# Patient Record
Sex: FEMALE | Race: WHITE | NOT HISPANIC OR LATINO | Employment: OTHER | URBAN - METROPOLITAN AREA
[De-identification: names, ages, dates, MRNs, and addresses within clinical notes are randomized per-mention and may not be internally consistent; named-entity substitution may affect disease eponyms.]

---

## 2017-03-21 ENCOUNTER — ALLSCRIPTS OFFICE VISIT (OUTPATIENT)
Dept: OTHER | Facility: OTHER | Age: 82
End: 2017-03-21

## 2017-03-23 ENCOUNTER — HOSPITAL ENCOUNTER (OUTPATIENT)
Dept: RADIOLOGY | Facility: HOSPITAL | Age: 82
Discharge: HOME | End: 2017-03-23
Attending: FAMILY MEDICINE
Payer: MEDICARE

## 2017-03-23 ENCOUNTER — TRANSCRIBE ORDERS (OUTPATIENT)
Dept: RADIOLOGY | Facility: HOSPITAL | Age: 82
End: 2017-03-23

## 2017-03-23 ENCOUNTER — ALLSCRIPTS OFFICE VISIT (OUTPATIENT)
Dept: OTHER | Facility: OTHER | Age: 82
End: 2017-03-23

## 2017-03-23 DIAGNOSIS — M54.9 DORSALGIA: ICD-10-CM

## 2017-03-23 PROCEDURE — 72110 X-RAY EXAM L-2 SPINE 4/>VWS: CPT

## 2017-04-06 ENCOUNTER — GENERIC CONVERSION - ENCOUNTER (OUTPATIENT)
Dept: OTHER | Facility: OTHER | Age: 82
End: 2017-04-06

## 2017-04-06 ENCOUNTER — ALLSCRIPTS OFFICE VISIT (OUTPATIENT)
Dept: OTHER | Facility: OTHER | Age: 82
End: 2017-04-06

## 2017-06-22 ENCOUNTER — ALLSCRIPTS OFFICE VISIT (OUTPATIENT)
Dept: OTHER | Facility: OTHER | Age: 82
End: 2017-06-22

## 2017-06-27 ENCOUNTER — APPOINTMENT (OUTPATIENT)
Dept: PHYSICAL THERAPY | Age: 82
End: 2017-06-27
Payer: MEDICARE

## 2017-06-27 PROCEDURE — G8990 OTHER PT/OT CURRENT STATUS: HCPCS

## 2017-06-27 PROCEDURE — 97163 PT EVAL HIGH COMPLEX 45 MIN: CPT

## 2017-06-27 PROCEDURE — G8991 OTHER PT/OT GOAL STATUS: HCPCS

## 2017-06-30 ENCOUNTER — APPOINTMENT (OUTPATIENT)
Dept: PHYSICAL THERAPY | Age: 82
End: 2017-06-30
Payer: MEDICARE

## 2017-07-07 ENCOUNTER — APPOINTMENT (OUTPATIENT)
Dept: PHYSICAL THERAPY | Age: 82
End: 2017-07-07
Payer: MEDICARE

## 2017-07-10 ENCOUNTER — APPOINTMENT (OUTPATIENT)
Dept: PHYSICAL THERAPY | Age: 82
End: 2017-07-10
Payer: MEDICARE

## 2017-07-10 PROCEDURE — 97112 NEUROMUSCULAR REEDUCATION: CPT

## 2017-07-10 PROCEDURE — 97014 ELECTRIC STIMULATION THERAPY: CPT

## 2017-07-13 ENCOUNTER — APPOINTMENT (OUTPATIENT)
Dept: PHYSICAL THERAPY | Age: 82
End: 2017-07-13
Payer: MEDICARE

## 2017-07-13 PROCEDURE — G8991 OTHER PT/OT GOAL STATUS: HCPCS | Performed by: PHYSICAL THERAPIST

## 2017-07-13 PROCEDURE — G8990 OTHER PT/OT CURRENT STATUS: HCPCS | Performed by: PHYSICAL THERAPIST

## 2017-07-13 PROCEDURE — 97110 THERAPEUTIC EXERCISES: CPT

## 2017-07-13 PROCEDURE — 97014 ELECTRIC STIMULATION THERAPY: CPT

## 2017-07-17 ENCOUNTER — APPOINTMENT (OUTPATIENT)
Dept: PHYSICAL THERAPY | Age: 82
End: 2017-07-17
Payer: MEDICARE

## 2017-07-19 ENCOUNTER — APPOINTMENT (OUTPATIENT)
Dept: PHYSICAL THERAPY | Age: 82
End: 2017-07-19
Payer: MEDICARE

## 2017-07-20 ENCOUNTER — APPOINTMENT (OUTPATIENT)
Dept: PHYSICAL THERAPY | Age: 82
End: 2017-07-20
Payer: MEDICARE

## 2017-07-22 ENCOUNTER — HOSPITAL ENCOUNTER (EMERGENCY)
Facility: HOSPITAL | Age: 82
Discharge: HOME | End: 2017-07-22
Attending: EMERGENCY MEDICINE
Payer: MEDICARE

## 2017-07-22 ENCOUNTER — APPOINTMENT (EMERGENCY)
Dept: RADIOLOGY | Facility: HOSPITAL | Age: 82
End: 2017-07-22
Payer: MEDICARE

## 2017-07-22 VITALS
OXYGEN SATURATION: 93 % | TEMPERATURE: 98 F | WEIGHT: 193 LBS | RESPIRATION RATE: 18 BRPM | HEART RATE: 108 BPM | SYSTOLIC BLOOD PRESSURE: 137 MMHG | DIASTOLIC BLOOD PRESSURE: 69 MMHG
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
## 2017-07-22 DIAGNOSIS — R07.89 CHEST WALL PAIN: ICD-10-CM

## 2017-07-22 DIAGNOSIS — F11.20 UNCOMPLICATED OPIOID DEPENDENCE (HCC): ICD-10-CM

## 2017-07-22 DIAGNOSIS — M54.9 BACK PAIN: ICD-10-CM

## 2017-07-22 LAB
ALBUMIN SERPL BCP-MCNC: 3.6 G/DL (ref 3.5–5)
ALP SERPL-CCNC: 203 U/L (ref 46–116)
ALT SERPL W P-5'-P-CCNC: 50 U/L (ref 12–78)
ANION GAP SERPL CALCULATED.3IONS-SCNC: 10 MMOL/L (ref 4–13)
AST SERPL W P-5'-P-CCNC: 37 U/L (ref 5–45)
ATRIAL RATE: 110 BPM
BACTERIA UR QL AUTO: ABNORMAL /HPF
BASOPHILS # BLD AUTO: 0.02 THOUSANDS/ΜL (ref 0–0.1)
BASOPHILS NFR BLD AUTO: 0 % (ref 0–1)
BILIRUB SERPL-MCNC: 0.8 MG/DL (ref 0.2–1)
BILIRUB UR QL STRIP: ABNORMAL
BUN SERPL-MCNC: 20 MG/DL (ref 5–25)
CALCIUM SERPL-MCNC: 9.5 MG/DL (ref 8.3–10.1)
CHLORIDE SERPL-SCNC: 100 MMOL/L (ref 100–108)
CLARITY UR: CLEAR
CO2 SERPL-SCNC: 26 MMOL/L (ref 21–32)
COLOR UR: YELLOW
CREAT SERPL-MCNC: 1.06 MG/DL (ref 0.6–1.3)
EOSINOPHIL # BLD AUTO: 0.11 THOUSAND/ΜL (ref 0–0.61)
EOSINOPHIL NFR BLD AUTO: 1 % (ref 0–6)
ERYTHROCYTE [DISTWIDTH] IN BLOOD BY AUTOMATED COUNT: 13 % (ref 11.6–15.1)
GFR SERPL CREATININE-BSD FRML MDRD: 49.9 ML/MIN/1.73SQ M
GLUCOSE SERPL-MCNC: 216 MG/DL (ref 65–140)
GLUCOSE UR STRIP-MCNC: NEGATIVE MG/DL
HCT VFR BLD AUTO: 44.4 % (ref 34.8–46.1)
HGB BLD-MCNC: 14.8 G/DL (ref 11.5–15.4)
HGB UR QL STRIP.AUTO: ABNORMAL
HYALINE CASTS #/AREA URNS LPF: ABNORMAL /LPF
KETONES UR STRIP-MCNC: NEGATIVE MG/DL
LEUKOCYTE ESTERASE UR QL STRIP: NEGATIVE
LIPASE SERPL-CCNC: 104 U/L (ref 73–393)
LYMPHOCYTES # BLD AUTO: 3.38 THOUSANDS/ΜL (ref 0.6–4.47)
LYMPHOCYTES NFR BLD AUTO: 24 % (ref 14–44)
MCH RBC QN AUTO: 29.5 PG (ref 26.8–34.3)
MCHC RBC AUTO-ENTMCNC: 33.3 G/DL (ref 31.4–37.4)
MCV RBC AUTO: 89 FL (ref 82–98)
MONOCYTES # BLD AUTO: 1.07 THOUSAND/ΜL (ref 0.17–1.22)
MONOCYTES NFR BLD AUTO: 7 % (ref 4–12)
NEUTROPHILS # BLD AUTO: 9.78 THOUSANDS/ΜL (ref 1.85–7.62)
NEUTS SEG NFR BLD AUTO: 68 % (ref 43–75)
NITRITE UR QL STRIP: NEGATIVE
NON-SQ EPI CELLS URNS QL MICRO: ABNORMAL /HPF
NRBC BLD AUTO-RTO: 0 /100 WBCS
P AXIS: 66 DEGREES
PH UR STRIP.AUTO: 5.5 [PH] (ref 4.5–8)
PLATELET # BLD AUTO: 312 THOUSANDS/UL (ref 149–390)
PMV BLD AUTO: 11.4 FL (ref 8.9–12.7)
POTASSIUM SERPL-SCNC: 4.2 MMOL/L (ref 3.5–5.3)
PR INTERVAL: 160 MS
PROT SERPL-MCNC: 8.1 G/DL (ref 6.4–8.2)
PROT UR STRIP-MCNC: ABNORMAL MG/DL
QRS AXIS: 47 DEGREES
QRSD INTERVAL: 84 MS
QT INTERVAL: 344 MS
QTC INTERVAL: 465 MS
RBC # BLD AUTO: 5.01 MILLION/UL (ref 3.81–5.12)
RBC #/AREA URNS AUTO: ABNORMAL /HPF
SODIUM SERPL-SCNC: 136 MMOL/L (ref 136–145)
SP GR UR STRIP.AUTO: >=1.03 (ref 1–1.03)
SPECIMEN SOURCE: NORMAL
T WAVE AXIS: 82 DEGREES
TROPONIN I BLD-MCNC: 0.01 NG/ML (ref 0–0.08)
UROBILINOGEN UR QL STRIP.AUTO: 0.2 E.U./DL
VENTRICULAR RATE: 110 BPM
WBC # BLD AUTO: 14.4 THOUSAND/UL (ref 4.31–10.16)
WBC #/AREA URNS AUTO: ABNORMAL /HPF

## 2017-07-22 PROCEDURE — 80053 COMPREHEN METABOLIC PANEL: CPT | Performed by: EMERGENCY MEDICINE

## 2017-07-22 PROCEDURE — 70450 CT HEAD/BRAIN W/O DYE: CPT

## 2017-07-22 PROCEDURE — 96374 THER/PROPH/DIAG INJ IV PUSH: CPT

## 2017-07-22 PROCEDURE — 85025 COMPLETE CBC W/AUTO DIFF WBC: CPT | Performed by: EMERGENCY MEDICINE

## 2017-07-22 PROCEDURE — 72125 CT NECK SPINE W/O DYE: CPT

## 2017-07-22 PROCEDURE — 93005 ELECTROCARDIOGRAM TRACING: CPT | Performed by: EMERGENCY MEDICINE

## 2017-07-22 PROCEDURE — 36415 COLL VENOUS BLD VENIPUNCTURE: CPT | Performed by: EMERGENCY MEDICINE

## 2017-07-22 PROCEDURE — 83690 ASSAY OF LIPASE: CPT | Performed by: EMERGENCY MEDICINE

## 2017-07-22 PROCEDURE — 96375 TX/PRO/DX INJ NEW DRUG ADDON: CPT

## 2017-07-22 PROCEDURE — 71250 CT THORAX DX C-: CPT

## 2017-07-22 PROCEDURE — 81002 URINALYSIS NONAUTO W/O SCOPE: CPT | Performed by: EMERGENCY MEDICINE

## 2017-07-22 PROCEDURE — 74176 CT ABD & PELVIS W/O CONTRAST: CPT

## 2017-07-22 PROCEDURE — 99284 EMERGENCY DEPT VISIT MOD MDM: CPT

## 2017-07-22 PROCEDURE — 84484 ASSAY OF TROPONIN QUANT: CPT

## 2017-07-22 PROCEDURE — 81001 URINALYSIS AUTO W/SCOPE: CPT

## 2017-07-22 RX ORDER — OXYCODONE HYDROCHLORIDE 5 MG/1
5 TABLET ORAL ONCE
Status: COMPLETED | OUTPATIENT
Start: 2017-07-22 | End: 2017-07-22

## 2017-07-22 RX ORDER — MORPHINE SULFATE 4 MG/ML
4 INJECTION, SOLUTION INTRAMUSCULAR; INTRAVENOUS ONCE
Status: COMPLETED | OUTPATIENT
Start: 2017-07-22 | End: 2017-07-22

## 2017-07-22 RX ORDER — FENTANYL CITRATE 50 UG/ML
50 INJECTION, SOLUTION INTRAMUSCULAR; INTRAVENOUS ONCE
Status: COMPLETED | OUTPATIENT
Start: 2017-07-22 | End: 2017-07-22

## 2017-07-22 RX ORDER — ACETAMINOPHEN 325 MG/1
975 TABLET ORAL ONCE
Status: COMPLETED | OUTPATIENT
Start: 2017-07-22 | End: 2017-07-22

## 2017-07-22 RX ADMIN — MORPHINE SULFATE 4 MG: 4 INJECTION, SOLUTION INTRAMUSCULAR; INTRAVENOUS at 03:44

## 2017-07-22 RX ADMIN — OXYCODONE HYDROCHLORIDE 5 MG: 5 TABLET ORAL at 05:04

## 2017-07-22 RX ADMIN — ACETAMINOPHEN 975 MG: 325 TABLET, FILM COATED ORAL at 05:02

## 2017-07-22 RX ADMIN — FENTANYL CITRATE 50 MCG: 50 INJECTION INTRAMUSCULAR; INTRAVENOUS at 03:02

## 2017-07-22 RX ADMIN — MORPHINE SULFATE 4 MG: 4 INJECTION, SOLUTION INTRAMUSCULAR; INTRAVENOUS at 03:16

## 2017-07-24 ENCOUNTER — APPOINTMENT (OUTPATIENT)
Dept: PHYSICAL THERAPY | Age: 82
End: 2017-07-24
Payer: MEDICARE

## 2017-07-26 ENCOUNTER — APPOINTMENT (OUTPATIENT)
Dept: PHYSICAL THERAPY | Age: 82
End: 2017-07-26
Payer: MEDICARE

## 2017-10-26 ENCOUNTER — GENERIC CONVERSION - ENCOUNTER (OUTPATIENT)
Dept: OTHER | Facility: OTHER | Age: 82
End: 2017-10-26

## 2017-11-01 ENCOUNTER — ALLSCRIPTS OFFICE VISIT (OUTPATIENT)
Dept: OTHER | Facility: OTHER | Age: 82
End: 2017-11-01

## 2017-11-02 NOTE — PROGRESS NOTES
Assessment  1  Chronic migraine without aura (346 70) (G43 709)    Plan  Chronic back pain greater than 3 months duration, Chronic migraine without aura,  Health Maintenance    · Follow-up visit in 6 months Evaluation and Treatment  Follow-up with Mackenzie  Status:  Complete  Done: [id number]   Ordered; For: Chronic back pain greater than 3 months duration, Chronic migraine without aura, Health Maintenance; Ordered By: Rehan Eubanks Performed:  Due: [id number]; Last Updated By: Frank Craven; 11/1/2017 12:01:08 PM    Discussion/Summary  Discussion Summary:   She is to continue to botox  Will change her botox to q 4 months and continue to see if we can wean her off of the botox slowly  Chief Complaint  Chief Complaint Free Text Note Form: Pt is here for a f/u      History of Present Illness  HPI: We had the pleasure of evaluating Dalton Dorado in neurological follow up today  As you know she is a [de-identified]year old right handed female  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  She is here today for headaches  Has history of breast cancer in 1992 with left breast lumpectomy and 1995 had mastectomy  Migraine headaches: meds used: Metoprolol, Methocarbamol, Benadryl, escitalopram, lorazepam, Depakote, olanzapine, Tizanidine, Lisinoprilmeds used: OxyContin, Reglan, Headache trigger: Fatigue, Stress/Tensionnone  last seen headaches have improved with Botox injections q 3 months  Since starting botox, the patient reports greater than 7 days of migraine relief from baseline, correlated with headache diary, decreased abortive medication use and decreased ER visits    often do the headaches occur - one with botox nowtime of the day do the headaches start - varieslong do the headaches last - few hours to daysare they located â occipital and temporalis the intensity of pain â 3 to 10/10your usual headache - Throbbing, Poundingsymptoms: nauseaPhotophobia, phonophobiaprefer to be alone and in a dark room, unable to work  reviewed personally       Review of Systems  Neurological ROS:   Constitutional: no fever, no chills, no recent weight gain, no recent weight loss, no complaints of feeling tired, no changes in appetite  HEENT:  no sinus problems, not feeling congested, no blurred vision, no dryness of the eyes, no eye pain, no hearing loss, no tinnitus, no mouth sores, no sore throat, no hoarseness, no dysphagia, no masses, no bleeding  Cardiovascular:  no chest pain or pressure, no palpitations present, the heart rate was not rapid or irregular, no swelling in the arms or legs, no poor circulation  Respiratory:  no unusual or persistant cough, no shortness of breath with or without exertion  Gastrointestinal:  no nausea, no vomiting, no diarrhea, no abdominal pain, no changes in bowel habits, no melena, no loss of bowel control  Genitourinary:  no incontinence, no feelings of urinary urgency, no increase in frequency, no urinary hesitancy, no dysuria, no hematuria  Musculoskeletal:  no arthralgias, no myalgias, no immobility or loss of function, no head/neck/back pain, no pain while walking  Integumentary  no masses, no rash, no skin lesions, no livedo reticularis  Psychiatric:  no anxiety, no depression, no mood swings, no psychiatric hospitalizations, no sleep problems  Endocrine  no unusual weight loss or gain, no excessive urination, no excessive thirst, no hair loss or gain, no hot or cold intolerance, no menstrual period change or irregularity, no loss of sexual ability or drive, no erection difficulty, no nipple discharge  Hematologic/Lymphatic:  no unusual bleeding, no tendency for easy bruising, no clotting skin or lumps  Neurological General:  no headache, no nausea or vomiting, no lightheadedness, no convulsions, no blackouts, no syncope, no trauma, no photopsia, no increased sleepiness, no trouble falling asleep, no snoring, no awakening at night     Neurological Mental Status:  no confusion, no mood swings, no alteration or loss of consciousness, no difficulty expressing/understanding speech, no memory problems  Neurological Cranial Nerves:  no blurry or double vision, no loss of vision, no face drooping, no facial numbness or weakness, no taste or smell loss/changes, no hearing loss or ringing, no vertigo or dizziness, no dysphagia, no slurred speech  Neurological Motor findings include:  no tremor, no twitching, no cramping(pre/post exercise), no atrophy  Neurological Coordination:  no unsteadiness, no vertigo or dizziness, no clumsiness, no problems reaching for objects  Neurological Sensory:  no numbness, no pain, no tingling, does not fall when eyes closed or taking a shower  Neurological Gait:  no difficulty walking, not falling to one side, no sensation of being pushed, has not had falls  ROS Reviewed:   ROS reviewed  Active Problems  1  Chronic back pain greater than 3 months duration (724 5,338 29) (M54 9,G89 29)   2  Chronic migraine without aura (346 70) (G43 709)   3  Migraine without aura with status migrainosus (346 12) (G43 001)   4  Pleural effusion (511 9) (J90)    Past Medical History  1  History of Anxiety (300 00) (F41 9)   2  History of Asthma (493 90) (J45 909)   3  History of Breast cancer, left (174 9) (C50 912)   4  History of Depression (311) (F32 9)   5  History of Diabetes (250 00) (E11 9)   6  History of gastroesophageal reflux (GERD) (V12 79) (Z87 19)   7  History of Hyperlipidemia (272 4) (E78 5)   8  History of Hypertension (401 9) (I10)    Surgical History  1  History of Back Surgery   2  History of Cholecystectomy   3  History of Hysterectomy    Family History  Mother    1  Family history of Colon cancer   2  Family history of   Father    3  Family history of    4  FH: emphysema (V17 6) (Z82 5)   5  Family history of Stroke  Sister    6  Family history of Breast cancer   7  Family history of    6   Family history of Leukemia  Brother    9  Family history of ALS (amyotrophic lateral sclerosis)   10  Family history of   Maternal Grandmother    6  Family history of   Paternal Grandmother    15  Family history of   Maternal Grandfather    15  Family history of   Paternal Grandfather    15  Family history of     Social History   · Caffeine use (V49 89) (F15 90)   · Drinks coffee   · Denied: History of Drug use   ·    · Never a smoker   · Social alcohol use (Z78 9)    Current Meds   1  Domperidone POWD; 20mg four times a day; Therapy: (Recorded:96Kpc9314) to Recorded   2  EpiPen 2-Hector 0 3 MG/0 3ML Injection Solution Auto-injector; INJECT 0 3ML   INTRAMUSCULARLY AS DIRECTED; Therapy: (Recorded:96Fqt4633) to Recorded   3  Fluocinonide 0 05 % External Cream;   Therapy: 12CDC2530 to Recorded   4  HumaLOG KwikPen 100 UNIT/ML Subcutaneous Solution Pen-injector; as directed; Therapy: 60QPR6992 to (Evaluate:2015) Recorded   5  Hydrocod Polst-CPM Polst ER 10-8 MG/5ML Oral Suspension Extended Release; Therapy: 10BSK2823 to Recorded   6  Lantus SOLN;   Therapy: (Recorded:83Qjk4512) to Recorded   7  Lidocaine 5 % External Ointment; APPLY TO AFFECTED area prior to Botox; Therapy: 94TEG2237 to ([facility])  Requested for: 03VKV6403 Ordered   8  Lidocaine-Prilocaine 2 5-2 5 % External Cream; APPLY 1 HOUR BEFORE PROCEDURE   AS DIRECTED; Therapy: 42MPO7525 to (Last [facility])  Requested for: 63TRI5847 Ordered   9  LORazepam 1 MG Oral Tablet; Therapy: 17OIX0664 to Recorded   10  Metoprolol Tartrate 50 MG Oral Tablet; TAKE 2 TABLETS DAILY; Therapy: 66Sdi7746 to Recorded   11  NovoLOG FlexPen 100 UNIT/ML Subcutaneous Solution Pen-injector; Therapy: 62XLG1913 to Recorded   12  OLANZapine 5 MG Oral Tablet; 1 po at bedtime PRN headache; Therapy: 81DHH3917 to (Evaluate:2017)  Requested for: 2017; Last    Rx:2017 Ordered   13   Oxybutynin Chloride ER 15 MG Oral Tablet Extended Release 24 Hour; Take 1 tablet    daily; Therapy: 81FIX1558 to (Evaluate:06Oct2014) Recorded   14  Prevacid 30 MG Oral Capsule Delayed Release; Therapy: (Recorded:49Xsn7327) to Recorded   15  Simvastatin 20 MG Oral Tablet; TAKE 1 TABLET DAILY AT BEDTIME; Therapy: 23RLM6992 to (Evaluate:05Fys3812) Recorded    Allergies  1  Betadine MISC   2  iodine    Vitals  Signs   Recorded: 65EGP3205 11:32AM   Heart Rate: 90  Systolic: 041  Diastolic: 67  Weight: 456 lb   BMI Calculated: 33 13  BSA Calculated: 1 9    Physical Exam    Constitutional   General appearance: No acute distress, well appearing and well nourished  Eyes   Ophthalmoscopic examination: Vision is grossly normal  Gross visual field testing by confrontation shows no abnormalities  EOMI in both eyes  Conjunctivae clear  Eyelids normal palpebral fissures equal  Orbits exhibit normal position  No discharge from the eyes  PERRL  Musculoskeletal   Gait and station: Abnormal   Gait evaluation demonstrated a wide-based and ataxic gait  Muscle strength: Normal strength throughout  -- left hand in a brace , unable to tchekc left upper ext  Muscle tone: No atrophy, abnormal movements, flaccidity, cogwheeling or spasticity      Neurologic   Orientation to person, place, and time: Normal     2nd cranial nerve: Normal     3rd, 4th, and 6th cranial nerves: Normal     5th cranial nerve: Normal     7th cranial nerve: Normal     8th cranial nerve: Normal     9th cranial nerve: Normal     11th cranial nerve: Normal     12th cranial nerve: Normal     Sensation: Normal     Reflexes: Normal     Coordination: Normal     Mood and affect: Normal        Future Appointments    Date/Time Provider Specialty Site   03/01/2018 02:00 PM Tonia Nicolas [facility] Neurology [facility] 21     Signatures   Electronically signed by : Liz Colorado MD; Nov 1 2017  1:41PM EST                       (Author)

## 2018-01-12 VITALS
WEIGHT: 198.03 LBS | DIASTOLIC BLOOD PRESSURE: 67 MMHG | BODY MASS INDEX: 34.53 KG/M2 | HEART RATE: 63 BPM | TEMPERATURE: 98.6 F | SYSTOLIC BLOOD PRESSURE: 147 MMHG

## 2018-01-13 VITALS
HEART RATE: 90 BPM | WEIGHT: 190 LBS | DIASTOLIC BLOOD PRESSURE: 67 MMHG | BODY MASS INDEX: 33.13 KG/M2 | SYSTOLIC BLOOD PRESSURE: 139 MMHG

## 2018-01-13 VITALS
SYSTOLIC BLOOD PRESSURE: 132 MMHG | OXYGEN SATURATION: 93 % | DIASTOLIC BLOOD PRESSURE: 68 MMHG | RESPIRATION RATE: 18 BRPM | TEMPERATURE: 97.7 F | HEART RATE: 70 BPM

## 2018-01-13 VITALS — TEMPERATURE: 98.3 F | SYSTOLIC BLOOD PRESSURE: 122 MMHG | DIASTOLIC BLOOD PRESSURE: 57 MMHG

## 2018-01-14 VITALS
HEART RATE: 103 BPM | HEIGHT: 64 IN | TEMPERATURE: 98.1 F | RESPIRATION RATE: 16 BRPM | WEIGHT: 192 LBS | DIASTOLIC BLOOD PRESSURE: 76 MMHG | SYSTOLIC BLOOD PRESSURE: 132 MMHG | OXYGEN SATURATION: 95 % | BODY MASS INDEX: 32.78 KG/M2

## 2018-01-18 NOTE — MISCELLANEOUS
Message   Recorded as Task   Date: 03/30/2017 01:22 PM, Created By: Brandt Jacobs   Task Name: Follow Up   Assigned To: Jeffrey Schneider   Regarding Patient: Veola Hashimoto, Status: Active   Comment:    Brandt Jacobs - 30 Mar 2017 1:22 PM     TASK CREATED  Caller: Self; [phone] (Home); [phone] (Work)  Pt would like to know if it is to combine Nortriptyline, Duloxetine, Oxycodone, and Tizanidine  Please advise     Pt advised not to take tizanidine at night, to take duloxetine in AM, and nortriptyline in PM       Signatures   Electronically signed by : NGOZI Farnsworth ; Apr 6 2017 12:20AM EST                       (Author)

## 2018-01-22 VITALS — SYSTOLIC BLOOD PRESSURE: 118 MMHG | DIASTOLIC BLOOD PRESSURE: 72 MMHG | TEMPERATURE: 97.8 F

## 2018-03-28 ENCOUNTER — PROCEDURE VISIT (OUTPATIENT)
Dept: NEUROLOGY | Facility: CLINIC | Age: 83
End: 2018-03-28
Payer: MEDICARE

## 2018-03-28 VITALS — DIASTOLIC BLOOD PRESSURE: 72 MMHG | SYSTOLIC BLOOD PRESSURE: 148 MMHG | TEMPERATURE: 97.7 F

## 2018-03-28 DIAGNOSIS — G43.709 CHRONIC MIGRAINE WITHOUT AURA WITHOUT STATUS MIGRAINOSUS, NOT INTRACTABLE: Primary | ICD-10-CM

## 2018-03-28 PROCEDURE — 64615 CHEMODENERV MUSC MIGRAINE: CPT | Performed by: PSYCHIATRY & NEUROLOGY

## 2018-03-28 NOTE — PROGRESS NOTES
Chemodenervation  Date/Time: 3/28/2018 3:06 PM  Performed by: Boom Orona  Authorized by: Jessie Serna details:     Prepped With: Alcohol    Anesthesia (see MAR for exact dosages): Anesthesia method:  None  Procedure details:     Position:  Upright  Botox:     Botox Type:  Type A    Brand:  Botox    mL's of Botulinum Toxin:  155    Final Concentration per CC:  200 units    Needle Gauge:  30 G 2 5 inch  Procedures:     Botox Procedures: chronic headache      Indications: migraines    Injection Location:     Head / Face:  L superior cervical paraspinal, R superior cervical paraspinal, L , R , L frontalis, R frontalis, L medial occipitalis, R medial occipitalis, procerus, R temporalis, L temporalis, R superior trapezius and L superior trapezius    L  injection amount:  5 unit(s)    R  injection amount:  5 unit(s)    L lateral frontalis:  0 unit(s)    R lateral frontalis:  0 unit(s)    L medial frontalis:  0 unit(s)    R medial frontalis:  0 unit(s)    L temporalis injection amount:  20 unit(s)    R temporalis injection amount:  20 unit(s)    Procerus injection amount:  5 unit(s)    L medial occipitalis injection amount:  20 unit(s)    R medial occipitalis injection amount:  20 unit(s)    L superior cervical paraspinal injection amount:  10 unit(s)    R superior cervical paraspinal injection amount:  10 unit(s)    L superior trapezius injection amount:  15 unit(s)    R superior trapezius injection amount:  15 unit(s)  Total Units:     Total units used:  155    Total units discarded:  45  Post-procedure details:     Chemodenervation:  Chronic migraine    Patient tolerance of procedure: Tolerated well, no immediate complications  Comments:      10  Units given in the apex of hers scalp

## 2018-05-15 NOTE — PROGRESS NOTES
Patient ID: Mary Suarez is a [de-identified] y o  female  Assessment/Plan:    Chronic migraine without aura without status migrainosus, not intractable  Preventative:  Continue your Botox every 3 months  Continue Metoprolol twice a day    Abortive: At onset of migraine use the Ketorolac and heating pad  Problem List Items Addressed This Visit        Cardiovascular and Mediastinum    Migraine without status migrainosus - Primary    Relevant Medications    ketorolac (TORADOL) 10 mg tablet    Chronic migraine without aura without status migrainosus, not intractable     Preventative:  Continue your Botox every 3 months  Continue Metoprolol twice a day    Abortive: At onset of migraine use the Ketorolac and heating pad  Relevant Medications    ketorolac (TORADOL) 10 mg tablet             Subjective:    HPI  We had the pleasure of evaluating Mary Suarez in neurological follow up today  As you know she is a [de-identified]year old right handed female  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  She is here today for headaches  Has history of breast cancer in 1992 with left breast lumpectomy and 1995 had mastectomy  Chronic Migraine headaches:   PREVENTIVE meds used: Metoprolol, Methocarbamol, Benadryl, escitalopram, lorazepam, Depakote, olanzapine, Tizanidine, Lisinopril, Botox  ABORTIVE meds used: OxyContin, Reglan,   Headache trigger: Fatigue, Stress/Tension  Aura: none    Since last seen headaches have improved with Botox injections q 3 months  Since starting botox, the patient reports greater than 7 days of migraine relief from baseline, correlated with headache diary, decreased abortive medication use and decreased ER visits      How often do the headaches occur - 1-2 a month  What time of the day do the headaches start - varies  How long do the headaches last - few hours   Where are they located - occipital and temporal  What is the intensity of pain - 3 to 6/10  Describe your usual headache - Throbbing, Pounding  Associated symptoms:   - nausea  - Photophobia, phonophobia  - prefer to be alone and in a dark room, unable to work      The following portions of the patient's history were reviewed and updated as appropriate: allergies, current medications, past family history, past medical history, past social history, past surgical history and problem list          Objective:    Blood pressure (!) 176/85, pulse 81, height 5' 2" (1 575 m), weight 83 7 kg (184 lb 9 6 oz)  Physical Exam   Constitutional: She appears well-developed and well-nourished  HENT:   Head: Normocephalic and atraumatic  Eyes: EOM are normal  Pupils are equal, round, and reactive to light  Neck: Normal range of motion  Cardiovascular: Normal rate  Pulmonary/Chest: Effort normal    Musculoskeletal: Normal range of motion  Neurological: She has normal strength and normal reflexes  Gait and coordination normal    Skin: Skin is warm and dry  Psychiatric: She has a normal mood and affect  Her speech is normal    Nursing note and vitals reviewed  Neurological Exam    Mental Status  The patient is alert and oriented to person, place, time, and situation  Her recent and remote memory are normal  She has no visuospatial neglect  Her speech is normal  Her language is fluent with no aphasia  She has normal attention span and concentration  She has a normal fund of knowledge  Cranial Nerves    CN II: The patient's visual acuity and visual fields are normal   CN III, IV, VI: The patient's pupils are equally round and reactive to light and ocular movements are normal   CN V: The patient has normal facial sensation  CN VII:  The patient has symmetric facial movement  CN VIII:  The patient's hearing is normal   CN IX, X: The patient has symmetric palate movement and normal gag reflex    CN XI: The patient's shoulder shrug strength is normal   CN XII: The patient's tongue is midline without atrophy or fasciculations  Motor  The patient has normal muscle bulk throughout  Her overall muscle tone is normal throughout  Her strength is 5/5 throughout all four extremities  Sensory  The patient's sensation is normal in all four extremities  She has normal cortical sensation    Reflexes  Deep tendon reflexes are 2+ and symmetric in all four extremities with downgoing toes bilaterally  Gait and Coordination  The patient has normal gait and station and normal casual, toe, heel, and tandem gait  She has normal coordination bilaterally  ROS:    Review of Systems   Constitutional: Negative  HENT: Positive for sneezing  Itching throat    Eyes: Negative  Respiratory: Positive for cough  Cardiovascular: Negative  Gastrointestinal: Negative  Endocrine: Negative  Genitourinary: Negative  Musculoskeletal: Positive for gait problem (ambulates with cane ) and neck pain  Skin: Negative  Allergic/Immunologic: Positive for environmental allergies  Neurological: Positive for light-headedness  Hematological: Negative  Psychiatric/Behavioral: Positive for sleep disturbance

## 2018-05-17 ENCOUNTER — OFFICE VISIT (OUTPATIENT)
Dept: NEUROLOGY | Facility: CLINIC | Age: 83
End: 2018-05-17
Payer: MEDICARE

## 2018-05-17 VITALS
HEART RATE: 81 BPM | WEIGHT: 184.6 LBS | BODY MASS INDEX: 33.97 KG/M2 | DIASTOLIC BLOOD PRESSURE: 85 MMHG | HEIGHT: 62 IN | SYSTOLIC BLOOD PRESSURE: 176 MMHG

## 2018-05-17 DIAGNOSIS — G43.009 MIGRAINE WITHOUT AURA AND WITHOUT STATUS MIGRAINOSUS, NOT INTRACTABLE: Primary | ICD-10-CM

## 2018-05-17 DIAGNOSIS — G43.709 CHRONIC MIGRAINE WITHOUT AURA WITHOUT STATUS MIGRAINOSUS, NOT INTRACTABLE: ICD-10-CM

## 2018-05-17 PROCEDURE — 99214 OFFICE O/P EST MOD 30 MIN: CPT | Performed by: PHYSICIAN ASSISTANT

## 2018-05-17 RX ORDER — INSULIN ASPART 100 [IU]/ML
INJECTION, SOLUTION INTRAVENOUS; SUBCUTANEOUS
Refills: 6 | COMMUNITY
Start: 2018-04-16 | End: 2019-06-18 | Stop reason: SDUPTHER

## 2018-05-17 RX ORDER — KETOROLAC TROMETHAMINE 10 MG/1
10 TABLET, FILM COATED ORAL AS NEEDED
COMMUNITY
End: 2018-10-18 | Stop reason: SDUPTHER

## 2018-05-17 RX ORDER — INSULIN GLARGINE 300 U/ML
50 INJECTION, SOLUTION SUBCUTANEOUS
COMMUNITY
Start: 2018-05-02 | End: 2019-06-18 | Stop reason: SDUPTHER

## 2018-05-17 RX ORDER — FLUTICASONE PROPIONATE 220 UG/1
AEROSOL, METERED RESPIRATORY (INHALATION)
Refills: 6 | COMMUNITY
Start: 2018-05-14

## 2018-05-17 RX ORDER — EPINEPHRINE 0.3 MG/.3ML
0.3 INJECTION SUBCUTANEOUS
COMMUNITY

## 2018-05-17 NOTE — PATIENT INSTRUCTIONS
Preventative:  Continue your Botox every 3 months  Continue Metoprolol twice a day    Abortive: At onset of migraine use the Ketorolac and heating pad  May take these over-the-counter supplements to decrease intensity and frequency of migraines  - Magnesium Oxide 400-500 mg a day  If any diarrhea or upset stomach, decrease dose  as tolerated  -  B2 200 mg a day  May take once a day in am  This supplement will change the color of the urine to fluorescent yellow no matter how hydrated, which is normal      Discussed side effects of all medications prescribed today to the patient in detail  Patient education was completed today and we also discussed precautions for rebound headaches  When patient has a moderate to severe headache, they should seek rest, initiate relaxation and apply cold compresses to the head  1  Maintain regular sleep schedule  Adults need at least 7-8 hours of uninterrupted a night  2  Limit over the counter medications such as Tylenol, Ibuprofen, Aleve, Excedrin  (No more than 3 times a week)  3  Maintain headache diary  We discussed an HEATHER for a smart phone is "Migraine eDiary"  4  Limit caffeine to 1-2 cups a day or less  5  Avoid dietary trigger  (list given to the patient and reviewed with them)  6  Patient is to have regular frequent meals to prevent headache onset  7   Please drink at least 64 ounces of water a day to help remain hydrated

## 2018-05-17 NOTE — ASSESSMENT & PLAN NOTE
Preventative:  Continue your Botox every 3 months  Continue Metoprolol twice a day    Abortive: At onset of migraine use the Ketorolac and heating pad

## 2018-06-15 ENCOUNTER — LAB REQUISITION (OUTPATIENT)
Dept: LAB | Facility: HOSPITAL | Age: 83
End: 2018-06-15
Payer: MEDICARE

## 2018-06-15 DIAGNOSIS — K22.70 BARRETT'S ESOPHAGUS WITHOUT DYSPLASIA: ICD-10-CM

## 2018-06-15 PROCEDURE — 88305 TISSUE EXAM BY PATHOLOGIST: CPT | Performed by: PATHOLOGY

## 2018-06-25 NOTE — PROGRESS NOTES
Chemodenervation  Date/Time: 6/27/2018 3:13 PM  Performed by: El Knowles by: Azul Ojeda details:     Prepped With: Alcohol    Anesthesia (see MAR for exact dosages): Anesthesia method:  None  Procedure details:     Position:  Upright  Botox:     Botox Type:  Type A    Brand:  Botox    Final Concentration per CC:  200 units    Needle Gauge:  30 G 2 5 inch  Procedures:     Botox Procedures: chronic headache      Indications: migraines    Injection Location:     Head / Face:  L superior cervical paraspinal, R superior cervical paraspinal, L , R , L frontalis, R frontalis, L medial occipitalis, R medial occipitalis, procerus, R temporalis, L temporalis, R superior trapezius and L superior trapezius    L temporalis injection amount:  20 unit(s)    R temporalis injection amount:  20 unit(s)    Procerus injection amount:  5 unit(s)    L medial occipitalis injection amount:  15 unit(s)    R medial occipitalis injection amount:  15 unit(s)    L superior cervical paraspinal injection amount:  10 unit(s)    R superior cervical paraspinal injection amount:  10 unit(s)    L superior trapezius injection amount:  15 unit(s)    R superior trapezius injection amount:  15 unit(s)  Post-procedure details:     Chemodenervation:  Chronic migraine    Facial Nerve Location[de-identified]  Bilateral facial nerve    Patient tolerance of procedure:   Tolerated well, no immediate complications    30 Units given in the occipital region bilaterally

## 2018-06-27 ENCOUNTER — PROCEDURE VISIT (OUTPATIENT)
Dept: NEUROLOGY | Facility: CLINIC | Age: 83
End: 2018-06-27
Payer: MEDICARE

## 2018-06-27 VITALS — HEART RATE: 73 BPM | SYSTOLIC BLOOD PRESSURE: 178 MMHG | TEMPERATURE: 98.1 F | DIASTOLIC BLOOD PRESSURE: 85 MMHG

## 2018-06-27 DIAGNOSIS — G43.709 CHRONIC MIGRAINE WITHOUT AURA WITHOUT STATUS MIGRAINOSUS, NOT INTRACTABLE: Primary | ICD-10-CM

## 2018-06-27 PROCEDURE — 64615 CHEMODENERV MUSC MIGRAINE: CPT | Performed by: PSYCHIATRY & NEUROLOGY

## 2018-09-24 ENCOUNTER — HOSPITAL ENCOUNTER (OUTPATIENT)
Facility: HOSPITAL | Age: 83
Setting detail: OBSERVATION
Discharge: HOME | End: 2018-09-25
Attending: EMERGENCY MEDICINE | Admitting: HOSPITALIST
Payer: MEDICARE

## 2018-09-24 ENCOUNTER — APPOINTMENT (EMERGENCY)
Dept: RADIOLOGY | Facility: HOSPITAL | Age: 83
End: 2018-09-24
Payer: MEDICARE

## 2018-09-24 DIAGNOSIS — R07.9 CHEST PAIN: Primary | ICD-10-CM

## 2018-09-24 PROBLEM — I50.9 CHRONIC HEART FAILURE (HCC): Status: ACTIVE | Noted: 2018-09-24

## 2018-09-24 LAB
ALBUMIN SERPL BCP-MCNC: 3.3 G/DL (ref 3.5–5)
ALP SERPL-CCNC: 154 U/L (ref 46–116)
ALT SERPL W P-5'-P-CCNC: 40 U/L (ref 12–78)
ANION GAP SERPL CALCULATED.3IONS-SCNC: 6 MMOL/L (ref 4–13)
AST SERPL W P-5'-P-CCNC: 37 U/L (ref 5–45)
ATRIAL RATE: 74 BPM
ATRIAL RATE: 75 BPM
ATRIAL RATE: 79 BPM
BASOPHILS # BLD AUTO: 0.02 THOUSANDS/ΜL (ref 0–0.1)
BASOPHILS NFR BLD AUTO: 0 % (ref 0–1)
BILIRUB SERPL-MCNC: 0.94 MG/DL (ref 0.2–1)
BUN SERPL-MCNC: 20 MG/DL (ref 5–25)
CALCIUM SERPL-MCNC: 9 MG/DL (ref 8.3–10.1)
CHLORIDE SERPL-SCNC: 104 MMOL/L (ref 100–108)
CO2 SERPL-SCNC: 25 MMOL/L (ref 21–32)
CREAT SERPL-MCNC: 1.01 MG/DL (ref 0.6–1.3)
DEPRECATED D DIMER PPP: 483 NG/ML (FEU) (ref 0–424)
EOSINOPHIL # BLD AUTO: 0.15 THOUSAND/ΜL (ref 0–0.61)
EOSINOPHIL NFR BLD AUTO: 1 % (ref 0–6)
ERYTHROCYTE [DISTWIDTH] IN BLOOD BY AUTOMATED COUNT: 12.7 % (ref 11.6–15.1)
GFR SERPL CREATININE-BSD FRML MDRD: 52 ML/MIN/1.73SQ M
GLUCOSE SERPL-MCNC: 176 MG/DL (ref 65–140)
GLUCOSE SERPL-MCNC: 207 MG/DL (ref 65–140)
HCT VFR BLD AUTO: 45.4 % (ref 34.8–46.1)
HGB BLD-MCNC: 14.6 G/DL (ref 11.5–15.4)
IMM GRANULOCYTES # BLD AUTO: 0.04 THOUSAND/UL (ref 0–0.2)
IMM GRANULOCYTES NFR BLD AUTO: 0 % (ref 0–2)
LYMPHOCYTES # BLD AUTO: 2.86 THOUSANDS/ΜL (ref 0.6–4.47)
LYMPHOCYTES NFR BLD AUTO: 24 % (ref 14–44)
MCH RBC QN AUTO: 29.1 PG (ref 26.8–34.3)
MCHC RBC AUTO-ENTMCNC: 32.2 G/DL (ref 31.4–37.4)
MCV RBC AUTO: 90 FL (ref 82–98)
MONOCYTES # BLD AUTO: 0.8 THOUSAND/ΜL (ref 0.17–1.22)
MONOCYTES NFR BLD AUTO: 7 % (ref 4–12)
NEUTROPHILS # BLD AUTO: 7.97 THOUSANDS/ΜL (ref 1.85–7.62)
NEUTS SEG NFR BLD AUTO: 68 % (ref 43–75)
NRBC BLD AUTO-RTO: 0 /100 WBCS
NT-PROBNP SERPL-MCNC: 176 PG/ML
P AXIS: 31 DEGREES
P AXIS: 43 DEGREES
P AXIS: 56 DEGREES
PLATELET # BLD AUTO: 214 THOUSANDS/UL (ref 149–390)
PLATELET # BLD AUTO: 264 THOUSANDS/UL (ref 149–390)
PMV BLD AUTO: 11.7 FL (ref 8.9–12.7)
PMV BLD AUTO: 11.8 FL (ref 8.9–12.7)
POTASSIUM SERPL-SCNC: 4.2 MMOL/L (ref 3.5–5.3)
PR INTERVAL: 150 MS
PR INTERVAL: 156 MS
PR INTERVAL: 158 MS
PROT SERPL-MCNC: 7.2 G/DL (ref 6.4–8.2)
QRS AXIS: 17 DEGREES
QRS AXIS: 19 DEGREES
QRS AXIS: 29 DEGREES
QRSD INTERVAL: 72 MS
QRSD INTERVAL: 74 MS
QRSD INTERVAL: 76 MS
QT INTERVAL: 352 MS
QT INTERVAL: 370 MS
QT INTERVAL: 384 MS
QTC INTERVAL: 403 MS
QTC INTERVAL: 413 MS
QTC INTERVAL: 426 MS
RBC # BLD AUTO: 5.02 MILLION/UL (ref 3.81–5.12)
SODIUM SERPL-SCNC: 135 MMOL/L (ref 136–145)
T WAVE AXIS: 14 DEGREES
T WAVE AXIS: 47 DEGREES
T WAVE AXIS: 58 DEGREES
TROPONIN I SERPL-MCNC: <0.02 NG/ML
VENTRICULAR RATE: 74 BPM
VENTRICULAR RATE: 75 BPM
VENTRICULAR RATE: 79 BPM
WBC # BLD AUTO: 11.84 THOUSAND/UL (ref 4.31–10.16)

## 2018-09-24 PROCEDURE — 84484 ASSAY OF TROPONIN QUANT: CPT | Performed by: PHYSICIAN ASSISTANT

## 2018-09-24 PROCEDURE — 85049 AUTOMATED PLATELET COUNT: CPT | Performed by: PHYSICIAN ASSISTANT

## 2018-09-24 PROCEDURE — 93010 ELECTROCARDIOGRAM REPORT: CPT | Performed by: INTERNAL MEDICINE

## 2018-09-24 PROCEDURE — 96374 THER/PROPH/DIAG INJ IV PUSH: CPT

## 2018-09-24 PROCEDURE — 36415 COLL VENOUS BLD VENIPUNCTURE: CPT

## 2018-09-24 PROCEDURE — 99220 PR INITIAL OBSERVATION CARE/DAY 70 MINUTES: CPT | Performed by: INTERNAL MEDICINE

## 2018-09-24 PROCEDURE — 74150 CT ABDOMEN W/O CONTRAST: CPT

## 2018-09-24 PROCEDURE — 82948 REAGENT STRIP/BLOOD GLUCOSE: CPT

## 2018-09-24 PROCEDURE — 85025 COMPLETE CBC W/AUTO DIFF WBC: CPT | Performed by: EMERGENCY MEDICINE

## 2018-09-24 PROCEDURE — 84484 ASSAY OF TROPONIN QUANT: CPT | Performed by: EMERGENCY MEDICINE

## 2018-09-24 PROCEDURE — 85379 FIBRIN DEGRADATION QUANT: CPT | Performed by: EMERGENCY MEDICINE

## 2018-09-24 PROCEDURE — 83880 ASSAY OF NATRIURETIC PEPTIDE: CPT | Performed by: EMERGENCY MEDICINE

## 2018-09-24 PROCEDURE — 80053 COMPREHEN METABOLIC PANEL: CPT | Performed by: EMERGENCY MEDICINE

## 2018-09-24 PROCEDURE — 96376 TX/PRO/DX INJ SAME DRUG ADON: CPT

## 2018-09-24 PROCEDURE — 93005 ELECTROCARDIOGRAM TRACING: CPT

## 2018-09-24 PROCEDURE — 99285 EMERGENCY DEPT VISIT HI MDM: CPT

## 2018-09-24 PROCEDURE — 71250 CT THORAX DX C-: CPT

## 2018-09-24 RX ORDER — MUSCLE RUB CREAM 100; 150 MG/G; MG/G
1 CREAM TOPICAL 4 TIMES DAILY PRN
Status: DISCONTINUED | OUTPATIENT
Start: 2018-09-24 | End: 2018-09-25 | Stop reason: HOSPADM

## 2018-09-24 RX ORDER — PRAVASTATIN SODIUM 40 MG
40 TABLET ORAL
Status: DISCONTINUED | OUTPATIENT
Start: 2018-09-25 | End: 2018-09-25 | Stop reason: HOSPADM

## 2018-09-24 RX ORDER — MORPHINE SULFATE 15 MG/1
15 TABLET ORAL EVERY 8 HOURS PRN
Status: DISCONTINUED | OUTPATIENT
Start: 2018-09-24 | End: 2018-09-25 | Stop reason: HOSPADM

## 2018-09-24 RX ORDER — GABAPENTIN 100 MG/1
100 CAPSULE ORAL
Status: DISCONTINUED | OUTPATIENT
Start: 2018-09-24 | End: 2018-09-25 | Stop reason: HOSPADM

## 2018-09-24 RX ORDER — ACETAMINOPHEN 325 MG/1
975 TABLET ORAL ONCE
Status: COMPLETED | OUTPATIENT
Start: 2018-09-24 | End: 2018-09-24

## 2018-09-24 RX ORDER — CALCIUM CARBONATE 200(500)MG
1000 TABLET,CHEWABLE ORAL DAILY PRN
Status: DISCONTINUED | OUTPATIENT
Start: 2018-09-24 | End: 2018-09-25 | Stop reason: HOSPADM

## 2018-09-24 RX ORDER — ALBUTEROL SULFATE 90 UG/1
1 AEROSOL, METERED RESPIRATORY (INHALATION) EVERY 4 HOURS PRN
Status: DISCONTINUED | OUTPATIENT
Start: 2018-09-24 | End: 2018-09-25 | Stop reason: HOSPADM

## 2018-09-24 RX ORDER — SENNOSIDES 8.6 MG
1 TABLET ORAL
Status: DISCONTINUED | OUTPATIENT
Start: 2018-09-24 | End: 2018-09-25 | Stop reason: HOSPADM

## 2018-09-24 RX ORDER — FLUTICASONE PROPIONATE 220 UG/1
2 AEROSOL, METERED RESPIRATORY (INHALATION)
Status: DISCONTINUED | OUTPATIENT
Start: 2018-09-24 | End: 2018-09-25 | Stop reason: HOSPADM

## 2018-09-24 RX ORDER — INSULIN GLARGINE 100 [IU]/ML
50 INJECTION, SOLUTION SUBCUTANEOUS
Status: DISCONTINUED | OUTPATIENT
Start: 2018-09-24 | End: 2018-09-25 | Stop reason: HOSPADM

## 2018-09-24 RX ORDER — ACETAMINOPHEN 325 MG/1
975 TABLET ORAL EVERY 8 HOURS SCHEDULED
Status: DISCONTINUED | OUTPATIENT
Start: 2018-09-24 | End: 2018-09-25 | Stop reason: HOSPADM

## 2018-09-24 RX ORDER — ONDANSETRON 2 MG/ML
4 INJECTION INTRAMUSCULAR; INTRAVENOUS EVERY 6 HOURS PRN
Status: DISCONTINUED | OUTPATIENT
Start: 2018-09-24 | End: 2018-09-25 | Stop reason: HOSPADM

## 2018-09-24 RX ORDER — MORPHINE SULFATE 15 MG/1
15 TABLET, FILM COATED, EXTENDED RELEASE ORAL EVERY 12 HOURS PRN
COMMUNITY

## 2018-09-24 RX ORDER — PANTOPRAZOLE SODIUM 40 MG/1
40 TABLET, DELAYED RELEASE ORAL
Status: DISCONTINUED | OUTPATIENT
Start: 2018-09-25 | End: 2018-09-25 | Stop reason: HOSPADM

## 2018-09-24 RX ORDER — METOPROLOL TARTRATE 50 MG/1
50 TABLET, FILM COATED ORAL EVERY 12 HOURS SCHEDULED
COMMUNITY

## 2018-09-24 RX ADMIN — INSULIN LISPRO 2 UNITS: 100 INJECTION, SOLUTION INTRAVENOUS; SUBCUTANEOUS at 22:59

## 2018-09-24 RX ADMIN — GABAPENTIN 100 MG: 100 CAPSULE ORAL at 22:29

## 2018-09-24 RX ADMIN — HYDROMORPHONE HYDROCHLORIDE 0.5 MG: 1 INJECTION, SOLUTION INTRAMUSCULAR; INTRAVENOUS; SUBCUTANEOUS at 17:14

## 2018-09-24 RX ADMIN — HYDROMORPHONE HYDROCHLORIDE 0.5 MG: 1 INJECTION, SOLUTION INTRAMUSCULAR; INTRAVENOUS; SUBCUTANEOUS at 22:30

## 2018-09-24 RX ADMIN — METOPROLOL TARTRATE 25 MG: 25 TABLET ORAL at 22:30

## 2018-09-24 RX ADMIN — INSULIN GLARGINE 50 UNITS: 100 INJECTION, SOLUTION SUBCUTANEOUS at 22:30

## 2018-09-24 RX ADMIN — HYDROMORPHONE HYDROCHLORIDE 0.5 MG: 1 INJECTION, SOLUTION INTRAMUSCULAR; INTRAVENOUS; SUBCUTANEOUS at 18:26

## 2018-09-24 RX ADMIN — ACETAMINOPHEN 975 MG: 325 TABLET, FILM COATED ORAL at 20:59

## 2018-09-24 NOTE — ASSESSMENT & PLAN NOTE
· Suspect MSK, r/o ACS  · Serial troponins  If negative, anticipate discharge home tomorrow  · Pain control- scheduled Tylenol, heat, topical agents   Start gabapentin

## 2018-09-24 NOTE — ASSESSMENT & PLAN NOTE
· Reports history of heart failure and follows with Dr Shady Milan  · Not on diuretics- appears euvolemic  · Continue BB

## 2018-09-24 NOTE — ASSESSMENT & PLAN NOTE
· Chronic morphine use due to chronic low back pain  Continue at home dose   Breakthrough meds if needed

## 2018-09-24 NOTE — ED ATTENDING ATTESTATION
I, Desirae Hollingsworth DO, saw and evaluated the patient  I have discussed the patient with the resident/non-physician practitioner and agree with the resident's/non-physician practitioner's findings, Plan of Care, and MDM as documented in the resident's/non-physician practitioner's note, except where noted  All available labs and Radiology studies were reviewed  At this point I agree with the current assessment done in the Emergency Department  I have conducted an independent evaluation of this patient a history and physical is as follows:      Critical Care Time  CritCare Time    Procedures     82 yr old fem with acute chest pain with radiation to left back and arm 30 min PTA  No sob, nausea, sweats  Had similar a few months ago that she did not seek eval   Pain still present  Some incr with arm motion  Exm; anxious,  Pressure good  Heart: rrr  With EGK wo acute changes  Radial pulses equal bilaterally  Abd: soft and nontender  Left shoulder tender with arm use but not with palpation  Pln: dissection eval and adm for ACS screen  Heart score over 3

## 2018-09-24 NOTE — ASSESSMENT & PLAN NOTE
Lab Results   Component Value Date    HGBA1C 7 2 (H) 01/20/2015       No results for input(s): POCGLU in the last 72 hours      Blood Sugar Average: Last 72 hrs:  · Check A1C  · Continue home insulin regimen

## 2018-09-24 NOTE — H&P
H&P- Hilda William 1935, 82 y o  female MRN: [id number]    Unit/Bed#: ED 20 Encounter: [id number]    Primary Care Provider: MERARY Soni MD   Date and time admitted to hospital: 9/24/2018  4:02 PM    * Chest pain   Assessment & Plan    · Suspect MSK, r/o ACS  · Serial troponins  If negative, anticipate discharge home tomorrow  · Pain control- scheduled Tylenol, heat, topical agents  Start gabapentin        Chronic heart failure ([facility] [address] )   Assessment & Plan    · Reports history of heart failure and follows with Dr Nathalia Cantu  · Not on diuretics- appears euvolemic  · Continue BB        Uncomplicated opioid dependence ([facility] [address] )   Assessment & Plan    · Chronic morphine use due to chronic low back pain  Continue at home dose  Breakthrough meds if needed        Diabetes mellitus type 2 in [facility])   Assessment & Plan    Lab Results   Component Value Date    HGBA1C 7 2 (H) 01/20/2015       No results for input(s): POCGLU in the last 72 hours  Blood Sugar Average: Last 72 hrs:  · Check A1C  · Continue home insulin regimen          VTE Prophylaxis: Enoxaparin (Lovenox)  / sequential compression device   Code Status: Full code  POLST: There is no POLST form on file for this patient (pre-hospital)  Discussion with family:  at bedside    Anticipated Length of Stay:  Patient will be admitted on an Observation basis with an anticipated length of stay of  < 2 midnights  Justification for Hospital Stay: Rule out cardiac event    Total Time for Visit, including Counseling / Coordination of Care: 45 minutes  Greater than 50% of this total time spent on direct patient counseling and coordination of care      Chief Complaint:   Chest pain    History of Present Illness:    Hilda William is a 82 y o  female with a history of heart failure, DM, HTN, HLD, breast cancer s/p left mastectomy with saline implant, and chronic opioid dependence secondary to chronic low back pain who presents with acute onset chest pain starting today  Pain started around 12:30 when patient was sitting in recliner  Pain is in anterior left chest and radiates to left shoulder blade, left neck, and left arm  Pain feels like a pressure and is worsened with movement of shoulder and sitting up/laying down  She was given aspirin and nitro in the ambulance without relief  Here she was given dilaudid 0 5 mg x 2 without relief  Her pain is ongoing and currently 9 5/10  She had no associated SOB  She notes she was told she had stenosis in her neck and was referred to neck surgeon in the past  Currently, she is sitting up eating in no acute distress  No smoking history  No family history of CAD  She reports she has a history of heart failure and follows with Dr Chavira Neither  Not on diuretic  Review of Systems:    Review of Systems   Constitutional: Negative  HENT: Negative  Eyes: Negative  Respiratory: Negative for shortness of breath  Cardiovascular: Positive for chest pain  Gastrointestinal: Negative  Endocrine: Negative  Genitourinary: Negative  Musculoskeletal: Positive for back pain and neck pain  Left arm pain   Skin: Negative  Allergic/Immunologic: Negative  Neurological: Negative  Hematological: Negative  Psychiatric/Behavioral: Negative  Past Medical and Surgical History:     Past Medical History:   Diagnosis Date    Diabetes mellitus ([address] )     GERD (gastroesophageal reflux disease)     Hyperlipidemia     Hypertension     Migraine        Past Surgical History:   Procedure Laterality Date    BREAST SURGERY      CARDIAC SURGERY      MASTECTOMY         Meds/Allergies:    Prior to Admission medications    Medication Sig Start Date End Date Taking?  Authorizing Provider   FLOVENT  MCG/ACT inhaler INHALE 2 PUFF BY INHALATION ROUTE 2 TIMES EVERY DAY 5/14/18  Yes Historical Provider, MD   lansoprazole (PREVACID) 15 mg capsule Take 30 mg by mouth daily     Yes Historical Provider, MD   metoprolol succinate (TOPROL-XL) 50 mg 24 hr tablet Take 50 mg by mouth daily  Yes Historical Provider, MD   oxybutynin (DITROPAN XL) 15 MG 24 hr tablet Take 15 mg by mouth daily  Yes Historical Provider, MD   simvastatin (ZOCOR) 20 mg tablet Take 20 mg by mouth daily at bedtime  Yes Historical Provider, MD   EPINEPHrine (EPIPEN 2-SILVIO) 0 3 mg/0 3 mL SOAJ Inject 0 3 mL as directed    Historical Provider, MD   ketorolac (TORADOL) 10 mg tablet Take 10 mg by mouth as needed for moderate pain    Historical Provider, MD   morphine (MSIR) 15 mg tablet Take 15 mg by mouth every 8 (eight) hours as needed for severe pain  Historical Provider, MD   NOVOLOG FLEXPEN 100 UNIT/ML SOPN USE SUBCUTANEOUSLY PER SLIDING SCALE PROVIDED BY DOCTOR  4/16/18   Historical Provider, MD Magda Negron SOLOSTAR injection pen 300 units/mL  5/2/18   Historical Provider, MD   VENTOLIN  (90 Base) MCG/ACT inhaler  2/26/18   Historical Provider, MD     I have reviewed home medications with patient personally  Allergies: Allergies   Allergen Reactions    Betadine [Povidone Iodine] Anaphylaxis    Iodine Anaphylaxis and Other (See Comments)    Aspirin GI Bleeding    Caffeine Palpitations       Social History:     Marital Status: /Civil Union   Occupation:    Patient Pre-hospital Living Situation: Lives at home  Patient Pre-hospital Level of Mobility:    Patient Pre-hospital Diet Restrictions:    Substance Use History:   History   Alcohol Use No     History   Smoking Status    Never Smoker   Smokeless Tobacco    Never Used     History   Drug Use No       Family History:    No family history of CAD    Physical Exam:     Vitals:   Blood Pressure: (!) 171/75 (09/24/18 1915)  Pulse: 80 (09/24/18 1915)  Temperature: 98 4 °F (36 9 °C) (09/24/18 1606)  Temp Source: Oral (09/24/18 1606)  Respirations: (!) 27 (09/24/18 1915)  SpO2: 93 % (09/24/18 1915)    Physical Exam   Constitutional: She is oriented to person, place, and time  No distress  NAD  Sitting up eating dinner   HENT:   Head: Normocephalic and atraumatic  Eyes: No scleral icterus  Neck: Neck supple  Reports neck pain with neck rotation   Cardiovascular: Normal rate, regular rhythm and normal heart sounds  Pulmonary/Chest: Effort normal and breath sounds normal  No respiratory distress  She has no wheezes  She has no rales  Abdominal: Soft  Bowel sounds are normal  She exhibits no distension  There is no tenderness  Musculoskeletal:   +Tenderness to palpation left thoracic paraspinal muscles  Pain reproducible with rotation of left shoulder   Neurological: She is alert and oriented to person, place, and time  Skin: Skin is warm  She is not diaphoretic  Psychiatric: She has a normal mood and affect  Her behavior is normal        Additional Data:     Lab Results: I have personally reviewed pertinent reports  Results from last 7 days  Lab Units 09/24/18  1610   WBC Thousand/uL 11 84*   HEMOGLOBIN g/dL 14 6   HEMATOCRIT % 45 4   PLATELETS Thousands/uL 264   NEUTROS PCT % 68   LYMPHS PCT % 24   MONOS PCT % 7   EOS PCT % 1       Results from last 7 days  Lab Units 09/24/18  1610   SODIUM mmol/L 135*   POTASSIUM mmol/L 4 2   CHLORIDE mmol/L 104   CO2 mmol/L 25   BUN mg/dL 20   CREATININE mg/dL 1 01   CALCIUM mg/dL 9 0   ALK PHOS U/L 154*   ALT U/L 40   AST U/L 37                   Imaging: I have personally reviewed pertinent reports  CT chest and abdomen wo contrast   Final Result by Sameera Mera MD (09/24 1808)      Chronic stable T12 compression deformity  No acute findings within the chest or abdomen  No thoracic or abdominal aortic aneurysm  Workstation performed: [id number]             EKG, Pathology, and Other Studies Reviewed on Admission:   · EKG: Normal sinus rhythm    Allscripts / Epic Records Reviewed: Yes     ** Please Note: This note has been constructed using a voice recognition system   **

## 2018-09-24 NOTE — ED PROVIDER NOTES
History  Chief Complaint   Patient presents with    Chest Pain     patient reports sudden onset of left sided chest pressure radiating to back and left arm, worse with movement     83 yo F presents to ED for chest pain  Pt reports that the pain started suddenly about an hour and a half ago while she was sitting in her recliner  Pain is pressure like and radiates to her back and down her L arm  Pt says that pain has been constant since onset  She received 4 ASA and a nitro en route from EMS with no improvement  Pain not worsened with exertion or relieved by rest  Pain worse with taking deep breath  She denies any shortness of breath, nausea/vomiting, diaphoresis, heart palpitations, lightheadedness/dizziness  Pt says she has had similar chest pain once before but it lasted only briefly  No known history of CAD but says she does have history of CHF and is followed by Dr Tracie Hutchins with cardiology  She denies any known recent weight gain or increase in edema  CAD risk factors include HTN, HLD, DM  Prior to Admission Medications   Prescriptions Last Dose Informant Patient Reported? Taking? EPINEPHrine (EPIPEN 2-SILVIO) 0 3 mg/0 3 mL SOAJ  Self Yes No   Sig: Inject 0 3 mL as directed   FLOVENT  MCG/ACT inhaler  Self Yes No   Sig: INHALE 2 PUFF BY INHALATION ROUTE 2 TIMES EVERY DAY   NOVOLOG FLEXPEN 100 UNIT/ML SOPN  Self Yes No   Sig: USE SUBCUTANEOUSLY PER SLIDING SCALE PROVIDED BY DOCTOR  GABE VALLE injection pen 300 units/mL  Self Yes No   VENTOLIN  (90 Base) MCG/ACT inhaler  Self Yes No   ketorolac (TORADOL) 10 mg tablet  Self Yes No   Sig: Take 10 mg by mouth as needed for moderate pain   lansoprazole (PREVACID) 15 mg capsule  Self Yes No   Sig: Take 30 mg by mouth daily     metoprolol succinate (TOPROL-XL) 50 mg 24 hr tablet  Self Yes No   Sig: Take 50 mg by mouth daily     morphine (MSIR) 15 mg tablet  Self Yes No   Sig: Take 15 mg by mouth every 8 (eight) hours as needed for severe pain    oxybutynin (DITROPAN XL) 15 MG 24 hr tablet  Self Yes No   Sig: Take 15 mg by mouth daily  simvastatin (ZOCOR) 20 mg tablet  Self Yes No   Sig: Take 20 mg by mouth daily at bedtime  Facility-Administered Medications: None       Past Medical History:   Diagnosis Date    Diabetes mellitus ([address] )     GERD (gastroesophageal reflux disease)     Hyperlipidemia     Hypertension     Migraine        Past Surgical History:   Procedure Laterality Date    BREAST SURGERY      CARDIAC SURGERY      MASTECTOMY         Family History   Problem Relation Age of Onset    Colon cancer Mother     Stroke Father     Emphysema Father     Leukemia Sister     Breast cancer Sister     ALS Brother      I have reviewed and agree with the history as documented  Social History   Substance Use Topics    Smoking status: Never Smoker    Smokeless tobacco: Never Used    Alcohol use No        Review of Systems   Constitutional: Negative for activity change, chills and fever  HENT: Negative for congestion, rhinorrhea, sore throat and trouble swallowing  Eyes: Negative for pain, discharge and visual disturbance  Respiratory: Negative for cough, chest tightness and shortness of breath  Cardiovascular: Positive for chest pain  Negative for palpitations and leg swelling  Gastrointestinal: Negative for abdominal pain, nausea and vomiting  Genitourinary: Negative for dysuria, frequency and urgency  Musculoskeletal: Negative for gait problem, neck pain and neck stiffness  Skin: Negative for color change, rash and wound  Neurological: Negative for dizziness, syncope, light-headedness and headaches         Physical Exam  ED Triage Vitals [09/24/18 1606]   Temperature Pulse Respirations Blood Pressure SpO2   98 4 °F (36 9 °C) 82 20 129/68 96 %      Temp Source Heart Rate Source Patient Position - Orthostatic VS BP Location FiO2 (%)   Oral Monitor Sitting Right arm --      Pain Score       9 Orthostatic Vital Signs  Vitals:    09/24/18 1710 09/24/18 1740 09/24/18 1745 09/24/18 1813   BP: 146/67 166/76 166/76 166/74   Pulse: 71 70 70 75   Patient Position - Orthostatic VS: Sitting Sitting Sitting Sitting       Physical Exam   Constitutional: She is oriented to person, place, and time  She appears well-developed and well-nourished  No distress  HENT:   Head: Normocephalic and atraumatic  Mouth/Throat: Oropharynx is clear and moist    Eyes: Conjunctivae and EOM are normal  Right eye exhibits no discharge  Left eye exhibits no discharge  Neck: Normal range of motion  Neck supple  Cardiovascular: Normal rate, regular rhythm and intact distal pulses  Pulmonary/Chest: Effort normal and breath sounds normal  No stridor  No respiratory distress  She exhibits no tenderness  Abdominal: Soft  There is no tenderness  There is no rebound and no guarding  Musculoskeletal: Normal range of motion  She exhibits edema (trace bilateral lower extremity edema)  She exhibits no tenderness  Neurological: She is alert and oriented to person, place, and time  She exhibits normal muscle tone  Skin: Skin is warm and dry  Capillary refill takes less than 2 seconds  Nursing note and vitals reviewed  ED Medications  Medications   acetaminophen (TYLENOL) tablet 975 mg (0 mg Oral Hold 9/24/18 1831)    EMS REPLENISHMENT MED ( Does not apply Given to EMS 9/24/18 1614)   HYDROmorphone (DILAUDID) injection 0 5 mg (0 5 mg Intravenous Given 9/24/18 1714)   HYDROmorphone (DILAUDID) injection 0 5 mg (0 5 mg Intravenous Given 9/24/18 1826)       Diagnostic Studies  Results Reviewed     Procedure Component Value Units Date/Time    Troponin I [[id number]] Collected:  09/24/18 1826    Lab Status:   In process Specimen:  Blood from Arm, Right Updated:  09/24/18 1833    Comprehensive metabolic panel [[id number]]  (Abnormal) Collected:  09/24/18 1610    Lab Status:  Final result Specimen:  Blood from Arm, Right Updated: 09/24/18 1703     Sodium 135 (L) mmol/L      Potassium 4 2 mmol/L      Chloride 104 mmol/L      CO2 25 mmol/L      ANION GAP 6 mmol/L      BUN 20 mg/dL      Creatinine 1 01 mg/dL      Glucose 176 (H) mg/dL      Calcium 9 0 mg/dL      AST 37 U/L      ALT 40 U/L      Alkaline Phosphatase 154 (H) U/L      Total Protein 7 2 g/dL      Albumin 3 3 (L) g/dL      Total Bilirubin 0 94 mg/dL      eGFR 52 ml/min/1 73sq m     Narrative:         National Kidney Disease Education Program recommendations are as follows:  GFR calculation is accurate only with a steady state creatinine  Chronic Kidney disease less than 60 ml/min/1 73 sq  meters  Kidney failure less than 15 ml/min/1 73 sq  meters      BNP [[id number]]  (Normal) Collected:  09/24/18 1610    Lab Status:  Final result Specimen:  Blood from Arm, Right Updated:  09/24/18 1703     NT-proBNP 176 pg/mL     D-dimer, quantitative [[id number]]  (Abnormal) Collected:  09/24/18 1610    Lab Status:  Final result Specimen:  Blood from Arm, Right Updated:  09/24/18 1700     D-Dimer, Quant 483 (H) ng/ml (FEU)     Troponin I [[id number]]  (Normal) Collected:  09/24/18 1610    Lab Status:  Final result Specimen:  Blood from Arm, Right Updated:  09/24/18 1700     Troponin I <0 02 ng/mL     CBC and differential [[id number]]  (Abnormal) Collected:  09/24/18 1610    Lab Status:  Final result Specimen:  Blood from Arm, Right Updated:  09/24/18 1626     WBC 11 84 (H) Thousand/uL      RBC 5 02 Million/uL      Hemoglobin 14 6 g/dL      Hematocrit 45 4 %      MCV 90 fL      MCH 29 1 pg      MCHC 32 2 g/dL      RDW 12 7 %      MPV 11 8 fL      Platelets 355 Thousands/uL      nRBC 0 /100 WBCs      Neutrophils Relative 68 %      Immat GRANS % 0 %      Lymphocytes Relative 24 %      Monocytes Relative 7 %      Eosinophils Relative 1 %      Basophils Relative 0 %      Neutrophils Absolute 7 97 (H) Thousands/µL      Immature Grans Absolute 0 04 Thousand/uL      Lymphocytes Absolute 2 86 Thousands/µL Monocytes Absolute 0 80 Thousand/µL      Eosinophils Absolute 0 15 Thousand/µL      Basophils Absolute 0 02 Thousands/µL                  CT chest and abdomen wo contrast   Final Result by Sherman Flowers MD (09/24 1808)      Chronic stable T12 compression deformity  No acute findings within the chest or abdomen  No thoracic or abdominal aortic aneurysm  Workstation performed: [id number]               Procedures  ECG 12 Lead Documentation  Date/Time: 9/24/2018 4:29 PM  Performed by: Deon Love by: Autumn Seals     Indications / Diagnosis:  Chest pain  ECG reviewed by me, the ED Provider: yes    Patient location:  ED  Previous ECG:     Previous ECG:  Compared to current  Interpretation:     Interpretation: normal    Rate:     ECG rate:  75    ECG rate assessment: normal    Rhythm:     Rhythm: sinus rhythm    Ectopy:     Ectopy: none    QRS:     QRS axis:  Normal  ST segments:     ST segments:  Normal  T waves:     T waves: normal            Phone Consults  ED Phone Contact    ED Course           Identification of Seniors at Risk      Most Recent Value   (ISAR) Identification of Seniors at Risk   Before the illness or injury that brought you to the Emergency, did you need someone to help you on a regular basis? 0 Filed at: 09/24/2018 1607   In the last 24 hours, have you needed more help than usual?  0 Filed at: 09/24/2018 1607   Have you been hospitalized for one or more nights during the past 6 months? 0 Filed at: 09/24/2018 1607   In general, do you see well?  0 Filed at: 09/24/2018 1607   In general, do you have serious problems with your memory? 0 Filed at: 09/24/2018 1607   Do you take more than three different medications every day?   1 Filed at: 09/24/2018 1607   ISAR Score  1 Filed at: 09/24/2018 1607                          MDM  Number of Diagnoses or Management Options  Chest pain:   Diagnosis management comments: Received ASA, nitro en route with no improvement of symptoms  EKG unremarkable, first troponin negative  Unable to do contrast study due to iodine anaphylaxis allergy but CT chest/abdomen without acute process  Pt reports pain improved after dilaudid, but after about an hour, chest pain worsened again  EKG repeated and remains unchanged  Repeat dilaudid and tylenol given  Unable to give toradol due to GFR<60  Heart score 4 (age and risk factors)  Will admit to [facility] [place] Time    Disposition  Final diagnoses:   Chest pain     Time reflects when diagnosis was documented in both MDM as applicable and the Disposition within this note     Time User Action Codes Description Comment    9/24/2018  6:41 PM Delfina Godinez Add [R07 9] Chest pain       ED Disposition     ED Disposition Condition Comment    Admit  Case was discussed with [facility] [place] and the patient's admission status was agreed to be Admission Status: observation status to the service of Dr Teresa Gaxiola   Follow-up Information    None         Patient's Medications   Discharge Prescriptions    No medications on file     No discharge procedures on file  ED Provider  Attending physically available and evaluated Leonora Trujillo I managed the patient along with the ED Attending      Electronically Signed by         Martha Oh MD  09/24/18 4380

## 2018-09-25 VITALS
HEIGHT: 62 IN | WEIGHT: 188.6 LBS | HEART RATE: 78 BPM | SYSTOLIC BLOOD PRESSURE: 124 MMHG | BODY MASS INDEX: 34.71 KG/M2 | DIASTOLIC BLOOD PRESSURE: 60 MMHG | RESPIRATION RATE: 18 BRPM | TEMPERATURE: 98.5 F | OXYGEN SATURATION: 94 %

## 2018-09-25 PROBLEM — R07.9 CHEST PAIN: Status: RESOLVED | Noted: 2018-09-24 | Resolved: 2018-09-25

## 2018-09-25 LAB
ANION GAP SERPL CALCULATED.3IONS-SCNC: 7 MMOL/L (ref 4–13)
BUN SERPL-MCNC: 19 MG/DL (ref 5–25)
CALCIUM SERPL-MCNC: 8.9 MG/DL (ref 8.3–10.1)
CHLORIDE SERPL-SCNC: 106 MMOL/L (ref 100–108)
CO2 SERPL-SCNC: 25 MMOL/L (ref 21–32)
CREAT SERPL-MCNC: 0.94 MG/DL (ref 0.6–1.3)
ERYTHROCYTE [DISTWIDTH] IN BLOOD BY AUTOMATED COUNT: 12.7 % (ref 11.6–15.1)
EST. AVERAGE GLUCOSE BLD GHB EST-MCNC: 157 MG/DL
GFR SERPL CREATININE-BSD FRML MDRD: 57 ML/MIN/1.73SQ M
GLUCOSE SERPL-MCNC: 106 MG/DL (ref 65–140)
GLUCOSE SERPL-MCNC: 113 MG/DL (ref 65–140)
GLUCOSE SERPL-MCNC: 169 MG/DL (ref 65–140)
HBA1C MFR BLD: 7.1 % (ref 4.2–6.3)
HCT VFR BLD AUTO: 41.4 % (ref 34.8–46.1)
HGB BLD-MCNC: 13.3 G/DL (ref 11.5–15.4)
MCH RBC QN AUTO: 29 PG (ref 26.8–34.3)
MCHC RBC AUTO-ENTMCNC: 32.1 G/DL (ref 31.4–37.4)
MCV RBC AUTO: 90 FL (ref 82–98)
PLATELET # BLD AUTO: 224 THOUSANDS/UL (ref 149–390)
PMV BLD AUTO: 12.1 FL (ref 8.9–12.7)
POTASSIUM SERPL-SCNC: 3.7 MMOL/L (ref 3.5–5.3)
RBC # BLD AUTO: 4.58 MILLION/UL (ref 3.81–5.12)
SODIUM SERPL-SCNC: 138 MMOL/L (ref 136–145)
TROPONIN I SERPL-MCNC: <0.02 NG/ML
WBC # BLD AUTO: 10.09 THOUSAND/UL (ref 4.31–10.16)

## 2018-09-25 PROCEDURE — 82948 REAGENT STRIP/BLOOD GLUCOSE: CPT

## 2018-09-25 PROCEDURE — 83036 HEMOGLOBIN GLYCOSYLATED A1C: CPT | Performed by: PHYSICIAN ASSISTANT

## 2018-09-25 PROCEDURE — 84484 ASSAY OF TROPONIN QUANT: CPT | Performed by: PHYSICIAN ASSISTANT

## 2018-09-25 PROCEDURE — 85027 COMPLETE CBC AUTOMATED: CPT | Performed by: PHYSICIAN ASSISTANT

## 2018-09-25 PROCEDURE — 99217 PR OBSERVATION CARE DISCHARGE MANAGEMENT: CPT | Performed by: GENERAL PRACTICE

## 2018-09-25 PROCEDURE — 80048 BASIC METABOLIC PNL TOTAL CA: CPT | Performed by: PHYSICIAN ASSISTANT

## 2018-09-25 RX ORDER — LIDOCAINE 5% 5 G/100G
1 CREAM TOPICAL AS NEEDED
Qty: 1 TUBE | Refills: 0 | Status: SHIPPED | OUTPATIENT
Start: 2018-09-25

## 2018-09-25 RX ORDER — GABAPENTIN 100 MG/1
100 CAPSULE ORAL
Qty: 30 CAPSULE | Refills: 0 | Status: SHIPPED | OUTPATIENT
Start: 2018-09-25 | End: 2018-10-18

## 2018-09-25 RX ORDER — ACETAMINOPHEN 500 MG
1000 TABLET ORAL EVERY 8 HOURS
Start: 2018-09-25 | End: 2018-10-18

## 2018-09-25 RX ADMIN — METOPROLOL TARTRATE 25 MG: 25 TABLET ORAL at 08:05

## 2018-09-25 RX ADMIN — ACETAMINOPHEN 975 MG: 325 TABLET, FILM COATED ORAL at 05:50

## 2018-09-25 RX ADMIN — INSULIN LISPRO 1 UNITS: 100 INJECTION, SOLUTION INTRAVENOUS; SUBCUTANEOUS at 10:43

## 2018-09-25 RX ADMIN — OXYBUTYNIN CHLORIDE 15 MG: 5 TABLET, EXTENDED RELEASE ORAL at 08:05

## 2018-09-25 RX ADMIN — MORPHINE SULFATE 15 MG: 15 TABLET ORAL at 08:07

## 2018-09-25 RX ADMIN — HYDROMORPHONE HYDROCHLORIDE 0.5 MG: 1 INJECTION, SOLUTION INTRAMUSCULAR; INTRAVENOUS; SUBCUTANEOUS at 10:40

## 2018-09-25 RX ADMIN — PANTOPRAZOLE SODIUM 40 MG: 40 TABLET, DELAYED RELEASE ORAL at 05:50

## 2018-09-25 NOTE — ASSESSMENT & PLAN NOTE
· Reports history of heart failure and follows with Dr Alize Herrera  · Not on diuretics- appears euvolemic  · Continue BB

## 2018-09-25 NOTE — CASE MANAGEMENT
Initial Clinical Review    Admission: Date/Time/Statement: 09/24/2018 @ 1842  Orders Placed This Encounter   Procedures    Place in Observation (expected length of stay for this patient is less than two midnights)     Standing Status:   Standing     Number of Occurrences:   1     Order Specific Question:   Admitting Physician     Answer:   Medina Rodríguez     Order Specific Question:   Level of Care     Answer:   Med Surg [16]         ED: Date/Time/Mode of Arrival:   ED Arrival Information     Expected Arrival Acuity Means of Arrival Escorted By Service Admission Type    - 9/24/2018 16:02 Emergent Ambulance [address] Emergency    Arrival Complaint    Chest Pain          Chief Complaint:   Chief Complaint   Patient presents with    Chest Pain     patient reports sudden onset of left sided chest pressure radiating to back and left arm, worse with movement       History of Illness:   Reta Suresh is a 82 y o  female with a history of heart failure, DM, HTN, HLD, breast cancer s/p left mastectomy with saline implant, and chronic opioid dependence secondary to chronic low back pain who presents with acute onset chest pain starting today  Pain started around 12:30 when patient was sitting in recliner  Pain is in anterior left chest and radiates to left shoulder blade, left neck, and left arm  Pain feels like a pressure and is worsened with movement of shoulder and sitting up/laying down  She was given aspirin and nitro in the ambulance without relief  Here she was given dilaudid 0 5 mg x 2 without relief  Her pain is ongoing and currently 9 5/10       ED Vital Signs:   ED Triage Vitals [09/24/18 1606]   Temperature Pulse Respirations Blood Pressure SpO2   98 4 °F (36 9 °C) 82 20 129/68 96 %      Temp Source Heart Rate Source Patient Position - Orthostatic VS BP Location FiO2 (%)   Oral Monitor Sitting Right arm --      Pain Score       9        Wt Readings from Last 1 Encounters: 18 85 5 kg (188 lb 9 6 oz)     Abnormal Labs/Diagnostic Test Results:   WBC Thousand/uL 11 84*   HEMOGLOBIN g/dL 14 6   HEMATOCRIT % 45 4   PLATELETS Thousands/uL 264     SODIUM mmol/L 135*   POTASSIUM mmol/L 4 2   CHLORIDE mmol/L 104   CO2 mmol/L 25   BUN mg/dL 20   CREATININE mg/dL 1 01   CALCIUM mg/dL 9 0   ALK PHOS U/L 154*   ALT U/L 40   AST U/L 37     CT chest/abd:    Chronic stable T12 compression deformity  No acute findings within the chest or abdomen  No thoracic or abdominal aortic aneurysm          EC, NSR    NT-proBNP 176 pg/mL     D-Dimer, Quant 483 (H) ng/ml (FEU)      Troponin I <0 02 ng/mL               ED Treatment:   Medication Administration from 2018 1601 to 20187       Date/Time Order Dose Route Action Action by Comments     2018 1614  EMS REPLENISHMENT MED 0  Does not apply Given to EMS Rolin Show, RN      2018 1714 HYDROmorphone (DILAUDID) injection 0 5 mg 0 5 mg Intravenous Given Rolin Show, RN      2018 1826 HYDROmorphone (DILAUDID) injection 0 5 mg 0 5 mg Intravenous Given Rolin Show, RN      2018 205 acetaminophen (TYLENOL) tablet 975 mg 975 mg Oral Given Rolin Show, RN      2018 183 acetaminophen (TYLENOL) tablet 975 mg 0 mg Oral Hold Rolin Show, RN           Past Medical/Surgical History:    Active Ambulatory Problems     Diagnosis Date Noted    Migraine without status migrainosus 2016    Essential hypertension 2016    GERD without esophagitis 2016    Diabetes mellitus type 2 in obese ([facility] )     [facility]    Chronic migraine without aura without status migrainosus, not intractable 2018     Resolved Ambulatory Problems     Diagnosis Date Noted    No Resolved Ambulatory Problems     Past Medical History:   Diagnosis Date    [facility]     GERD (gastroesophageal reflux disease)     Hyperlipidemia     Hypertension     Migraine        Admitting Diagnosis: Chest pain [R07 9]    Age/Sex: 82 y o  female    Assessment/Plan:   * Chest pain   Assessment & Plan     · Suspect MSK, r/o ACS  · Serial troponins  If negative, anticipate discharge home tomorrow  · Pain control- scheduled Tylenol, heat, topical agents  Start gabapentin          Chronic heart failure (HCC)   Assessment & Plan     · Reports history of heart failure and follows with Dr Fenton Phoenix  · Not on diuretics- appears euvolemic  · Continue BB          Uncomplicated opioid dependence ([address] )   Assessment & Plan     · Chronic morphine use due to chronic low back pain  Continue at home dose  Breakthrough meds if needed          Diabetes mellitus type 2 in obese [facility])   Assessment & Plan     Lab Results   Component Value Date     HGBA1C 7 2 (H) 01/20/2015         No results for input(s): POCGLU in the last 72 hours      Blood Sugar Average: Last 72 hrs:  · Check A1C  · Continue home insulin regimen             VTE Prophylaxis: Enoxaparin (Lovenox)  / sequential compression device   Anticipated Length of Stay:  Patient will be admitted on an Observation basis with an anticipated length of stay of  < 2 midnights     Justification for Hospital Stay: Rule out cardiac event    Admission Orders:  Scheduled Meds:   Current Facility-Administered Medications:  acetaminophen 975 mg Oral [facility] Nicole Chou PA-C   albuterol 1 puff Inhalation Q4H PRN Nicole Chou PA-C   calcium carbonate 1,000 mg Oral Daily PRN Nicole Chou PA-C   enoxaparin 40 mg Subcutaneous Daily Nicole Chou PA-C   fluticasone 2 puff Inhalation BID Nicole Chou PA-C   gabapentin 100 mg Oral HS Nicole Chou PA-C   HYDROmorphone 0 5 mg Intravenous Q6H PRN Nicole Chou PA-C   insulin glargine 50 Units Subcutaneous HS Nicole Chou PA-C   insulin lispro 1-6 Units Subcutaneous 4x Daily (AC & HS) Nicole Chou PA-C   menthol-methyl salicylate 1 application Apply externally 4x Daily PRN Meli Patino PA-C   metoprolol tartrate 25 mg Oral Q12H [address] Meli Patino PA-C   morphine 15 mg Oral Q8H PRN Meli Patino PA-C   ondansetron 4 mg Intravenous Q6H PRN Meli Patino PA-C   oxybutynin 15 mg Oral Daily Meli Patino PA-C   pantoprazole 40 mg Oral Early Morning Meli Patino PA-C   pravastatin 40 mg Oral Daily With [employer], MANUEL   senna 1 tablet Oral HS PRN Meli Patino PA-C       TELM

## 2018-09-25 NOTE — CASE MANAGEMENT
Continued Stay Review    Date: 09/25/2018  Age/Sex: 82 y o  female     Assessment/Plan: Jose Barrera    Discharge Plan:   09/25/18 1104  Discharge patient Once     Discharge Disposition: Home/Self Care    Expected Discharge Date: 09/25/18            Vital Signs: /61 (BP Location: Right arm)   Pulse 74   Temp 98 4 °F (36 9 °C) (Oral)   Resp 18   Ht 5' 2" (1 575 m)   Wt 85 5 kg (188 lb 9 6 oz)   SpO2 92%   BMI 34 50 kg/m²     Medications:   Scheduled Meds:   Current Facility-Administered Medications:  acetaminophen 975 mg Oral Q8H [address] Yolande Clarendon, PA-C   albuterol 1 puff Inhalation Q4H PRN Yolande Banner, PA-C   calcium carbonate 1,000 mg Oral Daily PRN [place] Clarendon, PA-C   enoxaparin 40 mg Subcutaneous Daily [place] Banner, PA-C   fluticasone 2 puff Inhalation BID [place] , PA-C   gabapentin 100 mg Oral HS Yolande Clarendon, PA-C   HYDROmorphone 0 5 mg Intravenous Q6H PRN Yolande Banjohann, PA-C   insulin glargine 50 Units Subcutaneous HS [place] Clarendon, PA-C   insulin lispro 1-6 Units Subcutaneous 4x Daily (AC & HS) Yolandekavita Cannon, PA-C   menthol-methyl salicylate 1 application Apply externally 4x Daily PRN Yolande Banjohann, PA-C   metoprolol tartrate 25 mg Oral Q12H [address] Yolande , PA-C   morphine 15 mg Oral Q8H PRN [place] Banner, PA-C   ondansetron 4 mg Intravenous Q6H PRN [place] Banner, PA-C   oxybutynin 15 mg Oral Daily [place] Clarendon, PA-C   pantoprazole 40 mg Oral Early Morning Yolande Banner, PA-C   pravastatin 40 mg Oral Daily With Shai Anguiano, PA-C   senna 1 tablet Oral HS PRN Yolandekavita Cannon, PA-C       Abnormal Labs/Diagnostic Results:
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
## 2018-09-25 NOTE — ASSESSMENT & PLAN NOTE
· Suspect MSK, pain in shoulder  · Serial troponins neg  · Pain control- scheduled Tylenol, heat, topical lidocaine  Start gabapentin  · Outpt Lexiscan nuclear stress    GREG < 3

## 2018-09-25 NOTE — DISCHARGE SUMMARY
Discharge- Diana Law 1935, 82 y o  female MRN: [id number]    Unit/Bed#: DISCHARGE POOL Encounter: [id number]    Primary Care Provider: MERARY Jackson MD   Date and time admitted to hospital: 9/24/2018  4:02 PM        * Chest painresolved as of 9/25/2018   Assessment & Plan    · Suspect MSK, pain in shoulder  · Serial troponins neg  · Pain control- scheduled Tylenol, heat, topical lidocaine  Start gabapentin  · Outpt Lexiscan nuclear stress  GREG < 3        Chronic heart failure (HCC)   Assessment & Plan    · Reports history of heart failure and follows with Dr Alyce Strong  · Not on diuretics- appears euvolemic  · Continue BB        Uncomplicated opioid dependence ([address] )   Assessment & Plan    · Chronic morphine use due to chronic low back pain  Continue at home dose     · Add Neurontin, tylenol RTC, and lidocaine gel        Diabetes mellitus type 2 in [facility])   Assessment & Plan    Lab Results   Component Value Date    HGBA1C 7 1 (H) 09/25/2018       Recent Labs      09/24/18   2212  09/25/18   0615  09/25/18   1040   POCGLU  207*  106  169*       Blood Sugar Average: Last 72 hrs:  · (P) 015 [id number] A1C  · Continue home insulin regimen          Discharging Physician / Practitioner: Elston Sever, DO  PCP: Jazmin Suarez MD  Admission Date:   Admission Orders     Ordered        09/24/18 1842  Place in Observation (expected length of stay for this patient is less than two midnights)  Once             Discharge Date: 09/25/18    Resolved Problems  Date Reviewed: 9/25/2018          Resolved    * (Principal)Chest pain 9/25/2018     Resolved by  Elston Sever, DO          Consultations During Hospital Stay:  · none    Procedures Performed:     · none    Significant Findings / Test Results:     · trops neg    Incidental Findings:   · none     Test Results Pending at Discharge (will require follow up):   · none     Outpatient Tests Requested:  · Lexiscan stress test    Complications: none    Reason for Admission: CP r/o ACS    Hospital Course:     Magui Mcfarlane is a 82 y o  female patient who originally presented to the hospital on 9/24/2018 due to CP to r/o ACS  Pain more localized to shoulder  Trops neg x 3  Will need to f/u with PCP and possibly get referral to pain mgmt    Please see above list of diagnoses and related plan for additional information  Condition at Discharge: stable     Discharge Day Visit / Exam:     Subjective:  Left shoulder pain  Vitals: Blood Pressure: 124/60 (09/25/18 1114)  Pulse: 78 (09/25/18 1114)  Temperature: 98 5 °F (36 9 °C) (09/25/18 1114)  Temp Source: Oral (09/25/18 0732)  Respirations: 18 (09/25/18 1114)  Height: 5' 2" (157 5 cm) (09/24/18 2158)  Weight - Scale: 85 5 kg (188 lb 9 6 oz) (09/24/18 2158)  SpO2: 94 % (09/25/18 1114)  Exam:   Physical Exam   Constitutional: She is oriented to person, place, and time  No distress  HENT:   Head: Normocephalic and atraumatic  Eyes: Conjunctivae and EOM are normal    Neck: Normal range of motion  Neck supple  Cardiovascular: Normal rate and regular rhythm  Pulmonary/Chest: Effort normal and breath sounds normal  She has no wheezes  She has no rales  Abdominal: Soft  Bowel sounds are normal  She exhibits no distension  There is no tenderness  Musculoskeletal: Normal range of motion  She exhibits tenderness (left shoulder)  She exhibits no edema  Neurological: She is alert and oriented to person, place, and time  Skin: Skin is warm and dry  She is not diaphoretic  Discussion with Family: no    Discharge instructions/Information to patient and family:   See after visit summary for information provided to patient and family  Provisions for Follow-Up Care:  See after visit summary for information related to follow-up care and any pertinent home health orders        Disposition:     Home    For Discharges to [facility]:   · Not Applicable to this Patient - Not Applicable to this Patient    Planned Readmission: no     Discharge Statement:  I spent 25 minutes discharging the patient  This time was spent on the day of discharge  I had direct contact with the patient on the day of discharge  Greater than 50% of the total time was spent examining patient, answering all patient questions, arranging and discussing plan of care with patient as well as directly providing post-discharge instructions  Additional time then spent on discharge activities  Discharge Medications:  See after visit summary for reconciled discharge medications provided to patient and family        ** Please Note: This note has been constructed using a voice recognition system **

## 2018-09-25 NOTE — DISCHARGE INSTRUCTIONS
Please talk to your PCP about pain management  To rule out heart disease, please get a stress test    Chest Pain, Ambulatory Care   GENERAL INFORMATION:   Chest pain  can be caused by a range of conditions, from not serious to life-threatening  It may be caused by a heart attack or a blood clot in your lungs  Sometimes chest pain or pressure is caused by poor blood flow to your heart (angina)  Infection, inflammation, or a fracture in the bones or cartilage in your chest can cause pain or discomfort  Chest pain can also be a symptom of a digestive problem, such as acid reflux or a stomach ulcer  Common symptoms include the following:   · Fever or sweating     · Nausea or vomiting     · Shortness of breath     · Discomfort or pressure that spreads from your chest to your back, jaw, or arm     · A racing or slow heartbeat     · Feeling weak, tired, or faint  Seek immediate care for the following symptoms:   · Any of the following signs of a heart attack:      ¨ Squeezing, pressure, or pain in your chest that lasts longer than 5 minutes or returns    ¨ Discomfort or pain in your back, neck, jaw, stomach, or arm     ¨ Trouble breathing     ¨ Nausea or vomiting    ¨ Lightheadedness or a sudden cold sweat, especially with trouble breathing         · Chest discomfort that gets worse, even with medicine    · Coughing or vomiting blood    · Black or bloody bowel movements     · Vomiting that does not stop, or pain when you swallow  Treatment for chest pain  may include medicine to treat your symptoms while he determines the cause of your chest pain  You may also need any of the following:  · Antiplatelets , such as aspirin, help prevent blood clots  Take your antiplatelet medicine exactly as directed  These medicines make it more likely for you to bleed or bruise  If you are told to take aspirin, do not take acetaminophen or ibuprofen instead  · Prescription pain medicine  may be given   Ask how to take this medicine safely  Do not smoke: If you smoke, it is never too late to quit  Smoking increases your risk for a heart attack and other heart and lung conditions  Ask your healthcare provider for information about how to stop smoking if you need help  Follow up with your healthcare provider as directed: You may need more tests  You may be referred to a specialist, such as a cardiologist or gastroenterologist  Write down your questions so you remember to ask them during your visits  CARE AGREEMENT:   You have the right to help plan your care  Learn about your health condition and how it may be treated  Discuss treatment options with your caregivers to decide what care you want to receive  You always have the right to refuse treatment  The above information is an  only  It is not intended as medical advice for individual conditions or treatments  Talk to your doctor, nurse or pharmacist before following any medical regimen to see if it is safe and effective for you  © 2014 [address] is for End User's use only and may not be sold, redistributed or otherwise used for commercial purposes  All illustrations and images included in CareNotes® are the copyrighted property of A D A M [employer] Inc  or Michael Hankins

## 2018-09-25 NOTE — ASSESSMENT & PLAN NOTE
· Chronic morphine use due to chronic low back pain  Continue at home dose     · Add Neurontin, tylenol RTC, and lidocaine gel

## 2018-09-25 NOTE — ASSESSMENT & PLAN NOTE
Lab Results   Component Value Date    HGBA1C 7 1 (H) 09/25/2018       Recent Labs      09/24/18   2212  09/25/18   0615  09/25/18   1040   POCGLU  207*  106  169*       Blood Sugar Average: Last 72 hrs:  · (P) 810 [id number] A1C  · Continue home insulin regimen

## 2018-09-26 NOTE — SOCIAL WORK
Patient identified as HRR per criteria- [address], DM, CHF  Call made to DC appointment hotline with information as required for CM support follow up  Cm left vm on OP CM hotline

## 2018-10-01 ENCOUNTER — HOSPITAL ENCOUNTER (OUTPATIENT)
Dept: NON INVASIVE DIAGNOSTICS | Facility: CLINIC | Age: 83
Discharge: HOME | End: 2018-10-01
Payer: MEDICARE

## 2018-10-01 DIAGNOSIS — R07.9 CHEST PAIN: ICD-10-CM

## 2018-10-01 PROCEDURE — 93017 CV STRESS TEST TRACING ONLY: CPT

## 2018-10-01 PROCEDURE — 78452 HT MUSCLE IMAGE SPECT MULT: CPT | Performed by: INTERNAL MEDICINE

## 2018-10-01 PROCEDURE — 93018 CV STRESS TEST I&R ONLY: CPT | Performed by: INTERNAL MEDICINE

## 2018-10-01 PROCEDURE — 93016 CV STRESS TEST SUPVJ ONLY: CPT | Performed by: INTERNAL MEDICINE

## 2018-10-01 PROCEDURE — A9502 TC99M TETROFOSMIN: HCPCS

## 2018-10-01 PROCEDURE — 78452 HT MUSCLE IMAGE SPECT MULT: CPT

## 2018-10-01 RX ADMIN — REGADENOSON 0.4 MG: 0.08 INJECTION, SOLUTION INTRAVENOUS at 14:03

## 2018-10-02 LAB
CHEST PAIN STATEMENT: NORMAL
MAX DIASTOLIC BP: 76 MMHG
MAX HEART RATE: 92 BPM
MAX PREDICTED HEART RATE: 139 BPM
MAX. SYSTOLIC BP: 150 MMHG
PROTOCOL NAME: NORMAL
REASON FOR TERMINATION: NORMAL
TARGET HR FORMULA: NORMAL
TEST INDICATION: NORMAL
TIME IN EXERCISE PHASE: NORMAL

## 2018-10-18 ENCOUNTER — PROCEDURE VISIT (OUTPATIENT)
Dept: NEUROLOGY | Facility: CLINIC | Age: 83
End: 2018-10-18
Payer: MEDICARE

## 2018-10-18 VITALS — HEART RATE: 80 BPM | DIASTOLIC BLOOD PRESSURE: 79 MMHG | SYSTOLIC BLOOD PRESSURE: 141 MMHG | TEMPERATURE: 98 F

## 2018-10-18 DIAGNOSIS — G43.709 CHRONIC MIGRAINE WITHOUT AURA WITHOUT STATUS MIGRAINOSUS, NOT INTRACTABLE: Primary | ICD-10-CM

## 2018-10-18 PROCEDURE — 64615 CHEMODENERV MUSC MIGRAINE: CPT

## 2018-10-18 RX ORDER — LIDOCAINE AND PRILOCAINE 25; 25 MG/G; MG/G
CREAM TOPICAL
Refills: 2 | COMMUNITY
Start: 2018-10-14

## 2018-10-18 RX ORDER — LANCETS 33 GAUGE
EACH MISCELLANEOUS
Refills: 5 | COMMUNITY
Start: 2018-10-14

## 2018-10-18 RX ORDER — KETOROLAC TROMETHAMINE 10 MG/1
10 TABLET, FILM COATED ORAL AS NEEDED
Qty: 20 TABLET | Refills: 0 | Status: SHIPPED | OUTPATIENT
Start: 2018-10-18 | End: 2019-01-28 | Stop reason: SDUPTHER

## 2018-10-18 RX ORDER — LIDOCAINE 5% 5 G/100G
CREAM TOPICAL
Refills: 0 | COMMUNITY
Start: 2018-09-28 | End: 2018-10-18

## 2018-10-18 NOTE — PROGRESS NOTES
Chemodenervation  Date/Time: 10/18/2018 2:37 PM  Performed by: Dominik Correa  Authorized by: Glynn Gosselin details:     Preparation: Patient was prepped and draped in usual sterile fashion      Prepped With: Alcohol    Anesthesia (see MAR for exact dosages): Anesthesia method:  None  Procedure details:     Position:  Upright  Botox:     Botox Type:  Type A    Brand:  Botox    mL's of Botulinum Toxin:  200    Final Concentration per CC:  200 units    Needle Gauge:  30 G 2 5 inch  Procedures:     Botox Procedures: chronic headache      Indications: migraines    Injection Location:     Head / Face:  L superior cervical paraspinal, R superior cervical paraspinal, L frontalis, R frontalis, procerus, R medial occipitalis, L medial occipitalis, L temporalis, R temporalis, L superior trapezius and R superior trapezius    L medial frontalis:  10 unit(s)    R medial frontalis:  10 unit(s)    L temporalis injection amount:  20 unit(s)    R temporalis injection amount:  20 unit(s)    Procerus injection amount:  5 unit(s)    L medial occipitalis injection amount:  15 unit(s)    R medial occipitalis injection amount:  15 unit(s)    L superior cervical paraspinal injection amount:  10 unit(s)    R superior cervical paraspinal injection amount:  10 unit(s)    L superior trapezius injection amount:  15 unit(s)    R superior trapezius injection amount:  15 unit(s)  Total Units:     Total units used:  200    Total units discarded:  0  Post-procedure details:     Chemodenervation:  Chronic migraine    Facial Nerve Location[de-identified]  Bilateral facial nerve    Patient tolerance of procedure:   Tolerated well, no immediate complications  Comments:      20 units occipitalis  35 units frontalis  All medically necessary

## 2018-12-19 ENCOUNTER — LAB REQUISITION (OUTPATIENT)
Dept: LAB | Facility: HOSPITAL | Age: 83
End: 2018-12-19
Payer: MEDICARE

## 2018-12-19 DIAGNOSIS — K57.30 DIVERTICULOSIS OF LARGE INTESTINE WITHOUT PERFORATION OR ABSCESS WITHOUT BLEEDING: ICD-10-CM

## 2018-12-19 DIAGNOSIS — D12.0 BENIGN NEOPLASM OF CECUM: ICD-10-CM

## 2018-12-19 DIAGNOSIS — Z86.010 HISTORY OF COLONIC POLYPS: ICD-10-CM

## 2018-12-19 DIAGNOSIS — D12.3 BENIGN NEOPLASM OF TRANSVERSE COLON: ICD-10-CM

## 2018-12-19 PROCEDURE — 88305 TISSUE EXAM BY PATHOLOGIST: CPT | Performed by: PATHOLOGY

## 2019-01-02 ENCOUNTER — TELEPHONE (OUTPATIENT)
Dept: NEUROLOGY | Facility: CLINIC | Age: 84
End: 2019-01-02

## 2019-01-23 ENCOUNTER — TELEPHONE (OUTPATIENT)
Dept: NEUROLOGY | Facility: CLINIC | Age: 84
End: 2019-01-23

## 2019-01-24 NOTE — TELEPHONE ENCOUNTER
Patient called back and scheduled BINJ for Monday 1/28/19 with KV in [facility] office  She confirmed date/time/location

## 2019-01-25 RX ORDER — TOBRAMYCIN AND DEXAMETHASONE 3; 1 MG/ML; MG/ML
SUSPENSION/ DROPS OPHTHALMIC
Refills: 2 | COMMUNITY
Start: 2019-01-09 | End: 2019-04-04 | Stop reason: ALTCHOICE

## 2019-01-28 ENCOUNTER — PROCEDURE VISIT (OUTPATIENT)
Dept: NEUROLOGY | Facility: CLINIC | Age: 84
End: 2019-01-28
Payer: MEDICARE

## 2019-01-28 VITALS — SYSTOLIC BLOOD PRESSURE: 124 MMHG | DIASTOLIC BLOOD PRESSURE: 72 MMHG | HEART RATE: 98 BPM

## 2019-01-28 DIAGNOSIS — G43.709 CHRONIC MIGRAINE WITHOUT AURA WITHOUT STATUS MIGRAINOSUS, NOT INTRACTABLE: Primary | ICD-10-CM

## 2019-01-28 PROCEDURE — 64615 CHEMODENERV MUSC MIGRAINE: CPT | Performed by: PHYSICIAN ASSISTANT

## 2019-01-28 RX ORDER — KETOROLAC TROMETHAMINE 10 MG/1
10 TABLET, FILM COATED ORAL AS NEEDED
Qty: 20 TABLET | Refills: 0 | Status: SHIPPED | OUTPATIENT
Start: 2019-01-28 | End: 2020-06-05 | Stop reason: SDUPTHER

## 2019-01-28 NOTE — PROGRESS NOTES
Chemodenervation  Date/Time: 1/28/2019 2:58 PM  Performed by: Triny Collier  Authorized by: Bonifacio Leon details:     Preparation: Patient was prepped and draped in usual sterile fashion      Prepped With: Alcohol    Anesthesia (see MAR for exact dosages): Anesthesia method:  None  Procedure details:     Position:  Upright  Botox:     Botox Type:  Type A    Brand:  Botox    mL's of Botulinum Toxin:  200    Final Concentration per CC:  200 units    Needle Gauge:  30 G 2 5 inch  Procedures:     Botox Procedures: chronic headache      Indications: migraines    Injection Location:     Head / Face:  L superior cervical paraspinal, R superior cervical paraspinal, L frontalis, R frontalis, procerus, R medial occipitalis, L medial occipitalis, L temporalis, R temporalis, L superior trapezius and R superior trapezius    L medial frontalis:  10 unit(s)    R medial frontalis:  10 unit(s)    L temporalis injection amount:  20 unit(s)    R temporalis injection amount:  20 unit(s)    Procerus injection amount:  5 unit(s)    L medial occipitalis injection amount:  15 unit(s)    R medial occipitalis injection amount:  15 unit(s)    L superior cervical paraspinal injection amount:  10 unit(s)    R superior cervical paraspinal injection amount:  10 unit(s)    L superior trapezius injection amount:  15 unit(s)    R superior trapezius injection amount:  15 unit(s)  Total Units:     Total units used:  200    Total units discarded:  0  Post-procedure details:     Chemodenervation:  Chronic migraine    Facial Nerve Location[de-identified]  Bilateral facial nerve    Patient tolerance of procedure:   Tolerated well, no immediate complications  Comments:      20 units occipitalis  35 units frontalis  All medically necessary

## 2019-04-04 ENCOUNTER — OFFICE VISIT (OUTPATIENT)
Dept: CARDIOLOGY CLINIC | Facility: CLINIC | Age: 84
End: 2019-04-04
Payer: MEDICARE

## 2019-04-04 VITALS
DIASTOLIC BLOOD PRESSURE: 80 MMHG | WEIGHT: 188.4 LBS | SYSTOLIC BLOOD PRESSURE: 135 MMHG | HEIGHT: 62 IN | BODY MASS INDEX: 34.67 KG/M2 | HEART RATE: 75 BPM | RESPIRATION RATE: 18 BRPM

## 2019-04-04 DIAGNOSIS — I50.32 CHRONIC DIASTOLIC HEART FAILURE (HCC): ICD-10-CM

## 2019-04-04 DIAGNOSIS — I10 ESSENTIAL HYPERTENSION: Primary | Chronic | ICD-10-CM

## 2019-04-04 DIAGNOSIS — E78.2 MIXED HYPERLIPIDEMIA: ICD-10-CM

## 2019-04-04 PROCEDURE — 99213 OFFICE O/P EST LOW 20 MIN: CPT | Performed by: INTERNAL MEDICINE

## 2019-04-04 RX ORDER — OCTISALATE, AVOBENZONE, HOMOSALATE, AND OCTOCRYLENE 29.4; 29.4; 49; 25.48 MG/ML; MG/ML; MG/ML; MG/ML
LOTION TOPICAL
COMMUNITY

## 2019-05-02 ENCOUNTER — TELEPHONE (OUTPATIENT)
Dept: NEUROLOGY | Facility: CLINIC | Age: 84
End: 2019-05-02

## 2019-05-13 ENCOUNTER — PROCEDURE VISIT (OUTPATIENT)
Dept: NEUROLOGY | Facility: CLINIC | Age: 84
End: 2019-05-13
Payer: MEDICARE

## 2019-05-13 VITALS — DIASTOLIC BLOOD PRESSURE: 74 MMHG | SYSTOLIC BLOOD PRESSURE: 136 MMHG | HEART RATE: 68 BPM | TEMPERATURE: 97.8 F

## 2019-05-13 DIAGNOSIS — G43.709 CHRONIC MIGRAINE WITHOUT AURA WITHOUT STATUS MIGRAINOSUS, NOT INTRACTABLE: Primary | ICD-10-CM

## 2019-05-13 PROCEDURE — 64615 CHEMODENERV MUSC MIGRAINE: CPT | Performed by: PHYSICIAN ASSISTANT

## 2019-07-19 ENCOUNTER — DOCUMENTATION (OUTPATIENT)
Dept: NEUROLOGY | Facility: CLINIC | Age: 84
End: 2019-07-19

## 2019-07-19 NOTE — PROGRESS NOTES
Type Date User Summary Attachment   General 07/19/2019 11:32 AM Bri Gates Care Coordination  -   Note    Botox- no authorization needed   Please use our stock      Thank you,

## 2019-07-22 LAB — HBA1C MFR BLD HPLC: 7.7 %

## 2019-08-13 ENCOUNTER — PROCEDURE VISIT (OUTPATIENT)
Dept: NEUROLOGY | Facility: CLINIC | Age: 84
End: 2019-08-13
Payer: MEDICARE

## 2019-08-13 VITALS — TEMPERATURE: 98.1 F | SYSTOLIC BLOOD PRESSURE: 142 MMHG | DIASTOLIC BLOOD PRESSURE: 62 MMHG

## 2019-08-13 DIAGNOSIS — G43.709 CHRONIC MIGRAINE WITHOUT AURA WITHOUT STATUS MIGRAINOSUS, NOT INTRACTABLE: Primary | ICD-10-CM

## 2019-08-13 PROCEDURE — 64615 CHEMODENERV MUSC MIGRAINE: CPT | Performed by: PHYSICIAN ASSISTANT

## 2019-08-13 NOTE — PROGRESS NOTES
Chemodenervation  Date/Time: 8/13/2019 1:48 PM  Performed by: Loli Finley PA-C  Authorized by: Loli Finley PA-C     Pre-procedure details:     Prepped With: Alcohol    Anesthesia (see MAR for exact dosages): Anesthesia method:  None  Procedure details:     Position:  Upright  Botox:     Botox Type:  Type A    Brand:  Botox    mL's of Botulinum Toxin:  200    Final Concentration per CC:  200 units    Needle Gauge:  30 G 2 5 inch  Procedures:     Botox Procedures: chronic headache      Indications: migraines    Injection Location:     Head / Face:  L superior cervical paraspinal, R superior cervical paraspinal, L frontalis, R frontalis, L medial occipitalis, R medial occipitalis, procerus, R temporalis, L temporalis, R superior trapezius and L superior trapezius    L lateral frontalis:  5 unit(s)    R lateral frontalis:  5 unit(s)    L medial frontalis:  5 unit(s)    R medial frontalis:  5 unit(s)    L temporalis injection amount:  20 unit(s)    R temporalis injection amount:  20 unit(s)    Procerus injection amount:  5 unit(s)    L medial occipitalis injection amount:  15 unit(s)    R medial occipitalis injection amount:  15 unit(s)    L superior cervical paraspinal injection amount:  10 unit(s)    R superior cervical paraspinal injection amount:  10 unit(s)    L superior trapezius injection amount:  15 unit(s)    R superior trapezius injection amount:  15 unit(s)  Total Units:     Total units used:  200    Total units discarded:  0  Post-procedure details:     Chemodenervation:  Chronic migraine    Facial Nerve Location[de-identified]  Bilateral facial nerve    Patient tolerance of procedure:   Tolerated well, no immediate complications  Comments:      20 units occipitalis  35 units frontalis  All medically necessary

## 2019-09-19 ENCOUNTER — OFFICE VISIT (OUTPATIENT)
Dept: CARDIOLOGY CLINIC | Facility: CLINIC | Age: 84
End: 2019-09-19
Payer: MEDICARE

## 2019-09-19 VITALS
HEART RATE: 70 BPM | DIASTOLIC BLOOD PRESSURE: 80 MMHG | WEIGHT: 186.4 LBS | SYSTOLIC BLOOD PRESSURE: 130 MMHG | RESPIRATION RATE: 18 BRPM | HEIGHT: 62 IN | BODY MASS INDEX: 34.3 KG/M2

## 2019-09-19 DIAGNOSIS — I50.32 CHRONIC DIASTOLIC HEART FAILURE (HCC): Primary | ICD-10-CM

## 2019-09-19 DIAGNOSIS — I10 ESSENTIAL HYPERTENSION: Chronic | ICD-10-CM

## 2019-09-19 DIAGNOSIS — E78.2 MIXED HYPERLIPIDEMIA: ICD-10-CM

## 2019-09-19 PROCEDURE — 99213 OFFICE O/P EST LOW 20 MIN: CPT | Performed by: INTERNAL MEDICINE

## 2019-09-19 NOTE — LETTER
September 19, 2019     Sachin Moncada MD  [facility] [address]  [address] [address]  [address] [address]    Patient: Aleyda Padilla   YOB: 1935   Date of Visit: 9/19/2019       Dear Dr Phyllis Boone: Thank you for referring Aleyda Padilla to me for evaluation  Below are my notes for this consultation  If you have questions, please do not hesitate to call me  I look forward to following your patient along with you  Sincerely,        Kendrick Felix MD        CC: No Recipients  Kendrick Felix MD  9/19/2019  3:35 PM  Sign at close encounter  Assessment/Plan:    Mixed hyperlipidemia  Hyperlipidemia, stable  The patient will continue simvastatin at 20 mg daily  Chronic heart failure (HCC)  Wt Readings from Last 3 Encounters:   09/19/19 84 6 kg (186 lb 6 4 oz)   04/04/19 85 5 kg (188 lb 6 4 oz)   09/24/18 85 5 kg (188 lb 9 6 oz)         The patient had a bout of diastolic congestive heart failure in the context of acute pericarditis and paroxysmal atrial fibrillation  This has resolved and the patient has been asymptomatic with no symptoms of dyspnea or any evidence of leg edema  We will continue present regimen of medications  Essential hypertension  Hypertension, stable and adequately controlled  Diagnoses and all orders for this visit:    Chronic diastolic heart failure ([address] )    Essential hypertension    Mixed hyperlipidemia          Subjective:  Feels rather well  Patient ID: Aleyda Padilla is a 82 y o  female  Patient presented to this office for the purpose of cardiac follow-up  She has a history of hypertension and prior experience with acute pericarditis complicated by congestive heart failure and paroxysmal atrial fibrillation  The patient has been doing well from the cardiac standpoint denying any symptoms of chest pain, shortness of breath, palpitation, dizziness or lightheadedness  She has no leg edema        The following portions of the patient's history were reviewed and updated as appropriate: allergies, current medications, past family history, past medical history, past social history, past surgical history and problem list     Review of Systems   Respiratory: Negative for apnea, cough, chest tightness, shortness of breath and wheezing  Cardiovascular: Negative for chest pain, palpitations and leg swelling  Gastrointestinal: Negative for abdominal pain  Neurological: Negative for dizziness and light-headedness  Objective:  Stable cardiac-wise  /80 (BP Location: Right arm, Patient Position: Sitting)   Pulse 70   Resp 18   Ht 5' 2" (1 575 m)   Wt 84 6 kg (186 lb 6 4 oz)   BMI 34 09 kg/m²           Physical Exam   Constitutional: She appears well-developed and well-nourished  No distress  HENT:   Head: Normocephalic and atraumatic  Neck: Normal range of motion  Neck supple  No JVD present  No thyromegaly present  Cardiovascular: Normal rate, regular rhythm, S1 normal and S2 normal  Exam reveals no gallop and no friction rub  No murmur heard  Pulmonary/Chest: Effort normal  No respiratory distress  She has no wheezes  She has no rales  She exhibits no tenderness  Abdominal: Soft  Skin: She is not diaphoretic  Vitals reviewed

## 2019-09-19 NOTE — PROGRESS NOTES
Assessment/Plan:    Mixed hyperlipidemia  Hyperlipidemia, stable  The patient will continue simvastatin at 20 mg daily  Chronic heart failure (HCC)  Wt Readings from Last 3 Encounters:   09/19/19 84 6 kg (186 lb 6 4 oz)   04/04/19 85 5 kg (188 lb 6 4 oz)   09/24/18 85 5 kg (188 lb 9 6 oz)         The patient had a bout of diastolic congestive heart failure in the context of acute pericarditis and paroxysmal atrial fibrillation  This has resolved and the patient has been asymptomatic with no symptoms of dyspnea or any evidence of leg edema  We will continue present regimen of medications  Essential hypertension  Hypertension, stable and adequately controlled  Diagnoses and all orders for this visit:    Chronic diastolic heart failure ([address] )    Essential hypertension    Mixed hyperlipidemia          Subjective:  Feels rather well  Patient ID: Spenser Jones is a 82 y o  female  Patient presented to this office for the purpose of cardiac follow-up  She has a history of hypertension and prior experience with acute pericarditis complicated by congestive heart failure and paroxysmal atrial fibrillation  The patient has been doing well from the cardiac standpoint denying any symptoms of chest pain, shortness of breath, palpitation, dizziness or lightheadedness  She has no leg edema  The following portions of the patient's history were reviewed and updated as appropriate: allergies, current medications, past family history, past medical history, past social history, past surgical history and problem list     Review of Systems   Respiratory: Negative for apnea, cough, chest tightness, shortness of breath and wheezing  Cardiovascular: Negative for chest pain, palpitations and leg swelling  Gastrointestinal: Negative for abdominal pain  Neurological: Negative for dizziness and light-headedness  Objective:  Stable cardiac-wise        /80 (BP Location: Right arm, Patient Position: Sitting)   Pulse 70   Resp 18   Ht 5' 2" (1 575 m)   Wt 84 6 kg (186 lb 6 4 oz)   BMI 34 09 kg/m²          Physical Exam   Constitutional: She appears well-developed and well-nourished  No distress  HENT:   Head: Normocephalic and atraumatic  Neck: Normal range of motion  Neck supple  No JVD present  No thyromegaly present  Cardiovascular: Normal rate, regular rhythm, S1 normal and S2 normal  Exam reveals no gallop and no friction rub  No murmur heard  Pulmonary/Chest: Effort normal  No respiratory distress  She has no wheezes  She has no rales  She exhibits no tenderness  Abdominal: Soft  Skin: She is not diaphoretic  Vitals reviewed

## 2019-09-19 NOTE — ASSESSMENT & PLAN NOTE
Wt Readings from Last 3 Encounters:   09/19/19 84 6 kg (186 lb 6 4 oz)   04/04/19 85 5 kg (188 lb 6 4 oz)   09/24/18 85 5 kg (188 lb 9 6 oz)         The patient had a bout of diastolic congestive heart failure in the context of acute pericarditis and paroxysmal atrial fibrillation  This has resolved and the patient has been asymptomatic with no symptoms of dyspnea or any evidence of leg edema  We will continue present regimen of medications

## 2019-11-05 ENCOUNTER — DOCUMENTATION (OUTPATIENT)
Dept: NEUROLOGY | Facility: CLINIC | Age: 84
End: 2019-11-05

## 2019-11-05 NOTE — PROGRESS NOTES
Patient is scheduled on 11/26/19 with Loren Stevens in [facility]

## 2019-11-05 NOTE — PROGRESS NOTES
Referral Notes   Number of Notes: 1   Type Date User Summary Attachment   General 11/05/2019 10:35 AM Jasmin Verdin care coordination  -   Note    Botox- no authorization needed   Please use our stock      Thank you,     Barbara Willingham

## 2020-02-04 ENCOUNTER — PROCEDURE VISIT (OUTPATIENT)
Dept: NEUROLOGY | Facility: CLINIC | Age: 85
End: 2020-02-04
Payer: MEDICARE

## 2020-02-04 VITALS — TEMPERATURE: 97.7 F | DIASTOLIC BLOOD PRESSURE: 70 MMHG | HEART RATE: 73 BPM | SYSTOLIC BLOOD PRESSURE: 132 MMHG

## 2020-02-04 DIAGNOSIS — G43.709 CHRONIC MIGRAINE WITHOUT AURA WITHOUT STATUS MIGRAINOSUS, NOT INTRACTABLE: Primary | ICD-10-CM

## 2020-02-04 PROCEDURE — 64615 CHEMODENERV MUSC MIGRAINE: CPT | Performed by: PHYSICIAN ASSISTANT

## 2020-02-04 NOTE — PROGRESS NOTES
Chemodenervation  Date/Time: 2/4/2020 1:10 PM  Performed by: Patito Tipton PA-C  Authorized by: Patito Tipton PA-C     Pre-procedure details:     Prepped With: Alcohol    Anesthesia (see MAR for exact dosages): Anesthesia method:  None  Procedure details:     Position:  Upright  Botox:     Botox Type:  Type A    Brand:  Botox    mL's of Botulinum Toxin:  200    Final Concentration per CC:  200 units    Needle Gauge:  30 G 2 5 inch  Procedures:     Botox Procedures: chronic headache      Indications: migraines    Injection Location:     Head / Face:  L superior cervical paraspinal, R superior cervical paraspinal, L frontalis, R frontalis, L medial occipitalis, R medial occipitalis, procerus, R temporalis, L temporalis, R superior trapezius and L superior trapezius    L lateral frontalis:  5 unit(s)    R lateral frontalis:  5 unit(s)    L medial frontalis:  5 unit(s)    R medial frontalis:  5 unit(s)    L temporalis injection amount:  20 unit(s)    R temporalis injection amount:  20 unit(s)    Procerus injection amount:  5 unit(s)    L medial occipitalis injection amount:  15 unit(s)    R medial occipitalis injection amount:  15 unit(s)    L superior cervical paraspinal injection amount:  10 unit(s)    R superior cervical paraspinal injection amount:  10 unit(s)    L superior trapezius injection amount:  15 unit(s)    R superior trapezius injection amount:  15 unit(s)  Total Units:     Total units used:  200    Total units discarded:  0  Post-procedure details:     Chemodenervation:  Chronic migraine    Facial Nerve Location[de-identified]  Bilateral facial nerve    Patient tolerance of procedure:   Tolerated well, no immediate complications  Comments:      20 units occipitalis  35 units frontalis  All medically necessary

## 2020-04-01 ENCOUNTER — DOCUMENTATION (OUTPATIENT)
Dept: NEUROLOGY | Facility: CLINIC | Age: 85
End: 2020-04-01

## 2020-06-03 ENCOUNTER — TELEPHONE (OUTPATIENT)
Dept: NEUROLOGY | Facility: CLINIC | Age: 85
End: 2020-06-03

## 2020-06-03 ENCOUNTER — TELEMEDICINE (OUTPATIENT)
Dept: NEUROLOGY | Facility: CLINIC | Age: 85
End: 2020-06-03
Payer: MEDICARE

## 2020-06-03 VITALS — HEIGHT: 62 IN | BODY MASS INDEX: 33.86 KG/M2 | WEIGHT: 184 LBS

## 2020-06-03 DIAGNOSIS — F11.20 UNCOMPLICATED OPIOID DEPENDENCE (HCC): Primary | ICD-10-CM

## 2020-06-03 DIAGNOSIS — G43.009 MIGRAINE WITHOUT AURA AND WITHOUT STATUS MIGRAINOSUS, NOT INTRACTABLE: ICD-10-CM

## 2020-06-03 PROBLEM — G43.709 CHRONIC MIGRAINE WITHOUT AURA WITHOUT STATUS MIGRAINOSUS, NOT INTRACTABLE: Status: RESOLVED | Noted: 2018-03-28 | Resolved: 2020-06-03

## 2020-06-03 PROCEDURE — 99214 OFFICE O/P EST MOD 30 MIN: CPT | Performed by: PSYCHIATRY & NEUROLOGY

## 2020-06-05 DIAGNOSIS — G43.709 CHRONIC MIGRAINE WITHOUT AURA WITHOUT STATUS MIGRAINOSUS, NOT INTRACTABLE: ICD-10-CM

## 2020-06-07 RX ORDER — KETOROLAC TROMETHAMINE 10 MG/1
10 TABLET, FILM COATED ORAL AS NEEDED
Qty: 10 TABLET | Refills: 0 | Status: SHIPPED | OUTPATIENT
Start: 2020-06-07 | End: 2020-11-18 | Stop reason: HOSPADM

## 2020-08-17 PROBLEM — N63.0 BREAST MASS: Status: ACTIVE | Noted: 2020-08-17

## 2020-08-19 NOTE — PROGRESS NOTES
Call placed to patient regarding recommendation for;    _____ RIGHT ___X___LEFT      __X___Ultrasound guided  ______Stereotactic breast biopsy  Pt states that procedure was explained to her, additional questions answered at this time    __X___Verbalized understanding        Blood thinners:  _____yes __X___no    Date stopped: ___N/A____    Biopsy teaching sheet given:  _____yes __X__no (All teaching points discussed during call, pt with no questions at this time, pt adv to arrive at [phone] for ultrasound to be followed by biopsy)    Pt given name/# for any further questions/needs    Pt agreeable to a post procedure call

## 2020-08-31 ENCOUNTER — HOSPITAL ENCOUNTER (OUTPATIENT)
Dept: ULTRASOUND IMAGING | Facility: CLINIC | Age: 85
Discharge: HOME | End: 2020-08-31
Payer: MEDICARE

## 2020-08-31 ENCOUNTER — TREATMENT (OUTPATIENT)
Dept: OTHER | Facility: HOSPITAL | Age: 85
End: 2020-08-31

## 2020-08-31 VITALS — SYSTOLIC BLOOD PRESSURE: 163 MMHG | HEART RATE: 68 BPM | TEMPERATURE: 97.1 F | DIASTOLIC BLOOD PRESSURE: 88 MMHG

## 2020-08-31 DIAGNOSIS — R92.8 ABNORMAL ULTRASOUND OF BREAST: ICD-10-CM

## 2020-08-31 DIAGNOSIS — N63.0 BREAST MASS: Primary | ICD-10-CM

## 2020-08-31 DIAGNOSIS — N63.0 BREAST MASS: ICD-10-CM

## 2020-08-31 PROCEDURE — 88341 IMHCHEM/IMCYTCHM EA ADD ANTB: CPT | Performed by: PATHOLOGY

## 2020-08-31 PROCEDURE — 19083 BX BREAST 1ST LESION US IMAG: CPT

## 2020-08-31 PROCEDURE — 88361 TUMOR IMMUNOHISTOCHEM/COMPUT: CPT | Performed by: PATHOLOGY

## 2020-08-31 PROCEDURE — 76642 ULTRASOUND BREAST LIMITED: CPT

## 2020-08-31 PROCEDURE — 88305 TISSUE EXAM BY PATHOLOGIST: CPT | Performed by: PATHOLOGY

## 2020-08-31 PROCEDURE — 38505 NEEDLE BIOPSY LYMPH NODES: CPT

## 2020-08-31 PROCEDURE — 88342 IMHCHEM/IMCYTCHM 1ST ANTB: CPT | Performed by: PATHOLOGY

## 2020-08-31 PROCEDURE — 76942 ECHO GUIDE FOR BIOPSY: CPT

## 2020-08-31 RX ORDER — LIDOCAINE HYDROCHLORIDE 10 MG/ML
5 INJECTION, SOLUTION EPIDURAL; INFILTRATION; INTRACAUDAL; PERINEURAL ONCE
Status: COMPLETED | OUTPATIENT
Start: 2020-08-31 | End: 2020-08-31

## 2020-08-31 RX ADMIN — LIDOCAINE HYDROCHLORIDE 5 ML: 10 INJECTION, SOLUTION EPIDURAL; INFILTRATION; INTRACAUDAL; PERINEURAL at 13:40

## 2020-08-31 RX ADMIN — LIDOCAINE HYDROCHLORIDE 5 ML: 10 INJECTION, SOLUTION EPIDURAL; INFILTRATION; INTRACAUDAL; PERINEURAL at 13:30

## 2020-08-31 NOTE — PROGRESS NOTES
Patient arrived via:    __x___ambulatory with walker    _____wheelchair    _____stretcher      Domestic violence screen    __x____negative______positive    Breast Implants:    ____x___yes ________no

## 2020-08-31 NOTE — PROGRESS NOTES
Procedure type:    __x___ultrasound guided _____stereotactic    Breast:    ___x__Left _____Right    Location: 1-2 oclock    Needle: 14g Gianna    # of passes: 4    Clip: Ultraclip Ribbon    Performed by: Dr Terence Mccurdy held for 5 minutes by: Femi Mcclendon    Steri Strips:    __x___yes _____no    Band aid:    __x___yes_____no    Tape and guaze:    _____yes __x___no    Tolerated procedure:    ___x__yes _____no        Procedure type:    __x___ultrasound guided _____stereotactic    Breast:    __x___Left _____Right    Location: Left axilla    Needle: 16g Gianna    # of passes: 3    Clip: Hydromark Open Coil    Performed by: Dr Terence Mccurdy held for 5 minutes by: Femi Mcclendon    Steri Strips:    ___x__yes _____no    Band aid:    ___x__yes_____no    Tape and guaze:    _____yes __x___no    Tolerated procedure:    ___x__yes _____no

## 2020-08-31 NOTE — DISCHARGE INSTR - OTHER ORDERS
POST LARGE CORE BREAST BIOPSY PATIENT INFORMATION      1  Place an ice pack inside your bra over the top of the dressing every hour for 20 minutes (20 minutes on, 60 minutes off)  Do this until bedtime  2  Do not shower or bathe until the following morning  3  You may bathe your breast carefully with the steri-strips in place  Be careful    Not to loosen them  The steri-strips will fall off in 3-5 days  4  You may have mild discomfort, and you may have some bruising where the   Needle entered the skin  This should clear within 5-7 days  5  If you need medicine for discomfort, take acetaminophen products such as   Tylenol  You may also take Advil or Motrin products  6  Do not participate in strenuous activities such as-tennis, aerobics, skiing,  Weight lifting, etc  for 24 hours  Refrain from swimming/soaking for 72 hours  7  Wearing a bra for sleeping may be more comfortable for the first 24-48 hours  8  Watch for continued bleeding, pain or fever over 101; please call with any questions or concerns  For procedures done at the [facility] call:  Jayjay Hand RN at [facility] RN at [phone]                    *After 4 PM call the Interventional Radiology Department                    [phone] and ask to speak with the nurse on call  For procedures done at the [address] [place] [address] call:         Jannette Ghotra RN at   *After 4 PM call the Interventional Radiology Department   [phone] and ask to speak with the nurse on call  For procedures done at [address] [place] [address] call: The Radiology Nurse at [phone]  *After 4 PM call your physician, or go to the Emergency Department  9          The final results of your biopsy are usually available within one week

## 2020-09-01 NOTE — PROGRESS NOTES
Post procedure call completed on 9/1/20 @ 0912    Bleeding: _____yes __x___no    Pain: _____yes ___x___no    Redness/Swelling: ______yes ___x___no    Band aid removed: _____yes __x___no    Steri-Strips intact: ___x___yes _____no  Pt states left breast is a little sore

## 2020-09-03 ENCOUNTER — TELEPHONE (OUTPATIENT)
Dept: HEMATOLOGY ONCOLOGY | Facility: CLINIC | Age: 85
End: 2020-09-03

## 2020-09-03 ENCOUNTER — TREATMENT (OUTPATIENT)
Dept: OTHER | Facility: HOSPITAL | Age: 85
End: 2020-09-03

## 2020-09-03 NOTE — TELEPHONE ENCOUNTER
New Patient Encounter    New Patient Intake Form   Patient Details:  Nadege Jordan  1935  [id number]    Background Information:  [address] starts by opening a telephone encounter and gathering the following information   Who is calling to schedule? If not self, relationship to patient? self   Referring Provider Isaias Sims   What is the diagnosis? Recurrent LT breast CA   Is this diagnosis confirmed? Yes   When was the diagnosis? 9/2020   Is there a confirmed diagnosis from a biopsy/tissue reviewed by pathology? yes   Were outside slides requested? No   Is patient aware of diagnosis? Yes   Is there a personal history and what kind? Breast 25 yrs ago   Is there a family history and what kind? breast   Reason for visit? New Diagnosis   Have you had any imaging or labs done? If so: when, where? yes  [facility] RMRI ( no imaging effected breast since mastectomy)   Are records in [facility]? yes   If patient has a prior history of breast cancer were old records obtained? No 25 yrs ago, unavailable   Was the patient told to bring a disk? No, requested RMRI be sent by  to [facility]   Does the patient smoke or Vape? If yes, how many packs or cartridges per day? Scheduling Information:   Preferred [place]: [facility]     Are there any dates/time the patient cannot be seen? Miscellaneous: pt had lumpectomy and RT 25 years ago, had recurrence 2yrs later with mastectomy and no further treatment or imaging on LT side  After completing the above information, please route to Financial Counselor and the appropriate Nurse Navigator for review

## 2020-09-03 NOTE — PROGRESS NOTES
Patient is aware of these results  Patient referred to breast specialist, Dr Екатерина Boyd:   A  Left breast ultrasound-guided core needle biopsy at 1-2 o'clock:  - Invasive mammary carcinoma of no special type (ductal)  See Note       B  Left axillary mass ultrasound-guided core needle biopsy:  - Invasive mammary carcinoma of no special type (ductal)  See Note       Note from pathology:  In both biopsies, there is background fibrosclerosis consistent with prior surgical site and probable recurrent invasive carcinoma  No residual lymph node is identified in specimen B, favoring axillary extension of invasive carcinoma over lymph node metastasis  Sampling of a completely replaced lymph node with extranodal extension of metastatic carcinoma cannot be completely excluded  Clinical radiologic correlation is required  There is significant crush artifact and detached tumor noted hindering evaluation        Please refer to the full pathology report      The imaging and malignant pathology is concordant  On ultrasound, the mass at 1-2 o'clock measured 28 x 25 x 12 mm and involved the skin  The mass in the axilla measured 14 x 12 x 11 mm  It is uncertain on imaging and pathology if the mass in the axilla is a fully replaced lymph node or if this is just a 2nd site of malignancy      Given the patient's history of a left mastectomy, no left mammogram was performed and only the areas of clinical concern were evaluated with targeted ultrasound  We have not performed dedicated imaging of the patient's right breast at this institution  The patient may benefit from additional imaging    If she is seen here for additional breast imaging, we need her comparison studies        The patient will be notified of her pathology results by her surgeon         RECOMMENDATION:       - Surgical Consultation for both breasts

## 2020-09-04 NOTE — TELEPHONE ENCOUNTER
Pt has active medicare part A & B  [employer] part A effective 05/01/02  [employer] part B effective 8/01/06  [employer] pt also has an active supplemental plan C thru [employer] effective 08/01/10  this plan will pay the 20% that medicare doesn'tpay

## 2020-09-14 ENCOUNTER — PATIENT OUTREACH (OUTPATIENT)
Dept: SURGICAL ONCOLOGY | Facility: CLINIC | Age: 85
End: 2020-09-14

## 2020-09-14 DIAGNOSIS — C50.812 MALIGNANT NEOPLASM OF OVERLAPPING SITES OF LEFT FEMALE BREAST, UNSPECIFIED ESTROGEN RECEPTOR STATUS (HCC): Primary | ICD-10-CM

## 2020-09-14 NOTE — PROGRESS NOTES
Reviewed patient's chart with Dr Bang Romero  He is requesting that these studies be performed prior to patient's consultation with him to expedite care

## 2020-09-14 NOTE — PROGRESS NOTES
Breast Oncology Nurse Navigator    Called patient for initial navigation outreach  Explained reason for call  Discussed the role of breast cancer nurse navigator as well as additional cancer support services available to utilize as needed  She appears to have a good understanding of pathology findings  Discussed additional testing Dr Taj Bautista ordered to be done prior to appointment needed for additional treatment recommendations  I offered to schedule CT C/A/P, Bone Scan and Right breast mammogram  Patient stated that she prefers to schedule herself as she needs to refer to her calendar to coordinate around other appointments  Patient advised to schedule as soon as possible and at minimum 1 week prior to appointment  She is scheduled for labs ordered by PCP in which CBCD and CMP are ordered as well  Patient will have these drawn at [facility] and request that a copy be sent to Dr Booker Bernal  Provided patient with phone number for central scheduling to schedule all ordered testing  Patient offered that when she was first diagnosed with breast cancer in 1992 she had a lumpectomy and 36 radiation treatments at [facility] in [place] which patient says is not longer there  Then in February 1995 She has mastectomy with reconstruction also at [facility]  Patient with supportive spouse, family and friends  Stated it was surprising to have cancer again but she is hopeful that it can be treated  Offered patient emotional support and encouragement  Provided with my contact information and availability and encouraged to call as needed with any questions or support needs throughout treatment course  I will continue to follow up with patient as needed         Received call from patient informing that when she called to schedule ordered testing one is ordered to be done with contrast and due to Iodine allergy she will not have test done with any contrast  Informed that I will let surgical oncology know and request ordered be changed and I will call her back once that is completed so she can schedule all testing  Patient agreeable  Message sent to surgical oncology informing as above and requesting change to order

## 2020-09-15 ENCOUNTER — HOSPITAL ENCOUNTER (OUTPATIENT)
Dept: RADIOLOGY | Age: 85
Discharge: HOME | End: 2020-09-15
Payer: MEDICARE

## 2020-09-15 DIAGNOSIS — C50.812 MALIGNANT NEOPLASM OF OVERLAPPING SITES OF LEFT FEMALE BREAST, UNSPECIFIED ESTROGEN RECEPTOR STATUS (HCC): Primary | ICD-10-CM

## 2020-09-15 DIAGNOSIS — C50.812 MALIGNANT NEOPLASM OF OVERLAPPING SITES OF LEFT FEMALE BREAST, UNSPECIFIED ESTROGEN RECEPTOR STATUS (HCC): ICD-10-CM

## 2020-09-15 LAB — HBA1C MFR BLD HPLC: 7.8 %

## 2020-09-15 PROCEDURE — 74176 CT ABD & PELVIS W/O CONTRAST: CPT

## 2020-09-15 PROCEDURE — 71250 CT THORAX DX C-: CPT

## 2020-09-15 PROCEDURE — G1004 CDSM NDSC: HCPCS

## 2020-09-15 NOTE — PROGRESS NOTES
Called patient, no answer  Left voicemail message and informed that CT C/A/P has been reordered without contrast and to scheduled as soon as possible  Requested return call to confirm message received  Awaiting return call  Received return call from patient  Patient confirmed she received my message  She asked more details about ordered testing which I answered to her satisfaction  She stated she is calling now to schedule all tests  Encouraged to call as needed with any difficulty in scheduling  Patient agreeable  I will continue to follow up with patient as needed  Call from patient stating that she just had her CT C/A/P done and scheduled for NM bone scan tomorrow  She tried to make an appointment with [facility] but they could not schedule her until mid October  Patient will call her usual imaging center where she gets her mammograms done  Patient called and informed that she is scheduled for right Breast mammogram on 09/21/2020  Faxed order to [phone] as requested and requested that patient obtain a disc after mammogram and bring with her to appointment with Dr Dre Lowe  Patient verbalized understanding and agreeable

## 2020-09-16 ENCOUNTER — HOSPITAL ENCOUNTER (OUTPATIENT)
Dept: NUCLEAR MEDICINE | Facility: HOSPITAL | Age: 85
Discharge: HOME | End: 2020-09-16
Attending: SURGERY
Payer: MEDICARE

## 2020-09-16 DIAGNOSIS — C50.812 MALIGNANT NEOPLASM OF OVERLAPPING SITES OF LEFT FEMALE BREAST, UNSPECIFIED ESTROGEN RECEPTOR STATUS (HCC): ICD-10-CM

## 2020-09-16 PROCEDURE — G1004 CDSM NDSC: HCPCS

## 2020-09-16 PROCEDURE — 78306 BONE IMAGING WHOLE BODY: CPT

## 2020-09-16 PROCEDURE — A9503 TC99M MEDRONATE: HCPCS

## 2020-09-22 ENCOUNTER — PATIENT OUTREACH (OUTPATIENT)
Dept: SURGICAL ONCOLOGY | Facility: CLINIC | Age: 85
End: 2020-09-22

## 2020-09-24 NOTE — PROGRESS NOTES
Received call from patient to inform me that she had ordered mammogram completed but they could not provide her with the disc because there equipment was not working and that they will be mailing the disc to Dr Moi Caraballo  She also requested fax number to have copy of labs ordered by PCP that included same labs requested by Dr Moi Caraballo faxed for his review and our records as well  Provided with surgical oncology fax number [phone]  Patient with no additional questions at this time

## 2020-10-05 ENCOUNTER — TELEPHONE (OUTPATIENT)
Dept: SURGICAL ONCOLOGY | Facility: CLINIC | Age: 85
End: 2020-10-05

## 2020-10-05 PROBLEM — Z17.0 MALIGNANT NEOPLASM OF UPPER-OUTER QUADRANT OF LEFT BREAST IN FEMALE, ESTROGEN RECEPTOR POSITIVE (HCC): Status: ACTIVE | Noted: 2020-08-17

## 2020-10-05 PROBLEM — C50.412 MALIGNANT NEOPLASM OF UPPER-OUTER QUADRANT OF LEFT BREAST IN FEMALE, ESTROGEN RECEPTOR POSITIVE (HCC): Status: ACTIVE | Noted: 2020-08-17

## 2020-10-07 ENCOUNTER — CONSULT (OUTPATIENT)
Dept: SURGICAL ONCOLOGY | Facility: CLINIC | Age: 85
End: 2020-10-07
Payer: MEDICARE

## 2020-10-07 VITALS
HEART RATE: 83 BPM | TEMPERATURE: 97.2 F | WEIGHT: 191 LBS | BODY MASS INDEX: 34.93 KG/M2 | DIASTOLIC BLOOD PRESSURE: 74 MMHG | SYSTOLIC BLOOD PRESSURE: 130 MMHG

## 2020-10-07 DIAGNOSIS — Z17.0 MALIGNANT NEOPLASM OF UPPER-OUTER QUADRANT OF LEFT BREAST IN FEMALE, ESTROGEN RECEPTOR POSITIVE (HCC): Primary | ICD-10-CM

## 2020-10-07 DIAGNOSIS — N63.0 BREAST MASS: ICD-10-CM

## 2020-10-07 DIAGNOSIS — Z80.3 FAMILY HISTORY OF BREAST CANCER: ICD-10-CM

## 2020-10-07 DIAGNOSIS — C50.412 MALIGNANT NEOPLASM OF UPPER-OUTER QUADRANT OF LEFT BREAST IN FEMALE, ESTROGEN RECEPTOR POSITIVE (HCC): Primary | ICD-10-CM

## 2020-10-07 PROCEDURE — 99205 OFFICE O/P NEW HI 60 MIN: CPT | Performed by: SURGERY

## 2020-10-08 ENCOUNTER — HOSPITAL ENCOUNTER (OUTPATIENT)
Dept: RADIOLOGY | Facility: HOSPITAL | Age: 85
Discharge: HOME | End: 2020-10-08
Attending: SURGERY
Payer: MEDICARE

## 2020-10-08 VITALS — WEIGHT: 188 LBS | HEIGHT: 63 IN | BODY MASS INDEX: 33.31 KG/M2

## 2020-10-08 DIAGNOSIS — C50.412 MALIGNANT NEOPLASM OF UPPER-OUTER QUADRANT OF LEFT BREAST IN FEMALE, ESTROGEN RECEPTOR POSITIVE (HCC): ICD-10-CM

## 2020-10-08 DIAGNOSIS — Z17.0 MALIGNANT NEOPLASM OF UPPER-OUTER QUADRANT OF LEFT BREAST IN FEMALE, ESTROGEN RECEPTOR POSITIVE (HCC): ICD-10-CM

## 2020-10-08 PROCEDURE — C8908 MRI W/O FOL W/CONT, BREAST,: HCPCS

## 2020-10-08 PROCEDURE — A9585 GADOBUTROL INJECTION: HCPCS | Performed by: SURGERY

## 2020-10-08 PROCEDURE — C8937 CAD BREAST MRI: HCPCS

## 2020-10-08 PROCEDURE — G1004 CDSM NDSC: HCPCS

## 2020-10-08 RX ADMIN — GADOBUTROL 9 ML: 604.72 INJECTION INTRAVENOUS at 12:43

## 2020-10-09 ENCOUNTER — TELEPHONE (OUTPATIENT)
Dept: INFUSION CENTER | Facility: HOSPITAL | Age: 85
End: 2020-10-09

## 2020-10-20 ENCOUNTER — TELEPHONE (OUTPATIENT)
Dept: SURGICAL ONCOLOGY | Facility: CLINIC | Age: 85
End: 2020-10-20

## 2020-10-21 ENCOUNTER — LAB (OUTPATIENT)
Dept: LAB | Facility: CLINIC | Age: 85
End: 2020-10-21
Payer: MEDICARE

## 2020-10-21 ENCOUNTER — HOSPITAL ENCOUNTER (OUTPATIENT)
Dept: RADIOLOGY | Facility: HOSPITAL | Age: 85
Discharge: HOME | End: 2020-10-21
Attending: SURGERY
Payer: MEDICARE

## 2020-10-21 ENCOUNTER — APPOINTMENT (OUTPATIENT)
Dept: LAB | Facility: CLINIC | Age: 85
End: 2020-10-21
Payer: MEDICARE

## 2020-10-21 ENCOUNTER — OFFICE VISIT (OUTPATIENT)
Dept: SURGICAL ONCOLOGY | Facility: CLINIC | Age: 85
End: 2020-10-21
Payer: MEDICARE

## 2020-10-21 VITALS
DIASTOLIC BLOOD PRESSURE: 98 MMHG | HEART RATE: 78 BPM | RESPIRATION RATE: 18 BRPM | BODY MASS INDEX: 33.13 KG/M2 | WEIGHT: 187 LBS | TEMPERATURE: 97.4 F | SYSTOLIC BLOOD PRESSURE: 138 MMHG | HEIGHT: 63 IN

## 2020-10-21 DIAGNOSIS — C50.412 MALIGNANT NEOPLASM OF UPPER-OUTER QUADRANT OF LEFT BREAST IN FEMALE, ESTROGEN RECEPTOR POSITIVE (HCC): ICD-10-CM

## 2020-10-21 DIAGNOSIS — Z17.0 MALIGNANT NEOPLASM OF UPPER-OUTER QUADRANT OF LEFT BREAST IN FEMALE, ESTROGEN RECEPTOR POSITIVE (HCC): ICD-10-CM

## 2020-10-21 DIAGNOSIS — C50.812 MALIGNANT NEOPLASM OF OVERLAPPING SITES OF LEFT FEMALE BREAST, UNSPECIFIED ESTROGEN RECEPTOR STATUS (HCC): ICD-10-CM

## 2020-10-21 DIAGNOSIS — Z01.818 PREOP EXAMINATION: Primary | ICD-10-CM

## 2020-10-21 LAB
ALBUMIN SERPL BCP-MCNC: 3.8 G/DL (ref 3.5–5)
ALP SERPL-CCNC: 141 U/L (ref 46–116)
ALT SERPL W P-5'-P-CCNC: 31 U/L (ref 12–78)
ANION GAP SERPL CALCULATED.3IONS-SCNC: 9 MMOL/L (ref 4–13)
AST SERPL W P-5'-P-CCNC: 18 U/L (ref 5–45)
BASOPHILS # BLD AUTO: 0.03 THOUSANDS/ΜL (ref 0–0.1)
BASOPHILS NFR BLD AUTO: 0 % (ref 0–1)
BILIRUB SERPL-MCNC: 1.11 MG/DL (ref 0.2–1)
BUN SERPL-MCNC: 23 MG/DL (ref 5–25)
CALCIUM SERPL-MCNC: 10 MG/DL (ref 8.3–10.1)
CHLORIDE SERPL-SCNC: 101 MMOL/L (ref 100–108)
CO2 SERPL-SCNC: 25 MMOL/L (ref 21–32)
CREAT SERPL-MCNC: 1.26 MG/DL (ref 0.6–1.3)
EOSINOPHIL # BLD AUTO: 0.05 THOUSAND/ΜL (ref 0–0.61)
EOSINOPHIL NFR BLD AUTO: 0 % (ref 0–6)
ERYTHROCYTE [DISTWIDTH] IN BLOOD BY AUTOMATED COUNT: 12.4 % (ref 11.6–15.1)
GFR SERPL CREATININE-BSD FRML MDRD: 39 ML/MIN/1.73SQ M
GLUCOSE SERPL-MCNC: 225 MG/DL (ref 65–140)
HCT VFR BLD AUTO: 48.4 % (ref 34.8–46.1)
HGB BLD-MCNC: 15.6 G/DL (ref 11.5–15.4)
IMM GRANULOCYTES # BLD AUTO: 0.05 THOUSAND/UL (ref 0–0.2)
IMM GRANULOCYTES NFR BLD AUTO: 0 % (ref 0–2)
LYMPHOCYTES # BLD AUTO: 2.32 THOUSANDS/ΜL (ref 0.6–4.47)
LYMPHOCYTES NFR BLD AUTO: 19 % (ref 14–44)
MCH RBC QN AUTO: 29.4 PG (ref 26.8–34.3)
MCHC RBC AUTO-ENTMCNC: 32.2 G/DL (ref 31.4–37.4)
MCV RBC AUTO: 91 FL (ref 82–98)
MONOCYTES # BLD AUTO: 0.73 THOUSAND/ΜL (ref 0.17–1.22)
MONOCYTES NFR BLD AUTO: 6 % (ref 4–12)
NEUTROPHILS # BLD AUTO: 9.36 THOUSANDS/ΜL (ref 1.85–7.62)
NEUTS SEG NFR BLD AUTO: 75 % (ref 43–75)
NRBC BLD AUTO-RTO: 0 /100 WBCS
PLATELET # BLD AUTO: 295 THOUSANDS/UL (ref 149–390)
PMV BLD AUTO: 11.3 FL (ref 8.9–12.7)
POTASSIUM SERPL-SCNC: 4.4 MMOL/L (ref 3.5–5.3)
PROT SERPL-MCNC: 8.3 G/DL (ref 6.4–8.2)
RBC # BLD AUTO: 5.31 MILLION/UL (ref 3.81–5.12)
SODIUM SERPL-SCNC: 135 MMOL/L (ref 136–145)
WBC # BLD AUTO: 12.54 THOUSAND/UL (ref 4.31–10.16)

## 2020-10-21 PROCEDURE — 93005 ELECTROCARDIOGRAM TRACING: CPT

## 2020-10-21 PROCEDURE — 36415 COLL VENOUS BLD VENIPUNCTURE: CPT

## 2020-10-21 PROCEDURE — 71046 X-RAY EXAM CHEST 2 VIEWS: CPT

## 2020-10-21 PROCEDURE — 99215 OFFICE O/P EST HI 40 MIN: CPT | Performed by: SURGERY

## 2020-10-21 PROCEDURE — 80053 COMPREHEN METABOLIC PANEL: CPT

## 2020-10-21 PROCEDURE — 85025 COMPLETE CBC W/AUTO DIFF WBC: CPT

## 2020-10-22 LAB
ATRIAL RATE: 71 BPM
P AXIS: 31 DEGREES
PR INTERVAL: 154 MS
QRS AXIS: 29 DEGREES
QRSD INTERVAL: 76 MS
QT INTERVAL: 394 MS
QTC INTERVAL: 428 MS
T WAVE AXIS: 43 DEGREES
VENTRICULAR RATE: 71 BPM

## 2020-10-22 PROCEDURE — 93010 ELECTROCARDIOGRAM REPORT: CPT | Performed by: INTERNAL MEDICINE

## 2020-10-28 ENCOUNTER — PATIENT OUTREACH (OUTPATIENT)
Dept: CASE MANAGEMENT | Facility: HOSPITAL | Age: 85
End: 2020-10-28

## 2020-11-02 ENCOUNTER — HOSPITAL ENCOUNTER (OUTPATIENT)
Dept: ULTRASOUND IMAGING | Facility: CLINIC | Age: 85
Discharge: HOME | End: 2020-11-02
Admitting: RADIOLOGY
Payer: MEDICARE

## 2020-11-02 VITALS — HEART RATE: 80 BPM | DIASTOLIC BLOOD PRESSURE: 88 MMHG | TEMPERATURE: 97.5 F | SYSTOLIC BLOOD PRESSURE: 160 MMHG

## 2020-11-02 DIAGNOSIS — Z85.3 HISTORY OF LEFT BREAST CANCER: ICD-10-CM

## 2020-11-02 PROCEDURE — 10035 PLMT SFT TISS LOCLZJ DEV 1ST: CPT

## 2020-11-02 RX ORDER — LIDOCAINE HYDROCHLORIDE 10 MG/ML
5 INJECTION, SOLUTION EPIDURAL; INFILTRATION; INTRACAUDAL; PERINEURAL ONCE
Status: COMPLETED | OUTPATIENT
Start: 2020-11-02 | End: 2020-11-02

## 2020-11-02 RX ADMIN — LIDOCAINE HYDROCHLORIDE 5 ML: 10 INJECTION, SOLUTION EPIDURAL; INFILTRATION; INTRACAUDAL; PERINEURAL at 14:59

## 2020-11-06 ENCOUNTER — PATIENT OUTREACH (OUTPATIENT)
Dept: CASE MANAGEMENT | Facility: HOSPITAL | Age: 85
End: 2020-11-06

## 2020-11-07 DIAGNOSIS — C50.412 MALIGNANT NEOPLASM OF UPPER-OUTER QUADRANT OF LEFT BREAST IN FEMALE, ESTROGEN RECEPTOR POSITIVE (HCC): ICD-10-CM

## 2020-11-07 DIAGNOSIS — Z17.0 MALIGNANT NEOPLASM OF UPPER-OUTER QUADRANT OF LEFT BREAST IN FEMALE, ESTROGEN RECEPTOR POSITIVE (HCC): ICD-10-CM

## 2020-11-07 PROCEDURE — U0003 INFECTIOUS AGENT DETECTION BY NUCLEIC ACID (DNA OR RNA); SEVERE ACUTE RESPIRATORY SYNDROME CORONAVIRUS 2 (SARS-COV-2) (CORONAVIRUS DISEASE [COVID-19]), AMPLIFIED PROBE TECHNIQUE, MAKING USE OF HIGH THROUGHPUT TECHNOLOGIES AS DESCRIBED BY CMS-2020-01-R: HCPCS | Performed by: SURGERY

## 2020-11-08 LAB — SARS-COV-2 RNA SPEC QL NAA+PROBE: NOT DETECTED

## 2020-11-16 ENCOUNTER — ANESTHESIA EVENT (OUTPATIENT)
Dept: PERIOP | Facility: HOSPITAL | Age: 85
End: 2020-11-16
Payer: MEDICARE

## 2020-11-17 ENCOUNTER — ANESTHESIA (OUTPATIENT)
Dept: PERIOP | Facility: HOSPITAL | Age: 85
End: 2020-11-17
Payer: MEDICARE

## 2020-11-17 ENCOUNTER — HOSPITAL ENCOUNTER (OUTPATIENT)
Dept: ULTRASOUND IMAGING | Facility: HOSPITAL | Age: 85
Discharge: HOME | End: 2020-11-17
Attending: SURGERY
Payer: MEDICARE

## 2020-11-17 ENCOUNTER — HOSPITAL ENCOUNTER (OUTPATIENT)
Facility: HOSPITAL | Age: 85
Setting detail: OUTPATIENT SURGERY
LOS: 1 days | Discharge: HOME HEALTH | End: 2020-11-18
Attending: SURGERY | Admitting: SURGERY
Payer: MEDICARE

## 2020-11-17 ENCOUNTER — APPOINTMENT (OUTPATIENT)
Dept: MAMMOGRAPHY | Facility: HOSPITAL | Age: 85
End: 2020-11-17
Payer: MEDICARE

## 2020-11-17 VITALS — HEART RATE: 96 BPM

## 2020-11-17 DIAGNOSIS — Z17.0 MALIGNANT NEOPLASM OF UPPER-OUTER QUADRANT OF LEFT BREAST IN FEMALE, ESTROGEN RECEPTOR POSITIVE (HCC): ICD-10-CM

## 2020-11-17 DIAGNOSIS — C50.412 MALIGNANT NEOPLASM OF UPPER-OUTER QUADRANT OF LEFT FEMALE BREAST (HCC): ICD-10-CM

## 2020-11-17 DIAGNOSIS — C50.412 MALIGNANT NEOPLASM OF UPPER-OUTER QUADRANT OF LEFT BREAST IN FEMALE, ESTROGEN RECEPTOR POSITIVE (HCC): ICD-10-CM

## 2020-11-17 PROBLEM — Z86.79 ATRIAL FIBRILLATION, CURRENTLY IN SINUS RHYTHM: Chronic | Status: ACTIVE | Noted: 2020-11-17

## 2020-11-17 PROBLEM — G89.29 CHRONIC PAIN: Chronic | Status: ACTIVE | Noted: 2020-11-17

## 2020-11-17 LAB
GLUCOSE SERPL-MCNC: 181 MG/DL (ref 65–140)
GLUCOSE SERPL-MCNC: 188 MG/DL (ref 65–140)

## 2020-11-17 PROCEDURE — 88342 IMHCHEM/IMCYTCHM 1ST ANTB: CPT | Performed by: PATHOLOGY

## 2020-11-17 PROCEDURE — 82948 REAGENT STRIP/BLOOD GLUCOSE: CPT

## 2020-11-17 PROCEDURE — 88309 TISSUE EXAM BY PATHOLOGIST: CPT | Performed by: PATHOLOGY

## 2020-11-17 PROCEDURE — 76642 ULTRASOUND BREAST LIMITED: CPT

## 2020-11-17 PROCEDURE — G9197 ORDER FOR CEPH: HCPCS | Performed by: SURGERY

## 2020-11-17 PROCEDURE — 19371 PERI-IMPLT CAPSLC BRST COMPL: CPT | Performed by: SURGERY

## 2020-11-17 PROCEDURE — 88341 IMHCHEM/IMCYTCHM EA ADD ANTB: CPT | Performed by: PATHOLOGY

## 2020-11-17 PROCEDURE — 94664 DEMO&/EVAL PT USE INHALER: CPT

## 2020-11-17 PROCEDURE — 19302 P-MASTECTOMY W/LN REMOVAL: CPT | Performed by: SURGERY

## 2020-11-17 RX ORDER — MORPHINE SULFATE 15 MG/1
15 TABLET ORAL EVERY 8 HOURS PRN
Status: DISCONTINUED | OUTPATIENT
Start: 2020-11-17 | End: 2020-11-18 | Stop reason: HOSPADM

## 2020-11-17 RX ORDER — PROMETHAZINE HYDROCHLORIDE 25 MG/1
25 TABLET ORAL EVERY 6 HOURS PRN
Status: DISCONTINUED | OUTPATIENT
Start: 2020-11-17 | End: 2020-11-17

## 2020-11-17 RX ORDER — ONDANSETRON 2 MG/ML
4 INJECTION INTRAMUSCULAR; INTRAVENOUS EVERY 6 HOURS PRN
Status: DISCONTINUED | OUTPATIENT
Start: 2020-11-17 | End: 2020-11-18 | Stop reason: HOSPADM

## 2020-11-17 RX ORDER — MORPHINE SULFATE 15 MG/1
15 TABLET, FILM COATED, EXTENDED RELEASE ORAL EVERY 12 HOURS PRN
Status: DISCONTINUED | OUTPATIENT
Start: 2020-11-17 | End: 2020-11-18 | Stop reason: HOSPADM

## 2020-11-17 RX ORDER — DIPHENHYDRAMINE HCL 25 MG
50 TABLET ORAL EVERY 6 HOURS PRN
Status: DISCONTINUED | OUTPATIENT
Start: 2020-11-17 | End: 2020-11-18 | Stop reason: HOSPADM

## 2020-11-17 RX ORDER — PROMETHAZINE HYDROCHLORIDE 25 MG/ML
12.5 INJECTION, SOLUTION INTRAMUSCULAR; INTRAVENOUS ONCE AS NEEDED
Status: DISCONTINUED | OUTPATIENT
Start: 2020-11-17 | End: 2020-11-17 | Stop reason: HOSPADM

## 2020-11-17 RX ORDER — MAGNESIUM HYDROXIDE 1200 MG/15ML
LIQUID ORAL AS NEEDED
Status: DISCONTINUED | OUTPATIENT
Start: 2020-11-17 | End: 2020-11-17 | Stop reason: HOSPADM

## 2020-11-17 RX ORDER — HYDROMORPHONE HCL/PF 1 MG/ML
0.5 SYRINGE (ML) INJECTION
Status: DISCONTINUED | OUTPATIENT
Start: 2020-11-17 | End: 2020-11-17 | Stop reason: HOSPADM

## 2020-11-17 RX ORDER — MORPHINE SULFATE 15 MG/1
15 TABLET, FILM COATED, EXTENDED RELEASE ORAL ONCE
Status: COMPLETED | OUTPATIENT
Start: 2020-11-17 | End: 2020-11-17

## 2020-11-17 RX ORDER — LIDOCAINE HYDROCHLORIDE 10 MG/ML
0.5 INJECTION, SOLUTION EPIDURAL; INFILTRATION; INTRACAUDAL; PERINEURAL ONCE AS NEEDED
Status: DISCONTINUED | OUTPATIENT
Start: 2020-11-17 | End: 2020-11-17 | Stop reason: HOSPADM

## 2020-11-17 RX ORDER — DOCUSATE SODIUM 100 MG/1
100 CAPSULE, LIQUID FILLED ORAL 2 TIMES DAILY
Status: DISCONTINUED | OUTPATIENT
Start: 2020-11-17 | End: 2020-11-17

## 2020-11-17 RX ORDER — DOCUSATE SODIUM 100 MG/1
100 CAPSULE, LIQUID FILLED ORAL 2 TIMES DAILY
Status: DISCONTINUED | OUTPATIENT
Start: 2020-11-17 | End: 2020-11-18 | Stop reason: HOSPADM

## 2020-11-17 RX ORDER — OXYCODONE HYDROCHLORIDE AND ACETAMINOPHEN 5; 325 MG/1; MG/1
2 TABLET ORAL EVERY 4 HOURS PRN
Status: DISCONTINUED | OUTPATIENT
Start: 2020-11-17 | End: 2020-11-18 | Stop reason: HOSPADM

## 2020-11-17 RX ORDER — CEFAZOLIN SODIUM 2 G/50ML
2000 SOLUTION INTRAVENOUS ONCE
Status: COMPLETED | OUTPATIENT
Start: 2020-11-17 | End: 2020-11-17

## 2020-11-17 RX ORDER — OXYCODONE HYDROCHLORIDE AND ACETAMINOPHEN 5; 325 MG/1; MG/1
2 TABLET ORAL EVERY 4 HOURS PRN
Status: DISCONTINUED | OUTPATIENT
Start: 2020-11-17 | End: 2020-11-17

## 2020-11-17 RX ORDER — ONDANSETRON 2 MG/ML
4 INJECTION INTRAMUSCULAR; INTRAVENOUS ONCE AS NEEDED
Status: DISCONTINUED | OUTPATIENT
Start: 2020-11-17 | End: 2020-11-17 | Stop reason: HOSPADM

## 2020-11-17 RX ORDER — SODIUM CHLORIDE 9 MG/ML
INJECTION, SOLUTION INTRAVENOUS CONTINUOUS PRN
Status: DISCONTINUED | OUTPATIENT
Start: 2020-11-17 | End: 2020-11-17

## 2020-11-17 RX ORDER — FLUTICASONE PROPIONATE 220 UG/1
1 AEROSOL, METERED RESPIRATORY (INHALATION)
Status: DISCONTINUED | OUTPATIENT
Start: 2020-11-17 | End: 2020-11-18 | Stop reason: HOSPADM

## 2020-11-17 RX ORDER — ALBUTEROL SULFATE 90 UG/1
1 AEROSOL, METERED RESPIRATORY (INHALATION) EVERY 4 HOURS PRN
Status: DISCONTINUED | OUTPATIENT
Start: 2020-11-17 | End: 2020-11-18 | Stop reason: HOSPADM

## 2020-11-17 RX ORDER — HYDROMORPHONE HCL/PF 1 MG/ML
0.2 SYRINGE (ML) INJECTION
Status: DISCONTINUED | OUTPATIENT
Start: 2020-11-17 | End: 2020-11-17 | Stop reason: HOSPADM

## 2020-11-17 RX ORDER — LIDOCAINE HYDROCHLORIDE AND EPINEPHRINE 10; 10 MG/ML; UG/ML
INJECTION, SOLUTION INFILTRATION; PERINEURAL AS NEEDED
Status: DISCONTINUED | OUTPATIENT
Start: 2020-11-17 | End: 2020-11-17 | Stop reason: HOSPADM

## 2020-11-17 RX ORDER — CEFAZOLIN SODIUM 2 G/50ML
2000 SOLUTION INTRAVENOUS ONCE
Status: DISCONTINUED | OUTPATIENT
Start: 2020-11-17 | End: 2020-11-17

## 2020-11-17 RX ORDER — PROPOFOL 10 MG/ML
INJECTION, EMULSION INTRAVENOUS AS NEEDED
Status: DISCONTINUED | OUTPATIENT
Start: 2020-11-17 | End: 2020-11-17

## 2020-11-17 RX ORDER — DEXAMETHASONE SODIUM PHOSPHATE 10 MG/ML
INJECTION, SOLUTION INTRAMUSCULAR; INTRAVENOUS AS NEEDED
Status: DISCONTINUED | OUTPATIENT
Start: 2020-11-17 | End: 2020-11-17

## 2020-11-17 RX ORDER — FENTANYL CITRATE/PF 50 MCG/ML
25 SYRINGE (ML) INJECTION
Status: DISCONTINUED | OUTPATIENT
Start: 2020-11-17 | End: 2020-11-17 | Stop reason: HOSPADM

## 2020-11-17 RX ORDER — METOPROLOL TARTRATE 50 MG/1
50 TABLET, FILM COATED ORAL EVERY 12 HOURS SCHEDULED
Status: DISCONTINUED | OUTPATIENT
Start: 2020-11-17 | End: 2020-11-18 | Stop reason: HOSPADM

## 2020-11-17 RX ORDER — ROCURONIUM BROMIDE 10 MG/ML
INJECTION, SOLUTION INTRAVENOUS AS NEEDED
Status: DISCONTINUED | OUTPATIENT
Start: 2020-11-17 | End: 2020-11-17

## 2020-11-17 RX ORDER — PANTOPRAZOLE SODIUM 20 MG/1
20 TABLET, DELAYED RELEASE ORAL
Status: DISCONTINUED | OUTPATIENT
Start: 2020-11-18 | End: 2020-11-18 | Stop reason: HOSPADM

## 2020-11-17 RX ORDER — MORPHINE SULFATE 4 MG/ML
3 INJECTION, SOLUTION INTRAMUSCULAR; INTRAVENOUS
Status: DISCONTINUED | OUTPATIENT
Start: 2020-11-17 | End: 2020-11-18 | Stop reason: HOSPADM

## 2020-11-17 RX ORDER — LABETALOL 20 MG/4 ML (5 MG/ML) INTRAVENOUS SYRINGE
10
Status: DISCONTINUED | OUTPATIENT
Start: 2020-11-17 | End: 2020-11-17 | Stop reason: HOSPADM

## 2020-11-17 RX ORDER — LIDOCAINE HYDROCHLORIDE 10 MG/ML
INJECTION, SOLUTION EPIDURAL; INFILTRATION; INTRACAUDAL; PERINEURAL AS NEEDED
Status: DISCONTINUED | OUTPATIENT
Start: 2020-11-17 | End: 2020-11-17

## 2020-11-17 RX ORDER — HYDRALAZINE HYDROCHLORIDE 20 MG/ML
5 INJECTION INTRAMUSCULAR; INTRAVENOUS
Status: DISCONTINUED | OUTPATIENT
Start: 2020-11-17 | End: 2020-11-17 | Stop reason: HOSPADM

## 2020-11-17 RX ORDER — OXYBUTYNIN CHLORIDE 5 MG/1
15 TABLET, EXTENDED RELEASE ORAL DAILY
Status: DISCONTINUED | OUTPATIENT
Start: 2020-11-18 | End: 2020-11-18 | Stop reason: HOSPADM

## 2020-11-17 RX ORDER — ONDANSETRON 2 MG/ML
INJECTION INTRAMUSCULAR; INTRAVENOUS AS NEEDED
Status: DISCONTINUED | OUTPATIENT
Start: 2020-11-17 | End: 2020-11-17

## 2020-11-17 RX ORDER — EPHEDRINE SULFATE 50 MG/ML
INJECTION INTRAVENOUS AS NEEDED
Status: DISCONTINUED | OUTPATIENT
Start: 2020-11-17 | End: 2020-11-17

## 2020-11-17 RX ORDER — KETAMINE HCL IN NACL, ISO-OSM 100MG/10ML
SYRINGE (ML) INJECTION AS NEEDED
Status: DISCONTINUED | OUTPATIENT
Start: 2020-11-17 | End: 2020-11-17

## 2020-11-17 RX ORDER — ALBUTEROL SULFATE 2.5 MG/3ML
2.5 SOLUTION RESPIRATORY (INHALATION) ONCE AS NEEDED
Status: DISCONTINUED | OUTPATIENT
Start: 2020-11-17 | End: 2020-11-17 | Stop reason: HOSPADM

## 2020-11-17 RX ORDER — SACCHAROMYCES BOULARDII 250 MG
250 CAPSULE ORAL 2 TIMES DAILY
Status: DISCONTINUED | OUTPATIENT
Start: 2020-11-17 | End: 2020-11-18 | Stop reason: HOSPADM

## 2020-11-17 RX ORDER — HYDROMORPHONE HCL/PF 1 MG/ML
SYRINGE (ML) INJECTION AS NEEDED
Status: DISCONTINUED | OUTPATIENT
Start: 2020-11-17 | End: 2020-11-17

## 2020-11-17 RX ORDER — METOCLOPRAMIDE HYDROCHLORIDE 5 MG/ML
10 INJECTION INTRAMUSCULAR; INTRAVENOUS ONCE AS NEEDED
Status: DISCONTINUED | OUTPATIENT
Start: 2020-11-17 | End: 2020-11-17 | Stop reason: HOSPADM

## 2020-11-17 RX ORDER — FENTANYL CITRATE 50 UG/ML
INJECTION, SOLUTION INTRAMUSCULAR; INTRAVENOUS AS NEEDED
Status: DISCONTINUED | OUTPATIENT
Start: 2020-11-17 | End: 2020-11-17

## 2020-11-17 RX ORDER — SODIUM CHLORIDE, SODIUM LACTATE, POTASSIUM CHLORIDE, CALCIUM CHLORIDE 600; 310; 30; 20 MG/100ML; MG/100ML; MG/100ML; MG/100ML
125 INJECTION, SOLUTION INTRAVENOUS CONTINUOUS
Status: DISCONTINUED | OUTPATIENT
Start: 2020-11-17 | End: 2020-11-18 | Stop reason: HOSPADM

## 2020-11-17 RX ORDER — CEFAZOLIN SODIUM 2 G/50ML
2000 SOLUTION INTRAVENOUS EVERY 8 HOURS
Status: DISCONTINUED | OUTPATIENT
Start: 2020-11-17 | End: 2020-11-18 | Stop reason: HOSPADM

## 2020-11-17 RX ORDER — SUCCINYLCHOLINE/SOD CL,ISO/PF 100 MG/5ML
SYRINGE (ML) INTRAVENOUS AS NEEDED
Status: DISCONTINUED | OUTPATIENT
Start: 2020-11-17 | End: 2020-11-17

## 2020-11-17 RX ORDER — SODIUM CHLORIDE, SODIUM LACTATE, POTASSIUM CHLORIDE, CALCIUM CHLORIDE 600; 310; 30; 20 MG/100ML; MG/100ML; MG/100ML; MG/100ML
INJECTION, SOLUTION INTRAVENOUS CONTINUOUS PRN
Status: DISCONTINUED | OUTPATIENT
Start: 2020-11-17 | End: 2020-11-17

## 2020-11-17 RX ADMIN — DOCUSATE SODIUM 100 MG: 100 CAPSULE, LIQUID FILLED ORAL at 21:28

## 2020-11-17 RX ADMIN — FENTANYL CITRATE 50 MCG: 50 INJECTION INTRAMUSCULAR; INTRAVENOUS at 14:53

## 2020-11-17 RX ADMIN — METOPROLOL TARTRATE 50 MG: 50 TABLET, FILM COATED ORAL at 21:28

## 2020-11-17 RX ADMIN — MORPHINE SULFATE 15 MG: 15 TABLET, EXTENDED RELEASE ORAL at 12:37

## 2020-11-17 RX ADMIN — Medication 50 MG: at 14:30

## 2020-11-17 RX ADMIN — HYDROMORPHONE HYDROCHLORIDE 0.5 MG: 1 INJECTION, SOLUTION INTRAMUSCULAR; INTRAVENOUS; SUBCUTANEOUS at 14:59

## 2020-11-17 RX ADMIN — SODIUM CHLORIDE, SODIUM LACTATE, POTASSIUM CHLORIDE, AND CALCIUM CHLORIDE: .6; .31; .03; .02 INJECTION, SOLUTION INTRAVENOUS at 14:50

## 2020-11-17 RX ADMIN — CEFAZOLIN SODIUM 2000 MG: 2 SOLUTION INTRAVENOUS at 14:22

## 2020-11-17 RX ADMIN — HYDROMORPHONE HYDROCHLORIDE 0.5 MG: 1 INJECTION, SOLUTION INTRAMUSCULAR; INTRAVENOUS; SUBCUTANEOUS at 16:03

## 2020-11-17 RX ADMIN — FENTANYL CITRATE 50 MCG: 50 INJECTION INTRAMUSCULAR; INTRAVENOUS at 14:30

## 2020-11-17 RX ADMIN — MORPHINE SULFATE 15 MG: 15 TABLET, EXTENDED RELEASE ORAL at 21:29

## 2020-11-17 RX ADMIN — Medication 0.2 MCG/KG/HR: at 14:45

## 2020-11-17 RX ADMIN — Medication 100 MG: at 14:31

## 2020-11-17 RX ADMIN — SUGAMMADEX 339 MG: 100 INJECTION, SOLUTION INTRAVENOUS at 15:12

## 2020-11-17 RX ADMIN — HYDROMORPHONE HYDROCHLORIDE 0.2 MG: 1 INJECTION, SOLUTION INTRAMUSCULAR; INTRAVENOUS; SUBCUTANEOUS at 18:06

## 2020-11-17 RX ADMIN — SODIUM CHLORIDE: 0.9 INJECTION, SOLUTION INTRAVENOUS at 14:36

## 2020-11-17 RX ADMIN — SODIUM CHLORIDE, SODIUM LACTATE, POTASSIUM CHLORIDE, AND CALCIUM CHLORIDE 125 ML/HR: .6; .31; .03; .02 INJECTION, SOLUTION INTRAVENOUS at 21:32

## 2020-11-17 RX ADMIN — FENTANYL CITRATE 25 MCG: 50 INJECTION INTRAMUSCULAR; INTRAVENOUS at 17:48

## 2020-11-17 RX ADMIN — PHENYLEPHRINE HYDROCHLORIDE 50 MCG/MIN: 10 INJECTION INTRAVENOUS at 14:45

## 2020-11-17 RX ADMIN — PHENYLEPHRINE HYDROCHLORIDE 200 MCG: 10 INJECTION INTRAVENOUS at 14:35

## 2020-11-17 RX ADMIN — SODIUM CHLORIDE, SODIUM LACTATE, POTASSIUM CHLORIDE, AND CALCIUM CHLORIDE: .6; .31; .03; .02 INJECTION, SOLUTION INTRAVENOUS at 14:23

## 2020-11-17 RX ADMIN — LIDOCAINE HYDROCHLORIDE 50 MG: 10 INJECTION, SOLUTION EPIDURAL; INFILTRATION; INTRACAUDAL at 14:30

## 2020-11-17 RX ADMIN — HYDROMORPHONE HYDROCHLORIDE 0.2 MG: 1 INJECTION, SOLUTION INTRAMUSCULAR; INTRAVENOUS; SUBCUTANEOUS at 18:13

## 2020-11-17 RX ADMIN — DEXAMETHASONE SODIUM PHOSPHATE 4 MG: 10 INJECTION, SOLUTION INTRAMUSCULAR; INTRAVENOUS at 14:34

## 2020-11-17 RX ADMIN — CEFAZOLIN SODIUM 2000 MG: 2 SOLUTION INTRAVENOUS at 21:33

## 2020-11-17 RX ADMIN — PROPOFOL 150 MG: 10 INJECTION, EMULSION INTRAVENOUS at 14:30

## 2020-11-17 RX ADMIN — HYDROMORPHONE HYDROCHLORIDE 0.2 MG: 1 INJECTION, SOLUTION INTRAMUSCULAR; INTRAVENOUS; SUBCUTANEOUS at 17:58

## 2020-11-17 RX ADMIN — ONDANSETRON 4 MG: 2 INJECTION INTRAMUSCULAR; INTRAVENOUS at 14:31

## 2020-11-17 RX ADMIN — FENTANYL CITRATE 25 MCG: 50 INJECTION INTRAMUSCULAR; INTRAVENOUS at 17:53

## 2020-11-17 RX ADMIN — SODIUM CHLORIDE, SODIUM LACTATE, POTASSIUM CHLORIDE, AND CALCIUM CHLORIDE: .6; .31; .03; .02 INJECTION, SOLUTION INTRAVENOUS at 12:35

## 2020-11-17 RX ADMIN — ROCURONIUM BROMIDE 50 MG: 10 SOLUTION INTRAVENOUS at 14:49

## 2020-11-17 RX ADMIN — Medication 250 MG: at 21:28

## 2020-11-17 RX ADMIN — FENTANYL CITRATE 25 MCG: 50 INJECTION INTRAMUSCULAR; INTRAVENOUS at 17:42

## 2020-11-17 RX ADMIN — PHENYLEPHRINE HYDROCHLORIDE 30 MCG/MIN: 10 INJECTION INTRAVENOUS at 15:02

## 2020-11-17 RX ADMIN — EPHEDRINE SULFATE 10 MG: 50 INJECTION, SOLUTION INTRAVENOUS at 14:46

## 2020-11-18 VITALS
HEIGHT: 63 IN | DIASTOLIC BLOOD PRESSURE: 68 MMHG | BODY MASS INDEX: 33.13 KG/M2 | OXYGEN SATURATION: 92 % | SYSTOLIC BLOOD PRESSURE: 154 MMHG | HEART RATE: 72 BPM | RESPIRATION RATE: 18 BRPM | WEIGHT: 187 LBS | TEMPERATURE: 97.6 F

## 2020-11-18 LAB
GLUCOSE SERPL-MCNC: 182 MG/DL (ref 65–140)
GLUCOSE SERPL-MCNC: 229 MG/DL (ref 65–140)

## 2020-11-18 PROCEDURE — 99024 POSTOP FOLLOW-UP VISIT: CPT | Performed by: SURGERY

## 2020-11-18 PROCEDURE — 82948 REAGENT STRIP/BLOOD GLUCOSE: CPT

## 2020-11-18 RX ADMIN — Medication 250 MG: at 09:54

## 2020-11-18 RX ADMIN — SODIUM CHLORIDE, SODIUM LACTATE, POTASSIUM CHLORIDE, AND CALCIUM CHLORIDE 125 ML/HR: .6; .31; .03; .02 INJECTION, SOLUTION INTRAVENOUS at 05:44

## 2020-11-18 RX ADMIN — PANTOPRAZOLE SODIUM 20 MG: 20 TABLET, DELAYED RELEASE ORAL at 05:47

## 2020-11-18 RX ADMIN — ENOXAPARIN SODIUM 40 MG: 40 INJECTION SUBCUTANEOUS at 09:54

## 2020-11-18 RX ADMIN — INSULIN LISPRO 2 UNITS: 100 INJECTION, SOLUTION INTRAVENOUS; SUBCUTANEOUS at 11:52

## 2020-11-18 RX ADMIN — CEFAZOLIN SODIUM 2000 MG: 2 SOLUTION INTRAVENOUS at 05:47

## 2020-11-18 RX ADMIN — INSULIN LISPRO 1 UNITS: 100 INJECTION, SOLUTION INTRAVENOUS; SUBCUTANEOUS at 09:55

## 2020-11-18 RX ADMIN — MORPHINE SULFATE 15 MG: 15 TABLET, EXTENDED RELEASE ORAL at 07:29

## 2020-11-18 RX ADMIN — DOCUSATE SODIUM 100 MG: 100 CAPSULE, LIQUID FILLED ORAL at 09:53

## 2020-11-18 RX ADMIN — OXYBUTYNIN 15 MG: 5 TABLET, FILM COATED, EXTENDED RELEASE ORAL at 09:53

## 2020-11-18 RX ADMIN — METOPROLOL TARTRATE 50 MG: 50 TABLET, FILM COATED ORAL at 09:54

## 2020-11-30 ENCOUNTER — TELEPHONE (OUTPATIENT)
Dept: SURGICAL ONCOLOGY | Facility: CLINIC | Age: 85
End: 2020-11-30

## 2020-12-01 ENCOUNTER — TELEPHONE (OUTPATIENT)
Dept: NEUROLOGY | Facility: CLINIC | Age: 85
End: 2020-12-01

## 2020-12-01 PROBLEM — C50.912 CARCINOMA OF LEFT BREAST METASTATIC TO AXILLARY LYMPH NODE (HCC): Status: ACTIVE | Noted: 2020-12-01

## 2020-12-01 PROBLEM — C77.3 CARCINOMA OF LEFT BREAST METASTATIC TO AXILLARY LYMPH NODE (HCC): Status: ACTIVE | Noted: 2020-12-01

## 2020-12-03 ENCOUNTER — OFFICE VISIT (OUTPATIENT)
Dept: SURGICAL ONCOLOGY | Facility: CLINIC | Age: 85
End: 2020-12-03
Payer: MEDICARE

## 2020-12-03 ENCOUNTER — TELEPHONE (OUTPATIENT)
Dept: HEMATOLOGY ONCOLOGY | Facility: CLINIC | Age: 85
End: 2020-12-03

## 2020-12-03 VITALS
RESPIRATION RATE: 18 BRPM | HEART RATE: 72 BPM | TEMPERATURE: 97 F | WEIGHT: 184 LBS | BODY MASS INDEX: 32.6 KG/M2 | DIASTOLIC BLOOD PRESSURE: 80 MMHG | HEIGHT: 63 IN | SYSTOLIC BLOOD PRESSURE: 122 MMHG

## 2020-12-03 DIAGNOSIS — C50.412 MALIGNANT NEOPLASM OF UPPER-OUTER QUADRANT OF LEFT BREAST IN FEMALE, ESTROGEN RECEPTOR POSITIVE (HCC): ICD-10-CM

## 2020-12-03 DIAGNOSIS — Z17.0 MALIGNANT NEOPLASM OF UPPER-OUTER QUADRANT OF LEFT BREAST IN FEMALE, ESTROGEN RECEPTOR POSITIVE (HCC): ICD-10-CM

## 2020-12-03 DIAGNOSIS — C50.912 CARCINOMA OF LEFT BREAST METASTATIC TO AXILLARY LYMPH NODE (HCC): ICD-10-CM

## 2020-12-03 DIAGNOSIS — Z71.89 OTHER SPECIFIED COUNSELING: Primary | ICD-10-CM

## 2020-12-03 DIAGNOSIS — C77.3 CARCINOMA OF LEFT BREAST METASTATIC TO AXILLARY LYMPH NODE (HCC): ICD-10-CM

## 2020-12-03 DIAGNOSIS — Z98.890 STATUS POST LEFT BREAST LUMPECTOMY: ICD-10-CM

## 2020-12-03 PROCEDURE — 99213 OFFICE O/P EST LOW 20 MIN: CPT | Performed by: SURGERY

## 2020-12-14 ENCOUNTER — TELEPHONE (OUTPATIENT)
Dept: RADIATION ONCOLOGY | Facility: HOSPITAL | Age: 85
End: 2020-12-14

## 2020-12-21 ENCOUNTER — CONSULT (OUTPATIENT)
Dept: HEMATOLOGY ONCOLOGY | Facility: CLINIC | Age: 85
End: 2020-12-21
Payer: MEDICARE

## 2020-12-21 VITALS
TEMPERATURE: 97.5 F | BODY MASS INDEX: 33.13 KG/M2 | SYSTOLIC BLOOD PRESSURE: 158 MMHG | HEART RATE: 80 BPM | HEIGHT: 63 IN | OXYGEN SATURATION: 96 % | RESPIRATION RATE: 18 BRPM | DIASTOLIC BLOOD PRESSURE: 90 MMHG | WEIGHT: 187 LBS

## 2020-12-21 DIAGNOSIS — C77.3 CARCINOMA OF LEFT BREAST METASTATIC TO AXILLARY LYMPH NODE (HCC): Primary | ICD-10-CM

## 2020-12-21 DIAGNOSIS — C50.912 CARCINOMA OF LEFT BREAST METASTATIC TO AXILLARY LYMPH NODE (HCC): Primary | ICD-10-CM

## 2020-12-21 PROCEDURE — 99205 OFFICE O/P NEW HI 60 MIN: CPT | Performed by: INTERNAL MEDICINE

## 2020-12-21 RX ORDER — ANASTROZOLE 1 MG/1
1 TABLET ORAL DAILY
Qty: 90 TABLET | Refills: 1 | Status: SHIPPED | OUTPATIENT
Start: 2020-12-21 | End: 2021-06-21 | Stop reason: SDUPTHER

## 2020-12-21 RX ORDER — OXYCODONE HYDROCHLORIDE AND ACETAMINOPHEN 5; 325 MG/1; MG/1
TABLET ORAL
COMMUNITY
Start: 2020-12-18 | End: 2022-04-20 | Stop reason: ALTCHOICE

## 2020-12-28 ENCOUNTER — TELEPHONE (OUTPATIENT)
Dept: RADIATION ONCOLOGY | Facility: CLINIC | Age: 85
End: 2020-12-28

## 2021-01-14 ENCOUNTER — CLINICAL SUPPORT (OUTPATIENT)
Dept: RADIATION ONCOLOGY | Facility: HOSPITAL | Age: 86
End: 2021-01-14
Attending: RADIOLOGY
Payer: MEDICARE

## 2021-01-14 VITALS
HEIGHT: 63 IN | WEIGHT: 188 LBS | HEART RATE: 83 BPM | SYSTOLIC BLOOD PRESSURE: 130 MMHG | OXYGEN SATURATION: 96 % | BODY MASS INDEX: 33.31 KG/M2 | RESPIRATION RATE: 20 BRPM | DIASTOLIC BLOOD PRESSURE: 72 MMHG | TEMPERATURE: 97.8 F

## 2021-01-14 DIAGNOSIS — C50.912 CARCINOMA OF LEFT BREAST METASTATIC TO AXILLARY LYMPH NODE (HCC): Primary | ICD-10-CM

## 2021-01-14 DIAGNOSIS — C77.3 CARCINOMA OF LEFT BREAST METASTATIC TO AXILLARY LYMPH NODE (HCC): Primary | ICD-10-CM

## 2021-01-14 DIAGNOSIS — C77.3 CARCINOMA OF LEFT BREAST METASTATIC TO AXILLARY LYMPH NODE (HCC): ICD-10-CM

## 2021-01-14 DIAGNOSIS — C50.912 CARCINOMA OF LEFT BREAST METASTATIC TO AXILLARY LYMPH NODE (HCC): ICD-10-CM

## 2021-01-14 DIAGNOSIS — C50.412 MALIGNANT NEOPLASM OF UPPER-OUTER QUADRANT OF LEFT BREAST IN FEMALE, ESTROGEN RECEPTOR POSITIVE (HCC): Primary | ICD-10-CM

## 2021-01-14 DIAGNOSIS — Z17.0 MALIGNANT NEOPLASM OF UPPER-OUTER QUADRANT OF LEFT BREAST IN FEMALE, ESTROGEN RECEPTOR POSITIVE (HCC): Primary | ICD-10-CM

## 2021-01-14 PROCEDURE — 99211 OFF/OP EST MAY X REQ PHY/QHP: CPT | Performed by: RADIOLOGY

## 2021-01-14 NOTE — PROGRESS NOTES
Briseyda Carter 1935 is a 82 y o  female     Patient presents today in consult to discuss radiation therapy for recurrent left breast cancer  This is an 82-year-old woman who in 12 was diagnosed with breast cancer on the left side  She was treated with lumpectomy and radiation therapy to the whole breast  36-38 treatments  Unable to obtain records  She did not have any axillary surgery, hormonal therapy or chemotherapy  Approximately 2 years later she developed a recurrence and had a mastectomy  This was done in [place]  Records are trying to be obtained  Patient was doing well until just before the peak of COVID when she appreciated a mass in the left breast and ultimately saw Dr Ariel Hill  On 8/31/2020 she underwent left breast and lymph node biopsy  which demonstrated invasive ductal carcinoma measuring approximately 2 8 cm in size grade 2 % LA 90% HER2 Ember 1+  The lymph node was either a replace lymph node or additional cancer in the axilla approximately 1 4 cm in size  9/15/2020 CT of the chest, abdomen and pelvis revealed small pulmonary nodules  5/6 of them were stable the other 1 is approximately 4 mm and would need follow-up  9/16/2020 bone scan demonstrated no metastatic disease  10/7/2020 Consult with Dr Lobito Guillen:  Family history is remarkable for breast cancer in her sister as well as her mother  She declined genetic testing  Recommended an MRI of her breast to better define the area as well as exclude any additional masses that may not be appreciated on exam   Recommended placing a HUGO  reflector in the lymph node  10/21/2020 Pre-op visit with Dr Lobito Guillen: Recommend removing the breast cancer with a large lumpectomy and then perform an axillary dissection         11/17/2020 Underwent left breast lumpectomy; (Left) HUGO  DIRECTED AXILLARY DISSECTION; (Left) BREAST CAPSULECTOMY;  (Left) BREAST IMPLANT REMOVAL and  (Left) FLAP LOCAL TRUNK 12/3/2020 Post-op visit with Dr Davis Miller:  Pathology demonstrated 3/5 positive lymph nodes with multiple invasion as well as erosion through the skin making her a stage IIIB if this is the new cancer  Clinically there were suspicious small areas for dermal metastasis in the region which were completely excised  She was reconstructed with a large rotational flap by Dr Elli Cooper   She has a positive margin at the anterior subcutaneous soft tissue margin 9-12 o'clock as well as close the deep margin  Referred to Radiation Oncology as well as Medical Oncology  12/21/2020-Dr Madhuri Crowder  No chemotherapy; recommended anastrozole    Upcoming:    3/4/21-f/u surg onc Davis Miller  6/21/21-f/u med onc Madhuri Crowder          Oncology History   Malignant neoplasm of upper-outer quadrant of left breast in female, estrogen receptor positive ([facility] [address] )   1992 Initial Diagnosis    Left breast cancer  Lumpectomy  RT      1994 Surgery    Left breast cancer recurrence  Mastectomy with immediate reconstruction     8/31/2020 Biopsy    Left breast ultrasound-guided biopsy  A  1 - 2 o'clock  Invasive mammary carcinoma of no special type   Grade 2    UT 90  HER2 1+  Lymphovascular invasion not identified    B  Left axillary lymph node  Invasive mammary carcinoma of no special type  Grade 2    Concordant  No residual lymph node is identified in specimen B, favoring axillary extension of invasive carcinoma over lymph node metastasis  Breast mass measured 2 8cm on US and involved skin  Axillary mass measured 1 4cm  Right clear       11/17/2020 Surgery    Left breast ultrasound-guided lumpectomy with HUGO  directed axillary dissection  Invasive carcinoma of no special type (ductal)  Grade 2  3 2 cm  Invasive carcinoma directly invades into the dermis or epidermis with skin ulceration  Carcinoma invades skeletal muscle  Lymphovascular invasion present  Anterior margin positive for invasive carcinoma  3/5 Lymph nodes with macrometastases (1 6 cm)  Anatomic Stage IIIB  Prognostic Stage IIIA    Immediate reconstruction with Dr Stella Zapata (implant removal and local flap)     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 [address] )   12/1/2020 Initial Diagnosis    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 [facility])         Clinical Trial: no        Health Maintenance   Topic Date Due    Medicare Annual Wellness Visit (AWV)  1935    Diabetic Foot Exam  05/28/1947    DM Eye Exam  05/28/1947    URINE MICROALBUMIN  05/28/1947    Depression Screening PHQ  05/28/1949    COVID-19 Vaccine (1 of 2) 05/28/1953    BMI: Followup Plan  05/28/1955    Fall Risk  05/28/2002    Pneumococcal Vaccine: 65+ Years (2 of 2 - PPSV23) 02/26/2017    Influenza Vaccine (1) 09/01/2020    HEMOGLOBIN A1C  03/15/2021    BMI: Adult  01/14/2022    DTaP,Tdap,and Td Vaccines (2 - Td) 10/11/2026    HIB Vaccine  Aged Out    Hepatitis B Vaccine  Aged Out    IPV Vaccine  Aged Out    Hepatitis A Vaccine  Aged Out    Meningococcal ACWY Vaccine  Aged Out    HPV Vaccine  Aged Out       Past Medical History:   Diagnosis Date    Anxiety     Asthma     Atrial fibrillation ([facility] [address] )     Breast cancer ([facility] [address] )     Chest pain, unspecified     CHF (congestive heart failure) ([facility] [address] )     Diabetes mellitus ([facility] [address] )     GERD (gastroesophageal reflux disease)     History of radiation exposure 1992    Hyperlipidemia     Hypertension     Irregular heart beat     Afib    Migraine     Obesity     Pericardial effusion     Pericarditis        Past Surgical History:   Procedure Laterality Date    ABDOMINAL ADHESION SURGERY      APPENDECTOMY      AUGMENTATION MAMMAPLASTY Left 1994    BACK SURGERY      BREAST LUMPECTOMY Left 1992    malignant    BREAST LUMPECTOMY Left 11/17/2020    Procedure: BREAST ULTRASOUND DIRECTED LUMPECTOMY (ULTRASOUND AT 1200);   Surgeon: Barbara Cabrera MD;  [facility];  Service: Surgical Oncology    BREAST SURGERY      CARDIAC SURGERY Pericardial window    CHOLECYSTECTOMY      EYE SURGERY      FLAP LOCAL TRUNK Left 11/17/2020    Procedure: FLAP LOCAL TRUNK;  Surgeon: Wanda Medel MD;  [facility];  Service: Plastics    HYSTERECTOMY      LYMPH NODE DISSECTION Left 11/17/2020    Procedure: HUGO  DIRECTED AXILLARY DISSECTION;  Surgeon: Juan Mcdonnell MD;  [facility];  Service: Surgical Oncology    MASTECTOMY Left 1994    malignant    WI CELESTE-IMPLANT CAPSULECTOMY BREAST COMPLETE Left 11/17/2020    Procedure: BREAST CAPSULECTOMY;  Surgeon: Wanda Medel MD;  [facility];  Service: Plastics    WI REMOVAL INTACT BREAST IMPLANT Left 11/17/2020    Procedure: BREAST IMPLANT REMOVAL;  Surgeon: Wanda Medel MD;  [facility];  Service: Plastics    US BREAST NEEDLE LOC LEFT Left 11/2/2020    US GUIDED BREAST BIOPSY LEFT COMPLETE Left 8/31/2020    US GUIDED BREAST LYMPH NODE BIOPSY LEFT Left 8/31/2020    WRIST SURGERY         Family History   Problem Relation Age of Onset    Colon cancer Mother 54    Breast cancer Mother         Early 52's    Stroke Father     Emphysema Father     Leukemia Sister     Breast cancer Sister 72    ALS Brother        Social History     Tobacco Use    Smoking status: Never Smoker    Smokeless tobacco: Never Used   Substance Use Topics    Alcohol use: Yes     Frequency: Monthly or less     Drinks per session: 1 or 2     Comment: Twice a month    Drug use: No          Current Outpatient Medications:     anastrozole (ARIMIDEX) 1 mg tablet, Take 1 tablet (1 mg total) by mouth daily, Disp: 90 tablet, Rfl: 1    EPINEPHrine (EPIPEN 2-SILVIO) 0 3 mg/0 3 mL SOAJ, Inject 0 3 mL as directed, Disp: , Rfl:     FLOVENT  MCG/ACT inhaler, INHALE 2 PUFF BY INHALATION ROUTE 2 TIMES EVERY DAY, Disp: , Rfl: 6    insulin aspart (NOVOLOG FLEXPEN) 100 Units/mL injection pen, Novolog Flexpen U-100 Insulin aspart 100 unit/mL (3 mL) subcutaneous, Disp: , Rfl:     Insulin Glargine (TOUJEO SOLOSTAR) 300 units/mL CONCETRATED U-300 injection pen, Toujeo SoloStar U-300 Insulin 300 unit/mL (1 5 mL) subcutaneous pen  INJECT 67 UNITS BY SUBCUTANEOUS ROUTE AS PER INSULIN PROTOCOL, Disp: , Rfl:     lansoprazole (PREVACID) 30 mg capsule, lansoprazole 30 mg capsule,delayed release, Disp: , Rfl:     Lidocaine 5 % CREA, Apply 1 application topically as needed (pain), Disp: 1 Tube, Rfl: 0    lidocaine-prilocaine (EMLA) cream, APPLY FOR 12 HOURS, AND THEN OFF 12 HOURS AS NEEDED, Disp: , Rfl: 2    metoprolol tartrate (LOPRESSOR) 50 mg tablet, Take 50 mg by mouth every 12 (twelve) hours , Disp: , Rfl:     morphine (MS CONTIN) 15 mg 12 hr tablet, Take 15 mg by mouth every 12 (twelve) hours as needed for severe pain, Disp: , Rfl:     morphine (MSIR) 15 mg tablet, Take 15 mg by mouth every 8 (eight) hours as needed for severe pain , Disp: , Rfl:     NON FORMULARY, Domperidone 20 mg TID, Disp: , Rfl:     NOVOFINE 32G X 6 MM MISC, 2 (two) times a day As directed, Disp: , Rfl: 5    ONE TOUCH ULTRA TEST test strip, TEST TWICE DAILY OR AS DIRECTED DX: E11 9, Disp: , Rfl: 5    ONETOUCH DELICA LANCETS 48B MISC, TEST TWICE DAILY OR AS DIRECTED, Disp: , Rfl: 5    oxybutynin (DITROPAN XL) 15 MG 24 hr tablet, Take 15 mg by mouth daily  , Disp: , Rfl:     oxyCODONE-acetaminophen (PERCOCET) 5-325 mg per tablet, , Disp: , Rfl:     Probiotic Product (ALIGN) 4 MG CAPS, Take by mouth, Disp: , Rfl:     simvastatin (ZOCOR) 20 mg tablet, Take 20 mg by mouth daily at bedtime  , Disp: , Rfl:     triamcinolone (KENALOG) 0 1 % cream, , Disp: , Rfl:     VENTOLIN  (90 Base) MCG/ACT inhaler, , Disp: , Rfl:     Allergies   Allergen Reactions    Betadine [Povidone Iodine] Anaphylaxis    Iodinated Diagnostic Agents Anaphylaxis    Iodine Anaphylaxis and Other (See Comments)    Aspirin GI Bleeding    Caffeine Palpitations        Review of Systems:  Review of Systems   Constitutional: Positive for fatigue   Negative for activity change, appetite change, chills, fever and unexpected weight change  HENT: Negative  Eyes:        Wears glasses   Respiratory: Positive for cough (asthma)  Negative for shortness of breath  Cardiovascular: Negative  Negative for chest pain, palpitations and leg swelling  Gastrointestinal: Negative for abdominal pain, blood in stool, constipation, diarrhea, nausea and vomiting  Left abdominal pain since surgery 8 weeks ago   Endocrine: Negative for heat intolerance  Genitourinary: Negative  Musculoskeletal: Positive for back pain  Ambulates with walker   Skin:        Skin healed from surgery   Allergic/Immunologic: Positive for food allergies  Neurological: Negative for dizziness, weakness, light-headedness and headaches  Hematological: Negative  Does not bruise/bleed easily  Psychiatric/Behavioral: Positive for dysphoric mood  C/o depression       Vitals:    21 1039   BP: 130/72   BP Location: Right arm   Patient Position: Sitting   Pulse: 83   Resp: 20   Temp: 97 8 °F (36 6 °C)   TempSrc: Temporal   SpO2: 96%   Weight: 85 3 kg (188 lb)   Height: 5' 3" (1 6 m)            OB/GYN History:  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, Pacemaker, pain stimulator) rods and screws in back    Hip Replacement no

## 2021-01-26 ENCOUNTER — TELEPHONE (OUTPATIENT)
Dept: HEMATOLOGY ONCOLOGY | Facility: CLINIC | Age: 86
End: 2021-01-26

## 2021-01-26 ENCOUNTER — NURSE TRIAGE (OUTPATIENT)
Dept: HEMATOLOGY ONCOLOGY | Facility: CLINIC | Age: 86
End: 2021-01-26

## 2021-01-26 NOTE — TELEPHONE ENCOUNTER
Returned call to patient  Patient started anastrozole one month ago  Patient  c/o rash  Onset:3 weeks ago  Location: on left side of chest, axilla and anterior shoulder where surgical incision is located  Size:diffuse  Color:reddish purple  Slight pruritis, patient is afebrile  Denies sore throat , fever,  joint pain or SOB  Patient denies new agents like food or topicals etc   Patient has not seen PCP in awhile but will be making appt to establish care again  Will pass on rash assessment  to Dr Fannie Brennan RN  Patient aware to seek emergency care with any acute signs of allergic reaction  Patient aware of plan

## 2021-01-26 NOTE — TELEPHONE ENCOUNTER
Patient states that she is currently taking the medication anastrozole (ARIMIDEX) 1 mg tablet and is currently has a rash which she believes is a reaction to the medication  Best call back [phone]

## 2021-01-26 NOTE — TELEPHONE ENCOUNTER
Dr Cory Robertson does not feel that this is related to the Anastrozole  She can try bendryl (PO or cream) or hydrocortisone cream OTC  She should ask her PCP to evaluate as well when she sees them

## 2021-01-26 NOTE — TELEPHONE ENCOUNTER
Patient aware that rash is likely not from anastrozole per Dr Anastacio Mccollum  Patient will continue taking med and call PCP for evaluation of rash  Patient verbalizes understanding

## 2021-02-02 NOTE — PROGRESS NOTES
Consultation - Radiation Oncology      STUART:[id number] : 1935  Encounter: [id number]  Patient Information: Sandra Severino      CHIEF COMPLAINT  Chief Complaint   Patient presents with    Consult     radiation oncology     Cancer Staging  Malignant neoplasm of upper-outer quadrant of left breast in female, estrogen receptor positive ([facility] [address] )  Staging form: Breast, AJCC 8th Edition  - Pathologic: Stage IIIA (pT4b, pN1a, cM0, G2, ER+, AL+, HER2-) - Unsigned  Method of lymph node assessment: Axillary lymph node dissection  Histologic grading system: 3 grade system           History of Present Illness   Sandra Severino is a 82y o  year old female who presents today in consult to discuss radiation therapy for recurrent left breast cancer       Briefly, the patient was diagnosed in  left sided breast cancer status post lumpectomy and radiation  Unable to obtain records  She did not have any axillary surgery, hormonal therapy or chemotherapy  Approximately 2 years later she developed a recurrence and had a mastectomy with reconstruction  Treatment was done in [place] and records have been requested, but are not available at time of consultation  She did well until  when she self-palpated a left breast mass  2020 she underwent left breast biopsy with Dr Liban Herrera  Pathology demonstrated grade 2 invasive ductal carcinoma  Tumor cells were ER/AL (+) and [id number] (-)      2020 The patient underwent left breast resection of mass, implant removal, axillary lymph node dissection and flap reconstruction under the care of Dr Jamari Worrell and Dr Daniel Tom     Pathology demonstrated 3 2cm in size grade 2 invasive ductal carcinoma  3/5 lymph nodes demonstrated metastatic disease  Largest deposit measured 1 6cm with MARKO  Tumor involved skin with ulceration and skeletal muscle  Anterior subcutaneous margin 9-12:00 was positive and deep margin negative by 0 5mm    LVI and dermal LVI were noted  9/15/2020 CT of the chest, abdomen and pelvis revealed small pulmonary nodules, most stable  No definite metastatic disease  9/16/2020 bone scan demonstrated no metastatic disease       12/21/2020-Dr Azalia Boyd recommended anastrozole     Currently, the patient has some pain in the left abdominal wall and lower chest wall where rotational flap was harvested  She is taking 4 Percocet per day with improvement  She rates pain 7-8/10 in severity at baseline decreased to 6/10 with Percocet  She notes tenderness and break through sharp pains  She denies upper extremity edema or shoulder restriction  She denies new palpable nodules or suspicious skin changes of the right breast or left chest wall  She denies spreading or progressive pain, edema, erythema of the chest wall  Historical Information   Oncology History   Malignant neoplasm of upper-outer quadrant of left breast in female, estrogen receptor positive ([facility] [address] )   1992 Initial Diagnosis    Left breast cancer  Lumpectomy  RT      1994 Surgery    Left breast cancer recurrence  Mastectomy with immediate reconstruction     8/31/2020 Biopsy    Left breast ultrasound-guided biopsy  A  1 - 2 o'clock  Invasive mammary carcinoma of no special type   Grade 2    IL 90  HER2 1+  Lymphovascular invasion not identified    B  Left axillary lymph node  Invasive mammary carcinoma of no special type  Grade 2    Concordant  No residual lymph node is identified in specimen B, favoring axillary extension of invasive carcinoma over lymph node metastasis  Breast mass measured 2 8cm on US and involved skin  Axillary mass measured 1 4cm  Right clear       11/17/2020 Surgery    Left breast ultrasound-guided lumpectomy with HUGO  directed axillary dissection  Invasive carcinoma of no special type (ductal)  Grade 2  3 2 cm  Invasive carcinoma directly invades into the dermis or epidermis with skin ulceration  Carcinoma invades skeletal muscle  Lymphovascular invasion present  Anterior margin positive for invasive carcinoma  3/5 Lymph nodes with macrometastases (1 6 cm)  Anatomic Stage IIIB  Prognostic Stage IIIA    Immediate reconstruction with Dr Adele Escalona (implant removal and local flap)     2020 -  Hormone Therapy    Anastrozole 1 mg daily  Dr Bruner Life of left breast 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 ([facility] [address] )   2020 Initial Diagnosis    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 [facility])       OB/GYN History:  The patient underwent 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  Had many surgeries  Menopause Status Post  No LMP recorded  Patient is postmenopausal   Menopause at 19 years  Partial hysterectomy  Uterus removed at 27  Menopause Reason surgical  Hormone replacement therapy: yes  Years used approx 3-4   0   Para 0   Pregnancy test needed:  not applicable    Past Medical History:   Diagnosis Date    Anxiety     Asthma     Atrial fibrillation (HCC)     Breast cancer (HCC)     Chest pain, unspecified     CHF (congestive heart failure) (HCC)     Diabetes mellitus ([facility] [address] )     GERD (gastroesophageal reflux disease)     History of radiation exposure     Hyperlipidemia     Hypertension     Irregular heart beat     Afib    Migraine     Obesity     Pericardial effusion     Pericarditis      Past Surgical History:   Procedure Laterality Date    ABDOMINAL ADHESION SURGERY      APPENDECTOMY      AUGMENTATION MAMMAPLASTY Left     BACK SURGERY      BREAST LUMPECTOMY Left     malignant    BREAST LUMPECTOMY Left 2020    Procedure: BREAST ULTRASOUND DIRECTED LUMPECTOMY (ULTRASOUND AT 1200);   Surgeon: Niyah Flores MD;  [facility];  Service: Surgical Oncology    BREAST SURGERY      CARDIAC SURGERY      Pericardial window    CHOLECYSTECTOMY      EYE SURGERY      FLAP LOCAL TRUNK Left 2020    Procedure: FLAP LOCAL TRUNK;  Surgeon: Zeeshan Matson MD;  [facility];  Service: Plastics    HYSTERECTOMY      LYMPH NODE DISSECTION Left 11/17/2020    Procedure: HUGO  DIRECTED AXILLARY DISSECTION;  Surgeon: Juan Mcdonnell MD;  [facility];  Service: Surgical Oncology    MASTECTOMY Left 1994    malignant    PA CELESTE-IMPLANT CAPSULECTOMY BREAST COMPLETE Left 11/17/2020    Procedure: BREAST CAPSULECTOMY;  Surgeon: Wanda Medel MD;  [facility];  Service: Plastics    PA REMOVAL INTACT BREAST IMPLANT Left 11/17/2020    Procedure: BREAST IMPLANT REMOVAL;  Surgeon: Wanda Medel MD;  [facility];  Service: Plastics    US BREAST NEEDLE LOC LEFT Left 11/2/2020    US GUIDED BREAST BIOPSY LEFT COMPLETE Left 8/31/2020    US GUIDED BREAST LYMPH NODE BIOPSY LEFT Left 8/31/2020    WRIST SURGERY         Family History   Problem Relation Age of Onset    Colon cancer Mother 54    Breast cancer Mother         Early 52's    Stroke Father     Emphysema Father     Leukemia Sister     Breast cancer Sister 72    ALS Brother        Social History   Social History     Substance and Sexual Activity   Alcohol Use Yes    Frequency: Monthly or less    Drinks per session: 1 or 2    Comment: Twice a month     Social History     Substance and Sexual Activity   Drug Use No     Social History     Tobacco Use   Smoking Status Never Smoker   Smokeless Tobacco Never Used       Meds/Allergies     Current Outpatient Medications:     anastrozole (ARIMIDEX) 1 mg tablet, Take 1 tablet (1 mg total) by mouth daily, Disp: 90 tablet, Rfl: 1    EPINEPHrine (EPIPEN 2-SILVIO) 0 3 mg/0 3 mL SOAJ, Inject 0 3 mL as directed, Disp: , Rfl:     FLOVENT  MCG/ACT inhaler, INHALE 2 PUFF BY INHALATION ROUTE 2 TIMES EVERY DAY, Disp: , Rfl: 6    insulin aspart (NOVOLOG FLEXPEN) 100 Units/mL injection pen, Novolog Flexpen U-100 Insulin aspart 100 unit/mL (3 mL) subcutaneous, Disp: , Rfl:     Insulin Glargine (TOUJEO SOLOSTAR) 300 units/mL CONCETRATED U-300 injection pen, Toujeo SoloStar U-300 Insulin 300 unit/mL (1 5 mL) subcutaneous pen  INJECT 67 UNITS BY SUBCUTANEOUS ROUTE AS PER INSULIN PROTOCOL, Disp: , Rfl:     lansoprazole (PREVACID) 30 mg capsule, lansoprazole 30 mg capsule,delayed release, Disp: , Rfl:     Lidocaine 5 % CREA, Apply 1 application topically as needed (pain), Disp: 1 Tube, Rfl: 0    lidocaine-prilocaine (EMLA) cream, APPLY FOR 12 HOURS, AND THEN OFF 12 HOURS AS NEEDED, Disp: , Rfl: 2    metoprolol tartrate (LOPRESSOR) 50 mg tablet, Take 50 mg by mouth every 12 (twelve) hours , Disp: , Rfl:     morphine (MS CONTIN) 15 mg 12 hr tablet, Take 15 mg by mouth every 12 (twelve) hours as needed for severe pain, Disp: , Rfl:     morphine (MSIR) 15 mg tablet, Take 15 mg by mouth every 8 (eight) hours as needed for severe pain , Disp: , Rfl:     NON FORMULARY, Domperidone 20 mg TID, Disp: , Rfl:     NOVOFINE 32G X 6 MM MISC, 2 (two) times a day As directed, Disp: , Rfl: 5    ONE TOUCH ULTRA TEST test strip, TEST TWICE DAILY OR AS DIRECTED DX: E11 9, Disp: , Rfl: 5    ONETOUCH DELICA LANCETS 60P MISC, TEST TWICE DAILY OR AS DIRECTED, Disp: , Rfl: 5    oxybutynin (DITROPAN XL) 15 MG 24 hr tablet, Take 15 mg by mouth daily  , Disp: , Rfl:     oxyCODONE-acetaminophen (PERCOCET) 5-325 mg per tablet, , Disp: , Rfl:     Probiotic Product (ALIGN) 4 MG CAPS, Take by mouth, Disp: , Rfl:     simvastatin (ZOCOR) 20 mg tablet, Take 20 mg by mouth daily at bedtime  , Disp: , Rfl:     triamcinolone (KENALOG) 0 1 % cream, , Disp: , Rfl:     VENTOLIN  (90 Base) MCG/ACT inhaler, , Disp: , Rfl:   Allergies   Allergen Reactions    Betadine [Povidone Iodine] Anaphylaxis    Iodinated Diagnostic Agents Anaphylaxis    Iodine Anaphylaxis and Other (See Comments)    Aspirin GI Bleeding    Caffeine Palpitations       Review of Systems   Constitutional: Positive for fatigue  Negative for activity change, appetite change, chills, fever and unexpected weight change  HENT: Negative     Eyes: Wears glasses   Respiratory: Positive for cough (asthma)  Negative for shortness of breath  Cardiovascular: Negative  Negative for chest pain, palpitations and leg swelling  Gastrointestinal: Negative for abdominal pain, blood in stool, constipation, diarrhea, nausea and vomiting  Left abdominal pain since surgery 8 weeks ago   Endocrine: Negative for heat intolerance  Genitourinary: Negative  Musculoskeletal: Positive for back pain  Ambulates with walker   Skin:   Skin healed from surgery   Allergic/Immunologic: Positive for food allergies  Neurological: Negative for dizziness, weakness, light-headedness and headaches  Hematological: Negative  Does not bruise/bleed easily  Psychiatric/Behavioral: Positive for dysphoric mood  C/o depression       OBJECTIVE:   /72 (BP Location: Right arm, Patient Position: Sitting)   Pulse 83   Temp 97 8 °F (36 6 °C) (Temporal)   Resp 20   Ht 5' 3" (1 6 m)   Wt 85 3 kg (188 lb)   SpO2 96%   BMI 33 30 kg/m² Performance Status: Karnofsky: 80 - Normal activity with effort; some signs or symptoms of disease    Physical Exam  Vitals signs and nursing note reviewed  Constitutional:       General: She is not in acute distress  Appearance: She is well-developed  Eyes:      General: No scleral icterus  Cardiovascular:      Rate and Rhythm: Normal rate and regular rhythm  Heart sounds: No murmur  Pulmonary:      Effort: Pulmonary effort is normal  No respiratory distress  Breath sounds: No stridor  No wheezing, rhonchi or rales  Chest:          Comments: Breast exam demonstrates left mastectomy and flap reconstruction  The area appears closed and healing well  No suspicious skin changes of right breast or left chest wall  No palpable nodules bilaterally  Abdominal:      General: There is no distension  Palpations: Abdomen is soft  Tenderness: There is no abdominal tenderness     Musculoskeletal:      Right lower leg: No edema  Left lower leg: No edema  Comments: No upper extremity edema bilaterally  FROM of upper extremities bilaterally  Lymphadenopathy:      Cervical: No cervical adenopathy  Upper Body:      Right upper body: No supraclavicular or axillary adenopathy  Left upper body: No supraclavicular or axillary adenopathy  Neurological:      Mental Status: She is alert and oriented to person, place, and time  RESULTS    Pathology:  Collected:  11/17/2020 15:19 Status:  Final result   Visible to patient:  No (inaccessible in [address]) Dx: Malignant neoplasm of upper-outer lora    Component    Case Report   Surgical Pathology Report                         Case: [id number]                                    Authorizing Provider: Emma Moore MD              Collected:           11/17/2020 1519               Ordering Location:     [facility]        Received:            11/17/2020 [address] [place] [address] Operating Room                                                       Pathologist:           Lorraine Louis MD                                                               Specimen:    Soft Tissue, Other, section of left chest wall mass and axillary dissection                Final Diagnosis   A   Soft Tissue, Other, section of left chest wall mass and axillary dissection :  - Invasive mammary carcinoma, NOS/ ductal carcinoma 3 2 x 3 1 x 1 0 cm , ulcerating into the skin ( slide A29), located in the 9-12 o'clock region  -The tumor invades skeletal muscle in few section  -Intraductal papilloma  -Skin with seborrhoic keratosis and small hemangioma (2 mm)  -Multiple foci of LVI seen  -Subcutaneous dense fibrous capsule  -Three of total five lymph nodes are positive for metastatic carcinoma with extranodal extension, largest deposit measures 1 6 cm     Margins  -The tumor involves (blue -inked soft tissue  margin)  anterior subcutaneous  soft tissue margin in the 9-12 o'clock quadrant (slide A30, 32)  -Tumor is located 0 5 mm of deep margin in 12-3 o'clock-yellow inked margin  The remaining margins are negative for tumor by wider range     Note  Despite extensive search of ymph nodes and use of fat dissolving solution only five lymph nodes are identifed  Case discussed with Dr Scott Kim on 11/30/20 at 10:30 AM                      ASSESSMENT  1  Malignant neoplasm of upper-outer quadrant of left breast in female, estrogen receptor positive ([facility] [address] )     2  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)  Ambulatory referral to Radiation Oncology     Cancer Staging  Malignant neoplasm of upper-outer quadrant of left breast in female, estrogen receptor positive ([facility] [address] )  Staging form: Breast, AJCC 8th Edition  - Pathologic: Stage IIIA (pT4b, pN1a, cM0, G2, ER+, ND+, HER2-) - Unsigned  Method of lymph node assessment: Axillary lymph node dissection  Histologic grading system: 3 grade system        PLAN/DISCUSSION    Rob Amador is a 82y o  year old female with stage IIIA left breast cancer status post resection and axillary lymph node dissection  She had left breast cancer in 1998 status post lumpectomy and adjuvant radiation followed by mastectomy, lymph node dissection and reconstruction in 2000  No further radiation at that time per patient  Pathology from most recent resection is significant for +3/5 lymph nodes with GRICELDA and positive anterior subcutaneous margin in the 9-12:00 position and close but negative deep margin  Tumor cells are ER/ND (+) and [id number] (-)  Aromatase inhibitor therapy planned  Given her presentation, the patient is at increased risk for local recurrence  I offered the patient adjuvant radiation to the chest wall and draining lymph nodes  We would plan a dose of 45-50Gy with possible boost to (+) margin areas to 60-66Gy depending on normal tissue tolerance and ability to locate this region    I feel that this would offer patient benefit in terms of local control  We are attempting to obtain [place] records to confirm no second round of radiation previously given  Full boost doses may also be limited depending on skin and tissue tolerance  The rationale and potential benefits, as well as the risks and acute and late side effects and potential toxicities of radiation were discussed with the patient at length  Side effects discussed included, but were not limited to: Fatigue, skin erythema, hyperpigmentation, desquamation, fibrosis, skin necrosis, pulmonary fibrosis, lymphedema  Alternatively, given her age and ER/NE (+) disease, hormone therapy alone was discussed as an option  The patient was given the opportunity to ask questions and all questions were answered to her satisfaction  She wished to proceed with the recommended treatment with radiation  We will refer patient for lymphadema clinic evaluation prophylactically  CT simulation will be scheduled  Plan to begin radiation soon thereafter  Ashley Carbone MD  2/2/2021,2:39 PM      Portions of the record may have been created with voice recognition software  Occasional wrong word or "sound a like" substitutions may have occurred due to the inherent limitations of voice recognition software  Read the chart carefully and recognize, using context, where substitutions have occurred

## 2021-02-03 ENCOUNTER — IMMUNIZATIONS (OUTPATIENT)
Dept: FAMILY MEDICINE CLINIC | Facility: HOSPITAL | Age: 86
End: 2021-02-03

## 2021-02-03 DIAGNOSIS — Z23 ENCOUNTER FOR IMMUNIZATION: Primary | ICD-10-CM

## 2021-02-03 PROCEDURE — 91300 SARS-COV-2 / COVID-19 MRNA VACCINE (PFIZER-BIONTECH) 30 MCG: CPT

## 2021-02-03 PROCEDURE — 0001A SARS-COV-2 / COVID-19 MRNA VACCINE (PFIZER-BIONTECH) 30 MCG: CPT

## 2021-02-09 DIAGNOSIS — C77.3 CARCINOMA OF LEFT BREAST METASTATIC TO AXILLARY LYMPH NODE (HCC): Primary | ICD-10-CM

## 2021-02-09 DIAGNOSIS — C50.912 CARCINOMA OF LEFT BREAST METASTATIC TO AXILLARY LYMPH NODE (HCC): Primary | ICD-10-CM

## 2021-02-11 ENCOUNTER — APPOINTMENT (OUTPATIENT)
Dept: PHYSICAL THERAPY | Age: 86
End: 2021-02-11
Payer: MEDICARE

## 2021-02-17 ENCOUNTER — EVALUATION (OUTPATIENT)
Dept: PHYSICAL THERAPY | Age: 86
End: 2021-02-17
Payer: MEDICARE

## 2021-02-17 DIAGNOSIS — C77.3 CARCINOMA OF LEFT BREAST METASTATIC TO AXILLARY LYMPH NODE (HCC): ICD-10-CM

## 2021-02-17 DIAGNOSIS — I89.0 LYMPHEDEMA: Primary | ICD-10-CM

## 2021-02-17 DIAGNOSIS — C50.912 CARCINOMA OF LEFT BREAST METASTATIC TO AXILLARY LYMPH NODE (HCC): ICD-10-CM

## 2021-02-17 PROCEDURE — 97162 PT EVAL MOD COMPLEX 30 MIN: CPT

## 2021-02-17 PROCEDURE — 97110 THERAPEUTIC EXERCISES: CPT

## 2021-02-18 NOTE — PROGRESS NOTES
PT Evaluation     Today's date: 2021  Patient name: Brandon Beltran  : 1935  MRN: [id number]  Referring provider: Manjula Anthony MD  Dx:   Encounter Diagnosis     ICD-10-CM    1  Lymphedema  I89 0    2  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 ([facility] [address] )  C50 912 Ambulatory referral to Physical Therapy    C77 3              21 Right UE Left  le reeval     mcp 19cm 18 5cm      Styloid proc 17 18      4cm prox sty 18 19 8      8 21 23      12 24 25      16 26 26 5      20 27 5 28      24 28 31      28 30 33      32 33 34      36 34 35      40 36cm 37cm                Left ant chest wall ecchymosis hard seroma tender to palpation                                                 Assessment  Assessment details: Brandon Beltran is an 82year old female referred to outpt PT with left ant chest wall swellling/lymphedema , seroma and tenderness to palpation and decreased left shoulder rom and strength due to pain s/p 2020 left breast cancer lumpectomy surgery with axial node dissection and saline implant removal  PT is warranted to address the above stated deficits in efforts to maximize function, reduce pain and swelling in the left ant chest wall and shoulder  The pt is strongly encouraged to perform aarom and arom left shoulder exer in efforts to be able to receive radiation treatment in the near future   PT performed KT compression wrapping of the left ant chest wall today in efforts to reduce left encapsulated seroma with ecchymosis present  PT shall also trial and recommend and left ant chest wall and left ue vasopnuematic compression pump with left ue elevation 40  MMHG along with exercise and tissue fibrosis reduction and mld massage and soft tissue mobilization as tolerated     Impairments: abnormal or restricted ROM, activity intolerance, impaired physical strength, lacks appropriate home exercise program, pain with function and poor posture   Other impairment: seroma left ant chest wall with encapsulation, non pitting , painful to palpation with significant ecchymosis, + tissue fibrosis, scar tissue adherence, lymphedema   Functional limitations: decreased dressing, reaching overhead and lifting ability due to rom deficits and pain,   Symptom irritability: moderateUnderstanding of Dx/Px/POC: good   Prognosis: good    Goals  stg independence in home exer program in 2 weeks   Increase left shoulder rom by 40-50 degrees with flex and abd in 2 weeks   Improve sitting posture with use of lumbar towel roll in two week   ltg achieve 170 left shoulder flexion and abd in 4 weeks ext rotation wfl's   Reduction in left ant chest wall seroma and lymphedema with compression wrapping (KT wrap used today )and possible need for surgical drainage in future   Left shoulder mmt to 4/5 mmt in 4-6 weeks     Plan  Patient would benefit from: PT eval, skilled physical therapy and lymphedema eval  Referral necessary: Yes  Other planned modality interventions: vasopneumatic compression pump with chest component left ue and chest wall   Planned therapy interventions: manual therapy, massage, neuromuscular re-education, muscle pump exercises, patient education, postural training, compression, strengthening, stretching, therapeutic activities, therapeutic exercise and home exercise program  Frequency: 2x week  Duration in weeks: 8  Plan of Care beginning date: 2/17/2021  Plan of Care expiration date: 4/16/2021  Treatment plan discussed with: patient        Subjective Evaluation    History of Present Illness  Onset date: 2 months ago approx Jan of 2021 left ant chest wall swelling  Date of surgery: 11/17/2020 left breast cancer lumpectomy and saline implant removal and lymph node dissection    Mechanism of injury: surgery  Mechanism of injury: Marcelina Hackett is an 82year old female referred to outpt PT with left ant chest wall pain, swelling and discoloration and left shoulder pain with rom deficits since undergoing left breast cancer lumpectomy surgery, lymph node dissection and saline implant removal on 2020  She reported increased left ant chest wall swelling approx 2 months ago with significant ecchymosis present  The pt was scheduled to receive radiation treatment but was unable to do so as she could not raise her left arm above her head  No PT was ordered post surgery and the pt did not perform any arm exercises per her report  PMH is significant for multiple breast cancer occurrences with active cancer still present per pt report  ( see PMH) She is now referred to PT to increase left shoulder rom and reduce edema             Recurrent probem    Quality of life: fair    Pain  Current pain ratin  At best pain ratin  At worst pain rating: 10  Location: left sh pain and ant chest wall pain  Quality: sharp, tight, pressure, pulling and radiating  Relieving factors: change in position and rest  Aggravating factors: overhead activity and lifting  Progression: worsening    Social Support  Steps to enter house: yes (3-4 steps + 1 step no railing)  Stairs in house: no   L[facility] in: [facility]  Lives with: spouse    Employment status: not working  Hand dominance: right  Exercise history: sedentary in nature, pt goes to [employer] twice weekly    Treatments  Current treatment: physical therapy  Patient Goals  Patient goals for therapy: decreased edema, decreased pain, increased motion, increased strength, independence with ADLs/IADLs and return to sport/leisure activities  Patient goal: "To have less pain and to move my left arm better "        Objective     Active Range of Motion   Left Shoulder   Flexion: 105 degrees with pain  Abduction: 100 degrees with pain  External rotation 90°: 50 degrees with pain    Right Shoulder   Flexion: 165 degrees   Abduction: 165 degrees   External rotation 90°: 85 degrees    Strength/Myotome Testing     Left Shoulder     Planes of Motion   Flexion: 3+   Abduction: 3+ External rotation at 90°: 3+     Right Shoulder     Planes of Motion   Flexion: 5   Abduction: 5   External rotation at 90°: 5     Additional Strength Details  Pt with new c/o of right wrist pain wearing velcro compression band at this time  Pt does not recall any incident of injuring her right wrist      Ambulation     Ambulation: Level Surfaces   Ambulation with assistive device: independent    Additional Level Surfaces Ambulation Details  100 ft pt utilized rollator with ambulation,       Flowsheet Rows      Most Recent Value   PT/OT G-Codes   Current Score  59   Projected Score  67             Precautions: allergies : betadine, iodine, iodinated diagnostic agents, caffeine,  aspirin,  PMH: GERD, DM type 2, migraine without aura, essential HTN, A fib, chronic heart failure, uncomplicated opiod dependence, chronic pain, asthma, anxiety, cholecystectomy, appendectomy, s/p abdominal adhesion surgery, left breast lumpectomy surgery for breast cancer May of 1992 s/p radiation, breast cancer Dec of 1994 s/p mastectomy,  left breast augmentation mammaplasty 1994 and saline implant, left ,  11/17/2020 s/p lumpectomy surgery for left breast cancer and removal of left breast implant, jill-implant capsulectomy and lymph node dissection, left flap of local trunk also performed , pt to be scheduled for radiation in the future,  Pericardial window cardiac surgery history, pt with limited left shoulder rom since 11/17/2020 surgery and onset of left ant chest wall swelling and discoloration noted 2 months ago, left wrist fx 2017, new c/o right wrist pain pt wearing left velcro strap on wrist, low back surgery 1982 and 2005 rods screws inserted and s/p laminectomy per pt report, pt is right and dominant         Manuals 2/17/21             I eval            compression wrapping with kt wrap left ant chest             Mld massage and soft tissue mobilization left ant chest wall prn                          There exer Wall pulleys 5 min            Left ue sh flex and abd 5 reps             Sh backward rolls 10 reps            scap squeezes 5 reps 5 sec hold                                                    Ther Ex                                                                                                                     Ther Activity                                                                              Modalities

## 2021-02-24 ENCOUNTER — IMMUNIZATIONS (OUTPATIENT)
Dept: FAMILY MEDICINE CLINIC | Facility: HOSPITAL | Age: 86
End: 2021-02-24

## 2021-02-24 ENCOUNTER — APPOINTMENT (OUTPATIENT)
Dept: PHYSICAL THERAPY | Age: 86
End: 2021-02-24
Payer: MEDICARE

## 2021-02-24 DIAGNOSIS — Z23 ENCOUNTER FOR IMMUNIZATION: Primary | ICD-10-CM

## 2021-02-24 PROCEDURE — 0002A SARS-COV-2 / COVID-19 MRNA VACCINE (PFIZER-BIONTECH) 30 MCG: CPT

## 2021-02-24 PROCEDURE — 91300 SARS-COV-2 / COVID-19 MRNA VACCINE (PFIZER-BIONTECH) 30 MCG: CPT

## 2021-02-26 ENCOUNTER — OFFICE VISIT (OUTPATIENT)
Dept: PHYSICAL THERAPY | Age: 86
End: 2021-02-26
Payer: MEDICARE

## 2021-02-26 DIAGNOSIS — I89.0 LYMPHEDEMA: Primary | ICD-10-CM

## 2021-02-26 PROCEDURE — 97110 THERAPEUTIC EXERCISES: CPT

## 2021-02-26 PROCEDURE — 97016 VASOPNEUMATIC DEVICE THERAPY: CPT

## 2021-02-27 NOTE — PROGRESS NOTES
Daily Note     Today's date: 2021  Patient name: Jatin Gottlieb  : 1935  MRN: [id number]  Referring provider: Mack Gómez MD  Dx:   Encounter Diagnosis     ICD-10-CM    1  Lymphedema  I89 0                   Subjective: Will U PT supervising      Objective: See treatment diary below      Assessment: Tolerated treatment well  Patient would benefit from continued PT  Trial compression pump with chest component and left ue without c/o pain       Plan: Continue per plan of care        Precautions: allergies      Manuals 21            I eval            compression wrapping with kt wrap left ant chest  Pt to wrap daily and bring in kt wrap if not wrapped           Mld massage and soft tissue mobilization left ant chest wall prn                          There exer              Wall pulleys 5 min 5 min           Left ue sh flex and abd 5 reps  On table 3 x 10 on sliding board           Sh backward rolls 10 reps 10           scap squeezes 5 reps 5 sec hold  10 reps                                                   Ther Ex                                                                                                                     Ther Activity                                                                              Modalities             Lue and chest piece compression pump with left ue elevation 40 mmHG  30 min

## 2021-03-01 ENCOUNTER — APPOINTMENT (OUTPATIENT)
Dept: PHYSICAL THERAPY | Age: 86
End: 2021-03-01
Payer: MEDICARE

## 2021-03-03 ENCOUNTER — TELEPHONE (OUTPATIENT)
Dept: SURGICAL ONCOLOGY | Facility: CLINIC | Age: 86
End: 2021-03-03

## 2021-03-03 ENCOUNTER — APPOINTMENT (OUTPATIENT)
Dept: RADIOLOGY | Facility: AMBULARY SURGERY CENTER | Age: 86
End: 2021-03-03
Attending: SURGERY
Payer: MEDICARE

## 2021-03-03 ENCOUNTER — OFFICE VISIT (OUTPATIENT)
Dept: OBGYN CLINIC | Facility: CLINIC | Age: 86
End: 2021-03-03
Payer: MEDICARE

## 2021-03-03 VITALS
SYSTOLIC BLOOD PRESSURE: 149 MMHG | DIASTOLIC BLOOD PRESSURE: 80 MMHG | WEIGHT: 187 LBS | BODY MASS INDEX: 33.13 KG/M2 | HEART RATE: 77 BPM | HEIGHT: 63 IN

## 2021-03-03 DIAGNOSIS — M18.11 ARTHRITIS OF CARPOMETACARPAL (CMC) JOINT OF RIGHT THUMB: ICD-10-CM

## 2021-03-03 DIAGNOSIS — M25.531 PAIN IN RIGHT WRIST: ICD-10-CM

## 2021-03-03 DIAGNOSIS — M65.4 DE QUERVAIN'S TENOSYNOVITIS, RIGHT: Primary | ICD-10-CM

## 2021-03-03 PROBLEM — Z79.811 USE OF ANASTROZOLE (ARIMIDEX): Status: ACTIVE | Noted: 2021-03-03

## 2021-03-03 PROCEDURE — 20550 NJX 1 TENDON SHEATH/LIGAMENT: CPT | Performed by: SURGERY

## 2021-03-03 PROCEDURE — 73110 X-RAY EXAM OF WRIST: CPT

## 2021-03-03 PROCEDURE — 99204 OFFICE O/P NEW MOD 45 MIN: CPT | Performed by: SURGERY

## 2021-03-03 RX ORDER — LIDOCAINE HYDROCHLORIDE 10 MG/ML
1 INJECTION, SOLUTION INFILTRATION; PERINEURAL
Status: COMPLETED | OUTPATIENT
Start: 2021-03-03 | End: 2021-03-03

## 2021-03-03 RX ORDER — DEXAMETHASONE SODIUM PHOSPHATE 100 MG/10ML
40 INJECTION INTRAMUSCULAR; INTRAVENOUS
Status: COMPLETED | OUTPATIENT
Start: 2021-03-03 | End: 2021-03-03

## 2021-03-03 RX ADMIN — DEXAMETHASONE SODIUM PHOSPHATE 40 MG: 100 INJECTION INTRAMUSCULAR; INTRAVENOUS at 15:18

## 2021-03-03 RX ADMIN — LIDOCAINE HYDROCHLORIDE 1 ML: 10 INJECTION, SOLUTION INFILTRATION; PERINEURAL at 15:18

## 2021-03-03 NOTE — PROGRESS NOTES
Jonathan NGO  Attending, Orthopaedic Surgery  Hand, Wrist, and Elbow Surgery  [facility]      ORTHOPAEDIC HAND, WRIST, AND ELBOW OFFICE  VISIT       ASSESSMENT/PLAN:      82 y o  female with right De Quervain's tenosynovitis and right thumb CMC arthritis (asymptimamic)  The etiology of Alpa Holden was discussed today along with treatment options  Wrist x-ray's were obtained and reviewed  Benny Fisher was provided with a right thumb spica brace, to be worn at night  OT was ordered for a [employer]  The decision was made to proceed with an initial right De Quervain's tenosynovitis CSI  The CSI was performed in the office without complication  Post injection Procort was discussed  Follow up in 6 weeks time for re-evaluation  The patient verbalized understanding of exam findings and treatment plan  We engaged in the shared decision-making process and treatment options were discussed at length with the patient  Surgical and conservative management discussed today along with risks and benefits  Diagnoses and all orders for this visit:    De Quervain's tenosynovitis, right  -     Ambulatory referral to PT/OT hand therapy; Future  -     Hand/upper extremity injection: R extensor compartment 1    Pain in right wrist  -     XR wrist 3+ vw right; Future    Arthritis of carpometacarpal Bates) joint of right thumb      Follow Up:  Return in about 6 weeks (around 4/14/2021)  To Do Next Visit:  Re-evaluation of current issue      General Discussions:  Elysia Cadet Tenosynovitis: The anatomy and physiology of de Quervain's tenosynovitis was discussed with the patient today in the office  Edema and increased contact pressure within the first dorsal extensor compartment at the radial styloid can cause pain, crepitation, and limitation of function    Treatment options include resting thumb spica splints to decrease edema, oral anti-inflammatory medications, home or formal therapy exercises, up to 2 steroid injections within the first dorsal extensor compartment, or surgical release  While majority of patients do respond to conservative treatment, up to 20% may require surgical release      ____________________________________________________________________________________________________________________________________________      CHIEF COMPLAINT:  Chief Complaint   Patient presents with    Right Wrist - Pain       SUBJECTIVE:  Tevin Grey is a 82y o  year old RHD female who presents to the office today for right wrist pain/ Josphine states that she has been experiencing pain to the dorsal aspect of her right wrist for aprox  2 5 months  She denies any injury or trama  Jackquline Breeze states that her pain will worse with wrist flexion, such as wiping after using the restroom  She is currently on Oxycodone and notes that it has help slightly with her wrist pain  She has 3 different wrist braces, which also provide her with some improvement in her pain        Pain/symptom timing:  Worse during the day when active  Pain/symptom context:  Worse with activites and work  Pain/symptom modifying factors:  Rest makes better, activities make worse  Pain/symptom associated signs/symptoms: none    Prior treatment   · NSAIDsNo   · Injections No   · Bracing/Orthotics Yes    Physical Therapy No     I have personally reviewed all the relevant PMH, PSH, SH, FH, Medications and allergies      PAST MEDICAL HISTORY:  Past Medical History:   Diagnosis Date    Anxiety     Asthma     Atrial fibrillation ([facility] [address] )     [facility]     Chest pain, unspecified     CHF (congestive heart failure) (HCC)     Diabetes mellitus [facility]     GERD (gastroesophageal reflux disease)     History of radiation exposure 1992    Hyperlipidemia     Hypertension     Irregular heart beat     Afib    Migraine     Obesity     Pericardial effusion     Pericarditis        PAST SURGICAL HISTORY:  Past Surgical History: Procedure Laterality Date    ABDOMINAL ADHESION SURGERY      APPENDECTOMY      AUGMENTATION MAMMAPLASTY Left 1994    BACK SURGERY      BREAST LUMPECTOMY Left 1992    malignant    BREAST LUMPECTOMY Left 11/17/2020    Procedure: BREAST ULTRASOUND DIRECTED LUMPECTOMY (ULTRASOUND AT 1200);   Surgeon: Krzysztof Scott MD;  [facility];  Service: Surgical Oncology    BREAST SURGERY      CARDIAC SURGERY      Pericardial window    CHOLECYSTECTOMY      EYE SURGERY      FLAP LOCAL TRUNK Left 11/17/2020    Procedure: FLAP LOCAL TRUNK;  Surgeon: Pegge Cogan, MD;  [facility];  Service: Plastics    HYSTERECTOMY      LYMPH NODE DISSECTION Left 11/17/2020    Procedure: HUGO  DIRECTED AXILLARY DISSECTION;  Surgeon: Krzysztof Scott MD;  [facility];  Service: Surgical Oncology    MASTECTOMY Left 1994    malignant    MD CELESTE-IMPLANT CAPSULECTOMY BREAST COMPLETE Left 11/17/2020    Procedure: BREAST CAPSULECTOMY;  Surgeon: Pegge Cogan, MD;  [facility];  Service: Plastics    MD REMOVAL INTACT BREAST IMPLANT Left 11/17/2020    Procedure: BREAST IMPLANT REMOVAL;  Surgeon: Pegge Cogan, MD;  [facility];  Service: Plastics    US BREAST NEEDLE LOC LEFT Left 11/2/2020    US GUIDED BREAST BIOPSY LEFT COMPLETE Left 8/31/2020    US GUIDED BREAST LYMPH NODE BIOPSY LEFT Left 8/31/2020    WRIST SURGERY         FAMILY HISTORY:  Family History   Problem Relation Age of Onset    Colon cancer Mother 54    Breast cancer Mother         Early 52's    Stroke Father     Emphysema Father     Leukemia Sister     Breast cancer Sister 72    ALS Brother        SOCIAL HISTORY:  Social History     Tobacco Use    Smoking status: Never Smoker    Smokeless tobacco: Never Used   Substance Use Topics    Alcohol use: Yes     Frequency: Monthly or less     Drinks per session: 1 or 2     Comment: Twice a month    Drug use: No       MEDICATIONS:    Current Outpatient Medications:     anastrozole (ARIMIDEX) 1 mg tablet, Take 1 tablet (1 mg total) by mouth daily, Disp: 90 tablet, Rfl: 1    EPINEPHrine (EPIPEN 2-SILVIO) 0 3 mg/0 3 mL SOAJ, Inject 0 3 mL as directed, Disp: , Rfl:     FLOVENT  MCG/ACT inhaler, INHALE 2 PUFF BY INHALATION ROUTE 2 TIMES EVERY DAY, Disp: , Rfl: 6    insulin aspart (NOVOLOG FLEXPEN) 100 Units/mL injection pen, Novolog Flexpen U-100 Insulin aspart 100 unit/mL (3 mL) subcutaneous, Disp: , Rfl:     Insulin Glargine (TOUJEO SOLOSTAR) 300 units/mL CONCETRATED U-300 injection pen, Toujeo SoloStar U-300 Insulin 300 unit/mL (1 5 mL) subcutaneous pen  INJECT 67 UNITS BY SUBCUTANEOUS ROUTE AS PER INSULIN PROTOCOL, Disp: , Rfl:     lansoprazole (PREVACID) 30 mg capsule, lansoprazole 30 mg capsule,delayed release, Disp: , Rfl:     Lidocaine 5 % CREA, Apply 1 application topically as needed (pain), Disp: 1 Tube, Rfl: 0    lidocaine-prilocaine (EMLA) cream, APPLY FOR 12 HOURS, AND THEN OFF 12 HOURS AS NEEDED, Disp: , Rfl: 2    metoprolol tartrate (LOPRESSOR) 50 mg tablet, Take 50 mg by mouth every 12 (twelve) hours , Disp: , Rfl:     morphine (MS CONTIN) 15 mg 12 hr tablet, Take 15 mg by mouth every 12 (twelve) hours as needed for severe pain, Disp: , Rfl:     morphine (MSIR) 15 mg tablet, Take 15 mg by mouth every 8 (eight) hours as needed for severe pain , Disp: , Rfl:     NON FORMULARY, Domperidone 20 mg TID, Disp: , Rfl:     NOVOFINE 32G X 6 MM MISC, 2 (two) times a day As directed, Disp: , Rfl: 5    ONE TOUCH ULTRA TEST test strip, TEST TWICE DAILY OR AS DIRECTED DX: E11 9, Disp: , Rfl: 5    ONETOUCH DELICA LANCETS 91V MISC, TEST TWICE DAILY OR AS DIRECTED, Disp: , Rfl: 5    oxybutynin (DITROPAN XL) 15 MG 24 hr tablet, Take 15 mg by mouth daily  , Disp: , Rfl:     oxyCODONE-acetaminophen (PERCOCET) 5-325 mg per tablet, , Disp: , Rfl:     Probiotic Product (ALIGN) 4 MG CAPS, Take by mouth, Disp: , Rfl:     simvastatin (ZOCOR) 20 mg tablet, Take 20 mg by mouth daily at bedtime  , Disp: , Rfl:     triamcinolone (KENALOG) 0 1 % cream, , Disp: , Rfl:     VENTOLIN  (90 Base) MCG/ACT inhaler, , Disp: , Rfl:     ALLERGIES:  Allergies   Allergen Reactions    Betadine [Povidone Iodine] Anaphylaxis    Iodinated Diagnostic Agents Anaphylaxis    Iodine (Food Allergy) Anaphylaxis and Other (See Comments)    Aspirin GI Bleeding    Caffeine (Food Allergy) Palpitations           REVIEW OF SYSTEMS:  Review of Systems   Constitutional: Negative for chills, fever and unexpected weight change  HENT: Negative for hearing loss, nosebleeds and sore throat  Eyes: Negative for pain, redness and visual disturbance  Respiratory: Negative for cough, shortness of breath and wheezing  Cardiovascular: Negative for chest pain, palpitations and leg swelling  Gastrointestinal: Negative for abdominal pain, nausea and vomiting  Endocrine: Negative for polydipsia and polyuria  Genitourinary: Negative for difficulty urinating and hematuria  Musculoskeletal: Positive for arthralgias  Negative for joint swelling and myalgias  Skin: Negative for rash and wound  Neurological: Negative for dizziness, numbness and headaches  Psychiatric/Behavioral: Negative for decreased concentration, dysphoric mood and suicidal ideas  The patient is not nervous/anxious          VITALS:  Vitals:    03/03/21 1438   BP: 149/80   Pulse: 77       LABS:  HgA1c:   Lab Results   Component Value Date    HGBA1C 7 8 09/15/2020     BMP:   Lab Results   Component Value Date    GLUCOSE 103 01/30/2015    CALCIUM 10 0 10/21/2020     01/30/2015    K 4 4 10/21/2020    CO2 25 10/21/2020     10/21/2020    BUN 23 10/21/2020    CREATININE 1 26 10/21/2020       _____________________________________________________  PHYSICAL EXAMINATION:  General: well developed and well nourished, alert, oriented times 3 and appears comfortable  Psychiatric: Normal  HEENT: Normocephalic, Atraumatic Trachea Midline, No torticollis  Pulmonary: No audible wheezing or respiratory distress   Cardiovascular: No pitting edema, 2+ radial pulse   Skin: No masses, erythema, lacerations, fluctation, ulcerations  Neurovascular: Sensation Intact to the Median, Ulnar, Radial Nerve, Motor Intact to the Median, Ulnar, Radial Nerve and Pulses Intact  Musculoskeletal: Normal, except as noted in detailed exam and in HPI  MUSCULOSKELETAL EXAMINATION:    Maritza Arguello Tenosynovitis Exam:  right side  Positive tender to palpation over 1st dorsal extensor compartment   Negative palpable nodule  Negative crepitus over 1st dorsal extensor compartment   Positive Finkelstein's test    Positive pain with resisted abduction of the thumb    Right CMC Exam:  No adduction contracture  No hyperextension deformity of MCP joint  Negative localized tenderness over radial and dorsal aspect of thumb (CMC joint)  Grind test is Negative for pain   Metacarpal load shift test slightly positive   No triggering or tenderness over the A1 pulley  + pain with Finkelsteins maneuver       ___________________________________________________  STUDIES REVIEWED:  I have personally reviewed AP lateral and oblique radiographs of   Right  wristdemonstrates no acute fracture dislocation,  Patient does have moderate CMC arthrosis        PROCEDURES PERFORMED:  Hand/upper extremity injection: R extensor compartment 1  Universal Protocol:  Consent: Verbal consent obtained  Written consent not obtained  Risks and benefits: risks, benefits and alternatives were discussed  Consent given by: patient  Time out: Immediately prior to procedure a "time out" was called to verify the correct patient, procedure, equipment, support staff and site/side marked as required    Patient identity confirmed: verbally with patient    Supporting Documentation  Indications: pain   Procedure Details  Condition:de Quervain's tenosynovitis Site: R extensor compartment 1   Preparation: Patient was prepped and draped in the usual sterile fashion  Needle size: 25 G  Ultrasound guidance: no  Medications administered: 1 mL lidocaine 1 %; 40 mg dexamethasone 100 mg/10 mL    Patient tolerance: patient tolerated the procedure well with no immediate complications  Dressing:  Sterile dressing applied            _____________________________________________________      Scribe Attestation    I,:  Maribel Moore am acting as a scribe while in the presence of the attending physician :       I,:  Sánchez Woodward MD personally performed the services described in this documentation    as scribed in my presence :

## 2021-03-04 ENCOUNTER — OFFICE VISIT (OUTPATIENT)
Dept: SURGICAL ONCOLOGY | Facility: CLINIC | Age: 86
End: 2021-03-04
Payer: MEDICARE

## 2021-03-04 VITALS
HEART RATE: 74 BPM | BODY MASS INDEX: 32.96 KG/M2 | DIASTOLIC BLOOD PRESSURE: 78 MMHG | HEIGHT: 63 IN | WEIGHT: 186 LBS | SYSTOLIC BLOOD PRESSURE: 132 MMHG | RESPIRATION RATE: 18 BRPM | TEMPERATURE: 98 F

## 2021-03-04 DIAGNOSIS — C77.3 CARCINOMA OF LEFT BREAST METASTATIC TO AXILLARY LYMPH NODE (HCC): ICD-10-CM

## 2021-03-04 DIAGNOSIS — Z17.0 MALIGNANT NEOPLASM OF UPPER-OUTER QUADRANT OF LEFT BREAST IN FEMALE, ESTROGEN RECEPTOR POSITIVE (HCC): Primary | ICD-10-CM

## 2021-03-04 DIAGNOSIS — Z79.811 USE OF ANASTROZOLE (ARIMIDEX): ICD-10-CM

## 2021-03-04 DIAGNOSIS — C50.912 CARCINOMA OF LEFT BREAST METASTATIC TO AXILLARY LYMPH NODE (HCC): ICD-10-CM

## 2021-03-04 DIAGNOSIS — C50.412 MALIGNANT NEOPLASM OF UPPER-OUTER QUADRANT OF LEFT BREAST IN FEMALE, ESTROGEN RECEPTOR POSITIVE (HCC): Primary | ICD-10-CM

## 2021-03-04 PROCEDURE — 99214 OFFICE O/P EST MOD 30 MIN: CPT | Performed by: SURGERY

## 2021-03-04 NOTE — PROGRESS NOTES
Surgical Oncology Follow Up       [address]  [facility]  [address] [facility] [address]  [facility] [address]    Jacob Hoang  1935  [id number]  [address] [facility]  [facility] [place]  [address] [address]    Chief Complaint   Patient presents with    Follow-up     Pt is here for a three month follow up          Assessment & Plan:    Lindy Patrick presents for a three-month follow-up visit  She had developed a seroma Malon Backer  And her postoperative site where she had previously had a lumpectomy followed by mastectomy and reconstruction with an implant implant had been removed for recurrence and resected with a rotational flap by Ramiro Barry   The patient had seen Dr Andreea Hoskins who had recommended aspirating this the patient declined at that time  She presents now for 3 month follow-up visit and is agreeable having it aspirated  400 cc of old serous sanguinous fluid was removed  The patient tolerated the procedure well  Patient is on anastrozole with Dr Prince Bautista  She is going to have radiation therapy with Dr Rosalie Rmaesh  Cancer History:     Oncology History   Malignant neoplasm of upper-outer quadrant of left breast in female, estrogen receptor positive ([facility] [address] )   1992 Initial Diagnosis    Left breast cancer  Lumpectomy  RT      1994 Surgery    Left breast cancer recurrence  Mastectomy with immediate reconstruction     8/31/2020 Biopsy    Left breast ultrasound-guided biopsy  A  1 - 2 o'clock  Invasive mammary carcinoma of no special type   Grade 2    IL 90  HER2 1+  Lymphovascular invasion not identified    B  Left axillary lymph node  Invasive mammary carcinoma of no special type  Grade 2    Concordant  No residual lymph node is identified in specimen B, favoring axillary extension of invasive carcinoma over lymph node metastasis  Breast mass measured 2 8cm on US and involved skin   Axillary mass measured 1 4cm  Right clear  11/17/2020 Surgery    Left breast ultrasound-guided lumpectomy with HUGO  directed axillary dissection  Invasive carcinoma of no special type (ductal)  Grade 2  3 2 cm  Invasive carcinoma directly invades into the dermis or epidermis with skin ulceration  Carcinoma invades skeletal muscle  Lymphovascular invasion present  Anterior margin positive for invasive carcinoma  3/5 Lymph nodes with macrometastases (1 6 cm)  Anatomic Stage IIIB  Prognostic Stage IIIA    Immediate reconstruction with Dr Yan Horner (implant removal and local flap)     12/21/2020 -  Hormone Therapy    Anastrozole 1 mg daily  Dr Berlin Shearer of left breast 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 ([facility] [address] )   12/1/2020 Initial Diagnosis    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 ([facility] [address] )           Interval History:     See above, the patient has developed a large hematoma  Review of Systems:   Review of Systems   All other systems reviewed and are negative        Past Medical History     Patient Active Problem List   Diagnosis    Essential hypertension    GERD without esophagitis    Diabetes mellitus type 2 in obese ([facility] [address] )    Uncomplicated opioid dependence ([facility] [address] )    Chronic heart failure (HCC)    Mixed hyperlipidemia    Migraine without aura and without status migrainosus, not intractable    Malignant neoplasm of upper-outer quadrant of left breast in female, estrogen receptor positive ([address] )    Atrial fibrillation, currently in sinus rhythm    Chronic pain    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)    Use of anastrozole (Arimidex)     Past Medical History:   Diagnosis Date    Anxiety     Asthma     Atrial fibrillation (HCC)     Breast cancer (HCC)     Chest pain, unspecified     CHF (congestive heart failure) ([facility] [address] )     Diabetes mellitus ([address]     GERD (gastroesophageal reflux disease)     History of radiation exposure 1992    Hyperlipidemia     Hypertension  Irregular heart beat     Afib    Migraine     Obesity     Pericardial effusion     Pericarditis      Past Surgical History:   Procedure Laterality Date    ABDOMINAL ADHESION SURGERY      APPENDECTOMY      AUGMENTATION MAMMAPLASTY Left 1994    BACK SURGERY      BREAST LUMPECTOMY Left 1992    malignant    BREAST LUMPECTOMY Left 11/17/2020    Procedure: BREAST ULTRASOUND DIRECTED LUMPECTOMY (ULTRASOUND AT 1200);   Surgeon: Roberto Rouse MD;  [facility];  Service: Surgical Oncology    BREAST SURGERY      CARDIAC SURGERY      Pericardial window    CHOLECYSTECTOMY      EYE SURGERY      FLAP LOCAL TRUNK Left 11/17/2020    Procedure: FLAP LOCAL TRUNK;  Surgeon: Foster Calvo MD;  [facility];  Service: Plastics    HYSTERECTOMY      LYMPH NODE DISSECTION Left 11/17/2020    Procedure: HUGO  DIRECTED AXILLARY DISSECTION;  Surgeon: Roberto Rouse MD;  [facility];  Service: Surgical Oncology    MASTECTOMY Left 1994    malignant    ME CELESTE-IMPLANT CAPSULECTOMY BREAST COMPLETE Left 11/17/2020    Procedure: BREAST CAPSULECTOMY;  Surgeon: Foster Calvo MD;  [facility];  Service: Plastics    ME REMOVAL INTACT BREAST IMPLANT Left 11/17/2020    Procedure: BREAST IMPLANT REMOVAL;  Surgeon: Foster Calvo MD;  [facility];  Service: Plastics    US BREAST NEEDLE LOC LEFT Left 11/2/2020    US GUIDED BREAST BIOPSY LEFT COMPLETE Left 8/31/2020    US GUIDED BREAST LYMPH NODE BIOPSY LEFT Left 8/31/2020    WRIST SURGERY       Family History   Problem Relation Age of Onset    Colon cancer Mother 54    Breast cancer Mother         Early 52's    Stroke Father     Emphysema Father     Leukemia Sister     Breast cancer Sister 72    ALS Brother      Social History     Socioeconomic History    Marital status: /Civil Union     Spouse name: Not on file    Number of children: Not on file    Years of education: Not on file    Highest education level: Not on file Occupational History    Not on file   Social Needs    Financial resource strain: Not on file    Food insecurity     Worry: Not on file     Inability: Not on file    Transportation needs     Medical: Not on file     Non-medical: Not on file   Tobacco Use    Smoking status: Never Smoker    Smokeless tobacco: Never Used   Substance and Sexual Activity    Alcohol use: Yes     Frequency: Monthly or less     Drinks per session: 1 or 2     Comment: Twice a month    Drug use: No    Sexual activity: Not on file   Lifestyle    Physical activity     Days per week: Not on file     Minutes per session: Not on file    Stress: Not on file   Relationships    Social connections     Talks on phone: Not on file     Gets together: Not on file     Attends Protestant service: Not on file     Active member of club or organization: Not on file     Attends meetings of clubs or organizations: Not on file     Relationship status: Not on file    Intimate partner violence     Fear of current or ex partner: Not on file     Emotionally abused: Not on file     Physically abused: Not on file     Forced sexual activity: Not on file   Other Topics Concern    Not on file   Social History Narrative    Not on file       Current Outpatient Medications:     anastrozole (ARIMIDEX) 1 mg tablet, Take 1 tablet (1 mg total) by mouth daily, Disp: 90 tablet, Rfl: 1    EPINEPHrine (EPIPEN 2-SILVIO) 0 3 mg/0 3 mL SOAJ, Inject 0 3 mL as directed, Disp: , Rfl:     FLOVENT  MCG/ACT inhaler, INHALE 2 PUFF BY INHALATION ROUTE 2 TIMES EVERY DAY, Disp: , Rfl: 6    insulin aspart (NOVOLOG FLEXPEN) 100 Units/mL injection pen, Novolog Flexpen U-100 Insulin aspart 100 unit/mL (3 mL) subcutaneous, Disp: , Rfl:     Insulin Glargine (TOUJEO SOLOSTAR) 300 units/mL CONCETRATED U-300 injection pen, Toujeo SoloStar U-300 Insulin 300 unit/mL (1 5 mL) subcutaneous pen  INJECT 67 UNITS BY SUBCUTANEOUS ROUTE AS PER INSULIN PROTOCOL, Disp: , Rfl:    lansoprazole (PREVACID) 30 mg capsule, lansoprazole 30 mg capsule,delayed release, Disp: , Rfl:     Lidocaine 5 % CREA, Apply 1 application topically as needed (pain), Disp: 1 Tube, Rfl: 0    lidocaine-prilocaine (EMLA) cream, APPLY FOR 12 HOURS, AND THEN OFF 12 HOURS AS NEEDED, Disp: , Rfl: 2    metoprolol tartrate (LOPRESSOR) 50 mg tablet, Take 50 mg by mouth every 12 (twelve) hours , Disp: , Rfl:     morphine (MS CONTIN) 15 mg 12 hr tablet, Take 15 mg by mouth every 12 (twelve) hours as needed for severe pain, Disp: , Rfl:     morphine (MSIR) 15 mg tablet, Take 15 mg by mouth every 8 (eight) hours as needed for severe pain , Disp: , Rfl:     NON FORMULARY, Domperidone 20 mg TID, Disp: , Rfl:     NOVOFINE 32G X 6 MM MISC, 2 (two) times a day As directed, Disp: , Rfl: 5    ONE TOUCH ULTRA TEST test strip, TEST TWICE DAILY OR AS DIRECTED DX: E11 9, Disp: , Rfl: 5    ONETOUCH DELICA LANCETS 15R MISC, TEST TWICE DAILY OR AS DIRECTED, Disp: , Rfl: 5    oxybutynin (DITROPAN XL) 15 MG 24 hr tablet, Take 15 mg by mouth daily  , Disp: , Rfl:     oxyCODONE-acetaminophen (PERCOCET) 5-325 mg per tablet, , Disp: , Rfl:     Probiotic Product (ALIGN) 4 MG CAPS, Take by mouth, Disp: , Rfl:     simvastatin (ZOCOR) 20 mg tablet, Take 20 mg by mouth daily at bedtime  , Disp: , Rfl:     triamcinolone (KENALOG) 0 1 % cream, , Disp: , Rfl:     VENTOLIN  (90 Base) MCG/ACT inhaler, , Disp: , Rfl:   No current facility-administered medications for this visit  Allergies   Allergen Reactions    Betadine [Povidone Iodine] Anaphylaxis    Iodinated Diagnostic Agents Anaphylaxis    Iodine (Food Allergy) Anaphylaxis and Other (See Comments)    Aspirin GI Bleeding    Caffeine (Food Allergy) Palpitations       Physical Exam:     Vitals:    03/04/21 1120   BP: 132/78   Pulse: 74   Resp: 18   Temp: 98 °F (36 7 °C)     Physical Exam  Vitals signs reviewed  Constitutional:       Appearance: She is well-developed     HENT: Head: Normocephalic and atraumatic  Eyes:      Pupils: Pupils are equal, round, and reactive to light  Neck:      Musculoskeletal: Normal range of motion and neck supple  Thyroid: No thyromegaly  Vascular: No JVD  Trachea: No tracheal deviation  Cardiovascular:      Rate and Rhythm: Normal rate and regular rhythm  Heart sounds: Normal heart sounds  No murmur  No friction rub  No gallop  Pulmonary:      Effort: Pulmonary effort is normal  No respiratory distress  Breath sounds: Normal breath sounds  No wheezing or rales  Chest:      Comments: The right breast was examined in the sitting and supine position  There are no worrisome skin changes, tenderness, inverted nipple, nipple discharge, swelling, bleeding or evidence of a mass in any quadrant  Errol survey demonstrated no evidence of any clinically suspicious axillary, pectoral or paraclavicular lymph nodes  examination of the left breast demonstrates a large hematoma replacing the volume that her previous implant had  Is quite tense,  There is no evidence of infection  Abdominal:      General: There is no distension  Palpations: Abdomen is soft  There is no hepatomegaly or mass  Tenderness: There is no abdominal tenderness  There is no guarding or rebound  Musculoskeletal: Normal range of motion  General: No tenderness  Lymphadenopathy:      Cervical: No cervical adenopathy  Skin:     General: Skin is warm and dry  Findings: No erythema or rash  Neurological:      Mental Status: She is alert and oriented to person, place, and time  Cranial Nerves: No cranial nerve deficit  Psychiatric:         Behavior: Behavior normal            Results & Discussion:     After informed consent the area was prepped and 400 cc of serous sanguinous low viscosity fluid was withdrawn  The patient tolerated the procedure well    There is still probably 20 cc residual fluid that I could not aspirate  We will see her back if her seroma recurs otherwise we will see her back in approximately 6 months  She is agreeable see our advanced practitioner at that time  She will follow-up with Dr Stefanie Clark for radiation therapy  Advance Care Planning/Advance Directives:  I discussed the disease status, treatment plans and follow-up with the patient

## 2021-03-05 ENCOUNTER — OFFICE VISIT (OUTPATIENT)
Dept: PHYSICAL THERAPY | Age: 86
End: 2021-03-05
Payer: MEDICARE

## 2021-03-05 DIAGNOSIS — C77.3 CARCINOMA OF LEFT BREAST METASTATIC TO AXILLARY LYMPH NODE (HCC): ICD-10-CM

## 2021-03-05 DIAGNOSIS — C50.912 CARCINOMA OF LEFT BREAST METASTATIC TO AXILLARY LYMPH NODE (HCC): ICD-10-CM

## 2021-03-05 DIAGNOSIS — I89.0 LYMPHEDEMA: Primary | ICD-10-CM

## 2021-03-05 PROCEDURE — 97110 THERAPEUTIC EXERCISES: CPT

## 2021-03-06 NOTE — PROGRESS NOTES
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 [facility])  C50 912     C77 3                   Subjective: "Dr Jose Alberto Suarez drained 400 cc's out of the left side of my chest "  Spouse has KT compression wrap on currently  Pt not being consistent with home exer program      Objective: See treatment diary below      Assessment: Tolerated treatment well  Patient would benefit from continued PT      Plan: Continue per plan of care        Precautions: allergies      Manuals 2/17/21 2/26 3/5           I eval            compression wrapping with kt wrap left ant chest  Pt to wrap daily and bring in kt wrap if not wrapped           Mld massage and soft tissue mobilization left ant chest wall prn                          There exer              Wall pulleys 5 min 5 min 5 min          Left ue sh flex and abd 5 reps  On table 3 x 10 on sliding board 3 x 10          Sh backward rolls 10 reps 10 20          scap squeezes 5 reps 5 sec hold  10 reps  20          Wall slides sh flex and abd   5 reps each          Sitting  press with cane    10 reps           Bicep curls  With cane   2 x 10          Arm circles ccw, cw in 90 degrees flexion and abd 90 degrees   2 x 10, pulses forward , backward, upwards downwards at 90 degrees abd 2 x 10                                                                                                                  Ther Activity                                                                              Modalities             Lue and chest piece compression pump with left ue elevation 40 mmHG  30 min

## 2021-03-09 ENCOUNTER — OFFICE VISIT (OUTPATIENT)
Dept: PHYSICAL THERAPY | Age: 86
End: 2021-03-09
Payer: MEDICARE

## 2021-03-09 DIAGNOSIS — I89.0 LYMPHEDEMA: Primary | ICD-10-CM

## 2021-03-09 DIAGNOSIS — C77.3 CARCINOMA OF LEFT BREAST METASTATIC TO AXILLARY LYMPH NODE (HCC): ICD-10-CM

## 2021-03-09 DIAGNOSIS — C50.912 CARCINOMA OF LEFT BREAST METASTATIC TO AXILLARY LYMPH NODE (HCC): ICD-10-CM

## 2021-03-09 PROCEDURE — 97110 THERAPEUTIC EXERCISES: CPT

## 2021-03-09 PROCEDURE — 97140 MANUAL THERAPY 1/> REGIONS: CPT

## 2021-03-09 NOTE — PROGRESS NOTES
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)  C50 912     C77 3                   Subjective: "My right thumb and wrist still hurt and my left shoulder hurts  I am in so much pain today  The shot in my wrist didn't work " Per pt's spouse he reports she is not performing any of her home exer programming  The pt requires strong encouragement to participate  Objective: See treatment diary below      Assessment: Tolerated treatment well  Patient would benefit from continued PT      Plan: Continue per plan of care        Precautions: allergies      Manuals 2/17/21 2/26 3/5 3/9          I eval            compression wrapping with kt wrap left ant chest  Pt to wrap daily and bring in kt wrap if not wrapped  Man with chip bag left ant chest wall         Mld massage and soft tissue mobilization left ant chest wall prn    Prom left shoulder by PT                      There exer              Wall pulleys 5 min 5 min 5 min 5 min         Left ue sh flex and abd 5 reps  On table 3 x 10 on sliding board 3 x 10          Sh backward rolls 10 reps 10 20 20         scap squeezes 5 reps 5 sec hold  10 reps  20 20         Wall slides sh flex and abd   5 reps each          Sitting  press with cane    10 reps           Bicep curls  With cane   2 x 10          Arm circles ccw, cw in 90 degrees flexion and abd 90 degrees   2 x 10, pulses forward , backward, upwards downwards at 90 degrees abd 2 x 10                                                                                                                  Ther Activity                                                                              Modalities             Lue and chest piece compression pump with left ue elevation 40 mmHG  30 min           MH left last chest wall pre stretch   No charge

## 2021-03-12 ENCOUNTER — OFFICE VISIT (OUTPATIENT)
Dept: PHYSICAL THERAPY | Age: 86
End: 2021-03-12
Payer: MEDICARE

## 2021-03-12 DIAGNOSIS — I89.0 LYMPHEDEMA: Primary | ICD-10-CM

## 2021-03-12 DIAGNOSIS — C50.912 CARCINOMA OF LEFT BREAST METASTATIC TO AXILLARY LYMPH NODE (HCC): ICD-10-CM

## 2021-03-12 DIAGNOSIS — C77.3 CARCINOMA OF LEFT BREAST METASTATIC TO AXILLARY LYMPH NODE (HCC): ICD-10-CM

## 2021-03-12 PROCEDURE — 97140 MANUAL THERAPY 1/> REGIONS: CPT

## 2021-03-12 PROCEDURE — 97110 THERAPEUTIC EXERCISES: CPT

## 2021-03-13 NOTE — PROGRESS NOTES
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)  C50 912     C77 3                   Subjective: "I am moving my left arm more but the swelling is returning in the left side of the chest "      Objective: See treatment diary below      Assessment: Tolerated treatment well  Patient would benefit from continued PT      Plan: Continue per plan of care        Precautions: allergies      Manuals 2/17/21 2/26 3/5 3/9 3/12         I eval            compression wrapping with kt wrap left ant chest  Pt to wrap daily and bring in kt wrap if not wrapped  Man with chip bag left ant chest wall performed by spouse with chip bag        Mld massage and soft tissue mobilization left ant chest wall prn    Prom left shoulder by PT Prom left sh by PT                     There exer              Wall pulleys 5 min 5 min 5 min 5 min  5min        Left ue sh flex and abd 5 reps  On table 3 x 10 on sliding board 3 x 10  3 x 10        Sh backward rolls 10 reps 10 20 20 20        scap squeezes 5 reps 5 sec hold  10 reps  20 20 20        Wall slides sh flex and abd   5 reps each  5        Sitting  press with cane    10 reps           Bicep curls  With cane   2 x 10          Arm circles ccw, cw in 90 degrees flexion and abd 90 degrees   2 x 10, pulses forward , backward, upwards downwards at 90 degrees abd 2 x 10  2 x 10        Sitting ext rotation on wedge      10 reps 10 sec hold                                                                                                   Ther Activity                                                                              Modalities             Lue and chest piece compression pump with left ue elevation 40 mmHG  30 min           MH left last chest wall pre stretch   No charge    No charge pre stretch

## 2021-03-16 ENCOUNTER — APPOINTMENT (OUTPATIENT)
Dept: PHYSICAL THERAPY | Age: 86
End: 2021-03-16
Payer: MEDICARE

## 2021-03-18 ENCOUNTER — EVALUATION (OUTPATIENT)
Dept: OCCUPATIONAL THERAPY | Age: 86
End: 2021-03-18
Payer: MEDICARE

## 2021-03-18 DIAGNOSIS — M65.4 DE QUERVAIN'S TENOSYNOVITIS, RIGHT: ICD-10-CM

## 2021-03-18 DIAGNOSIS — M25.531 RIGHT WRIST PAIN: Primary | ICD-10-CM

## 2021-03-18 PROCEDURE — 97165 OT EVAL LOW COMPLEX 30 MIN: CPT

## 2021-03-18 PROCEDURE — 97110 THERAPEUTIC EXERCISES: CPT

## 2021-03-18 NOTE — PROGRESS NOTES
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  Recommend follow-up as needed  Impairments: abnormal or restricted ROM, activity intolerance, impaired physical strength, lacks appropriate home exercise program and pain with function    Goals  1  Wil Barnhart will be compliant and independent with her HEP in 2 weeks  Plan  Patient would benefit from: OT eval        Subjective Evaluation    History of Present Illness  Onset date: 3 months  Mechanism of injury: Wil Barnhart reports pain started when she picked up a dictionary  She has a OTC thumb spica and has had a CSI which she reports has caused her more pain  Pain  Current pain rating: 10  At best pain rating: 10  At worst pain rating: 10  Quality: burning, dull ache and throbbing    Hand dominance: right          Objective     Neurological Testing     Sensation     Wrist/Hand     Right   Intact: light touch    Active Range of Motion     Right Wrist   Wrist flexion: WFL  Wrist extension: WFl    Right Thumb   Flexion     MP: 32    DIP: 42  Palmar Abduction    CMC: 45  Radial Abduction    CMC: 43  Opposition: Full opposition with intact thumb slide    Additional Active Range of Motion Details  Reports "sensation" with wrist AROM  WFL composite fist    Tests     Right Wrist/Hand   Positive Finkelstein's       Additional Tests Details  (+) WHAT             Precautions: Universal      Manuals                                                                 Neuro Re-Ed Ther Ex                                                                                                        HEP: Dart throwers and WE Eccentrics, Digit Eccentrics, Cross Friction Massage             Ther Activity                                       Gait Training                                       Modalities

## 2021-03-19 ENCOUNTER — OFFICE VISIT (OUTPATIENT)
Dept: PHYSICAL THERAPY | Age: 86
End: 2021-03-19
Payer: MEDICARE

## 2021-03-19 DIAGNOSIS — C77.3 CARCINOMA OF LEFT BREAST METASTATIC TO AXILLARY LYMPH NODE (HCC): ICD-10-CM

## 2021-03-19 DIAGNOSIS — I89.0 LYMPHEDEMA: Primary | ICD-10-CM

## 2021-03-19 DIAGNOSIS — C50.912 CARCINOMA OF LEFT BREAST METASTATIC TO AXILLARY LYMPH NODE (HCC): ICD-10-CM

## 2021-03-19 PROCEDURE — 97140 MANUAL THERAPY 1/> REGIONS: CPT

## 2021-03-19 PROCEDURE — 97110 THERAPEUTIC EXERCISES: CPT

## 2021-03-20 NOTE — PROGRESS NOTES
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 ([facility] [address] )  C50 912     C77 3                   Subjective: "I think I have to get it drained again " Discussion post Dr Ronaldo Castleman visit to drain a second time and immediately compression wrap with TG band  Next week      Objective: See treatment diary below  Manuals 2/17/21 2/26 3/5 3/9 3/12  3/19             I eval                     compression wrapping with kt wrap left ant chest  Pt to wrap daily and bring in kt wrap if not wrapped   Man with chip bag left ant chest wall performed by spouse with chip bag             Mld massage and soft tissue mobilization left ant chest wall prn       Prom left shoulder by PT Prom left sh by PT  prom left sh man                                   There exer                        Wall pulleys 5 min 5 min 5 min 5 min  5min  5 min           Left ue sh flex and abd 5 reps  On table 3 x 10 on sliding board 3 x 10   3 x 10  3 x 10           Sh backward rolls 10 reps 10 20 20 20  20           scap squeezes 5 reps 5 sec hold  10 reps  20 20 20  20           Wall slides sh flex and abd     5 reps each   5  5           Sitting  press with cane      10 reps                  Bicep curls  With cane     2 x 10                 Arm circles ccw, cw in 90 degrees flexion and abd 90 degrees     2 x 10, pulses forward , backward, upwards downwards at 90 degrees abd 2 x 10   2 x 10             Sitting ext rotation on wedge          10 reps 10 sec hold  10 reps 10 sec hold                                                                                                                                                                                   Ther Activity                                                                                                                                               Modalities                       Lue and chest piece compression pump with left ue elevation 40 mmHG   30 min                   MH left last chest wall pre stretch     No charge    No charge pre stretch  no charge                  Assessment: Tolerated treatment well  Patient would benefit from continued PT      Plan: Continue per plan of care        Precautions: Universal

## 2021-03-22 ENCOUNTER — OFFICE VISIT (OUTPATIENT)
Dept: SURGICAL ONCOLOGY | Facility: CLINIC | Age: 86
End: 2021-03-22
Payer: MEDICARE

## 2021-03-22 VITALS
HEIGHT: 63 IN | DIASTOLIC BLOOD PRESSURE: 78 MMHG | SYSTOLIC BLOOD PRESSURE: 124 MMHG | BODY MASS INDEX: 33.13 KG/M2 | HEART RATE: 80 BPM | TEMPERATURE: 98 F | RESPIRATION RATE: 18 BRPM | WEIGHT: 187 LBS

## 2021-03-22 DIAGNOSIS — Z17.0 MALIGNANT NEOPLASM OF UPPER-OUTER QUADRANT OF LEFT BREAST IN FEMALE, ESTROGEN RECEPTOR POSITIVE (HCC): ICD-10-CM

## 2021-03-22 DIAGNOSIS — N64.89 SEROMA OF BREAST: Primary | ICD-10-CM

## 2021-03-22 DIAGNOSIS — Z79.811 USE OF ANASTROZOLE (ARIMIDEX): ICD-10-CM

## 2021-03-22 DIAGNOSIS — C50.912 CARCINOMA OF LEFT BREAST METASTATIC TO AXILLARY LYMPH NODE (HCC): ICD-10-CM

## 2021-03-22 DIAGNOSIS — C77.3 CARCINOMA OF LEFT BREAST METASTATIC TO AXILLARY LYMPH NODE (HCC): ICD-10-CM

## 2021-03-22 DIAGNOSIS — C50.412 MALIGNANT NEOPLASM OF UPPER-OUTER QUADRANT OF LEFT BREAST IN FEMALE, ESTROGEN RECEPTOR POSITIVE (HCC): ICD-10-CM

## 2021-03-22 PROCEDURE — 99213 OFFICE O/P EST LOW 20 MIN: CPT | Performed by: SURGERY

## 2021-03-22 RX ORDER — SODIUM CHLORIDE 9 MG/ML
75 INJECTION, SOLUTION INTRAVENOUS CONTINUOUS
Status: CANCELLED | OUTPATIENT
Start: 2021-03-22

## 2021-03-22 NOTE — PROGRESS NOTES
Surgical Oncology Follow Up       [address]  [facility] [place]  [address] [facility]  [place] [address]    Talat Cannon  1935  [id number]  [address]  [facility] [place]  [address] [address]    Chief Complaint   Patient presents with    Seroma          Assessment & Plan:    Patient presents with a recurrent seroma  Previously drained 400 cc today we drained approximately 80 and were unable to withdraw further fluid  Though she did continue to ooze out of her 18 gauge needle hole  I have recommended she have an IR placed drain which may help resolve this issue  Cancer History:     Oncology History   Malignant neoplasm of upper-outer quadrant of left breast in female, estrogen receptor positive ([facility] [address] )   1992 Initial Diagnosis    Left breast cancer  Lumpectomy  RT      1994 Surgery    Left breast cancer recurrence  Mastectomy with immediate reconstruction     8/31/2020 Biopsy    Left breast ultrasound-guided biopsy  A  1 - 2 o'clock  Invasive mammary carcinoma of no special type   Grade 2    ME 90  HER2 1+  Lymphovascular invasion not identified    B  Left axillary lymph node  Invasive mammary carcinoma of no special type  Grade 2    Concordant  No residual lymph node is identified in specimen B, favoring axillary extension of invasive carcinoma over lymph node metastasis  Breast mass measured 2 8cm on US and involved skin  Axillary mass measured 1 4cm  Right clear       11/17/2020 Surgery    Left breast ultrasound-guided lumpectomy with HUGO  directed axillary dissection  Invasive carcinoma of no special type (ductal)  Grade 2  3 2 cm  Invasive carcinoma directly invades into the dermis or epidermis with skin ulceration  Carcinoma invades skeletal muscle  Lymphovascular invasion present  Anterior margin positive for invasive carcinoma  3/5 Lymph nodes with macrometastases (1 6 cm)  Anatomic Stage IIIB  Prognostic Stage IIIA    Immediate reconstruction with Dr Javier Benton (implant removal and local flap)     12/21/2020 -  Hormone Therapy    Anastrozole 1 mg daily  Dr Daniela Anthony of left breast 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 (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 ([facility] )           Interval History:    the patient is waiting to have her seroma resolved so she can undergo radiation therapy  Review of Systems:   Review of Systems   All other systems reviewed and are negative        Past Medical History     Patient Active Problem List   Diagnosis    Essential hypertension    GERD without esophagitis    Diabetes mellitus type 2 in obese ([facility] )    Uncomplicated opioid dependence ([facility] )    Chronic heart failure (HCC)    Mixed hyperlipidemia    Migraine without aura and without status migrainosus, not intractable    Malignant neoplasm of upper-outer quadrant of left breast in female, estrogen receptor positive (HCC)    Atrial fibrillation, currently in sinus rhythm    Chronic pain    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)    Use of anastrozole (Arimidex)     Past Medical History:   Diagnosis Date    Anxiety     Asthma     Atrial fibrillation (HCC)     Breast cancer (HCC)     Chest pain, unspecified     CHF (congestive heart failure) (HCC)     Diabetes mellitus ([facility] )     GERD (gastroesophageal reflux disease)     History of radiation exposure 1992    Hyperlipidemia     Hypertension     Irregular heart beat     Afib    Migraine     Obesity     Pericardial effusion     Pericarditis      Past Surgical History:   Procedure Laterality Date    ABDOMINAL ADHESION SURGERY      APPENDECTOMY      AUGMENTATION MAMMAPLASTY Left 1994    BACK SURGERY      BREAST LUMPECTOMY Left 1992    malignant    BREAST LUMPECTOMY Left 11/17/2020    Procedure: BREAST ULTRASOUND DIRECTED LUMPECTOMY (ULTRASOUND AT 1200);   Surgeon: Jose Young MD;  [facility];  Service: Surgical Oncology    BREAST SURGERY      CARDIAC SURGERY      Pericardial window    CHOLECYSTECTOMY      EYE SURGERY      FLAP LOCAL TRUNK Left 11/17/2020    Procedure: FLAP LOCAL TRUNK;  Surgeon: Cisco Daley MD;  [facility];  Service: Plastics    HYSTERECTOMY      LYMPH NODE DISSECTION Left 11/17/2020    Procedure: HUGO  DIRECTED AXILLARY DISSECTION;  Surgeon: Jose Young MD;  [facility];  Service: Surgical Oncology    MASTECTOMY Left 1994    malignant    NV CELESTE-IMPLANT CAPSULECTOMY BREAST COMPLETE Left 11/17/2020    Procedure: BREAST CAPSULECTOMY;  Surgeon: Cisco Daley MD;  [facility];  Service: Plastics    NV REMOVAL INTACT BREAST IMPLANT Left 11/17/2020    Procedure: BREAST IMPLANT REMOVAL;  Surgeon: Cisco Daley MD;  [facility];  Service: Plastics    US BREAST NEEDLE LOC LEFT Left 11/2/2020    US GUIDED BREAST BIOPSY LEFT COMPLETE Left 8/31/2020    US GUIDED BREAST LYMPH NODE BIOPSY LEFT Left 8/31/2020    WRIST SURGERY       Family History   Problem Relation Age of Onset   Anastacio Garcia Colon cancer Mother 54    Breast cancer Mother         Early 52's    Stroke Father     Emphysema Father     Leukemia Sister     Breast cancer Sister 72    ALS Brother      Social History     Socioeconomic History    Marital status: /Civil Union     Spouse name: Not on file    Number of children: Not on file    Years of education: Not on file    Highest education level: Not on file   Occupational History    Not on file   Social Needs    Financial resource strain: Not on file    Food insecurity     Worry: Not on file     Inability: Not on file    Transportation needs     Medical: Not on file     Non-medical: Not on file   Tobacco Use    Smoking status: Never Smoker    Smokeless tobacco: Never Used   Substance and Sexual Activity    Alcohol use: Yes     Frequency: Monthly or less Drinks per session: 1 or 2     Comment: Twice a month    Drug use: No    Sexual activity: Not on file   Lifestyle    Physical activity     Days per week: Not on file     Minutes per session: Not on file    Stress: Not on file   Relationships    Social connections     Talks on phone: Not on file     Gets together: Not on file     Attends Spiritism service: Not on file     Active member of club or organization: Not on file     Attends meetings of clubs or organizations: Not on file     Relationship status: Not on file    Intimate partner violence     Fear of current or ex partner: Not on file     Emotionally abused: Not on file     Physically abused: Not on file     Forced sexual activity: Not on file   Other Topics Concern    Not on file   Social History Narrative    Not on file       Current Outpatient Medications:     anastrozole (ARIMIDEX) 1 mg tablet, Take 1 tablet (1 mg total) by mouth daily, Disp: 90 tablet, Rfl: 1    EPINEPHrine (EPIPEN 2-SILVIO) 0 3 mg/0 3 mL SOAJ, Inject 0 3 mL as directed, Disp: , Rfl:     FLOVENT  MCG/ACT inhaler, INHALE 2 PUFF BY INHALATION ROUTE 2 TIMES EVERY DAY, Disp: , Rfl: 6    insulin aspart (NOVOLOG FLEXPEN) 100 Units/mL injection pen, Novolog Flexpen U-100 Insulin aspart 100 unit/mL (3 mL) subcutaneous, Disp: , Rfl:     Insulin Glargine (TOUJEO SOLOSTAR) 300 units/mL CONCETRATED U-300 injection pen, Toujeo SoloStar U-300 Insulin 300 unit/mL (1 5 mL) subcutaneous pen  INJECT 67 UNITS BY SUBCUTANEOUS ROUTE AS PER INSULIN PROTOCOL, Disp: , Rfl:     lansoprazole (PREVACID) 30 mg capsule, lansoprazole 30 mg capsule,delayed release, Disp: , Rfl:     Lidocaine 5 % CREA, Apply 1 application topically as needed (pain), Disp: 1 Tube, Rfl: 0    lidocaine-prilocaine (EMLA) cream, APPLY FOR 12 HOURS, AND THEN OFF 12 HOURS AS NEEDED, Disp: , Rfl: 2    metoprolol tartrate (LOPRESSOR) 50 mg tablet, Take 50 mg by mouth every 12 (twelve) hours , Disp: , Rfl:     morphine (MS CONTIN) 15 mg 12 hr tablet, Take 15 mg by mouth every 12 (twelve) hours as needed for severe pain, Disp: , Rfl:     morphine (MSIR) 15 mg tablet, Take 15 mg by mouth every 8 (eight) hours as needed for severe pain , Disp: , Rfl:     NON FORMULARY, Domperidone 20 mg TID, Disp: , Rfl:     NOVOFINE 32G X 6 MM MISC, 2 (two) times a day As directed, Disp: , Rfl: 5    ONE TOUCH ULTRA TEST test strip, TEST TWICE DAILY OR AS DIRECTED DX: E11 9, Disp: , Rfl: 5    ONETOUCH DELICA LANCETS 43Z MISC, TEST TWICE DAILY OR AS DIRECTED, Disp: , Rfl: 5    oxybutynin (DITROPAN XL) 15 MG 24 hr tablet, Take 15 mg by mouth daily  , Disp: , Rfl:     oxyCODONE-acetaminophen (PERCOCET) 5-325 mg per tablet, , Disp: , Rfl:     Probiotic Product (ALIGN) 4 MG CAPS, Take by mouth, Disp: , Rfl:     simvastatin (ZOCOR) 20 mg tablet, Take 20 mg by mouth daily at bedtime  , Disp: , Rfl:     triamcinolone (KENALOG) 0 1 % cream, , Disp: , Rfl:     VENTOLIN  (90 Base) MCG/ACT inhaler, , Disp: , Rfl:   Allergies   Allergen Reactions    Betadine [Povidone Iodine] Anaphylaxis    Iodinated Diagnostic Agents Anaphylaxis    Iodine Anaphylaxis and Other (See Comments)    Aspirin GI Bleeding    Caffeine Palpitations       Physical Exam:     Vitals:    03/22/21 1027   BP: 124/78   Pulse: 80   Resp: 18   Temp: 98 °F (36 7 °C)     Physical Exam  Chest:      Comments:   Examination of the left breast demonstrates a large recurrent seroma however there was multiple loculations  Aspiration was able to remove approximately 80 cc of serous sanguinous fluid  Left breast was prepped with alcohol an 18 gauge needle inserted in 80 cc of serous sanguinous fluid were withdrawn though it kept occluding  Ultrasound evaluation demonstrated multiple loculated  Areas though they seem to communicate      Results & Discussion:     I suspect that this may be substantially better treated with a IR placed drain could have constant suction on it over several weeks a possibly with a sclerotic agent  If this is not resolved but then surgical evacuation and drain placement may be warranted  All this was reviewed with the patient  We will see her back in approximately 3 weeks  Advance Care Planning/Advance Directives:  I discussed the disease status, treatment plans and follow-up with the patient

## 2021-03-23 ENCOUNTER — TELEPHONE (OUTPATIENT)
Dept: RADIOLOGY | Facility: HOSPITAL | Age: 86
End: 2021-03-23

## 2021-03-23 ENCOUNTER — APPOINTMENT (OUTPATIENT)
Dept: PHYSICAL THERAPY | Age: 86
End: 2021-03-23
Payer: MEDICARE

## 2021-03-23 NOTE — PRE-PROCEDURE INSTRUCTIONS
Phone Consult completed:Pre procedure instructions reviewed with verbal understanding  Allergies,meds,NPO, and ride  Approximate arrival time given,SDS phone call evening before procedure  COVID vaccine complete

## 2021-03-26 ENCOUNTER — APPOINTMENT (OUTPATIENT)
Dept: PHYSICAL THERAPY | Age: 86
End: 2021-03-26
Payer: MEDICARE

## 2021-03-31 ENCOUNTER — TELEPHONE (OUTPATIENT)
Dept: SURGERY | Facility: HOSPITAL | Age: 86
End: 2021-03-31

## 2021-04-01 ENCOUNTER — PREP FOR PROCEDURE (OUTPATIENT)
Dept: INTERVENTIONAL RADIOLOGY/VASCULAR | Facility: CLINIC | Age: 86
End: 2021-04-01

## 2021-04-01 ENCOUNTER — HOSPITAL ENCOUNTER (OUTPATIENT)
Dept: RADIOLOGY | Facility: HOSPITAL | Age: 86
Discharge: HOME | End: 2021-04-01
Attending: RADIOLOGY | Admitting: STUDENT IN AN ORGANIZED HEALTH CARE EDUCATION/TRAINING PROGRAM
Payer: MEDICARE

## 2021-04-01 VITALS
DIASTOLIC BLOOD PRESSURE: 74 MMHG | RESPIRATION RATE: 16 BRPM | WEIGHT: 187 LBS | TEMPERATURE: 97.7 F | SYSTOLIC BLOOD PRESSURE: 163 MMHG | BODY MASS INDEX: 33.13 KG/M2 | HEART RATE: 76 BPM | HEIGHT: 63 IN | OXYGEN SATURATION: 92 %

## 2021-04-01 DIAGNOSIS — N64.89 SEROMA OF BREAST: Primary | ICD-10-CM

## 2021-04-01 DIAGNOSIS — N64.89 SEROMA OF BREAST: ICD-10-CM

## 2021-04-01 LAB
ANION GAP SERPL CALCULATED.3IONS-SCNC: 4 MMOL/L (ref 4–13)
BUN SERPL-MCNC: 22 MG/DL (ref 5–25)
CALCIUM SERPL-MCNC: 10 MG/DL (ref 8.3–10.1)
CHLORIDE SERPL-SCNC: 108 MMOL/L (ref 100–108)
CO2 SERPL-SCNC: 25 MMOL/L (ref 21–32)
CREAT SERPL-MCNC: 1.38 MG/DL (ref 0.6–1.3)
ERYTHROCYTE [DISTWIDTH] IN BLOOD BY AUTOMATED COUNT: 12.4 % (ref 11.6–15.1)
GFR SERPL CREATININE-BSD FRML MDRD: 35 ML/MIN/1.73SQ M
GLUCOSE P FAST SERPL-MCNC: 156 MG/DL (ref 65–99)
GLUCOSE SERPL-MCNC: 156 MG/DL (ref 65–140)
HCT VFR BLD AUTO: 44.2 % (ref 34.8–46.1)
HGB BLD-MCNC: 14 G/DL (ref 11.5–15.4)
MCH RBC QN AUTO: 28.5 PG (ref 26.8–34.3)
MCHC RBC AUTO-ENTMCNC: 31.7 G/DL (ref 31.4–37.4)
MCV RBC AUTO: 90 FL (ref 82–98)
PLATELET # BLD AUTO: 293 THOUSANDS/UL (ref 149–390)
PMV BLD AUTO: 11.6 FL (ref 8.9–12.7)
POTASSIUM SERPL-SCNC: 5.5 MMOL/L (ref 3.5–5.3)
RBC # BLD AUTO: 4.92 MILLION/UL (ref 3.81–5.12)
SODIUM SERPL-SCNC: 137 MMOL/L (ref 136–145)
WBC # BLD AUTO: 10.53 THOUSAND/UL (ref 4.31–10.16)

## 2021-04-01 PROCEDURE — 99152 MOD SED SAME PHYS/QHP 5/>YRS: CPT | Performed by: STUDENT IN AN ORGANIZED HEALTH CARE EDUCATION/TRAINING PROGRAM

## 2021-04-01 PROCEDURE — C1729 CATH, DRAINAGE: HCPCS

## 2021-04-01 PROCEDURE — 85027 COMPLETE CBC AUTOMATED: CPT | Performed by: RADIOLOGY

## 2021-04-01 PROCEDURE — 99024 POSTOP FOLLOW-UP VISIT: CPT | Performed by: STUDENT IN AN ORGANIZED HEALTH CARE EDUCATION/TRAINING PROGRAM

## 2021-04-01 PROCEDURE — 10030 IMG GID FLU COLL DRG SFT TIS: CPT | Performed by: STUDENT IN AN ORGANIZED HEALTH CARE EDUCATION/TRAINING PROGRAM

## 2021-04-01 PROCEDURE — 10030 IMG GID FLU COLL DRG SFT TIS: CPT

## 2021-04-01 PROCEDURE — 80048 BASIC METABOLIC PNL TOTAL CA: CPT | Performed by: RADIOLOGY

## 2021-04-01 PROCEDURE — 99152 MOD SED SAME PHYS/QHP 5/>YRS: CPT

## 2021-04-01 RX ORDER — MIDAZOLAM HYDROCHLORIDE 2 MG/2ML
INJECTION, SOLUTION INTRAMUSCULAR; INTRAVENOUS CODE/TRAUMA/SEDATION MEDICATION
Status: COMPLETED | OUTPATIENT
Start: 2021-04-01 | End: 2021-04-01

## 2021-04-01 RX ORDER — FENTANYL CITRATE 50 UG/ML
INJECTION, SOLUTION INTRAMUSCULAR; INTRAVENOUS CODE/TRAUMA/SEDATION MEDICATION
Status: COMPLETED | OUTPATIENT
Start: 2021-04-01 | End: 2021-04-01

## 2021-04-01 RX ORDER — SODIUM CHLORIDE 9 MG/ML
75 INJECTION, SOLUTION INTRAVENOUS CONTINUOUS
Status: DISCONTINUED | OUTPATIENT
Start: 2021-04-01 | End: 2021-04-01 | Stop reason: HOSPADM

## 2021-04-01 RX ADMIN — MIDAZOLAM 0.5 MG: 1 INJECTION INTRAMUSCULAR; INTRAVENOUS at 11:12

## 2021-04-01 RX ADMIN — FENTANYL CITRATE 50 MCG: 50 INJECTION INTRAMUSCULAR; INTRAVENOUS at 11:12

## 2021-04-01 RX ADMIN — SODIUM CHLORIDE 75 ML/HR: 0.9 INJECTION, SOLUTION INTRAVENOUS at 10:26

## 2021-04-01 RX ADMIN — FENTANYL CITRATE 50 MCG: 50 INJECTION INTRAMUSCULAR; INTRAVENOUS at 11:15

## 2021-04-01 NOTE — SEDATION DOCUMENTATION
Left breast drain tube placed by dr Evangelista Record without complication  Pt is for 1 hour bedrest starting 2490  Report given to SS

## 2021-04-01 NOTE — BRIEF OP NOTE (RAD/CATH)
INTERVENTIONAL RADIOLOGY PROCEDURE NOTE    Date: 4/1/2021    Procedure: IR DRAINAGE TUBE PLACEMENT    Preoperative diagnosis:   1  Seroma of breast         Postoperative diagnosis: Same  Surgeon: Roby Geller MD     Assistant: None  No qualified resident was available  Blood loss: 5 ml    Specimens: None     Findings: 8 Fr L seroma drain placement, 300 ml serosanguinous fluid removed  Complications: None immediate      Anesthesia: conscious sedation

## 2021-04-01 NOTE — PROGRESS NOTES
Interventional Radiology Preprocedure Note    History/Indication for procedure:   Freddy Dubose is a 82 y o  female with a PMH of recurrent L breast seroma who presents for drainage catheter placement  Relevant past medical history:    Past Medical History:   Diagnosis Date    Anxiety     Asthma     Atrial fibrillation ([facility] )     Breast cancer ([facility] )     Chest pain, unspecified     CHF (congestive heart failure) (HCC)     Diabetes mellitus (HCC)     GERD (gastroesophageal reflux disease)     History of radiation exposure 1992    Hyperlipidemia     Hypertension     Irregular heart beat     Afib    Migraine     Obesity     Pericardial effusion     Pericarditis      Patient Active Problem List   Diagnosis    Essential hypertension    GERD without esophagitis    Diabetes mellitus type 2 in obese 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)    Use of anastrozole (Arimidex)       /75   Pulse 77   Temp 97 7 °F (36 5 °C) (Oral)   Resp 18   Ht 5' 3" (1 6 m)   Wt 84 8 kg (187 lb)   SpO2 96%   BMI 33 13 kg/m²     Medications:    Inpatient Medications:     Scheduled Medications:  Current Facility-Administered Medications   Medication Dose Route Frequency Provider Last Rate    sodium chloride  75 mL/hr Intravenous Continuous Lou Gonzalez MD 75 mL/hr (04/01/21 1026)       Infusions:  sodium chloride, 75 mL/hr, Last Rate: 75 mL/hr (04/01/21 1026)        PRN:      Outpatient Medications:  No current facility-administered medications on file prior to encounter        Current Outpatient Medications on File Prior to Encounter   Medication Sig Dispense Refill    anastrozole (ARIMIDEX) 1 mg tablet Take 1 tablet (1 mg total) by mouth daily 90 tablet 1    insulin aspart (NOVOLOG FLEXPEN) 100 Units/mL injection pen Novolog Flexpen U-100 Insulin aspart 100 unit/mL (3 mL) subcutaneous      Insulin Glargine (TOUJEO SOLOSTAR) 300 units/mL CONCETRATED U-300 injection pen Toujeo SoloStar U-300 Insulin 300 unit/mL (1 5 mL) subcutaneous pen   INJECT 67 UNITS BY SUBCUTANEOUS ROUTE AS PER INSULIN PROTOCOL      lansoprazole (PREVACID) 30 mg capsule lansoprazole 30 mg capsule,delayed release      metoprolol tartrate (LOPRESSOR) 50 mg tablet Take 50 mg by mouth every 12 (twelve) hours       NON FORMULARY Domperidone 20 mg TID      NOVOFINE 32G X 6 MM MISC 2 (two) times a day As directed  5    oxybutynin (DITROPAN XL) 15 MG 24 hr tablet Take 15 mg by mouth daily   Probiotic Product (ALIGN) 4 MG CAPS Take by mouth      simvastatin (ZOCOR) 20 mg tablet Take 20 mg by mouth daily at bedtime   EPINEPHrine (EPIPEN 2-SILVIO) 0 3 mg/0 3 mL SOAJ Inject 0 3 mL as directed      FLOVENT  MCG/ACT inhaler INHALE 2 PUFF BY INHALATION ROUTE 2 TIMES EVERY DAY  6    Lidocaine 5 % CREA Apply 1 application topically as needed (pain) 1 Tube 0    lidocaine-prilocaine (EMLA) cream APPLY FOR 12 HOURS, AND THEN OFF 12 HOURS AS NEEDED  2    morphine (MS CONTIN) 15 mg 12 hr tablet Take 15 mg by mouth every 12 (twelve) hours as needed for severe pain      morphine (MSIR) 15 mg tablet Take 15 mg by mouth every 8 (eight) hours as needed for severe pain        ONE TOUCH ULTRA TEST test strip TEST TWICE DAILY OR AS DIRECTED DX: E11 9  5    ONETOUCH DELICA LANCETS 77Z MISC TEST TWICE DAILY OR AS DIRECTED  5    oxyCODONE-acetaminophen (PERCOCET) 5-325 mg per tablet       triamcinolone (KENALOG) 0 1 % cream       VENTOLIN  (90 Base) MCG/ACT inhaler          Allergies   Allergen Reactions    Betadine [Povidone Iodine] Anaphylaxis    Iodinated Diagnostic Agents Anaphylaxis    Iodine - Food Allergy Anaphylaxis and Other (See Comments)    Aspirin GI Bleeding    Caffeine - Food Allergy Palpitations       Anticoagulants: none    ASA classification: ASA 3 - Patient with moderate systemic disease with functional limitations    Airway Assessment: II (hard and soft palate, upper portion of tonsils anduvula visible)    Relevant family history: None    Relevant review of systems: None    Prior sedation/anesthesia: yes    Can the patient lie flat? Yes     NPO Status: yes    Labs:   CBC with diff:   Lab Results   Component Value Date    WBC 10 53 (H) 04/01/2021    HGB 14 0 04/01/2021    HCT 44 2 04/01/2021    MCV 90 04/01/2021     04/01/2021    MCH 28 5 04/01/2021    MCHC 31 7 04/01/2021    RDW 12 4 04/01/2021    MPV 11 6 04/01/2021    NRBC 0 10/21/2020     BMP/CMP:  Lab Results   Component Value Date     01/30/2015    K 4 4 10/21/2020    K 3 7 01/30/2015     10/21/2020    CL 99 (L) 01/30/2015    CO2 25 10/21/2020    CO2 30 01/30/2015    ANIONGAP 7 01/30/2015    BUN 23 10/21/2020    BUN 20 01/30/2015    CREATININE 1 26 10/21/2020    CREATININE 1 03 01/30/2015    GLUCOSE 103 01/30/2015    CALCIUM 10 0 10/21/2020    CALCIUM 9 3 01/30/2015    AST 18 10/21/2020    AST 58 (H) 01/28/2015    ALT 31 10/21/2020    ALT 44 01/28/2015    ALKPHOS 141 (H) 10/21/2020    ALKPHOS 144 (H) 01/28/2015    PROT 5 7 (L) 01/28/2015    PROT 6 3 (L) 01/28/2015    BILITOT 0 49 01/28/2015    EGFR 39 10/21/2020   ,     Coags:   Lab Results   Component Value Date    PTT 49 (H) 01/16/2015    INR 2 19 (H) 01/17/2015   ,          Relevant imaging studies:   Reviewed  Directed physical examination:  I agree with the physical exam performed on 3/22/21 and there are no additional changes  Assessment/Plan: For L seroma drainage catheter placement  Sedation/Anesthesia plan: Moderate sedation will be used as needed for procedure      Consent with alternatives to the procedure, risks and benefits have been explained and discussed with the patient/patient's family: yes

## 2021-04-02 ENCOUNTER — TELEPHONE (OUTPATIENT)
Dept: RADIOLOGY | Facility: HOSPITAL | Age: 86
End: 2021-04-02

## 2021-04-02 NOTE — PROGRESS NOTES
Discussed proper tube care with  Blanca Buerger  Currently patient is draining tube and resetting SEFERINO bulb  Also recording output  Instructed to call [phone] or [phone] with any questions or issues with left breast seroma drain tube  Verbalizes understanding of tube care    Next F/U appointment is scheduled for Monday 4/12/21 at [facility]

## 2021-04-02 NOTE — DISCHARGE INSTRUCTIONS
TUBE CARE INSTRUCTIONS    Care after your procedure:    Resume your normal diet  Small sips of flat soda will help with nausea  1  The properly functioning catheter should be forward flushed once (1x) daily with 10ml of normal saline using clean technique  You will be given a prescription for flushes  To flush the tube, clean both connections with alcohol swab  Twist off the drainage bag/ bulb  tubing and twist the saline syringe into the drainage tube and flush  Remove the syringe and twist the drainage bag / bulb tubing tubing back on     2  The drainage bag/bulb may be emptied as necessary  Keep a record of the amount of fluid you drain from your tube  This should be done with clean technique as well  3  A fresh dressing should be applied daily over the tube insertion site  4  As the tube is secured to the skin with only a suture,try not to pull on your tube  Tub baths are not permitted  Showers are permitted if the patient's skin entry site is prevented from getting wet  Similarly, washcloth "baths" are acceptable  Contact Interventional Radiology at [phone] Jadiel PATIENTS: Contact Interventional Radiology at [phone]) Hillary Wren PATIENTS: Contact Interventional Radiology at [phone]) if:    1  Leakage or large amounts of liquid around the catheter  2  Fever of 101 degrees lasting several hours without other obvious cause (such as sore throat, flu, etc)  3  Persistent nausea or vomiting  4  Diminished drainage, which may be associated with pressure or pain  Or when the     drainage from your tube is less than 10mls for 48 hours  5  Catheter pulled back or falls out  The following pharmacies carry the flush syringes         [facility]                     [facility]  [address]                         [address] [facility] [place]  Phone [phone]            Phone [phone]   [place] [address]                                [phone]  [address] [facility] [address] [place]                      [address] [facility]  Phone [phone]            Phone [phone]                      Pedro Record                                                                                                          [phone]  [facility]  [facility]    [address]  Phone [phone]

## 2021-04-19 ENCOUNTER — OFFICE VISIT (OUTPATIENT)
Dept: SURGICAL ONCOLOGY | Facility: CLINIC | Age: 86
End: 2021-04-19
Payer: MEDICARE

## 2021-04-19 VITALS
BODY MASS INDEX: 32.78 KG/M2 | DIASTOLIC BLOOD PRESSURE: 80 MMHG | HEART RATE: 76 BPM | RESPIRATION RATE: 16 BRPM | TEMPERATURE: 98 F | HEIGHT: 63 IN | WEIGHT: 185 LBS | SYSTOLIC BLOOD PRESSURE: 142 MMHG

## 2021-04-19 DIAGNOSIS — Z17.0 MALIGNANT NEOPLASM OF UPPER-OUTER QUADRANT OF LEFT BREAST IN FEMALE, ESTROGEN RECEPTOR POSITIVE (HCC): ICD-10-CM

## 2021-04-19 DIAGNOSIS — Z79.811 USE OF ANASTROZOLE (ARIMIDEX): ICD-10-CM

## 2021-04-19 DIAGNOSIS — C50.412 MALIGNANT NEOPLASM OF UPPER-OUTER QUADRANT OF LEFT BREAST IN FEMALE, ESTROGEN RECEPTOR POSITIVE (HCC): ICD-10-CM

## 2021-04-19 DIAGNOSIS — N64.89 SEROMA OF BREAST: Primary | ICD-10-CM

## 2021-04-19 PROCEDURE — 99213 OFFICE O/P EST LOW 20 MIN: CPT | Performed by: SURGERY

## 2021-04-19 NOTE — PROGRESS NOTES
Surgical Oncology Follow Up       [address]  [facility]  [address]  [facility] [address]    Refugia Doing  1935  [id number]  [address] [facility]  [facility] [place]  [place]az[place] [id number]    Chief Complaint   Patient presents with    Follow-up          Assessment & Plan:     Patient  Presents for follow-up visit  She still has her IR drain in is putting out approximately 40-50 cc a day  She has no evidence of infection  She has a follow-up appointment with Radiation Oncology  I suspect Radiology may consider placing a  Sclerosing agent through the drain if it is not diminish relatively soon  The patient is aware of this  Cancer History:     Oncology History   Malignant neoplasm of upper-outer quadrant of left breast in female, estrogen receptor positive ([facility] )   1992 Initial Diagnosis    Left breast cancer  Lumpectomy  RT      1994 Surgery    Left breast cancer recurrence  Mastectomy with immediate reconstruction     8/31/2020 Biopsy    Left breast ultrasound-guided biopsy  A  1 - 2 o'clock  Invasive mammary carcinoma of no special type   Grade 2    CT 90  HER2 1+  Lymphovascular invasion not identified    B  Left axillary lymph node  Invasive mammary carcinoma of no special type  Grade 2    Concordant  No residual lymph node is identified in specimen B, favoring axillary extension of invasive carcinoma over lymph node metastasis  Breast mass measured 2 8cm on US and involved skin  Axillary mass measured 1 4cm  Right clear       11/17/2020 Surgery    Left breast ultrasound-guided lumpectomy with HUGO  directed axillary dissection  Invasive carcinoma of no special type (ductal)  Grade 2  3 2 cm  Invasive carcinoma directly invades into the dermis or epidermis with skin ulceration  Carcinoma invades skeletal muscle  Lymphovascular invasion present  Anterior margin positive for invasive carcinoma  3/5 Lymph nodes with macrometastases (1 6 cm)  Anatomic Stage IIIB  Prognostic Stage IIIA    Immediate reconstruction with Dr Carlos Hood (implant removal and local flap)     12/21/2020 -  Hormone Therapy    Anastrozole 1 mg daily  Dr Valle Eye of left breast 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 ([facility] )   12/1/2020 Initial Diagnosis    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 ([place] [address] )           Interval History:     See above  Overall the patient is doing well  Review of Systems:   Review of Systems   Constitutional: Negative for chills and fever  HENT: Negative for ear pain and sore throat  Eyes: Negative for pain and visual disturbance  Respiratory: Negative for cough and shortness of breath  Cardiovascular: Negative for chest pain and palpitations  Gastrointestinal: Negative for abdominal pain and vomiting  Genitourinary: Negative for dysuria and hematuria  Musculoskeletal: Negative for arthralgias and back pain  Skin: Negative for color change and rash  Neurological: Negative for seizures and syncope  All other systems reviewed and are negative        Past Medical History     Patient Active Problem List   Diagnosis    Essential hypertension    GERD without esophagitis    Diabetes mellitus type 2 in obese ([facility] )    Uncomplicated opioid dependence ([place] [address] )    Chronic heart failure (HCC)    Mixed hyperlipidemia    Migraine without aura and without status migrainosus, not intractable    Malignant neoplasm of upper-outer quadrant of left breast in female, estrogen receptor positive ([facility] )    Atrial fibrillation, currently in sinus rhythm    Chronic pain    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)    Use of anastrozole (Arimidex)     Past Medical History:   Diagnosis Date    Anxiety     Asthma     Atrial fibrillation (HCC)     Breast cancer (HCC)     Chest pain, unspecified     CHF (congestive heart failure) ([facility] )     Diabetes mellitus ([facility] )     GERD (gastroesophageal reflux disease)     History of radiation exposure 1992    Hyperlipidemia     Hypertension     Irregular heart beat     Afib    Migraine     Obesity     Pericardial effusion     Pericarditis      Past Surgical History:   Procedure Laterality Date    ABDOMINAL ADHESION SURGERY      APPENDECTOMY      AUGMENTATION MAMMAPLASTY Left 1994    BACK SURGERY      BREAST LUMPECTOMY Left 1992    malignant    BREAST LUMPECTOMY Left 11/17/2020    Procedure: BREAST ULTRASOUND DIRECTED LUMPECTOMY (ULTRASOUND AT 1200);   Surgeon: Johnna Stewart MD;  [facility];  Service: Surgical Oncology    BREAST SURGERY      CARDIAC SURGERY      Pericardial window    CHOLECYSTECTOMY      EYE SURGERY      FLAP LOCAL TRUNK Left 11/17/2020    Procedure: FLAP LOCAL TRUNK;  Surgeon: Shikha Hanson MD;  [facility];  Service: Plastics    HYSTERECTOMY      IR DRAINAGE TUBE PLACEMENT  4/1/2021    LYMPH NODE DISSECTION Left 11/17/2020    Procedure: HUGO  DIRECTED AXILLARY DISSECTION;  Surgeon: Johnna Stewart MD;  [facility];  Service: Surgical Oncology    MASTECTOMY Left 1994    malignant    HI CELESTE-IMPLANT CAPSULECTOMY BREAST COMPLETE Left 11/17/2020    Procedure: BREAST CAPSULECTOMY;  Surgeon: Shikha Hanson MD;  [facility];  Service: Plastics    HI REMOVAL INTACT BREAST IMPLANT Left 11/17/2020    Procedure: BREAST IMPLANT REMOVAL;  Surgeon: Shikha Hanson MD;  [facility];  Service: Plastics    US BREAST NEEDLE LOC LEFT Left 11/2/2020    US GUIDED BREAST BIOPSY LEFT COMPLETE Left 8/31/2020    US GUIDED BREAST LYMPH NODE BIOPSY LEFT Left 8/31/2020    WRIST SURGERY       Family History   Problem Relation Age of Onset    Colon cancer Mother 54    Breast cancer Mother         Early 52's    Stroke Father     Emphysema Father     Leukemia Sister     Breast cancer Sister 72    ALS Brother      Social History     Socioeconomic History    Marital status: /Civil Union     Spouse name: Not on file    Number of children: Not on file    Years of education: Not on file    Highest education level: Not on file   Occupational History    Not on file   Social Needs    Financial resource strain: Not on file    Food insecurity     Worry: Not on file     Inability: Not on file    Transportation needs     Medical: Not on file     Non-medical: Not on file   Tobacco Use    Smoking status: Never Smoker    Smokeless tobacco: Never Used   Substance and Sexual Activity    Alcohol use: Yes     Frequency: Monthly or less     Drinks per session: 1 or 2     Comment: Twice a month    Drug use: No    Sexual activity: Not on file   Lifestyle    Physical activity     Days per week: Not on file     Minutes per session: Not on file    Stress: Not on file   Relationships    Social connections     Talks on phone: Not on file     Gets together: Not on file     Attends Gnosticist service: Not on file     Active member of club or organization: Not on file     Attends meetings of clubs or organizations: Not on file     Relationship status: Not on file    Intimate partner violence     Fear of current or ex partner: Not on file     Emotionally abused: Not on file     Physically abused: Not on file     Forced sexual activity: Not on file   Other Topics Concern    Not on file   Social History Narrative    Not on file       Current Outpatient Medications:     anastrozole (ARIMIDEX) 1 mg tablet, Take 1 tablet (1 mg total) by mouth daily, Disp: 90 tablet, Rfl: 1    EPINEPHrine (EPIPEN 2-SILVIO) 0 3 mg/0 3 mL SOAJ, Inject 0 3 mL as directed, Disp: , Rfl:     FLOVENT  MCG/ACT inhaler, INHALE 2 PUFF BY INHALATION ROUTE 2 TIMES EVERY DAY, Disp: , Rfl: 6    insulin aspart (NOVOLOG FLEXPEN) 100 Units/mL injection pen, Novolog Flexpen U-100 Insulin aspart 100 unit/mL (3 mL) subcutaneous, Disp: , Rfl:     Insulin Glargine (TOUJEO SOLOSTAR) 300 units/mL CONCETRATED U-300 injection pen, Toujeo SoloStar U-300 Insulin 300 unit/mL (1 5 mL) subcutaneous pen  INJECT 67 UNITS BY SUBCUTANEOUS ROUTE AS PER INSULIN PROTOCOL, Disp: , Rfl:     lansoprazole (PREVACID) 30 mg capsule, lansoprazole 30 mg capsule,delayed release, Disp: , Rfl:     Lidocaine 5 % CREA, Apply 1 application topically as needed (pain), Disp: 1 Tube, Rfl: 0    lidocaine-prilocaine (EMLA) cream, APPLY FOR 12 HOURS, AND THEN OFF 12 HOURS AS NEEDED, Disp: , Rfl: 2    metoprolol tartrate (LOPRESSOR) 50 mg tablet, Take 50 mg by mouth every 12 (twelve) hours , Disp: , Rfl:     morphine (MS CONTIN) 15 mg 12 hr tablet, Take 15 mg by mouth every 12 (twelve) hours as needed for severe pain, Disp: , Rfl:     morphine (MSIR) 15 mg tablet, Take 15 mg by mouth every 8 (eight) hours as needed for severe pain , Disp: , Rfl:     NON FORMULARY, Domperidone 20 mg TID, Disp: , Rfl:     NOVOFINE 32G X 6 MM MISC, 2 (two) times a day As directed, Disp: , Rfl: 5    ONE TOUCH ULTRA TEST test strip, TEST TWICE DAILY OR AS DIRECTED DX: E11 9, Disp: , Rfl: 5    ONETOUCH DELICA LANCETS 45Y MISC, TEST TWICE DAILY OR AS DIRECTED, Disp: , Rfl: 5    oxybutynin (DITROPAN XL) 15 MG 24 hr tablet, Take 15 mg by mouth daily  , Disp: , Rfl:     oxyCODONE-acetaminophen (PERCOCET) 5-325 mg per tablet, , Disp: , Rfl:     Probiotic Product (ALIGN) 4 MG CAPS, Take by mouth, Disp: , Rfl:     simvastatin (ZOCOR) 20 mg tablet, Take 20 mg by mouth daily at bedtime  , Disp: , Rfl:     triamcinolone (KENALOG) 0 1 % cream, , Disp: , Rfl:     VENTOLIN  (90 Base) MCG/ACT inhaler, , Disp: , Rfl:   Allergies   Allergen Reactions    Betadine [Povidone Iodine] Anaphylaxis    Iodinated Diagnostic Agents Anaphylaxis    Iodine - Food Allergy Anaphylaxis and Other (See Comments)    Aspirin GI Bleeding    Caffeine - Food Allergy Palpitations       Physical Exam:     Vitals:    04/19/21 1153   BP: 142/80   Pulse: 76   Resp: 16   Temp: 98 °F (36 7 °C)     Physical Exam  Chest:      Comments: The left mastectomy site has a pigtail catheter attached to a SEFERINO suction bulb  There is no evidence of infection  There is no evidence of fluid in her cavity  It  Is completely flat  Results & Discussion:    I have recommended she continue to follow-up with interventional Radiology as well as Radiation Oncology  I will see her back in 3 months  The patient will contact me should she have any problems or questions  Advance Care Planning/Advance Directives:  I discussed the disease status, treatment plans and follow-up with the patient

## 2021-04-23 ENCOUNTER — HOSPITAL ENCOUNTER (OUTPATIENT)
Dept: RADIOLOGY | Facility: HOSPITAL | Age: 86
Discharge: HOME | End: 2021-04-23
Attending: STUDENT IN AN ORGANIZED HEALTH CARE EDUCATION/TRAINING PROGRAM | Admitting: STUDENT IN AN ORGANIZED HEALTH CARE EDUCATION/TRAINING PROGRAM
Payer: MEDICARE

## 2021-04-23 DIAGNOSIS — N64.89 SEROMA OF BREAST: ICD-10-CM

## 2021-04-23 PROCEDURE — 76080 X-RAY EXAM OF FISTULA: CPT | Performed by: STUDENT IN AN ORGANIZED HEALTH CARE EDUCATION/TRAINING PROGRAM

## 2021-04-23 PROCEDURE — 76080 X-RAY EXAM OF FISTULA: CPT

## 2021-04-23 PROCEDURE — 49424 ASSESS CYST CONTRAST INJECT: CPT

## 2021-04-23 PROCEDURE — 49424 ASSESS CYST CONTRAST INJECT: CPT | Performed by: STUDENT IN AN ORGANIZED HEALTH CARE EDUCATION/TRAINING PROGRAM

## 2021-04-23 NOTE — SEDATION DOCUMENTATION
Left breast drainage tube check performed and removed at this visit  Patient educated about potential of recollection of fluid and verbalized understanding

## 2021-04-23 NOTE — H&P (VIEW-ONLY)
Interventional Radiology Preprocedure Note    History/Indication for procedure:   Brooks Collet is a 82 y o  female with a PMH of L breast seroma who presents for abscessogram     Output has been < 10 ml/3 days  No f/c/leakage  There were no vitals taken for this visit  Relevant past medical history:    Past Medical History:   Diagnosis Date    Anxiety     Asthma     Atrial fibrillation ([facility] )     Breast cancer ([place] [address] )     Chest pain, unspecified     CHF (congestive heart failure) (HCC)     Diabetes mellitus ([place] [address] )     GERD (gastroesophageal reflux disease)     History of radiation exposure 1992    Hyperlipidemia     Hypertension     Irregular heart beat     Afib    Migraine     Obesity     Pericardial effusion     Pericarditis      Patient Active Problem List   Diagnosis    Essential hypertension    GERD without esophagitis    Diabetes mellitus type 2 in obese ([place] [address] )    Uncomplicated opioid dependence ([place] [address] )    Chronic heart failure (HCC)    Mixed hyperlipidemia    Migraine without aura and without status migrainosus, not intractable    Malignant neoplasm of upper-outer quadrant of left breast in female, estrogen receptor positive ([place] [address] )    Atrial fibrillation, currently in sinus rhythm    Chronic pain    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)    Use of anastrozole (Arimidex)       Medications:    Inpatient Medications:     Scheduled Medications:      Infusions:  No current facility-administered medications for this encounter         PRN:      Outpatient Medications:  Current Outpatient Medications on File Prior to Encounter   Medication Sig Dispense Refill    anastrozole (ARIMIDEX) 1 mg tablet Take 1 tablet (1 mg total) by mouth daily 90 tablet 1    EPINEPHrine (EPIPEN 2-SILVIO) 0 3 mg/0 3 mL SOAJ Inject 0 3 mL as directed      FLOVENT  MCG/ACT inhaler INHALE 2 PUFF BY INHALATION ROUTE 2 TIMES EVERY DAY  6    insulin aspart (NOVOLOG FLEXPEN) 100 Units/mL injection pen Novolog Flexpen U-100 Insulin aspart 100 unit/mL (3 mL) subcutaneous      Insulin Glargine (TOUJEO SOLOSTAR) 300 units/mL CONCETRATED U-300 injection pen Toujeo SoloStar U-300 Insulin 300 unit/mL (1 5 mL) subcutaneous pen   INJECT 67 UNITS BY SUBCUTANEOUS ROUTE AS PER INSULIN PROTOCOL      lansoprazole (PREVACID) 30 mg capsule lansoprazole 30 mg capsule,delayed release      Lidocaine 5 % CREA Apply 1 application topically as needed (pain) 1 Tube 0    lidocaine-prilocaine (EMLA) cream APPLY FOR 12 HOURS, AND THEN OFF 12 HOURS AS NEEDED  2    metoprolol tartrate (LOPRESSOR) 50 mg tablet Take 50 mg by mouth every 12 (twelve) hours       morphine (MS CONTIN) 15 mg 12 hr tablet Take 15 mg by mouth every 12 (twelve) hours as needed for severe pain      morphine (MSIR) 15 mg tablet Take 15 mg by mouth every 8 (eight) hours as needed for severe pain   NON FORMULARY Domperidone 20 mg TID      NOVOFINE 32G X 6 MM MISC 2 (two) times a day As directed  5    ONE TOUCH ULTRA TEST test strip TEST TWICE DAILY OR AS DIRECTED DX: E11 9  5    ONETOUCH DELICA LANCETS 83N MISC TEST TWICE DAILY OR AS DIRECTED  5    oxybutynin (DITROPAN XL) 15 MG 24 hr tablet Take 15 mg by mouth daily   oxyCODONE-acetaminophen (PERCOCET) 5-325 mg per tablet       Probiotic Product (ALIGN) 4 MG CAPS Take by mouth      simvastatin (ZOCOR) 20 mg tablet Take 20 mg by mouth daily at bedtime   triamcinolone (KENALOG) 0 1 % cream       VENTOLIN  (90 Base) MCG/ACT inhaler        No current facility-administered medications on file prior to encounter          Allergies   Allergen Reactions    Betadine [Povidone Iodine] Anaphylaxis    Iodinated Diagnostic Agents Anaphylaxis    Iodine - Food Allergy Anaphylaxis and Other (See Comments)    Aspirin GI Bleeding    Caffeine - Food Allergy Palpitations       Anticoagulants: none    Labs:   CBC with diff:   Lab Results   Component Value Date    WBC 10 53 (H) 04/01/2021    HGB 14 0 04/01/2021    HCT 44 2 04/01/2021    MCV 90 04/01/2021     04/01/2021    MCH 28 5 04/01/2021    MCHC 31 7 04/01/2021    RDW 12 4 04/01/2021    MPV 11 6 04/01/2021    NRBC 0 10/21/2020     BMP/CMP:  Lab Results   Component Value Date     01/30/2015    K 5 5 (H) 04/01/2021    K 3 7 01/30/2015     04/01/2021    CL 99 (L) 01/30/2015    CO2 25 04/01/2021    CO2 30 01/30/2015    ANIONGAP 7 01/30/2015    BUN 22 04/01/2021    BUN 20 01/30/2015    CREATININE 1 38 (H) 04/01/2021    CREATININE 1 03 01/30/2015    GLUCOSE 103 01/30/2015    CALCIUM 10 0 04/01/2021    CALCIUM 9 3 01/30/2015    AST 18 10/21/2020    AST 58 (H) 01/28/2015    ALT 31 10/21/2020    ALT 44 01/28/2015    ALKPHOS 141 (H) 10/21/2020    ALKPHOS 144 (H) 01/28/2015    PROT 5 7 (L) 01/28/2015    PROT 6 3 (L) 01/28/2015    BILITOT 0 49 01/28/2015    EGFR 35 04/01/2021   ,     Coags:   Lab Results   Component Value Date    PTT 49 (H) 01/16/2015    INR 2 19 (H) 01/17/2015   ,          Relevant imaging studies:   Reviewed  Directed physical examination:  I agree with the physical exam performed on 4/19/21 and there are no additional changes  Assessment/Plan: For tube check and/or change  Sedation/Anesthesia plan:  Local sedation will be used as needed for procedure  Consent with alternatives to the procedure, risks and benefits have been explained and discussed with the patient/patient's family: Continuation of care

## 2021-04-23 NOTE — DISCHARGE INSTRUCTIONS
Drainage Tube Removal    Your drainage tube was removed today  What you need know at home:   Keep a clean dry dressing at the tube site until the small opening closes  It will take twenty four to forty eight hours  Keep the site dry until it heals  A small amount of drainage on your dressing is normal  Resume your normal diet  Small sips of flat soda will help with any nausea  Contact Interventional Radiology for any of the following: You have pain, fever greater than 101, shaking chills  If you have increased redness or swelling at the site  I the drainage from your site does not stop  If the site drains pus or has a bad odor       Contact Interventional Radiology at [phone]   Jadiel PATIENTS: Contact Interventional Radiology at [phone]) Cheryl Pelayo PATIENTS: Contact Interventional Radiology at [phone]) if:

## 2021-04-23 NOTE — PROGRESS NOTES
Interventional Radiology Preprocedure Note    History/Indication for procedure:   Derian Bingham is a 82 y o  female with a PMH of L breast seroma who presents for abscessogram     Output has been < 10 ml/3 days  No f/c/leakage  There were no vitals taken for this visit  Relevant past medical history:    Past Medical History:   Diagnosis Date    Anxiety     Asthma     Atrial fibrillation ([facility] )     Breast cancer ([place] [address] )     Chest pain, unspecified     CHF (congestive heart failure) (HCC)     Diabetes mellitus ([place] [address] )     GERD (gastroesophageal reflux disease)     History of radiation exposure 1992    Hyperlipidemia     Hypertension     Irregular heart beat     Afib    Migraine     Obesity     Pericardial effusion     Pericarditis      Patient Active Problem List   Diagnosis    Essential hypertension    GERD without esophagitis    Diabetes mellitus type 2 in obese ([place] [address] )    Uncomplicated opioid dependence ([place] [address] )    Chronic heart failure (HCC)    Mixed hyperlipidemia    Migraine without aura and without status migrainosus, not intractable    Malignant neoplasm of upper-outer quadrant of left breast in female, estrogen receptor positive ([place] [address] )    Atrial fibrillation, currently in sinus rhythm    Chronic pain    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)    Use of anastrozole (Arimidex)       Medications:    Inpatient Medications:     Scheduled Medications:      Infusions:  No current facility-administered medications for this encounter         PRN:      Outpatient Medications:  Current Outpatient Medications on File Prior to Encounter   Medication Sig Dispense Refill    anastrozole (ARIMIDEX) 1 mg tablet Take 1 tablet (1 mg total) by mouth daily 90 tablet 1    EPINEPHrine (EPIPEN 2-SILVIO) 0 3 mg/0 3 mL SOAJ Inject 0 3 mL as directed      FLOVENT  MCG/ACT inhaler INHALE 2 PUFF BY INHALATION ROUTE 2 TIMES EVERY DAY  6    insulin aspart (NOVOLOG FLEXPEN) 100 Units/mL injection pen Novolog Flexpen U-100 Insulin aspart 100 unit/mL (3 mL) subcutaneous      Insulin Glargine (TOUJEO SOLOSTAR) 300 units/mL CONCETRATED U-300 injection pen Toujeo SoloStar U-300 Insulin 300 unit/mL (1 5 mL) subcutaneous pen   INJECT 67 UNITS BY SUBCUTANEOUS ROUTE AS PER INSULIN PROTOCOL      lansoprazole (PREVACID) 30 mg capsule lansoprazole 30 mg capsule,delayed release      Lidocaine 5 % CREA Apply 1 application topically as needed (pain) 1 Tube 0    lidocaine-prilocaine (EMLA) cream APPLY FOR 12 HOURS, AND THEN OFF 12 HOURS AS NEEDED  2    metoprolol tartrate (LOPRESSOR) 50 mg tablet Take 50 mg by mouth every 12 (twelve) hours       morphine (MS CONTIN) 15 mg 12 hr tablet Take 15 mg by mouth every 12 (twelve) hours as needed for severe pain      morphine (MSIR) 15 mg tablet Take 15 mg by mouth every 8 (eight) hours as needed for severe pain   NON FORMULARY Domperidone 20 mg TID      NOVOFINE 32G X 6 MM MISC 2 (two) times a day As directed  5    ONE TOUCH ULTRA TEST test strip TEST TWICE DAILY OR AS DIRECTED DX: E11 9  5    ONETOUCH DELICA LANCETS 22M MISC TEST TWICE DAILY OR AS DIRECTED  5    oxybutynin (DITROPAN XL) 15 MG 24 hr tablet Take 15 mg by mouth daily   oxyCODONE-acetaminophen (PERCOCET) 5-325 mg per tablet       Probiotic Product (ALIGN) 4 MG CAPS Take by mouth      simvastatin (ZOCOR) 20 mg tablet Take 20 mg by mouth daily at bedtime   triamcinolone (KENALOG) 0 1 % cream       VENTOLIN  (90 Base) MCG/ACT inhaler        No current facility-administered medications on file prior to encounter          Allergies   Allergen Reactions    Betadine [Povidone Iodine] Anaphylaxis    Iodinated Diagnostic Agents Anaphylaxis    Iodine - Food Allergy Anaphylaxis and Other (See Comments)    Aspirin GI Bleeding    Caffeine - Food Allergy Palpitations       Anticoagulants: none    Labs:   CBC with diff:   Lab Results   Component Value Date    WBC 10 53 (H) 04/01/2021    HGB 14 0 04/01/2021    HCT 44 2 04/01/2021    MCV 90 04/01/2021     04/01/2021    MCH 28 5 04/01/2021    MCHC 31 7 04/01/2021    RDW 12 4 04/01/2021    MPV 11 6 04/01/2021    NRBC 0 10/21/2020     BMP/CMP:  Lab Results   Component Value Date     01/30/2015    K 5 5 (H) 04/01/2021    K 3 7 01/30/2015     04/01/2021    CL 99 (L) 01/30/2015    CO2 25 04/01/2021    CO2 30 01/30/2015    ANIONGAP 7 01/30/2015    BUN 22 04/01/2021    BUN 20 01/30/2015    CREATININE 1 38 (H) 04/01/2021    CREATININE 1 03 01/30/2015    GLUCOSE 103 01/30/2015    CALCIUM 10 0 04/01/2021    CALCIUM 9 3 01/30/2015    AST 18 10/21/2020    AST 58 (H) 01/28/2015    ALT 31 10/21/2020    ALT 44 01/28/2015    ALKPHOS 141 (H) 10/21/2020    ALKPHOS 144 (H) 01/28/2015    PROT 5 7 (L) 01/28/2015    PROT 6 3 (L) 01/28/2015    BILITOT 0 49 01/28/2015    EGFR 35 04/01/2021   ,     Coags:   Lab Results   Component Value Date    PTT 49 (H) 01/16/2015    INR 2 19 (H) 01/17/2015   ,          Relevant imaging studies:   Reviewed  Directed physical examination:  I agree with the physical exam performed on 4/19/21 and there are no additional changes  Assessment/Plan: For tube check and/or change  Sedation/Anesthesia plan:  Local sedation will be used as needed for procedure  Consent with alternatives to the procedure, risks and benefits have been explained and discussed with the patient/patient's family: Continuation of care

## 2021-04-23 NOTE — BRIEF OP NOTE (RAD/CATH)
INTERVENTIONAL RADIOLOGY PROCEDURE NOTE    Date: 4/23/2021    Procedure: IR DRAINAGE TUBE CHECK WITH SCLEROSIS    Preoperative diagnosis:   1  Seroma of breast         Postoperative diagnosis: Same  Surgeon: Wallace Rodarte MD     Assistant: None  No qualified resident was available  Blood loss: 0 ml    Specimens: None  Findings: tube check showed minimal residual cavity  Catheter removed  Complications: None immediate      Anesthesia: local

## 2021-05-05 ENCOUNTER — TELEPHONE (OUTPATIENT)
Dept: HEMATOLOGY ONCOLOGY | Facility: CLINIC | Age: 86
End: 2021-05-05

## 2021-05-05 DIAGNOSIS — C77.3 CARCINOMA OF LEFT BREAST METASTATIC TO AXILLARY LYMPH NODE (HCC): ICD-10-CM

## 2021-05-05 DIAGNOSIS — N64.89 RECURRENT SEROMA OF BREAST: Primary | ICD-10-CM

## 2021-05-05 DIAGNOSIS — C50.912 CARCINOMA OF LEFT BREAST METASTATIC TO AXILLARY LYMPH NODE (HCC): ICD-10-CM

## 2021-05-05 NOTE — TELEPHONE ENCOUNTER
Patient calling asking to speak with REJI Hand  Patient states her chest is filling with fluid again and would like a call back to discuss  I will also teams message REJI Hand

## 2021-05-05 NOTE — TELEPHONE ENCOUNTER
Returned patient's phone call to further discuss  Patient reports that her drain was removed by IR on 4/23/21 and that 3-4 days afterwards, she noticed a build up of fluid in her chest again  Reports that it appears like a "breast formed" again and the area is uncomfortable  Discussed this with Dr Jorge Luis New who recommended that the patient call IR to be seen by them again for possible catheter placement and sclerotherapy  Patient verbalized understanding

## 2021-05-06 ENCOUNTER — TELEPHONE (OUTPATIENT)
Dept: RADIOLOGY | Facility: HOSPITAL | Age: 86
End: 2021-05-06

## 2021-05-06 ENCOUNTER — PREP FOR PROCEDURE (OUTPATIENT)
Dept: INTERVENTIONAL RADIOLOGY/VASCULAR | Facility: CLINIC | Age: 86
End: 2021-05-06

## 2021-05-06 DIAGNOSIS — N64.89 SEROMA OF BREAST: Primary | ICD-10-CM

## 2021-05-06 NOTE — PRE-PROCEDURE INSTRUCTIONS
Phone Consult completed:Pre procedure instructions reviewed regarding seroma drain placemntwith verbal understanding  Allergies,meds,,and ride   Arrival time given, procedure with local

## 2021-05-07 ENCOUNTER — TELEPHONE (OUTPATIENT)
Dept: HEMATOLOGY ONCOLOGY | Facility: CLINIC | Age: 86
End: 2021-05-07

## 2021-05-07 NOTE — TELEPHONE ENCOUNTER
Patient calling in requesting to speak to [address] with Dr Zandra Cooks  States she spoke to her the other day regarding fluid in her lungs and has some questions

## 2021-05-14 ENCOUNTER — HOSPITAL ENCOUNTER (OUTPATIENT)
Dept: RADIOLOGY | Facility: HOSPITAL | Age: 86
Discharge: HOME | End: 2021-05-14
Attending: STUDENT IN AN ORGANIZED HEALTH CARE EDUCATION/TRAINING PROGRAM | Admitting: RADIOLOGY
Payer: MEDICARE

## 2021-05-14 DIAGNOSIS — N64.89 SEROMA OF BREAST: ICD-10-CM

## 2021-05-14 PROCEDURE — C1769 GUIDE WIRE: HCPCS

## 2021-05-14 PROCEDURE — 10030 IMG GID FLU COLL DRG SFT TIS: CPT

## 2021-05-14 PROCEDURE — C1729 CATH, DRAINAGE: HCPCS

## 2021-05-14 PROCEDURE — 10030 IMG GID FLU COLL DRG SFT TIS: CPT | Performed by: RADIOLOGY

## 2021-05-14 RX ORDER — LIDOCAINE WITH 8.4% SOD BICARB 0.9%(10ML)
SYRINGE (ML) INJECTION CODE/TRAUMA/SEDATION MEDICATION
Status: COMPLETED | OUTPATIENT
Start: 2021-05-14 | End: 2021-05-14

## 2021-05-14 RX ORDER — SODIUM CHLORIDE 9 MG/ML
10 INJECTION INTRAVENOUS DAILY
Qty: 300 ML | Refills: 0 | Status: SHIPPED | OUTPATIENT
Start: 2021-05-14

## 2021-05-14 RX ADMIN — Medication 10 ML: at 10:27

## 2021-05-14 NOTE — SEDATION DOCUMENTATION
10 Fr  Drain placed with bulb suction to left breast seroma without complications  Tolerated well by patient  200ml cloudy, tea colored fluid aspirated immediately

## 2021-05-14 NOTE — BRIEF OP NOTE (RAD/CATH)
INTERVENTIONAL RADIOLOGY PROCEDURE NOTE    Date: 5/14/2021    Procedure: IR DRAINAGE TUBE PLACEMENT WITH SCLEROSIS    Preoperative diagnosis:   1  Seroma of breast         Postoperative diagnosis: Same  Surgeon: Afsaneh Ledezma MD     Assistant: None  No qualified resident was available  Blood loss: Minimal    Specimens: None     Findings: Left breast seroma, 10 F drain placed  Complications: None immediate      Anesthesia: local

## 2021-05-14 NOTE — DISCHARGE INSTRUCTIONS
:      Rafi-Myers Drain Care   WHAT YOU NEED TO KNOW:   A Rafi-Myers (SEFERINO) drain is used to remove fluids that build up in an area of your body after surgery  The SEFERINO drain is a bulb shaped device connected to a tube  One end of the tube is placed inside you during surgery  The other end comes out through a small cut in your skin  The bulb is connected to this end  You may have a stitch to hold the tube in place  DISCHARGE INSTRUCTIONS:   Seek care immediately if:   · Your SEFERINO drain breaks or comes out  · You have cloudy yellow or brown drainage from your SEFERINO drain site, or the drainage smells bad  Contact your healthcare provider if:   · You drain less than 30 milliliters (2 tablespoons) in 24 hours  This may mean your drain can be removed  · You suddenly stop draining fluid or think your SEFERINO drain is blocked  · You have a fever higher than 101 5°F (38 6°C)  · You have increased pain, redness, or swelling around the drain site  · You have questions about your SEFERINO drain care  How a Rafi-Myers drain works: The SEFERINO drain removes fluids by creating suction in the tube  The bulb is squeezed flat and connected to the tube that sticks out of your body  The bulb expands as it fills with fluid  How to change the bandage around your Rafi-Myers drain:  If you have a bandage, change it once a day  You may need to change your bandage more than once a day if it gets completely wet  · Wash your hands with soap and water  · Loosen the tape and gently remove the old bandage  Throw the old bandage into a plastic trash bag  · Use soap and water or saline (salt water) solution to clean your SEFERINO drain site  Dip a cotton swab or gauze pad in the solution and gently clean your skin  · Pat the area dry  · Place a new bandage on your SEFERINO drain site and secure it to your skin with medical tape  · Wash your hands      How to empty the Rafi-Myers drain:  Empty the bulb when it is half full or every 8 to 12 hours  · Wash your hands with soap and water  · Remove the plug from the bulb  · Pour the fluid into a measuring cup  · Clean the plug with an alcohol swab or a cotton ball dipped in rubbing alcohol  · Squeeze the bulb flat and put the plug back in  The bulb should stay flat until it starts to fill with fluid again  · Measure the amount of fluid you pour out  Write down how much fluid you empty from the SEFERINO drain and the date and time you collected it  · Flush the fluid down the toilet  Wash your hands  Clear clogged tubing: Use the following steps to clear your Rafi-Myers tubing:  · Hold the tubing between your thumb and first finger at the place closest to your skin  This hand will prevent the tube from being pulled out of your skin  · Use your other thumb and first finger to slide the clog down the tubing toward the bulb  You may have to repeat the sliding until the tubing is unclogged  Rafi-Myers drain removal:  The amount of fluid that you drain will decrease as your wound heals  The SEFERINO drain usually is removed when less than 30 milliliters (2 tablespoons) is collected in 24 hours  Ask your healthcare provider when and how your SEFERINO drain will be removed  Follow up with your healthcare provider as directed:  Write down your questions so you remember to ask them during your visits  © Copyright [address] Information is for End User's use only and may not be sold, redistributed or otherwise used for commercial purposes  All illustrations and images included in CareNotes® are the copyrighted property of A D A M [employer]  or [address] [place], [place] [address]mirian   The above information is an  only  It is not intended as medical advice for individual conditions or treatments  Talk to your doctor, nurse or pharmacist before following any medical regimen to see if it is safe and effective for you

## 2021-05-14 NOTE — INTERVAL H&P NOTE
Update: (This section must be completed if the H&P was completed greater than 24 hrs to procedure or admission)    H&P reviewed  After examining the patient, I find no changed to the H&P since it had been written  Patient re-evaluated   Accept as history and physical     Candance Shallow, MD/May 14, 2021/10:54 AM

## 2021-05-25 ENCOUNTER — TELEPHONE (OUTPATIENT)
Dept: HEMATOLOGY ONCOLOGY | Facility: CLINIC | Age: 86
End: 2021-05-25

## 2021-05-25 NOTE — TELEPHONE ENCOUNTER
Dr Bri Toussaint does not think that this is related to the Anastrozole  He would like for her to contact her PCP in regards to her symptoms

## 2021-05-25 NOTE — TELEPHONE ENCOUNTER
Patient calling to report that she developed a cough about 5 months ago  She started on Arimidex 6 months ago  Patient states the cough is constant  She has it all day and all night  She tried using Tussionex Rx which did not offer her relief  This Rx was given to her prior to starting Arimidex - from another provider  Patient thinks her cough is coming from Arimidex and is looking for further recommendation from Dr Chandan Larose  Should she hold medication to see if symptoms resolve?     Will send to Rn to review with MD  Call back number for patient [phone]

## 2021-06-03 ENCOUNTER — HOSPITAL ENCOUNTER (OUTPATIENT)
Dept: RADIOLOGY | Facility: HOSPITAL | Age: 86
Discharge: HOME | End: 2021-06-03
Attending: RADIOLOGY | Admitting: RADIOLOGY
Payer: MEDICARE

## 2021-06-03 DIAGNOSIS — N64.89 SEROMA OF BREAST: ICD-10-CM

## 2021-06-03 PROCEDURE — 75984 XRAY CONTROL CATHETER CHANGE: CPT

## 2021-06-03 PROCEDURE — 75984 XRAY CONTROL CATHETER CHANGE: CPT | Performed by: RADIOLOGY

## 2021-06-03 PROCEDURE — 49423 EXCHANGE DRAINAGE CATHETER: CPT | Performed by: RADIOLOGY

## 2021-06-03 PROCEDURE — 49185 SCLEROTX FLUID COLLECTION: CPT

## 2021-06-03 PROCEDURE — 49185 SCLEROTX FLUID COLLECTION: CPT | Performed by: RADIOLOGY

## 2021-06-03 PROCEDURE — 49423 EXCHANGE DRAINAGE CATHETER: CPT

## 2021-06-03 PROCEDURE — A9585 GADOBUTROL INJECTION: HCPCS | Performed by: RADIOLOGY

## 2021-06-03 RX ORDER — ALCOHOL 0.98 ML/ML
INJECTION INTRASPINAL CODE/TRAUMA/SEDATION MEDICATION
Status: COMPLETED | OUTPATIENT
Start: 2021-06-03 | End: 2021-06-03

## 2021-06-03 RX ADMIN — ALCOHOL 10 ML: 0.98 INJECTION INTRASPINAL at 13:41

## 2021-06-03 RX ADMIN — GADOBUTROL 10 ML: 604.72 INJECTION INTRAVENOUS at 13:43

## 2021-06-03 NOTE — H&P
Interventional Radiology  History and Physical 6/3/2021     Des Glover   1935   [id number]    Assessment/Plan:  73-year-old female with history of breast cancer status post mastectomy developed left breast seroma  Drainage catheter has been placed with sclerotherapy performed in the past   Patient returns for a drain check  Problem List Items Addressed This Visit     None      Visit Diagnoses     Seroma of breast        Relevant Orders    IR drainage tube check with sclerosis             Subjective:     Patient ID: Des Glover is a 82 y o  female  History of Present Illness  Patient with history of breast cancer status post mastectomy developed left breast seroma  Drainage catheter has been placed with sclerotherapy performed in the past   Patient returns for a drain check  Patient continues to have approximately 50 cc of output daily through the drain  Review of Systems   Constitutional: Negative for fever  Respiratory: Negative for shortness of breath  Past Medical History:   Diagnosis Date    Anxiety     Asthma     Atrial fibrillation (HCC)     Breast cancer (HCC)     Chest pain, unspecified     CHF (congestive heart failure) (HCC)     Diabetes mellitus ([address] )     GERD (gastroesophageal reflux disease)     History of radiation exposure 1992    Hyperlipidemia     Hypertension     Irregular heart beat     Afib    Migraine     Obesity     Pericardial effusion     Pericarditis         Past Surgical History:   Procedure Laterality Date    ABDOMINAL ADHESION SURGERY      APPENDECTOMY      AUGMENTATION MAMMAPLASTY Left 1994    BACK SURGERY      BREAST LUMPECTOMY Left 1992    malignant    BREAST LUMPECTOMY Left 11/17/2020    Procedure: BREAST ULTRASOUND DIRECTED LUMPECTOMY (ULTRASOUND AT 1200);   Surgeon: Sam Garsia MD;  [facility];  Service: Surgical Oncology    BREAST SURGERY      CARDIAC SURGERY      Pericardial window    CHOLECYSTECTOMY      EYE SURGERY      FLAP LOCAL TRUNK Left 11/17/2020    Procedure: FLAP LOCAL TRUNK;  Surgeon: Fer Poon MD;  [facility];  Service: Plastics    HYSTERECTOMY      IR DRAINAGE TUBE CHECK WITH SCLEROSIS  4/23/2021    IR DRAINAGE TUBE PLACEMENT  4/1/2021    IR DRAINAGE TUBE PLACEMENT WITH SCLEROSIS  5/14/2021    LYMPH NODE DISSECTION Left 11/17/2020    Procedure: HUGO  DIRECTED AXILLARY DISSECTION;  Surgeon: Wil Reynaga MD;  [facility];  Service: Surgical Oncology    MASTECTOMY Left 1994    malignant    MS CELESTE-IMPLANT CAPSULECTOMY BREAST COMPLETE Left 11/17/2020    Procedure: BREAST CAPSULECTOMY;  Surgeon: Fer Poon MD;  [facility];  Service: Plastics    MS REMOVAL INTACT BREAST IMPLANT Left 11/17/2020    Procedure: BREAST IMPLANT REMOVAL;  Surgeon: Fer Poon MD;  [facility];  Service: Plastics    US BREAST NEEDLE LOC LEFT Left 11/2/2020    US GUIDED BREAST BIOPSY LEFT COMPLETE Left 8/31/2020    US GUIDED BREAST LYMPH NODE BIOPSY LEFT Left 8/31/2020    WRIST SURGERY          Social History     Tobacco Use   Smoking Status Never Smoker   Smokeless Tobacco Never Used        Social History     Substance and Sexual Activity   Alcohol Use Yes    Frequency: Monthly or less    Drinks per session: 1 or 2    Comment: Twice a month        Social History     Substance and Sexual Activity   Drug Use No        Allergies   Allergen Reactions    Betadine [Povidone Iodine] Anaphylaxis    Iodinated Diagnostic Agents Anaphylaxis    Iodine - Food Allergy Anaphylaxis and Other (See Comments)    Aspirin GI Bleeding    Caffeine - Food Allergy Palpitations       Current Outpatient Medications   Medication Sig Dispense Refill    anastrozole (ARIMIDEX) 1 mg tablet Take 1 tablet (1 mg total) by mouth daily 90 tablet 1    EPINEPHrine (EPIPEN 2-SILVIO) 0 3 mg/0 3 mL SOAJ Inject 0 3 mL as directed      FLOVENT  MCG/ACT inhaler INHALE 2 PUFF BY INHALATION ROUTE 2 TIMES EVERY DAY 6    insulin aspart (NOVOLOG FLEXPEN) 100 Units/mL injection pen Novolog Flexpen U-100 Insulin aspart 100 unit/mL (3 mL) subcutaneous      Insulin Glargine (TOUJEO SOLOSTAR) 300 units/mL CONCETRATED U-300 injection pen Toujeo SoloStar U-300 Insulin 300 unit/mL (1 5 mL) subcutaneous pen   INJECT 67 UNITS BY SUBCUTANEOUS ROUTE AS PER INSULIN PROTOCOL      lansoprazole (PREVACID) 30 mg capsule lansoprazole 30 mg capsule,delayed release      Lidocaine 5 % CREA Apply 1 application topically as needed (pain) 1 Tube 0    lidocaine-prilocaine (EMLA) cream APPLY FOR 12 HOURS, AND THEN OFF 12 HOURS AS NEEDED  2    metoprolol tartrate (LOPRESSOR) 50 mg tablet Take 50 mg by mouth every 12 (twelve) hours       morphine (MS CONTIN) 15 mg 12 hr tablet Take 15 mg by mouth every 12 (twelve) hours as needed for severe pain      morphine (MSIR) 15 mg tablet Take 15 mg by mouth every 8 (eight) hours as needed for severe pain   NON FORMULARY Domperidone 20 mg TID      NOVOFINE 32G X 6 MM MISC 2 (two) times a day As directed  5    ONE TOUCH ULTRA TEST test strip TEST TWICE DAILY OR AS DIRECTED DX: E11 9  5    ONETOUCH DELICA LANCETS 61T MISC TEST TWICE DAILY OR AS DIRECTED  5    oxybutynin (DITROPAN XL) 15 MG 24 hr tablet Take 15 mg by mouth daily   oxyCODONE-acetaminophen (PERCOCET) 5-325 mg per tablet       Probiotic Product (ALIGN) 4 MG CAPS Take by mouth      simvastatin (ZOCOR) 20 mg tablet Take 20 mg by mouth daily at bedtime   sodium chloride, PF, 0 9 % 10 mL by Intracatheter route daily Intracatheter flushing daily 300 mL 0    triamcinolone (KENALOG) 0 1 % cream       VENTOLIN  (90 Base) MCG/ACT inhaler        No current facility-administered medications for this encounter  Objective: There were no vitals filed for this visit  Physical Exam  Constitutional:       Appearance: Normal appearance     Pulmonary:      Effort: Pulmonary effort is normal    Musculoskeletal: Comments: Left chest wall drain in place   Skin:     General: Skin is dry  Lab Results   Component Value Date    BNP 86 01/16/2015      Lab Results   Component Value Date    WBC 10 53 (H) 04/01/2021    HGB 14 0 04/01/2021    HCT 44 2 04/01/2021    MCV 90 04/01/2021     04/01/2021     Lab Results   Component Value Date    INR 2 19 (H) 01/17/2015    INR 2 46 (H) 01/16/2015    INR 1 19 (H) 12/04/2014    PROTIME 23 8 (H) 01/17/2015    PROTIME 26 0 (H) 01/16/2015    PROTIME 14 9 (H) 12/04/2014     Lab Results   Component Value Date    PTT 49 (H) 01/16/2015         I have personally reviewed pertinent imaging and laboratory results  Code Status: Prior  Advance Directive and Living Will:      Power of :    POLST:      This text is generated with voice recognition software  There may be translation, syntax,  or grammatical errors  If you have any questions, please contact the dictating provider

## 2021-06-03 NOTE — BRIEF OP NOTE (RAD/CATH)
INTERVENTIONAL RADIOLOGY PROCEDURE NOTE    Date: 6/3/2021    Procedure: IR DRAINAGE TUBE CHECK WITH SCLEROSIS    Preoperative diagnosis:   1  Seroma of breast         Postoperative diagnosis: Same  Surgeon: Abner Crow MD     Assistant: None  No qualified resident was available  Blood loss: None    Specimens: None     Findings:   1  There remained left breast seroma cavity  2  Drain was repositioned more centrally within the cavity  3  Sclerotherapy performed using 10 mL alcohol  Complications: None immediate      Anesthesia: local

## 2021-06-14 ENCOUNTER — OFFICE VISIT (OUTPATIENT)
Dept: OBGYN CLINIC | Facility: CLINIC | Age: 86
End: 2021-06-14
Payer: MEDICARE

## 2021-06-14 VITALS
HEIGHT: 63 IN | BODY MASS INDEX: 32.6 KG/M2 | SYSTOLIC BLOOD PRESSURE: 169 MMHG | WEIGHT: 184 LBS | DIASTOLIC BLOOD PRESSURE: 82 MMHG | HEART RATE: 80 BPM

## 2021-06-14 DIAGNOSIS — M18.11 ARTHRITIS OF CARPOMETACARPAL (CMC) JOINT OF RIGHT THUMB: ICD-10-CM

## 2021-06-14 DIAGNOSIS — M65.4 DE QUERVAIN'S TENOSYNOVITIS, RIGHT: Primary | ICD-10-CM

## 2021-06-14 PROCEDURE — 20550 NJX 1 TENDON SHEATH/LIGAMENT: CPT | Performed by: SURGERY

## 2021-06-14 PROCEDURE — 99214 OFFICE O/P EST MOD 30 MIN: CPT | Performed by: SURGERY

## 2021-06-14 RX ORDER — BUPIVACAINE HYDROCHLORIDE 2.5 MG/ML
0.5 INJECTION, SOLUTION INFILTRATION; PERINEURAL
Status: COMPLETED | OUTPATIENT
Start: 2021-06-14 | End: 2021-06-14

## 2021-06-14 RX ORDER — TRIAMCINOLONE ACETONIDE 40 MG/ML
20 INJECTION, SUSPENSION INTRA-ARTICULAR; INTRAMUSCULAR
Status: COMPLETED | OUTPATIENT
Start: 2021-06-14 | End: 2021-06-14

## 2021-06-14 RX ADMIN — BUPIVACAINE HYDROCHLORIDE 0.5 ML: 2.5 INJECTION, SOLUTION INFILTRATION; PERINEURAL at 15:55

## 2021-06-14 RX ADMIN — TRIAMCINOLONE ACETONIDE 20 MG: 40 INJECTION, SUSPENSION INTRA-ARTICULAR; INTRAMUSCULAR at 15:55

## 2021-06-14 NOTE — PROGRESS NOTES
ASSESSMENT/PLAN:      82 y o  female with right De Quervain's tenosynovitis and right thumb CMC arthritis (asymptimatic)  Garrison Clinton is not interested in therapy at this time  As the first CSI was not significantly beneficial for her, right De Quervain's release was discussed  Garrison Clinton does not wish to proceed with surgical intervention at this time  She wishes to proceed with a 2nd De Quervain's CSI using Kenalog instead of Dexamethasone  Right Karen Coles CSI was performed in the office without complication  Follow up in the office as needed if symptoms worsen or fail to improve  The patient verbalized understanding of exam findings and treatment plan  We engaged in the shared decision-making process and treatment options were discussed at length with the patient  Surgical and conservative management discussed today along with risks and benefits  Diagnoses and all orders for this visit:    De Quervain's tenosynovitis, right  -     Hand/upper extremity injection: R extensor compartment 1    Arthritis of carpometacarpal (CMC) joint of right thumb      Follow Up:  Return if symptoms worsen or fail to improve  To Do Next Visit:  Re-evaluation of current issue    ____________________________________________________________________________________________________________________________________________      CHIEF COMPLAINT:  Chief Complaint   Patient presents with    Right Wrist - Follow-up, Pain       SUBJECTIVE:  Adiel Richards is a 82y o  year old RHD female who presents to the office today for a follow up regarding right wrist pain  Garrison Clinton was last seen in the office on 3/3/2021, at which time she was provided with a thumb spica brace, for nighttime use  OT was ordered for [employer] exercises and she underwent a right De Quervain's CSI  Garrison Clinton states that the CSI was not beneficial for her   She notes continued pain from the IP joint of her thumb to the radial aspect of her wrist  She was provided with a HEP by therapy and states that she was only able to perform 1 one of exercises and is not interested in therapy  Julieta Neville states that she is able to wipe after using the bathroom without pain  Her pain will increased with wrist flexion or when abducting her fingers  Julieta Neville is not wearing the thumb spica brace at night as it is cumbersome and she is not able to do anything with it on  Julieta Neville is wearing a compression brace during the day and while sleeping  She is currently on Morphine for back issues  Julieta Neville was recently diagnosed with cancer  I have personally reviewed all the relevant PMH, PSH, SH, FH, Medications and allergies  PAST MEDICAL HISTORY:  Past Medical History:   Diagnosis Date    Anxiety     Asthma     Atrial fibrillation (HCC)     Breast cancer (HCC)     Chest pain, unspecified     CHF (congestive heart failure) (HCC)     Diabetes mellitus ([address] )     GERD (gastroesophageal reflux disease)     History of radiation exposure 1992    Hyperlipidemia     Hypertension     Irregular heart beat     Afib    Migraine     Obesity     Pericardial effusion     Pericarditis        PAST SURGICAL HISTORY:  Past Surgical History:   Procedure Laterality Date    ABDOMINAL ADHESION SURGERY      APPENDECTOMY      AUGMENTATION MAMMAPLASTY Left 1994    BACK SURGERY      BREAST LUMPECTOMY Left 1992    malignant    BREAST LUMPECTOMY Left 11/17/2020    Procedure: BREAST ULTRASOUND DIRECTED LUMPECTOMY (ULTRASOUND AT 1200);   Surgeon: Nazia Martin MD;  [facility];  Service: Surgical Oncology    BREAST SURGERY      CARDIAC SURGERY      Pericardial window    CHOLECYSTECTOMY      EYE SURGERY      FLAP LOCAL TRUNK Left 11/17/2020    Procedure: FLAP LOCAL TRUNK;  Surgeon: Aleksandr Collins MD;  [facility];  Service: Plastics    HYSTERECTOMY      IR DRAINAGE TUBE CHECK WITH SCLEROSIS  4/23/2021    IR DRAINAGE TUBE CHECK WITH SCLEROSIS  6/3/2021    IR DRAINAGE TUBE PLACEMENT  4/1/2021    IR DRAINAGE TUBE PLACEMENT WITH SCLEROSIS  5/14/2021    LYMPH NODE DISSECTION Left 11/17/2020    Procedure: HUGO  DIRECTED AXILLARY DISSECTION;  Surgeon: Faith Paz MD;  [facility];  Service: Surgical Oncology    MASTECTOMY Left 1994    malignant    MN CELESTE-IMPLANT CAPSULECTOMY BREAST COMPLETE Left 11/17/2020    Procedure: BREAST CAPSULECTOMY;  Surgeon: Sherry Carreon MD;  [facility];  Service: Plastics    MN REMOVAL INTACT BREAST IMPLANT Left 11/17/2020    Procedure: BREAST IMPLANT REMOVAL;  Surgeon: Sherry Carreon MD;  [facility];  Service: Plastics    US BREAST NEEDLE LOC LEFT Left 11/2/2020    US GUIDED BREAST BIOPSY LEFT COMPLETE Left 8/31/2020    US GUIDED BREAST LYMPH NODE BIOPSY LEFT Left 8/31/2020    WRIST SURGERY         FAMILY HISTORY:  Family History   Problem Relation Age of Onset    Colon cancer Mother 54    Breast cancer Mother         Early 54's    Stroke Father     Emphysema Father     Leukemia Sister     Breast cancer Sister 72    ALS Brother        SOCIAL HISTORY:  Social History     Tobacco Use    Smoking status: Never Smoker    Smokeless tobacco: Never Used   Vaping Use    Vaping Use: Never used   Substance Use Topics    Alcohol use: Yes     Comment: Twice a month    Drug use: No       MEDICATIONS:    Current Outpatient Medications:     anastrozole (ARIMIDEX) 1 mg tablet, Take 1 tablet (1 mg total) by mouth daily, Disp: 90 tablet, Rfl: 1    EPINEPHrine (EPIPEN 2-SILVIO) 0 3 mg/0 3 mL SOAJ, Inject 0 3 mL as directed, Disp: , Rfl:     FLOVENT  MCG/ACT inhaler, INHALE 2 PUFF BY INHALATION ROUTE 2 TIMES EVERY DAY, Disp: , Rfl: 6    hydrocodone-chlorpheniramine polistirex (TUSSIONEX) 10-8 mg/5 mL ER suspension, Take 5 mL by mouth every 12 (twelve) hours, Disp: , Rfl:     hydrocortisone 2 5 % cream, APPLY TO THE AFFECTED AREA TWICE DAILY   ([address], [address]), Disp: , Rfl:     insulin aspart (Teodoro Gallegos) 100 Units/mL injection pen, Novolog Flexpen U-100 Insulin aspart 100 unit/mL (3 mL) subcutaneous, Disp: , Rfl:     Insulin Glargine (TOUJEO SOLOSTAR) 300 units/mL CONCETRATED U-300 injection pen, Toujeo SoloStar U-300 Insulin 300 unit/mL (1 5 mL) subcutaneous pen  INJECT 67 UNITS BY SUBCUTANEOUS ROUTE AS PER INSULIN PROTOCOL, Disp: , Rfl:     lansoprazole (PREVACID) 30 mg capsule, lansoprazole 30 mg capsule,delayed release, Disp: , Rfl:     Lidocaine 5 % CREA, Apply 1 application topically as needed (pain), Disp: 1 Tube, Rfl: 0    lidocaine-prilocaine (EMLA) cream, APPLY FOR 12 HOURS, AND THEN OFF 12 HOURS AS NEEDED, Disp: , Rfl: 2    metoprolol tartrate (LOPRESSOR) 50 mg tablet, Take 50 mg by mouth every 12 (twelve) hours , Disp: , Rfl:     morphine (MS CONTIN) 15 mg 12 hr tablet, Take 15 mg by mouth every 12 (twelve) hours as needed for severe pain, Disp: , Rfl:     morphine (MSIR) 15 mg tablet, Take 15 mg by mouth every 8 (eight) hours as needed for severe pain , Disp: , Rfl:     NON FORMULARY, Domperidone 20 mg TID, Disp: , Rfl:     NOVOFINE 32G X 6 MM MISC, 2 (two) times a day As directed, Disp: , Rfl: 5    nystatin (MYCOSTATIN) cream, APPLY TO AFFECTED AREAS TWICE A DAY  (MIX WITH HYDROCORTISONE), Disp: , Rfl:     ONE TOUCH ULTRA TEST test strip, TEST TWICE DAILY OR AS DIRECTED DX: E11 9, Disp: , Rfl: 5    ONETOUCH DELICA LANCETS 91B MISC, TEST TWICE DAILY OR AS DIRECTED, Disp: , Rfl: 5    oxybutynin (DITROPAN XL) 15 MG 24 hr tablet, Take 15 mg by mouth daily  , Disp: , Rfl:     oxyCODONE-acetaminophen (PERCOCET) 5-325 mg per tablet, , Disp: , Rfl:     Probiotic Product (ALIGN) 4 MG CAPS, Take by mouth, Disp: , Rfl:     simvastatin (ZOCOR) 20 mg tablet, Take 20 mg by mouth daily at bedtime  , Disp: , Rfl:     sodium chloride, PF, 0 9 %, 10 mL by Intracatheter route daily Intracatheter flushing daily, Disp: 300 mL, Rfl: 0    triamcinolone (KENALOG) 0 1 % cream, , Disp: , Rfl:     VENTOLIN HFA 108 (90 Base) MCG/ACT inhaler, , Disp: , Rfl:     ALLERGIES:  Allergies   Allergen Reactions    Betadine [Povidone Iodine] Anaphylaxis    Iodinated Diagnostic Agents Anaphylaxis    Iodine - Food Allergy Anaphylaxis and Other (See Comments)    Aspirin GI Bleeding    Caffeine - Food Allergy Palpitations       REVIEW OF SYSTEMS:  Review of Systems   Constitutional: Negative for chills, fever and unexpected weight change  HENT: Negative for hearing loss, nosebleeds and sore throat  Eyes: Negative for pain, redness and visual disturbance  Respiratory: Negative for cough, shortness of breath and wheezing  Cardiovascular: Negative for chest pain, palpitations and leg swelling  Gastrointestinal: Negative for abdominal pain, nausea and vomiting  Endocrine: Negative for polydipsia and polyuria  Genitourinary: Negative for difficulty urinating and hematuria  Musculoskeletal: Positive for arthralgias and myalgias  Negative for joint swelling  Skin: Negative for rash and wound  Neurological: Negative for dizziness, numbness and headaches  Psychiatric/Behavioral: Negative for decreased concentration, dysphoric mood and suicidal ideas  The patient is not nervous/anxious          VITALS:  Vitals:    06/14/21 1538   BP: 169/82   Pulse: 80       LABS:  HgA1c:   Lab Results   Component Value Date    HGBA1C 7 8 09/15/2020     BMP:   Lab Results   Component Value Date    GLUCOSE 103 01/30/2015    CALCIUM 10 0 04/01/2021     01/30/2015    K 5 5 (H) 04/01/2021    CO2 25 04/01/2021     04/01/2021    BUN 22 04/01/2021    CREATININE 1 38 (H) 04/01/2021       _____________________________________________________  PHYSICAL EXAMINATION:  General: well developed and well nourished, alert, oriented times 3 and appears comfortable  Psychiatric: Normal  HEENT: Normocephalic, Atraumatic Trachea Midline, No torticollis  Pulmonary: No audible wheezing or respiratory distress   Cardiovascular: No pitting edema, 2+ radial pulse   Abdominal/GI: abdomen non tender, non distended   Skin: No masses, erythema, lacerations, fluctation, ulcerations  Neurovascular: Sensation Intact to the Median, Ulnar, Radial Nerve, Motor Intact to the Median, Ulnar, Radial Nerve and Pulses Intact  Musculoskeletal: Normal, except as noted in detailed exam and in HPI  MUSCULOSKELETAL EXAMINATION:    Karen Coles Tenosynovitis Exam:  right side    Positive tender to palpation over 1st dorsal extensor compartment   Negative palpable nodule  Negative crepitus over 1st dorsal extensor compartment   Positive Finkelstein's test    Positive pain with resisted abduction of the thumb  CMC joint mildly tender to palpation     ___________________________________________________  STUDIES REVIEWED:  No new imaging to review           PROCEDURES PERFORMED:  Hand/upper extremity injection: R extensor compartment 1  Universal Protocol:  Consent: Verbal consent obtained  Written consent not obtained  Risks and benefits: risks, benefits and alternatives were discussed  Consent given by: patient  Time out: Immediately prior to procedure a "time out" was called to verify the correct patient, procedure, equipment, support staff and site/side marked as required    Patient identity confirmed: verbally with patient    Supporting Documentation  Indications: pain   Procedure Details  Condition:de Quervain's tenosynovitis Site: R extensor compartment 1   Preparation: Patient was prepped and draped in the usual sterile fashion  Needle size: 25 G  Ultrasound guidance: no  Medications administered: 0 5 mL bupivacaine 0 25 %; 20 mg triamcinolone acetonide 40 mg/mL    Patient tolerance: patient tolerated the procedure well with no immediate complications  Dressing:  Sterile dressing applied            _____________________________________________________      Scribe Attestation    I,:  Dominique Moore am acting as a scribe while in the presence of the attending physician : I,:  Royer Neely MD personally performed the services described in this documentation    as scribed in my presence :

## 2021-06-17 ENCOUNTER — HOSPITAL ENCOUNTER (OUTPATIENT)
Dept: RADIOLOGY | Facility: HOSPITAL | Age: 86
Discharge: HOME | End: 2021-06-17
Attending: RADIOLOGY | Admitting: RADIOLOGY
Payer: MEDICARE

## 2021-06-17 DIAGNOSIS — N64.89 SEROMA OF BREAST: Primary | ICD-10-CM

## 2021-06-17 PROCEDURE — A9585 GADOBUTROL INJECTION: HCPCS | Performed by: RADIOLOGY

## 2021-06-17 PROCEDURE — 49185 SCLEROTX FLUID COLLECTION: CPT | Performed by: RADIOLOGY

## 2021-06-17 PROCEDURE — 49185 SCLEROTX FLUID COLLECTION: CPT

## 2021-06-17 RX ADMIN — GADOBUTROL 10 ML: 604.72 INJECTION INTRAVENOUS at 13:07

## 2021-06-17 NOTE — SEDATION DOCUMENTATION
Drainage tube check performed with sclerosis of 5ml dehydrated alcohol  Tube capped and patient educated to place to drainage bag in 4 hours

## 2021-06-17 NOTE — DISCHARGE INSTRUCTIONS
Drainage tube sclerosed with 5ml of dehydrated alcohol  Tube is capped  Please place to drainage bulb in 4 hours

## 2021-06-21 ENCOUNTER — OFFICE VISIT (OUTPATIENT)
Dept: HEMATOLOGY ONCOLOGY | Facility: CLINIC | Age: 86
End: 2021-06-21
Payer: MEDICARE

## 2021-06-21 VITALS
HEART RATE: 75 BPM | DIASTOLIC BLOOD PRESSURE: 68 MMHG | WEIGHT: 185.4 LBS | RESPIRATION RATE: 18 BRPM | OXYGEN SATURATION: 96 % | TEMPERATURE: 98.2 F | SYSTOLIC BLOOD PRESSURE: 150 MMHG | BODY MASS INDEX: 32.85 KG/M2 | HEIGHT: 63 IN

## 2021-06-21 DIAGNOSIS — Z17.0 MALIGNANT NEOPLASM OF UPPER-OUTER QUADRANT OF LEFT BREAST IN FEMALE, ESTROGEN RECEPTOR POSITIVE (HCC): ICD-10-CM

## 2021-06-21 DIAGNOSIS — C77.3 CARCINOMA OF LEFT BREAST METASTATIC TO AXILLARY LYMPH NODE (HCC): Primary | ICD-10-CM

## 2021-06-21 DIAGNOSIS — C50.912 CARCINOMA OF LEFT BREAST METASTATIC TO AXILLARY LYMPH NODE (HCC): Primary | ICD-10-CM

## 2021-06-21 DIAGNOSIS — C50.412 MALIGNANT NEOPLASM OF UPPER-OUTER QUADRANT OF LEFT BREAST IN FEMALE, ESTROGEN RECEPTOR POSITIVE (HCC): ICD-10-CM

## 2021-06-21 PROCEDURE — 99214 OFFICE O/P EST MOD 30 MIN: CPT | Performed by: INTERNAL MEDICINE

## 2021-06-21 RX ORDER — ANASTROZOLE 1 MG/1
1 TABLET ORAL DAILY
Qty: 90 TABLET | Refills: 3 | Status: SHIPPED | OUTPATIENT
Start: 2021-06-21 | End: 2022-03-30

## 2021-06-21 NOTE — PROGRESS NOTES
Hematology / Oncology Outpatient Follow Up Note    Adiel Richards 82 y o  female :1935 AMY:[id number]         Date:  2021    Assessment / Plan:  A 62-year-old postmenopausal woman with history of left breast cancer in , status post lumpectomy followed by radiation therapy  In , she had local recurrence in her left breast for which she underwent mastectomy  She has no history of adjuvant hormonal therapy or chemotherapy in the past   She had left chest wall and left axillary lymph node recurrent disease, grade 2, % positive, W[place] 90% positive, HER2 negative disease, diagnosed in 2020  She underwent surgical resection as well as left axillary lymph node dissection resulting in grossly RODNEY but positive anterior surgical margin  She is currently on adjuvant hormonal therapy with anastrozole with excellent tolerance  Based on physical examination, she has no evidence recurrent disease  I recommended her to continue with anastrozole 1 mg once a day  I will see her again in 6 month for routine follow-up  She is in agreement with my recommendations            Subjective:      HPI:  A 21-year-old postmenopausal woman with history of left breast cancer in   She underwent lumpectomy followed by radiation therapy  She did not have adjuvant chemotherapy or hormonal treatment  In , she had local recurrence in her left breast for which she underwent mastectomy  Again, she did not have adjuvant therapy at all  Since 2020, she felt a lump in her left reconstructed breast   She was found to have large left chest wall mass which was biopsied as well as left axillary lymph node biopsy both of which were positive for invasive ductal carcinoma, grade 2  This was % positive, WY 90% positive, HER2 negative disease    Subsequently, she underwent surgical resection as well as axillary lymph node dissection and removal of left breast implant in  which showed 32 x 31 x 10 mm of invasive ductal carcinoma, grade 2  Lymphovascular invasion was present  3/5 axillary lymph nodes were positive for metastatic disease with largest tumor measuring 16 mm  She had dermis invasion of tumor  She presents today to discuss adjuvant treatment options  Her anterior surgical margin was positive  She has some pain in her left chest wall  Otherwise, she feels well  She has no respiratory symptoms  Her weight is stable  Her performance status is normal              Interval History:  A 82year-old postmenopausal woman with history of left breast cancer in 1992, status post lumpectomy followed by radiation therapy  In 1994, she had local recurrence in her left breast for which she underwent mastectomy  She has no history of adjuvant hormonal therapy or chemotherapy in the past   She had left chest wall and left axillary lymph node recurrent disease, grade 2, % positive, TN 90% positive, HER2 negative disease, diagnosed in November 2020  She underwent surgical resection as well as left axillary lymph node dissection resulting in grossly RODNEY but positive anterior surgical margin  Since December 2020, she has been on adjuvant hormonal therapy with anastrozole  She is tolerating treatment well  She has no hot flashes or musculoskeletal symptoms  She has frequent fluid accumulation in her left chest wall  She still have drain in place  From the drain, she noted have 45-50 cc of fluid daily  She has no complaint of pain  She has no respiratory symptoms  Her performance status is normal            Objective:      Primary Diagnosis:     1  Left breast cancer, diagnosed in 1992       2  Local recurrence of breast cancer in her left breast, diagnosed in 1994      3  Left chest wall and left axillary lymph node recurrent disease, grade 2, % positive, TN 90% positive, HER2 negative disease    Diagnosed in November 2020       Cancer Staging:  Cancer Staging  Malignant neoplasm of upper-outer quadrant of left breast in female, estrogen receptor positive ([facility] [address] )  Staging form: Breast, AJCC 8th Edition  - Pathologic: Stage IIIA (pT4b, pN1a, cM0, G2, ER+, NH+, HER2-) - Unsigned  Method of lymph node assessment: Axillary lymph node dissection  Histologic grading system: 3 grade system           Previous Hematologic/ Oncologic Treatment:      Adjuvant radiation therapy in 1992       Current Hematologic/ Oncologic Treatment:             Disease Status:       Adjuvant hormonal therapy with anastrozole since December 2020       Test Results:     Pathology:     32 x 31 x 10 mm of invasive ductal carcinoma, grade 2  Lymphovascular invasion was present  3/5 axillary lymph nodes were positive for metastatic disease measuring 16 mm  Dermal invasion was positive  Anterior surgical margin was positive        Radiology:     Chest x-ray was negative for pulmonary disease      Laboratory:     See below      Physical Exam:        General Appearance:    Alert, oriented          Eyes:    PERRL   Ears:    Normal external ear canals, both ears   Nose:   Nares normal, septum midline   Throat:   Mucosa moist  Pharynx without injection  Neck:   Supple         Lungs:     Clear to auscultation bilaterally   Chest Wall:    No tenderness or deformity    Heart:    Regular rate and rhythm         Abdomen:     Soft, non-tender, bowel sounds +, no organomegaly               Extremities:   Extremities no cyanosis or edema         Skin:   no rash or icterus  Lymph nodes:   Cervical, supraclavicular, and axillary nodes normal   Neurologic:   CNII-XII intact, normal strength, sensation and reflexes     Throughout             Breast exam:   Left status post mastectomy without reconstruction  No palpable abnormality in her left chest wall  Right breast exam is negative  ROS: Review of Systems   All other systems reviewed and are negative            Imaging: IR drainage tube check with sclerosis    Result Date: 6/17/2021  Narrative: Abscess tube check Clinical History: Follow-up of left breast seroma Contrast: 10 mL of gadobutrol injection (MULTI-DOSE) Fluoro time: 0 2 mins Number of Images: 4 Radiation dose: 4 mGy Technique: The patient was brought to the interventional radiology suite and placed supine on the table  After a  view was obtained, contrast was injected into the abscess drainage catheter and multiple images were obtained  Findings: The cavity is relatively small in size, and the existing catheter aspirates well  5 mL ethanol was injected  This will be allowed to dwell for 4 hours and then opened to drainage  The patient will follow-up with a tube check in one week  Impression: Impression: Small remaining cavity with continued output  Sclerosis was performed  Workstation performed: [id number]     IR drainage tube check with sclerosis    Result Date: 6/3/2021  Narrative: PROCEDURE: Left breast seroma drain check with exchange and sclerotherapy Procedural Personnel Attending physician(s): Dr Dora Cain Pre-procedure diagnosis: Left breast seroma Post-procedure diagnosis: Same Indication: Patient with left breast seroma returns for drain check and sclerotherapy Complications: No immediate complications  Impression: 1  There remained a left breast seroma cavity surrounding the existing drain  2   Existing drain was exchanged for a new 10-Italian catheter which was placed more centrally within the cavity  3   Repeat sclerotherapy performed using 10 mL alcohol  Plan: - Return in 2 weeks for repeat drain check ______________________________________________________________________ PROCEDURE SUMMARY: - Left breast seroma drain check - Left breast seroma drain exchange and repositioning - Seroma sclerotherapy PROCEDURE DETAILS: Pre-procedure Consent: Existing informed consent was utilized and time-out was performed prior to the procedure   Preparation: The site was prepared and draped using maximal sterile barrier technique including cutaneous antisepsis  Initial imaging The patient was positioned supine  Initial imaging was performed using fluoroscopy with gadolinium injection through the existing drain  Findings: There remains seroma cavity in the left breast  Drainage catheter exchange and repositioning The existing seroma drain was removed over an Amplatz wire  A new 10-Yoruba drainage catheter was advanced over the wire into the seroma cavity  Sclerotherapy 10 mL of absolute ethanol was injected through the new catheter and capped  Patient will uncap at home one hour later  Contrast Contrast agent: Gadolinium Contrast volume (mL): 10 Radiation Dose Fluoroscopy time (minutes): 0 4  Reference air kerma (mGy): 5 Additional Details Specimens removed: Existing seroma drainage catheter Estimated blood loss (mL): None Standardized report: [email or web address] Attestation Signer name: [facility] I attest that I was present for the entire procedure  I reviewed the stored images and agree with the report as written   Workstation performed: [id number]         Labs:   Lab Results   Component Value Date    WBC 10 53 (H) 04/01/2021    HGB 14 0 04/01/2021    HCT 44 2 04/01/2021    MCV 90 04/01/2021     04/01/2021     Lab Results   Component Value Date     01/30/2015    K 5 5 (H) 04/01/2021     04/01/2021    CO2 25 04/01/2021    ANIONGAP 7 01/30/2015    BUN 22 04/01/2021    CREATININE 1 38 (H) 04/01/2021    GLUCOSE 103 01/30/2015    GLUF 156 (H) 04/01/2021    CALCIUM 10 0 04/01/2021    AST 18 10/21/2020    ALT 31 10/21/2020    ALKPHOS 141 (H) 10/21/2020    PROT 5 7 (L) 01/28/2015    PROT 6 3 (L) 01/28/2015    BILITOT 0 49 01/28/2015    EGFR 35 04/01/2021         Lab Results   Component Value Date     01/17/2015       Lab Results   Component Value Date    UPEP The urine total 01/28/2015         Current Medications: Reviewed  Allergies: Reviewed  PMH/FH/SH: Reviewed      Vital Sign:    Body surface area is 1 87 meters squared      Wt Readings from Last 3 Encounters:   06/21/21 84 1 kg (185 lb 6 4 oz)   06/14/21 83 5 kg (184 lb)   04/19/21 83 9 kg (185 lb)        Temp Readings from Last 3 Encounters:   06/21/21 98 2 °F (36 8 °C) (Tympanic)   04/19/21 98 °F (36 7 °C)   04/01/21 97 7 °F (36 5 °C) (Oral)        BP Readings from Last 3 Encounters:   06/21/21 150/68   06/14/21 169/82   04/19/21 142/80         Pulse Readings from Last 3 Encounters:   06/21/21 75   06/14/21 80   04/19/21 76     @LASTSAO2(3)@

## 2021-06-28 ENCOUNTER — HOSPITAL ENCOUNTER (OUTPATIENT)
Dept: RADIOLOGY | Facility: HOSPITAL | Age: 86
Discharge: HOME | End: 2021-06-28
Attending: RADIOLOGY
Payer: MEDICARE

## 2021-06-28 DIAGNOSIS — N64.89 SEROMA OF BREAST: ICD-10-CM

## 2021-06-28 PROCEDURE — A9585 GADOBUTROL INJECTION: HCPCS | Performed by: RADIOLOGY

## 2021-06-28 PROCEDURE — 49185 SCLEROTX FLUID COLLECTION: CPT | Performed by: RADIOLOGY

## 2021-06-28 PROCEDURE — 49185 SCLEROTX FLUID COLLECTION: CPT

## 2021-06-28 RX ORDER — ALCOHOL 0.98 ML/ML
INJECTION INTRASPINAL CODE/TRAUMA/SEDATION MEDICATION
Status: COMPLETED | OUTPATIENT
Start: 2021-06-28 | End: 2021-06-28

## 2021-06-28 RX ADMIN — ALCOHOL 5 ML: 0.98 INJECTION INTRASPINAL at 11:51

## 2021-06-28 RX ADMIN — GADOBUTROL 5 ML: 604.72 INJECTION INTRAVENOUS at 12:04

## 2021-06-28 NOTE — SEDATION DOCUMENTATION
Left breast seroma drainage tube checked under fluoro by Dr Ashley Vergara  Dehydrated alcohol injected to tube  per Dr Ashley Vergara  Tube to be capped and to dwell for one hour  Tube to then be returned to bulb suction  To return in one week for tube check  AVS given to patient  Discharged ambulatory without questions or complaints

## 2021-06-28 NOTE — DISCHARGE INSTRUCTIONS
TUBE CARE INSTRUCTIONS    Care after your procedure:    Resume your normal diet  Small sips of flat soda will help with nausea  1  The properly functioning catheter should be forward flushed once (1x) daily with 10ml of normal saline using clean technique  You will be given a prescription for flushes  To flush the tube, clean both connections with alcohol swab  Twist off the drainage bag/ bulb  tubing and twist the saline syringe into the drainage tube and flush  Remove the syringe and twist the drainage bag / bulb tubing tubing back on     2  The drainage bag/bulb may be emptied as necessary  Keep a record of the amount of fluid you drain from your tube  This should be done with clean technique as well  3  A fresh dressing should be applied daily over the tube insertion site  4  As the tube is secured to the skin with only a suture,try not to pull on your tube  Tub baths are not permitted  Showers are permitted if the patient's skin entry site is prevented from getting wet  Similarly, washcloth "baths" are acceptable  Contact Interventional Radiology at [phone] Jadiel PATIENTS: Contact Interventional Radiology at [phone]) Tian Sanz PATIENTS: Contact Interventional Radiology at [phone]) if:    1  Leakage or large amounts of liquid around the catheter  2  Fever of 101 degrees lasting several hours without other obvious cause (such as sore throat, flu, etc)  3  Persistent nausea or vomiting  4  Diminished drainage, which may be associated with pressure or pain  Or when the     drainage from your tube is less than 10mls for 48 hours  5  Catheter pulled back or falls out  The following pharmacies carry the flush syringes         [facility]                     [facility]  [address] [facility]                         [address] [facility]  Phone [phone]            Phone [phone]   [place] [address]                                [phone]  [address] [facility] [place]                      [address] [facility]  Phone [phone]            Phone [phone]                      Yvan Donahue                                                                                                          [phone]  [facility]  [facility] [address]    [address]  Phone [phone]

## 2021-06-28 NOTE — BRIEF OP NOTE (RAD/CATH)
INTERVENTIONAL RADIOLOGY PROCEDURE NOTE    Date: 6/28/2021    Procedure: IR DRAINAGE TUBE CHECK WITH SCLEROSIS    Preoperative diagnosis:   1  Seroma of breast         Postoperative diagnosis: Same  Surgeon: Candance Shallow, MD     Assistant: None  No qualified resident was available  Blood loss: None    Specimens: None     Findings: Persistent small seroma  5 cc ethanol sclerosis performed  The patient was instructed to place catheter bag to suction in 1 hour  Complications: None immediate      Anesthesia: none

## 2021-07-07 ENCOUNTER — HOSPITAL ENCOUNTER (OUTPATIENT)
Dept: RADIOLOGY | Facility: HOSPITAL | Age: 86
Discharge: HOME | End: 2021-07-07
Attending: RADIOLOGY | Admitting: RADIOLOGY
Payer: MEDICARE

## 2021-07-07 DIAGNOSIS — N64.89 SEROMA OF BREAST: ICD-10-CM

## 2021-07-07 PROCEDURE — 49185 SCLEROTX FLUID COLLECTION: CPT | Performed by: RADIOLOGY

## 2021-07-07 PROCEDURE — A9585 GADOBUTROL INJECTION: HCPCS | Performed by: RADIOLOGY

## 2021-07-07 PROCEDURE — 49185 SCLEROTX FLUID COLLECTION: CPT

## 2021-07-07 RX ORDER — ALCOHOL 0.98 ML/ML
INJECTION INTRASPINAL CODE/TRAUMA/SEDATION MEDICATION
Status: COMPLETED | OUTPATIENT
Start: 2021-07-07 | End: 2021-07-07

## 2021-07-07 RX ADMIN — ALCOHOL 5 ML: 0.98 INJECTION INTRASPINAL at 13:15

## 2021-07-07 RX ADMIN — GADOBUTROL 5 ML: 604.72 INJECTION INTRAVENOUS at 13:23

## 2021-07-07 NOTE — H&P
Interventional Radiology  History and Physical 7/7/2021     Reta Suresh   1935   [id number]    Assessment/Plan:  82-year-old female with history of breast cancer status post mastectomy developed left breast seroma  Drainage catheter has been placed with sclerotherapy performed in the past   Patient returns for a drain check  Problem List Items Addressed This Visit     None      Visit Diagnoses     Seroma of breast        Relevant Orders    IR drainage tube check with sclerosis             Subjective:     Patient ID: Reta Suresh is a 82 y o  female  History of Present Illness  Patient with history of breast cancer status post mastectomy developed left breast seroma  Drainage catheter has been placed with sclerotherapy performed in the past   Patient returns for a drain check  Patient continues to have approximately 30 cc of output daily through the drain  Review of Systems   Constitutional: Negative for fever  Respiratory: Negative for shortness of breath  Past Medical History:   Diagnosis Date    Anxiety     Asthma     Atrial fibrillation (HCC)     Breast cancer (HCC)     Chest pain, unspecified     CHF (congestive heart failure) (HCC)     Diabetes mellitus ([address] )     GERD (gastroesophageal reflux disease)     History of radiation exposure 1992    Hyperlipidemia     Hypertension     Irregular heart beat     Afib    Migraine     Obesity     Pericardial effusion     Pericarditis         Past Surgical History:   Procedure Laterality Date    ABDOMINAL ADHESION SURGERY      APPENDECTOMY      AUGMENTATION MAMMAPLASTY Left 1994    BACK SURGERY      BREAST LUMPECTOMY Left 1992    malignant    BREAST LUMPECTOMY Left 11/17/2020    Procedure: BREAST ULTRASOUND DIRECTED LUMPECTOMY (ULTRASOUND AT 1200);   Surgeon: Carl Iverson MD;  [facility];  Service: Surgical Oncology    BREAST SURGERY      CARDIAC SURGERY      Pericardial window    CHOLECYSTECTOMY      EYE SURGERY      FLAP LOCAL TRUNK Left 11/17/2020    Procedure: FLAP LOCAL TRUNK;  Surgeon: Tommy Rodriguez MD;  [facility];  Service: Plastics    HYSTERECTOMY      IR DRAINAGE TUBE CHECK WITH SCLEROSIS  4/23/2021    IR DRAINAGE TUBE CHECK WITH SCLEROSIS  6/3/2021    IR DRAINAGE TUBE CHECK WITH SCLEROSIS  6/17/2021    IR DRAINAGE TUBE CHECK WITH SCLEROSIS  6/28/2021    IR DRAINAGE TUBE PLACEMENT  4/1/2021    IR DRAINAGE TUBE PLACEMENT WITH SCLEROSIS  5/14/2021    LYMPH NODE DISSECTION Left 11/17/2020    Procedure: HUGO  DIRECTED AXILLARY DISSECTION;  Surgeon: Livier López MD;  [facility];  Service: Surgical Oncology    MASTECTOMY Left 1994    malignant    LA CELESTE-IMPLANT CAPSULECTOMY BREAST COMPLETE Left 11/17/2020    Procedure: BREAST CAPSULECTOMY;  Surgeon: Tommy Rodriguez MD;  [facility];  Service: Plastics    LA REMOVAL INTACT BREAST IMPLANT Left 11/17/2020    Procedure: BREAST IMPLANT REMOVAL;  Surgeon: Tommy Rodriguez MD;  [facility];  Service: Plastics    US BREAST NEEDLE LOC LEFT Left 11/2/2020    US GUIDED BREAST BIOPSY LEFT COMPLETE Left 8/31/2020    US GUIDED BREAST LYMPH NODE BIOPSY LEFT Left 8/31/2020    WRIST SURGERY          Social History     Tobacco Use   Smoking Status Never Smoker   Smokeless Tobacco Never Used        Social History     Substance and Sexual Activity   Alcohol Use Yes    Comment: Twice a month        Social History     Substance and Sexual Activity   Drug Use No        Allergies   Allergen Reactions    Betadine [Povidone Iodine] Anaphylaxis    Iodinated Diagnostic Agents Anaphylaxis    Iodine - Food Allergy Anaphylaxis and Other (See Comments)    Aspirin GI Bleeding    Caffeine - Food Allergy Palpitations       Current Outpatient Medications   Medication Sig Dispense Refill    anastrozole (ARIMIDEX) 1 mg tablet Take 1 tablet (1 mg total) by mouth daily 90 tablet 3    EPINEPHrine (EPIPEN 2-SILVIO) 0 3 mg/0 3 mL SOAJ Inject 0 3 mL as directed      FLOVENT  MCG/ACT inhaler INHALE 2 PUFF BY INHALATION ROUTE 2 TIMES EVERY DAY  6    hydrocodone-chlorpheniramine polistirex (TUSSIONEX) 10-8 mg/5 mL ER suspension Take 5 mL by mouth every 12 (twelve) hours      hydrocortisone 2 5 % cream APPLY TO THE AFFECTED AREA TWICE DAILY  (MIX WITH NYSTATIN)      insulin aspart (NOVOLOG FLEXPEN) 100 Units/mL injection pen Novolog Flexpen U-100 Insulin aspart 100 unit/mL (3 mL) subcutaneous      Insulin Glargine (TOUJEO SOLOSTAR) 300 units/mL CONCETRATED U-300 injection pen Toujeo SoloStar U-300 Insulin 300 unit/mL (1 5 mL) subcutaneous pen   INJECT 67 UNITS BY SUBCUTANEOUS ROUTE AS PER INSULIN PROTOCOL      lansoprazole (PREVACID) 30 mg capsule lansoprazole 30 mg capsule,delayed release      Lidocaine 5 % CREA Apply 1 application topically as needed (pain) 1 Tube 0    lidocaine-prilocaine (EMLA) cream APPLY FOR 12 HOURS, AND THEN OFF 12 HOURS AS NEEDED  2    metoprolol tartrate (LOPRESSOR) 50 mg tablet Take 50 mg by mouth every 12 (twelve) hours       morphine (MS CONTIN) 15 mg 12 hr tablet Take 15 mg by mouth every 12 (twelve) hours as needed for severe pain      morphine (MSIR) 15 mg tablet Take 15 mg by mouth every 8 (eight) hours as needed for severe pain   NON FORMULARY Domperidone 20 mg TID      NOVOFINE 32G X 6 MM MISC 2 (two) times a day As directed  5    nystatin (MYCOSTATIN) cream APPLY TO AFFECTED AREAS TWICE A DAY  (MIX WITH HYDROCORTISONE)      ONE TOUCH ULTRA TEST test strip TEST TWICE DAILY OR AS DIRECTED DX: E11 9  5    ONETOUCH DELICA LANCETS 56E MISC TEST TWICE DAILY OR AS DIRECTED  5    oxybutynin (DITROPAN XL) 15 MG 24 hr tablet Take 15 mg by mouth daily   oxyCODONE-acetaminophen (PERCOCET) 5-325 mg per tablet  (Patient not taking: Reported on 6/21/2021)      Probiotic Product (ALIGN) 4 MG CAPS Take by mouth      simvastatin (ZOCOR) 20 mg tablet Take 20 mg by mouth daily at bedtime        sodium chloride, PF, 0 9 % 10 mL by Intracatheter route daily Intracatheter flushing daily 300 mL 0    triamcinolone (KENALOG) 0 1 % cream       VENTOLIN  (90 Base) MCG/ACT inhaler        No current facility-administered medications for this encounter  Objective: There were no vitals filed for this visit  Physical Exam  Constitutional:       Appearance: Normal appearance  Pulmonary:      Effort: Pulmonary effort is normal    Musculoskeletal:      Comments: Left breast seroma drain present   Skin:     General: Skin is dry  Lab Results   Component Value Date    BNP 86 01/16/2015      Lab Results   Component Value Date    WBC 10 53 (H) 04/01/2021    HGB 14 0 04/01/2021    HCT 44 2 04/01/2021    MCV 90 04/01/2021     04/01/2021     Lab Results   Component Value Date    INR 2 19 (H) 01/17/2015    INR 2 46 (H) 01/16/2015    INR 1 19 (H) 12/04/2014    PROTIME 23 8 (H) 01/17/2015    PROTIME 26 0 (H) 01/16/2015    PROTIME 14 9 (H) 12/04/2014     Lab Results   Component Value Date    PTT 49 (H) 01/16/2015         I have personally reviewed pertinent imaging and laboratory results  Code Status: Prior  Advance Directive and Living Will:      Power of :    POLST:      This text is generated with voice recognition software  There may be translation, syntax,  or grammatical errors  If you have any questions, please contact the dictating provider

## 2021-07-07 NOTE — BRIEF OP NOTE (RAD/CATH)
INTERVENTIONAL RADIOLOGY PROCEDURE NOTE    Date: 7/7/2021    Procedure: IR DRAINAGE TUBE CHECK WITH SCLEROSIS    Preoperative diagnosis:   1  Seroma of breast         Postoperative diagnosis: Same  Surgeon: Maryellen Thapa MD     Assistant: None  No qualified resident was available  Blood loss: None    Specimens: None     Findings: There remains small seroma cavity  Drainage catheter kept in place  Sclerotherapy performed using 5 mL alcohol  Complications: None immediate      Anesthesia: none

## 2021-07-07 NOTE — DISCHARGE INSTRUCTIONS
TUBE CARE INSTRUCTIONS    Care after your procedure:    Resume your normal diet  Small sips of flat soda will help with nausea  1  The properly functioning catheter should be forward flushed once (1x) daily with 10ml of normal saline using clean technique  You will be given a prescription for flushes  To flush the tube, clean both connections with alcohol swab  Twist off the drainage bag/ bulb  tubing and twist the saline syringe into the drainage tube and flush  Remove the syringe and twist the drainage bag / bulb tubing tubing back on     2  The drainage bag/bulb may be emptied as necessary  Keep a record of the amount of fluid you drain from your tube  This should be done with clean technique as well  3  A fresh dressing should be applied daily over the tube insertion site  4  As the tube is secured to the skin with only a suture,try not to pull on your tube  Tub baths are not permitted  Showers are permitted if the patient's skin entry site is prevented from getting wet  Similarly, washcloth "baths" are acceptable  5  Connect to bulb at 215 pm   Contact Interventional Radiology at [phone] [facility] PATIENTS: Contact Interventional Radiology at [phone]) ([address] [facility] [address]: Contact Interventional Radiology at [phone]) if:    1  Leakage or large amounts of liquid around the catheter  2  Fever of 101 degrees lasting several hours without other obvious cause (such as sore throat, flu, etc)  3  Persistent nausea or vomiting  4  Diminished drainage, which may be associated with pressure or pain  Or when the     drainage from your tube is less than 10mls for 48 hours  5  Catheter pulled back or falls out  The following pharmacies carry the flush syringes         [address] [facility]  [address] [facility]  Phone [phone]            Phone [phone]   [place] [address]                                [phone]  [address] [facility] [place]                      [address] [facility]  Phone [phone]            Phone [phone]                      Terra Keto                                                                                                          [phone]  [facility]  [facility]    [address]  Phone [phone]

## 2021-07-19 ENCOUNTER — OFFICE VISIT (OUTPATIENT)
Dept: SURGICAL ONCOLOGY | Facility: CLINIC | Age: 86
End: 2021-07-19
Payer: MEDICARE

## 2021-07-19 VITALS
TEMPERATURE: 98 F | BODY MASS INDEX: 33.31 KG/M2 | OXYGEN SATURATION: 96 % | SYSTOLIC BLOOD PRESSURE: 138 MMHG | HEART RATE: 90 BPM | WEIGHT: 188 LBS | DIASTOLIC BLOOD PRESSURE: 64 MMHG | HEIGHT: 63 IN | RESPIRATION RATE: 14 BRPM

## 2021-07-19 DIAGNOSIS — Z79.811 USE OF ANASTROZOLE (ARIMIDEX): ICD-10-CM

## 2021-07-19 DIAGNOSIS — C50.912 CARCINOMA OF LEFT BREAST METASTATIC TO AXILLARY LYMPH NODE (HCC): ICD-10-CM

## 2021-07-19 DIAGNOSIS — C77.3 CARCINOMA OF LEFT BREAST METASTATIC TO AXILLARY LYMPH NODE (HCC): ICD-10-CM

## 2021-07-19 DIAGNOSIS — C50.412 MALIGNANT NEOPLASM OF UPPER-OUTER QUADRANT OF LEFT BREAST IN FEMALE, ESTROGEN RECEPTOR POSITIVE (HCC): Primary | ICD-10-CM

## 2021-07-19 DIAGNOSIS — Z17.0 MALIGNANT NEOPLASM OF UPPER-OUTER QUADRANT OF LEFT BREAST IN FEMALE, ESTROGEN RECEPTOR POSITIVE (HCC): Primary | ICD-10-CM

## 2021-07-19 PROCEDURE — 99214 OFFICE O/P EST MOD 30 MIN: CPT | Performed by: SURGERY

## 2021-07-19 RX ORDER — AMOXICILLIN 500 MG/1
500 CAPSULE ORAL EVERY 8 HOURS SCHEDULED
COMMUNITY
End: 2022-04-20 | Stop reason: ALTCHOICE

## 2021-07-19 NOTE — PROGRESS NOTES
Surgical Oncology Follow Up       [address]  [facility] [place]  [address] [facility] [address]  [place] [address]    Huson Seats  1935  [id number]  [address] [facility]  [facility] [place]  [address] [address]    Chief Complaint   Patient presents with    Follow-up     3 month           Assessment & Plan:    patient presents for a three-month follow-up visit  She still has her IR drain that was putting out small amounts of serous fluid  Exam demonstrates that this is most certainly stuck  Hopefully she will have the drain removed quickly and can start radiation therapy  Otherwise she has no complaints referable to her breast   She has good range of motion  We will see her back in 6 months  Cancer History:     Oncology History   Malignant neoplasm of upper-outer quadrant of left breast in female, estrogen receptor positive ([facility] [address] )   1992 Initial Diagnosis    Left breast cancer  Lumpectomy  RT      1994 Surgery    Left breast cancer recurrence  Mastectomy with immediate reconstruction     8/31/2020 Biopsy    Left breast ultrasound-guided biopsy  A  1 - 2 o'clock  Invasive mammary carcinoma of no special type   Grade 2    NH 90  HER2 1+  Lymphovascular invasion not identified    B  Left axillary lymph node  Invasive mammary carcinoma of no special type  Grade 2    Concordant  No residual lymph node is identified in specimen B, favoring axillary extension of invasive carcinoma over lymph node metastasis  Breast mass measured 2 8cm on US and involved skin  Axillary mass measured 1 4cm  Right clear       11/17/2020 Surgery    Left breast ultrasound-guided lumpectomy with HUGO  directed axillary dissection  Invasive carcinoma of no special type (ductal)  Grade 2  3 2 cm  Invasive carcinoma directly invades into the dermis or epidermis with skin ulceration  Carcinoma invades skeletal muscle  Lymphovascular invasion present  Anterior margin positive for invasive carcinoma  3/5 Lymph nodes with macrometastases (1 6 cm)  Anatomic Stage IIIB  Prognostic Stage IIIA    Immediate reconstruction with Dr Higinio Jules (implant removal and local flap)     12/21/2020 -  Hormone Therapy    Anastrozole 1 mg daily  Dr Nithya Monreal of left breast 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 ([facility] )   12/1/2020 Initial Diagnosis    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 ([facility] )           Interval History:     See above, the patient will contact Dr Tonya Woods once her drain is removed to start radiation therapy  Review of Systems:   Review of Systems   All other systems reviewed and are negative        Past Medical History     Patient Active Problem List   Diagnosis    Essential hypertension    GERD without esophagitis    Diabetes mellitus type 2 in obese ([facility] )    Uncomplicated opioid dependence ([facility] )    Chronic heart failure (HCC)    Mixed hyperlipidemia    Migraine without aura and without status migrainosus, not intractable    Malignant neoplasm of upper-outer quadrant of left breast in female, estrogen receptor positive ([facility] )    Atrial fibrillation, currently in sinus rhythm    Chronic pain    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)    Use of anastrozole (Arimidex)     Past Medical History:   Diagnosis Date    Anxiety     Asthma     Atrial fibrillation (HCC)     Breast cancer (HCC)     Chest pain, unspecified     CHF (congestive heart failure) ([facility] )     Diabetes mellitus ([facility] )     GERD (gastroesophageal reflux disease)     History of radiation exposure 1992    Hyperlipidemia     Hypertension     Irregular heart beat     Afib    Migraine     Obesity     Pericardial effusion     Pericarditis      Past Surgical History:   Procedure Laterality Date    ABDOMINAL ADHESION SURGERY      APPENDECTOMY      AUGMENTATION MAMMAPLASTY Left 1994    BACK SURGERY      BREAST LUMPECTOMY Left 1992    malignant    BREAST LUMPECTOMY Left 11/17/2020    Procedure: BREAST ULTRASOUND DIRECTED LUMPECTOMY (ULTRASOUND AT 1200);   Surgeon: Rubens Oviedo MD;  [facility];  Service: Surgical Oncology    BREAST SURGERY      CARDIAC SURGERY      Pericardial window    CHOLECYSTECTOMY      EYE SURGERY      FLAP LOCAL TRUNK Left 11/17/2020    Procedure: FLAP LOCAL TRUNK;  Surgeon: Dario Luevano MD;  [facility];  Service: Plastics    HYSTERECTOMY      IR DRAINAGE TUBE CHECK WITH SCLEROSIS  4/23/2021    IR DRAINAGE TUBE CHECK WITH SCLEROSIS  6/3/2021    IR DRAINAGE TUBE CHECK WITH SCLEROSIS  6/17/2021    IR DRAINAGE TUBE CHECK WITH SCLEROSIS  6/28/2021    IR DRAINAGE TUBE CHECK WITH SCLEROSIS  7/7/2021    IR DRAINAGE TUBE PLACEMENT  4/1/2021    IR DRAINAGE TUBE PLACEMENT WITH SCLEROSIS  5/14/2021    LYMPH NODE DISSECTION Left 11/17/2020    Procedure: HUGO  DIRECTED AXILLARY DISSECTION;  Surgeon: Rubens Oviedo MD;  [facility];  Service: Surgical Oncology    MASTECTOMY Left 1994    malignant    TX CELESTE-IMPLANT CAPSULECTOMY BREAST COMPLETE Left 11/17/2020    Procedure: BREAST CAPSULECTOMY;  Surgeon: Dario Luevano MD;  [facility];  Service: Plastics    TX REMOVAL INTACT BREAST IMPLANT Left 11/17/2020    Procedure: BREAST IMPLANT REMOVAL;  Surgeon: Dario Luevano MD;  [facility];  Service: Plastics    US BREAST NEEDLE LOC LEFT Left 11/2/2020    US GUIDED BREAST BIOPSY LEFT COMPLETE Left 8/31/2020    US GUIDED BREAST LYMPH NODE BIOPSY LEFT Left 8/31/2020    WRIST SURGERY       Family History   Problem Relation Age of Onset    Colon cancer Mother 54    Breast cancer Mother         Early 52's    Stroke Father     Emphysema Father     Leukemia Sister     Breast cancer Sister 72    ALS Brother      Social History     Socioeconomic History    Marital status: /Civil Union     Spouse name: Not on file    Number of children: Not on file    Years of education: Not on file    Highest education level: Not on file   Occupational History    Not on file   Tobacco Use    Smoking status: Never Smoker    Smokeless tobacco: Never Used   Vaping Use    Vaping Use: Never used   Substance and Sexual Activity    Alcohol use: Yes     Comment: Twice a month    Drug use: No    Sexual activity: Not Currently   Other Topics Concern    Not on file   Social History Narrative    Not on file     Social Determinants of Health     Financial Resource Strain:     Difficulty of Paying Living Expenses:    Food Insecurity:     Worried About Running Out of Food in the Last Year:     [address] in the Last Year:    Transportation Needs:     Lack of Transportation (Medical):      Lack of Transportation (Non-Medical):    Physical Activity:     Days of Exercise per Week:     Minutes of Exercise per Session:    Stress:     Feeling of Stress :    Social Connections:     Frequency of Communication with Friends and Family:     Frequency of Social Gatherings with Friends and Family:     Attends Oriental orthodox Services:     Active Member of Clubs or Organizations:     Attends Club or Organization Meetings:     Marital Status:    Intimate Partner Violence:     Fear of Current or Ex-Partner:     Emotionally Abused:     Physically Abused:     Sexually Abused:        Current Outpatient Medications:     amoxicillin (AMOXIL) 500 mg capsule, Take 500 mg by mouth every 8 (eight) hours, Disp: , Rfl:     anastrozole (ARIMIDEX) 1 mg tablet, Take 1 tablet (1 mg total) by mouth daily, Disp: 90 tablet, Rfl: 3    EPINEPHrine (EPIPEN 2-SILVIO) 0 3 mg/0 3 mL SOAJ, Inject 0 3 mL as directed, Disp: , Rfl:     FLOVENT  MCG/ACT inhaler, INHALE 2 PUFF BY INHALATION ROUTE 2 TIMES EVERY DAY, Disp: , Rfl: 6    hydrocodone-chlorpheniramine polistirex (TUSSIONEX) 10-8 mg/5 mL ER suspension, Take 5 mL by mouth every 12 (twelve) hours, Disp: , Rfl:     hydrocortisone 2 5 % cream, APPLY TO THE AFFECTED AREA TWICE DAILY  ([address], [address]), Disp: , Rfl:     insulin aspart (NOVOLOG FLEXPEN) 100 Units/mL injection pen, Novolog Flexpen U-100 Insulin aspart 100 unit/mL (3 mL) subcutaneous, Disp: , Rfl:     Insulin Glargine (TOUJEO SOLOSTAR) 300 units/mL CONCETRATED U-300 injection pen, Toujeo SoloStar U-300 Insulin 300 unit/mL (1 5 mL) subcutaneous pen  INJECT 67 UNITS BY SUBCUTANEOUS ROUTE AS PER INSULIN PROTOCOL, Disp: , Rfl:     lansoprazole (PREVACID) 30 mg capsule, lansoprazole 30 mg capsule,delayed release, Disp: , Rfl:     Lidocaine 5 % CREA, Apply 1 application topically as needed (pain), Disp: 1 Tube, Rfl: 0    lidocaine-prilocaine (EMLA) cream, APPLY FOR 12 HOURS, AND THEN OFF 12 HOURS AS NEEDED, Disp: , Rfl: 2    metoprolol tartrate (LOPRESSOR) 50 mg tablet, Take 50 mg by mouth every 12 (twelve) hours , Disp: , Rfl:     morphine (MS CONTIN) 15 mg 12 hr tablet, Take 15 mg by mouth every 12 (twelve) hours as needed for severe pain, Disp: , Rfl:     morphine (MSIR) 15 mg tablet, Take 15 mg by mouth every 8 (eight) hours as needed for severe pain , Disp: , Rfl:     NON FORMULARY, Domperidone 20 mg TID, Disp: , Rfl:     NOVOFINE 32G X 6 MM MISC, 2 (two) times a day As directed, Disp: , Rfl: 5    nystatin (MYCOSTATIN) cream, APPLY TO AFFECTED AREAS TWICE A DAY  (MIX WITH HYDROCORTISONE), Disp: , Rfl:     ONE TOUCH ULTRA TEST test strip, TEST TWICE DAILY OR AS DIRECTED DX: E11 9, Disp: , Rfl: 5    ONETOUCH DELICA LANCETS 33Q MISC, TEST TWICE DAILY OR AS DIRECTED, Disp: , Rfl: 5    oxybutynin (DITROPAN XL) 15 MG 24 hr tablet, Take 15 mg by mouth daily  , Disp: , Rfl:     Probiotic Product (ALIGN) 4 MG CAPS, Take by mouth, Disp: , Rfl:     simvastatin (ZOCOR) 20 mg tablet, Take 20 mg by mouth daily at bedtime  , Disp: , Rfl:     sodium chloride, PF, 0 9 %, 10 mL by Intracatheter route daily Intracatheter flushing daily, Disp: 300 mL, Rfl: 0    triamcinolone (KENALOG) 0 1 % cream, , Disp: , Rfl:     VENTOLIN  (90 Base) MCG/ACT inhaler, , Disp: , Rfl:     oxyCODONE-acetaminophen (PERCOCET) 5-325 mg per tablet, , Disp: , Rfl:   Allergies   Allergen Reactions    Betadine [Povidone Iodine] Anaphylaxis    Iodinated Diagnostic Agents Anaphylaxis    Iodine - Food Allergy Anaphylaxis and Other (See Comments)    Aspirin GI Bleeding    Caffeine - Food Allergy Palpitations       Physical Exam:     Vitals:    07/19/21 1043   BP: 138/64   Pulse: 90   Resp: 14   Temp: 98 °F (36 7 °C)   SpO2: 96%     Physical Exam  Vitals reviewed  Constitutional:       Appearance: She is well-developed  HENT:      Head: Normocephalic and atraumatic  Eyes:      Pupils: Pupils are equal, round, and reactive to light  Neck:      Thyroid: No thyromegaly  Vascular: No JVD  Trachea: No tracheal deviation  Cardiovascular:      Rate and Rhythm: Normal rate and regular rhythm  Heart sounds: Normal heart sounds  No murmur heard  No friction rub  No gallop  Pulmonary:      Effort: Pulmonary effort is normal  No respiratory distress  Breath sounds: Normal breath sounds  No wheezing or rales  Chest:      Comments: The right breast was examined in the sitting and supine position  There are no worrisome skin changes, tenderness, inverted nipple, nipple discharge, swelling, bleeding or evidence of a mass in any quadrant  Errol survey demonstrated no evidence of any clinically suspicious axillary, pectoral or paraclavicular lymph nodes  Examination of the left mastectomy site demonstrates no evidence of infection  Her drain is putting out serous fluid  She states that is a very small amount on a daily basis  There is no evidence of any skin metastasis or axillary adenopathy  Abdominal:      General: There is no distension  Palpations: Abdomen is soft  There is no hepatomegaly or mass  Tenderness: There is no abdominal tenderness   There is no guarding or rebound  Musculoskeletal:         General: No tenderness  Normal range of motion  Cervical back: Normal range of motion and neck supple  Lymphadenopathy:      Cervical: No cervical adenopathy  Skin:     General: Skin is warm and dry  Findings: No erythema or rash  Neurological:      Mental Status: She is alert and oriented to person, place, and time  Cranial Nerves: No cranial nerve deficit  Psychiatric:         Behavior: Behavior normal            Results & Discussion:     Overall the patient is doing quite well  Hopefully her drain will be removed in the near future and she can initiate radiation therapy  We will see her back in 6 months  She has no evidence of local regional recurrence  Advance Care Planning/Advance Directives:  I discussed the disease status, treatment plans and follow-up with the patient

## 2021-07-27 ENCOUNTER — HOSPITAL ENCOUNTER (OUTPATIENT)
Dept: RADIOLOGY | Facility: HOSPITAL | Age: 86
Discharge: HOME | End: 2021-07-27
Attending: RADIOLOGY | Admitting: RADIOLOGY
Payer: MEDICARE

## 2021-07-27 VITALS
RESPIRATION RATE: 18 BRPM | SYSTOLIC BLOOD PRESSURE: 157 MMHG | DIASTOLIC BLOOD PRESSURE: 75 MMHG | OXYGEN SATURATION: 95 % | HEART RATE: 75 BPM

## 2021-07-27 DIAGNOSIS — N64.89 SEROMA OF BREAST: ICD-10-CM

## 2021-07-27 PROCEDURE — 76080 X-RAY EXAM OF FISTULA: CPT | Performed by: RADIOLOGY

## 2021-07-27 PROCEDURE — 49424 ASSESS CYST CONTRAST INJECT: CPT

## 2021-07-27 PROCEDURE — 49424 ASSESS CYST CONTRAST INJECT: CPT | Performed by: RADIOLOGY

## 2021-07-27 PROCEDURE — 76080 X-RAY EXAM OF FISTULA: CPT

## 2021-07-27 RX ADMIN — IOHEXOL 5 ML: 350 INJECTION, SOLUTION INTRAVENOUS at 12:26

## 2021-07-27 NOTE — DISCHARGE INSTRUCTIONS
Drainage Tube Removal    Your drainage tube was removed today  What you need know at home:   Keep a clean dry dressing at the tube site until the small opening closes  It will take twenty four to forty eight hours  Keep the site dry until it heals  A small amount of drainage on your dressing is normal  Resume your normal diet  Small sips of flat soda will help with any nausea  Contact Interventional Radiology for any of the following: You have pain, fever greater than 101, shaking chills  If you have increased redness or swelling at the site  I the drainage from your site does not stop  If the site drains pus or has a bad odor       Contact Interventional Radiology at [phone]   [facility] PATIENTS: Contact Interventional Radiology at [phone]) My [facility] PATIENTS: Contact Interventional Radiology at [phone]) if:

## 2021-07-27 NOTE — SEDATION DOCUMENTATION
Left breast drainage tube checked and removed in IR per order Dr Ashley Pisano  AVS given to patient  Discharged ambulatory without questions or complaints

## 2021-08-31 ENCOUNTER — RADIATION ONCOLOGY CONSULT (OUTPATIENT)
Dept: RADIATION ONCOLOGY | Facility: HOSPITAL | Age: 86
End: 2021-08-31
Attending: RADIOLOGY
Payer: MEDICARE

## 2021-08-31 VITALS — RESPIRATION RATE: 18 BRPM | OXYGEN SATURATION: 97 % | HEART RATE: 70 BPM | TEMPERATURE: 97.7 F

## 2021-08-31 DIAGNOSIS — C50.912 CARCINOMA OF LEFT BREAST METASTATIC TO AXILLARY LYMPH NODE (HCC): Primary | ICD-10-CM

## 2021-08-31 DIAGNOSIS — C77.3 CARCINOMA OF LEFT BREAST METASTATIC TO AXILLARY LYMPH NODE (HCC): Primary | ICD-10-CM

## 2021-08-31 PROCEDURE — 99211 OFF/OP EST MAY X REQ PHY/QHP: CPT | Performed by: RADIOLOGY

## 2021-08-31 NOTE — PROGRESS NOTES
Reta Suresh 1935 is a 82 y o  female       Follow up visit     Patient returns to radiation oncology for limited consult and CT simulation for recurrent left breast cancer  Last consult with Dr Mingo Lui on 1/14/21    82year old female with stage IIIA left breast cancer status post resection and axillary lymph node dissection  She had left breast cancer in 1998 status post lumpectomy and adjuvant radiation followed by mastectomy, lymph node dissection and reconstruction in 2000  No further radiation at that time per patient  Pathology from most recent resection is significant for +3/5 lymph nodes with GRICELDA and positive anterior subcutaneous margin in the 9-12:00 position and close but negative deep margin  Tumor cells are ER/MA (+) and [id number] (-)  Aromatase inhibitor therapy planned  Dr Mingo Lui offered the patient adjuvant radiation to the chest wall and draining lymph nodes  Plan a dose of 45-50Gy with possible boost to (+) margin areas to 60-66Gy depending on normal tissue tolerance and ability to locate this region  2/8/21 Pt attempted CT simulation but unable to raise left arm, she was referred to Physical therapy and asked to return when ROM improved  2/17/21 PT evaluation for LCW edema, lymphedema, seroma, decreased left shoulder ROM  Plan for PT 2x/week 2/17 - 4/17/21    3/4/21 Dr Dennis Lopez - aspirated 400 cc serosang fluid    3/22/21 Dr Dennis Lopez - recurrent seroma 80 cc aspirated  Refer to IR for drain placement  4/1/21 IR - Drain placed for left breast recurrent seroma  300 mL drained  4/23/21 IR - drain removal    5/14/21 IR - drain placement     6/3/21 IR - drain repositioned    6/21/21 Dr Alex White follow-up  Currently on adjuvant hormonal therapy with anastrozole with excellent tolerance  7/7/21 IR - drain tube check -  There remains small seroma cavity  Drainage catheter kept in place        7/19/21 Dr Josue Caballero has her IR drain that was putting out small amounts of serous fluid  Hopefully she will have the drain removed quickly and can start radiation therapy  Otherwise she has no complaints referable to her breast   She has good range of motion  Will see her back in 6 months     7/27/21 IR - drain removed         12/20/21 Dr Guadalupe Trevino follow-up  1/19/22 Dr Florencia Canales follow-up          Oncology History   Malignant neoplasm of upper-outer quadrant of left breast in female, estrogen receptor positive ([address] )   1992 Initial Diagnosis    Left breast cancer  Lumpectomy  RT      1994 Surgery    Left breast cancer recurrence  Mastectomy with immediate reconstruction     8/31/2020 Biopsy    Left breast ultrasound-guided biopsy  A  1 - 2 o'clock  Invasive mammary carcinoma of no special type   Grade 2    MD 90  HER2 1+  Lymphovascular invasion not identified    B  Left axillary lymph node  Invasive mammary carcinoma of no special type  Grade 2    Concordant  No residual lymph node is identified in specimen B, favoring axillary extension of invasive carcinoma over lymph node metastasis  Breast mass measured 2 8cm on US and involved skin  Axillary mass measured 1 4cm  Right clear       11/17/2020 Surgery    Left breast ultrasound-guided lumpectomy with HUGO  directed axillary dissection  Invasive carcinoma of no special type (ductal)  Grade 2  3 2 cm  Invasive carcinoma directly invades into the dermis or epidermis with skin ulceration  Carcinoma invades skeletal muscle  Lymphovascular invasion present  Anterior margin positive for invasive carcinoma  3/5 Lymph nodes with macrometastases (1 6 cm)  Anatomic Stage IIIB  Prognostic Stage IIIA    Immediate reconstruction with Dr Evin Holley (implant removal and local flap)     12/21/2020 -  Hormone Therapy    Anastrozole 1 mg daily  Dr Merry Gomez of left breast 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 ([facility] )   12/1/2020 Initial Diagnosis    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 ([address] )         Clinical Trial: no      Health Maintenance   Topic Date Due    Medicare Annual Wellness Visit (AWV)  Never done    Diabetic Foot Exam  Never done    DM Eye Exam  Never done    URINE MICROALBUMIN  Never done    BMI: Followup Plan  Never done    Pneumococcal Vaccine: 65+ Years (2 of 4 - PPSV23) 04/22/2016    HEMOGLOBIN A1C  03/15/2021    COVID-19 Vaccine (3 - Pfizer risk 3-dose series) 03/24/2021    Influenza Vaccine (1) 09/01/2021    Depression Screening PHQ  01/14/2022    Fall Risk  02/17/2022    BMI: Adult  07/19/2022    Breast Cancer Screening: Mammogram  09/21/2022    DTaP,Tdap,and Td Vaccines (2 - Td or Tdap) 10/11/2026    HIB Vaccine  Aged Out    Hepatitis B Vaccine  Aged Out    IPV Vaccine  Aged Out    Hepatitis A Vaccine  Aged Out    Meningococcal ACWY Vaccine  Aged Out    HPV Vaccine  Aged Out       Patient Active Problem List   Diagnosis    Essential hypertension    GERD without esophagitis    Diabetes mellitus type 2 in obese ([facility] [facility]    [facility]com[facility] opioid dependence [facility]    Chronic heart failure ([facility]    Mixed hyperlipidemia    Migraine without aura and without status migrainosus, not intractable    Malignant neoplasm of upper-outer quadrant of left breast in female, estrogen receptor positive ([facility] )    Atrial fibrillation, currently in sinus rhythm    Chronic pain    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)    Use of anastrozole (Arimidex)     Past Medical History:   Diagnosis Date    Anxiety     Asthma     Atrial fibrillation ([facility]     [facility] cancer [facility]     Chest pain, unspecified     CHF (congestive heart failure) ([facility]     Diabetes mellitus ([facility]     GERD (gastroesophageal reflux disease)     History of radiation exposure 1992    Hyperlipidemia     Hypertension     Irregular heart beat     Afib    Migraine     Obesity     Pericardial effusion     Pericarditis      Past Surgical History:   Procedure Laterality Date    ABDOMINAL ADHESION SURGERY      APPENDECTOMY      AUGMENTATION MAMMAPLASTY Left 1994    BACK SURGERY      BREAST LUMPECTOMY Left 1992    malignant    BREAST LUMPECTOMY Left 11/17/2020    Procedure: BREAST ULTRASOUND DIRECTED LUMPECTOMY (ULTRASOUND AT 1200);   Surgeon: Rubens Oviedo MD;  [facility];  Service: Surgical Oncology    BREAST SURGERY      CARDIAC SURGERY      Pericardial window    CHOLECYSTECTOMY      EYE SURGERY      FLAP LOCAL TRUNK Left 11/17/2020    Procedure: FLAP LOCAL TRUNK;  Surgeon: Dario Luevano MD;  [facility];  Service: Plastics    HYSTERECTOMY      IR DRAINAGE TUBE CHECK WITH SCLEROSIS  4/23/2021    IR DRAINAGE TUBE CHECK WITH SCLEROSIS  6/3/2021    IR DRAINAGE TUBE CHECK WITH SCLEROSIS  6/17/2021    IR DRAINAGE TUBE CHECK WITH SCLEROSIS  6/28/2021    IR DRAINAGE TUBE CHECK WITH SCLEROSIS  7/7/2021    IR DRAINAGE TUBE CHECK WITH SCLEROSIS  7/27/2021    IR DRAINAGE TUBE PLACEMENT  4/1/2021    IR DRAINAGE TUBE PLACEMENT WITH SCLEROSIS  5/14/2021    LYMPH NODE DISSECTION Left 11/17/2020    Procedure: HUGO  DIRECTED AXILLARY DISSECTION;  Surgeon: Rubens Oviedo MD;  [facility];  Service: Surgical Oncology    MASTECTOMY Left 1994    malignant    SD CELESTE-IMPLANT CAPSULECTOMY BREAST COMPLETE Left 11/17/2020    Procedure: BREAST CAPSULECTOMY;  Surgeon: Dario Luevano MD;  [facility];  Service: Plastics    SD REMOVAL INTACT BREAST IMPLANT Left 11/17/2020    Procedure: BREAST IMPLANT REMOVAL;  Surgeon: Dario Luevano MD;  [facility];  Service: Plastics    US BREAST NEEDLE LOC LEFT Left 11/2/2020    US GUIDED BREAST BIOPSY LEFT COMPLETE Left 8/31/2020    US GUIDED BREAST LYMPH NODE BIOPSY LEFT Left 8/31/2020    WRIST SURGERY       Family History   Problem Relation Age of Onset    Colon cancer Mother 54    Breast cancer Mother         Early 52's    Stroke Father     Emphysema Father     Leukemia Sister     Breast cancer Sister 72    ALS Brother Social History     Socioeconomic History    Marital status: /Civil Union     Spouse name: Not on file    Number of children: Not on file    Years of education: Not on file    Highest education level: Not on file   Occupational History    Not on file   Tobacco Use    Smoking status: Never Smoker    Smokeless tobacco: Never Used   Vaping Use    Vaping Use: Never used   Substance and Sexual Activity    Alcohol use: Yes     Comment: Twice a month    Drug use: No    Sexual activity: Not Currently   Other Topics Concern    Not on file   Social History Narrative    Not on file     Social Determinants of Health     Financial Resource Strain:     Difficulty of Paying Living Expenses:    Food Insecurity:     Worried About Running Out of Food in the Last Year:     [address] in the Last Year:    Transportation Needs:     Lack of Transportation (Medical):      Lack of Transportation (Non-Medical):    Physical Activity:     Days of Exercise per Week:     Minutes of Exercise per Session:    Stress:     Feeling of Stress :    Social Connections:     Frequency of Communication with Friends and Family:     Frequency of Social Gatherings with Friends and Family:     Attends Congregational Services:     Active Member of Clubs or Organizations:     Attends Club or Organization Meetings:     Marital Status:    Intimate Partner Violence:     Fear of Current or Ex-Partner:     Emotionally Abused:     Physically Abused:     Sexually Abused:        Current Outpatient Medications:     anastrozole (ARIMIDEX) 1 mg tablet, Take 1 tablet (1 mg total) by mouth daily, Disp: 90 tablet, Rfl: 3    EPINEPHrine (EPIPEN 2-SILVIO) 0 3 mg/0 3 mL SOAJ, Inject 0 3 mL as directed, Disp: , Rfl:     FLOVENT  MCG/ACT inhaler, INHALE 2 PUFF BY INHALATION ROUTE 2 TIMES EVERY DAY, Disp: , Rfl: 6    hydrocodone-chlorpheniramine polistirex (TUSSIONEX) 10-8 mg/5 mL ER suspension, Take 5 mL by mouth every 12 (twelve) hours, Disp: , Rfl:     hydrocortisone 2 5 % cream, APPLY TO THE AFFECTED AREA TWICE DAILY  ([address], [address]), Disp: , Rfl:     insulin aspart (NOVOLOG FLEXPEN) 100 Units/mL injection pen, Novolog Flexpen U-100 Insulin aspart 100 unit/mL (3 mL) subcutaneous, Disp: , Rfl:     Insulin Glargine (TOUJEO SOLOSTAR) 300 units/mL CONCETRATED U-300 injection pen, Toujeo SoloStar U-300 Insulin 300 unit/mL (1 5 mL) subcutaneous pen  INJECT 67 UNITS BY SUBCUTANEOUS ROUTE AS PER INSULIN PROTOCOL, Disp: , Rfl:     lansoprazole (PREVACID) 30 mg capsule, lansoprazole 30 mg capsule,delayed release, Disp: , Rfl:     Lidocaine 5 % CREA, Apply 1 application topically as needed (pain), Disp: 1 Tube, Rfl: 0    metoprolol tartrate (LOPRESSOR) 50 mg tablet, Take 50 mg by mouth every 12 (twelve) hours , Disp: , Rfl:     morphine (MS CONTIN) 15 mg 12 hr tablet, Take 15 mg by mouth every 12 (twelve) hours as needed for severe pain, Disp: , Rfl:     morphine (MSIR) 15 mg tablet, Take 15 mg by mouth every 8 (eight) hours as needed for severe pain , Disp: , Rfl:     NON FORMULARY, Domperidone 20 mg TID, Disp: , Rfl:     NOVOFINE 32G X 6 MM MISC, 2 (two) times a day As directed, Disp: , Rfl: 5    nystatin (MYCOSTATIN) cream, APPLY TO AFFECTED AREAS TWICE A DAY  (MIX WITH HYDROCORTISONE), Disp: , Rfl:     ONE TOUCH ULTRA TEST test strip, TEST TWICE DAILY OR AS DIRECTED DX: E11 9, Disp: , Rfl: 5    ONETOUCH DELICA LANCETS 87I MISC, TEST TWICE DAILY OR AS DIRECTED, Disp: , Rfl: 5    oxybutynin (DITROPAN XL) 15 MG 24 hr tablet, Take 15 mg by mouth daily  , Disp: , Rfl:     Probiotic Product (ALIGN) 4 MG CAPS, Take by mouth, Disp: , Rfl:     triamcinolone (KENALOG) 0 1 % cream, , Disp: , Rfl:     VENTOLIN  (90 Base) MCG/ACT inhaler, , Disp: , Rfl:     amoxicillin (AMOXIL) 500 mg capsule, Take 500 mg by mouth every 8 (eight) hours (Patient not taking: Reported on 8/31/2021), Disp: , Rfl:     lidocaine-prilocaine (EMLA) cream, APPLY FOR 12 HOURS, AND THEN OFF 12 HOURS AS NEEDED, Disp: , Rfl: 2    oxyCODONE-acetaminophen (PERCOCET) 5-325 mg per tablet, , Disp: , Rfl:     simvastatin (ZOCOR) 20 mg tablet, Take 20 mg by mouth daily at bedtime  , Disp: , Rfl:     sodium chloride, PF, 0 9 %, 10 mL by Intracatheter route daily Intracatheter flushing daily (Patient not taking: Reported on 8/31/2021), Disp: 300 mL, Rfl: 0  Allergies   Allergen Reactions    Betadine [Povidone Iodine] Anaphylaxis    Iodinated Diagnostic Agents Anaphylaxis    Iodine - Food Allergy Anaphylaxis and Other (See Comments)    Aspirin GI Bleeding    Caffeine - Food Allergy Palpitations       Review of Systems:  Review of Systems   Constitutional: Negative  HENT: Negative  Eyes: Negative  Respiratory: Positive for shortness of breath (with exertion)  Cardiovascular: Negative  Endocrine: Negative  Genitourinary: Negative  Musculoskeletal: Positive for back pain  Skin:        Healed scar LCW, edema improved, ROM improved in left arm   Allergic/Immunologic: Negative  Neurological: Negative  Hematological: Negative  Psychiatric/Behavioral: Negative  Vitals:    08/31/21 0940   Pulse: 70   Resp: 18   Temp: 97 7 °F (36 5 °C)   TempSrc: Temporal   SpO2: 97%                 Imaging:No results found      Teaching: [facility] radiation packet

## 2021-09-29 ENCOUNTER — TELEPHONE (OUTPATIENT)
Dept: RADIATION ONCOLOGY | Facility: HOSPITAL | Age: 86
End: 2021-09-29

## 2021-09-29 NOTE — TELEPHONE ENCOUNTER
Patient was told her case would be discussed at [facility] and she has not heard anything  She would like to know what was discussed and what we are recommending

## 2021-09-29 NOTE — TELEPHONE ENCOUNTER
I have been on leave for the last 6 weeks, I think Dr Delisa Shanks may have called me but I don't remember if it was this patient or what the details were  I would wait until he returns and ask him

## 2021-11-10 ENCOUNTER — TELEPHONE (OUTPATIENT)
Dept: OBGYN CLINIC | Facility: HOSPITAL | Age: 86
End: 2021-11-10

## 2021-11-11 ENCOUNTER — IMMUNIZATIONS (OUTPATIENT)
Dept: FAMILY MEDICINE CLINIC | Facility: HOSPITAL | Age: 86
End: 2021-11-11

## 2021-11-11 DIAGNOSIS — Z23 ENCOUNTER FOR IMMUNIZATION: Primary | ICD-10-CM

## 2021-11-11 PROCEDURE — 0001A COVID-19 PFIZER VACC 0.3 ML: CPT

## 2021-11-11 PROCEDURE — 91300 COVID-19 PFIZER VACC 0.3 ML: CPT

## 2022-01-06 ENCOUNTER — TELEPHONE (OUTPATIENT)
Dept: HEMATOLOGY ONCOLOGY | Facility: MEDICAL CENTER | Age: 87
End: 2022-01-06

## 2022-01-19 ENCOUNTER — TELEPHONE (OUTPATIENT)
Dept: HEMATOLOGY ONCOLOGY | Facility: CLINIC | Age: 87
End: 2022-01-19

## 2022-01-19 NOTE — TELEPHONE ENCOUNTER
Reschedule Appointment     Who is calling in  Patient   Doctor Appointment Scheduled with Garrison Ni date and time 1-19-22 @ 2:30pm    New date and time 2-23-22 @ 2:30pm   Location [place]    Patient verbalized understanding

## 2022-01-21 ENCOUNTER — TELEPHONE (OUTPATIENT)
Dept: HEMATOLOGY ONCOLOGY | Facility: CLINIC | Age: 87
End: 2022-01-21

## 2022-01-21 NOTE — TELEPHONE ENCOUNTER
Reschedule Appointment     Who is calling in Patient    Doctor Appointment Scheduled with Cha Rogers date and time 01/21/22 @ 2:40pm   New date and time 01/28/22 @ 1:20pm   Location [place]    Patient verbalized understanding

## 2022-01-28 ENCOUNTER — OFFICE VISIT (OUTPATIENT)
Dept: HEMATOLOGY ONCOLOGY | Facility: CLINIC | Age: 87
End: 2022-01-28
Payer: MEDICARE

## 2022-01-28 VITALS
TEMPERATURE: 97.9 F | HEIGHT: 63 IN | DIASTOLIC BLOOD PRESSURE: 80 MMHG | OXYGEN SATURATION: 96 % | RESPIRATION RATE: 18 BRPM | WEIGHT: 184 LBS | BODY MASS INDEX: 32.6 KG/M2 | HEART RATE: 74 BPM | SYSTOLIC BLOOD PRESSURE: 142 MMHG

## 2022-01-28 DIAGNOSIS — Z17.0 MALIGNANT NEOPLASM OF UPPER-OUTER QUADRANT OF LEFT BREAST IN FEMALE, ESTROGEN RECEPTOR POSITIVE (HCC): ICD-10-CM

## 2022-01-28 DIAGNOSIS — C50.412 MALIGNANT NEOPLASM OF UPPER-OUTER QUADRANT OF LEFT BREAST IN FEMALE, ESTROGEN RECEPTOR POSITIVE (HCC): ICD-10-CM

## 2022-01-28 DIAGNOSIS — C50.912 CARCINOMA OF LEFT BREAST METASTATIC TO AXILLARY LYMPH NODE (HCC): Primary | ICD-10-CM

## 2022-01-28 DIAGNOSIS — C77.3 CARCINOMA OF LEFT BREAST METASTATIC TO AXILLARY LYMPH NODE (HCC): Primary | ICD-10-CM

## 2022-01-28 PROCEDURE — 99214 OFFICE O/P EST MOD 30 MIN: CPT | Performed by: INTERNAL MEDICINE

## 2022-01-28 NOTE — PROGRESS NOTES
Hematology / Oncology Outpatient Follow Up Note    Des Glover 82 y o  female :1935 ZLD:[id number]         Date:  2022    Assessment / Plan:  A 24-year-old postmenopausal woman with history of left breast cancer in , status post lumpectomy followed by radiation therapy   In , she had local recurrence in her left breast for which she underwent mastectomy   She has no history of adjuvant hormonal therapy or chemotherapy in the past   She had left chest wall and left axillary lymph node recurrent disease, grade 2, % positive, RI 90% positive, HER2 negative disease, diagnosed in 2020  She underwent surgical resection as well as left axillary lymph node dissection resulting in grossly RODNEY but positive anterior surgical margin  She is currently on adjuvant hormonal therapy with anastrozole with excellent tolerance  Clinically, she has no evidence recurrent disease  I recommended her to continue anastrozole 1 mg once a day  She is in agreement with my recommendation  I will see her again in a year for routine follow-up           Subjective:      HPI:  A 24-year-old postmenopausal woman with history of left breast cancer in   She underwent lumpectomy followed by radiation therapy   She did not have adjuvant chemotherapy or hormonal treatment  Lucie Goode, she had local recurrence in her left breast for which she underwent mastectomy  Earnest Bhagat, she did not have adjuvant therapy at all   Since 2020, she felt a lump in her left reconstructed breast   She was found to have large left chest wall mass which was biopsied as well as left axillary lymph node biopsy both of which were positive for invasive ductal carcinoma, grade 2   This was % positive, RI 90% positive, HER2 negative disease   Subsequently, she underwent surgical resection as well as axillary lymph node dissection and removal of left breast implant in  which showed 32 x 31 x 10 mm of invasive ductal carcinoma, grade 2  Lymphovascular invasion was present   3/5 axillary lymph nodes were positive for metastatic disease with largest tumor measuring 16 mm   She had dermis invasion of tumor   She presents today to discuss adjuvant treatment options   Her anterior surgical margin was positive   She has some pain in her left chest wall   Otherwise, she feels well   She has no respiratory symptoms   Her weight is stable   Her performance status is normal              Interval History:  A 82year-old postmenopausal woman with history of left breast cancer in 1992, status post lumpectomy followed by radiation therapy  [facility] , she had local recurrence in her left breast for which she underwent mastectomy   She has no history of adjuvant hormonal therapy or chemotherapy in the past   She had left chest wall and left axillary lymph node recurrent disease, grade 2, % positive, OK 90% positive, HER2 negative disease, diagnosed in November 2020  She underwent surgical resection as well as left axillary lymph node dissection resulting in grossly RODNEY but positive anterior surgical margin  Since December 2020, she has been on adjuvant hormonal therapy with anastrozole  She presents today for routine follow-up  She is tolerating anastrozole very well  She has no complaint hot flashes or musculoskeletal symptoms  She denied any pain  She has no cough, sputum production or shortness of breath    Her performance status is normal           Objective:      Primary Diagnosis:     1  Left breast cancer, diagnosed in 1992       2  Local recurrence of breast cancer in her left breast, diagnosed in 1994      3  Left chest wall and left axillary lymph node recurrent disease, grade 2, % positive, OK 90% positive, HER2 negative disease   Diagnosed in November 2020       Cancer Staging:  Cancer Staging  Malignant neoplasm of upper-outer quadrant of left breast in female, estrogen receptor positive [facility])  Staging form: Breast, AJCC 8th Edition  - Pathologic: Stage IIIA (pT4b, pN1a, cM0, G2, ER+, TN+, HER2-) - Unsigned  Method of lymph node assessment: Axillary lymph node dissection  Histologic grading system: 3 grade system           Previous Hematologic/ Oncologic Treatment:      Adjuvant radiation therapy in 1992       Current Hematologic/ Oncologic Treatment:         Adjuvant hormonal therapy with anastrozole since December 2020       Disease Status:      RODNEY status post mastectomy and axillary lymph node dissection      Test Results:     Pathology:     32 x 31 x 10 mm of invasive ductal carcinoma, grade 2   Lymphovascular invasion was present   3/5 axillary lymph nodes were positive for metastatic disease measuring 16 mm   Dermal invasion was positive   Anterior surgical margin was positive        Radiology:     Chest x-ray was negative for pulmonary disease      Laboratory:     See below      Physical Exam:        General Appearance:    Alert, oriented          Eyes:    PERRL   Ears:    Normal external ear canals, both ears   Nose:   Nares normal, septum midline   Throat:   Mucosa moist  Pharynx without injection  Neck:   Supple         Lungs:     Clear to auscultation bilaterally   Chest Wall:    No tenderness or deformity    Heart:    Regular rate and rhythm         Abdomen:     Soft, non-tender, bowel sounds +, no organomegaly               Extremities:   Extremities no cyanosis or edema         Skin:   no rash or icterus  Lymph nodes:   Cervical, supraclavicular, and axillary nodes normal   Neurologic:   CNII-XII intact, normal strength, sensation and reflexes     Throughout             Breast exam:   Left status post mastectomy without reconstruction   No palpable abnormality in her left chest wall   Right breast exam is negative               ROS: Review of Systems   All other systems reviewed and are negative  Imaging: No results found        Labs:   Lab Results   Component Value Date WBC 10 53 (H) 04/01/2021    HGB 14 0 04/01/2021    HCT 44 2 04/01/2021    MCV 90 04/01/2021     04/01/2021     Lab Results   Component Value Date     01/30/2015    K 5 5 (H) 04/01/2021     04/01/2021    CO2 25 04/01/2021    ANIONGAP 7 01/30/2015    BUN 22 04/01/2021    CREATININE 1 38 (H) 04/01/2021    GLUCOSE 103 01/30/2015    GLUF 156 (H) 04/01/2021    CALCIUM 10 0 04/01/2021    AST 18 10/21/2020    ALT 31 10/21/2020    ALKPHOS 141 (H) 10/21/2020    PROT 5 7 (L) 01/28/2015    PROT 6 3 (L) 01/28/2015    BILITOT 0 49 01/28/2015    EGFR 35 04/01/2021         Lab Results   Component Value Date     01/17/2015       Lab Results   Component Value Date    UPEP The urine total 01/28/2015       Current Medications: Reviewed  Allergies: Reviewed  PMH/FH/SH:  Reviewed      Vital Sign:    Body surface area is 1 87 meters squared      Wt Readings from Last 3 Encounters:   01/28/22 83 5 kg (184 lb)   07/19/21 85 3 kg (188 lb)   06/21/21 84 1 kg (185 lb 6 4 oz)        Temp Readings from Last 3 Encounters:   01/28/22 97 9 °F (36 6 °C) (Tympanic Core)   08/31/21 97 7 °F (36 5 °C) (Temporal)   07/19/21 98 °F (36 7 °C) (Tympanic)        BP Readings from Last 3 Encounters:   01/28/22 142/80   07/27/21 157/75   07/19/21 138/64         Pulse Readings from Last 3 Encounters:   01/28/22 74   08/31/21 70   07/27/21 75     @LASTSAO2(3)@

## 2022-02-08 ENCOUNTER — OFFICE VISIT (OUTPATIENT)
Dept: UROLOGY | Facility: AMBULATORY SURGERY CENTER | Age: 87
End: 2022-02-08
Payer: MEDICARE

## 2022-02-08 VITALS
WEIGHT: 188 LBS | SYSTOLIC BLOOD PRESSURE: 132 MMHG | HEIGHT: 63 IN | HEART RATE: 72 BPM | BODY MASS INDEX: 33.31 KG/M2 | DIASTOLIC BLOOD PRESSURE: 82 MMHG

## 2022-02-08 DIAGNOSIS — N39.0 RECURRENT UTI: Primary | ICD-10-CM

## 2022-02-08 DIAGNOSIS — N32.81 OAB (OVERACTIVE BLADDER): ICD-10-CM

## 2022-02-08 PROCEDURE — 99204 OFFICE O/P NEW MOD 45 MIN: CPT | Performed by: NURSE PRACTITIONER

## 2022-02-08 NOTE — PATIENT INSTRUCTIONS
Kegel Exercises for Women   AMBULATORY CARE:   Kegel exercises  help strengthen your pelvic muscles  Pelvic muscles hold your pelvic organs, such as your bladder and uterus, in place  Kegel exercises help prevent or control problems with urine incontinence (leakage)  Incontinence may be caused by pregnancy, childbirth, or menopause  Contact your healthcare provider if:   · You cannot feel your muscles tighten or relax  · You continue to leak urine  · You have questions or concerns about your condition or care  Use the correct muscles:  Pelvic muscles are the muscles you use to control urine flow  To target these muscles, stop and start the flow of urine several times  This will help you become familiar with how it feels to tighten and relax these muscles  How to do Kegel exercises:   · Empty your bladder  You may lie down, stand up, or sit down to do these exercises  When you first try to do these exercises, it may be easier if you lie down  Tighten or squeeze your pelvic muscles slowly  It may feel like you are trying to hold back urine or gas  Hold this position for 3 seconds  Relax for 3 seconds  Repeat this cycle 10 times  · Do 10 sets of Kegel exercises, at least 3 times a day  Do not hold your breath when you do Kegel exercises  Keep your stomach, back, and leg muscles relaxed  · As your muscles get stronger, you will be able to hold the squeeze longer  Your healthcare provider may ask that you increase your pelvic muscle squeeze to 10 seconds  After you squeeze for 10 seconds, relax for 10 seconds  What else you should know:   · Once you know how to do Kegel exercises, use different positions  You can do these exercises while you lie on the floor, sit at your desk or watch TV, and while you stand  · You may notice improved bladder control within about 6 weeks  · Tighten your pelvic muscles before you sneeze, cough, or lift to prevent urine leakage      Follow up with your doctor as directed:  Write down your questions so you remember to ask them during your visits  © Copyright [employer] 2021 Information is for End User's use only and may not be sold, redistributed or otherwise used for commercial purposes  All illustrations and images included in CareNotes® are the copyrighted property of [employer]  or Ruthie Brumfield  The above information is an  only  It is not intended as medical advice for individual conditions or treatments  Talk to your doctor, nurse or pharmacist before following any medical regimen to see if it is safe and effective for you

## 2022-02-08 NOTE — PROGRESS NOTES
2/8/2022    Sanam Johnson  1935  [id number]        Assessment  -Overactive bladder  -Recurrent urinary tract infections  -History of nephrolithiasis     Discussion/Plan  Tam Blackman is a 82 y o  female who presents in consultation    1  Overactive bladder, recurrent urinary tract infections- patient unable to provide urine specimen in the office today  Provided orders for UA and culture  I recommend obtaining a renal ultrasound due to her increased lower urinary tract symptoms and remotes history of nephrolithiasis  We will call with her results  We also discussed dietary and behavioral modifications and adding Myrbetriq to current anticholinergic  Patient defers at this time, and wishes to continue oxybutynin only  We discussed that if results are negative and symptoms persist, would place referral to pelvic floor physical therapy  Call with results of testing, and determine follow up thereafter  Patient instructed to call sooner with any issues      -All questions answered, patients agree with plan     History of Present Illness  82 y o  female who presents in consultation today for evaluation of overactive bladder and urinary tract infections  Patient referred by her PCP  She reports previously following with [facility] urology 4 years ago  Patient has longstanding history of urinary frequency, urgency, and mixed incontinence  She is currently on oxybutynin 15mg daily  Patient states prior urologist had prescribed Myrbetriq, but medication was ineffective  She denies any gross hematuria or dysuria  No recent urine testing available for review  She also notes a remote history of nephrolithiasis and underwent ureteroscopy  Patient wears numerous sanitary pads daily for protection  Additional history includes insulin dependent diabetes and breast cancer  Review of Systems  Review of Systems   Constitutional: Negative  HENT: Negative  Respiratory: Negative      Cardiovascular: Negative  Gastrointestinal: Negative  Genitourinary: Positive for frequency and urgency  Negative for decreased urine volume, difficulty urinating, dysuria, flank pain and hematuria  Musculoskeletal: Negative  Skin: Negative  Neurological: Negative  Psychiatric/Behavioral: Negative  Past Medical History  Past Medical History:   Diagnosis Date    Anxiety     Asthma     Atrial fibrillation (HCC)     Breast cancer (HCC)     Chest pain, unspecified     CHF (congestive heart failure) (HCC)     Diabetes mellitus (HCC)     GERD (gastroesophageal reflux disease)     History of radiation exposure 1992    Hyperlipidemia     Hypertension     Irregular heart beat     Afib    Migraine     Obesity     Pericardial effusion     Pericarditis        Past Social History  Past Surgical History:   Procedure Laterality Date    ABDOMINAL ADHESION SURGERY      APPENDECTOMY      AUGMENTATION MAMMAPLASTY Left 1994    BACK SURGERY      BREAST LUMPECTOMY Left 1992    malignant    BREAST LUMPECTOMY Left 11/17/2020    Procedure: BREAST ULTRASOUND DIRECTED LUMPECTOMY (ULTRASOUND AT 1200);   Surgeon: Alpa Bird MD;  [facility];  Service: Surgical Oncology    BREAST SURGERY      CARDIAC SURGERY      Pericardial window    CHOLECYSTECTOMY      EYE SURGERY      FLAP LOCAL TRUNK Left 11/17/2020    Procedure: FLAP LOCAL TRUNK;  Surgeon: Michelle Allison MD;  [facility];  Service: Plastics    HYSTERECTOMY      IR DRAINAGE TUBE CHECK WITH SCLEROSIS  4/23/2021    IR DRAINAGE TUBE CHECK WITH SCLEROSIS  6/3/2021    IR DRAINAGE TUBE CHECK WITH SCLEROSIS  6/17/2021    IR DRAINAGE TUBE CHECK WITH SCLEROSIS  6/28/2021    IR DRAINAGE TUBE CHECK WITH SCLEROSIS  7/7/2021    IR DRAINAGE TUBE CHECK WITH SCLEROSIS  7/27/2021    IR DRAINAGE TUBE PLACEMENT  4/1/2021    IR DRAINAGE TUBE PLACEMENT WITH SCLEROSIS  5/14/2021    LYMPH NODE DISSECTION Left 11/17/2020    Procedure: HUGO  DIRECTED AXILLARY DISSECTION;  Surgeon: Marisol Campbell MD;  [facility];  Service: Surgical Oncology    MASTECTOMY Left 1994    malignant    WI CELESTE-IMPLANT CAPSULECTOMY BREAST COMPLETE Left 11/17/2020    Procedure: BREAST CAPSULECTOMY;  Surgeon: Margaret Kam MD;  [facility];  Service: Plastics    WI REMOVAL INTACT BREAST IMPLANT Left 11/17/2020    Procedure: BREAST IMPLANT REMOVAL;  Surgeon: Margaret Kam MD;  [facility];  Service: Plastics    US BREAST NEEDLE LOC LEFT Left 11/2/2020    US GUIDED BREAST BIOPSY LEFT COMPLETE Left 8/31/2020    US GUIDED BREAST LYMPH NODE BIOPSY LEFT Left 8/31/2020    WRIST SURGERY         Past Family History  Family History   Problem Relation Age of Onset   Jullie Liming Colon cancer Mother 54    Breast cancer Mother         Early 52's    Stroke Father     Emphysema Father     Leukemia Sister     Breast cancer Sister 72    ALS Brother        Past Social history  Social History     Socioeconomic History    Marital status: /Civil Union     Spouse name: Not on file    Number of children: Not on file    Years of education: Not on file    Highest education level: Not on file   Occupational History    Not on file   Tobacco Use    Smoking status: Never Smoker    Smokeless tobacco: Never Used   Vaping Use    Vaping Use: Never used   Substance and Sexual Activity    Alcohol use: Yes     Comment: Twice a month    Drug use: No    Sexual activity: Not Currently   Other Topics Concern    Not on file   Social History Narrative    Not on file     Social Determinants of Health     Financial Resource Strain: Not on file   Food Insecurity: Not on file   Transportation Needs: Not on file   Physical Activity: Not on file   Stress: Not on file   Social Connections: Not on file   Intimate Partner Violence: Not on file   Housing Stability: Not on file       Current Medications  Current Outpatient Medications   Medication Sig Dispense Refill    anastrozole (ARIMIDEX) 1 mg tablet Take 1 tablet (1 mg total) by mouth daily 90 tablet 3    EPINEPHrine (EPIPEN 2-SILVIO) 0 3 mg/0 3 mL SOAJ Inject 0 3 mL as directed      hydrocodone-chlorpheniramine polistirex (TUSSIONEX) 10-8 mg/5 mL ER suspension Take 5 mL by mouth every 12 (twelve) hours      hydrocortisone 2 5 % cream APPLY TO THE AFFECTED AREA TWICE DAILY  (MIX WITH NYSTATIN)      insulin aspart (NOVOLOG FLEXPEN) 100 Units/mL injection pen Novolog Flexpen U-100 Insulin aspart 100 unit/mL (3 mL) subcutaneous      Insulin Glargine (TOUJEO SOLOSTAR) 300 units/mL CONCETRATED U-300 injection pen Toujeo SoloStar U-300 Insulin 300 unit/mL (1 5 mL) subcutaneous pen   INJECT 67 UNITS BY SUBCUTANEOUS ROUTE AS PER INSULIN PROTOCOL      lansoprazole (PREVACID) 30 mg capsule lansoprazole 30 mg capsule,delayed release      Lidocaine 5 % CREA Apply 1 application topically as needed (pain) 1 Tube 0    lidocaine-prilocaine (EMLA) cream APPLY FOR 12 HOURS, AND THEN OFF 12 HOURS AS NEEDED  2    metoprolol tartrate (LOPRESSOR) 50 mg tablet Take 50 mg by mouth every 12 (twelve) hours       morphine (MS CONTIN) 15 mg 12 hr tablet Take 15 mg by mouth every 12 (twelve) hours as needed for severe pain      morphine (MSIR) 15 mg tablet Take 15 mg by mouth every 8 (eight) hours as needed for severe pain   NON FORMULARY Domperidone 20 mg TID      NOVOFINE 32G X 6 MM MISC 2 (two) times a day As directed  5    nystatin (MYCOSTATIN) cream APPLY TO AFFECTED AREAS TWICE A DAY  (MIX WITH HYDROCORTISONE)      ONE TOUCH ULTRA TEST test strip TEST TWICE DAILY OR AS DIRECTED DX: E11 9  5    ONETOUCH DELICA LANCETS 23O MISC TEST TWICE DAILY OR AS DIRECTED  5    oxybutynin (DITROPAN XL) 15 MG 24 hr tablet Take 15 mg by mouth daily   simvastatin (ZOCOR) 20 mg tablet Take 20 mg by mouth daily at bedtime        sodium chloride, PF, 0 9 % 10 mL by Intracatheter route daily Intracatheter flushing daily 300 mL 0    amoxicillin (AMOXIL) 500 mg capsule Take 500 mg by mouth every 8 (eight) hours   (Patient not taking: Reported on 2/8/2022 )      FLOVENT  MCG/ACT inhaler INHALE 2 PUFF BY INHALATION ROUTE 2 TIMES EVERY DAY (Patient not taking: Reported on 2/8/2022)  6    oxyCODONE-acetaminophen (PERCOCET) 5-325 mg per tablet   (Patient not taking: Reported on 2/8/2022 )      Probiotic Product (ALIGN) 4 MG CAPS Take by mouth (Patient not taking: Reported on 2/8/2022 )      triamcinolone (KENALOG) 0 1 % cream  (Patient not taking: Reported on 2/8/2022 )      VENTOLIN  (90 Base) MCG/ACT inhaler  (Patient not taking: Reported on 2/8/2022 )       No current facility-administered medications for this visit  Allergies  Allergies   Allergen Reactions    Betadine [Povidone Iodine] Anaphylaxis    Iodinated Diagnostic Agents Anaphylaxis    Iodine - Food Allergy Anaphylaxis and Other (See Comments)    Aspirin GI Bleeding    Caffeine - Food Allergy Palpitations       Past medical history, social history, family history, medications and allergies were reviewed  Vitals  Vitals:    02/08/22 1400   BP: 132/82   BP Location: Left arm   Patient Position: Sitting   Cuff Size: Adult   Pulse: 72   Weight: 85 3 kg (188 lb)   Height: 5' 3" (1 6 m)       Physical Exam  Physical Exam  Constitutional:       Appearance: Normal appearance  She is well-developed  HENT:      Head: Normocephalic  Eyes:      Pupils: Pupils are equal, round, and reactive to light  Pulmonary:      Effort: Pulmonary effort is normal    Abdominal:      Palpations: Abdomen is soft  Tenderness: There is no right CVA tenderness or left CVA tenderness  Musculoskeletal:         General: Normal range of motion  Cervical back: Normal range of motion  Comments: Ambulates with walker   Skin:     General: Skin is warm and dry  Neurological:      General: No focal deficit present  Mental Status: She is alert and oriented to person, place, and time     Psychiatric: Mood and Affect: Mood normal          Behavior: Behavior normal          Thought Content: Thought content normal          Judgment: Judgment normal          Results    I have personally reviewed all pertinent lab results and reviewed with patient  Lab Results   Component Value Date    GLUCOSE 103 01/30/2015    CALCIUM 10 0 04/01/2021     01/30/2015    K 5 5 (H) 04/01/2021    CO2 25 04/01/2021     04/01/2021    BUN 22 04/01/2021    CREATININE 1 38 (H) 04/01/2021     Lab Results   Component Value Date    WBC 10 53 (H) 04/01/2021    HGB 14 0 04/01/2021    HCT 44 2 04/01/2021    MCV 90 04/01/2021     04/01/2021     No results found for this or any previous visit (from the past 1 hour(s))

## 2022-02-10 ENCOUNTER — APPOINTMENT (OUTPATIENT)
Dept: LAB | Facility: CLINIC | Age: 87
End: 2022-02-10
Payer: MEDICARE

## 2022-02-10 DIAGNOSIS — N39.0 RECURRENT UTI: ICD-10-CM

## 2022-02-10 LAB
BACTERIA UR QL AUTO: ABNORMAL /HPF
BILIRUB UR QL STRIP: NEGATIVE
CAOX CRY URNS QL MICRO: ABNORMAL /HPF
CLARITY UR: ABNORMAL
COLOR UR: YELLOW
GLUCOSE UR STRIP-MCNC: NEGATIVE MG/DL
HGB UR QL STRIP.AUTO: ABNORMAL
KETONES UR STRIP-MCNC: NEGATIVE MG/DL
LEUKOCYTE ESTERASE UR QL STRIP: NEGATIVE
NITRITE UR QL STRIP: NEGATIVE
NON-SQ EPI CELLS URNS QL MICRO: ABNORMAL /HPF
PH UR STRIP.AUTO: 6 [PH]
PROT UR STRIP-MCNC: NEGATIVE MG/DL
RBC #/AREA URNS AUTO: ABNORMAL /HPF
SP GR UR STRIP.AUTO: 1.02 (ref 1–1.03)
UROBILINOGEN UR QL STRIP.AUTO: 0.2 E.U./DL
WBC #/AREA URNS AUTO: ABNORMAL /HPF

## 2022-02-10 PROCEDURE — 87086 URINE CULTURE/COLONY COUNT: CPT

## 2022-02-10 PROCEDURE — 87186 SC STD MICRODIL/AGAR DIL: CPT

## 2022-02-10 PROCEDURE — 81001 URINALYSIS AUTO W/SCOPE: CPT | Performed by: NURSE PRACTITIONER

## 2022-02-10 PROCEDURE — 87077 CULTURE AEROBIC IDENTIFY: CPT

## 2022-02-11 ENCOUNTER — TELEPHONE (OUTPATIENT)
Dept: UROLOGY | Facility: AMBULATORY SURGERY CENTER | Age: 87
End: 2022-02-11

## 2022-02-11 DIAGNOSIS — N39.0 URINARY TRACT INFECTION WITHOUT HEMATURIA, SITE UNSPECIFIED: Primary | ICD-10-CM

## 2022-02-11 RX ORDER — CEPHALEXIN 500 MG/1
500 CAPSULE ORAL EVERY 12 HOURS SCHEDULED
Qty: 10 CAPSULE | Refills: 0 | Status: SHIPPED | OUTPATIENT
Start: 2022-02-11 | End: 2022-02-16

## 2022-02-11 NOTE — TELEPHONE ENCOUNTER
Please inform patient results of urine culture were positive for infection, Keflex sent to her pharmacy  We will call if she requires a different antibiotic

## 2022-02-11 NOTE — TELEPHONE ENCOUNTER
[address] Kris Brumfield and notified her that Keflex was sent to pharmacy and encouraged her to hydrate with water

## 2022-02-12 LAB — BACTERIA UR CULT: ABNORMAL

## 2022-02-28 ENCOUNTER — HOSPITAL ENCOUNTER (OUTPATIENT)
Dept: RADIOLOGY | Age: 87
Discharge: HOME | End: 2022-02-28
Payer: MEDICARE

## 2022-02-28 DIAGNOSIS — N39.0 RECURRENT UTI: ICD-10-CM

## 2022-02-28 PROCEDURE — 76770 US EXAM ABDO BACK WALL COMP: CPT

## 2022-03-03 ENCOUNTER — TELEPHONE (OUTPATIENT)
Dept: UROLOGY | Facility: AMBULATORY SURGERY CENTER | Age: 87
End: 2022-03-03

## 2022-03-03 DIAGNOSIS — N32.81 OAB (OVERACTIVE BLADDER): Primary | ICD-10-CM

## 2022-03-03 NOTE — TELEPHONE ENCOUNTER
----- Message from [id number] Perry Larsen sent at 3/3/2022  1:28 PM EST -----  Please inform patient results of renal ultrasound were unremarkable  Bilateral simple cysts noted  If patient still symptomatic, would recommend adding Myrbetriq as previously discussed in the office

## 2022-03-03 NOTE — TELEPHONE ENCOUNTER
Prescription sent, however medication should be taken for at least 4 weeks to evaluate its effectiveness

## 2022-03-03 NOTE — TELEPHONE ENCOUNTER
Called and LM per communication consent that Mybertiq was called into [facility] on [address]  Advised that is was called in for a week's supply but the NP recommends it should be taken for at least 4 weeks to evaluate its effectiveness  She is to call back if she wants a month's worth

## 2022-03-07 ENCOUNTER — OFFICE VISIT (OUTPATIENT)
Dept: CARDIOLOGY CLINIC | Facility: CLINIC | Age: 87
End: 2022-03-07
Payer: MEDICARE

## 2022-03-07 VITALS
DIASTOLIC BLOOD PRESSURE: 76 MMHG | BODY MASS INDEX: 33.66 KG/M2 | SYSTOLIC BLOOD PRESSURE: 134 MMHG | WEIGHT: 190 LBS | HEIGHT: 63 IN | HEART RATE: 80 BPM | OXYGEN SATURATION: 96 % | RESPIRATION RATE: 14 BRPM

## 2022-03-07 DIAGNOSIS — E78.2 MIXED HYPERLIPIDEMIA: ICD-10-CM

## 2022-03-07 DIAGNOSIS — I50.32 CHRONIC DIASTOLIC HEART FAILURE (HCC): ICD-10-CM

## 2022-03-07 DIAGNOSIS — I10 ESSENTIAL HYPERTENSION: Primary | Chronic | ICD-10-CM

## 2022-03-07 PROCEDURE — 99214 OFFICE O/P EST MOD 30 MIN: CPT | Performed by: INTERNAL MEDICINE

## 2022-03-07 NOTE — LETTER
March 7, 2022     Christophe Delgado MD  [facility] [address]  [address] [address]  [address] [address]    Patient: Charlee Rogers   YOB: 1935   Date of Visit: 3/7/2022       Dear Dr Richard Santamaria: Thank you for referring Charlee Rogers to me for evaluation  Below are my notes for this consultation  If you have questions, please do not hesitate to call me  I look forward to following your patient along with you  Sincerely,        Ewa Boyer MD        CC: No Recipients  Ewa Boyer MD  3/7/2022  1:52 PM  Sign when Signing Visit  Assessment/Plan:    Essential hypertension   Hypertension, stable adequately controlled  Chronic heart failure (HCC)  Wt Readings from Last 3 Encounters:   03/07/22 86 2 kg (190 lb)   02/08/22 85 3 kg (188 lb)   01/28/22 83 5 kg (184 lb)       Chronic diastolic congestive heart failure, stable  Mixed hyperlipidemia    Hyperlipidemia, stable  The patient will continue simvastatin  Diagnoses and all orders for this visit:    Essential hypertension    Chronic diastolic heart failure ([address] )    Mixed hyperlipidemia          Subjective:   Feels well from the cardiac standpoint  Patient ID: Charlee Rogers is a 82 y o  female  Patient presented to this office for the purpose of cardiac follow-up  He is known to have hypertension hyperlipidemia and diastolic congestive heart failure  The patient has been feeling rather well from cardiac denying any symptoms chest pain, shortness of breath, palpitation, dizziness  or lightheadedness  She has no leg edema  The following portions of the patient's history were reviewed and updated as appropriate: allergies, current medications, past family history, past medical history, past social history, past surgical history and problem list     Review of Systems   Respiratory: Negative for apnea, cough, chest tightness, shortness of breath and wheezing      Cardiovascular: Negative for chest pain, palpitations and leg swelling  Gastrointestinal: Negative for abdominal pain  Neurological: Negative for dizziness and light-headedness  Psychiatric/Behavioral: Negative  Objective:  Stable cardiac-wise  /76 (BP Location: Right arm, Patient Position: Sitting, Cuff Size: Large)   Pulse 80   Resp 14   Ht 5' 3" (1 6 m)   Wt 86 2 kg (190 lb)   SpO2 96%   BMI 33 66 kg/m²          Physical Exam  Vitals reviewed  Constitutional:       General: She is not in acute distress  Appearance: She is well-developed  She is not diaphoretic  HENT:      Head: Normocephalic and atraumatic  Neck:      Thyroid: No thyromegaly  Vascular: No JVD  Cardiovascular:      Rate and Rhythm: Normal rate and regular rhythm  Heart sounds: S1 normal and S2 normal  No murmur heard  No friction rub  No gallop  Pulmonary:      Effort: Pulmonary effort is normal  No respiratory distress  Breath sounds: No wheezing or rales  Chest:      Chest wall: No tenderness  Abdominal:      Palpations: Abdomen is soft  Musculoskeletal:      Cervical back: Normal range of motion and neck supple  Right lower leg: No edema  Left lower leg: No edema     Psychiatric:         Mood and Affect: Mood normal          Behavior: Behavior normal

## 2022-03-07 NOTE — PROGRESS NOTES
Assessment/Plan:    Essential hypertension   Hypertension, stable adequately controlled  Chronic heart failure (HCC)  Wt Readings from Last 3 Encounters:   03/07/22 86 2 kg (190 lb)   02/08/22 85 3 kg (188 lb)   01/28/22 83 5 kg (184 lb)       Chronic diastolic congestive heart failure, stable  Mixed hyperlipidemia    Hyperlipidemia, stable  The patient will continue simvastatin  Diagnoses and all orders for this visit:    Essential hypertension    Chronic diastolic heart failure ([address] )    Mixed hyperlipidemia          Subjective:   Feels well from the cardiac standpoint  Patient ID: Herber Fregoso is a 82 y o  female  Patient presented to this office for the purpose of cardiac follow-up  He is known to have hypertension hyperlipidemia and diastolic congestive heart failure  The patient has been feeling rather well from cardiac denying any symptoms chest pain, shortness of breath, palpitation, dizziness  or lightheadedness  She has no leg edema  The following portions of the patient's history were reviewed and updated as appropriate: allergies, current medications, past family history, past medical history, past social history, past surgical history and problem list     Review of Systems   Respiratory: Negative for apnea, cough, chest tightness, shortness of breath and wheezing  Cardiovascular: Negative for chest pain, palpitations and leg swelling  Gastrointestinal: Negative for abdominal pain  Neurological: Negative for dizziness and light-headedness  Psychiatric/Behavioral: Negative  Objective:  Stable cardiac-wise  /76 (BP Location: Right arm, Patient Position: Sitting, Cuff Size: Large)   Pulse 80   Resp 14   Ht 5' 3" (1 6 m)   Wt 86 2 kg (190 lb)   SpO2 96%   BMI 33 66 kg/m²          Physical Exam  Vitals reviewed  Constitutional:       General: She is not in acute distress  Appearance: She is well-developed  She is not diaphoretic  HENT:      Head: Normocephalic and atraumatic  Neck:      Thyroid: No thyromegaly  Vascular: No JVD  Cardiovascular:      Rate and Rhythm: Normal rate and regular rhythm  Heart sounds: S1 normal and S2 normal  No murmur heard  No friction rub  No gallop  Pulmonary:      Effort: Pulmonary effort is normal  No respiratory distress  Breath sounds: No wheezing or rales  Chest:      Chest wall: No tenderness  Abdominal:      Palpations: Abdomen is soft  Musculoskeletal:      Cervical back: Normal range of motion and neck supple  Right lower leg: No edema  Left lower leg: No edema     Psychiatric:         Mood and Affect: Mood normal          Behavior: Behavior normal

## 2022-03-07 NOTE — ASSESSMENT & PLAN NOTE
Wt Readings from Last 3 Encounters:   03/07/22 86 2 kg (190 lb)   02/08/22 85 3 kg (188 lb)   01/28/22 83 5 kg (184 lb)       Chronic diastolic congestive heart failure, stable

## 2022-03-24 ENCOUNTER — TELEPHONE (OUTPATIENT)
Dept: HEMATOLOGY ONCOLOGY | Facility: CLINIC | Age: 87
End: 2022-03-24

## 2022-03-24 NOTE — TELEPHONE ENCOUNTER
Reschedule Appointment     Who is calling in Patient    Doctor Appointment Scheduled with Dr Lisa Rodriguez date and time 03/24 at 3:45pm   New date and time 04/04 at 1:00pm   [facility]   Patient verbalized understanding    yes

## 2022-03-29 DIAGNOSIS — C77.3 CARCINOMA OF LEFT BREAST METASTATIC TO AXILLARY LYMPH NODE (HCC): ICD-10-CM

## 2022-03-29 DIAGNOSIS — C50.912 CARCINOMA OF LEFT BREAST METASTATIC TO AXILLARY LYMPH NODE (HCC): ICD-10-CM

## 2022-03-30 RX ORDER — ANASTROZOLE 1 MG/1
TABLET ORAL
Qty: 90 TABLET | Refills: 3 | Status: SHIPPED | OUTPATIENT
Start: 2022-03-30

## 2022-04-04 ENCOUNTER — OFFICE VISIT (OUTPATIENT)
Dept: SURGICAL ONCOLOGY | Facility: CLINIC | Age: 87
End: 2022-04-04
Payer: MEDICARE

## 2022-04-04 VITALS
BODY MASS INDEX: 33.66 KG/M2 | SYSTOLIC BLOOD PRESSURE: 128 MMHG | HEIGHT: 63 IN | OXYGEN SATURATION: 98 % | HEART RATE: 78 BPM | TEMPERATURE: 97.9 F | DIASTOLIC BLOOD PRESSURE: 76 MMHG | WEIGHT: 190 LBS | RESPIRATION RATE: 16 BRPM

## 2022-04-04 DIAGNOSIS — Z17.0 MALIGNANT NEOPLASM OF UPPER-OUTER QUADRANT OF LEFT BREAST IN FEMALE, ESTROGEN RECEPTOR POSITIVE (HCC): Primary | ICD-10-CM

## 2022-04-04 DIAGNOSIS — C77.3 CARCINOMA OF LEFT BREAST METASTATIC TO AXILLARY LYMPH NODE (HCC): ICD-10-CM

## 2022-04-04 DIAGNOSIS — Z79.811 USE OF ANASTROZOLE (ARIMIDEX): ICD-10-CM

## 2022-04-04 DIAGNOSIS — C50.912 CARCINOMA OF LEFT BREAST METASTATIC TO AXILLARY LYMPH NODE (HCC): ICD-10-CM

## 2022-04-04 DIAGNOSIS — C50.412 MALIGNANT NEOPLASM OF UPPER-OUTER QUADRANT OF LEFT BREAST IN FEMALE, ESTROGEN RECEPTOR POSITIVE (HCC): Primary | ICD-10-CM

## 2022-04-04 PROCEDURE — 99214 OFFICE O/P EST MOD 30 MIN: CPT | Performed by: SURGERY

## 2022-04-04 NOTE — PROGRESS NOTES
Surgical Oncology Follow Up       [address] [place] [address]   [facility] [place]  [address] [facility] [address]  [facility] [id number]    David Stewart  1935  [id number]  [address] [facility]  [facility] [place]  [address] [facility] [address] [id number]    Chief Complaint   Patient presents with    Breast Cancer          Assessment & Plan:   Patient presents for a six-month follow-up visit for her left recurrent breast cancer  She complains of intermittent sharp spasms in her mastectomy site  These do not seem to be overly troublesome though  There is no evidence of local regional or distant recurrence disease on her clinical exam   She remains on anastrozole with Dr Dorothy Lux  Cancer History:     Oncology History   Malignant neoplasm of upper-outer quadrant of left breast in female, estrogen receptor positive ([facility] [address] )   1992 Initial Diagnosis    Left breast cancer  Lumpectomy  RT      1994 Surgery    Left breast cancer recurrence  Mastectomy with immediate reconstruction     8/31/2020 Biopsy    Left breast ultrasound-guided biopsy  A  1 - 2 o'clock  Invasive mammary carcinoma of no special type   Grade 2    MS 90  HER2 1+  Lymphovascular invasion not identified    B  Left axillary lymph node  Invasive mammary carcinoma of no special type  Grade 2    Concordant  No residual lymph node is identified in specimen B, favoring axillary extension of invasive carcinoma over lymph node metastasis  Breast mass measured 2 8cm on US and involved skin  Axillary mass measured 1 4cm  Right clear       11/17/2020 Surgery    Left breast ultrasound-guided lumpectomy with HUGO  directed axillary dissection  Invasive carcinoma of no special type (ductal)  Grade 2  3 2 cm  Invasive carcinoma directly invades into the dermis or epidermis with skin ulceration  Carcinoma invades skeletal muscle  Lymphovascular invasion present  Anterior margin positive for invasive carcinoma  3/5 Lymph nodes with macrometastases (1 6 cm)  Anatomic Stage IIIB  Prognostic Stage IIIA    Immediate reconstruction with Dr Reta Barahona (implant removal and local flap)     12/21/2020 -  Hormone Therapy    Anastrozole 1 mg daily  Dr Melva Alicia of left breast 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 ([facility] [address] )         Interval History:   Overall the patient is doing well  She complains of being tired though her PCP and others have stated this may be because of her multiple medications that she needs  Review of Systems:   Review of Systems   All other systems reviewed and are negative        Past Medical History     Patient Active Problem List   Diagnosis    Essential hypertension    GERD without esophagitis    Diabetes mellitus type 2 in obese ([facility] [address] )    [facility]com[facility] dependence [facility]    Chronic heart failure (HCC)    Mixed hyperlipidemia    Migraine without aura and without status migrainosus, not intractable    Malignant neoplasm of upper-outer quadrant of left breast in female, estrogen receptor positive (HCC)    Atrial fibrillation, currently in sinus rhythm    Chronic pain    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)    Use of anastrozole (Arimidex)     Past Medical History:   Diagnosis Date    Anxiety     Asthma     Atrial fibrillation (HCC)     Breast cancer (HCC)     Chest pain, unspecified     CHF (congestive heart failure) (HCC)     Diabetes mellitus ([facility] )     GERD (gastroesophageal reflux disease)     History of radiation exposure 1992    Hyperlipidemia     Hypertension     Irregular heart beat     Afib    Migraine     Obesity     Pericardial effusion     Pericarditis      Past Surgical History:   Procedure Laterality Date    ABDOMINAL ADHESION SURGERY      APPENDECTOMY      AUGMENTATION MAMMAPLASTY Left 1994    BACK SURGERY      BREAST LUMPECTOMY Left 1992    malignant    BREAST LUMPECTOMY Left 11/17/2020    Procedure: BREAST ULTRASOUND DIRECTED LUMPECTOMY (ULTRASOUND AT 1200);   Surgeon: Barbara Cabrera MD;  [facility];  Service: Surgical Oncology    BREAST SURGERY      CARDIAC SURGERY      Pericardial window    CHOLECYSTECTOMY      EYE SURGERY      FLAP LOCAL TRUNK Left 11/17/2020    Procedure: FLAP LOCAL TRUNK;  Surgeon: Liam Solis MD;  [facility];  Service: Plastics    HYSTERECTOMY      IR DRAINAGE TUBE CHECK WITH SCLEROSIS  4/23/2021    IR DRAINAGE TUBE CHECK WITH SCLEROSIS  6/3/2021    IR DRAINAGE TUBE CHECK WITH SCLEROSIS  6/17/2021    IR DRAINAGE TUBE CHECK WITH SCLEROSIS  6/28/2021    IR DRAINAGE TUBE CHECK WITH SCLEROSIS  7/7/2021    IR DRAINAGE TUBE CHECK WITH SCLEROSIS  7/27/2021    IR DRAINAGE TUBE PLACEMENT  4/1/2021    IR DRAINAGE TUBE PLACEMENT WITH SCLEROSIS  5/14/2021    LYMPH NODE DISSECTION Left 11/17/2020    Procedure: HUGO  DIRECTED AXILLARY DISSECTION;  Surgeon: Barbara Cabrera MD;  [facility];  Service: Surgical Oncology    MASTECTOMY Left 1994    malignant    NV CELESTE-IMPLANT CAPSULECTOMY BREAST COMPLETE Left 11/17/2020    Procedure: BREAST CAPSULECTOMY;  Surgeon: Liam Solis MD;  [facility];  Service: Plastics    NV REMOVAL INTACT BREAST IMPLANT Left 11/17/2020    Procedure: BREAST IMPLANT REMOVAL;  Surgeon: Liam Solis MD;  [facility];  Service: Plastics    US BREAST NEEDLE LOC LEFT Left 11/2/2020    US GUIDED BREAST BIOPSY LEFT COMPLETE Left 8/31/2020    US GUIDED BREAST LYMPH NODE BIOPSY LEFT Left 8/31/2020    WRIST SURGERY       Family History   Problem Relation Age of Onset    Colon cancer Mother 54    Breast cancer Mother         Early 52's    Stroke Father     Emphysema Father     Leukemia Sister     Breast cancer Sister 72    ALS Brother      Social History     Socioeconomic History    Marital status: /Civil Union     Spouse name: Not on file    Number of children: Not on file    Years of education: Not on file   Shruti Moses education level: Not on file   Occupational History    Not on file   Tobacco Use    Smoking status: Never Smoker    Smokeless tobacco: Never Used   Vaping Use    Vaping Use: Never used   Substance and Sexual Activity    Alcohol use: Yes     Comment: Twice a month    Drug use: No    Sexual activity: Not Currently   Other Topics Concern    Not on file   Social History Narrative    Not on file     Social Determinants of Health     Financial Resource Strain: Not on file   Food Insecurity: Not on file   Transportation Needs: Not on file   Physical Activity: Not on file   Stress: Not on file   Social Connections: Not on file   Intimate Partner Violence: Not on file   Housing Stability: Not on file       Current Outpatient Medications:     anastrozole (ARIMIDEX) 1 mg tablet, TAKE 1 TABLET EVERY DAY, Disp: 90 tablet, Rfl: 3    EPINEPHrine (EPIPEN 2-SILVIO) 0 3 mg/0 3 mL SOAJ, Inject 0 3 mL as directed, Disp: , Rfl:     FLOVENT  MCG/ACT inhaler, INHALE 2 PUFF BY INHALATION ROUTE 2 TIMES EVERY DAY, Disp: , Rfl: 6    hydrocodone-chlorpheniramine polistirex (TUSSIONEX) 10-8 mg/5 mL ER suspension, Take 5 mL by mouth every 12 (twelve) hours, Disp: , Rfl:     hydrocortisone 2 5 % cream, APPLY TO THE AFFECTED AREA TWICE DAILY   ([address], [address]), Disp: , Rfl:     insulin aspart (NOVOLOG FLEXPEN) 100 Units/mL injection pen, Novolog Flexpen U-100 Insulin aspart 100 unit/mL (3 mL) subcutaneous, Disp: , Rfl:     Insulin Glargine (TOUJEO SOLOSTAR) 300 units/mL CONCETRATED U-300 injection pen, Toujeo SoloStar U-300 Insulin 300 unit/mL (1 5 mL) subcutaneous pen  INJECT 67 UNITS BY SUBCUTANEOUS ROUTE AS PER INSULIN PROTOCOL, Disp: , Rfl:     lansoprazole (PREVACID) 30 mg capsule, lansoprazole 30 mg capsule,delayed release, Disp: , Rfl:     Lidocaine 5 % CREA, Apply 1 application topically as needed (pain), Disp: 1 Tube, Rfl: 0    lidocaine-prilocaine (EMLA) cream, APPLY FOR 12 HOURS, AND THEN OFF 12 HOURS AS NEEDED, Disp: , Rfl: 2    metoprolol tartrate (LOPRESSOR) 50 mg tablet, Take 50 mg by mouth every 12 (twelve) hours , Disp: , Rfl:     morphine (MS CONTIN) 15 mg 12 hr tablet, Take 15 mg by mouth every 12 (twelve) hours as needed for severe pain, Disp: , Rfl:     morphine (MSIR) 15 mg tablet, Take 15 mg by mouth every 8 (eight) hours as needed for severe pain , Disp: , Rfl:     NON FORMULARY, Domperidone 20 mg TID, Disp: , Rfl:     NOVOFINE 32G X 6 MM MISC, 2 (two) times a day As directed, Disp: , Rfl: 5    nystatin (MYCOSTATIN) cream, APPLY TO AFFECTED AREAS TWICE A DAY  (MIX WITH HYDROCORTISONE), Disp: , Rfl:     ONE TOUCH ULTRA TEST test strip, TEST TWICE DAILY OR AS DIRECTED DX: E11 9, Disp: , Rfl: 5    ONETOUCH DELICA LANCETS 03W MISC, TEST TWICE DAILY OR AS DIRECTED, Disp: , Rfl: 5    oxybutynin (DITROPAN XL) 15 MG 24 hr tablet, Take 15 mg by mouth daily  , Disp: , Rfl:     oxyCODONE-acetaminophen (PERCOCET) 5-325 mg per tablet,  , Disp: , Rfl:     Probiotic Product (ALIGN) 4 MG CAPS, Take by mouth  , Disp: , Rfl:     simvastatin (ZOCOR) 20 mg tablet, Take 20 mg by mouth daily at bedtime  , Disp: , Rfl:     sodium chloride, PF, 0 9 %, 10 mL by Intracatheter route daily Intracatheter flushing daily, Disp: 300 mL, Rfl: 0    triamcinolone (KENALOG) 0 1 % cream,  , Disp: , Rfl:     VENTOLIN  (90 Base) MCG/ACT inhaler,  , Disp: , Rfl:     amoxicillin (AMOXIL) 500 mg capsule, Take 500 mg by mouth every 8 (eight) hours   (Patient not taking: Reported on 4/4/2022 ), Disp: , Rfl:     Mirabegron ER 25 MG TB24, Take 25 mg by mouth in the morning, Disp: 30 tablet, Rfl: 3  Allergies   Allergen Reactions    Betadine [Povidone Iodine] Anaphylaxis    Iodinated Diagnostic Agents Anaphylaxis    Iodine - Food Allergy Anaphylaxis and Other (See Comments)    Aspirin GI Bleeding    Caffeine - Food Allergy Palpitations       Physical Exam:     Vitals:    04/04/22 1243   BP: 128/76   Pulse: 78   Resp: 16   Temp: 97 9 °F (36 6 °C)   SpO2: 98%     Physical Exam  Vitals reviewed  Constitutional:       Appearance: She is well-developed  HENT:      Head: Normocephalic and atraumatic  Eyes:      Pupils: Pupils are equal, round, and reactive to light  Neck:      Thyroid: No thyromegaly  Vascular: No JVD  Trachea: No tracheal deviation  Cardiovascular:      Rate and Rhythm: Normal rate and regular rhythm  Heart sounds: Normal heart sounds  No murmur heard  No friction rub  No gallop  Pulmonary:      Effort: Pulmonary effort is normal  No respiratory distress  Breath sounds: Normal breath sounds  No wheezing or rales  Chest:      Comments: The right breast was examined in the sitting and supine position  There are no worrisome skin changes, tenderness, inverted nipple, nipple discharge, swelling, bleeding or evidence of a mass in any quadrant  Errol survey demonstrated no evidence of any clinically suspicious axillary, pectoral or paraclavicular lymph nodes  Examination of the left mastectomy site demonstrates moderate scar tissue but no evidence of local regional recurrence  There is no axillary adenopathy no worrisome skin findings  Abdominal:      General: There is no distension  Palpations: Abdomen is soft  There is no hepatomegaly or mass  Tenderness: There is no abdominal tenderness  There is no guarding or rebound  Musculoskeletal:         General: No tenderness  Normal range of motion  Cervical back: Normal range of motion and neck supple  Lymphadenopathy:      Cervical: No cervical adenopathy  Skin:     General: Skin is warm and dry  Findings: No erythema or rash  Neurological:      Mental Status: She is alert and oriented to person, place, and time  Cranial Nerves: No cranial nerve deficit     Psychiatric:         Behavior: Behavior normal            Results & Discussion:   Overall the patient is free of any evidence of local regional or distant recurrence disease  She is due for a mammogram   She informs me that she has an order from her PCP however do not see it in the chart  I will coordinate a right diagnostic mammogram so that were certain she least has a order  We will see her back in 6 months  Advance Care Planning/Advance Directives:  I discussed the disease status, treatment plans and follow-up with the patient

## 2022-04-20 ENCOUNTER — HOSPITAL ENCOUNTER (OUTPATIENT)
Dept: GASTROENTEROLOGY | Facility: HOSPITAL | Age: 87
Setting detail: OUTPATIENT SURGERY
Discharge: HOME | End: 2022-04-20
Attending: INTERNAL MEDICINE
Payer: MEDICARE

## 2022-04-20 ENCOUNTER — ANESTHESIA (OUTPATIENT)
Dept: GASTROENTEROLOGY | Facility: HOSPITAL | Age: 87
End: 2022-04-20

## 2022-04-20 ENCOUNTER — ANESTHESIA EVENT (OUTPATIENT)
Dept: GASTROENTEROLOGY | Facility: HOSPITAL | Age: 87
End: 2022-04-20

## 2022-04-20 VITALS
SYSTOLIC BLOOD PRESSURE: 133 MMHG | HEART RATE: 67 BPM | TEMPERATURE: 96.6 F | OXYGEN SATURATION: 94 % | DIASTOLIC BLOOD PRESSURE: 66 MMHG | RESPIRATION RATE: 20 BRPM | HEIGHT: 63 IN | WEIGHT: 190 LBS | BODY MASS INDEX: 33.66 KG/M2

## 2022-04-20 DIAGNOSIS — K22.70 BARRETT'S ESOPHAGUS WITHOUT DYSPLASIA: ICD-10-CM

## 2022-04-20 LAB — GLUCOSE SERPL-MCNC: 196 MG/DL (ref 65–140)

## 2022-04-20 PROCEDURE — 88313 SPECIAL STAINS GROUP 2: CPT | Performed by: PATHOLOGY

## 2022-04-20 PROCEDURE — 88305 TISSUE EXAM BY PATHOLOGIST: CPT | Performed by: PATHOLOGY

## 2022-04-20 PROCEDURE — 82948 REAGENT STRIP/BLOOD GLUCOSE: CPT

## 2022-04-20 RX ORDER — SODIUM CHLORIDE 9 MG/ML
INJECTION, SOLUTION INTRAVENOUS CONTINUOUS PRN
Status: DISCONTINUED | OUTPATIENT
Start: 2022-04-20 | End: 2022-04-20

## 2022-04-20 RX ORDER — LIDOCAINE HYDROCHLORIDE 10 MG/ML
INJECTION, SOLUTION EPIDURAL; INFILTRATION; INTRACAUDAL; PERINEURAL AS NEEDED
Status: DISCONTINUED | OUTPATIENT
Start: 2022-04-20 | End: 2022-04-20

## 2022-04-20 RX ORDER — PROPOFOL 10 MG/ML
INJECTION, EMULSION INTRAVENOUS AS NEEDED
Status: DISCONTINUED | OUTPATIENT
Start: 2022-04-20 | End: 2022-04-20

## 2022-04-20 RX ORDER — PROPOFOL 10 MG/ML
INJECTION, EMULSION INTRAVENOUS CONTINUOUS PRN
Status: DISCONTINUED | OUTPATIENT
Start: 2022-04-20 | End: 2022-04-20

## 2022-04-20 RX ADMIN — SODIUM CHLORIDE: 0.9 INJECTION, SOLUTION INTRAVENOUS at 14:01

## 2022-04-20 RX ADMIN — PROPOFOL 20 MG: 10 INJECTION, EMULSION INTRAVENOUS at 14:14

## 2022-04-20 RX ADMIN — PROPOFOL 50 MG: 10 INJECTION, EMULSION INTRAVENOUS at 14:07

## 2022-04-20 RX ADMIN — PROPOFOL 50 MCG/KG/MIN: 10 INJECTION, EMULSION INTRAVENOUS at 14:06

## 2022-04-20 RX ADMIN — PROPOFOL 50 MG: 10 INJECTION, EMULSION INTRAVENOUS at 14:09

## 2022-04-20 RX ADMIN — PROPOFOL 30 MG: 10 INJECTION, EMULSION INTRAVENOUS at 14:11

## 2022-04-20 RX ADMIN — PROPOFOL 50 MG: 10 INJECTION, EMULSION INTRAVENOUS at 14:06

## 2022-04-20 RX ADMIN — LIDOCAINE HYDROCHLORIDE 50 MG: 10 INJECTION, SOLUTION EPIDURAL; INFILTRATION; INTRACAUDAL; PERINEURAL at 14:06

## 2022-04-20 NOTE — ANESTHESIA PREPROCEDURE EVALUATION
Procedure:  EGD    Relevant Problems   CARDIO   (+) Essential hypertension   (+) Migraine without aura and without status migrainosus, not intractable   (+) Mixed hyperlipidemia      ENDO   (+) Diabetes mellitus type 2 in obese (HCC)      GI/HEPATIC   (+) GERD without esophagitis      GYN   (+) Malignant neoplasm of upper-outer quadrant of left breast in female, estrogen receptor positive (HCC)      NEURO/PSYCH   (+) Atrial fibrillation, currently in sinus rhythm   (+) Chronic pain   (+) Migraine without aura and without status migrainosus, not intractable             Anesthesia Plan  ASA Score- 3     Anesthesia Type- IV sedation with anesthesia with ASA Monitors  Additional Monitors:   Airway Plan:           Plan Factors-Exercise tolerance (METS): >4 METS  Patient summary reviewed  Patient is not a current smoker  Obstructive sleep apnea risk education given perioperatively  Induction- intravenous  Postoperative Plan-     Informed Consent- Anesthetic plan and risks discussed with patient  I personally reviewed this patient with the CRNA  Discussed and agreed on the Anesthesia Plan with the CRNA  Juan Diego Ching

## 2022-04-20 NOTE — ANESTHESIA POSTPROCEDURE EVALUATION
Post-Op Assessment Note    CV Status:  Stable  Pain Score: 0    Pain management: adequate     Mental Status:  Arousable and sleepy   Hydration Status:  Euvolemic   PONV Controlled:  Controlled   Airway Patency:  Patent      Post Op Vitals Reviewed: Yes      Staff: CRNA, Anesthesiologist         No complications documented      /65 (04/20/22 1422)    Temp (!) 96 6 °F (35 9 °C) (04/20/22 1422)    Pulse 68 (04/20/22 1422)   Resp 20 (04/20/22 1422)    SpO2 95 % (04/20/22 1422)

## 2022-04-20 NOTE — H&P
History and Physical - [facility]  Jesusita Early 82 y o  female MRN: [id number]                  HPI: Jesusita Early is a 82y o  year old female who presents for an upper endoscopy  Indications for the procedure:  Gastroesophageal reflux, possibly history of short-segment Heart's esophagus  REVIEW OF SYSTEMS: Per the HPI, and otherwise unremarkable  Historical Information   Past Medical History:   Diagnosis Date    Anxiety     Asthma     Atrial fibrillation (HCC)     Breast cancer (HCC)     Chest pain, unspecified     CHF (congestive heart failure) (HCC)     Diabetes mellitus (HCC)     GERD (gastroesophageal reflux disease)     History of radiation exposure 1992    Hyperlipidemia     Hypertension     Irregular heart beat     Afib    Migraine     Obesity     Pericardial effusion     Pericarditis      Past Surgical History:   Procedure Laterality Date    ABDOMINAL ADHESION SURGERY      APPENDECTOMY      AUGMENTATION MAMMAPLASTY Left 1994    BACK SURGERY      BREAST LUMPECTOMY Left 1992    malignant    BREAST LUMPECTOMY Left 11/17/2020    Procedure: BREAST ULTRASOUND DIRECTED LUMPECTOMY (ULTRASOUND AT 1200);   Surgeon: Lázaro Chang MD;  [facility];  Service: Surgical Oncology    BREAST SURGERY      CARDIAC SURGERY      Pericardial window    CHOLECYSTECTOMY      EYE SURGERY      FLAP LOCAL TRUNK Left 11/17/2020    Procedure: FLAP LOCAL TRUNK;  Surgeon: Montana Hackett MD;  [facility];  Service: Plastics    HYSTERECTOMY      IR DRAINAGE TUBE CHECK WITH SCLEROSIS  4/23/2021    IR DRAINAGE TUBE CHECK WITH SCLEROSIS  6/3/2021    IR DRAINAGE TUBE CHECK WITH SCLEROSIS  6/17/2021    IR DRAINAGE TUBE CHECK WITH SCLEROSIS  6/28/2021    IR DRAINAGE TUBE CHECK WITH SCLEROSIS  7/7/2021    IR DRAINAGE TUBE CHECK WITH SCLEROSIS  7/27/2021    IR DRAINAGE TUBE PLACEMENT  4/1/2021    IR DRAINAGE TUBE PLACEMENT WITH SCLEROSIS  5/14/2021    LYMPH NODE DISSECTION Left 11/17/2020    Procedure: HUGO  DIRECTED AXILLARY DISSECTION;  Surgeon: Sheela Antonio MD;  [facility];  Service: Surgical Oncology    MASTECTOMY Left 1994    malignant    CT CELESTE-IMPLANT CAPSULECTOMY BREAST COMPLETE Left 11/17/2020    Procedure: BREAST CAPSULECTOMY;  Surgeon: Leann Jarrett MD;  [facility];  Service: Plastics    CT REMOVAL INTACT BREAST IMPLANT Left 11/17/2020    Procedure: BREAST IMPLANT REMOVAL;  Surgeon: Leann Jarrett MD;  [facility];  Service: Plastics    US BREAST NEEDLE LOC LEFT Left 11/2/2020    US GUIDED BREAST BIOPSY LEFT COMPLETE Left 8/31/2020    US GUIDED BREAST LYMPH NODE BIOPSY LEFT Left 8/31/2020    WRIST SURGERY       Social History   Social History     Substance and Sexual Activity   Alcohol Use Yes    Comment: Twice a month     Social History     Substance and Sexual Activity   Drug Use No     Social History     Tobacco Use   Smoking Status Never Smoker   Smokeless Tobacco Never Used     Family History   Problem Relation Age of Onset    Colon cancer Mother 54    Breast cancer Mother         Early 52's    Stroke Father     Emphysema Father     Leukemia Sister     Breast cancer Sister 72    ALS Brother        Meds/Allergies       Current Outpatient Medications:     anastrozole (ARIMIDEX) 1 mg tablet    hydrocodone-chlorpheniramine polistirex (TUSSIONEX) 10-8 mg/5 mL ER suspension    insulin aspart (NOVOLOG FLEXPEN) 100 Units/mL injection pen    Insulin Glargine (TOUJEO SOLOSTAR) 300 units/mL CONCETRATED U-300 injection pen    lansoprazole (PREVACID) 30 mg capsule    metoprolol tartrate (LOPRESSOR) 50 mg tablet    Mirabegron ER 25 MG TB24    morphine (MS CONTIN) 15 mg 12 hr tablet    NON FORMULARY    oxybutynin (DITROPAN XL) 15 MG 24 hr tablet    simvastatin (ZOCOR) 20 mg tablet    VENTOLIN  (90 Base) MCG/ACT inhaler    EPINEPHrine (EPIPEN 2-SILVIO) 0 3 mg/0 3 mL SOAJ    FLOVENT  MCG/ACT inhaler   hydrocortisone 2 5 % cream    Lidocaine 5 % CREA    lidocaine-prilocaine (EMLA) cream    morphine (MSIR) 15 mg tablet    NOVOFINE 32G X 6 MM MISC    nystatin (MYCOSTATIN) cream    ONE TOUCH ULTRA TEST test strip    ONETOUCH DELICA LANCETS 02F MISC    Probiotic Product (ALIGN) 4 MG CAPS    sodium chloride, PF, 0 9 %    triamcinolone (KENALOG) 0 1 % cream    Allergies   Allergen Reactions    Betadine [Povidone Iodine] Anaphylaxis    Iodinated Diagnostic Agents Anaphylaxis    Iodine - Food Allergy Anaphylaxis and Other (See Comments)    Aspirin GI Bleeding    Caffeine - Food Allergy Palpitations       Objective     There were no vitals taken for this visit  PHYSICAL EXAM    Gen: NAD  Head: NCAT  CV: RRR  CHEST: Clear  ABD: soft, NT/ND  EXT: no edema      ASSESSMENT/PLAN:  This is a 82y o  year old female here for EGD, and she is stable and optimized for her procedure

## 2022-05-24 ENCOUNTER — TELEPHONE (OUTPATIENT)
Dept: SURGICAL ONCOLOGY | Facility: CLINIC | Age: 87
End: 2022-05-24

## 2022-05-24 NOTE — TELEPHONE ENCOUNTER
Called patient to follow up with her overdue right mammogram  When patient was in to see Dr Nuria Avila in April, she was agreeable to scheduling and having her mammogram performed; however, nothing was listed in EPIC  Patient reports that she had a mammogram scheduled for this Thursday, 5/26, at [facility] but she fell backwards yesterday and had to cancel the study as she is "banged up " Explained that addressing her injuries from the fall was a priority but when she was ready to have her mammogram done, she could call central scheduling to have her mammogram scheduled at the [facility] so Dr Nuria Avila would be able to see the images  Patient was agreeable to this and the number for central scheduling was provided  Patient denies any questions at this time and was appreciative of the call

## 2022-07-25 DIAGNOSIS — N32.81 OAB (OVERACTIVE BLADDER): ICD-10-CM

## 2022-09-14 ENCOUNTER — HOSPITAL ENCOUNTER (OUTPATIENT)
Dept: GASTROENTEROLOGY | Facility: HOSPITAL | Age: 87
Setting detail: OUTPATIENT SURGERY
Discharge: HOME | End: 2022-09-14
Attending: INTERNAL MEDICINE
Payer: MEDICARE

## 2022-09-14 ENCOUNTER — ANESTHESIA (OUTPATIENT)
Dept: ANESTHESIOLOGY | Facility: HOSPITAL | Age: 87
End: 2022-09-14

## 2022-09-14 ENCOUNTER — ANESTHESIA EVENT (OUTPATIENT)
Dept: GASTROENTEROLOGY | Facility: HOSPITAL | Age: 87
End: 2022-09-14

## 2022-09-14 ENCOUNTER — ANESTHESIA EVENT (OUTPATIENT)
Dept: ANESTHESIOLOGY | Facility: HOSPITAL | Age: 87
End: 2022-09-14

## 2022-09-14 ENCOUNTER — ANESTHESIA (OUTPATIENT)
Dept: GASTROENTEROLOGY | Facility: HOSPITAL | Age: 87
End: 2022-09-14

## 2022-09-14 VITALS
HEART RATE: 73 BPM | RESPIRATION RATE: 18 BRPM | OXYGEN SATURATION: 92 % | SYSTOLIC BLOOD PRESSURE: 104 MMHG | TEMPERATURE: 97.9 F | DIASTOLIC BLOOD PRESSURE: 57 MMHG

## 2022-09-14 DIAGNOSIS — Z86.010 PERSONAL HISTORY OF COLONIC POLYPS: ICD-10-CM

## 2022-09-14 RX ORDER — PROPOFOL 10 MG/ML
INJECTION, EMULSION INTRAVENOUS CONTINUOUS PRN
Status: DISCONTINUED | OUTPATIENT
Start: 2022-09-14 | End: 2022-09-14

## 2022-09-14 RX ORDER — SODIUM CHLORIDE 9 MG/ML
INJECTION, SOLUTION INTRAVENOUS CONTINUOUS PRN
Status: DISCONTINUED | OUTPATIENT
Start: 2022-09-14 | End: 2022-09-14

## 2022-09-14 RX ORDER — PROPOFOL 10 MG/ML
INJECTION, EMULSION INTRAVENOUS AS NEEDED
Status: DISCONTINUED | OUTPATIENT
Start: 2022-09-14 | End: 2022-09-14

## 2022-09-14 RX ADMIN — PROPOFOL 120 MCG/KG/MIN: 10 INJECTION, EMULSION INTRAVENOUS at 14:02

## 2022-09-14 RX ADMIN — PROPOFOL 40 MG: 10 INJECTION, EMULSION INTRAVENOUS at 14:04

## 2022-09-14 RX ADMIN — PROPOFOL 100 MG: 10 INJECTION, EMULSION INTRAVENOUS at 14:02

## 2022-09-14 RX ADMIN — SODIUM CHLORIDE: 0.9 INJECTION, SOLUTION INTRAVENOUS at 13:58

## 2022-09-14 RX ADMIN — LIDOCAINE HYDROCHLORIDE 100 MG: 20 INJECTION INTRAVENOUS at 14:02

## 2022-09-14 NOTE — H&P
History and Physical - [facility]  Bernadette Moise 82 y o  female MRN: [id number]                  HPI: Bernadette Moise is a 82y o  year old female who presents for for an upper endoscopy  Indications for the procedure: Follow-up on partially resected gastric adenoma, EGD on 4/20/ 2022  REVIEW OF SYSTEMS: Per the HPI, and otherwise unremarkable  Historical Information   Past Medical History:   Diagnosis Date    Anxiety     Asthma     Atrial fibrillation (HCC)     Breast cancer (HCC)     Chest pain, unspecified     CHF (congestive heart failure) (HCC)     Diabetes mellitus (HCC)     GERD (gastroesophageal reflux disease)     History of radiation exposure 1992    Hyperlipidemia     Hypertension     Irregular heart beat     Afib    Migraine     Obesity     Pericardial effusion     Pericarditis      Past Surgical History:   Procedure Laterality Date    ABDOMINAL ADHESION SURGERY      APPENDECTOMY      AUGMENTATION MAMMAPLASTY Left 1994    BACK SURGERY      BREAST LUMPECTOMY Left 1992    malignant    BREAST LUMPECTOMY Left 11/17/2020    Procedure: BREAST ULTRASOUND DIRECTED LUMPECTOMY (ULTRASOUND AT 1200);   Surgeon: Alexx Mars MD;  [facility];  Service: Surgical Oncology    BREAST SURGERY      CARDIAC SURGERY      Pericardial window    CHOLECYSTECTOMY      EYE SURGERY      FLAP LOCAL TRUNK Left 11/17/2020    Procedure: FLAP LOCAL TRUNK;  Surgeon: Ursula Anne MD;  [facility];  Service: Plastics    HYSTERECTOMY      IR DRAINAGE TUBE CHECK WITH SCLEROSIS  4/23/2021    IR DRAINAGE TUBE CHECK WITH SCLEROSIS  6/3/2021    IR DRAINAGE TUBE CHECK WITH SCLEROSIS  6/17/2021    IR DRAINAGE TUBE CHECK WITH SCLEROSIS  6/28/2021    IR DRAINAGE TUBE CHECK WITH SCLEROSIS  7/7/2021    IR DRAINAGE TUBE CHECK WITH SCLEROSIS  7/27/2021    IR DRAINAGE TUBE PLACEMENT  4/1/2021    IR DRAINAGE TUBE PLACEMENT WITH SCLEROSIS  5/14/2021    LYMPH NODE DISSECTION Left 11/17/2020    Procedure: HUGO  DIRECTED AXILLARY DISSECTION;  Surgeon: Kev Cardoza MD;  [facility];  Service: Surgical Oncology    MASTECTOMY Left 1994    malignant    DC CELESTE-IMPLANT CAPSULECTOMY BREAST COMPLETE Left 11/17/2020    Procedure: BREAST CAPSULECTOMY;  Surgeon: Isreal Zuniga MD;  [facility];  Service: Plastics    DC REMOVAL INTACT BREAST IMPLANT Left 11/17/2020    Procedure: BREAST IMPLANT REMOVAL;  Surgeon: Isreal Zuniga MD;  [facility];  Service: Plastics    US BREAST NEEDLE LOC LEFT Left 11/2/2020    US GUIDED BREAST BIOPSY LEFT COMPLETE Left 8/31/2020    US GUIDED BREAST LYMPH NODE BIOPSY LEFT Left 8/31/2020    WRIST SURGERY       Social History   Social History     Substance and Sexual Activity   Alcohol Use Yes    Comment: Twice a month     Social History     Substance and Sexual Activity   Drug Use No     Social History     Tobacco Use   Smoking Status Never Smoker   Smokeless Tobacco Never Used     Family History   Problem Relation Age of Onset    Colon cancer Mother 54    Breast cancer Mother         Early 52's    Stroke Father     Emphysema Father     Leukemia Sister     Breast cancer Sister 72    ALS Brother        Meds/Allergies       Current Outpatient Medications:     anastrozole (ARIMIDEX) 1 mg tablet    insulin aspart (NovoLOG) 100 Units/mL injection pen    Insulin Glargine (TOUJEO) 300 units/mL CONCETRATED U-300 injection pen    lansoprazole (PREVACID) 30 mg capsule    metoprolol tartrate (LOPRESSOR) 50 mg tablet    Mirabegron ER 25 MG TB24    morphine (MSIR) 15 mg tablet    oxybutynin (DITROPAN XL) 15 MG 24 hr tablet    simvastatin (ZOCOR) 20 mg tablet    EPINEPHrine (EPIPEN 2-SILVIO) 0 3 mg/0 3 mL SOAJ    FLOVENT  MCG/ACT inhaler    hydrocodone-chlorpheniramine polistirex (TUSSIONEX) 10-8 mg/5 mL ER suspension    hydrocortisone 2 5 % cream    Lidocaine 5 % CREA    lidocaine-prilocaine (EMLA) cream    morphine (MS CONTIN) 15 mg 12 hr tablet    NON FORMULARY    NOVOFINE 32G X 6 MM MISC    nystatin (MYCOSTATIN) cream    ONE TOUCH ULTRA TEST test strip    ONETOUCH DELICA LANCETS 96Q MISC    sodium chloride, PF, 0 9 %    triamcinolone (KENALOG) 0 1 % cream    VENTOLIN  (90 Base) MCG/ACT inhaler    Allergies   Allergen Reactions    Betadine [Povidone Iodine] Anaphylaxis    Iodinated Diagnostic Agents Anaphylaxis    Iodine - Food Allergy Anaphylaxis and Other (See Comments)    Aspirin GI Bleeding    Caffeine - Food Allergy Palpitations       Objective     /71   Pulse 77   Temp (!) 97 3 °F (36 3 °C) (Tympanic)   Resp 18   SpO2 97%       PHYSICAL EXAM    Gen: NAD  Head: NCAT  CV: RRR  CHEST: Clear  ABD: soft, NT/ND  EXT: no edema      ASSESSMENT/PLAN:  This is a 82y o  year old female here for EGD, and she is stable and optimized for her procedure

## 2022-09-14 NOTE — ANESTHESIA PREPROCEDURE EVALUATION
Procedure:  PRE-OP ONLY    Relevant Problems   CARDIO   (+) Essential hypertension   (+) Migraine without aura and without status migrainosus, not intractable   (+) Mixed hyperlipidemia      ENDO   (+) Diabetes mellitus type 2 in obese (HCC)      GI/HEPATIC   (+) GERD without esophagitis      GYN   (+) Malignant neoplasm of upper-outer quadrant of left breast in female, estrogen receptor positive (HCC)      NEURO/PSYCH   (+) Atrial fibrillation, currently in sinus rhythm   (+) Chronic pain   (+) Migraine without aura and without status migrainosus, not intractable      ekg 10/2020 nsr  Physical Exam    Airway    Mallampati score: II  TM Distance: >3 FB  Neck ROM: full     Dental   No notable dental hx     Cardiovascular  Cardiovascular exam normal    Pulmonary  Pulmonary exam normal     Other Findings        Anesthesia Plan  ASA Score- 3     Anesthesia Type- IV sedation with anesthesia with ASA Monitors  Additional Monitors:   Airway Plan:           Plan Factors-Exercise tolerance (METS): >4 METS  Chart reviewed  EKG reviewed  Imaging results reviewed  Existing labs reviewed  Patient summary reviewed  Patient is not a current smoker  Patient not instructed to abstain from smoking on day of procedure  Patient did not smoke on day of surgery  Induction-     Postoperative Plan-     Informed Consent- Anesthetic plan and risks discussed with patient  I personally reviewed this patient with the CRNA  Discussed and agreed on the Anesthesia Plan with the WYATT Evans

## 2022-09-14 NOTE — ANESTHESIA PREPROCEDURE EVALUATION
Procedure:  EGD    Relevant Problems   CARDIO   (+) Essential hypertension   (+) Migraine without aura and without status migrainosus, not intractable   (+) Mixed hyperlipidemia      ENDO   (+) Diabetes mellitus type 2 in obese (HCC)      GI/HEPATIC   (+) GERD without esophagitis      GYN   (+) Malignant neoplasm of upper-outer quadrant of left breast in female, estrogen receptor positive (HCC)      NEURO/PSYCH   (+) Atrial fibrillation, currently in sinus rhythm   (+) Chronic pain   (+) Migraine without aura and without status migrainosus, not intractable      ekg 10/2020 nsr  Physical Exam    Airway    Mallampati score: II  TM Distance: >3 FB  Neck ROM: full     Dental   No notable dental hx     Cardiovascular  Cardiovascular exam normal    Pulmonary  Pulmonary exam normal     Other Findings        Anesthesia Plan  ASA Score- 3     Anesthesia Type- IV sedation with anesthesia with ASA Monitors  Additional Monitors:   Airway Plan:           Plan Factors-Exercise tolerance (METS): >4 METS  Chart reviewed  EKG reviewed  Imaging results reviewed  Existing labs reviewed  Patient summary reviewed  Patient is not a current smoker  Patient not instructed to abstain from smoking on day of procedure  Patient did not smoke on day of surgery  Induction-     Postoperative Plan-     Informed Consent- Anesthetic plan and risks discussed with patient  I personally reviewed this patient with the CRNA  Discussed and agreed on the Anesthesia Plan with the CRNA  Sweetie Stewart

## 2022-09-20 ENCOUNTER — HOSPITAL ENCOUNTER (OUTPATIENT)
Dept: ULTRASOUND IMAGING | Facility: CLINIC | Age: 87
Discharge: HOME | End: 2022-09-20
Payer: MEDICARE

## 2022-09-20 ENCOUNTER — HOSPITAL ENCOUNTER (OUTPATIENT)
Dept: MAMMOGRAPHY | Facility: CLINIC | Age: 87
Discharge: HOME | End: 2022-09-20
Payer: MEDICARE

## 2022-09-20 VITALS — BODY MASS INDEX: 33.66 KG/M2 | WEIGHT: 190 LBS | HEIGHT: 63 IN

## 2022-09-20 DIAGNOSIS — Z17.0 MALIGNANT NEOPLASM OF UPPER-OUTER QUADRANT OF LEFT BREAST IN FEMALE, ESTROGEN RECEPTOR POSITIVE (HCC): ICD-10-CM

## 2022-09-20 DIAGNOSIS — R92.8 ABNORMAL MAMMOGRAM: ICD-10-CM

## 2022-09-20 DIAGNOSIS — C50.412 MALIGNANT NEOPLASM OF UPPER-OUTER QUADRANT OF LEFT BREAST IN FEMALE, ESTROGEN RECEPTOR POSITIVE (HCC): ICD-10-CM

## 2022-09-20 PROCEDURE — 76642 ULTRASOUND BREAST LIMITED: CPT

## 2022-09-20 PROCEDURE — 77065 DX MAMMO INCL CAD UNI: CPT

## 2022-09-20 PROCEDURE — G0279 TOMOSYNTHESIS, MAMMO: HCPCS

## 2022-10-04 ENCOUNTER — OFFICE VISIT (OUTPATIENT)
Dept: CARDIOLOGY CLINIC | Facility: CLINIC | Age: 87
End: 2022-10-04
Payer: MEDICARE

## 2022-10-04 VITALS
HEIGHT: 63 IN | DIASTOLIC BLOOD PRESSURE: 66 MMHG | OXYGEN SATURATION: 95 % | BODY MASS INDEX: 32.78 KG/M2 | HEART RATE: 74 BPM | SYSTOLIC BLOOD PRESSURE: 110 MMHG | WEIGHT: 185 LBS

## 2022-10-04 DIAGNOSIS — E78.2 MIXED HYPERLIPIDEMIA: ICD-10-CM

## 2022-10-04 DIAGNOSIS — Z86.79 ATRIAL FIBRILLATION, CURRENTLY IN SINUS RHYTHM: Chronic | ICD-10-CM

## 2022-10-04 DIAGNOSIS — I50.32 CHRONIC DIASTOLIC HEART FAILURE (HCC): ICD-10-CM

## 2022-10-04 DIAGNOSIS — I10 ESSENTIAL HYPERTENSION: Primary | Chronic | ICD-10-CM

## 2022-10-04 PROCEDURE — 99214 OFFICE O/P EST MOD 30 MIN: CPT | Performed by: INTERNAL MEDICINE

## 2022-10-04 PROCEDURE — 93000 ELECTROCARDIOGRAM COMPLETE: CPT | Performed by: INTERNAL MEDICINE

## 2022-10-04 RX ORDER — INSULIN GLARGINE 300 U/ML
INJECTION, SOLUTION SUBCUTANEOUS
COMMUNITY
Start: 2022-09-16

## 2022-10-04 NOTE — LETTER
October 4, 2022     Jesus Carvajal MD  [facility]  [address] [facility] [address]  [address] [address]    Patient: Pam Ballesteros   YOB: 1935   Date of Visit: 10/4/2022       Dear Dr Lo Generous: Thank you for referring Pam Ballesteros to me for evaluation  Below are my notes for this consultation  If you have questions, please do not hesitate to call me  I look forward to following your patient along with you  Sincerely,        Andreea Bergman MD        CC: No Recipients  Andreea Bergman MD  10/4/2022  2:29 PM  Sign when Signing Visit  Assessment/Plan:    Essential hypertension    Hypertension, stable and adequately controlled  Chronic heart failure (HCC)  Wt Readings from Last 3 Encounters:   10/04/22 83 9 kg (185 lb)   09/20/22 86 2 kg (190 lb)   04/20/22 86 2 kg (190 lb)       Chronic diastolic congestive heart failure, stable  Atrial fibrillation, currently in sinus rhythm    A remote history of paroxysmal atrial fibrillation  This has been stable and the patient has been in regular rhythm and sinus mechanism  Mixed hyperlipidemia    Hyperlipidemia, stable  Arrange for the patient to have follow-up lab  Diagnoses and all orders for this visit:    Essential hypertension  -     POCT ECG  -     CBC and differential; Future  -     Comprehensive metabolic panel; Future  -     Lipid Panel with Direct LDL reflex; Future  -     TSH, 3rd generation    Chronic diastolic heart failure (HCC)    Atrial fibrillation, currently in sinus rhythm    Mixed hyperlipidemia  -     CBC and differential; Future  -     Comprehensive metabolic panel; Future  -     Lipid Panel with Direct LDL reflex; Future  -     TSH, 3rd generation    Other orders  -     Toujeo SoloStar 300 units/mL CONCENTRATED U-300 injection pen (1-unit dial);  INJECT 72 UNITS BY SUBCUTANEOUS ROUTE AS PER INSULIN PROTOCOL  -     glucose blood test strip; TEST TWICE DAILY OR AS DIRECTED DX: E11 9          Subjective: Chronic back pain otherwise feels well from the cardiac sta     Patient ID: Susan Banks is a 82 y o  female  The patient presented to this office for the purpose of cardiac follow-up  She is known to have history of hypertension and hyperlipidemia as well as diabetes mellitus  She had a history of pericarditis in the past and evidence of diastolic congestive failure  At this point the patient is feeling well from the cardiac standpoint denying any symptoms chest pain, shortness of breath, palpitation, dizziness or lightheadedness  She has no significant leg edema  The following portions of the patient's history were reviewed and updated as appropriate: allergies, current medications, past family history, past medical history, past social history, past surgical history and problem list     Review of Systems   Respiratory: Negative for apnea, cough, chest tightness, shortness of breath and wheezing  Cardiovascular: Negative for chest pain, palpitations and leg swelling  Gastrointestinal: Negative for abdominal pain  Neurological: Negative for dizziness and light-headedness  Psychiatric/Behavioral: Negative  Objective:  Stable cardiac-wise  /66 (BP Location: Right arm, Patient Position: Sitting, Cuff Size: Large)   Pulse 74   Ht 5' 2 5" (1 588 m)   Wt 83 9 kg (185 lb)   SpO2 95%   BMI 33 30 kg/m²          Physical Exam  Vitals reviewed  Constitutional:       General: She is not in acute distress  Appearance: She is well-developed  She is not diaphoretic  HENT:      Head: Normocephalic and atraumatic  Neck:      Thyroid: No thyromegaly  Vascular: No JVD  Cardiovascular:      Rate and Rhythm: Normal rate and regular rhythm  Heart sounds: S1 normal and S2 normal  No murmur heard  No friction rub  No gallop  Pulmonary:      Effort: Pulmonary effort is normal  No respiratory distress  Breath sounds: No wheezing or rales     Chest:      Chest wall: No tenderness  Abdominal:      Palpations: Abdomen is soft  Musculoskeletal:      Cervical back: Normal range of motion and neck supple  Right lower leg: No edema  Left lower leg: No edema  Skin:     General: Skin is warm and dry  Neurological:      Mental Status: She is oriented to person, place, and time     Psychiatric:         Mood and Affect: Mood normal          Behavior: Behavior normal

## 2022-10-04 NOTE — PROGRESS NOTES
Assessment/Plan:    Essential hypertension    Hypertension, stable and adequately controlled  Chronic heart failure (HCC)  Wt Readings from Last 3 Encounters:   10/04/22 83 9 kg (185 lb)   09/20/22 86 2 kg (190 lb)   04/20/22 86 2 kg (190 lb)       Chronic diastolic congestive heart failure, stable  Atrial fibrillation, currently in sinus rhythm    A remote history of paroxysmal atrial fibrillation  This has been stable and the patient has been in regular rhythm and sinus mechanism  Mixed hyperlipidemia    Hyperlipidemia, stable  Arrange for the patient to have follow-up lab  Diagnoses and all orders for this visit:    Essential hypertension  -     POCT ECG  -     CBC and differential; Future  -     Comprehensive metabolic panel; Future  -     Lipid Panel with Direct LDL reflex; Future  -     TSH, 3rd generation    Chronic diastolic heart failure (HCC)    Atrial fibrillation, currently in sinus rhythm    Mixed hyperlipidemia  -     CBC and differential; Future  -     Comprehensive metabolic panel; Future  -     Lipid Panel with Direct LDL reflex; Future  -     TSH, 3rd generation    Other orders  -     Toujeo SoloStar 300 units/mL CONCENTRATED U-300 injection pen (1-unit dial); INJECT 72 UNITS BY SUBCUTANEOUS ROUTE AS PER INSULIN PROTOCOL  -     glucose blood test strip; TEST TWICE DAILY OR AS DIRECTED DX: E11 9          Subjective:   Chronic back pain otherwise feels well from the cardiac sta     Patient ID: Donis Moya is a 82 y o  female  The patient presented to this office for the purpose of cardiac follow-up  She is known to have history of hypertension and hyperlipidemia as well as diabetes mellitus  She had a history of pericarditis in the past and evidence of diastolic congestive failure  At this point the patient is feeling well from the cardiac standpoint denying any symptoms chest pain, shortness of breath, palpitation, dizziness or lightheadedness    She has no significant leg edema  The following portions of the patient's history were reviewed and updated as appropriate: allergies, current medications, past family history, past medical history, past social history, past surgical history and problem list     Review of Systems   Respiratory: Negative for apnea, cough, chest tightness, shortness of breath and wheezing  Cardiovascular: Negative for chest pain, palpitations and leg swelling  Gastrointestinal: Negative for abdominal pain  Neurological: Negative for dizziness and light-headedness  Psychiatric/Behavioral: Negative  Objective:  Stable cardiac-wise  /66 (BP Location: Right arm, Patient Position: Sitting, Cuff Size: Large)   Pulse 74   Ht 5' 2 5" (1 588 m)   Wt 83 9 kg (185 lb)   SpO2 95%   BMI 33 30 kg/m²          Physical Exam  Vitals reviewed  Constitutional:       General: She is not in acute distress  Appearance: She is well-developed  She is not diaphoretic  HENT:      Head: Normocephalic and atraumatic  Neck:      Thyroid: No thyromegaly  Vascular: No JVD  Cardiovascular:      Rate and Rhythm: Normal rate and regular rhythm  Heart sounds: S1 normal and S2 normal  No murmur heard  No friction rub  No gallop  Pulmonary:      Effort: Pulmonary effort is normal  No respiratory distress  Breath sounds: No wheezing or rales  Chest:      Chest wall: No tenderness  Abdominal:      Palpations: Abdomen is soft  Musculoskeletal:      Cervical back: Normal range of motion and neck supple  Right lower leg: No edema  Left lower leg: No edema  Skin:     General: Skin is warm and dry  Neurological:      Mental Status: She is oriented to person, place, and time     Psychiatric:         Mood and Affect: Mood normal          Behavior: Behavior normal

## 2022-10-04 NOTE — ASSESSMENT & PLAN NOTE
Wt Readings from Last 3 Encounters:   10/04/22 83 9 kg (185 lb)   09/20/22 86 2 kg (190 lb)   04/20/22 86 2 kg (190 lb)       Chronic diastolic congestive heart failure, stable

## 2022-10-04 NOTE — ASSESSMENT & PLAN NOTE
A remote history of paroxysmal atrial fibrillation  This has been stable and the patient has been in regular rhythm and sinus mechanism

## 2022-10-06 ENCOUNTER — TELEPHONE (OUTPATIENT)
Dept: HEMATOLOGY ONCOLOGY | Facility: CLINIC | Age: 87
End: 2022-10-06

## 2022-10-06 NOTE — TELEPHONE ENCOUNTER
Appointment Cancellation Or Reschedule     Person calling in Patient    Provider Dr Zandra Cooks   Office Visit Date and Time  10/6/22   Office Visit Location [facility]   Did patient want to reschedule their office appointment? If so, when was it scheduled to? Yes 1/9/23 10:00am   Did you have STAR scheduled for this appointment? no   Do you need STAR set up for your new appointment? If yes, please send to "PATIENT RIDESHARE" pool for STAR rescheduling no   If you are cancelling appointment, can we notify STAR to cancel ride? If yes, please send to "PATIENT RIDESHARE" pool for STAR to cancel service no   Is this patient calling to reschedule an infusion appointment? no   When is their next infusion appointment? no   Is this patient a Chemo patient? no   Reason for Cancellation or Reschedule Patient is not feeling well  Would like to be added to the waiting list     If the patient is a treatment patient, please route this to the office nurse  If the patient is not on treatment, please route to the office MA  If the patient is a surgical oncology patient, please route to surg/onc clinical pool

## 2022-10-26 ENCOUNTER — NURSE TRIAGE (OUTPATIENT)
Dept: OTHER | Facility: OTHER | Age: 87
End: 2022-10-26

## 2022-10-26 DIAGNOSIS — N32.81 OAB (OVERACTIVE BLADDER): ICD-10-CM

## 2022-10-26 DIAGNOSIS — R39.9 UTI SYMPTOMS: Primary | ICD-10-CM

## 2022-10-26 RX ORDER — SOLIFENACIN SUCCINATE 10 MG/1
10 TABLET, FILM COATED ORAL DAILY
Qty: 30 TABLET | Refills: 3 | Status: SHIPPED | OUTPATIENT
Start: 2022-10-26

## 2022-10-26 NOTE — TELEPHONE ENCOUNTER
Reason for Disposition  • Can't control passage of urine (i e , urinary incontinence, wetting self) and present > 2 weeks    Answer Assessment - Initial Assessment Questions  1  SYMPTOM: "What's the main symptom you're concerned about?" (e g , frequency, incontinence)      Pain in back, urgency and pain when urinating  2  ONSET: "When did the  Symptoms   start?"     Several weeks   3  PAIN: "Is there any pain?" If Yes, ask: "How bad is it?" (Scale: 1-10; mild, moderate, severe)     It gets bad but I take my pain pills for it  4  CAUSE: "What do you think is causing the symptoms?"      Unsure   5  OTHER SYMPTOMS: "Do you have any other symptoms?" (e g , fever, flank pain, blood in urine, pain with urination)     Incontinence if she doesn't get to bathroom quick enough  No fever or chills    She states symptoms are since she started Mirabegron daily  She did not take it today though      Protocols used: URINARY SYMPTOMS-ADULT-OH

## 2022-10-26 NOTE — TELEPHONE ENCOUNTER
Called edie back and she will drop off urine to be tested    She said that her insurance company suggested the generic vesicare instead of Myrbetriq and she is wondering if she can have that prescribed

## 2022-10-26 NOTE — TELEPHONE ENCOUNTER
Agree with nurses recommendations to obtain urine testing  We will call with her results  Patient may stop Myrbetriq if she believes side effects are secondary to medication and has not helped to improve her urinary symptoms  Renal ultrasound from February showed no evidence of renal calculi

## 2022-10-26 NOTE — TELEPHONE ENCOUNTER
Regarding: back pains, pain/urgency w/urination  ----- Message from Quesada Riding sent at 10/26/2022 10:45 AM EDT -----  "I have pains in my back with urgency and pain when urinating "

## 2022-10-27 ENCOUNTER — APPOINTMENT (OUTPATIENT)
Dept: LAB | Facility: CLINIC | Age: 87
End: 2022-10-27
Payer: MEDICARE

## 2022-10-27 DIAGNOSIS — R39.9 UTI SYMPTOMS: ICD-10-CM

## 2022-10-27 LAB

## 2022-10-27 PROCEDURE — 81001 URINALYSIS AUTO W/SCOPE: CPT

## 2022-10-27 PROCEDURE — 87077 CULTURE AEROBIC IDENTIFY: CPT

## 2022-10-27 PROCEDURE — 87086 URINE CULTURE/COLONY COUNT: CPT

## 2022-10-27 PROCEDURE — 87186 SC STD MICRODIL/AGAR DIL: CPT

## 2022-10-28 ENCOUNTER — TELEPHONE (OUTPATIENT)
Dept: UROLOGY | Facility: AMBULATORY SURGERY CENTER | Age: 87
End: 2022-10-28

## 2022-10-28 DIAGNOSIS — N39.0 URINARY TRACT INFECTION WITHOUT HEMATURIA, SITE UNSPECIFIED: Primary | ICD-10-CM

## 2022-10-28 RX ORDER — CEPHALEXIN 500 MG/1
500 CAPSULE ORAL EVERY 12 HOURS SCHEDULED
Qty: 10 CAPSULE | Refills: 0 | Status: SHIPPED | OUTPATIENT
Start: 2022-10-28 | End: 2022-11-02

## 2022-10-28 NOTE — TELEPHONE ENCOUNTER
----- Message from [id number] Perry Larsen sent at 10/28/2022 12:05 PM EDT -----  Please inform patient results of urine culture were positive for infection, final sensitivity pending  Prescription for Keflex was sent to her pharmacy  Office will call if she requires a different antibiotic

## 2022-10-28 NOTE — TELEPHONE ENCOUNTER
Spoke to patient and informed her that she is to take the [address] phoned to her pharmacy per [facility], [address] and take that until final urine culture result comes back  If we need to change the antibiotic, we will let her know  She verbalized understanding

## 2022-10-29 LAB — BACTERIA UR CULT: ABNORMAL

## 2022-11-07 ENCOUNTER — TELEPHONE (OUTPATIENT)
Dept: OTHER | Facility: OTHER | Age: 87
End: 2022-11-07

## 2022-11-07 DIAGNOSIS — N39.0 URINARY TRACT INFECTION WITHOUT HEMATURIA, SITE UNSPECIFIED: ICD-10-CM

## 2022-11-07 DIAGNOSIS — R39.9 UTI SYMPTOMS: Primary | ICD-10-CM

## 2022-11-08 NOTE — TELEPHONE ENCOUNTER
Called and spoke with patient  She did get the antibiotic and completed it the other week  She is still having symptoms  Advised that she will need to repeat urine testing and orders placed  Informed to increase her water intake and we will call in 2-3 days with the results

## 2022-11-09 ENCOUNTER — APPOINTMENT (OUTPATIENT)
Dept: LAB | Facility: CLINIC | Age: 87
End: 2022-11-09

## 2022-11-09 DIAGNOSIS — R39.9 UTI SYMPTOMS: ICD-10-CM

## 2022-11-09 LAB
BACTERIA UR QL AUTO: ABNORMAL /HPF
BILIRUB UR QL STRIP: NEGATIVE
CLARITY UR: CLEAR
COLOR UR: ABNORMAL
GLUCOSE UR STRIP-MCNC: NEGATIVE MG/DL
HGB UR QL STRIP.AUTO: ABNORMAL
KETONES UR STRIP-MCNC: NEGATIVE MG/DL
LEUKOCYTE ESTERASE UR QL STRIP: ABNORMAL
NITRITE UR QL STRIP: POSITIVE
NON-SQ EPI CELLS URNS QL MICRO: ABNORMAL /HPF
PH UR STRIP.AUTO: 6.5 [PH]
PROT UR STRIP-MCNC: NEGATIVE MG/DL
RBC #/AREA URNS AUTO: ABNORMAL /HPF
RENAL EPI CELLS #/AREA URNS HPF: PRESENT /[HPF]
SP GR UR STRIP.AUTO: 1.01 (ref 1–1.03)
UROBILINOGEN UR STRIP-ACNC: <2 MG/DL
WBC #/AREA URNS AUTO: ABNORMAL /HPF

## 2022-11-11 LAB
BACTERIA UR CULT: ABNORMAL
BACTERIA UR CULT: ABNORMAL

## 2022-11-11 RX ORDER — CEFDINIR 300 MG/1
300 CAPSULE ORAL EVERY 12 HOURS SCHEDULED
Qty: 10 CAPSULE | Refills: 0 | Status: SHIPPED | OUTPATIENT
Start: 2022-11-11 | End: 2022-11-16

## 2022-11-11 RX ORDER — CIPROFLOXACIN 500 MG/1
500 TABLET, FILM COATED ORAL EVERY 12 HOURS SCHEDULED
Qty: 10 TABLET | Refills: 0 | Status: SHIPPED | OUTPATIENT
Start: 2022-11-11 | End: 2022-11-11 | Stop reason: SINTOL

## 2022-11-11 NOTE — TELEPHONE ENCOUNTER
Received results of recent urine culture which still shows presence of infection  Prescription for Cipro was sent to her pharmacy based on final sensitivity  27.15

## 2022-11-11 NOTE — TELEPHONE ENCOUNTER
Unsure which side effects patient is referring to  Antibiotics can cause GI upset, and would recommend taking with food and probiotic  Will change antibiotic to cefdinir

## 2022-11-11 NOTE — TELEPHONE ENCOUNTER
Patient called in stating she doesnt want to take cipro due to all of the s/e  " I have cancer and im not looking for any other side effects " Patient would like to know if there is an alternative

## 2022-12-29 ENCOUNTER — TELEPHONE (OUTPATIENT)
Dept: HEMATOLOGY ONCOLOGY | Facility: CLINIC | Age: 87
End: 2022-12-29

## 2022-12-29 NOTE — TELEPHONE ENCOUNTER
Called pt and let her know that needed to reschedule her appt with Dr Shayy Arteaga due to he will be out of the office and gave her a new date and time and she was fine with that

## 2023-01-04 ENCOUNTER — TELEPHONE (OUTPATIENT)
Dept: SURGICAL ONCOLOGY | Facility: CLINIC | Age: 88
End: 2023-01-04

## 2023-01-04 NOTE — TELEPHONE ENCOUNTER
Called the patient to reschedule her follow up appointment with Dr Dre Lowe on 1/9 due to him being in the OR that morning  Patient was offered an appointment for tomorrow but declined  She was rescheduled for 1/16 at 3:454 and verbalized understanding of appointment details

## 2023-01-05 DIAGNOSIS — N32.81 OAB (OVERACTIVE BLADDER): ICD-10-CM

## 2023-01-06 RX ORDER — SOLIFENACIN SUCCINATE 10 MG/1
10 TABLET, FILM COATED ORAL DAILY
Qty: 90 TABLET | Refills: 0 | Status: SHIPPED | OUTPATIENT
Start: 2023-01-06

## 2023-01-06 NOTE — TELEPHONE ENCOUNTER
An Auto-fax Refill Request for Solifenacin 10mg was received from [facility]/pharmacy #[id number]  The patient has an upcoming office visit scheduled for 1/27/23 with JOSE Velasquez in the [place] location but will run out of medication until then    Request for same, 90 day supply with NO refills was queued and forwarded to the Advanced Practitioner covering the [place] location for approval

## 2023-01-16 ENCOUNTER — TELEPHONE (OUTPATIENT)
Dept: HEMATOLOGY ONCOLOGY | Facility: CLINIC | Age: 88
End: 2023-01-16

## 2023-01-16 NOTE — TELEPHONE ENCOUNTER
Appointment Cancellation Or Reschedule     Person calling in Patient    If other than patient calling, are they listed on the communication consent form? N/A   Provider Dr Charli Lee   Office Visit Date and Time 01/16/2023 @3:30PM   [facility] Visit Location [facility]   Did patient want to reschedule their office appointment? If so, when was it scheduled to? Yes, 01/18/2023 @1:45PM   Did you have STAR scheduled for this appointment? no   Do you need STAR set up for your new appointment? If yes, please send to "PATIENT RIDESHARE" pool for STAR rescheduling no   If you are cancelling appointment, can we notify STAR to cancel ride? If yes, please send to "PATIENT RIDESHARE" pool for STAR to cancel service N/A   Is this patient calling to reschedule an infusion appointment? no   When is their next infusion appointment? N/A   Is this patient a Chemo patient? no   Reason for Cancellation or Reschedule Patient is sick      If the patient is a treatment patient, please route this to the office nurse  If the patient is not on treatment, please route to the Clerical pool based on location  If the patient is a surgical oncology patient, please route to surg/onc clinical pool  Route message as high priority if same day cancellation

## 2023-01-18 ENCOUNTER — OFFICE VISIT (OUTPATIENT)
Dept: SURGICAL ONCOLOGY | Facility: CLINIC | Age: 88
End: 2023-01-18

## 2023-01-18 VITALS
OXYGEN SATURATION: 97 % | DIASTOLIC BLOOD PRESSURE: 66 MMHG | HEIGHT: 63 IN | WEIGHT: 177.5 LBS | RESPIRATION RATE: 18 BRPM | TEMPERATURE: 97.1 F | HEART RATE: 75 BPM | SYSTOLIC BLOOD PRESSURE: 112 MMHG | BODY MASS INDEX: 31.45 KG/M2

## 2023-01-18 DIAGNOSIS — C50.412 MALIGNANT NEOPLASM OF UPPER-OUTER QUADRANT OF LEFT BREAST IN FEMALE, ESTROGEN RECEPTOR POSITIVE (HCC): Primary | ICD-10-CM

## 2023-01-18 DIAGNOSIS — Z17.0 MALIGNANT NEOPLASM OF UPPER-OUTER QUADRANT OF LEFT BREAST IN FEMALE, ESTROGEN RECEPTOR POSITIVE (HCC): Primary | ICD-10-CM

## 2023-01-18 DIAGNOSIS — Z79.811 USE OF ANASTROZOLE (ARIMIDEX): ICD-10-CM

## 2023-01-18 NOTE — PROGRESS NOTES
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 but she admits that there is not nearly as bad as that  Just troubles are occasionally in the evening  I recommended she continue doing range of motion exercises on a daily basis  Clinical exam shows no evidence of local regional or distant recurrent disease  She remains on her anastrozole  Her last mammogram was done in September and showed no worrisome findings  We will see her back in 6 months she is agreeable to see our nurse practitioner at that time  Cancer History:     Oncology History   Malignant neoplasm of upper-outer quadrant of left breast in female, estrogen receptor positive ([facility] [address] )   1992 Initial Diagnosis    Left breast cancer  Lumpectomy  RT      1994 Surgery    Left breast cancer recurrence  Mastectomy with immediate reconstruction     8/31/2020 Biopsy    Left breast ultrasound-guided biopsy  A  1 - 2 o'clock  Invasive mammary carcinoma of no special type   Grade 2    VA 90  HER2 1+  Lymphovascular invasion not identified    B  Left axillary lymph node  Invasive mammary carcinoma of no special type  Grade 2    Concordant  No residual lymph node is identified in specimen B, favoring axillary extension of invasive carcinoma over lymph node metastasis  Breast mass measured 2 8cm on US and involved skin  Axillary mass measured 1 4cm  Right clear       11/17/2020 Surgery    Left breast ultrasound-guided lumpectomy with HUGO  directed axillary dissection  Invasive carcinoma of no special type (ductal)  Grade 2  3 2 cm  Invasive carcinoma directly invades into the dermis or epidermis with skin ulceration  Carcinoma invades skeletal muscle  Lymphovascular invasion present  Anterior margin positive for invasive carcinoma  3/5 Lymph nodes with macrometastases (1 6 cm)  Anatomic Stage IIIB  Prognostic Stage IIIA    Immediate reconstruction with Dr Jeanine Woods (implant removal and local flap)     12/21/2020 -  Hormone Therapy    Anastrozole 1 mg daily  Dr Elli Tomlinson of left breast 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 ([facility] )         Interval History:   Patient has intermittent mastectomy site pain but relatively minor  Review of Systems:   Review of Systems   Constitutional: Negative for chills and fever  HENT: Negative for ear pain and sore throat  Eyes: Negative for pain and visual disturbance  Respiratory: Negative for cough and shortness of breath  Cardiovascular: Negative for chest pain and palpitations  Gastrointestinal: Negative for abdominal pain and vomiting  Genitourinary: Negative for dysuria and hematuria  Musculoskeletal: Negative for arthralgias and back pain  Skin: Negative for color change and rash  Neurological: Negative for seizures and syncope  All other systems reviewed and are negative        Past Medical History     Patient Active Problem List   Diagnosis   • Essential hypertension   • GERD without esophagitis   • Diabetes mellitus type 2 in obese ([facility] [address] )   • Uncomplicated opioid dependence ([facility] )   • Chronic heart failure (HCC)   • Mixed hyperlipidemia   • Migraine without aura and without status migrainosus, not intractable   • Malignant neoplasm of upper-outer quadrant of left breast in female, estrogen receptor positive ([facility] [address] )   • Atrial fibrillation, currently in sinus rhythm   • Chronic pain   • 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 ([facility] )   • Use of anastrozole (Arimidex)     Past Medical History:   Diagnosis Date   • Anxiety    • Asthma    • Atrial fibrillation ([facility] [address] )    • Breast cancer ([facility] ) 1992    left   • Chest pain, unspecified    • CHF (congestive heart failure) (HCC)    • Diabetes mellitus ([facility] )    • GERD (gastroesophageal reflux disease)    • History of radiation exposure 1992   • Hyperlipidemia    • Hypertension    • Irregular heart beat     Afib   • Migraine    • Obesity    • Pericardial effusion    • Pericarditis      Past Surgical History:   Procedure Laterality Date   • ABDOMINAL ADHESION SURGERY     • APPENDECTOMY     • AUGMENTATION MAMMAPLASTY Left 1994   • BACK SURGERY     • BREAST LUMPECTOMY Left 1992    malignant   • BREAST LUMPECTOMY Left 11/17/2020    Procedure: BREAST ULTRASOUND DIRECTED LUMPECTOMY (ULTRASOUND AT 1200);   Surgeon: Martina Marmolejo MD;  [facility];  Service: Surgical Oncology   • BREAST SURGERY     • CARDIAC SURGERY      Pericardial window   • CHOLECYSTECTOMY     • EYE SURGERY     • FLAP LOCAL TRUNK Left 11/17/2020    Procedure: FLAP LOCAL TRUNK;  Surgeon: Srikanth Ponce MD;  [facility];  Service: Plastics   • HYSTERECTOMY     • IR DRAINAGE TUBE CHECK WITH SCLEROSIS  4/23/2021   • IR DRAINAGE TUBE CHECK WITH SCLEROSIS  6/3/2021   • IR DRAINAGE TUBE CHECK WITH SCLEROSIS  6/17/2021   • IR DRAINAGE TUBE CHECK WITH SCLEROSIS  6/28/2021   • IR DRAINAGE TUBE CHECK WITH SCLEROSIS  7/7/2021   • IR DRAINAGE TUBE CHECK WITH SCLEROSIS  7/27/2021   • IR DRAINAGE TUBE PLACEMENT  4/1/2021   • IR DRAINAGE TUBE PLACEMENT WITH SCLEROSIS  5/14/2021   • LYMPH NODE DISSECTION Left 11/17/2020    Procedure: HUGO  DIRECTED AXILLARY DISSECTION;  Surgeon: Martina Marmolejo MD;  [facility];  Service: Surgical Oncology   • MASTECTOMY Left 1994    malignant   • CO CELESTE-IMPLANT CAPSULECTOMY BREAST COMPLETE Left 11/17/2020    Procedure: BREAST CAPSULECTOMY;  Surgeon: Srikanth Ponce MD;  [facility];  Service: Plastics   • FL REMOVAL INTACT BREAST IMPLANT Left 11/17/2020    Procedure: BREAST IMPLANT REMOVAL;  Surgeon: Meredeth Duverney, MD;  Location: [facility];  Service: Plastics   • US BREAST NEEDLE LOC LEFT Left 11/2/2020   • US GUIDED BREAST BIOPSY LEFT COMPLETE Left 8/31/2020   • US GUIDED BREAST LYMPH NODE BIOPSY LEFT Left 8/31/2020   • WRIST SURGERY       Family History   Problem Relation Age of Onset   • Colon cancer Mother 54   • Breast cancer Mother         Early 52's   • Stroke Father    • Emphysema Father    • Leukemia Sister    • Breast cancer Sister 72   • ALS Brother      Social History     Socioeconomic History   • Marital status: /Civil Union     Spouse name: Not on file   • Number of children: Not on file   • Years of education: Not on file   • Highest education level: Not on file   Occupational History   • Not on file   Tobacco Use   • Smoking status: Never   • Smokeless tobacco: Never   Vaping Use   • Vaping Use: Never used   Substance and Sexual Activity   • Alcohol use: Yes     Comment: Twice a month   • Drug use: No   • Sexual activity: Not Currently   Other Topics Concern   • Not on file   Social History Narrative   • Not on file     Social Determinants of Health     Financial Resource Strain: Not on file   Food Insecurity: Not on file   Transportation Needs: Not on file   Physical Activity: Not on file   Stress: Not on file   Social Connections: Not on file   Intimate Partner Violence: Not on file   Housing Stability: Not on file       Current Outpatient Medications:   •  anastrozole (ARIMIDEX) 1 mg tablet, TAKE 1 TABLET EVERY DAY, Disp: 90 tablet, Rfl: 3  •  EPINEPHrine (EPIPEN) 0 3 mg/0 3 mL SOAJ, Inject 0 3 mL as directed, Disp: , Rfl:   •  FLOVENT  MCG/ACT inhaler, INHALE 2 PUFF BY INHALATION ROUTE 2 TIMES EVERY DAY, Disp: , Rfl: 6  •  glucose blood test strip, TEST TWICE DAILY OR AS DIRECTED DX: E11 9, Disp: , Rfl:   •  hydrocodone-chlorpheniramine polistirex (TUSSIONEX) 10-8 mg/5 mL ER suspension, Take 5 mL by mouth every 12 (twelve) hours, Disp: , Rfl:   •  insulin aspart (NovoLOG) 100 Units/mL injection pen, Novolog Flexpen U-100 Insulin aspart 100 unit/mL (3 mL) subcutaneous, Disp: , Rfl:   •  Insulin Glargine (TOUJEO) 300 units/mL CONCETRATED U-300 injection pen, Toujeo SoloStar U-300 Insulin 300 unit/mL (1 5 mL) subcutaneous pen  INJECT 67 UNITS BY SUBCUTANEOUS ROUTE AS PER INSULIN PROTOCOL, Disp: , Rfl:   •  lansoprazole (PREVACID) 30 mg capsule, lansoprazole 30 mg capsule,delayed release, Disp: , Rfl:   •  Lidocaine 5 % CREA, Apply 1 application topically as needed (pain), Disp: 1 Tube, Rfl: 0  •  lidocaine-prilocaine (EMLA) cream, APPLY FOR 12 HOURS, AND THEN OFF 12 HOURS AS NEEDED, Disp: , Rfl: 2  •  metoprolol tartrate (LOPRESSOR) 50 mg tablet, Take 50 mg by mouth every 12 (twelve) hours , Disp: , Rfl:   •  morphine (MS CONTIN) 15 mg 12 hr tablet, Take 15 mg by mouth every 12 (twelve) hours as needed for severe pain, Disp: , Rfl:   •  morphine (MSIR) 15 mg tablet, Take 15 mg by mouth every 8 (eight) hours as needed for severe pain , Disp: , Rfl:   •  NON FORMULARY, Domperidone 20 mg TID, Disp: , Rfl:   •  NOVOFINE 32G X 6 MM MISC, 2 (two) times a day As directed, Disp: , Rfl: 5  •  nystatin (MYCOSTATIN) cream, APPLY TO AFFECTED AREAS TWICE A DAY  (MIX WITH HYDROCORTISONE), Disp: , Rfl:   •  ONE TOUCH ULTRA TEST test strip, TEST TWICE DAILY OR AS DIRECTED DX: E11 9, Disp: , Rfl: 5  •  ONETOUCH DELICA LANCETS 83H MISC, TEST TWICE DAILY OR AS DIRECTED, Disp: , Rfl: 5  •  oxybutynin (DITROPAN XL) 15 MG 24 hr tablet, Take 15 mg by mouth daily  , Disp: , Rfl:   •  simvastatin (ZOCOR) 20 mg tablet, Take 20 mg by mouth daily at bedtime  , Disp: , Rfl:   •  sodium chloride, PF, 0 9 %, 10 mL by Intracatheter route daily Intracatheter flushing daily, Disp: 300 mL, Rfl: 0  •  solifenacin (VESICARE) 10 MG tablet, Take 1 tablet (10 mg total) by mouth daily, Disp: 90 tablet, Rfl: 0  •  Toujeo SoloStar 300 units/mL CONCENTRATED U-300 injection pen (1-unit dial), INJECT 72 UNITS BY SUBCUTANEOUS ROUTE AS PER INSULIN PROTOCOL, Disp: , Rfl:   •  triamcinolone (KENALOG) 0 1 % cream,  , Disp: , Rfl:   •  VENTOLIN  (90 Base) MCG/ACT inhaler,  , Disp: , Rfl:   •  hydrocortisone 2 5 % cream, APPLY TO THE AFFECTED AREA TWICE DAILY  ([address], [address]), Disp: , Rfl:   Allergies   Allergen Reactions   • Betadine [Povidone Iodine] Anaphylaxis   • Iodinated Contrast Media Anaphylaxis   • Iodine - Food Allergy Anaphylaxis and Other (See Comments)   • Aspirin GI Bleeding   • Caffeine - Food Allergy Palpitations       Physical Exam:     Vitals:    01/18/23 1319   BP: 112/66   Pulse: 75   Resp: 18   Temp: (!) 97 1 °F (36 2 °C)   SpO2: 97%     Physical Exam  Chest:      Comments: The right breast was examined in the sitting and supine position  There are no worrisome skin changes, tenderness, inverted nipple, nipple discharge, swelling, bleeding or evidence of a mass in any quadrant  Errol survey demonstrated no evidence of any clinically suspicious axillary, pectoral or paraclavicular lymph nodes  Emanation of the left breast mastectomy site demonstrates no worrisome skin findings dominant masses axillary or supraclavicular adenopathy  Results & Discussion:   Patient is free of any evidence of local regional or distant recurrent disease  She is on her antihormonal therapy and tolerating that well  We will see her back in 6 months and coordinate imaging at that time  All questions were answered to the patient's satisfaction  He is agreeable to see our nurse practitioner at that time  Advance Care Planning/Advance Directives:  I discussed the disease status, treatment plans and follow-up with the patient

## 2023-01-26 ENCOUNTER — TELEPHONE (OUTPATIENT)
Dept: HEMATOLOGY ONCOLOGY | Facility: CLINIC | Age: 88
End: 2023-01-26

## 2023-01-26 NOTE — TELEPHONE ENCOUNTER
Appointment Cancellation Or Reschedule     Person calling in Patient   If someone other than patient calling, are they listed on the communication consent form? N/A   Provider Dr Bassem Cheung   Office Visit Date and Time 01/26 at 2:20pm   Office Visit Location [place]   Did patient want to reschedule their office appointment? If so, when was it scheduled to? Yes, 02/09 at 1:40pm   Did you have STAR scheduled for this appointment? No   Do you need STAR set up for your new appointment? If yes, please send [facility] pool for STAR rescheduling No   Is this patient calling to reschedule an infusion appointment? No   When is their next infusion appointment? n/a   Is this patient a Chemo patient? No   Reason for Cancellation or Reschedule Patient is not feeling well      If the patient is cancelling an appointment and needs their STAR Transport cancelled, please route to [address]  If the patient is a treatment patient, please route this to the office nurse  If the patient is not on treatment, please route to the Clerical pool based on location  If the patient is a surgical oncology patient, please route to surg/onc clinical pool  Route message as high priority if same day cancellation

## 2023-02-09 ENCOUNTER — TELEPHONE (OUTPATIENT)
Dept: HEMATOLOGY ONCOLOGY | Facility: CLINIC | Age: 88
End: 2023-02-09

## 2023-02-09 NOTE — TELEPHONE ENCOUNTER
Appointment Cancellation Or Reschedule     Person calling In self   If someone other than patient calling, are they listed on the communication consent form? self   Provider Sandie Menon   Office Visit Date and Time 2/9 1:40pm   Office Visit Location [place]   Did patient want to reschedule their office appointment? If so, when was it scheduled to? Yes, 2/10 2PM   Did you have STAR scheduled for this appointment? no   Do you need STAR set up for your new appointment? If yes, please send to "PAT[facility]" pool for STAR rescheduling no   Is this patient calling to reschedule an infusion appointment? no   When is their next infusion appointment? no   Is this patient a Chemo patient? no   Reason for Cancellation or Reschedule sick   Was No show policy reviewed with patient if patient canceling within 24 hours? yes     If the patient is cancelling an appointment and needs their STAR Transport cancelled, please route to [address]  If the patient is a treatment patient, please route this to the office nurse  If the patient is not on treatment, please route to the Clerical pool based on location  If the patient is a surgical oncology patient, please route to surg/onc clinical pool  Route message as high priority if same day cancellation

## 2023-02-10 ENCOUNTER — APPOINTMENT (OUTPATIENT)
Dept: LAB | Facility: CLINIC | Age: 88
End: 2023-02-10

## 2023-02-10 ENCOUNTER — TRANSCRIBE ORDERS (OUTPATIENT)
Dept: LAB | Facility: CLINIC | Age: 88
End: 2023-02-10

## 2023-02-10 DIAGNOSIS — E78.2 MIXED HYPERLIPIDEMIA: ICD-10-CM

## 2023-02-10 DIAGNOSIS — D72.829 LEUKOCYTOSIS, UNSPECIFIED TYPE: ICD-10-CM

## 2023-02-10 DIAGNOSIS — C50.912 MALIGNANT NEOPLASM OF LEFT FEMALE BREAST, UNSPECIFIED ESTROGEN RECEPTOR STATUS, UNSPECIFIED SITE OF BREAST (HCC): ICD-10-CM

## 2023-02-10 DIAGNOSIS — M51.37 DEGENERATION OF LUMBAR OR LUMBOSACRAL INTERVERTEBRAL DISC: ICD-10-CM

## 2023-02-10 DIAGNOSIS — I10 ESSENTIAL HYPERTENSION, MALIGNANT: ICD-10-CM

## 2023-02-10 DIAGNOSIS — R91.1 COIN LESION: ICD-10-CM

## 2023-02-10 DIAGNOSIS — E55.9 VITAMIN D DEFICIENCY, UNSPECIFIED: ICD-10-CM

## 2023-02-10 DIAGNOSIS — G43.801: ICD-10-CM

## 2023-02-10 DIAGNOSIS — Z79.899 ENCOUNTER FOR LONG-TERM (CURRENT) USE OF OTHER MEDICATIONS: Primary | ICD-10-CM

## 2023-02-10 DIAGNOSIS — N32.81 DETRUSOR INSTABILITY OF BLADDER: ICD-10-CM

## 2023-02-10 DIAGNOSIS — E10.69 TYPE I DIABETES MELLITUS WITH HYPEROSMOLAR COMA (HCC): ICD-10-CM

## 2023-02-10 DIAGNOSIS — E87.0 TYPE I DIABETES MELLITUS WITH HYPEROSMOLAR COMA (HCC): Primary | ICD-10-CM

## 2023-02-10 DIAGNOSIS — Z79.899 ENCOUNTER FOR LONG-TERM (CURRENT) USE OF OTHER MEDICATIONS: ICD-10-CM

## 2023-02-10 DIAGNOSIS — R53.83 FATIGUE, UNSPECIFIED TYPE: ICD-10-CM

## 2023-02-10 DIAGNOSIS — I10 ESSENTIAL HYPERTENSION: Chronic | ICD-10-CM

## 2023-02-10 DIAGNOSIS — E87.0 TYPE I DIABETES MELLITUS WITH HYPEROSMOLAR COMA (HCC): ICD-10-CM

## 2023-02-10 DIAGNOSIS — K31.7 GASTRIC POLYP: ICD-10-CM

## 2023-02-10 DIAGNOSIS — E10.65 TYPE I DIABETES MELLITUS WITH HYPEROSMOLAR COMA (HCC): Primary | ICD-10-CM

## 2023-02-10 DIAGNOSIS — E10.69 TYPE I DIABETES MELLITUS WITH HYPEROSMOLAR COMA (HCC): Primary | ICD-10-CM

## 2023-02-10 DIAGNOSIS — E10.65 TYPE I DIABETES MELLITUS WITH HYPEROSMOLAR COMA (HCC): ICD-10-CM

## 2023-02-10 LAB
25(OH)D3 SERPL-MCNC: 40.8 NG/ML (ref 30–100)
ALBUMIN SERPL BCP-MCNC: 3.7 G/DL (ref 3.5–5)
ALP SERPL-CCNC: 124 U/L (ref 46–116)
ALT SERPL W P-5'-P-CCNC: 32 U/L (ref 12–78)
ANION GAP SERPL CALCULATED.3IONS-SCNC: 5 MMOL/L (ref 4–13)
AST SERPL W P-5'-P-CCNC: 31 U/L (ref 5–45)
BASOPHILS # BLD AUTO: 0.04 THOUSANDS/ÂΜL (ref 0–0.1)
BASOPHILS NFR BLD AUTO: 0 % (ref 0–1)
BILIRUB SERPL-MCNC: 0.94 MG/DL (ref 0.2–1)
BUN SERPL-MCNC: 25 MG/DL (ref 5–25)
CALCIUM SERPL-MCNC: 10.3 MG/DL (ref 8.3–10.1)
CHLORIDE SERPL-SCNC: 106 MMOL/L (ref 96–108)
CHOLEST SERPL-MCNC: 146 MG/DL
CO2 SERPL-SCNC: 25 MMOL/L (ref 21–32)
CREAT SERPL-MCNC: 1.56 MG/DL (ref 0.6–1.3)
EOSINOPHIL # BLD AUTO: 0.16 THOUSAND/ÂΜL (ref 0–0.61)
EOSINOPHIL NFR BLD AUTO: 1 % (ref 0–6)
ERYTHROCYTE [DISTWIDTH] IN BLOOD BY AUTOMATED COUNT: 13.2 % (ref 11.6–15.1)
GFR SERPL CREATININE-BSD FRML MDRD: 30 ML/MIN/1.73SQ M
GLUCOSE P FAST SERPL-MCNC: 119 MG/DL (ref 65–99)
HCT VFR BLD AUTO: 47.9 % (ref 34.8–46.1)
HDLC SERPL-MCNC: 38 MG/DL
HGB BLD-MCNC: 15.2 G/DL (ref 11.5–15.4)
IMM GRANULOCYTES # BLD AUTO: 0.05 THOUSAND/UL (ref 0–0.2)
IMM GRANULOCYTES NFR BLD AUTO: 0 % (ref 0–2)
LDLC SERPL CALC-MCNC: 80 MG/DL (ref 0–100)
LYMPHOCYTES # BLD AUTO: 3.19 THOUSANDS/ÂΜL (ref 0.6–4.47)
LYMPHOCYTES NFR BLD AUTO: 27 % (ref 14–44)
MAGNESIUM SERPL-MCNC: 2.1 MG/DL (ref 1.6–2.6)
MCH RBC QN AUTO: 29.2 PG (ref 26.8–34.3)
MCHC RBC AUTO-ENTMCNC: 31.7 G/DL (ref 31.4–37.4)
MCV RBC AUTO: 92 FL (ref 82–98)
MONOCYTES # BLD AUTO: 0.93 THOUSAND/ÂΜL (ref 0.17–1.22)
MONOCYTES NFR BLD AUTO: 8 % (ref 4–12)
NEUTROPHILS # BLD AUTO: 7.34 THOUSANDS/ÂΜL (ref 1.85–7.62)
NEUTS SEG NFR BLD AUTO: 64 % (ref 43–75)
NRBC BLD AUTO-RTO: 0 /100 WBCS
PLATELET # BLD AUTO: 324 THOUSANDS/UL (ref 149–390)
PMV BLD AUTO: 12.8 FL (ref 8.9–12.7)
POTASSIUM SERPL-SCNC: 3.9 MMOL/L (ref 3.5–5.3)
PROT SERPL-MCNC: 7.7 G/DL (ref 6.4–8.4)
RBC # BLD AUTO: 5.21 MILLION/UL (ref 3.81–5.12)
SODIUM SERPL-SCNC: 136 MMOL/L (ref 135–147)
T4 FREE SERPL-MCNC: 1.27 NG/DL (ref 0.76–1.46)
TRIGL SERPL-MCNC: 138 MG/DL
TSH SERPL DL<=0.05 MIU/L-ACNC: 2.78 UIU/ML (ref 0.45–4.5)
WBC # BLD AUTO: 11.71 THOUSAND/UL (ref 4.31–10.16)

## 2023-02-11 LAB
EST. AVERAGE GLUCOSE BLD GHB EST-MCNC: 137 MG/DL
HBA1C MFR BLD: 6.4 %

## 2023-02-14 DIAGNOSIS — C77.3 CARCINOMA OF LEFT BREAST METASTATIC TO AXILLARY LYMPH NODE (HCC): ICD-10-CM

## 2023-02-14 DIAGNOSIS — C50.912 CARCINOMA OF LEFT BREAST METASTATIC TO AXILLARY LYMPH NODE (HCC): ICD-10-CM

## 2023-02-14 RX ORDER — ANASTROZOLE 1 MG/1
TABLET ORAL
Qty: 90 TABLET | Refills: 3 | Status: SHIPPED | OUTPATIENT
Start: 2023-02-14

## 2023-02-20 ENCOUNTER — OFFICE VISIT (OUTPATIENT)
Dept: HEMATOLOGY ONCOLOGY | Facility: CLINIC | Age: 88
End: 2023-02-20

## 2023-02-20 VITALS
BODY MASS INDEX: 32.07 KG/M2 | DIASTOLIC BLOOD PRESSURE: 72 MMHG | OXYGEN SATURATION: 98 % | HEART RATE: 68 BPM | HEIGHT: 63 IN | RESPIRATION RATE: 18 BRPM | SYSTOLIC BLOOD PRESSURE: 128 MMHG | WEIGHT: 181 LBS | TEMPERATURE: 97.8 F

## 2023-02-20 DIAGNOSIS — Z17.0 MALIGNANT NEOPLASM OF UPPER-OUTER QUADRANT OF LEFT BREAST IN FEMALE, ESTROGEN RECEPTOR POSITIVE (HCC): ICD-10-CM

## 2023-02-20 DIAGNOSIS — C50.412 MALIGNANT NEOPLASM OF UPPER-OUTER QUADRANT OF LEFT BREAST IN FEMALE, ESTROGEN RECEPTOR POSITIVE (HCC): ICD-10-CM

## 2023-02-20 DIAGNOSIS — C77.3 CARCINOMA OF LEFT BREAST METASTATIC TO AXILLARY LYMPH NODE (HCC): Primary | ICD-10-CM

## 2023-02-20 DIAGNOSIS — C50.912 CARCINOMA OF LEFT BREAST METASTATIC TO AXILLARY LYMPH NODE (HCC): Primary | ICD-10-CM

## 2023-02-20 NOTE — PROGRESS NOTES
Hematology / Oncology Outpatient Follow Up Note    Nadege Jordan 82 y o  female :1935 [facility]:[id number]         Date:  2023    Assessment / Plan:  A 47-year-old postmenopausal woman with history of left breast cancer in , status post lumpectomy followed by radiation therapy   In , she had local recurrence in her left breast for which she underwent mastectomy   She has no history of adjuvant hormonal therapy or chemotherapy in the past   She had left chest wall and left axillary lymph node recurrent disease, grade 2, % positive, SC 90% positive, HER2 negative disease, diagnosed in 2020  She underwent surgical resection as well as left axillary lymph node dissection resulting in grossly RODNEY but positive anterior surgical margin  She is currently on adjuvant hormonal therapy with anastrozole with excellent tolerance  She has no evidence of recurrent disease, based on her symptoms and physical examinations  I recommended her to continue anastrozole 1 mg once a day  She is in agreement with my recommendations  I will see her again in a year for routine follow-up           Subjective:      HPI:  A 47-year-old postmenopausal woman with history of left breast cancer in   She underwent lumpectomy followed by radiation therapy   She did not have adjuvant chemotherapy or hormonal treatment  Marleny Rice, she had local recurrence in her left breast for which she underwent mastectomy  Natanael Christy, she did not have adjuvant therapy at all   Since 2020, she felt a lump in her left reconstructed breast   She was found to have large left chest wall mass which was biopsied as well as left axillary lymph node biopsy both of which were positive for invasive ductal carcinoma, grade 2   This was % positive, SC 90% positive, HER2 negative disease   Subsequently, she underwent surgical resection as well as axillary lymph node dissection and removal of left breast implant in  17 2020 which showed 32 x 31 x 10 mm of invasive ductal carcinoma, grade 2  Lymphovascular invasion was present   3/5 axillary lymph nodes were positive for metastatic disease with largest tumor measuring 16 mm   She had dermis invasion of tumor   She presents today to discuss adjuvant treatment options   Her anterior surgical margin was positive   She has some pain in her left chest wall   Otherwise, she feels well   She has no respiratory symptoms   Her weight is stable   Her performance status is normal              Interval History:  A 82year-old postmenopausal woman with history of left breast cancer in 1992, status post lumpectomy followed by radiation therapy  [facility] she had local recurrence in her left breast for which she underwent mastectomy   She has no history of adjuvant hormonal therapy or chemotherapy in the past   She had left chest wall and left axillary lymph node recurrent disease, grade 2, % positive, SC 90% positive, HER2 negative disease, diagnosed in November 2020  She underwent surgical resection as well as left axillary lymph node dissection resulting in grossly RODNEY but positive anterior surgical margin  Since December 2020, she has been on adjuvant hormonal therapy with anastrozole  She presents today for routine follow-up  She feels well with no new complaints  However, she is recovering from recent bronchitis  She has no hot flashes or musculoskeletal symptoms  She denied any pain  Her weight is stable    Her performance status is normal          Objective:      Primary Diagnosis:     1  Left breast cancer, diagnosed in 1992       2  Local recurrence of breast cancer in her left breast, diagnosed in 1994      3  Left chest wall and left axillary lymph node recurrent disease, grade 2, % positive, SC 90% positive, HER2 negative disease   Diagnosed in November 2020       Cancer Staging:  Cancer Staging  Malignant neoplasm of upper-outer quadrant of left breast in female, estrogen receptor positive ([facility] [address] )  Staging form: Breast, AJCC 8th Edition  - Pathologic: Stage IIIA (pT4b, pN1a, cM0, G2, ER+, SD+, HER2-) - Unsigned  Method of lymph node assessment: Axillary lymph node dissection  Histologic grading system: 3 grade system           Previous Hematologic/ Oncologic Treatment:      Adjuvant radiation therapy in 1992       Current Hematologic/ Oncologic Treatment:         Adjuvant hormonal therapy with anastrozole since December 2020       Disease Status:      RODNEY status post mastectomy and axillary lymph node dissection      Test Results:     Pathology:     32 x 31 x 10 mm of invasive ductal carcinoma, grade 2   Lymphovascular invasion was present   3/5 axillary lymph nodes were positive for metastatic disease measuring 16 mm   Dermal invasion was positive   Anterior surgical margin was positive        Radiology:     Mammography in September 2022 was benign  BI-RADS 2      Laboratory:     See below      Physical Exam:        General Appearance:    Alert, oriented          Eyes:    PERRL   Ears:    Normal external ear canals, both ears   Nose:   Nares normal, septum midline   Throat:   Mucosa moist  Pharynx without injection  Neck:   Supple         Lungs:     Clear to auscultation bilaterally   Chest Wall:    No tenderness or deformity    Heart:    Regular rate and rhythm         Abdomen:     Soft, non-tender, bowel sounds +, no organomegaly               Extremities:   Extremities no cyanosis or edema         Skin:   no rash or icterus  Lymph nodes:   Cervical, supraclavicular, and axillary nodes normal   Neurologic:   CNII-XII intact, normal strength, sensation and reflexes     Throughout             Breast exam:   Left status post mastectomy without reconstruction   No palpable abnormality in her left chest wall   Right breast exam is negative               ROS: Review of Systems   All other systems reviewed and are negative  Imaging: No results found        Labs: Lab Results   Component Value Date    WBC 11 71 (H) 02/10/2023    HGB 15 2 02/10/2023    HCT 47 9 (H) 02/10/2023    MCV 92 02/10/2023     02/10/2023     Lab Results   Component Value Date     01/30/2015    K 3 9 02/10/2023     02/10/2023    CO2 25 02/10/2023    ANIONGAP 7 01/30/2015    BUN 25 02/10/2023    CREATININE 1 56 (H) 02/10/2023    GLUCOSE 103 01/30/2015    GLUF 119 (H) 02/10/2023    CALCIUM 10 3 (H) 02/10/2023    AST 31 02/10/2023    ALT 32 02/10/2023    ALKPHOS 124 (H) 02/10/2023    PROT 5 7 (L) 01/28/2015    PROT 6 3 (L) 01/28/2015    BILITOT 0 49 01/28/2015    EGFR 30 02/10/2023         Lab Results   Component Value Date     01/17/2015       Lab Results   Component Value Date    UPEP The urine total 01/28/2015       Current Medications: Reviewed  Allergies: Reviewed  PMH/FH/SH:  Reviewed      Vital Sign:    Body surface area is 1 84 meters squared      Wt Readings from Last 3 Encounters:   02/20/23 82 1 kg (181 lb)   01/18/23 80 5 kg (177 lb 8 oz)   10/04/22 83 9 kg (185 lb)        Temp Readings from Last 3 Encounters:   02/20/23 97 8 °F (36 6 °C)   01/18/23 (!) 97 1 °F (36 2 °C)   09/14/22 97 9 °F (36 6 °C) (Tympanic)        BP Readings from Last 3 Encounters:   02/20/23 128/72   01/18/23 112/66   10/04/22 110/66         Pulse Readings from Last 3 Encounters:   02/20/23 68   01/18/23 75   10/04/22 74     @LASTSAO2(3)@

## 2023-04-25 ENCOUNTER — OFFICE VISIT (OUTPATIENT)
Dept: CARDIOLOGY CLINIC | Facility: CLINIC | Age: 88
End: 2023-04-25

## 2023-04-25 VITALS
DIASTOLIC BLOOD PRESSURE: 64 MMHG | HEART RATE: 64 BPM | HEIGHT: 63 IN | WEIGHT: 180 LBS | BODY MASS INDEX: 31.89 KG/M2 | OXYGEN SATURATION: 96 % | SYSTOLIC BLOOD PRESSURE: 126 MMHG

## 2023-04-25 DIAGNOSIS — R07.9 CHEST PAIN, UNSPECIFIED TYPE: ICD-10-CM

## 2023-04-25 DIAGNOSIS — I49.3 PREMATURE VENTRICULAR CONTRACTIONS: ICD-10-CM

## 2023-04-25 DIAGNOSIS — I50.32 CHRONIC DIASTOLIC HEART FAILURE (HCC): ICD-10-CM

## 2023-04-25 DIAGNOSIS — I10 ESSENTIAL HYPERTENSION: Primary | Chronic | ICD-10-CM

## 2023-04-25 DIAGNOSIS — E78.2 MIXED HYPERLIPIDEMIA: ICD-10-CM

## 2023-04-25 RX ORDER — COVID-19 ANTIGEN TEST
KIT MISCELLANEOUS
COMMUNITY
Start: 2023-03-05

## 2023-04-25 RX ORDER — SODIUM CHLORIDE, SODIUM LACTATE, POTASSIUM CHLORIDE, CALCIUM CHLORIDE 600; 310; 30; 20 MG/100ML; MG/100ML; MG/100ML; MG/100ML
125 INJECTION, SOLUTION INTRAVENOUS CONTINUOUS
Status: CANCELLED | OUTPATIENT
Start: 2023-04-25

## 2023-04-25 RX ORDER — LIDOCAINE HYDROCHLORIDE 10 MG/ML
0.5 INJECTION, SOLUTION EPIDURAL; INFILTRATION; INTRACAUDAL; PERINEURAL ONCE AS NEEDED
Status: CANCELLED | OUTPATIENT
Start: 2023-04-25

## 2023-04-25 NOTE — LETTER
April 25, 2023     She Jimenez MD  [facility]  [address] [facility] [address]  [address] [place] [address]    Patient: Phill Cruz   YOB: 1935   Date of Visit: 4/25/2023       Dear Dr Irwin Hunter: Thank you for referring Phill Cruz to me for evaluation  Below are my notes for this consultation  If you have questions, please do not hesitate to call me  I look forward to following your patient along with you  Sincerely,        Chance Parker MD        CC: No Recipients  Chance Parker MD  4/25/2023  3:39 PM  Sign when Signing Visit  Assessment/Plan:    Essential hypertension  Hypertension, stable and adequately controlled  Chronic heart failure (HCC)  Wt Readings from Last 3 Encounters:   04/25/23 81 6 kg (180 lb)   02/20/23 82 1 kg (181 lb)   01/18/23 80 5 kg (177 lb 8 oz)     Patient has a history of congestive heart failure  She denies any symptoms of dyspnea and she has no leg edema  We will continue present regimen and observation  I will arrange for the patient to have a nuclear stress test as well as echocardiogram and 24-hour Holter  Mixed hyperlipidemia  Hyperlipidemia, stable  Chest pain  The patient experienced 2 episodes of chest discomfort at rest lasting for 2 minutes each not associated with any other symptoms  EKG today shows normal sinus rhythm with premature ventricular contractions otherwise no EKG changes to suggest ischemia  The patient had a history of congestive heart failure  I will arrange for the patient to have a nuclear stress test and echocardiogram as well as a 24-hour Holter  Diagnoses and all orders for this visit:    Essential hypertension  -     POCT ECG    Chronic diastolic heart failure ([address] )  -     NM myocardial perfusion spect (rx stress and/or rest); Future  -     Echo complete w/ contrast if indicated; Future  -     Holter monitor;  Future    Mixed hyperlipidemia    Chest pain, unspecified type  -     NM myocardial perfusion "spect (rx stress and/or rest); Future  -     Echo complete w/ contrast if indicated; Future  -     Holter monitor; Future    Premature ventricular contractions  -     NM myocardial perfusion spect (rx stress and/or rest); Future  -     Echo complete w/ contrast if indicated; Future  -     Holter monitor; Future    Other orders  -     Flowflex COVID-19 Ag Home Test KIT; Use as directed         Subjective: 2 instances of chest pain  Patient ID: Anna Dawson is a 82 y o  female  The patient presented to this office for the purpose of cardiac follow-up  She is known to have a history of hypertension and hyperlipidemia as well as a remote history of paroxysmal atrial fibrillation associated with a history of pericarditis  At this point the patient describes 2 instances of precordial chest discomfort that lasted for approximately 2 minutes occurring while at rest and resolving spontaneously  There was no associating symptoms of shortness of breath or palpitation  Patient denies any symptoms of dizziness or syncope  She has no significant leg edema  The following portions of the patient's history were reviewed and updated as appropriate: allergies, current medications, past family history, past medical history, past social history, past surgical history and problem list     Review of Systems   Respiratory: Negative for apnea, cough, chest tightness, shortness of breath and wheezing  Cardiovascular: Positive for chest pain  Negative for palpitations and leg swelling  Gastrointestinal: Negative for abdominal pain  Neurological: Negative for dizziness and light-headedness  Psychiatric/Behavioral: Negative  Objective: Stable cardiac wise  /64 (BP Location: Right arm, Patient Position: Sitting, Cuff Size: Standard)   Pulse 64   Ht 5' 2 5\" (1 588 m)   Wt 81 6 kg (180 lb)   SpO2 96%   BMI 32 40 kg/m²         Physical Exam  Vitals reviewed     Constitutional:       General: She " is not in acute distress  Appearance: She is well-developed  She is not diaphoretic  HENT:      Head: Normocephalic and atraumatic  Neck:      Thyroid: No thyromegaly  Vascular: No JVD  Cardiovascular:      Rate and Rhythm: Normal rate and regular rhythm  Heart sounds: S1 normal and S2 normal  No murmur heard  No friction rub  No gallop  Pulmonary:      Effort: Pulmonary effort is normal  No respiratory distress  Breath sounds: No wheezing or rales  Chest:      Chest wall: No tenderness  Abdominal:      Palpations: Abdomen is soft  Musculoskeletal:      Cervical back: Normal range of motion and neck supple  Right lower leg: No edema  Left lower leg: No edema  Skin:     General: Skin is warm and dry  Neurological:      Mental Status: She is oriented to person, place, and time     Psychiatric:         Mood and Affect: Mood normal          Behavior: Behavior normal

## 2023-04-25 NOTE — PATIENT INSTRUCTIONS
The patient will be scheduled for echocardiogram, 24-hour Holter as well as nuclear stress test   She will continue present medications

## 2023-04-25 NOTE — ASSESSMENT & PLAN NOTE
The patient experienced 2 episodes of chest discomfort at rest lasting for 2 minutes each not associated with any other symptoms  EKG today shows normal sinus rhythm with premature ventricular contractions otherwise no EKG changes to suggest ischemia  The patient had a history of congestive heart failure  I will arrange for the patient to have a nuclear stress test and echocardiogram as well as a 24-hour Holter

## 2023-04-25 NOTE — ASSESSMENT & PLAN NOTE
Wt Readings from Last 3 Encounters:   04/25/23 81 6 kg (180 lb)   02/20/23 82 1 kg (181 lb)   01/18/23 80 5 kg (177 lb 8 oz)     Patient has a history of congestive heart failure  She denies any symptoms of dyspnea and she has no leg edema  We will continue present regimen and observation  I will arrange for the patient to have a nuclear stress test as well as echocardiogram and 24-hour Holter

## 2023-04-25 NOTE — PROGRESS NOTES
Assessment/Plan:    Essential hypertension  Hypertension, stable and adequately controlled  Chronic heart failure (HCC)  Wt Readings from Last 3 Encounters:   04/25/23 81 6 kg (180 lb)   02/20/23 82 1 kg (181 lb)   01/18/23 80 5 kg (177 lb 8 oz)     Patient has a history of congestive heart failure  She denies any symptoms of dyspnea and she has no leg edema  We will continue present regimen and observation  I will arrange for the patient to have a nuclear stress test as well as echocardiogram and 24-hour Holter  Mixed hyperlipidemia  Hyperlipidemia, stable  Chest pain  The patient experienced 2 episodes of chest discomfort at rest lasting for 2 minutes each not associated with any other symptoms  EKG today shows normal sinus rhythm with premature ventricular contractions otherwise no EKG changes to suggest ischemia  The patient had a history of congestive heart failure  I will arrange for the patient to have a nuclear stress test and echocardiogram as well as a 24-hour Holter  Diagnoses and all orders for this visit:    Essential hypertension  -     POCT ECG    Chronic diastolic heart failure ([address] )  -     NM myocardial perfusion spect (rx stress and/or rest); Future  -     Echo complete w/ contrast if indicated; Future  -     Holter monitor; Future    Mixed hyperlipidemia    Chest pain, unspecified type  -     NM myocardial perfusion spect (rx stress and/or rest); Future  -     Echo complete w/ contrast if indicated; Future  -     Holter monitor; Future    Premature ventricular contractions  -     NM myocardial perfusion spect (rx stress and/or rest); Future  -     Echo complete w/ contrast if indicated; Future  -     Holter monitor; Future    Other orders  -     Flowflex COVID-19 Ag Home Test KIT; Use as directed          Subjective: 2 instances of chest pain  Patient ID: Mi Moctezuma is a 82 y o  female      The patient presented to this office for the purpose of cardiac "follow-up  She is known to have a history of hypertension and hyperlipidemia as well as a remote history of paroxysmal atrial fibrillation associated with a history of pericarditis  At this point the patient describes 2 instances of precordial chest discomfort that lasted for approximately 2 minutes occurring while at rest and resolving spontaneously  There was no associating symptoms of shortness of breath or palpitation  Patient denies any symptoms of dizziness or syncope  She has no significant leg edema  The following portions of the patient's history were reviewed and updated as appropriate: allergies, current medications, past family history, past medical history, past social history, past surgical history and problem list     Review of Systems   Respiratory: Negative for apnea, cough, chest tightness, shortness of breath and wheezing  Cardiovascular: Positive for chest pain  Negative for palpitations and leg swelling  Gastrointestinal: Negative for abdominal pain  Neurological: Negative for dizziness and light-headedness  Psychiatric/Behavioral: Negative  Objective: Stable cardiac wise  /64 (BP Location: Right arm, Patient Position: Sitting, Cuff Size: Standard)   Pulse 64   Ht 5' 2 5\" (1 588 m)   Wt 81 6 kg (180 lb)   SpO2 96%   BMI 32 40 kg/m²          Physical Exam  Vitals reviewed  Constitutional:       General: She is not in acute distress  Appearance: She is well-developed  She is not diaphoretic  HENT:      Head: Normocephalic and atraumatic  Neck:      Thyroid: No thyromegaly  Vascular: No JVD  Cardiovascular:      Rate and Rhythm: Normal rate and regular rhythm  Heart sounds: S1 normal and S2 normal  No murmur heard  No friction rub  No gallop  Pulmonary:      Effort: Pulmonary effort is normal  No respiratory distress  Breath sounds: No wheezing or rales  Chest:      Chest wall: No tenderness     Abdominal:      Palpations: " Abdomen is soft  Musculoskeletal:      Cervical back: Normal range of motion and neck supple  Right lower leg: No edema  Left lower leg: No edema  Skin:     General: Skin is warm and dry  Neurological:      Mental Status: She is oriented to person, place, and time     Psychiatric:         Mood and Affect: Mood normal          Behavior: Behavior normal

## 2023-04-26 ENCOUNTER — ANESTHESIA EVENT (OUTPATIENT)
Dept: GASTROENTEROLOGY | Facility: HOSPITAL | Age: 88
End: 2023-04-26

## 2023-04-26 ENCOUNTER — HOSPITAL ENCOUNTER (OUTPATIENT)
Dept: GASTROENTEROLOGY | Facility: HOSPITAL | Age: 88
Setting detail: OUTPATIENT SURGERY
Discharge: HOME | End: 2023-04-26
Attending: INTERNAL MEDICINE

## 2023-04-26 ENCOUNTER — ANESTHESIA (OUTPATIENT)
Dept: GASTROENTEROLOGY | Facility: HOSPITAL | Age: 88
End: 2023-04-26

## 2023-04-26 VITALS
SYSTOLIC BLOOD PRESSURE: 150 MMHG | TEMPERATURE: 97.9 F | DIASTOLIC BLOOD PRESSURE: 70 MMHG | WEIGHT: 180 LBS | HEART RATE: 60 BPM | RESPIRATION RATE: 16 BRPM | HEIGHT: 63 IN | BODY MASS INDEX: 31.89 KG/M2 | OXYGEN SATURATION: 94 %

## 2023-04-26 DIAGNOSIS — K31.7 POLYP OF STOMACH AND DUODENUM: ICD-10-CM

## 2023-04-26 LAB — GLUCOSE SERPL-MCNC: 127 MG/DL (ref 65–140)

## 2023-04-26 RX ORDER — LIDOCAINE HYDROCHLORIDE 10 MG/ML
INJECTION, SOLUTION EPIDURAL; INFILTRATION; INTRACAUDAL; PERINEURAL AS NEEDED
Status: DISCONTINUED | OUTPATIENT
Start: 2023-04-26 | End: 2023-04-26

## 2023-04-26 RX ORDER — SODIUM CHLORIDE 9 MG/ML
INJECTION, SOLUTION INTRAVENOUS CONTINUOUS PRN
Status: DISCONTINUED | OUTPATIENT
Start: 2023-04-26 | End: 2023-04-26

## 2023-04-26 RX ORDER — PROPOFOL 10 MG/ML
INJECTION, EMULSION INTRAVENOUS AS NEEDED
Status: DISCONTINUED | OUTPATIENT
Start: 2023-04-26 | End: 2023-04-26

## 2023-04-26 RX ADMIN — LIDOCAINE HYDROCHLORIDE 100 MG: 10 INJECTION, SOLUTION EPIDURAL; INFILTRATION; INTRACAUDAL; PERINEURAL at 14:15

## 2023-04-26 RX ADMIN — PROPOFOL 30 MG: 10 INJECTION, EMULSION INTRAVENOUS at 14:21

## 2023-04-26 RX ADMIN — PROPOFOL 30 MG: 10 INJECTION, EMULSION INTRAVENOUS at 14:17

## 2023-04-26 RX ADMIN — SODIUM CHLORIDE: 0.9 INJECTION, SOLUTION INTRAVENOUS at 14:13

## 2023-04-26 RX ADMIN — PROPOFOL 20 MG: 10 INJECTION, EMULSION INTRAVENOUS at 14:25

## 2023-04-26 RX ADMIN — PROPOFOL 120 MG: 10 INJECTION, EMULSION INTRAVENOUS at 14:15

## 2023-04-26 RX ADMIN — PROPOFOL 30 MG: 10 INJECTION, EMULSION INTRAVENOUS at 14:28

## 2023-04-26 NOTE — H&P
History and Physical - [facility]  Mane Solomon 82 y o  female MRN: [id number]                  HPI: Mane Solomon is a 82y o  year old female who presents for an upper endoscopy  Indications for the procedure: For the removal of tubular adenoma of the gastric antrum  Diagnosed on 4/20/2022  REVIEW OF SYSTEMS: Per the HPI, and otherwise unremarkable  Historical Information   Past Medical History:   Diagnosis Date    Anxiety     Asthma     Atrial fibrillation ([facility] )     Breast cancer ([place] [address] ) 1992    left    Chest pain, unspecified     CHF (congestive heart failure) (HCC)     Diabetes mellitus ([facility] )     GERD (gastroesophageal reflux disease)     History of radiation exposure 1992    Hyperlipidemia     Hypertension     Irregular heart beat     Afib    Migraine     Obesity     Pericardial effusion     Pericarditis      Past Surgical History:   Procedure Laterality Date    ABDOMINAL ADHESION SURGERY      APPENDECTOMY      AUGMENTATION MAMMAPLASTY Left 1994    BACK SURGERY      BREAST LUMPECTOMY Left 1992    malignant    BREAST LUMPECTOMY Left 11/17/2020    Procedure: BREAST ULTRASOUND DIRECTED LUMPECTOMY (ULTRASOUND AT 1200);   Surgeon: Debra Ferrera MD;  [facility];  Service: Surgical Oncology    BREAST SURGERY      CARDIAC SURGERY      Pericardial window    CHOLECYSTECTOMY      EYE SURGERY      FLAP LOCAL TRUNK Left 11/17/2020    Procedure: FLAP LOCAL TRUNK;  Surgeon: Dario Diez MD;  [facility];  Service: Plastics    HYSTERECTOMY      IR DRAINAGE TUBE CHECK WITH SCLEROSIS  4/23/2021    IR DRAINAGE TUBE CHECK WITH SCLEROSIS  6/3/2021    IR DRAINAGE TUBE CHECK WITH SCLEROSIS  6/17/2021    IR DRAINAGE TUBE CHECK WITH SCLEROSIS  6/28/2021    IR DRAINAGE TUBE CHECK WITH SCLEROSIS  7/7/2021    IR DRAINAGE TUBE CHECK WITH SCLEROSIS  7/27/2021    IR DRAINAGE TUBE PLACEMENT  4/1/2021    IR DRAINAGE TUBE PLACEMENT WITH SCLEROSIS  5/14/2021  LYMPH NODE DISSECTION Left 11/17/2020    Procedure: HUGO  DIRECTED AXILLARY DISSECTION;  Surgeon: Dylan Rnadolph MD;  [facility];  Service: Surgical Oncology    MASTECTOMY Left 1994    malignant    TX CELESTE-IMPLANT CAPSULECTOMY BREAST COMPLETE Left 11/17/2020    Procedure: BREAST CAPSULECTOMY;  Surgeon: Estrella Rosenberg MD;  [facility];  Service: Plastics    TX REMOVAL INTACT BREAST IMPLANT Left 11/17/2020    Procedure: BREAST IMPLANT REMOVAL;  Surgeon: Estrella Rosenberg MD;  [facility];  Service: Plastics    US BREAST NEEDLE LOC LEFT Left 11/2/2020    US GUIDED BREAST BIOPSY LEFT COMPLETE Left 8/31/2020    US GUIDED BREAST LYMPH NODE BIOPSY LEFT Left 8/31/2020    WRIST SURGERY       Social History   Social History     Substance and Sexual Activity   Alcohol Use Yes    Comment: social     Social History     Substance and Sexual Activity   Drug Use No     Social History     Tobacco Use   Smoking Status Never   Smokeless Tobacco Never     Family History   Problem Relation Age of Onset    Colon cancer Mother 54    Breast cancer Mother         Early 52's    Stroke Father     Emphysema Father     Leukemia Sister     Breast cancer Sister 72    ALS Brother        Meds/Allergies       Current Outpatient Medications:     anastrozole (ARIMIDEX) 1 mg tablet    insulin aspart (NovoLOG) 100 Units/mL injection pen    lansoprazole (PREVACID) 30 mg capsule    metoprolol tartrate (LOPRESSOR) 50 mg tablet    morphine (MS CONTIN) 15 mg 12 hr tablet    Naloxegol Oxalate (Movantik) 12 5 MG TABS    NON FORMULARY    oxybutynin (DITROPAN XL) 15 MG 24 hr tablet    simvastatin (ZOCOR) 20 mg tablet    solifenacin (VESICARE) 10 MG tablet    Toujeo SoloStar 300 units/mL CONCENTRATED U-300 injection pen (1-unit dial)    azithromycin (ZITHROMAX) 250 mg tablet    EPINEPHrine (EPIPEN) 0 3 mg/0 3 mL SOAJ    FLOVENT  MCG/ACT inhaler    Flowflex COVID-19 Ag Home Test KIT    glucose blood test "strip    hydrocodone-chlorpheniramine polistirex (TUSSIONEX) 10-8 mg/5 mL ER suspension    hydrocortisone 2 5 % cream    Insulin Glargine (TOUJEO) 300 units/mL CONCETRATED U-300 injection pen    Lidocaine 5 % CREA    lidocaine-prilocaine (EMLA) cream    morphine (MSIR) 15 mg tablet    nitrofurantoin (MACROBID) 100 mg capsule    NOVOFINE 32G X 6 MM MISC    nystatin (MYCOSTATIN) cream    ONE TOUCH ULTRA TEST test strip    ONETOUCH DELICA LANCETS 69B MISC    sodium chloride, PF, 0 9 %    triamcinolone (KENALOG) 0 1 % cream    VENTOLIN  (90 Base) MCG/ACT inhaler    zolpidem (AMBIEN) 10 mg tablet    Allergies   Allergen Reactions    Betadine [Povidone Iodine] Anaphylaxis    Iodinated Contrast Media Anaphylaxis    Iodine - Food Allergy Anaphylaxis and Other (See Comments)    Aspirin GI Bleeding    Caffeine - Food Allergy Palpitations       Objective     BP (!) 177/77   Pulse 69   Temp (!) 97 1 °F (36 2 °C) (Tympanic)   Resp 18   Ht 5' 2 5\" (1 588 m)   Wt 81 6 kg (180 lb)   SpO2 96%   BMI 32 40 kg/m²       PHYSICAL EXAM    Gen: NAD  Head: NCAT  CV: RRR  CHEST: Clear  ABD: soft, NT/ND  EXT: no edema      ASSESSMENT/PLAN:  This is a 82y o  year old female here for EGD and polypectomy of the tubular adenoma of the antrum, and she is stable and optimized for her procedure          "

## 2023-04-26 NOTE — ANESTHESIA POSTPROCEDURE EVALUATION
Post-Op Assessment Note    CV Status:  Stable  Pain Score: 0    Pain management: adequate     Mental Status:  Alert and awake   Hydration Status:  Euvolemic   PONV Controlled:  Controlled   Airway Patency:  Patent      Post Op Vitals Reviewed: Yes      Staff: Anesthesiologist, CRNA         No notable events documented      BP  139/63   Temp  97 8   Pulse  64   Resp   16   SpO2   94

## 2023-04-26 NOTE — ANESTHESIA PREPROCEDURE EVALUATION
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)   (+) Malignant neoplasm of upper-outer quadrant of left breast in female, estrogen receptor positive (HCC)      NEURO/PSYCH   (+) Atrial fibrillation, currently in sinus rhythm   (+) Chronic pain   (+) Migraine without aura and without status migrainosus, not intractable      Other   (+) 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)        Physical Exam    Airway    Mallampati score: III  TM Distance: >3 FB  Neck ROM: full     Dental       Cardiovascular  Rhythm: regular, Rate: normal, Cardiovascular exam normal    Pulmonary  Pulmonary exam normal Breath sounds clear to auscultation,     Other Findings  Multiple missing, as well as chipped and cracked teeth  Patient specifically mentions two teeth on her right side that need dental extraction  Documented on the accompanying chart  Anesthesia Plan  ASA Score- 3     Anesthesia Type- IV sedation with anesthesia with ASA Monitors  Additional Monitors:   Airway Plan:     Comment: Discussed risks/benefits, including medication reactions, awareness, aspiration, and serious/life threatening complications  Plan to maintain native airway with IVGA, monitored with EtCO2  Plan Factors-Exercise tolerance (METS): >4 METS  Patient summary reviewed  Patient instructed to abstain from smoking on day of procedure  Patient did not smoke on day of surgery  Induction- intravenous      Postoperative Plan-     Informed Consent- Anesthetic plan and risks discussed with patient  I personally reviewed this patient with the CRNA  Discussed and agreed on the Anesthesia Plan with the CRNA  Margaret Merida

## 2023-05-11 ENCOUNTER — APPOINTMENT (OUTPATIENT)
Dept: LAB | Facility: CLINIC | Age: 88
End: 2023-05-11

## 2023-05-11 ENCOUNTER — TRANSCRIBE ORDERS (OUTPATIENT)
Dept: LAB | Facility: CLINIC | Age: 88
End: 2023-05-11

## 2023-05-11 DIAGNOSIS — N18.9 CHRONIC KIDNEY DISEASE, UNSPECIFIED CKD STAGE: Primary | ICD-10-CM

## 2023-05-11 DIAGNOSIS — E83.52 HYPERCALCEMIA: ICD-10-CM

## 2023-05-11 DIAGNOSIS — N18.9 CHRONIC KIDNEY DISEASE, UNSPECIFIED CKD STAGE: ICD-10-CM

## 2023-05-11 LAB
ANION GAP SERPL CALCULATED.3IONS-SCNC: 2 MMOL/L (ref 4–13)
BUN SERPL-MCNC: 16 MG/DL (ref 5–25)
CALCIUM SERPL-MCNC: 10 MG/DL (ref 8.3–10.1)
CHLORIDE SERPL-SCNC: 108 MMOL/L (ref 96–108)
CO2 SERPL-SCNC: 27 MMOL/L (ref 21–32)
CREAT SERPL-MCNC: 1.36 MG/DL (ref 0.6–1.3)
GFR SERPL CREATININE-BSD FRML MDRD: 35 ML/MIN/1.73SQ M
GLUCOSE P FAST SERPL-MCNC: 168 MG/DL (ref 65–99)
PHOSPHATE SERPL-MCNC: 3.5 MG/DL (ref 2.3–4.1)
POTASSIUM SERPL-SCNC: 4 MMOL/L (ref 3.5–5.3)
PTH-INTACT SERPL-MCNC: 82.8 PG/ML (ref 18.4–80.1)
SODIUM SERPL-SCNC: 137 MMOL/L (ref 135–147)

## 2023-05-23 ENCOUNTER — HOSPITAL ENCOUNTER (OUTPATIENT)
Dept: NON INVASIVE DIAGNOSTICS | Facility: CLINIC | Age: 88
Discharge: HOME | End: 2023-05-23

## 2023-05-23 VITALS — WEIGHT: 180 LBS | BODY MASS INDEX: 33.13 KG/M2 | HEIGHT: 62 IN

## 2023-05-23 VITALS
WEIGHT: 180 LBS | SYSTOLIC BLOOD PRESSURE: 150 MMHG | HEART RATE: 65 BPM | BODY MASS INDEX: 31.89 KG/M2 | DIASTOLIC BLOOD PRESSURE: 70 MMHG | HEIGHT: 63 IN

## 2023-05-23 DIAGNOSIS — I49.3 PREMATURE VENTRICULAR CONTRACTIONS: ICD-10-CM

## 2023-05-23 DIAGNOSIS — R07.9 CHEST PAIN, UNSPECIFIED TYPE: ICD-10-CM

## 2023-05-23 DIAGNOSIS — I50.32 CHRONIC DIASTOLIC HEART FAILURE (HCC): ICD-10-CM

## 2023-05-23 LAB
AORTIC ROOT: 2.6 CM
APICAL FOUR CHAMBER EJECTION FRACTION: 55 %
ASCENDING AORTA: 2.9 CM
E WAVE DECELERATION TIME: 207 MS
FRACTIONAL SHORTENING: 29 (ref 28–44)
INTERVENTRICULAR SEPTUM IN DIASTOLE (PARASTERNAL SHORT AXIS VIEW): 1.4 CM
INTERVENTRICULAR SEPTUM: 1.4 CM (ref 0.6–1.1)
LAAS-AP2: 23.2 CM2
LAAS-AP4: 20.6 CM2
LEFT ATRIUM SIZE: 3.9 CM
LEFT INTERNAL DIMENSION IN SYSTOLE: 3 CM (ref 2.1–4)
LEFT VENTRICULAR INTERNAL DIMENSION IN DIASTOLE: 4.2 CM (ref 3.5–6)
LEFT VENTRICULAR POSTERIOR WALL IN END DIASTOLE: 1.2 CM
LEFT VENTRICULAR STROKE VOLUME: 44 ML
LVSV (TEICH): 44 ML
MV E'TISSUE VEL-SEP: 6 CM/S
MV PEAK A VEL: 0.78 M/S
MV PEAK E VEL: 72 CM/S
MV STENOSIS PRESSURE HALF TIME: 60 MS
MV VALVE AREA P 1/2 METHOD: 3.67
NUC STRESS EJECTION FRACTION: 63 %
RATE PRESSURE PRODUCT: NORMAL
RIGHT ATRIUM AREA SYSTOLE A4C: 17.4 CM2
RIGHT VENTRICLE ID DIMENSION: 3.3 CM
SL CV LEFT ATRIUM LENGTH A2C: 6.5 CM
SL CV LV EF: 60
SL CV PED ECHO LEFT VENTRICLE DIASTOLIC VOLUME (MOD BIPLANE) 2D: 78 ML
SL CV PED ECHO LEFT VENTRICLE SYSTOLIC VOLUME (MOD BIPLANE) 2D: 34 ML
SL CV REST NUCLEAR ISOTOPE DOSE: 10.96 MCI
SL CV STRESS NUCLEAR ISOTOPE DOSE: 30.7 MCI
SL CV STRESS RECOVERY BP: NORMAL MMHG
SL CV STRESS RECOVERY HR: 81 BPM
SL CV STRESS RECOVERY O2 SAT: 98 %
STRESS ANGINA INDEX: 0
STRESS BASELINE BP: NORMAL MMHG
STRESS BASELINE HR: 70 BPM
STRESS O2 SAT REST: 96 %
STRESS PEAK HR: 85 BPM
STRESS POST O2 SAT PEAK: 95 %
STRESS POST PEAK BP: 146 MMHG
STRESS/REST PERFUSION RATIO: 1.1
TR MAX PG: 26 MMHG
TR PEAK VELOCITY: 2.6 M/S
TRICUSPID ANNULAR PLANE SYSTOLIC EXCURSION: 2.1 CM
TRICUSPID VALVE PEAK REGURGITATION VELOCITY: 2.57 M/S

## 2023-05-23 RX ORDER — REGADENOSON 0.08 MG/ML
0.4 INJECTION, SOLUTION INTRAVENOUS ONCE
Status: COMPLETED | OUTPATIENT
Start: 2023-05-23 | End: 2023-05-23

## 2023-05-23 RX ADMIN — REGADENOSON 0.4 MG: 0.08 INJECTION, SOLUTION INTRAVENOUS at 12:08

## 2023-05-24 LAB
CHEST PAIN STATEMENT: NORMAL
MAX DIASTOLIC BP: 88 MMHG
MAX HEART RATE: 85 BPM
MAX PREDICTED HEART RATE: 135 BPM
MAX. SYSTOLIC BP: 162 MMHG
PROTOCOL NAME: NORMAL
REASON FOR TERMINATION: NORMAL
TARGET HR FORMULA: NORMAL
TEST INDICATION: NORMAL
TIME IN EXERCISE PHASE: NORMAL

## 2023-06-28 ENCOUNTER — TELEPHONE (OUTPATIENT)
Dept: CARDIOLOGY CLINIC | Facility: CLINIC | Age: 88
End: 2023-06-28

## 2023-07-24 ENCOUNTER — APPOINTMENT (OUTPATIENT)
Dept: LAB | Facility: CLINIC | Age: 88
End: 2023-07-24
Payer: MEDICARE

## 2023-07-24 ENCOUNTER — TRANSCRIBE ORDERS (OUTPATIENT)
Dept: LAB | Facility: CLINIC | Age: 88
End: 2023-07-24

## 2023-07-24 DIAGNOSIS — E83.52 HYPERCALCEMIA: ICD-10-CM

## 2023-07-24 DIAGNOSIS — E10.65 POOR CONTROL TYPE I DIABETES MELLITUS (HCC): ICD-10-CM

## 2023-07-24 DIAGNOSIS — I48.91 ATRIAL FIBRILLATION, UNSPECIFIED TYPE (HCC): ICD-10-CM

## 2023-07-24 DIAGNOSIS — N18.9 CHRONIC KIDNEY DISEASE, UNSPECIFIED CKD STAGE: ICD-10-CM

## 2023-07-24 DIAGNOSIS — N18.9 CHRONIC KIDNEY DISEASE, UNSPECIFIED CKD STAGE: Primary | ICD-10-CM

## 2023-07-24 LAB
ANION GAP SERPL CALCULATED.3IONS-SCNC: 8 MMOL/L
BUN SERPL-MCNC: 18 MG/DL (ref 5–25)
CALCIUM SERPL-MCNC: 10.3 MG/DL (ref 8.3–10.1)
CHLORIDE SERPL-SCNC: 109 MMOL/L (ref 96–108)
CO2 SERPL-SCNC: 23 MMOL/L (ref 21–32)
CREAT SERPL-MCNC: 1.33 MG/DL (ref 0.6–1.3)
GFR SERPL CREATININE-BSD FRML MDRD: 36 ML/MIN/1.73SQ M
GLUCOSE SERPL-MCNC: 155 MG/DL (ref 65–140)
PHOSPHATE SERPL-MCNC: 3.6 MG/DL (ref 2.3–4.1)
POTASSIUM SERPL-SCNC: 4 MMOL/L (ref 3.5–5.3)
PTH-INTACT SERPL-MCNC: 126.3 PG/ML (ref 12–88)
SODIUM SERPL-SCNC: 140 MMOL/L (ref 135–147)

## 2023-07-24 PROCEDURE — 83970 ASSAY OF PARATHORMONE: CPT

## 2023-07-24 PROCEDURE — 80048 BASIC METABOLIC PNL TOTAL CA: CPT

## 2023-07-24 PROCEDURE — 36415 COLL VENOUS BLD VENIPUNCTURE: CPT

## 2023-07-24 PROCEDURE — 84100 ASSAY OF PHOSPHORUS: CPT

## 2023-07-25 ENCOUNTER — TELEPHONE (OUTPATIENT)
Dept: HEMATOLOGY ONCOLOGY | Facility: CLINIC | Age: 88
End: 2023-07-25

## 2023-07-25 ENCOUNTER — OFFICE VISIT (OUTPATIENT)
Dept: SURGICAL ONCOLOGY | Facility: CLINIC | Age: 88
End: 2023-07-25
Payer: MEDICARE

## 2023-07-25 VITALS
WEIGHT: 180 LBS | BODY MASS INDEX: 33.13 KG/M2 | SYSTOLIC BLOOD PRESSURE: 126 MMHG | TEMPERATURE: 96.9 F | OXYGEN SATURATION: 93 % | HEART RATE: 80 BPM | RESPIRATION RATE: 15 BRPM | HEIGHT: 62 IN | DIASTOLIC BLOOD PRESSURE: 74 MMHG

## 2023-07-25 DIAGNOSIS — Z17.0 MALIGNANT NEOPLASM OF UPPER-OUTER QUADRANT OF LEFT BREAST IN FEMALE, ESTROGEN RECEPTOR POSITIVE (HCC): Primary | ICD-10-CM

## 2023-07-25 DIAGNOSIS — Z79.811 USE OF ANASTROZOLE (ARIMIDEX): ICD-10-CM

## 2023-07-25 DIAGNOSIS — C50.412 MALIGNANT NEOPLASM OF UPPER-OUTER QUADRANT OF LEFT BREAST IN FEMALE, ESTROGEN RECEPTOR POSITIVE (HCC): Primary | ICD-10-CM

## 2023-07-25 DIAGNOSIS — Z90.12 HISTORY OF LEFT MASTECTOMY: ICD-10-CM

## 2023-07-25 PROCEDURE — 99214 OFFICE O/P EST MOD 30 MIN: CPT

## 2023-07-25 NOTE — TELEPHONE ENCOUNTER
Appointment Confirmation   Who are you speaking with? Patient   If it is not the patient, are they listed on an active communication consent form? N/A   Which provider is the appointment scheduled with? JOSE Gallardo   When is the appointment scheduled? Please list date and time 07/25/2023 @3PM    At which location is the appointment scheduled to take place? [facility]   Did caller verbalize understanding of appointment details?  Yes

## 2023-07-25 NOTE — PROGRESS NOTES
Surgical Oncology Follow Up       [address] [facility]  [facility] [address]    Antonio Noble  1935  [id number]  [address] [facility]  [facility] [place]  [address] [facility] [address] [address]    Chief Complaint   Patient presents with   • Follow-up       Assessment/Plan:  1. Malignant neoplasm of upper-outer quadrant of left breast in female, estrogen receptor positive ([address])  - 6 month follow up  - Mammo diagnostic right w 3d & cad; Future    2. Use of anastrozole (Arimidex)  - continue use per medical oncology    3. History of left mastectomy  - Breast Prothesis    Discussion/Summary: Patient is a 53-year-old female presenting today for a 6-month follow-up for left breast cancer diagnosed in 2020. She initially was diagnosed with a left breast cancer in 1992 in which she underwent a lumpectomy and radiation therapy. In 1994 she had a recurrence and underwent a mastectomy with immediate reconstruction. Again in 2020 she was diagnosed with left breast invasive carcinoma ER/WI positive, HER2 negative and had lymphovascular involvement. She underwent a left breast lumpectomy with axillary dissection. She had 3 out of 5 lymph nodes with macrometastasis. She had immediate reconstruction with Dr. Sammi Garner who did implant removal and local flap. She is currently on anastrozole. We will get her scheduled for diagnostic mammogram of the right breast in September of this year. There were no concerns on her clinical breast exam.  I am going to provide patient with information on DME pharmacies as well as prescription for mastectomy bra and prosthetic at this time. I will see the patient back in 6 months or sooner should the need arise. She was instructed to call with any questions or concerns prior to this time. All questions were answered today.       History of Present Illness:     Oncology History Malignant neoplasm of upper-outer quadrant of left breast in female, estrogen receptor positive ([address])   1992 Initial Diagnosis    Left breast cancer  Lumpectomy  RT      1994 Surgery    Left breast cancer recurrence  Mastectomy with immediate reconstruction     8/31/2020 Biopsy    Left breast ultrasound-guided biopsy  A. 1 - 2 o'clock  Invasive mammary carcinoma of no special type   Grade 2    IL 90  HER2 1+  Lymphovascular invasion not identified    B. Left axillary lymph node  Invasive mammary carcinoma of no special type  Grade 2    Concordant. No residual lymph node is identified in specimen B, favoring axillary extension of invasive carcinoma over lymph node metastasis. Breast mass measured 2.8cm on US and involved skin. Axillary mass measured 1.4cm. Right clear. 11/17/2020 Surgery    Left breast ultrasound-guided lumpectomy with HUGO  directed axillary dissection  Invasive carcinoma of no special type (ductal)  Grade 2  3.2 cm  Invasive carcinoma directly invades into the dermis or epidermis with skin ulceration  Carcinoma invades skeletal muscle  Lymphovascular invasion present  Anterior margin positive for invasive carcinoma  3/5 Lymph nodes with macrometastases (1.6 cm)  Anatomic Stage IIIB  Prognostic Stage IIIA    Immediate reconstruction with Dr. Berlin Washburn (implant removal and local flap)     12/21/2020 -  Hormone Therapy    Anastrozole 1 mg daily  Dr. Dannie Pina of left breast 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 ([address])        -Interval History: Patient is a 60-year-old female presenting today for a 6-month follow-up for left breast cancer diagnosed in 2020. She is currently on anastrozole. Patient denies changes on her breast exam. She has chronic back pain. She denies persistent headaches, bone pain,  shortness of breath, or abdominal pain.       Review of Systems:  Review of Systems   Constitutional: Negative for activity change, appetite change, fatigue and unexpected weight change. Respiratory: Negative for cough and shortness of breath. Cardiovascular: Negative for chest pain. Gastrointestinal: Negative for abdominal pain, diarrhea, nausea and vomiting. Endocrine: Negative for heat intolerance. Musculoskeletal: Negative for arthralgias, back pain and myalgias. Skin: Negative for rash. Neurological: Negative for weakness and headaches. Hematological: Negative for adenopathy. 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)   • Use of anastrozole (Arimidex)   • Premature ventricular contractions     Past Medical History:   Diagnosis Date   • Anxiety    • Asthma    • Atrial fibrillation ([address])    • Breast cancer ([address]) 1992    left   • Chest pain, unspecified    • CHF (congestive heart failure) ([address])    • Diabetes mellitus ([address])    • GERD (gastroesophageal reflux disease)    • History of radiation exposure 1992   • Hyperlipidemia    • Hypertension    • Irregular heart beat     Afib   • Migraine    • Obesity    • Pericardial effusion    • Pericarditis      Past Surgical History:   Procedure Laterality Date   • ABDOMINAL ADHESION SURGERY     • APPENDECTOMY     • AUGMENTATION MAMMAPLASTY Left 1994   • BACK SURGERY     • BREAST LUMPECTOMY Left 1992    malignant   • BREAST LUMPECTOMY Left 11/17/2020    Procedure: BREAST ULTRASOUND DIRECTED LUMPECTOMY (ULTRASOUND AT 1200);   Surgeon: Beto Santos MD;  [facility];  Service: Surgical Oncology   • BREAST SURGERY     • CARDIAC SURGERY      Pericardial window   • CHOLECYSTECTOMY     • EYE SURGERY     • FLAP LOCAL TRUNK Left 11/17/2020    Procedure: FLAP LOCAL TRUNK;  Surgeon: Nicole Yung MD;  [facility];  Service: Plastics   • HYSTERECTOMY     • IR DRAINAGE TUBE CHECK WITH SCLEROSIS  4/23/2021   • IR DRAINAGE TUBE CHECK WITH SCLEROSIS  6/3/2021   • IR DRAINAGE TUBE CHECK WITH SCLEROSIS  6/17/2021   • IR DRAINAGE TUBE CHECK WITH SCLEROSIS  6/28/2021   • IR DRAINAGE TUBE CHECK WITH SCLEROSIS  7/7/2021   • IR DRAINAGE TUBE CHECK WITH SCLEROSIS  7/27/2021   • IR DRAINAGE TUBE PLACEMENT  4/1/2021   • IR DRAINAGE TUBE PLACEMENT WITH SCLEROSIS  5/14/2021   • LYMPH NODE DISSECTION Left 11/17/2020    Procedure: HUGO  DIRECTED AXILLARY DISSECTION;  Surgeon: Khang Bhardwaj MD;  [facility];  Service: Surgical Oncology   • MASTECTOMY Left 1994    malignant   • ME CELESTE-IMPLANT CAPSULECTOMY BREAST COMPLETE Left 11/17/2020    Procedure: BREAST CAPSULECTOMY;  Surgeon: Nicole Yung MD;  [facility];  Service: Plastics   • ME REMOVAL INTACT BREAST IMPLANT Left 11/17/2020    Procedure: BREAST IMPLANT REMOVAL;  Surgeon: Nicole Yung MD;  [facility];  Service: Plastics   • US BREAST NEEDLE LOC LEFT Left 11/2/2020   • US GUIDED BREAST BIOPSY LEFT COMPLETE Left 8/31/2020   • US GUIDED BREAST LYMPH NODE BIOPSY LEFT Left 8/31/2020   • WRIST SURGERY       Family History   Problem Relation Age of Onset   • Colon cancer Mother 54   • Breast cancer Mother         Early 52's   • Stroke Father    • Emphysema Father    • Leukemia Sister    • Breast cancer Sister 72   • ALS Brother      Social History     Socioeconomic History   • Marital status: /Civil Union     Spouse name: Not on file   • Number of children: Not on file   • Years of education: Not on file   • Highest education level: Not on file   Occupational History   • Not on file   Tobacco Use   • Smoking status: Never   • Smokeless tobacco: Never Vaping Use   • Vaping Use: Never used   Substance and Sexual Activity   • Alcohol use: Yes     Comment: social   • Drug use: No   • Sexual activity: Not Currently   Other Topics Concern   • Not on file   Social History Narrative   • Not on file     Social Determinants of Health     Financial Resource Strain: Not on file   Food Insecurity: Not on file   Transportation Needs: Not on file   Physical Activity: Not on file   Stress: Not on file   Social Connections: Not on file   Intimate Partner Violence: Not on file   Housing Stability: Not on file       Current Outpatient Medications:   •  anastrozole (ARIMIDEX) 1 mg tablet, TAKE 1 TABLET EVERY DAY, Disp: 90 tablet, Rfl: 3  •  Beclomethasone Dipropionate (QVAR IN), Inhale 2 puffs if needed, Disp: , Rfl:   •  EPINEPHrine (EPIPEN) 0.3 mg/0.3 mL SOAJ, Inject 0.3 mL as directed, Disp: , Rfl:   •  FLOVENT  MCG/ACT inhaler, INHALE 2 PUFF BY INHALATION ROUTE 2 TIMES EVERY DAY, Disp: , Rfl: 6  •  Flowflex COVID-19 Ag Home Test KIT, Use as directed, Disp: , Rfl:   •  glucose blood test strip, TEST TWICE DAILY OR AS DIRECTED DX: E11.9, Disp: , Rfl:   •  hydrocortisone 2.5 % cream, APPLY TO THE AFFECTED AREA TWICE DAILY.  ([address] [facility]), Disp: , Rfl:   •  insulin aspart (NovoLOG) 100 Units/mL injection pen, Novolog Flexpen U-100 Insulin aspart 100 unit/mL (3 mL) subcutaneous, Disp: , Rfl:   •  Insulin Glargine (TOUJEO) 300 units/mL CONCETRATED U-300 injection pen, Toujeo SoloStar U-300 Insulin 300 unit/mL (1.5 mL) subcutaneous pen  INJECT 67 UNITS BY SUBCUTANEOUS ROUTE AS PER INSULIN PROTOCOL, Disp: , Rfl:   •  lansoprazole (PREVACID) 30 mg capsule, lansoprazole 30 mg capsule,delayed release, Disp: , Rfl:   •  Lidocaine 5 % CREA, Apply 1 application topically as needed (pain), Disp: 1 Tube, Rfl: 0  •  metoprolol tartrate (LOPRESSOR) 50 mg tablet, Take 50 mg by mouth every 12 (twelve) hours , Disp: , Rfl:   •  morphine (MS CONTIN) 15 mg 12 hr tablet, Take 15 mg by mouth every 12 (twelve) hours as needed for severe pain, Disp: , Rfl:   •  morphine (MSIR) 15 mg tablet, Take 15 mg by mouth every 8 (eight) hours as needed for severe pain., Disp: , Rfl:   •  Naloxegol Oxalate (Movantik) 12.5 MG TABS, Take by mouth, Disp: , Rfl:   •  NON FORMULARY, Domperidone 20 mg TID, Disp: , Rfl:   •  NOVOFINE 32G X 6 MM MISC, 2 (two) times a day As directed, Disp: , Rfl: 5  •  nystatin (MYCOSTATIN) cream, APPLY TO AFFECTED AREAS TWICE A DAY. (MIX WITH HYDROCORTISONE), Disp: , Rfl:   •  ONE TOUCH ULTRA TEST test strip, TEST TWICE DAILY OR AS DIRECTED DX: E11.9, Disp: , Rfl: 5  •  ONETOUCH DELICA LANCETS 20G MISC, TEST TWICE DAILY OR AS DIRECTED, Disp: , Rfl: 5  •  oxybutynin (DITROPAN XL) 15 MG 24 hr tablet, Take 15 mg by mouth daily. , Disp: , Rfl:   •  simvastatin (ZOCOR) 20 mg tablet, Take 20 mg by mouth daily at bedtime. , Disp: , Rfl:   •  solifenacin (VESICARE) 10 MG tablet, Take 1 tablet (10 mg total) by mouth daily, Disp: 90 tablet, Rfl: 0  •  Toujeo SoloStar 300 units/mL CONCENTRATED U-300 injection pen (1-unit dial), INJECT 72 UNITS BY SUBCUTANEOUS ROUTE AS PER INSULIN PROTOCOL, Disp: , Rfl:   •  VENTOLIN  (90 Base) MCG/ACT inhaler,  , Disp: , Rfl:   •  zolpidem (AMBIEN) 10 mg tablet, Take by mouth Daily, Disp: , Rfl:   •  azithromycin (ZITHROMAX) 250 mg tablet, , Disp: , Rfl:   •  hydrocodone-chlorpheniramine polistirex (TUSSIONEX) 10-8 mg/5 mL ER suspension, Take 5 mL by mouth every 12 (twelve) hours (Patient not taking: Reported on 4/25/2023), Disp: , Rfl:   •  lidocaine-prilocaine (EMLA) cream, APPLY FOR 12 HOURS, AND THEN OFF 12 HOURS AS NEEDED (Patient not taking: Reported on 4/25/2023), Disp: , Rfl: 2  •  nitrofurantoin (MACROBID) 100 mg capsule, TAKE 1 CAPSULE BY MOUTH EVERY 12 HOURS WITH FOOD (Patient not taking: Reported on 4/25/2023), Disp: , Rfl:   •  sodium chloride, PF, 0.9 %, 10 mL by Intracatheter route daily Intracatheter flushing daily (Patient not taking: Reported on 7/25/2023), Disp: 300 mL, Rfl: 0  •  triamcinolone (KENALOG) 0.1 % cream,   (Patient not taking: Reported on 4/25/2023), Disp: , Rfl:   Allergies   Allergen Reactions   • Betadine [Povidone Iodine] Anaphylaxis   • Iodinated Contrast Media Anaphylaxis   • Iodine - Food Allergy Anaphylaxis and Other (See Comments)   • Aspirin GI Bleeding   • Caffeine - Food Allergy Palpitations     Vitals:    07/25/23 1506   Pulse: 80   Temp: (!) 96.9 °F (36.1 °C)   SpO2: 93%       Physical Exam  Constitutional:       General: She is not in acute distress. Appearance: Normal appearance. Cardiovascular:      Rate and Rhythm: Normal rate and regular rhythm. Pulses: Normal pulses. Heart sounds: Normal heart sounds. Pulmonary:      Effort: Pulmonary effort is normal.      Breath sounds: Normal breath sounds. Chest:      Chest wall: No mass. Breasts:     Right: No swelling, bleeding, inverted nipple, mass, nipple discharge, skin change or tenderness. Left: No swelling, bleeding, mass, skin change or tenderness. Abdominal:      General: Abdomen is flat. Palpations: Abdomen is soft. Lymphadenopathy:      Upper Body:      Right upper body: No supraclavicular, axillary or pectoral adenopathy. Left upper body: No supraclavicular, axillary or pectoral adenopathy. Skin:     General: Skin is warm. Neurological:      General: No focal deficit present. Mental Status: She is alert and oriented to person, place, and time. Psychiatric:         Mood and Affect: Mood normal.         Behavior: Behavior normal.           Results:    Imaging  No results found. I reviewed the above imaging data. Advance Care Planning/Advance Directives:  Discussed disease status, cancer treatment plans and/or cancer treatment goals with the patient.

## 2023-08-03 ENCOUNTER — TELEPHONE (OUTPATIENT)
Dept: UROLOGY | Facility: AMBULATORY SURGERY CENTER | Age: 88
End: 2023-08-03

## 2023-08-03 NOTE — TELEPHONE ENCOUNTER
Called and left a VM for the patient, her scheduled appointment has been cancelled since the provider will be unavailable. Please call us at your earliest convenience to reschedule, we can be reached at [phone].     Please reschedule her follow-up appointment for:  Notes: 3 month med follow up

## 2023-08-04 NOTE — TELEPHONE ENCOUNTER
Patient called into reschedule apt. Patient was given soonest available is requesting sooner apt. Please review and call patient.      CB: [phone]

## 2023-10-10 NOTE — TELEPHONE ENCOUNTER
DR you,  Patient is requesting order for valium prior to fna on 10/18/23. We have a protocol for MRI , however, we don't have protocol for FNAs.     Please advise if ok to order valium 5mg prior to FNA on 10/18/23 with 1 refill.  Thanks.   Patient called back after hours and decided that she would like to do a month's supply  She would like it called in as soon as possible   They are charging almost the same amount for 7 pills as a month's supply

## 2023-10-25 ENCOUNTER — OFFICE VISIT (OUTPATIENT)
Dept: UROLOGY | Facility: AMBULATORY SURGERY CENTER | Age: 88
End: 2023-10-25
Payer: MEDICARE

## 2023-10-25 VITALS
DIASTOLIC BLOOD PRESSURE: 82 MMHG | SYSTOLIC BLOOD PRESSURE: 140 MMHG | WEIGHT: 180 LBS | HEART RATE: 86 BPM | RESPIRATION RATE: 18 BRPM | OXYGEN SATURATION: 98 % | HEIGHT: 62 IN | BODY MASS INDEX: 33.13 KG/M2

## 2023-10-25 DIAGNOSIS — N32.81 OAB (OVERACTIVE BLADDER): ICD-10-CM

## 2023-10-25 DIAGNOSIS — N39.0 URINARY TRACT INFECTION WITHOUT HEMATURIA, SITE UNSPECIFIED: Primary | ICD-10-CM

## 2023-10-25 LAB
BACTERIA UR QL AUTO: ABNORMAL /HPF
BILIRUB UR QL STRIP: NEGATIVE
CLARITY UR: ABNORMAL
COLOR UR: YELLOW
GLUCOSE UR STRIP-MCNC: NEGATIVE MG/DL
HGB UR QL STRIP.AUTO: ABNORMAL
KETONES UR STRIP-MCNC: NEGATIVE MG/DL
LEUKOCYTE ESTERASE UR QL STRIP: ABNORMAL
NITRITE UR QL STRIP: POSITIVE
NON-SQ EPI CELLS URNS QL MICRO: ABNORMAL /HPF
PH UR STRIP.AUTO: 5.5 [PH]
POST-VOID RESIDUAL VOLUME, ML POC: 29 ML
PROT UR STRIP-MCNC: ABNORMAL MG/DL
RBC #/AREA URNS AUTO: ABNORMAL /HPF
SL AMB  POCT GLUCOSE, UA: NORMAL
SL AMB LEUKOCYTE ESTERASE,UA: NORMAL
SL AMB POCT BILIRUBIN,UA: NORMAL
SL AMB POCT BLOOD,UA: NORMAL
SL AMB POCT CLARITY,UA: CLEAR
SL AMB POCT COLOR,UA: YELLOW
SL AMB POCT KETONES,UA: NORMAL
SL AMB POCT NITRITE,UA: POSITIVE
SL AMB POCT PH,UA: 5
SL AMB POCT SPECIFIC GRAVITY,UA: 1.01
SL AMB POCT URINE PROTEIN: NORMAL
SL AMB POCT UROBILINOGEN: 0.2
SP GR UR STRIP.AUTO: 1.02 (ref 1–1.03)
UROBILINOGEN UR STRIP-ACNC: <2 MG/DL
WBC #/AREA URNS AUTO: ABNORMAL /HPF

## 2023-10-25 PROCEDURE — 81001 URINALYSIS AUTO W/SCOPE: CPT | Performed by: UROLOGY

## 2023-10-25 PROCEDURE — 51798 US URINE CAPACITY MEASURE: CPT | Performed by: UROLOGY

## 2023-10-25 PROCEDURE — 99214 OFFICE O/P EST MOD 30 MIN: CPT | Performed by: UROLOGY

## 2023-10-25 PROCEDURE — 87086 URINE CULTURE/COLONY COUNT: CPT | Performed by: UROLOGY

## 2023-10-25 PROCEDURE — 87077 CULTURE AEROBIC IDENTIFY: CPT | Performed by: UROLOGY

## 2023-10-25 PROCEDURE — 81002 URINALYSIS NONAUTO W/O SCOPE: CPT | Performed by: UROLOGY

## 2023-10-25 PROCEDURE — 87186 SC STD MICRODIL/AGAR DIL: CPT | Performed by: UROLOGY

## 2023-10-25 RX ORDER — SODIUM FLUORIDE 6.1 MG/ML
PASTE, DENTIFRICE DENTAL
COMMUNITY
Start: 2023-10-25

## 2023-10-25 RX ORDER — PEN NEEDLE, DIABETIC 32GX 5/32"
NEEDLE, DISPOSABLE MISCELLANEOUS
COMMUNITY
Start: 2023-09-10

## 2023-10-25 RX ORDER — NALOXONE HYDROCHLORIDE 1 MG/ML
2 INJECTION INTRAMUSCULAR; INTRAVENOUS; SUBCUTANEOUS
COMMUNITY
Start: 2023-09-08

## 2023-10-25 RX ORDER — CEPHALEXIN 500 MG/1
500 CAPSULE ORAL EVERY 8 HOURS SCHEDULED
Qty: 21 CAPSULE | Refills: 0 | Status: SHIPPED | OUTPATIENT
Start: 2023-10-25 | End: 2023-11-01

## 2023-10-25 NOTE — PROGRESS NOTES
10/25/2023    Alejo Corley  1935  [id number]        Assessment  Recurrent UTI  OAB symptoms, chronic    Plan  She seems to have a UTI. We reviewed her history and medications. She wishes to stay on dual anti-cholinergic therapy. Empiric cephalexin prescribed. Check urine culture. She is going away in 2 days and will hopefully confirm appropriate antibiotic by then. They are comfortable and agree with the plan. History of Present Illness  David Arreola is a 82 y.o. female previously following with [facility] 4 years ago. Patient has longstanding history of urinary frequency, urgency, and mixed incontinence. She is currently on oxybutynin 15mg daily. Patient states prior urologist had prescribed Myrbetriq, but medication was too expensive. she had been placed on solifenacin as dual therapy and has been happy with this. She has dry mouth but tolerates it. She also notes a remote history of nephrolithiasis and underwent ureteroscopy. Patient wears numerous sanitary pads daily for protection. Additional history includes insulin dependent diabetes and breast cancer. She currently feels that she has a UTI with dysuria, cloudy urine and frequency/urgency. Urine dip is positive. PVR 29 ml. Review of Systems  Review of Systems   Constitutional: Negative. HENT: Negative. Respiratory: Negative. Cardiovascular: Negative. Gastrointestinal: Negative. Genitourinary:         As per HPI   Musculoskeletal: Negative. Skin: Negative. Neurological: Negative. Hematological: Negative.           Past Medical History  Past Medical History:   Diagnosis Date    Anxiety     Asthma     Atrial fibrillation ([address] [facility])     Breast cancer ([facility]) 1992    left    Chest pain, unspecified     CHF (congestive heart failure) ([facility])     Diabetes mellitus ([facility])     GERD (gastroesophageal reflux disease)     History of radiation exposure 1992    Hyperlipidemia     Hypertension     Irregular heart beat     Afib    Migraine     Obesity     Pericardial effusion     Pericarditis        Past Social History  Past Surgical History:   Procedure Laterality Date    ABDOMINAL ADHESION SURGERY      APPENDECTOMY      AUGMENTATION MAMMAPLASTY Left 1994    BACK SURGERY      BREAST LUMPECTOMY Left 1992    malignant    BREAST LUMPECTOMY Left 11/17/2020    Procedure: BREAST ULTRASOUND DIRECTED LUMPECTOMY (ULTRASOUND AT 1200);   Surgeon: Christina Kinsey MD;  [facility];  Service: Surgical Oncology    BREAST SURGERY      CARDIAC SURGERY      Pericardial window    CHOLECYSTECTOMY      EYE SURGERY      FLAP LOCAL TRUNK Left 11/17/2020    Procedure: FLAP LOCAL TRUNK;  Surgeon: Sabiha Castrejon MD;  [facility];  Service: Plastics    HYSTERECTOMY      IR DRAINAGE TUBE CHECK WITH SCLEROSIS  4/23/2021    IR DRAINAGE TUBE CHECK WITH SCLEROSIS  6/3/2021    IR DRAINAGE TUBE CHECK WITH SCLEROSIS  6/17/2021    IR DRAINAGE TUBE CHECK WITH SCLEROSIS  6/28/2021    IR DRAINAGE TUBE CHECK WITH SCLEROSIS  7/7/2021    IR DRAINAGE TUBE CHECK WITH SCLEROSIS  7/27/2021    IR DRAINAGE TUBE PLACEMENT  4/1/2021    IR DRAINAGE TUBE PLACEMENT WITH SCLEROSIS  5/14/2021    LYMPH NODE DISSECTION Left 11/17/2020    Procedure: HUGO  DIRECTED AXILLARY DISSECTION;  Surgeon: Christina Kinsey MD;  [facility];  Service: Surgical Oncology    MASTECTOMY Left 1994    malignant    WY CELESTE-IMPLANT CAPSULECTOMY BREAST COMPLETE Left 11/17/2020    Procedure: BREAST CAPSULECTOMY;  Surgeon: Sabiha Castrejon MD;  [facility];  Service: Plastics    WY REMOVAL INTACT BREAST IMPLANT Left 11/17/2020    Procedure: BREAST IMPLANT REMOVAL;  Surgeon: Sabiha Castrejon MD;  [facility];  Service: Plastics    US BREAST NEEDLE LOC LEFT Left 11/2/2020    US GUIDED BREAST BIOPSY LEFT COMPLETE Left 8/31/2020    US GUIDED BREAST LYMPH NODE BIOPSY LEFT Left 8/31/2020    WRIST SURGERY         Past Family History  Family History   Problem Relation Age of Onset    Colon cancer Mother 54    Breast cancer Mother         Early 52's    Stroke Father     Emphysema Father     Leukemia Sister     Breast cancer Sister 72    ALS Brother        Past Social history  Social History     Socioeconomic History    Marital status: /Civil Union     Spouse name: Not on file    Number of children: Not on file    Years of education: Not on file    Highest education level: Not on file   Occupational History    Not on file   Tobacco Use    Smoking status: Never    Smokeless tobacco: Never   Vaping Use    Vaping Use: Never used   Substance and Sexual Activity    Alcohol use: Yes     Comment: social    Drug use: No    Sexual activity: Not Currently   Other Topics Concern    Not on file   Social History Narrative    Not on file     Social Determinants of Health     Financial Resource Strain: Not on file   Food Insecurity: Not on file   Transportation Needs: Not on file   Physical Activity: Not on file   Stress: Not on file   Social Connections: Not on file   Intimate Partner Violence: Not on file   Housing Stability: Not on file     Social History     Tobacco Use   Smoking Status Never   Smokeless Tobacco Never       Current Medications  Current Outpatient Medications   Medication Sig Dispense Refill    anastrozole (ARIMIDEX) 1 mg tablet TAKE 1 TABLET EVERY DAY 90 tablet 3    Beclomethasone Dipropionate (QVAR IN) Inhale 2 puffs if needed      cephalexin (KEFLEX) 500 mg capsule Take 1 capsule (500 mg total) by mouth every 8 (eight) hours for 7 days 21 capsule 0    Droplet Pen Needles 32G X 4 MM MISC       EPINEPHrine (EPIPEN) 0.3 mg/0.3 mL SOAJ Inject 0.3 mL as directed      FLOVENT  MCG/ACT inhaler INHALE 2 PUFF BY INHALATION ROUTE 2 TIMES EVERY DAY  6    Flowflex COVID-19 Ag Home Test KIT Use as directed      glucose blood test strip TEST TWICE DAILY OR AS DIRECTED DX: E11.9      hydrocortisone 2.5 % cream APPLY TO THE AFFECTED AREA TWICE DAILY.  (MIX WITH NYSTATIN)      insulin aspart (NovoLOG) 100 Units/mL injection pen Novolog Flexpen U-100 Insulin aspart 100 unit/mL (3 mL) subcutaneous      Insulin Glargine (TOUJEO) 300 units/mL CONCETRATED U-300 injection pen Toujeo SoloStar U-300 Insulin 300 unit/mL (1.5 mL) subcutaneous pen   INJECT 67 UNITS BY SUBCUTANEOUS ROUTE AS PER INSULIN PROTOCOL      lansoprazole (PREVACID) 30 mg capsule lansoprazole 30 mg capsule,delayed release      Lidocaine 5 % CREA Apply 1 application topically as needed (pain) 1 Tube 0    metoprolol tartrate (LOPRESSOR) 50 mg tablet Take 50 mg by mouth every 12 (twelve) hours       morphine (MS CONTIN) 15 mg 12 hr tablet Take 15 mg by mouth every 12 (twelve) hours as needed for severe pain      morphine (MSIR) 15 mg tablet Take 15 mg by mouth every 8 (eight) hours as needed for severe pain. Naloxegol Oxalate (Movantik) 12.5 MG TABS Take by mouth      naloxone (NARCAN) 2 MG/2ML injection Inject 2 mL into a catheter in a vein      NON FORMULARY Domperidone 20 mg TID      NOVOFINE 32G X 6 MM MISC 2 (two) times a day As directed  5    nystatin (MYCOSTATIN) cream APPLY TO AFFECTED AREAS TWICE A DAY. (MIX WITH HYDROCORTISONE)      ONE TOUCH ULTRA TEST test strip TEST TWICE DAILY OR AS DIRECTED DX: E11.9  5    ONETOUCH DELICA LANCETS 32B MISC TEST TWICE DAILY OR AS DIRECTED  5    oxybutynin (DITROPAN XL) 15 MG 24 hr tablet Take 15 mg by mouth daily. simvastatin (ZOCOR) 20 mg tablet Take 20 mg by mouth daily at bedtime.       Sodium Fluoride 5000 PPM 1.1 % GEL       solifenacin (VESICARE) 10 MG tablet Take 1 tablet (10 mg total) by mouth daily 90 tablet 0    Toujeo SoloStar 300 units/mL CONCENTRATED U-300 injection pen (1-unit dial) INJECT 72 UNITS BY SUBCUTANEOUS ROUTE AS PER INSULIN PROTOCOL      VENTOLIN  (90 Base) MCG/ACT inhaler        zolpidem (AMBIEN) 10 mg tablet Take by mouth Daily      azithromycin (ZITHROMAX) 250 mg tablet  (Patient not taking: Reported on 4/25/2023)      hydrocodone-chlorpheniramine polistirex (TUSSIONEX) 10-8 mg/5 mL ER suspension Take 5 mL by mouth every 12 (twelve) hours (Patient not taking: Reported on 4/25/2023)      lidocaine-prilocaine (EMLA) cream APPLY FOR 12 HOURS, AND THEN OFF 12 HOURS AS NEEDED (Patient not taking: Reported on 4/25/2023)  2    nitrofurantoin (MACROBID) 100 mg capsule TAKE 1 CAPSULE BY MOUTH EVERY 12 HOURS WITH FOOD (Patient not taking: Reported on 4/25/2023)      sodium chloride, PF, 0.9 % 10 mL by Intracatheter route daily Intracatheter flushing daily (Patient not taking: Reported on 7/25/2023) 300 mL 0    triamcinolone (KENALOG) 0.1 % cream   (Patient not taking: Reported on 4/25/2023)       No current facility-administered medications for this visit. Allergies  Allergies   Allergen Reactions    Iodinated Contrast Media Anaphylaxis    Iodine - Food Allergy Anaphylaxis and Other (See Comments)    Povidone Iodine Anaphylaxis    Aspirin GI Bleeding and Other (See Comments)    Caffeine - Food Allergy Palpitations       Past Medical History, Social History, Family History, medications and allergies were reviewed.     Vitals  Vitals:    10/25/23 1155   BP: 140/82   BP Location: Left arm   Patient Position: Sitting   Cuff Size: Standard   Pulse: 86   Resp: 18   SpO2: 98%   Weight: 81.6 kg (180 lb)   Height: 5' 2" (1.575 m)       Physical Exam  Physical Exam      Results  No results found for: "PSA"  Lab Results   Component Value Date    GLUCOSE 103 01/30/2015    CALCIUM 10.3 (H) 07/24/2023     01/30/2015    K 4.0 07/24/2023    CO2 23 07/24/2023     (H) 07/24/2023    BUN 18 07/24/2023    CREATININE 1.33 (H) 07/24/2023     Lab Results   Component Value Date    WBC 11.71 (H) 02/10/2023    HGB 15.2 02/10/2023    HCT 47.9 (H) 02/10/2023    MCV 92 02/10/2023     02/10/2023

## 2023-10-27 LAB — BACTERIA UR CULT: ABNORMAL

## 2023-11-28 ENCOUNTER — OFFICE VISIT (OUTPATIENT)
Dept: CARDIOLOGY CLINIC | Facility: CLINIC | Age: 88
End: 2023-11-28
Payer: MEDICARE

## 2023-11-28 VITALS
DIASTOLIC BLOOD PRESSURE: 82 MMHG | HEART RATE: 55 BPM | SYSTOLIC BLOOD PRESSURE: 120 MMHG | OXYGEN SATURATION: 97 % | WEIGHT: 178 LBS | BODY MASS INDEX: 32.76 KG/M2 | HEIGHT: 62 IN

## 2023-11-28 DIAGNOSIS — I10 ESSENTIAL HYPERTENSION: Primary | Chronic | ICD-10-CM

## 2023-11-28 DIAGNOSIS — I50.32 CHRONIC DIASTOLIC HEART FAILURE (HCC): ICD-10-CM

## 2023-11-28 PROCEDURE — 99213 OFFICE O/P EST LOW 20 MIN: CPT | Performed by: INTERNAL MEDICINE

## 2023-11-28 RX ORDER — TOBRAMYCIN AND DEXAMETHASONE 3; 1 MG/ML; MG/ML
SUSPENSION/ DROPS OPHTHALMIC
COMMUNITY
Start: 2023-11-22

## 2023-11-28 NOTE — ASSESSMENT & PLAN NOTE
Wt Readings from Last 3 Encounters:   11/28/23 80.7 kg (178 lb)   10/25/23 81.6 kg (180 lb)   07/25/23 81.6 kg (180 lb)     Chronic diastolic congestive heart failure, stable.

## 2023-11-28 NOTE — PROGRESS NOTES
Assessment/Plan:    Essential hypertension  Hypertension, stable and adequately controlled. Chronic heart failure (HCC)  Wt Readings from Last 3 Encounters:   11/28/23 80.7 kg (178 lb)   10/25/23 81.6 kg (180 lb)   07/25/23 81.6 kg (180 lb)     Chronic diastolic congestive heart failure, stable. Diagnoses and all orders for this visit:    Essential hypertension    Chronic diastolic heart failure ([address])    Other orders  -     tobramycin-dexamethasone (TOBRADEX) ophthalmic suspension; INSTILL 1 DROP IN THE RIGHT EYE 4 TIMES DAILY  -     VITAMIN K PO; Take by mouth          Subjective: Feels fairly well from the cardiac standpoint. Patient ID: Lloyd Savage is a 82 y.o. female. The patient presented to this office for the purpose of cardiac follow-up. She is known to have a history of hypertension and hyperlipidemia as well as diastolic congestive heart failure. The patient has been feeling fairly well from the cardiac standpoint denying any symptoms of chest pain or shortness of breath. She denies any symptoms of palpitation, dizziness or syncope. She has no lower extremity edema. The following portions of the patient's history were reviewed and updated as appropriate: allergies, current medications, past family history, past medical history, past social history, past surgical history, and problem list.    Review of Systems   Respiratory:  Negative for apnea, cough, chest tightness, shortness of breath and wheezing. Cardiovascular:  Negative for chest pain, palpitations and leg swelling. Gastrointestinal:  Negative for abdominal pain. Neurological:  Negative for dizziness and light-headedness. Psychiatric/Behavioral: Negative. Objective: Stable cardiac wise.       /82 (BP Location: Right arm, Patient Position: Sitting, Cuff Size: Standard)   Pulse 55   Ht 5' 2" (1.575 m)   Wt 80.7 kg (178 lb)   SpO2 97%   BMI 32.56 kg/m²          Physical Exam  Vitals reviewed. Constitutional:       General: She is not in acute distress. Appearance: She is well-developed. She is not diaphoretic. HENT:      Head: Normocephalic and atraumatic. Neck:      Thyroid: No thyromegaly. Vascular: No JVD. Cardiovascular:      Rate and Rhythm: Normal rate and regular rhythm. Heart sounds: S1 normal and S2 normal. No murmur heard. No friction rub. No gallop. Pulmonary:      Effort: Pulmonary effort is normal. No respiratory distress. Breath sounds: No wheezing or rales. Chest:      Chest wall: No tenderness. Abdominal:      Palpations: Abdomen is soft. Musculoskeletal:      Cervical back: Normal range of motion and neck supple. Right lower leg: No edema. Left lower leg: No edema. Skin:     General: Skin is warm and dry. Neurological:      Mental Status: She is oriented to person, place, and time.    Psychiatric:         Mood and Affect: Mood normal.         Behavior: Behavior normal.

## 2023-11-30 ENCOUNTER — TELEPHONE (OUTPATIENT)
Age: 88
End: 2023-11-30

## 2023-11-30 NOTE — TELEPHONE ENCOUNTER
Pt called and stated pt PCP Dr Jacques Allison would like to know if pt is to be taking solifenacin (VESICARE) 10 MG tablet and oxybutynin (DITROPAN XL) 15 MG 24 hr tablet     Please call pt and PCP to give instructions one which medications pt should be taking.     Pt call [phone]  [phone]

## 2023-12-04 NOTE — TELEPHONE ENCOUNTER
Patient called stating she wants to know if she should be taking both medication the oxybutynin (DITROPAN XL) 15 MG 24 hr tablet and     solifenacin (VESICARE) 10 MG tablet       She is to contact her pcp and let them know. She stated it has to do with her [place]. She would like a call from the office to verify this information. She stated she called Friday and did not hear from the office.      Patient can be reached at [phone]

## 2024-01-22 ENCOUNTER — TELEPHONE (OUTPATIENT)
Dept: HEMATOLOGY ONCOLOGY | Facility: CLINIC | Age: 89
End: 2024-01-22

## 2024-01-22 NOTE — TELEPHONE ENCOUNTER
Appointment Change  Cancel, Reschedule, Change to Virtual      Who are you speaking with? Patient   If it is not the patient, is the caller listed on the communication consent form? N/A   Which provider is the appointment scheduled with? JOSE Higuera   When was the original appointment scheduled?    Please list date and time 1/22/24 @ 3   At which location is the appointment scheduled to take place? [facility]   Was the appointment rescheduled?     Was the appointment changed from an in person visit to a virtual visit?    If so, please list the details of the change. 1/31/24 @ 3   What is the reason for the appointment change? Patient not feeling well       Was STAR transport scheduled? No   Does STAR transport need to be scheduled for the new visit (if applicable) No   Does the patient need an infusion appointment rescheduled? No   Does the patient have an upcoming infusion appointment scheduled? If so, when? No   Is the patient undergoing chemotherapy? No   For appointments cancelled with less than 24 hours:  Was the no-show policy reviewed? Yes

## 2024-01-31 ENCOUNTER — OFFICE VISIT (OUTPATIENT)
Dept: SURGICAL ONCOLOGY | Facility: CLINIC | Age: 89
End: 2024-01-31
Payer: MEDICARE

## 2024-01-31 VITALS
HEIGHT: 62 IN | BODY MASS INDEX: 33.03 KG/M2 | TEMPERATURE: 98 F | WEIGHT: 179.5 LBS | DIASTOLIC BLOOD PRESSURE: 60 MMHG | OXYGEN SATURATION: 95 % | SYSTOLIC BLOOD PRESSURE: 118 MMHG | HEART RATE: 75 BPM

## 2024-01-31 DIAGNOSIS — C77.3 CARCINOMA OF LEFT BREAST METASTATIC TO AXILLARY LYMPH NODE (HCC): ICD-10-CM

## 2024-01-31 DIAGNOSIS — Z79.811 USE OF ANASTROZOLE (ARIMIDEX): ICD-10-CM

## 2024-01-31 DIAGNOSIS — Z90.12 HISTORY OF LEFT MASTECTOMY: ICD-10-CM

## 2024-01-31 DIAGNOSIS — C50.412 MALIGNANT NEOPLASM OF UPPER-OUTER QUADRANT OF LEFT BREAST IN FEMALE, ESTROGEN RECEPTOR POSITIVE: Primary | ICD-10-CM

## 2024-01-31 DIAGNOSIS — C50.912 CARCINOMA OF LEFT BREAST METASTATIC TO AXILLARY LYMPH NODE (HCC): ICD-10-CM

## 2024-01-31 DIAGNOSIS — Z17.0 MALIGNANT NEOPLASM OF UPPER-OUTER QUADRANT OF LEFT BREAST IN FEMALE, ESTROGEN RECEPTOR POSITIVE: Primary | ICD-10-CM

## 2024-01-31 LAB
DME PARACHUTE DELIVERY DATE REQUESTED: NORMAL
DME PARACHUTE ITEM DESCRIPTION: NORMAL
DME PARACHUTE ORDER STATUS: NORMAL
DME PARACHUTE SUPPLIER NAME: NORMAL
DME PARACHUTE SUPPLIER PHONE: NORMAL

## 2024-01-31 PROCEDURE — 99214 OFFICE O/P EST MOD 30 MIN: CPT

## 2024-01-31 RX ORDER — NYSTATIN 100000 [USP'U]/G
POWDER TOPICAL
COMMUNITY
Start: 2023-11-14

## 2024-01-31 NOTE — PROGRESS NOTES
Surgical Oncology Follow Up       [facility] [place]  [address]  [place] [address]    Barbra De Oliveira  1935  [id number]  [facility] [place]  [address] [facility]  [facility] [address]    Chief Complaint   Patient presents with   • Follow-up       Assessment/Plan:  1. Malignant neoplasm of upper-outer quadrant of left breast in female, estrogen receptor positive   - 6 mo follow up    2. 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 Use of anastrozole (Arimidex)  - continue use per medical oncology    4. History of left mastectomy       Discussion/Summary: Patient is a 88-year-old female presenting today for a 6-month follow-up for recurrent left breast cancer diagnosed in 2020. She initially was diagnosed with a left breast cancer in 1992 for which she underwent a lumpectomy and WBRT. In 1994 she had a recurrence and underwent a mastectomy with immediate reconstruction. Again in 2020, she was diagnosed with left breast invasive carcinoma ER/ME positive, HER2 negative and had lymphovascular involvement. She underwent a left breast lumpectomy with axillary dissection. She had 3 out of 5 lymph nodes with macrometastasis. She had immediate reconstruction with implant removal and local flap reconstruction. She is currently on anastrozole. She is overdue for mammogram. Her last was performed in Sept 2022. She is scheduled for right breast mammo on 2/15/23. I placed an order for mastectomy bra and prosthetic through [facility]. There were no concerns on her clinical breast exam. I will see the patient back in 6 months or sooner should the need arise. She was instructed to call with any questions or concerns prior to this time. All questions were answered today.     History of Present Illness:     Oncology History   Malignant neoplasm of upper-outer quadrant of left  breast in female, estrogen receptor positive    1992 Initial Diagnosis    Left breast cancer  Lumpectomy  RT      1994 Surgery    Left breast cancer recurrence  Mastectomy with immediate reconstruction     8/31/2020 Biopsy    Left breast ultrasound-guided biopsy  A. 1 - 2 o'clock  Invasive mammary carcinoma of no special type   Grade 2    NM 90  HER2 1+  Lymphovascular invasion not identified    B. Left axillary lymph node  Invasive mammary carcinoma of no special type  Grade 2    Concordant. No residual lymph node is identified in specimen B, favoring axillary extension of invasive carcinoma over lymph node metastasis. Breast mass measured 2.8cm on US and involved skin. Axillary mass measured 1.4cm. Right clear.     11/17/2020 Surgery    Left breast ultrasound-guided lumpectomy with HUGO  directed axillary dissection  Invasive carcinoma of no special type (ductal)  Grade 2  3.2 cm  Invasive carcinoma directly invades into the dermis or epidermis with skin ulceration  Carcinoma invades skeletal muscle  Lymphovascular invasion present  Anterior margin positive for invasive carcinoma  3/5 Lymph nodes with macrometastases (1.6 cm)  Anatomic Stage IIIB  Prognostic Stage IIIA    Immediate reconstruction with Dr. Chiu (implant removal and local flap)     11/17/2020 -  Cancer Staged    Staging form: Breast, AJCC 8th Edition  - Pathologic stage from 11/17/2020: Stage IIIA (rpT4b, pN1a, cM0, G2, ER+, NM+, HER2-) - Signed by JOSE Stark on 1/31/2024  Stage prefix: Recurrence  Multigene prognostic tests performed: None  Histologic grading system: 3 grade system       12/21/2020 -  Hormone Therapy    Anastrozole 1 mg daily  Dr. 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 She is currently on anastrozole. She is  scheduled for right breast mammo on 2/15/23. She notes left chest wall nerve pain at times. Patient denies changes on her breast exam. She denies persistent headaches, bone pain, back pain, shortness of breath, or abdominal pain.      Review of Systems:  Review of Systems   Constitutional:  Negative for activity change, appetite change, fatigue and unexpected weight change.   Respiratory:  Negative for cough and shortness of breath.    Cardiovascular:  Negative for chest pain.   Gastrointestinal:  Negative for abdominal pain, diarrhea, nausea and vomiting.   Endocrine: Negative for heat intolerance.   Musculoskeletal:  Positive for arthralgias and gait problem (walker). Negative for back pain and myalgias.   Skin:  Negative for rash.   Neurological:  Negative for weakness and headaches.   Hematological:  Negative for adenopathy.       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)   • Use of anastrozole (Arimidex)   • Premature ventricular contractions   • History of left mastectomy     Past Medical History:   Diagnosis Date   • Anxiety    • Asthma    • Atrial fibrillation (HCC)    • Breast cancer (HCC) 1992    left   • Chest pain, unspecified    • CHF (congestive heart failure) (HCC)    • Diabetes mellitus (HCC)    • GERD (gastroesophageal reflux disease)    • History of radiation exposure 1992   • Hyperlipidemia    • Hypertension    • Irregular heart beat     Afib   • Migraine    • Obesity    •  Pericardial effusion    • Pericarditis      Past Surgical History:   Procedure Laterality Date   • ABDOMINAL ADHESION SURGERY     • APPENDECTOMY     • AUGMENTATION MAMMAPLASTY Left 1994   • BACK SURGERY     • BREAST LUMPECTOMY Left 1992    malignant   • BREAST LUMPECTOMY Left 11/17/2020    Procedure: BREAST ULTRASOUND DIRECTED LUMPECTOMY (ULTRASOUND AT 1200);  Surgeon: Earl Em MD;  [facility];  Service: Surgical Oncology   • BREAST SURGERY     • CARDIAC SURGERY      Pericardial window   • CHOLECYSTECTOMY     • EYE SURGERY     • FLAP LOCAL TRUNK Left 11/17/2020    Procedure: FLAP LOCAL TRUNK;  Surgeon: Ronnell Chiu MD;  [facility];  Service: Plastics   • HYSTERECTOMY     • IR DRAINAGE TUBE CHECK WITH SCLEROSIS  4/23/2021   • IR DRAINAGE TUBE CHECK WITH SCLEROSIS  6/3/2021   • IR DRAINAGE TUBE CHECK WITH SCLEROSIS  6/17/2021   • IR DRAINAGE TUBE CHECK WITH SCLEROSIS  6/28/2021   • IR DRAINAGE TUBE CHECK WITH SCLEROSIS  7/7/2021   • IR DRAINAGE TUBE CHECK WITH SCLEROSIS  7/27/2021   • IR DRAINAGE TUBE PLACEMENT  4/1/2021   • IR DRAINAGE TUBE PLACEMENT WITH SCLEROSIS  5/14/2021   • LYMPH NODE DISSECTION Left 11/17/2020    Procedure: HUGO  DIRECTED AXILLARY DISSECTION;  Surgeon: Earl Em MD;  [facility];  Service: Surgical Oncology   • MASTECTOMY Left 1994    malignant   • GA CELESTE-IMPLANT CAPSULECTOMY BREAST COMPLETE Left 11/17/2020    Procedure: BREAST CAPSULECTOMY;  Surgeon: Ronnell Chiu MD;  [facility];  Service: Plastics   • GA REMOVAL INTACT BREAST IMPLANT Left 11/17/2020    Procedure: BREAST IMPLANT REMOVAL;  Surgeon: Ronnell Chiu MD;  [facility];  Service: Plastics   • US BREAST NEEDLE LOC LEFT Left 11/2/2020   • US GUIDED BREAST BIOPSY LEFT COMPLETE Left 8/31/2020   • US GUIDED BREAST LYMPH NODE BIOPSY LEFT Left 8/31/2020   • WRIST SURGERY       Family History   Problem Relation Age of Onset   • Colon cancer Mother 55   • Breast cancer Mother         Early  50's   • Stroke Father    • Emphysema Father    • Leukemia Sister    • Breast cancer Sister 65   • ALS Brother      Social History     Socioeconomic History   • Marital status: /Civil Union     Spouse name: Not on file   • Number of children: Not on file   • Years of education: Not on file   • Highest education level: Not on file   Occupational History   • Not on file   Tobacco Use   • Smoking status: Never   • Smokeless tobacco: Never   Vaping Use   • Vaping status: Never Used   Substance and Sexual Activity   • Alcohol use: Yes     Comment: social   • Drug use: No   • Sexual activity: Not Currently   Other Topics Concern   • Not on file   Social History Narrative   • Not on file     Social Determinants of Health     Financial Resource Strain: Not on file   Food Insecurity: Not on file   Transportation Needs: Not on file   Physical Activity: Not on file   Stress: Not on file   Social Connections: Not on file   Intimate Partner Violence: Not on file   Housing Stability: Not on file       Current Outpatient Medications:   •  anastrozole (ARIMIDEX) 1 mg tablet, TAKE 1 TABLET EVERY DAY, Disp: 90 tablet, Rfl: 3  •  Beclomethasone Dipropionate (QVAR IN), Inhale 2 puffs if needed, Disp: , Rfl:   •  Droplet Pen Needles 32G X 4 MM MISC, , Disp: , Rfl:   •  EPINEPHrine (EPIPEN) 0.3 mg/0.3 mL SOAJ, Inject 0.3 mL as directed, Disp: , Rfl:   •  FLOVENT  MCG/ACT inhaler, INHALE 2 PUFF BY INHALATION ROUTE 2 TIMES EVERY DAY, Disp: , Rfl: 6  •  Flowflex COVID-19 Ag Home Test KIT, Use as directed, Disp: , Rfl:   •  glucose blood test strip, TEST TWICE DAILY OR AS DIRECTED DX: E11.9, Disp: , Rfl:   •  hydrocortisone 2.5 % cream, APPLY TO THE AFFECTED AREA TWICE DAILY. (MIX WITH NYSTATIN), Disp: , Rfl:   •  insulin aspart (NovoLOG) 100 Units/mL injection pen, Novolog Flexpen U-100 Insulin aspart 100 unit/mL (3 mL) subcutaneous, Disp: , Rfl:   •  Insulin Glargine (TOUJEO) 300 units/mL CONCETRATED U-300 injection pen,  Toujeo SoloStar U-300 Insulin 300 unit/mL (1.5 mL) subcutaneous pen  INJECT 67 UNITS BY SUBCUTANEOUS ROUTE AS PER INSULIN PROTOCOL, Disp: , Rfl:   •  lansoprazole (PREVACID) 30 mg capsule, lansoprazole 30 mg capsule,delayed release, Disp: , Rfl:   •  metoprolol tartrate (LOPRESSOR) 50 mg tablet, Take 50 mg by mouth every 12 (twelve) hours , Disp: , Rfl:   •  morphine (MS CONTIN) 15 mg 12 hr tablet, Take 15 mg by mouth every 12 (twelve) hours as needed for severe pain, Disp: , Rfl:   •  morphine (MSIR) 15 mg tablet, Take 15 mg by mouth every 8 (eight) hours as needed for severe pain., Disp: , Rfl:   •  Naloxegol Oxalate (Movantik) 12.5 MG TABS, Take by mouth, Disp: , Rfl:   •  naloxone (NARCAN) 2 MG/2ML injection, Inject 2 mL into a catheter in a vein, Disp: , Rfl:   •  NON FORMULARY, Domperidone 20 mg TID, Disp: , Rfl:   •  NOVOFINE 32G X 6 MM MISC, 2 (two) times a day As directed, Disp: , Rfl: 5  •  nystatin (MYCOSTATIN) cream, APPLY TO AFFECTED AREAS TWICE A DAY. (MIX WITH HYDROCORTISONE), Disp: , Rfl:   •  nystatin (MYCOSTATIN) powder, APPLY TO THE RASH IN THE GROIN ONCE DAILY, Disp: , Rfl:   •  ONE TOUCH ULTRA TEST test strip, TEST TWICE DAILY OR AS DIRECTED DX: E11.9, Disp: , Rfl: 5  •  ONETOUCH DELICA LANCETS 33G MISC, TEST TWICE DAILY OR AS DIRECTED, Disp: , Rfl: 5  •  oxybutynin (DITROPAN XL) 15 MG 24 hr tablet, Take 15 mg by mouth daily., Disp: , Rfl:   •  simvastatin (ZOCOR) 20 mg tablet, Take 20 mg by mouth daily at bedtime., Disp: , Rfl:   •  Sodium Fluoride 5000 PPM 1.1 % GEL, , Disp: , Rfl:   •  solifenacin (VESICARE) 10 MG tablet, Take 1 tablet (10 mg total) by mouth daily, Disp: 90 tablet, Rfl: 0  •  tobramycin-dexamethasone (TOBRADEX) ophthalmic suspension, INSTILL 1 DROP IN THE RIGHT EYE 4 TIMES DAILY, Disp: , Rfl:   •  Toujeo SoloStar 300 units/mL CONCENTRATED U-300 injection pen (1-unit dial), INJECT 72 UNITS BY SUBCUTANEOUS ROUTE AS PER INSULIN PROTOCOL, Disp: , Rfl:   •  VENTOLIN  (90  Base) MCG/ACT inhaler,  , Disp: , Rfl:   •  VITAMIN K PO, Take by mouth, Disp: , Rfl:   •  zolpidem (AMBIEN) 10 mg tablet, Take by mouth Daily, Disp: , Rfl:   •  azithromycin (ZITHROMAX) 250 mg tablet, , Disp: , Rfl:   •  hydrocodone-chlorpheniramine polistirex (TUSSIONEX) 10-8 mg/5 mL ER suspension, Take 5 mL by mouth every 12 (twelve) hours (Patient not taking: Reported on 4/25/2023), Disp: , Rfl:   •  Lidocaine 5 % CREA, Apply 1 application topically as needed (pain) (Patient not taking: Reported on 1/31/2024), Disp: 1 Tube, Rfl: 0  •  lidocaine-prilocaine (EMLA) cream, APPLY FOR 12 HOURS, AND THEN OFF 12 HOURS AS NEEDED (Patient not taking: Reported on 4/25/2023), Disp: , Rfl: 2  •  nitrofurantoin (MACROBID) 100 mg capsule, TAKE 1 CAPSULE BY MOUTH EVERY 12 HOURS WITH FOOD (Patient not taking: Reported on 4/25/2023), Disp: , Rfl:   •  sodium chloride, PF, 0.9 %, 10 mL by Intracatheter route daily Intracatheter flushing daily (Patient not taking: Reported on 7/25/2023), Disp: 300 mL, Rfl: 0  •  triamcinolone (KENALOG) 0.1 % cream,   (Patient not taking: Reported on 4/25/2023), Disp: , Rfl:   Allergies   Allergen Reactions   • Iodinated Contrast Media Anaphylaxis   • Iodine - Food Allergy Anaphylaxis and Other (See Comments)   • Povidone Iodine Anaphylaxis   • Aspirin GI Bleeding and Other (See Comments)   • Caffeine - Food Allergy Palpitations     Vitals:    01/31/24 1506   BP: 118/60   Pulse: 75   Temp: 98 °F (36.7 °C)   SpO2: 95%       Physical Exam  Constitutional:       General: She is not in acute distress.     Appearance: Normal appearance.   Cardiovascular:      Rate and Rhythm: Normal rate and regular rhythm.      Pulses: Normal pulses.      Heart sounds: Normal heart sounds.   Pulmonary:      Effort: Pulmonary effort is normal.      Breath sounds: Normal breath sounds.   Chest:      Chest wall: No mass.   Breasts:     Right: No swelling, bleeding, inverted nipple, mass, nipple discharge, skin change or  tenderness.      Left: No swelling, bleeding, mass, skin change or tenderness.       Abdominal:      General: Abdomen is flat.      Palpations: Abdomen is soft.   Lymphadenopathy:      Upper Body:      Right upper body: No supraclavicular, axillary or pectoral adenopathy.      Left upper body: No supraclavicular, axillary or pectoral adenopathy.   Skin:     General: Skin is warm.   Neurological:      General: No focal deficit present.      Mental Status: She is alert and oriented to person, place, and time.   Psychiatric:         Mood and Affect: Mood normal.         Behavior: Behavior normal.           Results:    Imaging  No results found.    I reviewed the above imaging data.      Advance Care Planning/Advance Directives:  Discussed disease status, cancer treatment plans and/or cancer treatment goals with the patient.

## 2024-02-13 ENCOUNTER — TELEPHONE (OUTPATIENT)
Dept: OBGYN CLINIC | Facility: OTHER | Age: 89
End: 2024-02-13

## 2024-02-22 ENCOUNTER — TELEPHONE (OUTPATIENT)
Dept: HEMATOLOGY ONCOLOGY | Facility: CLINIC | Age: 89
End: 2024-02-22

## 2024-02-22 NOTE — TELEPHONE ENCOUNTER
Appointment Change  Cancel, Reschedule, Change to Virtual      Who are you speaking with? Patient   If it is not the patient, is the caller listed on the communication consent form? N/A   Which provider is the appointment scheduled with? Dr. Portillo   When was the original appointment scheduled?    Please list date and time 2/22/24 220   At which location is the appointment scheduled to take place? [place]   Was the appointment rescheduled?     Was the appointment changed from an in person visit to a virtual visit?    If so, please list the details of the change. 4/23/24 220   What is the reason for the appointment change? Pt isn't feeling well       Was STAR transport scheduled? N/A   Does STAR transport need to be scheduled for the new visit (if applicable) N/A   Does the patient need an infusion appointment rescheduled? N/A   Does the patient have an upcoming infusion appointment scheduled? If so, when? No   Is the patient undergoing chemotherapy? N/A   For appointments cancelled with less than 24 hours:  Was the no-show policy reviewed? Yes

## 2024-02-27 ENCOUNTER — TRANSCRIBE ORDERS (OUTPATIENT)
Dept: LAB | Facility: CLINIC | Age: 89
End: 2024-02-27

## 2024-02-27 ENCOUNTER — APPOINTMENT (OUTPATIENT)
Dept: LAB | Facility: CLINIC | Age: 89
End: 2024-02-27
Payer: MEDICARE

## 2024-02-27 DIAGNOSIS — E55.9 VITAMIN D DEFICIENCY, UNSPECIFIED: ICD-10-CM

## 2024-02-27 DIAGNOSIS — I51.4 MYOCARDITIS, UNSPECIFIED CHRONICITY, UNSPECIFIED MYOCARDITIS TYPE (HCC): ICD-10-CM

## 2024-02-27 DIAGNOSIS — I10 ESSENTIAL HYPERTENSION: ICD-10-CM

## 2024-02-27 DIAGNOSIS — E10.65 POOR CONTROL TYPE I DIABETES MELLITUS (HCC): ICD-10-CM

## 2024-02-27 DIAGNOSIS — M51.37 DEGENERATION OF LUMBAR OR LUMBOSACRAL INTERVERTEBRAL DISC: ICD-10-CM

## 2024-02-27 DIAGNOSIS — N32.81 DETRUSOR INSTABILITY OF BLADDER: ICD-10-CM

## 2024-02-27 DIAGNOSIS — N18.9 CHRONIC KIDNEY DISEASE, UNSPECIFIED CKD STAGE: ICD-10-CM

## 2024-02-27 DIAGNOSIS — E10.65 POOR CONTROL TYPE I DIABETES MELLITUS (HCC): Primary | ICD-10-CM

## 2024-02-27 DIAGNOSIS — K22.70 SHORT-SEGMENT BARRETT'S ESOPHAGUS: ICD-10-CM

## 2024-02-27 DIAGNOSIS — E78.5 HYPERLIPIDEMIA, UNSPECIFIED HYPERLIPIDEMIA TYPE: ICD-10-CM

## 2024-02-27 DIAGNOSIS — K31.7 GASTRIC POLYP: ICD-10-CM

## 2024-02-27 DIAGNOSIS — E21.3 HYPERPARATHYROIDISM, UNSPECIFIED (HCC): ICD-10-CM

## 2024-02-27 DIAGNOSIS — R91.1 COIN LESION: ICD-10-CM

## 2024-02-27 DIAGNOSIS — Z79.899 ENCOUNTER FOR LONG-TERM (CURRENT) USE OF OTHER MEDICATIONS: ICD-10-CM

## 2024-02-27 DIAGNOSIS — G43.801: ICD-10-CM

## 2024-02-27 DIAGNOSIS — C50.912 MALIGNANT NEOPLASM OF LEFT FEMALE BREAST, UNSPECIFIED ESTROGEN RECEPTOR STATUS, UNSPECIFIED SITE OF BREAST (HCC): ICD-10-CM

## 2024-02-27 LAB
25(OH)D3 SERPL-MCNC: 45.9 NG/ML (ref 30–100)
ALBUMIN SERPL BCP-MCNC: 4.3 G/DL (ref 3.5–5)
ALP SERPL-CCNC: 134 U/L (ref 34–104)
ALT SERPL W P-5'-P-CCNC: 21 U/L (ref 7–52)
ANION GAP SERPL CALCULATED.3IONS-SCNC: 11 MMOL/L
AST SERPL W P-5'-P-CCNC: 20 U/L (ref 13–39)
BASOPHILS # BLD AUTO: 0.03 THOUSANDS/ÂΜL (ref 0–0.1)
BASOPHILS NFR BLD AUTO: 0 % (ref 0–1)
BILIRUB SERPL-MCNC: 1.26 MG/DL (ref 0.2–1)
BUN SERPL-MCNC: 21 MG/DL (ref 5–25)
CALCIUM SERPL-MCNC: 10.2 MG/DL (ref 8.4–10.2)
CHLORIDE SERPL-SCNC: 104 MMOL/L (ref 96–108)
CHOLEST SERPL-MCNC: 153 MG/DL
CO2 SERPL-SCNC: 24 MMOL/L (ref 21–32)
CREAT SERPL-MCNC: 1.37 MG/DL (ref 0.6–1.3)
EOSINOPHIL # BLD AUTO: 0.19 THOUSAND/ÂΜL (ref 0–0.61)
EOSINOPHIL NFR BLD AUTO: 2 % (ref 0–6)
ERYTHROCYTE [DISTWIDTH] IN BLOOD BY AUTOMATED COUNT: 12.8 % (ref 11.6–15.1)
EST. AVERAGE GLUCOSE BLD GHB EST-MCNC: 148 MG/DL
GFR SERPL CREATININE-BSD FRML MDRD: 34 ML/MIN/1.73SQ M
GLUCOSE P FAST SERPL-MCNC: 123 MG/DL (ref 65–99)
HBA1C MFR BLD: 6.8 %
HCT VFR BLD AUTO: 47.8 % (ref 34.8–46.1)
HDLC SERPL-MCNC: 42 MG/DL
HGB BLD-MCNC: 15.3 G/DL (ref 11.5–15.4)
IMM GRANULOCYTES # BLD AUTO: 0.03 THOUSAND/UL (ref 0–0.2)
IMM GRANULOCYTES NFR BLD AUTO: 0 % (ref 0–2)
LDLC SERPL CALC-MCNC: 80 MG/DL (ref 0–100)
LYMPHOCYTES # BLD AUTO: 3.59 THOUSANDS/ÂΜL (ref 0.6–4.47)
LYMPHOCYTES NFR BLD AUTO: 34 % (ref 14–44)
MAGNESIUM SERPL-MCNC: 1.9 MG/DL (ref 1.9–2.7)
MCH RBC QN AUTO: 29 PG (ref 26.8–34.3)
MCHC RBC AUTO-ENTMCNC: 32 G/DL (ref 31.4–37.4)
MCV RBC AUTO: 91 FL (ref 82–98)
MONOCYTES # BLD AUTO: 0.86 THOUSAND/ÂΜL (ref 0.17–1.22)
MONOCYTES NFR BLD AUTO: 8 % (ref 4–12)
NEUTROPHILS # BLD AUTO: 6.01 THOUSANDS/ÂΜL (ref 1.85–7.62)
NEUTS SEG NFR BLD AUTO: 56 % (ref 43–75)
NONHDLC SERPL-MCNC: 111 MG/DL
NRBC BLD AUTO-RTO: 0 /100 WBCS
PLATELET # BLD AUTO: 255 THOUSANDS/UL (ref 149–390)
PMV BLD AUTO: 12.4 FL (ref 8.9–12.7)
POTASSIUM SERPL-SCNC: 4.2 MMOL/L (ref 3.5–5.3)
PROT SERPL-MCNC: 6.7 G/DL (ref 6.4–8.4)
PTH-INTACT SERPL-MCNC: 129.4 PG/ML (ref 12–88)
RBC # BLD AUTO: 5.27 MILLION/UL (ref 3.81–5.12)
SODIUM SERPL-SCNC: 139 MMOL/L (ref 135–147)
T4 FREE SERPL-MCNC: 0.83 NG/DL (ref 0.61–1.12)
TRIGL SERPL-MCNC: 155 MG/DL
TSH SERPL DL<=0.05 MIU/L-ACNC: 1.44 UIU/ML (ref 0.45–4.5)
WBC # BLD AUTO: 10.71 THOUSAND/UL (ref 4.31–10.16)

## 2024-02-27 PROCEDURE — 83970 ASSAY OF PARATHORMONE: CPT

## 2024-02-27 PROCEDURE — 36415 COLL VENOUS BLD VENIPUNCTURE: CPT

## 2024-02-27 PROCEDURE — 83036 HEMOGLOBIN GLYCOSYLATED A1C: CPT

## 2024-02-27 PROCEDURE — 85025 COMPLETE CBC W/AUTO DIFF WBC: CPT

## 2024-02-27 PROCEDURE — 84443 ASSAY THYROID STIM HORMONE: CPT

## 2024-02-27 PROCEDURE — 83735 ASSAY OF MAGNESIUM: CPT

## 2024-02-27 PROCEDURE — 80061 LIPID PANEL: CPT

## 2024-02-27 PROCEDURE — 84439 ASSAY OF FREE THYROXINE: CPT

## 2024-02-27 PROCEDURE — 82306 VITAMIN D 25 HYDROXY: CPT

## 2024-02-27 PROCEDURE — 80053 COMPREHEN METABOLIC PANEL: CPT

## 2024-03-18 ENCOUNTER — TELEPHONE (OUTPATIENT)
Dept: HEMATOLOGY ONCOLOGY | Facility: CLINIC | Age: 89
End: 2024-03-18

## 2024-03-18 ENCOUNTER — TELEPHONE (OUTPATIENT)
Dept: CARDIOLOGY CLINIC | Facility: CLINIC | Age: 89
End: 2024-03-18

## 2024-03-18 NOTE — TELEPHONE ENCOUNTER
Barbra called having chest pains they recently have started again, only seems to be happening when has to get up from a laying position, or sitting. Her  has to help her up.     Denies any active/acute chest pains,no radiating, or dizziness or lightheaded, sob.     She has an appt with Dr Em who is her breast surgeon     Barbra would like to speak with you about the uncomfortable pain that has reoccurred.   Did advise if pain is that uncomfortable need to go to ED, pt did verbally understood    C/b [phone]

## 2024-03-18 NOTE — TELEPHONE ENCOUNTER
Patient Call    Who are you speaking with? Patient    If it is not the patient, are they listed on an active communication consent form? N/A   What is the reason for this call? Patient calling in regards to symptoms she is experiencing.  Patient state she is experiencing extreme pain in her left breast where she had her surgery in November 2020. Patient notices this pain when she tries to lift herself up from sitting on the sofa or chair. Patient notices pain when she moves while she is sleeping and when she goes to get out of her bed. Patient has been experiencing this pain for 4-5 days.  Patient would like to speak with Edelmira Hanson in regards to the pain she is experiencing.    Does this require a call back? Yes   If a call back is required, please list [facility] call back number [phone]   If a call back is required, advise that a message will be forwarded to their care team and someone will return their call as soon as possible.   Did you relay this information to the patient? Yes

## 2024-03-18 NOTE — TELEPHONE ENCOUNTER
"Returned the patient's phone call to further discuss. The patient stated that she has been having pain for the last 4-5 days on the left side where her surgery was done. She stated that her chest \"hurts when [she] moves\" and her  has to help her get out of the chair/bed. The patient described it as \"chest pain\" but denied any shortness of breath or left arm/shoulder pain. The patient also denied any skin changes at her surgical site. The patient was instructed to contact her cardiologist office today to discuss her symptoms as she described left chest pain. She verbalized understanding and was agreeable to this. Offered her an appointment with JOSE Higuera on 3/20 at 3:00 for a breast exam; which the patient accepted. She verified appointment details and was appreciative of the call back.  "

## 2024-03-19 NOTE — TELEPHONE ENCOUNTER
Discussed with the patent.  The pain sounds to be musculoskeletal in nature.  The pt is scheduled to see her breast surgeon.  If there is any doubt, I will arrange for Nuclear stress test.

## 2024-03-20 ENCOUNTER — OFFICE VISIT (OUTPATIENT)
Dept: SURGICAL ONCOLOGY | Facility: CLINIC | Age: 89
End: 2024-03-20
Payer: MEDICARE

## 2024-03-20 VITALS
RESPIRATION RATE: 18 BRPM | OXYGEN SATURATION: 92 % | BODY MASS INDEX: 33.86 KG/M2 | SYSTOLIC BLOOD PRESSURE: 138 MMHG | WEIGHT: 184 LBS | HEART RATE: 80 BPM | DIASTOLIC BLOOD PRESSURE: 72 MMHG | HEIGHT: 62 IN | TEMPERATURE: 97.5 F

## 2024-03-20 DIAGNOSIS — Z90.12 HISTORY OF LEFT MASTECTOMY: ICD-10-CM

## 2024-03-20 DIAGNOSIS — C50.412 MALIGNANT NEOPLASM OF UPPER-OUTER QUADRANT OF LEFT BREAST IN FEMALE, ESTROGEN RECEPTOR POSITIVE (HCC): ICD-10-CM

## 2024-03-20 DIAGNOSIS — R07.89 CHEST WALL PAIN: Primary | ICD-10-CM

## 2024-03-20 DIAGNOSIS — Z17.0 MALIGNANT NEOPLASM OF UPPER-OUTER QUADRANT OF LEFT BREAST IN FEMALE, ESTROGEN RECEPTOR POSITIVE (HCC): ICD-10-CM

## 2024-03-20 PROCEDURE — 99213 OFFICE O/P EST LOW 20 MIN: CPT

## 2024-03-20 NOTE — PROGRESS NOTES
Surgical Oncology Follow Up       [facility]COLOGY [place]  [address]  [place] [address]    Barbra De Oliveira  1935  [id number]  [facility]COL[facility] [place]  [address]  [place] [address]    Chief Complaint   Patient presents with   • Follow-up       Assessment/Plan:  1. Chest wall pain  - warm compress, stretches, aleve    2. Malignant neoplasm of upper-outer quadrant of left breast in female, estrogen receptor positive     3. History of left mastectomy       Discussion/Summary:  Patient is a 88-year-old female presenting today for left chest wall pain that she describes as a 12/10. She has a hx of 2x recurrent left breast cancer diagnosed in 1992 twice and again in 2020. She has had lumpectomy with WBRT, a mastectomy with recomnstruction, and another lumpectomy with axillary dissection to the left breast. Patient called with complaints of left chest wall pain stating its worse when she moves from laying to standing. We have her contact cardiology. They told her is sounds muscular in nature. On exam, she was tight in her left axilla and slightly tender on exam. I also think this is muscular related. She is not interested in PT. I recommend she use warm compress, do stretches at home, take aleve as needed. She is overdue for right breast mammo and is scheduled for one tomorrow. There were no concerns on her clinical breast exam. I will see the patient back in 6 months or sooner should the need arise. She was instructed to call with any questions or concerns prior to this time. All questions were answered today.        History of Present Illness:     Oncology History   Malignant neoplasm of upper-outer quadrant of left breast in female, estrogen receptor positive    1992 Initial Diagnosis    Left breast cancer  Lumpectomy  RT      1994 Surgery    Left breast cancer recurrence  Mastectomy  with immediate reconstruction     8/31/2020 Biopsy    Left breast ultrasound-guided biopsy  A. 1 - 2 o'clock  Invasive mammary carcinoma of no special type   Grade 2    RI 90  HER2 1+  Lymphovascular invasion not identified    B. Left axillary lymph node  Invasive mammary carcinoma of no special type  Grade 2    Concordant. No residual lymph node is identified in specimen B, favoring axillary extension of invasive carcinoma over lymph node metastasis. Breast mass measured 2.8cm on US and involved skin. Axillary mass measured 1.4cm. Right clear.     11/17/2020 Surgery    Left breast ultrasound-guided lumpectomy with HUGO  directed axillary dissection  Invasive carcinoma of no special type (ductal)  Grade 2  3.2 cm  Invasive carcinoma directly invades into the dermis or epidermis with skin ulceration  Carcinoma invades skeletal muscle  Lymphovascular invasion present  Anterior margin positive for invasive carcinoma  3/5 Lymph nodes with macrometastases (1.6 cm)  Anatomic Stage IIIB  Prognostic Stage IIIA    Immediate reconstruction with Dr. Chiu (implant removal and local flap)     11/17/2020 -  Cancer Staged    Staging form: Breast, AJCC 8th Edition  - Pathologic stage from 11/17/2020: Stage IIIA (rpT4b, pN1a, cM0, G2, ER+, RI+, HER2-) - Signed by JOSE Stark on 1/31/2024  Stage prefix: Recurrence  Multigene prognostic tests performed: None  Histologic grading system: 3 grade system       12/21/2020 -  Hormone Therapy    Anastrozole 1 mg daily  Dr. 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 Patient called with complaints of left chest wall pain stating its worse when she moves from laying to standing. Patients pain is r/t muscle tightness and hx of RT and multiple surgeries.        Review of Systems:  Review of Systems    Constitutional:  Negative for activity change, appetite change, fatigue and unexpected weight change.   Respiratory:  Negative for cough and shortness of breath.    Cardiovascular:  Negative for chest pain.   Gastrointestinal:  Negative for abdominal pain, diarrhea, nausea and vomiting.   Endocrine: Negative for heat intolerance.   Musculoskeletal:  Negative for arthralgias, back pain and myalgias.   Skin:  Negative for rash.   Neurological:  Negative for weakness and headaches.   Hematological:  Negative for adenopathy.       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)   • Use of anastrozole (Arimidex)   • Premature ventricular contractions   • History of left mastectomy     Past Medical History:   Diagnosis Date   • Anxiety    • Asthma    • Atrial fibrillation (HCC)    • Breast cancer (HCC) 1992    left   • Chest pain, unspecified    • CHF (congestive heart failure) (HCC)    • Diabetes mellitus (HCC)    • GERD (gastroesophageal reflux disease)    • History of radiation exposure 1992   • Hyperlipidemia    • Hypertension    • Irregular heart beat     Afib   • Migraine    • Obesity    • Pericardial effusion    • Pericarditis      Past Surgical History:   Procedure Laterality Date   • ABDOMINAL ADHESION SURGERY     • APPENDECTOMY     • AUGMENTATION MAMMAPLASTY Left 1994   • BACK SURGERY     • BREAST LUMPECTOMY Left 1992    malignant   • BREAST LUMPECTOMY Left 11/17/2020    Procedure: BREAST ULTRASOUND  DIRECTED LUMPECTOMY (ULTRASOUND AT 1200);  Surgeon: Earl Em MD;  [facility];  Service: Surgical Oncology   • BREAST SURGERY     • CARDIAC SURGERY      Pericardial window   • CHOLECYSTECTOMY     • EYE SURGERY     • FLAP LOCAL TRUNK Left 11/17/2020    Procedure: FLAP LOCAL TRUNK;  Surgeon: Ronnell Chiu MD;  [facility];  Service: Plastics   • HYSTERECTOMY     • IR DRAINAGE TUBE CHECK WITH SCLEROSIS  4/23/2021   • IR DRAINAGE TUBE CHECK WITH SCLEROSIS  6/3/2021   • IR DRAINAGE TUBE CHECK WITH SCLEROSIS  6/17/2021   • IR DRAINAGE TUBE CHECK WITH SCLEROSIS  6/28/2021   • IR DRAINAGE TUBE CHECK WITH SCLEROSIS  7/7/2021   • IR DRAINAGE TUBE CHECK WITH SCLEROSIS  7/27/2021   • IR DRAINAGE TUBE PLACEMENT  4/1/2021   • IR DRAINAGE TUBE PLACEMENT WITH SCLEROSIS  5/14/2021   • LYMPH NODE DISSECTION Left 11/17/2020    Procedure: HUGO  DIRECTED AXILLARY DISSECTION;  Surgeon: Earl Em MD;  [facility];  Service: Surgical Oncology   • MASTECTOMY Left 1994    malignant   • AZ CELESTE-IMPLANT CAPSULECTOMY BREAST COMPLETE Left 11/17/2020    Procedure: BREAST CAPSULECTOMY;  Surgeon: Ronnell Chiu MD;  [facility];  Service: Plastics   • AZ REMOVAL INTACT BREAST IMPLANT Left 11/17/2020    Procedure: BREAST IMPLANT REMOVAL;  Surgeon: Ronnell Chiu MD;  [facility];  Service: Plastics   • US BREAST NEEDLE LOC LEFT Left 11/2/2020   • US GUIDED BREAST BIOPSY LEFT COMPLETE Left 8/31/2020   • US GUIDED BREAST LYMPH NODE BIOPSY LEFT Left 8/31/2020   • WRIST SURGERY       Family History   Problem Relation Age of Onset   • Colon cancer Mother 55   • Breast cancer Mother         Early 50's   • Stroke Father    • Emphysema Father    • Leukemia Sister    • Breast cancer Sister 65   • ALS Brother      Social History     Socioeconomic History   • Marital status: /Civil Union     Spouse name: Not on file   • Number of children: Not on file   • Years of education: Not on file   • Highest education  level: Not on file   Occupational History   • Not on file   Tobacco Use   • Smoking status: Never   • Smokeless tobacco: Never   Vaping Use   • Vaping status: Never Used   Substance and Sexual Activity   • Alcohol use: Yes     Comment: social   • Drug use: No   • Sexual activity: Not Currently   Other Topics Concern   • Not on file   Social History Narrative   • Not on file     Social Determinants of Health     Financial Resource Strain: Not on file   Food Insecurity: Not on file   Transportation Needs: Not on file   Physical Activity: Not on file   Stress: Not on file   Social Connections: Not on file   Intimate Partner Violence: Not on file   Housing Stability: Not on file       Current Outpatient Medications:   •  anastrozole (ARIMIDEX) 1 mg tablet, TAKE 1 TABLET EVERY DAY, Disp: 90 tablet, Rfl: 3  •  Beclomethasone Dipropionate (QVAR IN), Inhale 2 puffs if needed, Disp: , Rfl:   •  Droplet Pen Needles 32G X 4 MM MISC, , Disp: , Rfl:   •  EPINEPHrine (EPIPEN) 0.3 mg/0.3 mL SOAJ, Inject 0.3 mL as directed, Disp: , Rfl:   •  FLOVENT  MCG/ACT inhaler, INHALE 2 PUFF BY INHALATION ROUTE 2 TIMES EVERY DAY, Disp: , Rfl: 6  •  glucose blood test strip, TEST TWICE DAILY OR AS DIRECTED DX: E11.9, Disp: , Rfl:   •  hydrocortisone 2.5 % cream, APPLY TO THE AFFECTED AREA TWICE DAILY. (MIX WITH NYSTATIN), Disp: , Rfl:   •  insulin aspart (NovoLOG) 100 Units/mL injection pen, Novolog Flexpen U-100 Insulin aspart 100 unit/mL (3 mL) subcutaneous, Disp: , Rfl:   •  Insulin Glargine (TOUJEO) 300 units/mL CONCETRATED U-300 injection pen, Toujeo SoloStar U-300 Insulin 300 unit/mL (1.5 mL) subcutaneous pen  INJECT 67 UNITS BY SUBCUTANEOUS ROUTE AS PER INSULIN PROTOCOL, Disp: , Rfl:   •  lansoprazole (PREVACID) 30 mg capsule, lansoprazole 30 mg capsule,delayed release, Disp: , Rfl:   •  metoprolol tartrate (LOPRESSOR) 50 mg tablet, Take 50 mg by mouth every 12 (twelve) hours , Disp: , Rfl:   •  morphine (MS CONTIN) 15 mg 12 hr  tablet, Take 15 mg by mouth every 12 (twelve) hours as needed for severe pain, Disp: , Rfl:   •  morphine (MSIR) 15 mg tablet, Take 15 mg by mouth every 8 (eight) hours as needed for severe pain., Disp: , Rfl:   •  Naloxegol Oxalate (Movantik) 12.5 MG TABS, Take by mouth, Disp: , Rfl:   •  naloxone (NARCAN) 2 MG/2ML injection, Inject 2 mL into a catheter in a vein, Disp: , Rfl:   •  NON FORMULARY, Domperidone 20 mg TID, Disp: , Rfl:   •  NOVOFINE 32G X 6 MM MISC, 2 (two) times a day As directed, Disp: , Rfl: 5  •  nystatin (MYCOSTATIN) cream, APPLY TO AFFECTED AREAS TWICE A DAY. (MIX WITH HYDROCORTISONE), Disp: , Rfl:   •  nystatin (MYCOSTATIN) powder, APPLY TO THE RASH IN THE GROIN ONCE DAILY, Disp: , Rfl:   •  ONE TOUCH ULTRA TEST test strip, TEST TWICE DAILY OR AS DIRECTED DX: E11.9, Disp: , Rfl: 5  •  ONETOUCH DELICA LANCETS 33G MISC, TEST TWICE DAILY OR AS DIRECTED, Disp: , Rfl: 5  •  oxybutynin (DITROPAN XL) 15 MG 24 hr tablet, Take 15 mg by mouth daily., Disp: , Rfl:   •  simvastatin (ZOCOR) 20 mg tablet, Take 20 mg by mouth daily at bedtime., Disp: , Rfl:   •  Sodium Fluoride 5000 PPM 1.1 % GEL, , Disp: , Rfl:   •  solifenacin (VESICARE) 10 MG tablet, Take 1 tablet (10 mg total) by mouth daily, Disp: 90 tablet, Rfl: 0  •  tobramycin-dexamethasone (TOBRADEX) ophthalmic suspension, INSTILL 1 DROP IN THE RIGHT EYE 4 TIMES DAILY, Disp: , Rfl:   •  Toujeo SoloStar 300 units/mL CONCENTRATED U-300 injection pen (1-unit dial), INJECT 72 UNITS BY SUBCUTANEOUS ROUTE AS PER INSULIN PROTOCOL, Disp: , Rfl:   •  VENTOLIN  (90 Base) MCG/ACT inhaler,  , Disp: , Rfl:   •  VITAMIN K PO, Take by mouth, Disp: , Rfl:   •  zolpidem (AMBIEN) 10 mg tablet, Take by mouth Daily, Disp: , Rfl:   •  azithromycin (ZITHROMAX) 250 mg tablet, , Disp: , Rfl:   •  Flowflex COVID-19 Ag Home Test KIT, Use as directed (Patient not taking: Reported on 3/20/2024), Disp: , Rfl:   •  hydrocodone-chlorpheniramine polistirex (TUSSIONEX) 10-8  mg/5 mL ER suspension, Take 5 mL by mouth every 12 (twelve) hours (Patient not taking: Reported on 4/25/2023), Disp: , Rfl:   •  Lidocaine 5 % CREA, Apply 1 application topically as needed (pain) (Patient not taking: Reported on 1/31/2024), Disp: 1 Tube, Rfl: 0  •  lidocaine-prilocaine (EMLA) cream, APPLY FOR 12 HOURS, AND THEN OFF 12 HOURS AS NEEDED (Patient not taking: Reported on 4/25/2023), Disp: , Rfl: 2  •  nitrofurantoin (MACROBID) 100 mg capsule, TAKE 1 CAPSULE BY MOUTH EVERY 12 HOURS WITH FOOD (Patient not taking: Reported on 4/25/2023), Disp: , Rfl:   •  sodium chloride, PF, 0.9 %, 10 mL by Intracatheter route daily Intracatheter flushing daily (Patient not taking: Reported on 7/25/2023), Disp: 300 mL, Rfl: 0  •  triamcinolone (KENALOG) 0.1 % cream,   (Patient not taking: Reported on 4/25/2023), Disp: , Rfl:   Allergies   Allergen Reactions   • Iodinated Contrast Media Anaphylaxis   • Iodine - Food Allergy Anaphylaxis and Other (See Comments)   • Povidone Iodine Anaphylaxis   • Aspirin GI Bleeding and Other (See Comments)   • Caffeine - Food Allergy Palpitations     Vitals:    03/20/24 1455   BP: 138/72   Pulse: 80   Resp: 18   Temp: 97.5 °F (36.4 °C)   SpO2: 92%       Physical Exam  Constitutional:       General: She is not in acute distress.     Appearance: Normal appearance.   Cardiovascular:      Rate and Rhythm: Normal rate and regular rhythm.      Pulses: Normal pulses.      Heart sounds: Normal heart sounds.   Pulmonary:      Effort: Pulmonary effort is normal.      Breath sounds: Normal breath sounds.   Chest:      Chest wall: No mass.   Breasts:     Right: No swelling, bleeding, inverted nipple, mass, nipple discharge, skin change or tenderness.      Left: No swelling, bleeding, mass, skin change or tenderness.       Abdominal:      General: Abdomen is flat.      Palpations: Abdomen is soft.   Lymphadenopathy:      Upper Body:      Right upper body: No supraclavicular, axillary or pectoral  adenopathy.      Left upper body: No supraclavicular, axillary or pectoral adenopathy.   Skin:     General: Skin is warm.   Neurological:      General: No focal deficit present.      Mental Status: She is alert and oriented to person, place, and time.   Psychiatric:         Mood and Affect: Mood normal.         Behavior: Behavior normal.           Results:    Imaging  No results found.    I reviewed the above imaging data.      Advance Care Planning/Advance Directives:  Discussed disease status, cancer treatment plans and/or cancer treatment goals with the patient.

## 2024-03-21 ENCOUNTER — HOSPITAL ENCOUNTER (OUTPATIENT)
Dept: ULTRASOUND IMAGING | Facility: CLINIC | Age: 89
Discharge: HOME | End: 2024-03-21
Payer: MEDICARE

## 2024-03-21 ENCOUNTER — HOSPITAL ENCOUNTER (OUTPATIENT)
Dept: MAMMOGRAPHY | Facility: CLINIC | Age: 89
Discharge: HOME | End: 2024-03-21
Payer: MEDICARE

## 2024-03-21 VITALS — HEIGHT: 62 IN | BODY MASS INDEX: 33.86 KG/M2 | WEIGHT: 184 LBS

## 2024-03-21 DIAGNOSIS — Z17.0 MALIGNANT NEOPLASM OF UPPER-OUTER QUADRANT OF LEFT BREAST IN FEMALE, ESTROGEN RECEPTOR POSITIVE (HCC): ICD-10-CM

## 2024-03-21 DIAGNOSIS — R92.8 ABNORMAL MAMMOGRAM OF RIGHT BREAST: ICD-10-CM

## 2024-03-21 DIAGNOSIS — C50.412 MALIGNANT NEOPLASM OF UPPER-OUTER QUADRANT OF LEFT BREAST IN FEMALE, ESTROGEN RECEPTOR POSITIVE (HCC): ICD-10-CM

## 2024-03-21 PROCEDURE — G0279 TOMOSYNTHESIS, MAMMO: HCPCS

## 2024-03-21 PROCEDURE — 76642 ULTRASOUND BREAST LIMITED: CPT

## 2024-03-21 PROCEDURE — 77065 DX MAMMO INCL CAD UNI: CPT

## 2024-04-09 ENCOUNTER — NURSE TRIAGE (OUTPATIENT)
Age: 89
End: 2024-04-09

## 2024-04-09 NOTE — TELEPHONE ENCOUNTER
----- Message from Jonathan Serra sent at 4/9/2024  1:28 PM EDT -----  Pt would like to speak to Dr. Coyne about meds and a test she is supposed to have. She isn't sure what test she's having

## 2024-04-09 NOTE — TELEPHONE ENCOUNTER
[place] pt called to make you aware Dr Alamo reduced Domperidone from 20 mg to 10 mg QID and will repeat EKG at next office visit 5/28/24.

## 2024-04-22 ENCOUNTER — APPOINTMENT (OUTPATIENT)
Dept: LAB | Facility: CLINIC | Age: 89
End: 2024-04-22
Payer: MEDICARE

## 2024-04-22 ENCOUNTER — NURSE TRIAGE (OUTPATIENT)
Age: 89
End: 2024-04-22

## 2024-04-22 DIAGNOSIS — N39.0 URINARY TRACT INFECTION WITHOUT HEMATURIA, SITE UNSPECIFIED: ICD-10-CM

## 2024-04-22 DIAGNOSIS — N39.0 URINARY TRACT INFECTION WITHOUT HEMATURIA, SITE UNSPECIFIED: Primary | ICD-10-CM

## 2024-04-22 LAB
BACTERIA UR QL AUTO: ABNORMAL /HPF
BILIRUB UR QL STRIP: NEGATIVE
CLARITY UR: CLEAR
COLOR UR: ABNORMAL
GLUCOSE UR STRIP-MCNC: NEGATIVE MG/DL
HGB UR QL STRIP.AUTO: NEGATIVE
KETONES UR STRIP-MCNC: NEGATIVE MG/DL
LEUKOCYTE ESTERASE UR QL STRIP: ABNORMAL
NITRITE UR QL STRIP: NEGATIVE
NON-SQ EPI CELLS URNS QL MICRO: ABNORMAL /HPF
PH UR STRIP.AUTO: 6 [PH]
PROT UR STRIP-MCNC: NEGATIVE MG/DL
RBC #/AREA URNS AUTO: ABNORMAL /HPF
SP GR UR STRIP.AUTO: 1.01 (ref 1–1.03)
UROBILINOGEN UR STRIP-ACNC: <2 MG/DL
WBC #/AREA URNS AUTO: ABNORMAL /HPF

## 2024-04-22 PROCEDURE — 87086 URINE CULTURE/COLONY COUNT: CPT

## 2024-04-22 PROCEDURE — 81001 URINALYSIS AUTO W/SCOPE: CPT

## 2024-04-22 PROCEDURE — 87186 SC STD MICRODIL/AGAR DIL: CPT

## 2024-04-22 PROCEDURE — 87077 CULTURE AEROBIC IDENTIFY: CPT

## 2024-04-22 RX ORDER — CEPHALEXIN 500 MG/1
500 CAPSULE ORAL EVERY 8 HOURS SCHEDULED
Qty: 15 CAPSULE | Refills: 0 | Status: SHIPPED | OUTPATIENT
Start: 2024-04-22 | End: 2024-04-23 | Stop reason: SDUPTHER

## 2024-04-22 NOTE — TELEPHONE ENCOUNTER
Abx sent to [facility] pharmacy on [address]. Will monitor urine culture results and change abx if necessary.

## 2024-04-22 NOTE — TELEPHONE ENCOUNTER
Called and spoke with patient. Informed that abx was sent to the pharmacy and that we will call if it needs to be switched. She already gave her urine sample this morning.

## 2024-04-22 NOTE — TELEPHONE ENCOUNTER
"Patient has history of urinary tract infections. Started over the weekend with urinary frequency, urgency and dysuria. She denies any abdominal pain and no N/V fevers or chills. She is hydrating well with water. They already took specimen to the lab on [address]. Urine orders were placed. She was given care advice to hydrate well with 40-60 ounces of water and to avoid bladder irritants and constipation. She was reviewed ER precautions and she is aware per MD office notes that if her symptoms persist she needs to come back in for an evaluation.  She stated she will call our office to schedule an appointment when she returns to the area in May. Please watch for urine culture results, can empiric antibiotics be sent since specimen was already given? Please advise.    Answer Assessment - Initial Assessment Questions  1. SYMPTOM: \"What's the main symptom you're concerned about?\" (e.g., frequency, incontinence)      Urgency and frequency.   2. ONSET: \"When did the  symptoms  start?\"      Over the weekend   3. PAIN: \"Is there any pain?\" If Yes, ask: \"How bad is it?\" (Scale: 1-10; mild, moderate, severe)      no  4. CAUSE: \"What do you think is causing the symptoms?\"      H/O urinary infection   5. OTHER SYMPTOMS: \"Do you have any other symptoms?\" (e.g., fever, flank pain, blood in urine, pain with urination)      no    Protocols used: Urinary Symptoms-ADULT-[place]    "

## 2024-04-22 NOTE — TELEPHONE ENCOUNTER
"Regarding: Bladder infection  ----- Message from Liz Ball sent at 4/22/2024 11:53 AM EDT -----  \"I have a bladder infection; burning when urinating and pain in my back. Would to see if the doctor can call in some medication for me . Dropping off a urine sample at the lab on [address].\"    "

## 2024-04-23 ENCOUNTER — TELEPHONE (OUTPATIENT)
Dept: HEMATOLOGY ONCOLOGY | Facility: CLINIC | Age: 89
End: 2024-04-23

## 2024-04-23 RX ORDER — CEPHALEXIN 500 MG/1
500 CAPSULE ORAL EVERY 8 HOURS SCHEDULED
Qty: 15 CAPSULE | Refills: 0 | Status: SHIPPED | OUTPATIENT
Start: 2024-04-23 | End: 2024-04-28

## 2024-04-23 NOTE — TELEPHONE ENCOUNTER
Called pharmacy and confirmed they received the medication.     Called patient and informed that abx was sent over and culture is not back yet.

## 2024-04-23 NOTE — TELEPHONE ENCOUNTER
Patient called rx refill line requesting results for the urine test and states that [facility] told her they did not get the phoned in abx order. Please call pharmacy to resolve the issue.

## 2024-04-23 NOTE — TELEPHONE ENCOUNTER
Appointment Change  Cancel, Reschedule, Change to Virtual      Who are you speaking with? Patient   If it is not the patient, is the caller listed on the communication consent form? N/A   Which provider is the appointment scheduled with? Dr. Portillo   When was the original appointment scheduled?    Please list date and time 4/23/24 220   At which location is the appointment scheduled to take place? [place]   Was the appointment rescheduled?     Was the appointment changed from an in person visit to a virtual visit?    If so, please list the details of the change. 7/29/24 @ 240   What is the reason for the appointment change? Patient is sick       Was [facility] transport scheduled? N/A   Does [facility] transport need to be scheduled for the new visit (if applicable) No   Does the patient need an infusion appointment rescheduled? No   Does the patient have an upcoming infusion appointment scheduled? If so, when? No   Is the patient undergoing chemotherapy? No   For appointments cancelled with less than 24 hours:  Was the no-show policy reviewed? Yes

## 2024-04-24 LAB — BACTERIA UR CULT: ABNORMAL

## 2024-05-02 DIAGNOSIS — C50.912 CARCINOMA OF LEFT BREAST METASTATIC TO AXILLARY LYMPH NODE (HCC): ICD-10-CM

## 2024-05-02 DIAGNOSIS — C77.3 CARCINOMA OF LEFT BREAST METASTATIC TO AXILLARY LYMPH NODE (HCC): ICD-10-CM

## 2024-05-02 RX ORDER — ANASTROZOLE 1 MG/1
TABLET ORAL
Qty: 90 TABLET | Refills: 3 | Status: SHIPPED | OUTPATIENT
Start: 2024-05-02

## 2024-05-15 ENCOUNTER — TELEPHONE (OUTPATIENT)
Age: 89
End: 2024-05-15

## 2024-05-15 NOTE — TELEPHONE ENCOUNTER
Received a call from Dr. Santiago's office that he needs to talk with Dr. Coyne.      Advised Dr. Coyne of same and gave him the message.

## 2024-05-15 NOTE — TELEPHONE ENCOUNTER
Caller: Dr Eid's office     Doctor: Dr Coyne    Reason for call: Dr Eid would like to speak with Dr Doretha SOSA! Patient is in the office now.     Call back#: [phone]

## 2024-06-06 ENCOUNTER — CLINICAL SUPPORT (OUTPATIENT)
Dept: OBGYN CLINIC | Facility: OTHER | Age: 89
End: 2024-06-06

## 2024-06-06 DIAGNOSIS — Z17.0 MALIGNANT NEOPLASM OF UPPER-OUTER QUADRANT OF LEFT BREAST IN FEMALE, ESTROGEN RECEPTOR POSITIVE (HCC): Primary | ICD-10-CM

## 2024-06-06 DIAGNOSIS — C50.412 MALIGNANT NEOPLASM OF UPPER-OUTER QUADRANT OF LEFT BREAST IN FEMALE, ESTROGEN RECEPTOR POSITIVE (HCC): Primary | ICD-10-CM

## 2024-06-06 NOTE — PROGRESS NOTES
Mastectomy Bra Fitting Order Details    Barbra De Oliveira  1935  [id number]    Reason For Visit  Mastectomy bra and prosthesis     Precautions   History of breast cancer     Subjective  Barbra has a mastectomy in 2020 without reconstruction.    Surgery Type: Mastectomy without reconstruction     Lymph node removal no    Date of surgery 11/17/2020    Surgical side right    Objective  Barbra is wearing a prosthesis currently along her skin . Has moderate taping on lateral edge.     Assessment  Band 20x 2 = 40 + 4 = 44  Cup - 23 x 2 = 46   44B  Trial of prosthesis size 7,8,9 but did not provide shape to match width of her natural side. Will trial a side specific prosthesis to provide more tissue on medial aspect.     Plan  Ship to home. Barbra was instructed on the wear and care of her products.     Order Details   L mastectomy 2020   natura cosmetic size 8R style 322 x 1 - trial  natura cosmetic size 9R style 322 x 1 -trial   Mary Lou 44B x 1 black  Mary Lou 44B x 1 white  Mary Lou 44B x 1 nude   ship to [employer] - will have a follow up for prosthesis sizing

## 2024-06-10 ENCOUNTER — OFFICE VISIT (OUTPATIENT)
Dept: CARDIOLOGY CLINIC | Facility: CLINIC | Age: 89
End: 2024-06-10
Payer: MEDICARE

## 2024-06-10 VITALS
WEIGHT: 176 LBS | SYSTOLIC BLOOD PRESSURE: 128 MMHG | HEIGHT: 62 IN | DIASTOLIC BLOOD PRESSURE: 80 MMHG | HEART RATE: 77 BPM | BODY MASS INDEX: 32.39 KG/M2 | OXYGEN SATURATION: 96 %

## 2024-06-10 DIAGNOSIS — I50.32 CHRONIC DIASTOLIC HEART FAILURE (HCC): ICD-10-CM

## 2024-06-10 DIAGNOSIS — I10 ESSENTIAL HYPERTENSION: Primary | Chronic | ICD-10-CM

## 2024-06-10 DIAGNOSIS — E78.2 MIXED HYPERLIPIDEMIA: ICD-10-CM

## 2024-06-10 DIAGNOSIS — I49.3 PREMATURE VENTRICULAR CONTRACTIONS: ICD-10-CM

## 2024-06-10 PROCEDURE — 99213 OFFICE O/P EST LOW 20 MIN: CPT | Performed by: INTERNAL MEDICINE

## 2024-06-10 NOTE — ASSESSMENT & PLAN NOTE
Wt Readings from Last 3 Encounters:   06/10/24 79.8 kg (176 lb)   03/21/24 83.5 kg (184 lb)   03/20/24 83.5 kg (184 lb)     Chronic diastolic congestive heart failure, stable.

## 2024-06-10 NOTE — PROGRESS NOTES
Subjective:        Patient ID: Barbra De Oliveira is a 89 y.o. female.    Chief Complaint:  The patient presented to this office for the purpose of cardiac follow-up.  She is known to have a history of hypertension and cardiac arrhythmia in the form of premature ventricular contractions.  The patient has been treated for GERD with domperidone.  However EKG showed evidence of prolonged QT interval and the medication was discontinued.  EKG was done today showing normal QT interval.  At this point the patient is feeling well from the cardiac standpoint.  She denies any symptoms of chest pain of angina.  She does have chest wall pain associated with left breast surgery.  She denies any symptoms of dyspnea.  She has no symptoms of palpitation, dizziness or syncope.  She has no significant lower extremity edema.      The following portions of the patient's history were reviewed and updated as appropriate: allergies, current medications, past family history, past medical history, past social history, past surgical history, and problem list.  Review of Systems   Constitutional: Negative.   Cardiovascular: Negative.    Respiratory: Negative.     Psychiatric/Behavioral: Negative.            Objective:     Physical Exam  Constitutional:       Appearance: Normal appearance.   HENT:      Head: Normocephalic and atraumatic.   Cardiovascular:      Rate and Rhythm: Normal rate and regular rhythm.   Pulmonary:      Effort: Pulmonary effort is normal.      Breath sounds: Normal breath sounds.   Musculoskeletal:      Right lower leg: No edema.      Left lower leg: No edema.   Skin:     General: Skin is warm and dry.   Neurological:      Mental Status: She is alert and oriented to person, place, and time.   Psychiatric:         Mood and Affect: Mood normal.         Behavior: Behavior normal.         Lab Review:   Lab Results   Component Value Date     01/30/2015    K 4.2 02/27/2024    K 3.7 01/30/2015     02/27/2024    CL 99  (L) 01/30/2015    CO2 24 02/27/2024    CO2 30 01/30/2015    BUN 21 02/27/2024    BUN 20 01/30/2015    CREATININE 1.37 (H) 02/27/2024    CREATININE 1.03 01/30/2015    GLUCOSE 103 01/30/2015    CALCIUM 10.2 02/27/2024    CALCIUM 9.3 01/30/2015         Assessment:       1. Essential hypertension        2. Chronic diastolic heart failure (HCC)        3. Premature ventricular contractions        4. Mixed hyperlipidemia             Plan:       The patient was advised to continue present regimen.

## 2024-06-10 NOTE — ASSESSMENT & PLAN NOTE
Premature ventricular contractions, stable.  The patient is asymptomatic.  While the patient was on domperidone the patient exhibited evidence of prolonged QT interval.  Today's EKG showed sinus mechanism with normal QT interval.  Occasional PVCs were noted.

## 2024-06-11 ENCOUNTER — TELEPHONE (OUTPATIENT)
Dept: OBGYN CLINIC | Facility: OTHER | Age: 89
End: 2024-06-11

## 2024-06-11 PROCEDURE — 93000 ELECTROCARDIOGRAM COMPLETE: CPT | Performed by: INTERNAL MEDICINE

## 2024-07-02 ENCOUNTER — CLINICAL SUPPORT (OUTPATIENT)
Dept: OBGYN CLINIC | Facility: OTHER | Age: 89
End: 2024-07-02
Payer: MEDICARE

## 2024-07-02 ENCOUNTER — APPOINTMENT (OUTPATIENT)
Dept: RADIOLOGY | Age: 89
End: 2024-07-02
Payer: MEDICARE

## 2024-07-02 DIAGNOSIS — C50.412 MALIGNANT NEOPLASM OF UPPER-OUTER QUADRANT OF LEFT BREAST IN FEMALE, ESTROGEN RECEPTOR POSITIVE (HCC): Primary | ICD-10-CM

## 2024-07-02 DIAGNOSIS — R05.9 COUGH, UNSPECIFIED TYPE: ICD-10-CM

## 2024-07-02 DIAGNOSIS — Z17.0 MALIGNANT NEOPLASM OF UPPER-OUTER QUADRANT OF LEFT BREAST IN FEMALE, ESTROGEN RECEPTOR POSITIVE (HCC): Primary | ICD-10-CM

## 2024-07-02 PROCEDURE — 71046 X-RAY EXAM CHEST 2 VIEWS: CPT

## 2024-07-02 NOTE — PROGRESS NOTES
Barbra arrived to trial natura cosmetic size 8 and R L along with Mary Lou. Size 8 achieved symmetry between sides. Will call for a follow up regarding products in 2 weeks.

## 2024-07-17 ENCOUNTER — TELEPHONE (OUTPATIENT)
Dept: OBGYN CLINIC | Facility: OTHER | Age: 89
End: 2024-07-17

## 2024-07-17 NOTE — TELEPHONE ENCOUNTER
Spoke with patient and wants to come in make sure she is wearing the prothesis correct. Coming out of bra.

## 2024-08-04 NOTE — PROGRESS NOTES
Hematology/Oncology Outpatient Office Note    Date of Service: 8/13/2024    [facility]ATOLOG[facility] [address]  [place] [address]  [phone]    Reason for Consultation:   Chief Complaint   Patient presents with    Follow-up       Cancer Stage at diagnosis: IIIA    Referral Physician: No ref. provider found    Primary Care Physician:  Osiel Eid MD     Nickname: Barbra    Spouse: Dinh Norman ECOG: 3    Today's ECOG: 3 (walker)    Goals and Barriers:  Current Goal: Minimize effects of disease burden, extend life.   Barriers to accomplishing this: chronic lower back pain for the past 10 years+    Patient's Capacity to Self Care:  Patient is able to moderately self care      ASSESSMENT & PLAN      Diagnosis ICD-10-CM Associated Orders   1. 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 Malignant neoplasm of upper-outer quadrant of left breast in female, estrogen receptor positive (HCC)  C50.412     Z17.0       3. Use of anastrozole (Arimidex)  Z79.811             This is a 89 y.o. c PMHx notable for L Breast HR+ Breast Cancer,  chronic lower back pain being seen in consultation for adjuvant endocrine therapy        + Anterior surgical margins after the L mastectomy.     Discussion of decision making  Oncology history updated, accordingly, during this visit  Goals of care/patient communication  I discussed with the patient the clinical course leading up to their cancer diagnosis. I reviewed relevant office notes, imaging reports and pathology result as well.  I told the patient that this is a case of curable disease and what this means. We discussed that the goal of anti-cancer therapy is to provide best quality of life, extend overall survival, and progression free survival as shown in clinical trials. We also discussed that there might be a point when the cancer will no longer respond  to this anti-neoplastic therapy.  I explained the risks/benefits of the proposed cancer therapy: Anastrozole and after discussion including understanding risks of possible life-threatening complications and therapy-related malignancy development, informed consent for blood products and treatment has been verbally obtained.  TNM/Staging At Diagnosis  Cancer Staging   Malignant neoplasm of upper-outer quadrant of left breast in female, estrogen receptor positive (HCC)  Staging form: Breast, AJCC 8th Edition  - Pathologic stage from 11/17/2020: Stage IIIA (rpT4b, pN1a, cM0, G2, ER+, LA+, HER2-) - Signed by JOSE Stark on 1/31/2024  Stage prefix: Recurrence  Multigene prognostic tests performed: None  Histologic grading system: 3 grade system  - Pathologic: Stage IIIA (pT4b, pN1a, cM0, G2, ER+, LA+, HER2-) - Unsigned  Method of lymph node assessment: Axillary lymph node dissection  Histologic grading system: 3 grade system    1. Left breast cancer, diagnosed in 1992.      2. Local recurrence of breast cancer in her left breast, diagnosed in 1994.     3. Left chest wall and left axillary lymph node recurrent disease, grade 2, % positive, LA 90% positive, HER2 negative disease.  Diagnosed in November 2020 to her implant/reconstructed breast.     Disease Features/Tumor Markers/Genetics  Tumor Marker: n/a  Notable Path Features: noted  Treatment: Anastrozole  Other Supportive care:   Treatment Team Members  Surgeon: Dr. Em  Rad Onc  Palliative  Labs  Diagnostics        Discussion of decision making    I personally reviewed the following lab results, the image studies, pathology, other specialty/physicians consult notes and recommendations, and outside medical records. I had a lengthy discussion with the patient and shared the work-up findings. We discussed the diagnosis and management plan as below. I spent 42 minutes reviewing the records (labs, clinician notes, outside records, medical history,  ordering medicine/tests/procedures, monitoring of anti-neoplastic toxicities, interpreting the imaging/labs previously done) and coordination of care as well as direct time with the patient today, of which greater than 50% of the time was spent in counseling and coordination of care with the patient/family.      Plan/Labs  F/u Surg-Onc, mammograms (done March)  DEXA due and is ordered  Cont Anastrozole, end date 10 years, 12/2030         Follow Up: 1 month to review the DEXA, then 11 months (to follow surg-Onc)    All questions were answered to the patient's satisfaction during this encounter. The patient knows the contact information for our office and knows to reach out for any relevant concerns related to this encounter. They are to call for any temperature 100.4 or higher, new symptoms including but not restricted to shaking chills, decreased appetite, nausea, vomiting, diarrhea, increased fatigue, shortness of breath or chest pain, confusion, and not feeling the strength to come to the clinic. For all other listed problems and medical diagnosis in their chart - they are managed by PCP and/or other specialists, which the patient acknowledges. Thank you very much for your consultation and making us a part of this patient's care. We are continuing to follow closely with you. Please do not hesitate to reach out to me with any additional questions or concerns.    Titus Portillo MD  Hematology & Medical Oncology Staff Physician             Disclaimer: This document was prepared using [employer] Direct technology. If a word or phrase is confusing, or does not make sense, this is likely due to recognition error which was not discovered during this clinician's review. If you believe an error has occurred, please contact me through [facility] service line for best?cation.      ONCOLOGY HISTORY OF PRESENT ILLNESS        Oncology History   Malignant neoplasm of upper-outer quadrant of left breast in female, estrogen  receptor positive (HCC)   1992 Initial Diagnosis    Left breast cancer  Lumpectomy  RT      1994 Surgery    Left breast cancer recurrence  Mastectomy with immediate reconstruction     8/31/2020 Biopsy    Left breast ultrasound-guided biopsy  A. 1 - 2 o'clock  Invasive mammary carcinoma of no special type   Grade 2    WI 90  HER2 1+  Lymphovascular invasion not identified    B. Left axillary lymph node  Invasive mammary carcinoma of no special type  Grade 2    Concordant. No residual lymph node is identified in specimen B, favoring axillary extension of invasive carcinoma over lymph node metastasis. Breast mass measured 2.8cm on US and involved skin. Axillary mass measured 1.4cm. Right clear.     11/17/2020 Surgery    Left breast ultrasound-guided lumpectomy with HUGO  directed axillary dissection  Invasive carcinoma of no special type (ductal)  Grade 2  3.2 cm  Invasive carcinoma directly invades into the dermis or epidermis with skin ulceration  Carcinoma invades skeletal muscle  Lymphovascular invasion present  Anterior margin positive for invasive carcinoma  3/5 Lymph nodes with macrometastases (1.6 cm)  Anatomic Stage IIIB  Prognostic Stage IIIA    Immediate reconstruction with Dr. Chiu (implant removal and local flap)     11/17/2020 -  Cancer Staged    Staging form: Breast, AJCC 8th Edition  - Pathologic stage from 11/17/2020: Stage IIIA (rpT4b, pN1a, cM0, G2, ER+, WI+, HER2-) - Signed by JOSE Stark on 1/31/2024  Stage prefix: Recurrence  Multigene prognostic tests performed: None  Histologic grading system: 3 grade system       12/21/2020 -  Hormone Therapy    Anastrozole 1 mg daily  Dr. 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 Never was pregnant as a result.    No night sweats, joint pains, hot flashes.      I have reviewed the relevant past medical, surgical, social and family history. I have also reviewed allergies and medications for this patient.    Review of Systems  Baseline weight: 170-175 lbs     A 10-point review of system was performed, pertinent positive and negative were detailed as above. Otherwise, the 10-point review of system was negative.      Past Medical History:   Diagnosis Date    Anxiety     Asthma     Atrial fibrillation (HCC)     Breast cancer (HCC) 1992    left    Chest pain, unspecified     CHF (congestive heart failure) (HCC)     Diabetes mellitus (HCC)     GERD (gastroesophageal reflux disease)     History of radiation exposure 1992    Hyperlipidemia     Hypertension     Irregular heart beat     Afib    Migraine     Obesity     Pericardial effusion     Pericarditis        Past Surgical History:   Procedure Laterality Date    ABDOMINAL ADHESION SURGERY      APPENDECTOMY      AUGMENTATION MAMMAPLASTY Left 1994    BACK SURGERY      BREAST LUMPECTOMY Left 1992    malignant    BREAST LUMPECTOMY Left 11/17/2020    Procedure: BREAST ULTRASOUND DIRECTED LUMPECTOMY (ULTRASOUND AT 1200);  Surgeon: Earl Em MD;  [facility];  Service: Surgical Oncology    BREAST SURGERY      CARDIAC SURGERY      Pericardial window    CHOLECYSTECTOMY      EYE SURGERY      FLAP LOCAL TRUNK Left 11/17/2020    Procedure: FLAP LOCAL TRUNK;  Surgeon: Ronnell Chiu MD;  [facility];  Service: Plastics    HYSTERECTOMY      IR DRAINAGE TUBE CHECK WITH SCLEROSIS  04/23/2021    IR DRAINAGE TUBE CHECK WITH SCLEROSIS  06/03/2021    IR DRAINAGE TUBE CHECK WITH SCLEROSIS  06/17/2021    IR DRAINAGE TUBE CHECK WITH  SCLEROSIS  06/28/2021    IR DRAINAGE TUBE CHECK WITH SCLEROSIS  07/07/2021    IR DRAINAGE TUBE CHECK WITH SCLEROSIS  07/27/2021    IR DRAINAGE TUBE PLACEMENT  04/01/2021    IR DRAINAGE TUBE PLACEMENT WITH SCLEROSIS  05/14/2021    LYMPH NODE DISSECTION Left 11/17/2020    Procedure: HUGO  DIRECTED AXILLARY DISSECTION;  Surgeon: Earl Em MD;  [facility];  Service: Surgical Oncology    MASTECTOMY Left 1994    malignant    NJ CELESTE-IMPLANT CAPSULECTOMY BREAST COMPLETE Left 11/17/2020    Procedure: BREAST CAPSULECTOMY;  Surgeon: Ronnell Chiu MD;  [facility];  Service: Plastics    NJ REMOVAL INTACT BREAST IMPLANT Left 11/17/2020    Procedure: BREAST IMPLANT REMOVAL;  Surgeon: Ronnell Chiu MD;  [facility];  Service: Plastics    US BREAST NEEDLE LOC LEFT Left 11/02/2020    US GUIDED BREAST BIOPSY LEFT COMPLETE Left 08/31/2020    US GUIDED BREAST LYMPH NODE BIOPSY LEFT Left 08/31/2020    WRIST SURGERY         Family History   Problem Relation Age of Onset    Colon cancer Mother 55    Breast cancer Mother         Early 50's    Stroke Father     Emphysema Father     Leukemia Sister     Breast cancer Sister 65    ALS Brother        Social History     Socioeconomic History    Marital status: /Civil Union     Spouse name: Not on file    Number of children: Not on file    Years of education: Not on file    Highest education level: Not on file   Occupational History    Not on file   Tobacco Use    Smoking status: Never    Smokeless tobacco: Never   Vaping Use    Vaping status: Never Used   Substance and Sexual Activity    Alcohol use: Yes     Comment: social    Drug use: No    Sexual activity: Not Currently   Other Topics Concern    Not on file   Social History Narrative    Not on file     Social Determinants of Health     Financial Resource Strain: Not on file   Food Insecurity: Not on file   Transportation Needs: Not on file   Physical Activity: Not on file   Stress: Not on file   Social  Connections: Not on file   Intimate Partner Violence: Not on file   Housing Stability: Not on file       Allergies   Allergen Reactions    Iodinated Contrast Media Anaphylaxis    Iodine - Food Allergy Anaphylaxis and Other (See Comments)    Povidone Iodine Anaphylaxis    Aspirin GI Bleeding and Other (See Comments)    Caffeine - Food Allergy Palpitations       Current Outpatient Medications   Medication Sig Dispense Refill    anastrozole (ARIMIDEX) 1 mg tablet TAKE 1 TABLET EVERY DAY 90 tablet 3    Droplet Pen Needles 32G X 4 MM MISC       EPINEPHrine (EPIPEN) 0.3 mg/0.3 mL SOAJ Inject 0.3 mL as directed      FLOVENT  MCG/ACT inhaler INHALE 2 PUFF BY INHALATION ROUTE 2 TIMES EVERY DAY  6    hydrocortisone 2.5 % cream APPLY TO THE AFFECTED AREA TWICE DAILY. (MIX WITH NYSTATIN)      insulin aspart (NovoLOG) 100 Units/mL injection pen Novolog Flexpen U-100 Insulin aspart 100 unit/mL (3 mL) subcutaneous      Insulin Glargine (TOUJEO) 300 units/mL CONCETRATED U-300 injection pen Toujeo SoloStar U-300 Insulin 300 unit/mL (1.5 mL) subcutaneous pen   INJECT 67 UNITS BY SUBCUTANEOUS ROUTE AS PER INSULIN PROTOCOL      lansoprazole (PREVACID) 30 mg capsule lansoprazole 30 mg capsule,delayed release      metoprolol tartrate (LOPRESSOR) 50 mg tablet Take 50 mg by mouth every 12 (twelve) hours       morphine (MS CONTIN) 15 mg 12 hr tablet Take 15 mg by mouth every 12 (twelve) hours as needed for severe pain      morphine (MSIR) 15 mg tablet Take 15 mg by mouth every 8 (eight) hours as needed for severe pain.      Naloxegol Oxalate (Movantik) 12.5 MG TABS Take by mouth      naloxone (NARCAN) 2 MG/2ML injection Inject 2 mL into a catheter in a vein      nystatin (MYCOSTATIN) cream APPLY TO AFFECTED AREAS TWICE A DAY. (MIX WITH HYDROCORTISONE)      nystatin (MYCOSTATIN) powder APPLY TO THE RASH IN THE GROIN ONCE DAILY      ONE TOUCH ULTRA TEST test strip TEST TWICE DAILY OR AS DIRECTED DX: E11.9  5    ONETOUCH DELICA LANCETS  33G MISC TEST TWICE DAILY OR AS DIRECTED  5    oxybutynin (DITROPAN XL) 15 MG 24 hr tablet Take 15 mg by mouth daily.      simvastatin (ZOCOR) 20 mg tablet Take 20 mg by mouth daily at bedtime.      solifenacin (VESICARE) 10 MG tablet Take 1 tablet (10 mg total) by mouth daily 90 tablet 0    Toujeo SoloStar 300 units/mL CONCENTRATED U-300 injection pen (1-unit dial) INJECT 72 UNITS BY SUBCUTANEOUS ROUTE AS PER INSULIN PROTOCOL      VENTOLIN  (90 Base) MCG/ACT inhaler        VITAMIN K PO Take by mouth      zolpidem (AMBIEN) 10 mg tablet Take by mouth Daily      azithromycin (ZITHROMAX) 250 mg tablet  (Patient not taking: Reported on 8/7/2024)      Beclomethasone Dipropionate (QVAR IN) Inhale 2 puffs if needed (Patient not taking: Reported on 8/13/2024)      Flowflex COVID-19 Ag Home Test KIT Use as directed (Patient not taking: Reported on 3/20/2024)      glucose blood test strip TEST TWICE DAILY OR AS DIRECTED DX: E11.9 (Patient not taking: Reported on 8/13/2024)      hydrocodone-chlorpheniramine polistirex (TUSSIONEX) 10-8 mg/5 mL ER suspension Take 5 mL by mouth every 12 (twelve) hours (Patient not taking: Reported on 4/25/2023)      Lidocaine 5 % CREA Apply 1 application topically as needed (pain) (Patient not taking: Reported on 1/31/2024) 1 Tube 0    lidocaine-prilocaine (EMLA) cream APPLY FOR 12 HOURS, AND THEN OFF 12 HOURS AS NEEDED (Patient not taking: Reported on 4/25/2023)  2    nitrofurantoin (MACROBID) 100 mg capsule  (Patient not taking: Reported on 8/7/2024)      NON FORMULARY Domperidone 20 mg TID (Patient not taking: Reported on 6/10/2024)      NOVOFINE 32G X 6 MM MISC 2 (two) times a day As directed (Patient not taking: Reported on 8/13/2024)  5    sodium chloride, PF, 0.9 % 10 mL by Intracatheter route daily Intracatheter flushing daily (Patient not taking: Reported on 7/25/2023) 300 mL 0    Sodium Fluoride 5000 PPM 1.1 % GEL  (Patient not taking: Reported on 6/10/2024)       tobramycin-dexamethasone (TOBRADEX) ophthalmic suspension INSTILL 1 DROP IN THE RIGHT EYE 4 TIMES DAILY (Patient not taking: Reported on 6/10/2024)      triamcinolone (KENALOG) 0.1 % cream   (Patient not taking: Reported on 4/25/2023)       No current facility-administered medications for this visit.       (Not in a hospital admission)      Objective:     24 Hour Vitals Assessment:     Vitals:    08/13/24 1501   BP: 124/80   Pulse: 75   Resp: 18   Temp: 97.7 °F (36.5 °C)   SpO2: 97%       PHYSICIAN EXAM:    General: Appearance: alert, cooperative, no distress.  HEENT: Normocephalic, atraumatic. No scleral icterus. conjunctivae clear. EOMI.  Chest: No tenderness to palpation. No open wound noted.  Lungs: Clear to auscultation bilaterally, Respirations unlabored.  Cardiac: Regular rate and rhythm, +S1and S2  Abdomen: Soft, non-tender, non-distended. Bowel sounds are normal.  Extremities:  No edema, cyanosis, clubbing.  Skin: Skin color, turgor are normal. No rashes.  Breast: deferred by pt  Lymphatics: no palpable supra-cervical, axillary, or inguinal adenopathy  Neurologic: Awake, Alert, and oriented, no gross focal deficits noted b/l.       DATA REVIEW:    Pathology Result:    Final Diagnosis   Date Value Ref Range Status   04/26/2023   Final    A. Polyp, Stomach/Small Intestine, antrum:  - Gastric adenoma, intestinal type.  See note   - No high-grade dysplasia or carcinoma seen  - Intestinal metaplasia present  - No adenoma present at the margin of biopsy    B. Polyp, Stomach/Small Intestine, fundus:  - Fragments of gastric hyperplastic polyp and fundic gland polyp. See note    NOTE A and B: Intradepartmental consultation was obtained.              04/20/2022   Final    A. Stomach, Gastric Antrum nodule - cold bx:  - Gastric adenoma, intestinal type, fragments.  - Negative for high grade dysplasia or carcinoma.  - Intestinal metaplasia confirmed with Alcian blue/PAS stain.    B. Polyp, Stomach/Small Intestine,  Gastric polyp - cold bx:  - Gastric fundic gland polyp, fragments.  - Negative for dysplasia or carcinoma.     C. Esophagogastric junction, GE junction - cold bx:  - Benign gastric fundic mucosa with mild chronic inflammation and regenerative epithelial changes.  - No intestinal metaplasia/ dysplasia identified.     11/17/2020   Final    A. Soft Tissue, Other, section of left chest wall mass and axillary dissection :  - Invasive mammary carcinoma, NOS/ ductal carcinoma 3.2 x 3.1 x 1.0 cm , ulcerating into the skin ( slide A29), located in the 9-12 o'clock region  -The tumor invades skeletal muscle in few section  -Intraductal papilloma  -Skin with seborrhoic keratosis and small hemangioma (2 mm)  -Multiple foci of LVI seen  -Subcutaneous dense fibrous capsule  -Three of total five lymph nodes are positive for metastatic carcinoma with extranodal extension, largest deposit measures 1.6 cm    Margins  -The tumor involves (blue -inked soft tissue  margin)  anterior subcutaneous  soft tissue margin in the 9-12 o'clock quadrant (slide A30, 32)  -Tumor is located 0.5 mm of deep margin in 12-3 o'clock-yellow inked margin  The remaining margins are negative for tumor by wider range    Note  Despite extensive search of ymph nodes and use of fat dissolving solution only five lymph nodes are identifed.  Case discussed with Dr. JS Em on 11/30/20 at 10:30 AM       08/31/2020   Final    A. Breast, Left, left breast u/s guided needle biopsy 1-2 o'clock 14g 4 passes:  - Invasive mammary carcinoma of no special type (ductal). See Note.     -- Paula histologic grade 2 of 3 (total score: 6 of 9).         * Glandular (acinar) Tubular Differentiation < 10%, score 3.        * Nuclear Pleomorphism 2 of 3, score 2.        * Mitotic Rate < 3/mm2, (</= 7 mitoses/10HPF; 3 mitoses/10 HPF), score 1.    -- Confirmed by tumor cell immunophenotype:        * Positive: E-cadherin, P120 - each in a membranous pattern.        * Negative: p63,  [facility].    -- Invasive carcinoma involves 4 of 4 submitted core biopsies, max. Dimension= 11 mm.    -- Estrogen, Progesterone & HER2 receptor studies pending, to be described in an addendum.  - Small portion of skeletal muscle, cannot exclude involvement by invasive carcinoma; Pankeratin (CK) and GATA3     immunostains pending; results will be reported in an addendum.   - Ductal carcinoma in-situ (DCIS): Not identified.  - Lymphovascular invasion: Not identified.  - Microcalcifications: Present, rare associated with non-neoplastic breast tissue.  - Best representative tumor block: A1    -- Sufficient tumor present for        Agendia Mammaprint/Blueprint (1 cm2 of invasive tumor in aggregate): No.        MI Profile/Foundation One (at least 5 x 5 mm of tumor): Yes.    B. Axillary lymph node, left axillary lymph node, needle core biopsy, 16g 3 passes:  - Invasive mammary carcinoma of no special type (ductal). See Note.     -- Present in background of fibrosclerosis and mature adipose tissue; no lymph node identified.     -- Paula histologic grade 2 of 3 (total score: 6 of 9)        * Glandular (acinar) Tubular Differentiation < 10%, score 3.        * Nuclear Pleomorphism 2 of 3, score 2.        * Mitotic Rate < 3/mm2, (</= 7 mitoses/10HPF; 2 mitoses/10 HPF), score 1.    -- Confirmed by positive Pankeratin (CK) and GATA3 immunostains.    -- Invasive carcinoma involves 2 of 3 submitted core biopsies, max. Dimension= 8 mm.  - Ductal carcinoma in-situ (DCIS): Not identified.  - Lymphovascular invasion: Not identified.  - Microcalcifications: Absent.  - Best representative tumor block: B2.    -- Sufficient tumor present for        Agendia Mammaprint/Blueprint (1 cm2 of invasive tumor in aggregate): No.        MI Profile/Foundation One (at least 5 x 5 mm of tumor): No.     12/19/2018   Final    A. Colon, cecum, polypectomy:             - Tubular adenoma.             - No high grade dysplasia or malignancy is  identified.    B. Colon, transverse, polypectomy:             - Tubular adenoma.             - No high-grade dysplasia or malignancy is identified.     C. Colon sigmoid, polypectomy:             - Polypoid colonic mucosa with mildly hyperplastic features.             - No dysplasia or malignancy is identified.     Interpretation performed at [facility], [address] [place] [id number]      06/15/2018   Final    A. Stomach, biopsy:             - Oxyntic mucosa with mild chronic inactive gastritis and superficial fragments with foveolar hyperplasia, suggestive of hyperplastic polyp.             - No intestinal metaplasia, dysplasia or malignancy is identified.    B. Esophagus, distal, biopsy:             - Benign squamous mucosa with reactive features.             - Benign glandular mucosa with chronic inflammation.             - No intestinal metaplasia, dysplasia or malignancy is identified.    Interpretation performed at [address] [address]           Image Results:   Image result are reviewed and documented in Hematology/Oncology history    XR chest pa & lateral  Narrative: CHEST    INDICATION:   Cough, unspecified.     COMPARISON:  None. EXAM PERFORMED/VIEWS:  XR CHEST PA & LATERAL    FINDINGS:    Cardiomediastinal silhouette appears unremarkable.    Linear atelectasis in the middle lobe now present. No airspace infiltrates..  No pneumothorax or pleural effusion.    Osseous structures appear within normal limits for patient age.  Impression: Mild linear atelectasis in the middle lobe, new from prior study of 10/21/2020.  No airspace infiltrate or effusion.    Electronically signed: 07/02/2024 07:12 PM Dominic Strong MD      LABS:  Lab data are reviewed and documented in HemOn history.       Lab Results   Component Value Date    HGB 15.3 02/27/2024    HCT 47.8 (H) 02/27/2024    MCV 91 02/27/2024     02/27/2024    WBC 10.71 (H) 02/27/2024    NRBC 0 02/27/2024  "    Lab Results   Component Value Date     01/30/2015    K 4.2 02/27/2024     02/27/2024    CO2 24 02/27/2024    ANIONGAP 7 01/30/2015    BUN 21 02/27/2024    CREATININE 1.37 (H) 02/27/2024    GLUCOSE 103 01/30/2015    GLUF 123 (H) 02/27/2024    CALCIUM 10.2 02/27/2024    AST 20 02/27/2024    ALT 21 02/27/2024    ALKPHOS 134 (H) 02/27/2024    PROT 5.7 (L) 01/28/2015    PROT 6.3 (L) 01/28/2015    BILITOT 0.49 01/28/2015    EGFR 34 02/27/2024       No results found for: \"IRON\", \"TIBC\", \"FERRITIN\"    No results found for: \"JDNTMEHA22\"    No results for input(s): \"WBC\", \"CREAT\", \"PLT\" in the last 72 hours.    By:  Titus Portillo MD, 8/13/2024, 3:16 PM                                  "

## 2024-08-06 ENCOUNTER — TELEPHONE (OUTPATIENT)
Dept: SURGICAL ONCOLOGY | Facility: CLINIC | Age: 89
End: 2024-08-06

## 2024-08-06 NOTE — TELEPHONE ENCOUNTER
Called the patient to re-time her appointment with Dr. Em tomorrow afternoon but there was no answer. Left a detailed message stating that her new appointment time will be 2:15 instead of 2:00. The hope line number was left if the patient had any questions or issues with her new time.

## 2024-08-07 ENCOUNTER — OFFICE VISIT (OUTPATIENT)
Dept: SURGICAL ONCOLOGY | Facility: CLINIC | Age: 89
End: 2024-08-07
Payer: MEDICARE

## 2024-08-07 VITALS
OXYGEN SATURATION: 99 % | TEMPERATURE: 96.8 F | WEIGHT: 174 LBS | HEART RATE: 79 BPM | DIASTOLIC BLOOD PRESSURE: 80 MMHG | HEIGHT: 62 IN | SYSTOLIC BLOOD PRESSURE: 140 MMHG | BODY MASS INDEX: 32.02 KG/M2 | RESPIRATION RATE: 16 BRPM

## 2024-08-07 DIAGNOSIS — C50.412 MALIGNANT NEOPLASM OF UPPER-OUTER QUADRANT OF LEFT BREAST IN FEMALE, ESTROGEN RECEPTOR POSITIVE (HCC): Primary | ICD-10-CM

## 2024-08-07 DIAGNOSIS — Z17.0 MALIGNANT NEOPLASM OF UPPER-OUTER QUADRANT OF LEFT BREAST IN FEMALE, ESTROGEN RECEPTOR POSITIVE (HCC): Primary | ICD-10-CM

## 2024-08-07 DIAGNOSIS — C50.912 CARCINOMA OF LEFT BREAST METASTATIC TO AXILLARY LYMPH NODE (HCC): ICD-10-CM

## 2024-08-07 DIAGNOSIS — C77.3 CARCINOMA OF LEFT BREAST METASTATIC TO AXILLARY LYMPH NODE (HCC): ICD-10-CM

## 2024-08-07 DIAGNOSIS — Z79.811 USE OF ANASTROZOLE (ARIMIDEX): ICD-10-CM

## 2024-08-07 PROCEDURE — 99214 OFFICE O/P EST MOD 30 MIN: CPT | Performed by: SURGERY

## 2024-08-07 NOTE — PROGRESS NOTES
Surgical Oncology Follow Up       [facility] [place]  [address]  [place] [address]    Barbra De Oliveira  1935  [id number]  [facility] [place]  [address]  [place] [address]    Chief Complaint   Patient presents with    office visit          Assessment & Plan:   Patient presents for follow-up visit.  She has no complaints referable to her breast other than a couple intermittent sharp spasms which are typical.  Her most recent mammogram from March showed no worrisome findings.  She is on anastrozole without  and doing well with that.  Her clinical exam shows no evidence of local regional or distant recurrent disease.  She will continue to be breast self-aware but will no longer be getting mammograms.  She will contact us if there are any other issues.  She will continue to follow-up with our nurse practitioner.  All questions were answered the patient's satisfaction.    Cancer History:     Oncology History   Malignant neoplasm of upper-outer quadrant of left breast in female, estrogen receptor positive (HCC)   1992 Initial Diagnosis    Left breast cancer  Lumpectomy  RT      1994 Surgery    Left breast cancer recurrence  Mastectomy with immediate reconstruction     8/31/2020 Biopsy    Left breast ultrasound-guided biopsy  A. 1 - 2 o'clock  Invasive mammary carcinoma of no special type   Grade 2    WV 90  HER2 1+  Lymphovascular invasion not identified    B. Left axillary lymph node  Invasive mammary carcinoma of no special type  Grade 2    Concordant. No residual lymph node is identified in specimen B, favoring axillary extension of invasive carcinoma over lymph node metastasis. Breast mass measured 2.8cm on US and involved skin. Axillary mass measured 1.4cm. Right clear.     11/17/2020 Surgery    Left breast ultrasound-guided lumpectomy with HUGO  directed  axillary dissection  Invasive carcinoma of no special type (ductal)  Grade 2  3.2 cm  Invasive carcinoma directly invades into the dermis or epidermis with skin ulceration  Carcinoma invades skeletal muscle  Lymphovascular invasion present  Anterior margin positive for invasive carcinoma  3/5 Lymph nodes with macrometastases (1.6 cm)  Anatomic Stage IIIB  Prognostic Stage IIIA    Immediate reconstruction with Dr. Chiu (implant removal and local flap)     11/17/2020 -  Cancer Staged    Staging form: Breast, AJCC 8th Edition  - Pathologic stage from 11/17/2020: Stage IIIA (rpT4b, pN1a, cM0, G2, ER+, RI+, HER2-) - Signed by JOSE Stark on 1/31/2024  Stage prefix: Recurrence  Multigene prognostic tests performed: None  Histologic grading system: 3 grade system       12/21/2020 -  Hormone Therapy    Anastrozole 1 mg daily  Dr. 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  She also had an ultrasound at that time.  Review of Systems:   Review of Systems   All other systems reviewed and are negative.      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)    Use of anastrozole (Arimidex)    Premature ventricular contractions    History of left mastectomy     Past Medical History:   Diagnosis Date    Anxiety     Asthma     Atrial fibrillation (HCC)     Breast cancer (HCC) 1992     left    Chest pain, unspecified     CHF (congestive heart failure) (HCC)     Diabetes mellitus (HCC)     GERD (gastroesophageal reflux disease)     History of radiation exposure 1992    Hyperlipidemia     Hypertension     Irregular heart beat     Afib    Migraine     Obesity     Pericardial effusion     Pericarditis      Past Surgical History:   Procedure Laterality Date    ABDOMINAL ADHESION SURGERY      APPENDECTOMY      AUGMENTATION MAMMAPLASTY Left 1994    BACK SURGERY      BREAST LUMPECTOMY Left 1992    malignant    BREAST LUMPECTOMY Left 11/17/2020    Procedure: BREAST ULTRASOUND DIRECTED LUMPECTOMY (ULTRASOUND AT 1200);  Surgeon: Earl Em MD;  [facility];  Service: Surgical Oncology    BREAST SURGERY      CARDIAC SURGERY      Pericardial window    CHOLECYSTECTOMY      EYE SURGERY      FLAP LOCAL TRUNK Left 11/17/2020    Procedure: FLAP LOCAL TRUNK;  Surgeon: Ronnell Chiu MD;  [facility];  Service: Plastics    HYSTERECTOMY      IR DRAINAGE TUBE CHECK WITH SCLEROSIS  04/23/2021    IR DRAINAGE TUBE CHECK WITH SCLEROSIS  06/03/2021    IR DRAINAGE TUBE CHECK WITH SCLEROSIS  06/17/2021    IR DRAINAGE TUBE CHECK WITH SCLEROSIS  06/28/2021    IR DRAINAGE TUBE CHECK WITH SCLEROSIS  07/07/2021    IR DRAINAGE TUBE CHECK WITH SCLEROSIS  07/27/2021    IR DRAINAGE TUBE PLACEMENT  04/01/2021    IR DRAINAGE TUBE PLACEMENT WITH SCLEROSIS  05/14/2021    LYMPH NODE DISSECTION Left 11/17/2020    Procedure: HUGO  DIRECTED AXILLARY DISSECTION;  Surgeon: Earl Em MD;  [facility];  Service: Surgical Oncology    MASTECTOMY Left 1994    malignant    CO CELESTE-IMPLANT CAPSULECTOMY BREAST COMPLETE Left 11/17/2020    Procedure: BREAST CAPSULECTOMY;  Surgeon: Ronnell Chiu MD;  [facility];  Service: Plastics    CO REMOVAL INTACT BREAST IMPLANT Left 11/17/2020    Procedure: BREAST IMPLANT REMOVAL;  Surgeon: Ronnell Chiu MD;  [facility];  Service: Plastics    US BREAST NEEDLE LOC LEFT Left  11/02/2020    US GUIDED BREAST BIOPSY LEFT COMPLETE Left 08/31/2020    US GUIDED BREAST LYMPH NODE BIOPSY LEFT Left 08/31/2020    [facility] SURGERY       Family History   Problem Relation Age of Onset    Colon cancer Mother 55    Breast cancer Mother         Early 50's    Stroke Father     Emphysema Father     Leukemia Sister     Breast cancer Sister 65    ALS Brother      Social History     Socioeconomic History    Marital status: /Civil Union     Spouse name: Not on file    Number of children: Not on file    Years of education: Not on file    Highest education level: Not on file   Occupational History    Not on file   Tobacco Use    Smoking status: Never    Smokeless tobacco: Never   Vaping Use    Vaping status: Never Used   Substance and Sexual Activity    Alcohol use: Yes     Comment: social    Drug use: No    Sexual activity: Not Currently   Other Topics Concern    Not on file   Social History Narrative    Not on file     Social Determinants of Health     Financial Resource Strain: Not on file   Food Insecurity: Not on file   Transportation Needs: Not on file   Physical Activity: Not on file   Stress: Not on file   Social Connections: Not on file   Intimate Partner Violence: Not on file   Housing Stability: Not on file       Current Outpatient Medications:     anastrozole (ARIMIDEX) 1 mg tablet, TAKE 1 TABLET EVERY DAY, Disp: 90 tablet, Rfl: 3    Beclomethasone Dipropionate (QVAR IN), Inhale 2 puffs if needed, Disp: , Rfl:     Droplet Pen Needles 32G X 4 MM MISC, , Disp: , Rfl:     EPINEPHrine (EPIPEN) 0.3 mg/0.3 mL SOAJ, Inject 0.3 mL as directed, Disp: , Rfl:     FLOVENT  MCG/ACT inhaler, INHALE 2 PUFF BY INHALATION ROUTE 2 TIMES EVERY DAY, Disp: , Rfl: 6    glucose blood test strip, TEST TWICE DAILY OR AS DIRECTED DX: E11.9, Disp: , Rfl:     hydrocortisone 2.5 % cream, APPLY TO THE AFFECTED AREA TWICE DAILY. (MIX WITH NYSTATIN), Disp: , Rfl:     insulin aspart (NovoLOG) 100 Units/mL injection pen,  Novolog Flexpen U-100 Insulin aspart 100 unit/mL (3 mL) subcutaneous, Disp: , Rfl:     Insulin Glargine (TOUJEO) 300 units/mL CONCETRATED U-300 injection pen, Toujeo SoloStar U-300 Insulin 300 unit/mL (1.5 mL) subcutaneous pen  INJECT 67 UNITS BY SUBCUTANEOUS ROUTE AS PER INSULIN PROTOCOL, Disp: , Rfl:     lansoprazole (PREVACID) 30 mg capsule, lansoprazole 30 mg capsule,delayed release, Disp: , Rfl:     metoprolol tartrate (LOPRESSOR) 50 mg tablet, Take 50 mg by mouth every 12 (twelve) hours , Disp: , Rfl:     morphine (MS CONTIN) 15 mg 12 hr tablet, Take 15 mg by mouth every 12 (twelve) hours as needed for severe pain, Disp: , Rfl:     morphine (MSIR) 15 mg tablet, Take 15 mg by mouth every 8 (eight) hours as needed for severe pain., Disp: , Rfl:     Naloxegol Oxalate (Movantik) 12.5 MG TABS, Take by mouth, Disp: , Rfl:     naloxone (NARCAN) 2 MG/2ML injection, Inject 2 mL into a catheter in a vein, Disp: , Rfl:     NOVOFINE 32G X 6 MM MISC, 2 (two) times a day As directed, Disp: , Rfl: 5    nystatin (MYCOSTATIN) cream, APPLY TO AFFECTED AREAS TWICE A DAY. (MIX WITH HYDROCORTISONE), Disp: , Rfl:     nystatin (MYCOSTATIN) powder, APPLY TO THE RASH IN THE GROIN ONCE DAILY, Disp: , Rfl:     ONE TOUCH ULTRA TEST test strip, TEST TWICE DAILY OR AS DIRECTED DX: E11.9, Disp: , Rfl: 5    ONETOUCH DELICA LANCETS 33G MISC, TEST TWICE DAILY OR AS DIRECTED, Disp: , Rfl: 5    oxybutynin (DITROPAN XL) 15 MG 24 hr tablet, Take 15 mg by mouth daily., Disp: , Rfl:     simvastatin (ZOCOR) 20 mg tablet, Take 20 mg by mouth daily at bedtime., Disp: , Rfl:     solifenacin (VESICARE) 10 MG tablet, Take 1 tablet (10 mg total) by mouth daily, Disp: 90 tablet, Rfl: 0    Toujeo SoloStar 300 units/mL CONCENTRATED U-300 injection pen (1-unit dial), INJECT 72 UNITS BY SUBCUTANEOUS ROUTE AS PER INSULIN PROTOCOL, Disp: , Rfl:     VENTOLIN  (90 Base) MCG/ACT inhaler,  , Disp: , Rfl:     VITAMIN K PO, Take by mouth, Disp: , Rfl:      zolpidem (AMBIEN) 10 mg tablet, Take by mouth Daily, Disp: , Rfl:     azithromycin (ZITHROMAX) 250 mg tablet, , Disp: , Rfl:     Flowflex COVID-19 Ag Home Test KIT, Use as directed (Patient not taking: Reported on 3/20/2024), Disp: , Rfl:     hydrocodone-chlorpheniramine polistirex (TUSSIONEX) 10-8 mg/5 mL ER suspension, Take 5 mL by mouth every 12 (twelve) hours (Patient not taking: Reported on 4/25/2023), Disp: , Rfl:     Lidocaine 5 % CREA, Apply 1 application topically as needed (pain) (Patient not taking: Reported on 1/31/2024), Disp: 1 Tube, Rfl: 0    lidocaine-prilocaine (EMLA) cream, APPLY FOR 12 HOURS, AND THEN OFF 12 HOURS AS NEEDED (Patient not taking: Reported on 4/25/2023), Disp: , Rfl: 2    nitrofurantoin (MACROBID) 100 mg capsule, , Disp: , Rfl:     NON FORMULARY, Domperidone 20 mg TID (Patient not taking: Reported on 6/10/2024), Disp: , Rfl:     sodium chloride, PF, 0.9 %, 10 mL by Intracatheter route daily Intracatheter flushing daily (Patient not taking: Reported on 7/25/2023), Disp: 300 mL, Rfl: 0    Sodium Fluoride 5000 PPM 1.1 % GEL, , Disp: , Rfl:     tobramycin-dexamethasone (TOBRADEX) ophthalmic suspension, INSTILL 1 DROP IN THE RIGHT EYE 4 TIMES DAILY (Patient not taking: Reported on 6/10/2024), Disp: , Rfl:     triamcinolone (KENALOG) 0.1 % cream,   (Patient not taking: Reported on 4/25/2023), Disp: , Rfl:   Allergies   Allergen Reactions    Iodinated Contrast Media Anaphylaxis    Iodine - Food Allergy Anaphylaxis and Other (See Comments)    Povidone Iodine Anaphylaxis    Aspirin GI Bleeding and Other (See Comments)    Caffeine - Food Allergy Palpitations       Physical Exam:     Vitals:    08/07/24 1412   BP: 140/80   Pulse: 79   Resp: 16   Temp: (!) 96.8 °F (36 °C)   SpO2: 99%     Physical Exam  Vitals reviewed.   Constitutional:       Appearance: She is well-developed.   HENT:      Head: Normocephalic and atraumatic.   Eyes:      Pupils: Pupils are equal, round, and reactive to light.    Neck:      Thyroid: No thyromegaly.      Vascular: No JVD.      Trachea: No tracheal deviation.   Cardiovascular:      Rate and Rhythm: Normal rate and regular rhythm.      Heart sounds: Normal heart sounds. No murmur heard.     No friction rub. No gallop.   Pulmonary:      Effort: Pulmonary effort is normal. No respiratory distress.      Breath sounds: Normal breath sounds. No wheezing or rales.   Chest:          Comments: The right breast was examined in the sitting and supine position.  There are no worrisome skin changes, tenderness, inverted nipple, nipple discharge, swelling, bleeding or evidence of a mass in any quadrant.  Errol survey demonstrated no evidence of any clinically suspicious axillary, pectoral or paraclavicular lymph nodes.    Examination of the left mastectomy site demonstrates considerable scarring she did have closure with Dr. Brown.  No worrisome skin findings or axillary adenopathy.  There are no masses.  Abdominal:      General: There is no distension.      Palpations: Abdomen is soft. There is no hepatomegaly or mass.      Tenderness: There is no abdominal tenderness. There is no guarding or rebound.   Musculoskeletal:         General: No tenderness. Normal range of motion.      Cervical back: Normal range of motion and neck supple.   Lymphadenopathy:      Cervical: No cervical adenopathy.   Skin:     General: Skin is warm and dry.      Findings: No erythema or rash.   Neurological:      Mental Status: She is alert and oriented to person, place, and time.      Cranial Nerves: No cranial nerve deficit.   Psychiatric:         Behavior: Behavior normal.           Results & Discussion:   Patient is free of any evidence of local regional or distant recurrent disease.  She will continue to be breast self-aware.  Will see her back in 6 months for repeat clinical breast exam.          Advance Care Planning/Advance Directives:  I discussed the disease status, treatment plans and follow-up with  the patient.

## 2024-08-13 ENCOUNTER — TELEPHONE (OUTPATIENT)
Dept: HEMATOLOGY ONCOLOGY | Facility: CLINIC | Age: 89
End: 2024-08-13

## 2024-08-13 ENCOUNTER — OFFICE VISIT (OUTPATIENT)
Dept: HEMATOLOGY ONCOLOGY | Facility: CLINIC | Age: 89
End: 2024-08-13
Payer: MEDICARE

## 2024-08-13 VITALS
BODY MASS INDEX: 32.02 KG/M2 | WEIGHT: 174 LBS | SYSTOLIC BLOOD PRESSURE: 124 MMHG | TEMPERATURE: 97.7 F | RESPIRATION RATE: 18 BRPM | HEIGHT: 62 IN | OXYGEN SATURATION: 97 % | DIASTOLIC BLOOD PRESSURE: 80 MMHG | HEART RATE: 75 BPM

## 2024-08-13 DIAGNOSIS — Z17.0 MALIGNANT NEOPLASM OF UPPER-OUTER QUADRANT OF LEFT BREAST IN FEMALE, ESTROGEN RECEPTOR POSITIVE (HCC): ICD-10-CM

## 2024-08-13 DIAGNOSIS — C77.3 CARCINOMA OF LEFT BREAST METASTATIC TO AXILLARY LYMPH NODE (HCC): Primary | ICD-10-CM

## 2024-08-13 DIAGNOSIS — C50.912 CARCINOMA OF LEFT BREAST METASTATIC TO AXILLARY LYMPH NODE (HCC): Primary | ICD-10-CM

## 2024-08-13 DIAGNOSIS — C50.412 MALIGNANT NEOPLASM OF UPPER-OUTER QUADRANT OF LEFT BREAST IN FEMALE, ESTROGEN RECEPTOR POSITIVE (HCC): ICD-10-CM

## 2024-08-13 DIAGNOSIS — Z79.811 USE OF ANASTROZOLE (ARIMIDEX): ICD-10-CM

## 2024-08-13 PROCEDURE — G2211 COMPLEX E/M VISIT ADD ON: HCPCS | Performed by: INTERNAL MEDICINE

## 2024-08-13 PROCEDURE — 99215 OFFICE O/P EST HI 40 MIN: CPT | Performed by: INTERNAL MEDICINE

## 2024-08-13 RX ORDER — ANASTROZOLE 1 MG/1
1 TABLET ORAL DAILY
Qty: 90 TABLET | Refills: 3 | Status: SHIPPED | OUTPATIENT
Start: 2024-08-13

## 2024-08-13 NOTE — TELEPHONE ENCOUNTER
Left msg for pt regarding next appt w/ provider... virtual appt is Tues 9/24 @ 10:15AM. Provided [facility] phone# [phone] if need to reschedule

## 2024-08-14 ENCOUNTER — TELEPHONE (OUTPATIENT)
Age: 89
End: 2024-08-14

## 2024-08-14 NOTE — TELEPHONE ENCOUNTER
Patient returning call regarding virtual appointment on 9/24. She has DEXA scan on 9/27 and virtual follow-up with Dr. Portillo on 10/7. Patient is wondering why she has an appointment on 9/24. Would like callback to confirm if this is appointment is necessary or if it can be canceled.

## 2024-08-14 NOTE — TELEPHONE ENCOUNTER
Spoke w/ pt to cancel virtual appt on 9/24 and to move the virtual scheduled for 10/7 up a week. New virtual appt is Thurs 10/3 @ 2:30PM.

## 2024-10-02 ENCOUNTER — TELEPHONE (OUTPATIENT)
Dept: OBGYN CLINIC | Facility: OTHER | Age: 89
End: 2024-10-02

## 2024-10-02 NOTE — TELEPHONE ENCOUNTER
Spoke to Barbra regarding mastectomy bras and prosthesis. She will call back when she would like to come in for another fitting. Call back number is [phone]

## 2024-10-04 ENCOUNTER — TRANSCRIBE ORDERS (OUTPATIENT)
Dept: LAB | Facility: CLINIC | Age: 89
End: 2024-10-04

## 2024-10-04 ENCOUNTER — APPOINTMENT (OUTPATIENT)
Dept: LAB | Facility: CLINIC | Age: 89
End: 2024-10-04
Payer: MEDICARE

## 2024-10-04 DIAGNOSIS — R30.9 MICTURITION PAINFUL: ICD-10-CM

## 2024-10-04 DIAGNOSIS — R35.0 URINARY FREQUENCY: ICD-10-CM

## 2024-10-04 DIAGNOSIS — R35.0 URINARY FREQUENCY: Primary | ICD-10-CM

## 2024-10-04 LAB
BACTERIA UR QL AUTO: ABNORMAL /HPF
BILIRUB UR QL STRIP: NEGATIVE
CLARITY UR: CLEAR
COLOR UR: ABNORMAL
GLUCOSE UR STRIP-MCNC: NEGATIVE MG/DL
HGB UR QL STRIP.AUTO: NEGATIVE
KETONES UR STRIP-MCNC: NEGATIVE MG/DL
LEUKOCYTE ESTERASE UR QL STRIP: ABNORMAL
NITRITE UR QL STRIP: POSITIVE
NON-SQ EPI CELLS URNS QL MICRO: ABNORMAL /HPF
PH UR STRIP.AUTO: 6.5 [PH]
PROT UR STRIP-MCNC: NEGATIVE MG/DL
RBC #/AREA URNS AUTO: ABNORMAL /HPF
SP GR UR STRIP.AUTO: 1.01 (ref 1–1.03)
UROBILINOGEN UR STRIP-ACNC: <2 MG/DL
WBC #/AREA URNS AUTO: ABNORMAL /HPF

## 2024-10-04 PROCEDURE — 87077 CULTURE AEROBIC IDENTIFY: CPT

## 2024-10-04 PROCEDURE — 81001 URINALYSIS AUTO W/SCOPE: CPT

## 2024-10-04 PROCEDURE — 87086 URINE CULTURE/COLONY COUNT: CPT

## 2024-10-04 PROCEDURE — 87186 SC STD MICRODIL/AGAR DIL: CPT

## 2024-10-06 LAB
BACTERIA UR CULT: ABNORMAL
BACTERIA UR CULT: ABNORMAL

## 2024-10-21 ENCOUNTER — APPOINTMENT (OUTPATIENT)
Dept: LAB | Facility: CLINIC | Age: 89
End: 2024-10-21

## 2024-10-21 ENCOUNTER — TELEPHONE (OUTPATIENT)
Age: 89
End: 2024-10-21

## 2024-10-21 DIAGNOSIS — N39.0 URINARY TRACT INFECTION WITHOUT HEMATURIA, SITE UNSPECIFIED: Primary | ICD-10-CM

## 2024-10-21 RX ORDER — CEPHALEXIN 500 MG/1
500 CAPSULE ORAL EVERY 8 HOURS SCHEDULED
Qty: 21 CAPSULE | Refills: 0 | Status: SHIPPED | OUTPATIENT
Start: 2024-10-21 | End: 2024-10-28

## 2024-10-21 NOTE — TELEPHONE ENCOUNTER
Patient called in stating that her  was at the lab with her urine and the lab stated there isn't a script for urine testing. I informed her that she had a UA and culture completed on 10/4/2024 that was not treated and Kaur Morales sent over a prescription for Keflex to her [facility] pharmacy. Patient appreciative of information.

## 2024-10-21 NOTE — TELEPHONE ENCOUNTER
Positive urine culture on 10/4/2024 that does not appear to have been treated with antibiotics.  Will send in a course of Keflex that she can start taking today.

## 2024-10-24 ENCOUNTER — HOSPITAL ENCOUNTER (EMERGENCY)
Facility: HOSPITAL | Age: 89
Discharge: HOME | End: 2024-10-25
Attending: EMERGENCY MEDICINE
Payer: MEDICARE

## 2024-10-24 ENCOUNTER — APPOINTMENT (EMERGENCY)
Dept: RADIOLOGY | Facility: HOSPITAL | Age: 89
End: 2024-10-24
Payer: MEDICARE

## 2024-10-24 DIAGNOSIS — K59.00 CONSTIPATION: Primary | ICD-10-CM

## 2024-10-24 LAB
ANION GAP SERPL CALCULATED.3IONS-SCNC: 5 MMOL/L (ref 4–13)
BASOPHILS # BLD AUTO: 0.02 THOUSANDS/ΜL (ref 0–0.1)
BASOPHILS NFR BLD AUTO: 0 % (ref 0–1)
BUN SERPL-MCNC: 21 MG/DL (ref 5–25)
CALCIUM SERPL-MCNC: 10.2 MG/DL (ref 8.4–10.2)
CHLORIDE SERPL-SCNC: 105 MMOL/L (ref 96–108)
CO2 SERPL-SCNC: 28 MMOL/L (ref 21–32)
CREAT SERPL-MCNC: 1.21 MG/DL (ref 0.6–1.3)
EOSINOPHIL # BLD AUTO: 0.17 THOUSAND/ΜL (ref 0–0.61)
EOSINOPHIL NFR BLD AUTO: 2 % (ref 0–6)
ERYTHROCYTE [DISTWIDTH] IN BLOOD BY AUTOMATED COUNT: 12.8 % (ref 11.6–15.1)
GLUCOSE SERPL-MCNC: 86 MG/DL (ref 65–140)
HCT VFR BLD AUTO: 47.3 % (ref 36.5–46.1)
HGB BLD-MCNC: 15.2 G/DL (ref 12–15.4)
IMM GRANULOCYTES # BLD AUTO: 0.03 THOUSAND/UL (ref 0–0.2)
IMM GRANULOCYTES NFR BLD AUTO: 0 % (ref 0–2)
LYMPHOCYTES # BLD AUTO: 3.61 THOUSANDS/ΜL (ref 0.6–4.47)
LYMPHOCYTES NFR BLD AUTO: 36 % (ref 14–44)
MCH RBC QN AUTO: 30.2 PG (ref 26.8–34.3)
MCHC RBC AUTO-ENTMCNC: 32.1 G/DL (ref 31.4–37.4)
MCV RBC AUTO: 94 FL (ref 82–98)
MONOCYTES # BLD AUTO: 0.63 THOUSAND/ΜL (ref 0.17–1.22)
MONOCYTES NFR BLD AUTO: 6 % (ref 4–12)
NEUTROPHILS # BLD AUTO: 5.68 THOUSANDS/ΜL (ref 1.85–7.62)
NEUTS SEG NFR BLD AUTO: 56 % (ref 43–75)
NRBC BLD AUTO-RTO: 0 /100 WBCS
PLATELET # BLD AUTO: 258 THOUSANDS/UL (ref 149–390)
PMV BLD AUTO: 11.8 FL (ref 8.9–12.7)
POTASSIUM SERPL-SCNC: 4.8 MMOL/L (ref 3.5–5.3)
RBC # BLD AUTO: 5.04 MILLION/UL (ref 3.88–5.12)
SODIUM SERPL-SCNC: 138 MMOL/L (ref 135–147)
WBC # BLD AUTO: 10.14 THOUSAND/UL (ref 4.31–10.16)

## 2024-10-24 PROCEDURE — 36415 COLL VENOUS BLD VENIPUNCTURE: CPT | Performed by: STUDENT IN AN ORGANIZED HEALTH CARE EDUCATION/TRAINING PROGRAM

## 2024-10-24 PROCEDURE — 85025 COMPLETE CBC W/AUTO DIFF WBC: CPT | Performed by: STUDENT IN AN ORGANIZED HEALTH CARE EDUCATION/TRAINING PROGRAM

## 2024-10-24 PROCEDURE — 99284 EMERGENCY DEPT VISIT MOD MDM: CPT

## 2024-10-24 PROCEDURE — 96372 THER/PROPH/DIAG INJ SC/IM: CPT

## 2024-10-24 PROCEDURE — 80048 BASIC METABOLIC PNL TOTAL CA: CPT | Performed by: STUDENT IN AN ORGANIZED HEALTH CARE EDUCATION/TRAINING PROGRAM

## 2024-10-24 PROCEDURE — 74176 CT ABD & PELVIS W/O CONTRAST: CPT

## 2024-10-24 PROCEDURE — 99284 EMERGENCY DEPT VISIT MOD MDM: CPT | Performed by: EMERGENCY MEDICINE

## 2024-10-24 RX ADMIN — POLYETHYLENE GLYCOL 3350, SODIUM SULFATE ANHYDROUS, SODIUM BICARBONATE, SODIUM CHLORIDE, POTASSIUM CHLORIDE 2000 ML: 236; 22.74; 6.74; 5.86; 2.97 POWDER, FOR SOLUTION ORAL at 18:35

## 2024-10-24 RX ADMIN — METHYLNALTREXONE BROMIDE 6 MG: 12 INJECTION, SOLUTION SUBCUTANEOUS at 20:04

## 2024-10-24 RX ADMIN — MINERAL OIL 1 ENEMA: 100 ENEMA RECTAL at 20:09

## 2024-10-24 NOTE — ED PROVIDER NOTES
Time reflects when diagnosis was documented in both MDM as applicable and the Disposition within this note       Time User Action Codes Description Comment    10/25/2024  1:22 AM Sae Gloria Add [K59.00] Constipation           ED Disposition       ED Disposition   Discharge    Condition   Stable    Date/Time   Fri Oct 25, 2024  2:54 AM    Comment   Barbra De Oliveira discharge to home/self care.                   Assessment & Plan       Medical Decision Making  Symptoms likely 2/2 chronic oipoid use, not on a consistent bowel regimen. Will order 1/2 prep of Golytely, Relistor, and mineral oil enema. BMP to assess for hypercalcemia as poss cause.     On reassessment, pt has had several bowel movements. No new complaints. Imaging results pending, see sign out note for final plans.     Amount and/or Complexity of Data Reviewed  Labs: ordered. Decision-making details documented in ED Course.  Radiology: ordered.    Risk  OTC drugs.  Prescription drug management.        ED Course as of 10/26/24 0936   Thu Oct 24, 2024   2032 Basic metabolic panel       Medications   polyethylene glycol (GOLYTELY) bowel prep 2,000 mL (2,000 mL Oral Given 10/24/24 1835)   mineral oil enema 1 enema (1 enema Rectal Given 10/24/24 2009)   methylnaltrexone (Relistor) subcutaneous injection 6 mg (6 mg Subcutaneous Given 10/24/24 2004)       ED Risk Strat Scores                                               History of Present Illness       Chief Complaint   Patient presents with    Constipation     Pt reports not having a bm for last 3 weeks. Tried Fleets enema 3 days ago and last night with no relief. Pt denies abdominal pain and n/v.       Past Medical History:   Diagnosis Date    Anxiety     Asthma     Atrial fibrillation (HCC)     Breast cancer (HCC) 1992    left    Chest pain, unspecified     CHF (congestive heart failure) (HCC)     Diabetes mellitus (HCC)     GERD (gastroesophageal reflux disease)     History of radiation exposure 1992     Hyperlipidemia     Hypertension     Irregular heart beat     Afib    Migraine     Obesity     Pericardial effusion     Pericarditis       Past Surgical History:   Procedure Laterality Date    ABDOMINAL ADHESION SURGERY      APPENDECTOMY      AUGMENTATION MAMMAPLASTY Left 1994    BACK SURGERY      BREAST LUMPECTOMY Left 1992    malignant    BREAST LUMPECTOMY Left 11/17/2020    Procedure: BREAST ULTRASOUND DIRECTED LUMPECTOMY (ULTRASOUND AT 1200);  Surgeon: Earl Em MD;  [facility];  Service: Surgical Oncology    BREAST SURGERY      CARDIAC SURGERY      Pericardial window    CHOLECYSTECTOMY      EYE SURGERY      FLAP LOCAL TRUNK Left 11/17/2020    Procedure: FLAP LOCAL TRUNK;  Surgeon: Ronnell Chiu MD;  [facility];  Service: Plastics    HYSTERECTOMY      IR DRAINAGE TUBE CHECK WITH SCLEROSIS  04/23/2021    IR DRAINAGE TUBE CHECK WITH SCLEROSIS  06/03/2021    IR DRAINAGE TUBE CHECK WITH SCLEROSIS  06/17/2021    IR DRAINAGE TUBE CHECK WITH SCLEROSIS  06/28/2021    IR DRAINAGE TUBE CHECK WITH SCLEROSIS  07/07/2021    IR DRAINAGE TUBE CHECK WITH SCLEROSIS  07/27/2021    IR DRAINAGE TUBE PLACEMENT  04/01/2021    IR DRAINAGE TUBE PLACEMENT WITH SCLEROSIS  05/14/2021    LYMPH NODE DISSECTION Left 11/17/2020    Procedure: HUGO  DIRECTED AXILLARY DISSECTION;  Surgeon: Earl Em MD;  [facility];  Service: Surgical Oncology    MASTECTOMY Left 1994    malignant    NJ CELESTE-IMPLANT CAPSULECTOMY BREAST COMPLETE Left 11/17/2020    Procedure: BREAST CAPSULECTOMY;  Surgeon: Ronnell Chiu MD;  [facility];  Service: Plastics    NJ REMOVAL INTACT BREAST IMPLANT Left 11/17/2020    Procedure: BREAST IMPLANT REMOVAL;  Surgeon: Ronnell Chiu MD;  [facility];  Service: Plastics    US BREAST NEEDLE LOC LEFT Left 11/02/2020    US GUIDED BREAST BIOPSY LEFT COMPLETE Left 08/31/2020    US GUIDED BREAST LYMPH NODE BIOPSY LEFT Left 08/31/2020    WRIST SURGERY        Family History   Problem  Relation Age of Onset    Colon cancer Mother 55    Breast cancer Mother         Early 50's    Stroke Father     Emphysema Father     Leukemia Sister     Breast cancer Sister 65    ALS Brother       Social History     Tobacco Use    Smoking status: Never    Smokeless tobacco: Never   Vaping Use    Vaping status: Never Used   Substance Use Topics    Alcohol use: Yes     Comment: social    Drug use: No      E-Cigarette/Vaping    E-Cigarette Use Never User       E-Cigarette/Vaping Substances    Nicotine No     THC No     CBD No     Flavoring No     Other No     Unknown No       I have reviewed and agree with the history as documented.     89 yo F presents for evaluation of constipation. Pt states her last BM was ~ 3 weeks ago. She has tried colace intermittently, 2 fleet enemas, and started taking Movantik. Pt tried to manually disimpact herself without feeling stool.  She has not been on a regular bowel regimen. Pt tolerating PO intake w/o N/V, no excessive belching. Pt denies increased abdominal distention, unintentional weight loss/gain, abdominal pain, blood per rectum, chest pain, fevers/chills, or SOB. Pt is passing flatus.         Review of Systems   Gastrointestinal:  Positive for constipation.           Objective       ED Triage Vitals [10/24/24 1726]   Temperature Pulse Blood Pressure Respirations SpO2 Patient Position - Orthostatic VS   (!) 97.4 °F (36.3 °C) 68 (!) 183/76 18 96 % Sitting      Temp Source Heart Rate Source BP Location FiO2 (%) Pain Score    Temporal Monitor Right arm -- --      Vitals      Date and Time Temp Pulse SpO2 Resp BP Pain Score FACES Pain Rating User   10/25/24 0115 -- 68 98 % 16 155/108 -- -- MS   10/24/24 2215 -- 72 97 % 18 169/118 -- -- MS   10/24/24 1945 -- 64 98 % 18 219/98 -- -- BL   10/24/24 1726 97.4 °F (36.3 °C) 68 96 % 18 183/76 -- -- BL            Physical Exam  Vitals reviewed.   Constitutional:       General: She is not in acute distress.     Appearance: Normal  appearance. She is normal weight.   HENT:      Head: Normocephalic and atraumatic.      Right Ear: External ear normal.      Left Ear: External ear normal.      Nose: Nose normal.      Mouth/Throat:      Mouth: Mucous membranes are moist.      Pharynx: Oropharynx is clear.   Eyes:      Conjunctiva/sclera: Conjunctivae normal.      Pupils: Pupils are equal, round, and reactive to light.   Cardiovascular:      Rate and Rhythm: Normal rate and regular rhythm.      Pulses: Normal pulses.      Heart sounds: Normal heart sounds.   Pulmonary:      Effort: Pulmonary effort is normal.      Breath sounds: Normal breath sounds.   Abdominal:      General: There is no distension.      Palpations: Abdomen is soft. There is no mass.      Tenderness: There is no abdominal tenderness. There is no guarding or rebound.   Musculoskeletal:         General: Normal range of motion.      Cervical back: Normal range of motion.   Skin:     General: Skin is warm and dry.      Capillary Refill: Capillary refill takes less than 2 seconds.   Neurological:      General: No focal deficit present.      Mental Status: She is alert and oriented to person, place, and time.         Results Reviewed       Procedure Component Value Units Date/Time    Basic metabolic panel [[id number]] Collected: 10/24/24 1842    Lab Status: Final result Specimen: Blood from Arm, Right Updated: 10/24/24 1914     Sodium 138 mmol/L      Potassium 4.8 mmol/L      Chloride 105 mmol/L      CO2 28 mmol/L      ANION GAP 5 mmol/L      BUN 21 mg/dL      Creatinine 1.21 mg/dL      Glucose 86 mg/dL      Calcium 10.2 mg/dL      eGFR --    Narrative:      Notes:     1. eGFR calculation is only valid for adults 18 years and older.  2. EGFR calculation cannot be performed for patients who are transgender, non-binary, or whose legal sex, sex at birth, and gender identity differ.    CBC and differential [[id number]]  (Abnormal) Collected: 10/24/24 1842    Lab Status: Final result Specimen:  Blood from Arm, Right Updated: 10/24/24 1853     WBC 10.14 Thousand/uL      RBC 5.04 Million/uL      Hemoglobin 15.2 g/dL      Hematocrit 47.3 %      MCV 94 fL      MCH 30.2 pg      MCHC 32.1 g/dL      RDW 12.8 %      MPV 11.8 fL      Platelets 258 Thousands/uL      nRBC 0 /100 WBCs      Segmented % 56 %      Immature Grans % 0 %      Lymphocytes % 36 %      Monocytes % 6 %      Eosinophils Relative 2 %      Basophils Relative 0 %      Absolute Neutrophils 5.68 Thousands/µL      Absolute Immature Grans 0.03 Thousand/uL      Absolute Lymphocytes 3.61 Thousands/µL      Absolute Monocytes 0.63 Thousand/µL      Eosinophils Absolute 0.17 Thousand/µL      Basophils Absolute 0.02 Thousands/µL             CT abdomen pelvis wo contrast   Final Interpretation by Ariel Desir MD (10/25 0840)      Large volume of colonic stool suggesting constipation. No acute intra-abdominal abnormality.      Workstation performed: [id number]             Procedures    ED Medication and Procedure Management   Prior to Admission Medications   Prescriptions Last Dose Informant Patient Reported? Taking?   Beclomethasone Dipropionate (QVAR IN)  Self Yes No   Sig: Inhale 2 puffs if needed   Patient not taking: Reported on 8/13/2024   Droplet Pen Needles 32G X 4 MM MISC  Self Yes No   EPINEPHrine (EPIPEN) 0.3 mg/0.3 mL SOAJ  Self Yes No   Sig: Inject 0.3 mL as directed   FLOVENT  MCG/ACT inhaler  Self Yes No   Sig: INHALE 2 PUFF BY INHALATION ROUTE 2 TIMES EVERY DAY   Flowflex COVID-19 Ag Home Test KIT  Self Yes No   Sig: Use as directed   Patient not taking: Reported on 3/20/2024   Insulin Glargine (TOUJEO) 300 units/mL CONCETRATED U-300 injection pen  Self Yes No   Sig: Toujeo SoloStar U-300 Insulin 300 unit/mL (1.5 mL) subcutaneous pen   INJECT 67 UNITS BY SUBCUTANEOUS ROUTE AS PER INSULIN PROTOCOL   Lidocaine 5 % CREA  Self No No   Sig: Apply 1 application topically as needed (pain)   Patient not taking: Reported on 1/31/2024   NON  FORMULARY  Self Yes No   Sig: Domperidone 20 mg TID   Patient not taking: Reported on 6/10/2024   NOVOFINE 32G X 6 MM MISC  Self Yes No   Si (two) times a day As directed   Patient not taking: Reported on 2024   Naloxegol Oxalate (Movantik) 12.5 MG TABS  Self Yes No   Sig: Take by mouth   ONE TOUCH ULTRA TEST test strip  Self Yes No   Sig: TEST TWICE DAILY OR AS DIRECTED DX: E11.9   ONETOUCH DELICA LANCETS 33G MISC  Self Yes No   Sig: TEST TWICE DAILY OR AS DIRECTED   Sodium Fluoride 5000 PPM 1.1 % GEL  Self Yes No   Patient not taking: Reported on 6/10/2024   Toujeo SoloStar 300 units/mL CONCENTRATED U-300 injection pen (1-unit dial)  Self Yes No   Sig: INJECT 72 UNITS BY SUBCUTANEOUS ROUTE AS PER INSULIN PROTOCOL   VENTOLIN  (90 Base) MCG/ACT inhaler  Self Yes No   Sig:     VITAMIN K PO  Self Yes No   Sig: Take by mouth   anastrozole (ARIMIDEX) 1 mg tablet   No No   Sig: Take 1 tablet (1 mg total) by mouth daily   azithromycin (ZITHROMAX) 250 mg tablet  Self Yes No   Patient not taking: Reported on 2024   cephalexin (KEFLEX) 500 mg capsule   No No   Sig: Take 1 capsule (500 mg total) by mouth every 8 (eight) hours for 7 days   glucose blood test strip  Self Yes No   Sig: TEST TWICE DAILY OR AS DIRECTED DX: E11.9   Patient not taking: Reported on 2024   hydrocodone-chlorpheniramine polistirex (TUSSIONEX) 10-8 mg/5 mL ER suspension  Self Yes No   Sig: Take 5 mL by mouth every 12 (twelve) hours   Patient not taking: Reported on 2023   hydrocortisone 2.5 % cream  Self Yes No   Sig: APPLY TO THE AFFECTED AREA TWICE DAILY. (MIX WITH NYSTATIN)   insulin aspart (NovoLOG) 100 Units/mL injection pen  Self Yes No   Sig: Novolog Flexpen U-100 Insulin aspart 100 unit/mL (3 mL) subcutaneous   lansoprazole (PREVACID) 30 mg capsule  Self Yes No   Sig: lansoprazole 30 mg capsule,delayed release   lidocaine-prilocaine (EMLA) cream  Self Yes No   Sig: APPLY FOR 12 HOURS, AND THEN OFF 12 HOURS AS NEEDED    Patient not taking: Reported on 4/25/2023   metoprolol tartrate (LOPRESSOR) 50 mg tablet  Self Yes No   Sig: Take 50 mg by mouth every 12 (twelve) hours    morphine (MS CONTIN) 15 mg 12 hr tablet  Self Yes No   Sig: Take 15 mg by mouth every 12 (twelve) hours as needed for severe pain   morphine (MSIR) 15 mg tablet  Self Yes No   Sig: Take 15 mg by mouth every 8 (eight) hours as needed for severe pain.   naloxone (NARCAN) 2 MG/2ML injection  Self Yes No   Sig: Inject 2 mL into a catheter in a vein   nitrofurantoin (MACROBID) 100 mg capsule  Self Yes No   Patient not taking: Reported on 8/7/2024   nystatin (MYCOSTATIN) cream  Self Yes No   Sig: APPLY TO AFFECTED AREAS TWICE A DAY. (MIX WITH HYDROCORTISONE)   nystatin (MYCOSTATIN) powder  Self Yes No   Sig: APPLY TO THE RASH IN THE GROIN ONCE DAILY   oxybutynin (DITROPAN XL) 15 MG 24 hr tablet  Self Yes No   Sig: Take 15 mg by mouth daily.   simvastatin (ZOCOR) 20 mg tablet  Self Yes No   Sig: Take 20 mg by mouth daily at bedtime.   sodium chloride, PF, 0.9 %  Self No No   Sig: 10 mL by Intracatheter route daily Intracatheter flushing daily   Patient not taking: Reported on 7/25/2023   solifenacin (VESICARE) 10 MG tablet  Self No No   Sig: Take 1 tablet (10 mg total) by mouth daily   tobramycin-dexamethasone (TOBRADEX) ophthalmic suspension  Self Yes No   Sig: INSTILL 1 DROP IN THE RIGHT EYE 4 TIMES DAILY   Patient not taking: Reported on 6/10/2024   triamcinolone (KENALOG) 0.1 % cream  Self Yes No   Sig:     Patient not taking: Reported on 4/25/2023   zolpidem (AMBIEN) 10 mg tablet  Self Yes No   Sig: Take by mouth Daily      Facility-Administered Medications: None     Discharge Medication List as of 10/25/2024  2:54 AM        CONTINUE these medications which have NOT CHANGED    Details   anastrozole (ARIMIDEX) 1 mg tablet Take 1 tablet (1 mg total) by mouth daily, Starting Tue 8/13/2024, Normal      azithromycin (ZITHROMAX) 250 mg tablet Starting Wed 12/7/2022,  Historical Med      Beclomethasone Dipropionate (QVAR IN) Inhale 2 puffs if needed, Historical Med      cephalexin (KEFLEX) 500 mg capsule Take 1 capsule (500 mg total) by mouth every 8 (eight) hours for 7 days, Starting Mon 10/21/2024, Until Mon 10/28/2024, Normal      !! Droplet Pen Needles 32G X 4 MM MISC Historical Med      EPINEPHrine (EPIPEN) 0.3 mg/0.3 mL SOAJ Inject 0.3 mL as directed, Historical Med      FLOVENT  MCG/ACT inhaler INHALE 2 PUFF BY INHALATION ROUTE 2 TIMES EVERY DAY, Historical Med      Flowflex COVID-19 Ag Home Test KIT Use as directed, Starting Sun 3/5/2023, Historical Med      !! glucose blood test strip TEST TWICE DAILY OR AS DIRECTED DX: E11.9, Historical Med      hydrocodone-chlorpheniramine polistirex (TUSSIONEX) 10-8 mg/5 mL ER suspension Take 5 mL by mouth every 12 (twelve) hours, Starting Wed 5/12/2021, Historical Med      hydrocortisone 2.5 % cream APPLY TO THE AFFECTED AREA TWICE DAILY. (MIX WITH NYSTATIN), Historical Med      insulin aspart (NovoLOG) 100 Units/mL injection pen Novolog Flexpen U-100 Insulin aspart 100 unit/mL (3 mL) subcutaneous, Historical Med      Insulin Glargine (TOUJEO) 300 units/mL CONCETRATED U-300 injection pen Toujeo SoloStar U-300 Insulin 300 unit/mL (1.5 mL) subcutaneous pen   INJECT 67 UNITS BY SUBCUTANEOUS ROUTE AS PER INSULIN PROTOCOL, Historical Med      lansoprazole (PREVACID) 30 mg capsule lansoprazole 30 mg capsule,delayed release, Historical Med      Lidocaine 5 % CREA Apply 1 application topically as needed (pain), Starting Tue 9/25/2018, Normal      lidocaine-prilocaine (EMLA) cream APPLY FOR 12 HOURS, AND THEN OFF 12 HOURS AS NEEDED, Historical Med      metoprolol tartrate (LOPRESSOR) 50 mg tablet Take 50 mg by mouth every 12 (twelve) hours , Historical Med      morphine (MS CONTIN) 15 mg 12 hr tablet Take 15 mg by mouth every 12 (twelve) hours as needed for severe pain, Historical Med      morphine (MSIR) 15 mg tablet Take 15 mg by  mouth every 8 (eight) hours as needed for severe pain., Historical Med      Naloxegol Oxalate (Movantik) 12.5 MG TABS Take by mouth, Starting Thu 3/9/2023, Historical Med      naloxone (NARCAN) 2 MG/2ML injection Inject 2 mL into a catheter in a vein, Starting Fri 9/8/2023, Historical Med      nitrofurantoin (MACROBID) 100 mg capsule Historical Med      NON FORMULARY Domperidone 20 mg TID, Historical Med      !! NOVOFINE 32G X 6 MM MISC 2 (two) times a day As directed, Starting Mon 8/13/2018, Historical Med      nystatin (MYCOSTATIN) cream APPLY TO AFFECTED AREAS TWICE A DAY. (MIX WITH HYDROCORTISONE), Historical Med      nystatin (MYCOSTATIN) powder APPLY TO THE RASH IN THE GROIN ONCE DAILY, Historical Med      !! ONE TOUCH ULTRA TEST test strip TEST TWICE DAILY OR AS DIRECTED DX: E11.9, Historical Med      ONETOUCH DELICA LANCETS 33G MISC TEST TWICE DAILY OR AS DIRECTED, Historical Med      oxybutynin (DITROPAN XL) 15 MG 24 hr tablet Take 15 mg by mouth daily., Historical Med      simvastatin (ZOCOR) 20 mg tablet Take 20 mg by mouth daily at bedtime., Historical Med      sodium chloride, PF, 0.9 % 10 mL by Intracatheter route daily Intracatheter flushing daily, Starting Fri 5/14/2021, Print      Sodium Fluoride 5000 PPM 1.1 % GEL Historical Med      solifenacin (VESICARE) 10 MG tablet Take 1 tablet (10 mg total) by mouth daily, Starting Fri 1/6/2023, Normal      tobramycin-dexamethasone (TOBRADEX) ophthalmic suspension INSTILL 1 DROP IN THE RIGHT EYE 4 TIMES DAILY, Historical Med      Toujeo SoloStar 300 units/mL CONCENTRATED U-300 injection pen (1-unit dial) INJECT 72 UNITS BY SUBCUTANEOUS ROUTE AS PER INSULIN PROTOCOL, Historical Med      triamcinolone (KENALOG) 0.1 % cream  , Starting Tue 5/7/2019, Historical Med      VENTOLIN  (90 Base) MCG/ACT inhaler  , Starting Mon 2/26/2018, Historical Med      VITAMIN K PO Take by mouth, Historical Med      zolpidem (AMBIEN) 10 mg tablet Take by mouth Daily,  Starting Mon 10/31/2022, Historical Med       !! - Potential duplicate medications found. Please discuss with provider.        No discharge procedures on file.  ED SEPSIS DOCUMENTATION   Time reflects when diagnosis was documented in both MDM as applicable and the Disposition within this note       Time User Action Codes Description Comment    10/25/2024  1:22 AM Sae Gloria Add [K59.00] Constipation                  Sae Gloria MD  10/26/24 0937

## 2024-10-24 NOTE — ED ATTENDING ATTESTATION
10/24/2024  IChepe MD, saw and evaluated the patient. I have discussed the patient with the resident/non-physician practitioner and agree with the resident's/non-physician practitioner's findings, Plan of Care, and MDM as documented in the resident's/non-physician practitioner's note, except where noted. All available labs and Radiology studies were reviewed.  I was present for key portions of any procedure(s) performed by the resident/non-physician practitioner and I was immediately available to provide assistance.       At this point I agree with the current assessment done in the Emergency Department.  I have conducted an independent evaluation of this patient a history and physical is as follows:    ED Course  ED Course as of 10/24/24 1849   Thu Oct 24, 2024   1814 Per resident h&p 89 YO presents for constipation; has taken colace intermittently;  tried a suppository this am; takes MSO4 for back pain chronically; no abdominal pain no distension no vomiting NL appetite; I/P constipation     Emergency Department Note- Barbra De Oliveira 89 y.o. adult MRN: [id number]    Unit/Bed#: ED 19 Encounter: [id number]    Barbra De Oliveira is a 89 y.o. adult who presents with   Chief Complaint   Patient presents with    Constipation     Pt reports not having a bm for last 3 weeks. Tried Fleets enema 3 days ago and last night with no relief. Pt denies abdominal pain and n/v.         History of Present Illness   HPI:  Barbra De Oliveira is a 89 y.o. adult who presents for evaluation of:  Constipation over the last 3 weeks.  Patient self administered a fleets enema this morning without any result.  Patient also attempted to manually disimpact herself yesterday without any resulting stool.  Patient notes that she has taken Colace several times without resultant bowel movement.  She denies associated nausea, vomiting, abdominal pain, loss of appetite, flank pain, and abdominal distention.  Patient takes opioid pain  medication to treat her chronic back pain.    Review of Systems   Constitutional:  Negative for fatigue and fever.   HENT:  Negative for congestion and sore throat.    Respiratory:  Negative for cough and shortness of breath.    Cardiovascular:  Negative for chest pain and palpitations.   Gastrointestinal:  Positive for constipation. Negative for abdominal pain, diarrhea, nausea, rectal pain and vomiting.   Genitourinary:  Negative for flank pain and frequency.   Neurological:  Negative for light-headedness and headaches.   Psychiatric/Behavioral:  Negative for dysphoric mood and hallucinations.    All other systems reviewed and are negative.      Historical Information   Past Medical History:   Diagnosis Date    Anxiety     Asthma     Atrial fibrillation (HCC)     Breast cancer (HCC) 1992    left    Chest pain, unspecified     CHF (congestive heart failure) (HCC)     Diabetes mellitus (HCC)     GERD (gastroesophageal reflux disease)     History of radiation exposure 1992    Hyperlipidemia     Hypertension     Irregular heart beat     Afib    Migraine     Obesity     Pericardial effusion     Pericarditis      Past Surgical History:   Procedure Laterality Date    ABDOMINAL ADHESION SURGERY      APPENDECTOMY      AUGMENTATION MAMMAPLASTY Left 1994    BACK SURGERY      BREAST LUMPECTOMY Left 1992    malignant    BREAST LUMPECTOMY Left 11/17/2020    Procedure: BREAST ULTRASOUND DIRECTED LUMPECTOMY (ULTRASOUND AT 1200);  Surgeon: Earl Em MD;  [facility];  Service: Surgical Oncology    BREAST SURGERY      CARDIAC SURGERY      Pericardial window    CHOLECYSTECTOMY      EYE SURGERY      FLAP LOCAL TRUNK Left 11/17/2020    Procedure: FLAP LOCAL TRUNK;  Surgeon: Ronnell Chiu MD;  [facility];  Service: Plastics    HYSTERECTOMY      IR DRAINAGE TUBE CHECK WITH SCLEROSIS  04/23/2021    IR DRAINAGE TUBE CHECK WITH SCLEROSIS  06/03/2021    IR DRAINAGE TUBE CHECK WITH SCLEROSIS  06/17/2021    IR DRAINAGE TUBE  CHECK WITH SCLEROSIS  06/28/2021    IR DRAINAGE TUBE CHECK WITH SCLEROSIS  07/07/2021    IR DRAINAGE TUBE CHECK WITH SCLEROSIS  07/27/2021    IR DRAINAGE TUBE PLACEMENT  04/01/2021    IR DRAINAGE TUBE PLACEMENT WITH SCLEROSIS  05/14/2021    LYMPH NODE DISSECTION Left 11/17/2020    Procedure: HUGO  DIRECTED AXILLARY DISSECTION;  Surgeon: Earl Em MD;  [facility];  Service: Surgical Oncology    MASTECTOMY Left 1994    malignant    VT CELESTE-IMPLANT CAPSULECTOMY BREAST COMPLETE Left 11/17/2020    Procedure: BREAST CAPSULECTOMY;  Surgeon: Ronnell Chiu MD;  [facility];  Service: Plastics    VT REMOVAL INTACT BREAST IMPLANT Left 11/17/2020    Procedure: BREAST IMPLANT REMOVAL;  Surgeon: Ronnell Chiu MD;  [facility];  Service: Plastics    US BREAST NEEDLE LOC LEFT Left 11/02/2020    US GUIDED BREAST BIOPSY LEFT COMPLETE Left 08/31/2020    US GUIDED BREAST LYMPH NODE BIOPSY LEFT Left 08/31/2020    WRIST SURGERY       Social History   Social History     Substance and Sexual Activity   Alcohol Use Yes    Comment: social     Social History     Substance and Sexual Activity   Drug Use No     Social History     Tobacco Use   Smoking Status Never   Smokeless Tobacco Never     Family History:   Family History   Problem Relation Age of Onset    Colon cancer Mother 55    Breast cancer Mother         Early 50's    Stroke Father     Emphysema Father     Leukemia Sister     Breast cancer Sister 65    ALS Brother        Meds/Allergies   PTA meds:   Prior to Admission Medications   Prescriptions Last Dose Informant Patient Reported? Taking?   Beclomethasone Dipropionate (QVAR IN)  Self Yes No   Sig: Inhale 2 puffs if needed   Patient not taking: Reported on 8/13/2024   Droplet Pen Needles 32G X 4 MM MISC  Self Yes No   EPINEPHrine (EPIPEN) 0.3 mg/0.3 mL SOAJ  Self Yes No   Sig: Inject 0.3 mL as directed   FLOVENT  MCG/ACT inhaler  Self Yes No   Sig: INHALE 2 PUFF BY INHALATION ROUTE 2 TIMES EVERY DAY    Flowflex COVID-19 Ag Home Test KIT  Self Yes No   Sig: Use as directed   Patient not taking: Reported on 3/20/2024   Insulin Glargine (TOUJEO) 300 units/mL CONCETRATED U-300 injection pen  Self Yes No   Sig: Toujeo SoloStar U-300 Insulin 300 unit/mL (1.5 mL) subcutaneous pen   INJECT 67 UNITS BY SUBCUTANEOUS ROUTE AS PER INSULIN PROTOCOL   Lidocaine 5 % CREA  Self No No   Sig: Apply 1 application topically as needed (pain)   Patient not taking: Reported on 2024   NON FORMULARY  Self Yes No   Sig: Domperidone 20 mg TID   Patient not taking: Reported on 6/10/2024   NOVOFINE 32G X 6 MM MISC  Self Yes No   Si (two) times a day As directed   Patient not taking: Reported on 2024   Naloxegol Oxalate (Movantik) 12.5 MG TABS  Self Yes No   Sig: Take by mouth   ONE TOUCH ULTRA TEST test strip  Self Yes No   Sig: TEST TWICE DAILY OR AS DIRECTED DX: E11.9   ONETOUCH DELICA LANCETS 33G MISC  Self Yes No   Sig: TEST TWICE DAILY OR AS DIRECTED   Sodium Fluoride 5000 PPM 1.1 % GEL  Self Yes No   Patient not taking: Reported on 6/10/2024   Toujeo SoloStar 300 units/mL CONCENTRATED U-300 injection pen (1-unit dial)  Self Yes No   Sig: INJECT 72 UNITS BY SUBCUTANEOUS ROUTE AS PER INSULIN PROTOCOL   VENTOLIN  (90 Base) MCG/ACT inhaler  Self Yes No   Sig:     VITAMIN K PO  Self Yes No   Sig: Take by mouth   anastrozole (ARIMIDEX) 1 mg tablet   No No   Sig: Take 1 tablet (1 mg total) by mouth daily   azithromycin (ZITHROMAX) 250 mg tablet  Self Yes No   Patient not taking: Reported on 2024   cephalexin (KEFLEX) 500 mg capsule   No No   Sig: Take 1 capsule (500 mg total) by mouth every 8 (eight) hours for 7 days   glucose blood test strip  Self Yes No   Sig: TEST TWICE DAILY OR AS DIRECTED DX: E11.9   Patient not taking: Reported on 2024   hydrocodone-chlorpheniramine polistirex (TUSSIONEX) 10-8 mg/5 mL ER suspension  Self Yes No   Sig: Take 5 mL by mouth every 12 (twelve) hours   Patient not taking:  Reported on 4/25/2023   hydrocortisone 2.5 % cream  Self Yes No   Sig: APPLY TO THE AFFECTED AREA TWICE DAILY. (MIX WITH NYSTATIN)   insulin aspart (NovoLOG) 100 Units/mL injection pen  Self Yes No   Sig: Novolog Flexpen U-100 Insulin aspart 100 unit/mL (3 mL) subcutaneous   lansoprazole (PREVACID) 30 mg capsule  Self Yes No   Sig: lansoprazole 30 mg capsule,delayed release   lidocaine-prilocaine (EMLA) cream  Self Yes No   Sig: APPLY FOR 12 HOURS, AND THEN OFF 12 HOURS AS NEEDED   Patient not taking: Reported on 4/25/2023   metoprolol tartrate (LOPRESSOR) 50 mg tablet  Self Yes No   Sig: Take 50 mg by mouth every 12 (twelve) hours    morphine (MS CONTIN) 15 mg 12 hr tablet  Self Yes No   Sig: Take 15 mg by mouth every 12 (twelve) hours as needed for severe pain   morphine (MSIR) 15 mg tablet  Self Yes No   Sig: Take 15 mg by mouth every 8 (eight) hours as needed for severe pain.   naloxone (NARCAN) 2 MG/2ML injection  Self Yes No   Sig: Inject 2 mL into a catheter in a vein   nitrofurantoin (MACROBID) 100 mg capsule  Self Yes No   Patient not taking: Reported on 8/7/2024   nystatin (MYCOSTATIN) cream  Self Yes No   Sig: APPLY TO AFFECTED AREAS TWICE A DAY. (MIX WITH HYDROCORTISONE)   nystatin (MYCOSTATIN) powder  Self Yes No   Sig: APPLY TO THE RASH IN THE GROIN ONCE DAILY   oxybutynin (DITROPAN XL) 15 MG 24 hr tablet  Self Yes No   Sig: Take 15 mg by mouth daily.   simvastatin (ZOCOR) 20 mg tablet  Self Yes No   Sig: Take 20 mg by mouth daily at bedtime.   sodium chloride, PF, 0.9 %  Self No No   Sig: 10 mL by Intracatheter route daily Intracatheter flushing daily   Patient not taking: Reported on 7/25/2023   solifenacin (VESICARE) 10 MG tablet  Self No No   Sig: Take 1 tablet (10 mg total) by mouth daily   tobramycin-dexamethasone (TOBRADEX) ophthalmic suspension  Self Yes No   Sig: INSTILL 1 DROP IN THE RIGHT EYE 4 TIMES DAILY   Patient not taking: Reported on 6/10/2024   triamcinolone (KENALOG) 0.1 % cream   Self Yes No   Sig:     Patient not taking: Reported on 4/25/2023   zolpidem (AMBIEN) 10 mg tablet  Self Yes No   Sig: Take by mouth Daily      Facility-Administered Medications: None     Allergies   Allergen Reactions    Iodinated Contrast Media Anaphylaxis    Iodine - Food Allergy Anaphylaxis and Other (See Comments)    Povidone Iodine Anaphylaxis    Aspirin GI Bleeding and Other (See Comments)    Caffeine - Food Allergy Palpitations       Objective   First Vitals:   Blood Pressure: (!) 183/76 (10/24/24 1726)  Pulse: 68 (10/24/24 1726)  Temperature: (!) 97.4 °F (36.3 °C) (10/24/24 1726)  Temp Source: Temporal (10/24/24 1726)  Respirations: 18 (10/24/24 1726)  SpO2: 96 % (10/24/24 1726)    Current Vitals:   Blood Pressure: (!) 183/76 (10/24/24 1726)  Pulse: 68 (10/24/24 1726)  Temperature: (!) 97.4 °F (36.3 °C) (10/24/24 1726)  Temp Source: Temporal (10/24/24 1726)  Respirations: 18 (10/24/24 1726)  SpO2: 96 % (10/24/24 1726)    No intake or output data in the 24 hours ending 10/24/24 1849    Invasive Devices       Drain  Duration             Closed/Suction Drain Left Other (Comment) 15 Fr. 1437 days                    Physical Exam  Vitals and nursing note reviewed.   Constitutional:       General: She is not in acute distress.     Appearance: Normal appearance. She is well-developed.   HENT:      Head: Normocephalic and atraumatic.      Right Ear: External ear normal.      Left Ear: External ear normal.      Nose: Nose normal.      Mouth/Throat:      Pharynx: No oropharyngeal exudate.   Eyes:      Conjunctiva/sclera: Conjunctivae normal.      Pupils: Pupils are equal, round, and reactive to light.   Cardiovascular:      Rate and Rhythm: Normal rate and regular rhythm.   Pulmonary:      Effort: Pulmonary effort is normal. No respiratory distress.   Abdominal:      General: Abdomen is flat. There is no distension.      Palpations: Abdomen is soft.   Musculoskeletal:         General: No deformity. Normal range of  "motion.      Cervical back: Normal range of motion and neck supple.   Skin:     General: Skin is warm and dry.      Capillary Refill: Capillary refill takes less than 2 seconds.   Neurological:      General: No focal deficit present.      Mental Status: She is alert and oriented to person, place, and time. Mental status is at baseline.      Coordination: Coordination normal.   Psychiatric:         Mood and Affect: Mood normal.         Behavior: Behavior normal.         Thought Content: Thought content normal.         Judgment: Judgment normal.           Medical Decision Makin.  Constipation: Plan to administer polyethylene glycol, methylnaltrexone, and an enema to treat the constipation in the ED.  Plan to check electrolytes and a BMP to rule out hypercalcemia and other electrolyte disturbances as a cause of constipation.    No results found for this or any previous visit (from the past 36 hour(s)).  No orders to display         Portions of the record may have been created with voice recognition software. Occasional wrong word or \"sound a like\" substitutions may have occurred due to the inherent limitations of voice recognition software.  Read the chart carefully and recognize, using context, where substitutions have occurred.        Critical Care Time  Procedures      "

## 2024-10-25 VITALS
SYSTOLIC BLOOD PRESSURE: 155 MMHG | DIASTOLIC BLOOD PRESSURE: 108 MMHG | RESPIRATION RATE: 16 BRPM | HEART RATE: 68 BPM | OXYGEN SATURATION: 98 % | TEMPERATURE: 97.4 F

## 2024-10-25 NOTE — DISCHARGE INSTRUCTIONS
Please schedule appointment with your PCP for follow-up    Recommend you take MiraLAX twice daily while you are opioids for pain.  If your bowel movements are consistently loose, you can decrease MiraLAX to once daily.  Drink plenty of fluids and eat a high-fiber diet    Call your doctor or return to the emergency department if you experience severe abdominal pain, persistent vomiting, severe abdominal distention, stop passing gas from below, or any other concerning symptoms

## 2024-11-06 ENCOUNTER — TELEPHONE (OUTPATIENT)
Dept: OBGYN CLINIC | Facility: OTHER | Age: 89
End: 2024-11-06

## 2024-11-06 NOTE — TELEPHONE ENCOUNTER
I spoke with patient and she is traveling and will call us once she is back to schedule for another fitting for her last 3 bras till the end of the year.

## 2024-12-10 ENCOUNTER — TELEPHONE (OUTPATIENT)
Dept: OBGYN CLINIC | Facility: OTHER | Age: 89
End: 2024-12-10

## 2024-12-10 NOTE — TELEPHONE ENCOUNTER
I called patient and left message to see if she is ready to order those last 3 bras till the end of the year. Call back # [phone].

## 2024-12-12 ENCOUNTER — TELEPHONE (OUTPATIENT)
Dept: OBGYN CLINIC | Facility: OTHER | Age: 89
End: 2024-12-12

## 2024-12-12 NOTE — TELEPHONE ENCOUNTER
Rescheduled today's appointment at the [facility] [place] to 12/17/24 @ 2:00 PM at our [facility]

## 2024-12-17 ENCOUNTER — CLINICAL SUPPORT (OUTPATIENT)
Dept: OBGYN CLINIC | Facility: OTHER | Age: 89
End: 2024-12-17

## 2024-12-17 DIAGNOSIS — Z17.0 MALIGNANT NEOPLASM OF UPPER-OUTER QUADRANT OF LEFT BREAST IN FEMALE, ESTROGEN RECEPTOR POSITIVE (HCC): Primary | ICD-10-CM

## 2024-12-17 DIAGNOSIS — C50.412 MALIGNANT NEOPLASM OF UPPER-OUTER QUADRANT OF LEFT BREAST IN FEMALE, ESTROGEN RECEPTOR POSITIVE (HCC): Primary | ICD-10-CM

## 2024-12-30 ENCOUNTER — TELEPHONE (OUTPATIENT)
Dept: CARDIOLOGY CLINIC | Facility: CLINIC | Age: 89
End: 2024-12-30

## 2024-12-30 NOTE — TELEPHONE ENCOUNTER
I called to reschedule pt's appt from today. She is not feeling well and I will call back around 3 per 's request.    R/S today's appt for 1/7/25

## 2025-01-01 ENCOUNTER — HOME CARE VISIT (OUTPATIENT)
Dept: HOME HEALTH SERVICES | Facility: HOME HEALTHCARE | Age: 89
End: 2025-01-01
Payer: MEDICARE

## 2025-01-01 ENCOUNTER — HOME CARE VISIT (OUTPATIENT)
Dept: HOME HOSPICE | Facility: HOSPICE | Age: 89
End: 2025-01-01
Payer: MEDICARE

## 2025-01-01 ENCOUNTER — HOSPICE ADMISSION (OUTPATIENT)
Dept: HOME HOSPICE | Facility: HOSPICE | Age: 89
End: 2025-01-01
Payer: MEDICARE

## 2025-01-01 VITALS — HEART RATE: 124 BPM | RESPIRATION RATE: 22 BRPM

## 2025-01-01 VITALS — RESPIRATION RATE: 22 BRPM | DIASTOLIC BLOOD PRESSURE: 60 MMHG | SYSTOLIC BLOOD PRESSURE: 180 MMHG | HEART RATE: 120 BPM

## 2025-01-01 VITALS — HEART RATE: 136 BPM | RESPIRATION RATE: 28 BRPM

## 2025-01-01 VITALS — RESPIRATION RATE: 36 BRPM | HEART RATE: 152 BPM

## 2025-01-01 VITALS — HEART RATE: 130 BPM | RESPIRATION RATE: 20 BRPM

## 2025-01-01 DIAGNOSIS — Z17.0 MALIGNANT NEOPLASM OF UPPER-OUTER QUADRANT OF LEFT BREAST IN FEMALE, ESTROGEN RECEPTOR POSITIVE (HCC): ICD-10-CM

## 2025-01-01 DIAGNOSIS — Z51.5 HOSPICE CARE PATIENT: ICD-10-CM

## 2025-01-01 DIAGNOSIS — Z51.5 HOSPICE CARE PATIENT: Primary | ICD-10-CM

## 2025-01-01 DIAGNOSIS — C50.412 MALIGNANT NEOPLASM OF UPPER-OUTER QUADRANT OF LEFT BREAST IN FEMALE, ESTROGEN RECEPTOR POSITIVE (HCC): ICD-10-CM

## 2025-01-01 DIAGNOSIS — G89.3 CANCER RELATED PAIN: ICD-10-CM

## 2025-01-01 PROCEDURE — 10330064 FOAM, ADH SIL W/BORDER SACRAL 7X7" (10/"

## 2025-01-01 PROCEDURE — 10330064 SYRINGE, GENERAL PURPOSE LL W/CATH TIP 5

## 2025-01-01 PROCEDURE — 10330064 BRIEF, TAB CLSR ULTRA MED 32-44 (16/BG 6

## 2025-01-01 PROCEDURE — 10330064

## 2025-01-01 PROCEDURE — 10330064 ALIGNER, FOAM BODY SM (8/CS)

## 2025-01-01 PROCEDURE — 10330057 MEDICATION, GENERAL

## 2025-01-01 PROCEDURE — G0156 HHCP-SVS OF AIDE,EA 15 MIN: HCPCS

## 2025-01-01 PROCEDURE — 10330064 CATH SECURE, STATLOCK FOLEY SWIVEL SIL P

## 2025-01-01 PROCEDURE — 10330064 BAG, URINE DRN (20/CS)        BARD

## 2025-01-01 PROCEDURE — G0299 HHS/HOSPICE OF RN EA 15 MIN: HCPCS

## 2025-01-01 PROCEDURE — 10330064 BRIEF, TAB CLSR ULTRA LG 45-58LG (18/BG

## 2025-01-01 PROCEDURE — 10330064 CATHETER, FOLEY 2WAY 5CC 16FR (10/BX)

## 2025-01-01 PROCEDURE — 10330063 VN DURABLE MEDICAL EQUIPMENT, SUPPLIES/MEDS

## 2025-01-01 PROCEDURE — 10330064 UNDERPAD, REUSE 36X36 1DZ     BECKCL

## 2025-01-01 PROCEDURE — G0155 HHCP-SVS OF CSW,EA 15 MIN: HCPCS

## 2025-01-01 PROCEDURE — 10330064 CATH TRAY, FOLEY SYR 10CC (20/CS)

## 2025-01-01 PROCEDURE — 10330064 CUSHION, COMPRESS COCCYX (4/CS)

## 2025-01-01 PROCEDURE — 10330087 HSPC SERVICE INTENSITY ADD-ON

## 2025-01-01 PROCEDURE — 10330064 SALINE, IRR SOL STR 100ML (48/CS) MGM37

## 2025-01-01 RX ORDER — LORAZEPAM 2 MG/ML
CONCENTRATE ORAL
Qty: 30 ML | Refills: 2 | Status: SHIPPED | OUTPATIENT
Start: 2025-01-01 | End: 2025-06-04

## 2025-01-07 ENCOUNTER — APPOINTMENT (OUTPATIENT)
Dept: LAB | Facility: CLINIC | Age: 89
End: 2025-01-07
Payer: MEDICARE

## 2025-01-07 ENCOUNTER — NURSE TRIAGE (OUTPATIENT)
Age: 89
End: 2025-01-07

## 2025-01-07 DIAGNOSIS — R39.9 UTI SYMPTOMS: Primary | ICD-10-CM

## 2025-01-07 LAB

## 2025-01-07 PROCEDURE — 87186 SC STD MICRODIL/AGAR DIL: CPT

## 2025-01-07 PROCEDURE — 87077 CULTURE AEROBIC IDENTIFY: CPT

## 2025-01-07 PROCEDURE — 81001 URINALYSIS AUTO W/SCOPE: CPT

## 2025-01-07 PROCEDURE — 87086 URINE CULTURE/COLONY COUNT: CPT

## 2025-01-07 NOTE — TELEPHONE ENCOUNTER
"Patient of Dr. Grajeda, last seen 10/25/2023, called stating she is experiencing a burning sensation when urinating, urgency, frequency, and cloudy urine. She states her symptoms started on Friday. She rates her pain 8-9/10 when urinating but denies pain, fever, or blood in urine any other time. I ordered UA and culture. I reviewed ED precautions, avoiding bladder irritants, and encouraged water intake. I verified [facility] on [address]. Please advise.      Reason for Disposition   All other urine symptoms    Answer Assessment - Initial Assessment Questions  1. SYMPTOM: \"What's the main symptom you're concerned about?\" (e.g., frequency, incontinence)      Burning sensation when urinating, urgency, frequency, cloudy urine    2. ONSET: \"When did the symptoms start?\"      Friday    3. PAIN: \"Is there any pain?\" If Yes, ask: \"How bad is it?\" (Scale: 1-10; mild, moderate, severe)      When urinating 8-9/10, denies pain any other time    4. CAUSE: \"What do you think is causing the symptoms?\"      UTI    5. OTHER SYMPTOMS: \"Do you have any other symptoms?\" (e.g., blood in urine, fever, flank pain, pain with urination)  Denies    Protocols used: Urinary Symptoms-Adult-[place]    "

## 2025-01-07 NOTE — TELEPHONE ENCOUNTER
Will wait for UA and treat accordingly. Please schedule follow up pt has not been seen for over a year.

## 2025-01-08 ENCOUNTER — RESULTS FOLLOW-UP (OUTPATIENT)
Dept: OTHER | Facility: HOSPITAL | Age: 89
End: 2025-01-08

## 2025-01-08 DIAGNOSIS — R39.9 UTI SYMPTOMS: ICD-10-CM

## 2025-01-08 RX ORDER — CEPHALEXIN 500 MG/1
500 CAPSULE ORAL EVERY 6 HOURS SCHEDULED
Qty: 20 CAPSULE | Refills: 0 | Status: SHIPPED | OUTPATIENT
Start: 2025-01-08 | End: 2025-01-15 | Stop reason: SDUPTHER

## 2025-01-08 NOTE — TELEPHONE ENCOUNTER
LM per communication consent with AP's note. Instructed patient to finish abx unless we call and indicate otherwise.

## 2025-01-08 NOTE — TELEPHONE ENCOUNTER
Preliminary urine culture result positive for UTI- Script for Keflex sent to patients pharmacy- will continue to monitor for sensitivity and contact patient if antibiotic should be changed.

## 2025-01-08 NOTE — TELEPHONE ENCOUNTER
Patient called stating she had a urine test done yesterday and she would like to know the results to see if she needs antibiotics.    Patient can be reached at [phone]

## 2025-01-09 LAB
BACTERIA UR CULT: ABNORMAL
BACTERIA UR CULT: ABNORMAL

## 2025-01-15 RX ORDER — CEPHALEXIN 500 MG/1
500 CAPSULE ORAL EVERY 6 HOURS SCHEDULED
Qty: 20 CAPSULE | Refills: 0 | Status: SHIPPED | OUTPATIENT
Start: 2025-01-15 | End: 2025-01-20

## 2025-01-15 NOTE — TELEPHONE ENCOUNTER
"Called/spoke to patient informing her that VALENTINA Henao, has prescribed another 5 days of Keflex and if, after that, she continues to have pain, we will repeat urine studies to see if the infection remains and to ensure no \"new bacteria\" is present. Patient was kind, appreciative of the call, and understanding of the information given.   "

## 2025-01-15 NOTE — TELEPHONE ENCOUNTER
Patient called in stating she finished keflex two days ago for a UTI and continues to have dysuria. Denies any other symptoms. Please advise on recommendations.

## 2025-01-15 NOTE — TELEPHONE ENCOUNTER
Patient called stating she finished her antibiotics on Monday and she continues to have pain when urinating and wants to know how to proceed.  Warm transfer to Triage nurse

## 2025-01-15 NOTE — TELEPHONE ENCOUNTER
"We will continue her Keflex for 5 more days (repeat order sent to pharmacy). If she continues to have pain we will need repeat urine studies to see if infection remains and ensure no \"new bacteria\" is present.   "

## 2025-01-27 ENCOUNTER — OFFICE VISIT (OUTPATIENT)
Dept: CARDIOLOGY CLINIC | Facility: CLINIC | Age: 89
End: 2025-01-27
Payer: MEDICARE

## 2025-01-27 VITALS
WEIGHT: 163.6 LBS | OXYGEN SATURATION: 96 % | BODY MASS INDEX: 30.11 KG/M2 | HEIGHT: 62 IN | HEART RATE: 91 BPM | SYSTOLIC BLOOD PRESSURE: 112 MMHG | DIASTOLIC BLOOD PRESSURE: 60 MMHG

## 2025-01-27 DIAGNOSIS — I10 ESSENTIAL HYPERTENSION: Primary | Chronic | ICD-10-CM

## 2025-01-27 DIAGNOSIS — I49.3 PREMATURE VENTRICULAR CONTRACTIONS: ICD-10-CM

## 2025-01-27 DIAGNOSIS — I50.32 CHRONIC DIASTOLIC HEART FAILURE (HCC): ICD-10-CM

## 2025-01-27 PROCEDURE — 99214 OFFICE O/P EST MOD 30 MIN: CPT | Performed by: INTERNAL MEDICINE

## 2025-01-27 NOTE — PROGRESS NOTES
Subjective:        Patient ID: Barbra De Oliveira is a 89 y.o. female.    Chief Complaint:  The patient presented to this office for the purpose of cardiac follow-up.  She is known to have a history of diastolic congestive heart failure with cardiac arrhythmia in the form of premature ventricular contractions.  The patient is experiencing aching sensation in the chest wall especially upon taking a deep breath.  She is scheduled for x-rays this afternoon.  She denies any anginal-like symptoms.  She has no significant dyspnea.  She denies any symptoms of palpitation, dizziness or syncope.  She has no leg edema.      The following portions of the patient's history were reviewed and updated as appropriate: allergies, current medications, past family history, past medical history, past social history, past surgical history, and problem list.  Review of Systems   Constitutional: Negative.   Respiratory: Negative.     Psychiatric/Behavioral: Negative.            Objective:     Physical Exam  Vitals reviewed.   Constitutional:       Appearance: Normal appearance.   HENT:      Head: Normocephalic and atraumatic.   Cardiovascular:      Rate and Rhythm: Normal rate and regular rhythm.   Pulmonary:      Effort: Pulmonary effort is normal.      Breath sounds: Normal breath sounds.   Musculoskeletal:      Right lower leg: No edema.      Left lower leg: No edema.   Skin:     General: Skin is warm and dry.   Neurological:      Mental Status: She is alert and oriented to person, place, and time.   Psychiatric:         Mood and Affect: Mood normal.         Behavior: Behavior normal.         Lab Review:   Nurse Triage on 01/07/2025   Component Date Value    Color, UA 01/07/2025 Light Yellow     Clarity, UA 01/07/2025 Turbid     Specific Gravity, UA 01/07/2025 1.009     pH, UA 01/07/2025 6.5     Leukocytes, UA 01/07/2025 Large (A)     Nitrite, UA 01/07/2025 Negative     Protein, UA 01/07/2025 Negative     Glucose, UA 01/07/2025 Negative      Ketones, UA 01/07/2025 Negative     Urobilinogen, UA 01/07/2025 <2.0     Bilirubin, UA 01/07/2025 Negative     Occult Blood, UA 01/07/2025 Trace (A)     RBC, UA 01/07/2025 4-10 (A)     WBC, UA 01/07/2025 Innumerable (A)     Epithelial Cells 01/07/2025 Occasional     Bacteria, UA 01/07/2025 Occasional     Urine Culture 01/07/2025 >100,000 cfu/ml Klebsiella pneumoniae (A)     Urine Culture 01/07/2025 10,000-19,000 cfu/ml          Assessment:       1. Essential hypertension        2. Chronic diastolic heart failure (HCC)        3. Premature ventricular contractions           Hypertension, stable and adequately controlled.  We will continue present regimen.    Diastolic congestive heart failure, stable.    Premature ventricular contractions, stable.  The patient is in regular rhythm and she is asymptomatic.  Plan:       The patient is advised to continue present regimen.

## 2025-01-28 ENCOUNTER — APPOINTMENT (OUTPATIENT)
Dept: RADIOLOGY | Age: 89
End: 2025-01-28
Payer: MEDICARE

## 2025-01-28 DIAGNOSIS — R05.9 COUGH, UNSPECIFIED TYPE: ICD-10-CM

## 2025-01-28 DIAGNOSIS — R07.81 PLEURITIC CHEST PAIN: ICD-10-CM

## 2025-01-28 PROCEDURE — 71101 X-RAY EXAM UNILAT RIBS/CHEST: CPT

## 2025-02-14 ENCOUNTER — HOSPITAL ENCOUNTER (OUTPATIENT)
Dept: BONE DENSITY | Facility: MEDICAL CENTER | Age: 89
Discharge: HOME | End: 2025-02-14
Payer: MEDICARE

## 2025-02-14 VITALS — HEIGHT: 62 IN | BODY MASS INDEX: 30 KG/M2 | WEIGHT: 163 LBS

## 2025-02-14 DIAGNOSIS — C77.3 CARCINOMA OF LEFT BREAST METASTATIC TO AXILLARY LYMPH NODE (HCC): ICD-10-CM

## 2025-02-14 DIAGNOSIS — Z79.811 USE OF ANASTROZOLE (ARIMIDEX): ICD-10-CM

## 2025-02-14 DIAGNOSIS — C50.912 CARCINOMA OF LEFT BREAST METASTATIC TO AXILLARY LYMPH NODE (HCC): ICD-10-CM

## 2025-02-14 PROCEDURE — 77080 DXA BONE DENSITY AXIAL: CPT

## 2025-02-24 ENCOUNTER — APPOINTMENT (EMERGENCY)
Dept: RADIOLOGY | Facility: HOSPITAL | Age: 89
DRG: 871 | End: 2025-02-24
Payer: MEDICARE

## 2025-02-24 ENCOUNTER — HOSPITAL ENCOUNTER (INPATIENT)
Facility: HOSPITAL | Age: 89
LOS: 6 days | Discharge: HOME HEALTH | DRG: 871 | End: 2025-03-02
Attending: EMERGENCY MEDICINE | Admitting: STUDENT IN AN ORGANIZED HEALTH CARE EDUCATION/TRAINING PROGRAM
Payer: MEDICARE

## 2025-02-24 ENCOUNTER — APPOINTMENT (INPATIENT)
Dept: RADIOLOGY | Facility: HOSPITAL | Age: 89
DRG: 871 | End: 2025-02-24
Payer: MEDICARE

## 2025-02-24 DIAGNOSIS — R06.02 SOB (SHORTNESS OF BREATH): ICD-10-CM

## 2025-02-24 DIAGNOSIS — J18.9 PNEUMONIA: Primary | ICD-10-CM

## 2025-02-24 DIAGNOSIS — R65.10 SIRS (SYSTEMIC INFLAMMATORY RESPONSE SYNDROME) (HCC): ICD-10-CM

## 2025-02-24 PROBLEM — J96.01 ACUTE HYPOXIC RESPIRATORY FAILURE (HCC): Status: ACTIVE | Noted: 2025-02-24

## 2025-02-24 PROBLEM — A41.9 SEPSIS (HCC): Status: ACTIVE | Noted: 2025-02-24

## 2025-02-24 LAB
2HR DELTA HS TROPONIN: -1 NG/L
ALBUMIN SERPL BCG-MCNC: 3.7 G/DL (ref 3.5–5)
ALP SERPL-CCNC: 137 U/L (ref 34–104)
ALT SERPL W P-5'-P-CCNC: 12 U/L (ref 7–52)
ANION GAP SERPL CALCULATED.3IONS-SCNC: 10 MMOL/L (ref 4–13)
APTT PPP: 31 SECONDS (ref 23–34)
AST SERPL W P-5'-P-CCNC: 16 U/L (ref 13–39)
ATRIAL RATE: 94 BPM
BASOPHILS # BLD AUTO: 0.02 THOUSANDS/ÂΜL (ref 0–0.1)
BASOPHILS NFR BLD AUTO: 0 % (ref 0–1)
BILIRUB SERPL-MCNC: 1.42 MG/DL (ref 0.2–1)
BNP SERPL-MCNC: 121 PG/ML (ref 0–100)
BUN SERPL-MCNC: 18 MG/DL (ref 5–25)
CALCIUM SERPL-MCNC: 10.2 MG/DL (ref 8.4–10.2)
CARDIAC TROPONIN I PNL SERPL HS: 5 NG/L (ref ?–50)
CARDIAC TROPONIN I PNL SERPL HS: 6 NG/L (ref ?–50)
CHLORIDE SERPL-SCNC: 102 MMOL/L (ref 96–108)
CO2 SERPL-SCNC: 25 MMOL/L (ref 21–32)
CREAT SERPL-MCNC: 1 MG/DL (ref 0.6–1.3)
EOSINOPHIL # BLD AUTO: 0.3 THOUSAND/ÂΜL (ref 0–0.61)
EOSINOPHIL NFR BLD AUTO: 3 % (ref 0–6)
ERYTHROCYTE [DISTWIDTH] IN BLOOD BY AUTOMATED COUNT: 12.8 % (ref 11.6–15.1)
GFR SERPL CREATININE-BSD FRML MDRD: 50 ML/MIN/1.73SQ M
GLUCOSE SERPL-MCNC: 145 MG/DL (ref 65–140)
HCT VFR BLD AUTO: 44.2 % (ref 34.8–46.1)
HGB BLD-MCNC: 14.5 G/DL (ref 11.5–15.4)
IMM GRANULOCYTES # BLD AUTO: 0.05 THOUSAND/UL (ref 0–0.2)
IMM GRANULOCYTES NFR BLD AUTO: 0 % (ref 0–2)
INR PPP: 1.29 (ref 0.85–1.19)
LACTATE SERPL-SCNC: 1.2 MMOL/L (ref 0.5–2)
LYMPHOCYTES # BLD AUTO: 2.95 THOUSANDS/ÂΜL (ref 0.6–4.47)
LYMPHOCYTES NFR BLD AUTO: 26 % (ref 14–44)
MCH RBC QN AUTO: 29.3 PG (ref 26.8–34.3)
MCHC RBC AUTO-ENTMCNC: 32.8 G/DL (ref 31.4–37.4)
MCV RBC AUTO: 89 FL (ref 82–98)
MONOCYTES # BLD AUTO: 0.92 THOUSAND/ÂΜL (ref 0.17–1.22)
MONOCYTES NFR BLD AUTO: 8 % (ref 4–12)
NEUTROPHILS # BLD AUTO: 7.35 THOUSANDS/ÂΜL (ref 1.85–7.62)
NEUTS SEG NFR BLD AUTO: 63 % (ref 43–75)
NRBC BLD AUTO-RTO: 0 /100 WBCS
P AXIS: 56 DEGREES
PLATELET # BLD AUTO: 250 THOUSANDS/UL (ref 149–390)
PMV BLD AUTO: 11.6 FL (ref 8.9–12.7)
POTASSIUM SERPL-SCNC: 3.9 MMOL/L (ref 3.5–5.3)
PR INTERVAL: 168 MS
PROCALCITONIN SERPL-MCNC: <0.05 NG/ML
PROT SERPL-MCNC: 7.5 G/DL (ref 6.4–8.4)
PROTHROMBIN TIME: 16.4 SECONDS (ref 12.3–15)
QRS AXIS: 42 DEGREES
QRSD INTERVAL: 76 MS
QT INTERVAL: 362 MS
QTC INTERVAL: 452 MS
RBC # BLD AUTO: 4.95 MILLION/UL (ref 3.81–5.12)
SODIUM SERPL-SCNC: 137 MMOL/L (ref 135–147)
T WAVE AXIS: 52 DEGREES
VENTRICULAR RATE: 94 BPM
WBC # BLD AUTO: 11.59 THOUSAND/UL (ref 4.31–10.16)

## 2025-02-24 PROCEDURE — 85730 THROMBOPLASTIN TIME PARTIAL: CPT

## 2025-02-24 PROCEDURE — 99223 1ST HOSP IP/OBS HIGH 75: CPT | Performed by: STUDENT IN AN ORGANIZED HEALTH CARE EDUCATION/TRAINING PROGRAM

## 2025-02-24 PROCEDURE — 85025 COMPLETE CBC W/AUTO DIFF WBC: CPT | Performed by: EMERGENCY MEDICINE

## 2025-02-24 PROCEDURE — 71046 X-RAY EXAM CHEST 2 VIEWS: CPT

## 2025-02-24 PROCEDURE — 96375 TX/PRO/DX INJ NEW DRUG ADDON: CPT

## 2025-02-24 PROCEDURE — 87040 BLOOD CULTURE FOR BACTERIA: CPT

## 2025-02-24 PROCEDURE — 96365 THER/PROPH/DIAG IV INF INIT: CPT

## 2025-02-24 PROCEDURE — 83880 ASSAY OF NATRIURETIC PEPTIDE: CPT | Performed by: EMERGENCY MEDICINE

## 2025-02-24 PROCEDURE — 83605 ASSAY OF LACTIC ACID: CPT | Performed by: EMERGENCY MEDICINE

## 2025-02-24 PROCEDURE — 99285 EMERGENCY DEPT VISIT HI MDM: CPT | Performed by: EMERGENCY MEDICINE

## 2025-02-24 PROCEDURE — 99285 EMERGENCY DEPT VISIT HI MDM: CPT

## 2025-02-24 PROCEDURE — 96366 THER/PROPH/DIAG IV INF ADDON: CPT

## 2025-02-24 PROCEDURE — 36415 COLL VENOUS BLD VENIPUNCTURE: CPT | Performed by: EMERGENCY MEDICINE

## 2025-02-24 PROCEDURE — 84145 PROCALCITONIN (PCT): CPT

## 2025-02-24 PROCEDURE — 96367 TX/PROPH/DG ADDL SEQ IV INF: CPT

## 2025-02-24 PROCEDURE — 71250 CT THORAX DX C-: CPT

## 2025-02-24 PROCEDURE — 80053 COMPREHEN METABOLIC PANEL: CPT | Performed by: EMERGENCY MEDICINE

## 2025-02-24 PROCEDURE — 84484 ASSAY OF TROPONIN QUANT: CPT | Performed by: EMERGENCY MEDICINE

## 2025-02-24 PROCEDURE — 93010 ELECTROCARDIOGRAM REPORT: CPT | Performed by: INTERNAL MEDICINE

## 2025-02-24 PROCEDURE — 94640 AIRWAY INHALATION TREATMENT: CPT

## 2025-02-24 PROCEDURE — 93005 ELECTROCARDIOGRAM TRACING: CPT

## 2025-02-24 PROCEDURE — 94644 CONT INHLJ TX 1ST HOUR: CPT

## 2025-02-24 PROCEDURE — 85610 PROTHROMBIN TIME: CPT

## 2025-02-24 RX ORDER — MORPHINE SULFATE 15 MG/1
15 TABLET ORAL EVERY 8 HOURS PRN
Refills: 0 | Status: DISCONTINUED | OUTPATIENT
Start: 2025-02-24 | End: 2025-03-02 | Stop reason: HOSPADM

## 2025-02-24 RX ORDER — ACETAMINOPHEN 325 MG/1
650 TABLET ORAL ONCE
Status: COMPLETED | OUTPATIENT
Start: 2025-02-24 | End: 2025-02-24

## 2025-02-24 RX ORDER — ANASTROZOLE 1 MG/1
1 TABLET ORAL DAILY
Status: DISCONTINUED | OUTPATIENT
Start: 2025-02-24 | End: 2025-03-02 | Stop reason: HOSPADM

## 2025-02-24 RX ORDER — OXYBUTYNIN CHLORIDE 10 MG/1
10 TABLET, EXTENDED RELEASE ORAL DAILY
Status: DISCONTINUED | OUTPATIENT
Start: 2025-02-24 | End: 2025-03-02 | Stop reason: HOSPADM

## 2025-02-24 RX ORDER — INSULIN GLARGINE 100 [IU]/ML
40 INJECTION, SOLUTION SUBCUTANEOUS EVERY 12 HOURS SCHEDULED
Status: DISCONTINUED | OUTPATIENT
Start: 2025-02-24 | End: 2025-02-26

## 2025-02-24 RX ORDER — ALBUTEROL SULFATE 90 UG/1
2 INHALANT RESPIRATORY (INHALATION) EVERY 6 HOURS PRN
Status: DISCONTINUED | OUTPATIENT
Start: 2025-02-24 | End: 2025-03-02 | Stop reason: HOSPADM

## 2025-02-24 RX ORDER — FLUTICASONE PROPIONATE 220 UG/1
2 AEROSOL, METERED RESPIRATORY (INHALATION)
Status: DISCONTINUED | OUTPATIENT
Start: 2025-02-24 | End: 2025-02-24

## 2025-02-24 RX ORDER — PRAVASTATIN SODIUM 40 MG
40 TABLET ORAL
Status: DISCONTINUED | OUTPATIENT
Start: 2025-02-24 | End: 2025-03-02 | Stop reason: HOSPADM

## 2025-02-24 RX ORDER — ACETAMINOPHEN 325 MG/1
650 TABLET ORAL EVERY 6 HOURS PRN
Status: DISCONTINUED | OUTPATIENT
Start: 2025-02-24 | End: 2025-02-25

## 2025-02-24 RX ORDER — AZITHROMYCIN 500 MG/1
500 TABLET, FILM COATED ORAL EVERY 24 HOURS
Status: DISCONTINUED | OUTPATIENT
Start: 2025-02-24 | End: 2025-02-26

## 2025-02-24 RX ORDER — AZITHROMYCIN 250 MG/1
500 TABLET, FILM COATED ORAL EVERY 24 HOURS
Status: DISCONTINUED | OUTPATIENT
Start: 2025-02-24 | End: 2025-02-24

## 2025-02-24 RX ORDER — ENOXAPARIN SODIUM 100 MG/ML
40 INJECTION SUBCUTANEOUS DAILY
Status: DISCONTINUED | OUTPATIENT
Start: 2025-02-24 | End: 2025-02-26

## 2025-02-24 RX ORDER — METOPROLOL TARTRATE 50 MG
50 TABLET ORAL EVERY 12 HOURS SCHEDULED
Status: DISCONTINUED | OUTPATIENT
Start: 2025-02-24 | End: 2025-03-02 | Stop reason: HOSPADM

## 2025-02-24 RX ORDER — SODIUM CHLORIDE FOR INHALATION 0.9 %
12 VIAL, NEBULIZER (ML) INHALATION ONCE
Status: COMPLETED | OUTPATIENT
Start: 2025-02-24 | End: 2025-02-24

## 2025-02-24 RX ORDER — MAGNESIUM SULFATE HEPTAHYDRATE 40 MG/ML
2 INJECTION, SOLUTION INTRAVENOUS ONCE
Status: COMPLETED | OUTPATIENT
Start: 2025-02-24 | End: 2025-02-24

## 2025-02-24 RX ORDER — METHYLPREDNISOLONE SODIUM SUCCINATE 125 MG/2ML
125 INJECTION, POWDER, LYOPHILIZED, FOR SOLUTION INTRAMUSCULAR; INTRAVENOUS ONCE
Status: COMPLETED | OUTPATIENT
Start: 2025-02-24 | End: 2025-02-24

## 2025-02-24 RX ORDER — ALBUTEROL SULFATE 5 MG/ML
10 SOLUTION RESPIRATORY (INHALATION) ONCE
Status: COMPLETED | OUTPATIENT
Start: 2025-02-24 | End: 2025-02-24

## 2025-02-24 RX ORDER — FLUTICASONE PROPIONATE 220 UG/1
2 AEROSOL, METERED RESPIRATORY (INHALATION)
Status: DISCONTINUED | OUTPATIENT
Start: 2025-02-24 | End: 2025-02-25

## 2025-02-24 RX ORDER — PANTOPRAZOLE SODIUM 40 MG/1
40 TABLET, DELAYED RELEASE ORAL
Status: DISCONTINUED | OUTPATIENT
Start: 2025-02-25 | End: 2025-03-02 | Stop reason: HOSPADM

## 2025-02-24 RX ADMIN — INSULIN GLARGINE 40 UNITS: 100 INJECTION, SOLUTION SUBCUTANEOUS at 22:44

## 2025-02-24 RX ADMIN — ACETAMINOPHEN 650 MG: 325 TABLET, FILM COATED ORAL at 16:23

## 2025-02-24 RX ADMIN — PRAVASTATIN SODIUM 40 MG: 40 TABLET ORAL at 16:11

## 2025-02-24 RX ADMIN — MORPHINE SULFATE 15 MG: 15 TABLET ORAL at 22:42

## 2025-02-24 RX ADMIN — MAGNESIUM SULFATE HEPTAHYDRATE 2 G: 40 INJECTION, SOLUTION INTRAVENOUS at 06:56

## 2025-02-24 RX ADMIN — ANASTROZOLE 1 MG: 1 TABLET, COATED ORAL at 16:11

## 2025-02-24 RX ADMIN — CEFTRIAXONE SODIUM 1000 MG: 10 INJECTION, POWDER, FOR SOLUTION INTRAVENOUS at 09:36

## 2025-02-24 RX ADMIN — ALBUTEROL SULFATE 10 MG: 2.5 SOLUTION RESPIRATORY (INHALATION) at 06:24

## 2025-02-24 RX ADMIN — ENOXAPARIN SODIUM 40 MG: 40 INJECTION SUBCUTANEOUS at 16:11

## 2025-02-24 RX ADMIN — AZITHROMYCIN 500 MG: 500 TABLET, FILM COATED ORAL at 16:14

## 2025-02-24 RX ADMIN — OXYBUTYNIN 10 MG: 10 TABLET, FILM COATED, EXTENDED RELEASE ORAL at 16:11

## 2025-02-24 RX ADMIN — METHYLPREDNISOLONE SODIUM SUCCINATE 125 MG: 125 INJECTION, POWDER, FOR SOLUTION INTRAMUSCULAR; INTRAVENOUS at 06:56

## 2025-02-24 RX ADMIN — MORPHINE SULFATE 15 MG: 15 TABLET ORAL at 14:07

## 2025-02-24 RX ADMIN — IPRATROPIUM BROMIDE 1 MG: 0.5 SOLUTION RESPIRATORY (INHALATION) at 06:24

## 2025-02-24 RX ADMIN — ISODIUM CHLORIDE 12 ML: 0.03 SOLUTION RESPIRATORY (INHALATION) at 06:24

## 2025-02-24 RX ADMIN — ACETAMINOPHEN 650 MG: 325 TABLET, FILM COATED ORAL at 09:36

## 2025-02-24 RX ADMIN — METOPROLOL TARTRATE 50 MG: 50 TABLET, FILM COATED ORAL at 16:14

## 2025-02-24 NOTE — ED CARE HANDOFF
Emergency Department Sign Out Note        Sign out and transfer of care from Dr. Suarez. See Separate Emergency Department note.     The patient, Barbra De Oliveira, was evaluated by the previous provider for shortness of breath.    Workup Completed:  Labs    ED Course / Workup Pending (followup):  Pending x-ray                                  ED Course as of 03/02/25 2115 Mon Feb 24, 2025   0703 SO: 3 days cough/ worsening SOB, 1 episode of vomiting, no CP. HX CHF and asthma. Treating for asthma now, looks mixed HF and asthma. Probably admit     Procedures  Medical Decision Making  Patient found to have pneumonia on chest x-ray.  Patient meets SIRS criteria.  Patient given antibiotics and admitted for further management workup.    Amount and/or Complexity of Data Reviewed  Labs: ordered.  Radiology: ordered.    Risk  OTC drugs.  Prescription drug management.  Decision regarding hospitalization.            Disposition  Final diagnoses:   Pneumonia   SOB (shortness of breath)   SIRS (systemic inflammatory response syndrome) (HCC)     Time reflects when diagnosis was documented in both MDM as applicable and the Disposition within this note       Time User Action Codes Description Comment    2/24/2025  9:53 AM Andrew Dan Add [J18.9] Pneumonia     2/24/2025  9:53 AM Andrew Dan Add [R06.02] SOB (shortness of breath)     2/24/2025  9:53 AM Andrew Dan Add [R65.10] SIRS (systemic inflammatory response syndrome) (HCC)           ED Disposition       ED Disposition   Admit    Condition   Stable    Date/Time   Mon Feb 24, 2025  9:53 AM    Comment                  Follow-up Information       Follow up With Specialties Details Why Contact Info    [facility]  Follow up  [address] [place] [id number] [phone]          Discharge Medication List as of 3/2/2025 11:19 AM        START taking these medications    Details   benzonatate (TESSALON) 200 MG capsule Take 1  capsule (200 mg total) by mouth 3 (three) times a day as needed for cough, Starting Sun 3/2/2025, Normal      dextromethorphan-guaiFENesin (ROBITUSSIN DM)  mg/5 mL syrup Take 10 mL by mouth every 4 (four) hours as needed for cough, Starting Sun 3/2/2025, Normal           CONTINUE these medications which have NOT CHANGED    Details   anastrozole (ARIMIDEX) 1 mg tablet Take 1 tablet (1 mg total) by mouth daily, Starting Tue 8/13/2024, Normal      Beclomethasone Dipropionate (QVAR IN) Inhale 2 puffs if needed, Historical Med      !! Droplet Pen Needles 32G X 4 MM MISC Historical Med      EPINEPHrine (EPIPEN) 0.3 mg/0.3 mL SOAJ Inject 0.3 mL as directed, Historical Med      FLOVENT  MCG/ACT inhaler INHALE 2 PUFF BY INHALATION ROUTE 2 TIMES EVERY DAY, Historical Med      hydrocortisone 2.5 % cream APPLY TO THE AFFECTED AREA TWICE DAILY. (MIX WITH NYSTATIN), Historical Med      insulin aspart (NovoLOG) 100 Units/mL injection pen Novolog Flexpen U-100 Insulin aspart 100 unit/mL (3 mL) subcutaneous, Historical Med      Insulin Glargine (TOUJEO) 300 units/mL CONCETRATED U-300 injection pen Toujeo SoloStar U-300 Insulin 300 unit/mL (1.5 mL) subcutaneous pen   INJECT 67 UNITS BY SUBCUTANEOUS ROUTE AS PER INSULIN PROTOCOL, Historical Med      lansoprazole (PREVACID) 30 mg capsule lansoprazole 30 mg capsule,delayed release, Historical Med      metoprolol tartrate (LOPRESSOR) 50 mg tablet Take 50 mg by mouth every 12 (twelve) hours , Historical Med      morphine (MS CONTIN) 15 mg 12 hr tablet Take 15 mg by mouth every 12 (twelve) hours as needed for severe pain, Historical Med      morphine (MSIR) 15 mg tablet Take 15 mg by mouth every 8 (eight) hours as needed for severe pain., Historical Med      naloxone (NARCAN) 2 MG/2ML injection Inject 2 mL into a catheter in a vein, Starting Fri 9/8/2023, Historical Med      !! NOVOFINE 32G X 6 MM MISC 2 (two) times a day As directed, Starting Mon 8/13/2018, Historical Med       nystatin (MYCOSTATIN) cream APPLY TO AFFECTED AREAS TWICE A DAY. (MIX WITH HYDROCORTISONE), Historical Med      nystatin (MYCOSTATIN) powder APPLY TO THE RASH IN THE GROIN ONCE DAILY, Historical Med      ONE TOUCH ULTRA TEST test strip TEST TWICE DAILY OR AS DIRECTED DX: E11.9, Historical Med      ONETOUCH DELICA LANCETS 33G MISC TEST TWICE DAILY OR AS DIRECTED, Historical Med      oxybutynin (DITROPAN XL) 15 MG 24 hr tablet Take 15 mg by mouth daily., Historical Med      simvastatin (ZOCOR) 20 mg tablet Take 20 mg by mouth daily at bedtime., Historical Med      solifenacin (VESICARE) 10 MG tablet Take 1 tablet (10 mg total) by mouth daily, Starting Fri 1/6/2023, Normal      Toujeo SoloStar 300 units/mL CONCENTRATED U-300 injection pen (1-unit dial) INJECT 72 UNITS BY SUBCUTANEOUS ROUTE AS PER INSULIN PROTOCOL, Historical Med      VENTOLIN  (90 Base) MCG/ACT inhaler  , Starting Mon 2/26/2018, Historical Med      VITAMIN K PO Take by mouth, Historical Med       !! - Potential duplicate medications found. Please discuss with provider.        STOP taking these medications       azithromycin (ZITHROMAX) 250 mg tablet Comments:   Reason for Stopping:         hydrocodone-chlorpheniramine polistirex (TUSSIONEX) 10-8 mg/5 mL ER suspension Comments:   Reason for Stopping:         Naloxegol Oxalate (Movantik) 12.5 MG TABS Comments:   Reason for Stopping:         nitrofurantoin (MACROBID) 100 mg capsule Comments:   Reason for Stopping:         NON FORMULARY Comments:   Reason for Stopping:                    ED Provider  Electronically Signed by     Andrew Dan MD  03/02/25 1433

## 2025-02-24 NOTE — ED NOTES
Pt back in bed and adjusted for comfort. Pt medicated for pain and has  at bedside. Nutrition changed to soft dental at pts request as pt states that she has scheduled dental work for her teeth that have been causing her pain and not letting her eat hard food.     Mckenna Schmid RN  02/24/25 2447

## 2025-02-24 NOTE — ED PROVIDER NOTES
"  ED Disposition       None          Assessment & Plan       Medical Decision Making  Amount and/or Complexity of Data Reviewed  Labs: ordered. Decision-making details documented in ED Course.  Radiology: ordered.    Risk  OTC drugs.  Prescription drug management.  Decision regarding hospitalization.        Patient is a 89 y.o. 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  Patient states that she left the office neurologist office when she had a sudden episode of nausea and vomiting.  Later that day, she developed cough and shortness of breath.  She does not believe she aspirated any of the vomitus.  She denies any fevers or chills.  She states she is not on any medicine for CHF because she \"only had CHF twice.\"  She also states that she has a history of asthma but does not take medicine for this unless her asthma is bothering her.  Patient does not require any oxygen at home.  She states that she has is worsening, has become productive of sputum.  She now also has left-sided rib pain when coughing.  No further nausea or vomiting other than 1 episode on Friday.  No diarrhea.  No dysuria.  No abdominal pain.  No chest pain.  No unilateral leg swelling.    Vital signs stable.  Patient SpO2 90 to 92% on room air.  Respiratory rate 24.  Afebrile.. Exam as listed below.    Differential diagnosis includes but is not limited to asthma exacerbation, pneumonia, CHF exacerbation, viral URI    Plan CBC, CMP, BNP, troponin, lactic acid, EKG, two-view chest x-ray, heart neb, Solu-Medrol, magnesium. If any concerning findings on CXR, progress to CT chest as I do have some concern for possible bony metastasis given patient's complaint of rib pain.     View ED course above for further discussion on patient " workup.     All labs reviewed and utilized in the medical decision making process  All radiology studies independently viewed by me and interpreted by the radiologist.  I reviewed all testing with the patient.     Upon re-evaluation - care of patient signed out to colleague prior to results of blood work or CXR. Do anticipate admission.    ED Course as of 03/03/25 1542   Mon Feb 24, 2025   0651 Blood Pressure: 145/88   0651 Temperature: 98 °F (36.7 °C)   0651 Pulse: 96   0651 Respirations(!): 24   0651 SpO2: 95 %   0659 LACTIC ACID: 1.2   0659 hs TnI 0hr: 6       Medications - No data to display    ED Risk Strat Scores                            SBIRT 20yo+      Flowsheet Row Most Recent Value   Initial Alcohol Screen: US AUDIT-C     1. How often do you have a drink containing alcohol? 0 Filed at: 02/24/2025 0609   2. How many drinks containing alcohol do you have on a typical day you are drinking?  0 Filed at: 02/24/2025 0609   3b. FEMALE Any Age, or MALE 65+: How often do you have 4 or more drinks on one occassion? 0 Filed at: 02/24/2025 0609   Audit-C Score 0 Filed at: 02/24/2025 0609   LI: How many times in the past year have you...    Used an illegal drug or used a prescription medication for non-medical reasons? Never Filed at: 02/24/2025 0609                            History of Present Illness       Chief Complaint   Patient presents with    Shortness of Breath     Pt with cough/SOB for a while now. Pt with productive cough. Pt 90-92% on room air for EMS. Pt with left sided pain from cough. No chest pain.       Past Medical History:   Diagnosis Date    Anxiety     Asthma     Atrial fibrillation (HCC)     Breast cancer (HCC) 1992    left    Chest pain, unspecified     CHF (congestive heart failure) (HCC)     Diabetes mellitus (HCC)     GERD (gastroesophageal reflux disease)     History of radiation exposure 1992    Hyperlipidemia     Hypertension     Irregular heart beat     Afib    Migraine     Obesity      Pericardial effusion     Pericarditis       Past Surgical History:   Procedure Laterality Date    ABDOMINAL ADHESION SURGERY      APPENDECTOMY      AUGMENTATION MAMMAPLASTY Left 1994    BACK SURGERY      BREAST LUMPECTOMY Left 1992    malignant    BREAST LUMPECTOMY Left 11/17/2020    Procedure: BREAST ULTRASOUND DIRECTED LUMPECTOMY (ULTRASOUND AT 1200);  Surgeon: Earl Em MD;  [facility];  Service: Surgical Oncology    BREAST SURGERY      CARDIAC SURGERY      Pericardial window    CHOLECYSTECTOMY      EYE SURGERY      FLAP LOCAL TRUNK Left 11/17/2020    Procedure: FLAP LOCAL TRUNK;  Surgeon: Ronnell Chiu MD;  [facility];  Service: Plastics    HYSTERECTOMY      IR DRAINAGE TUBE CHECK WITH SCLEROSIS  04/23/2021    IR DRAINAGE TUBE CHECK WITH SCLEROSIS  06/03/2021    IR DRAINAGE TUBE CHECK WITH SCLEROSIS  06/17/2021    IR DRAINAGE TUBE CHECK WITH SCLEROSIS  06/28/2021    IR DRAINAGE TUBE CHECK WITH SCLEROSIS  07/07/2021    IR DRAINAGE TUBE CHECK WITH SCLEROSIS  07/27/2021    IR DRAINAGE TUBE PLACEMENT  04/01/2021    IR DRAINAGE TUBE PLACEMENT WITH SCLEROSIS  05/14/2021    LYMPH NODE DISSECTION Left 11/17/2020    Procedure: HUGO  DIRECTED AXILLARY DISSECTION;  Surgeon: Earl Em MD;  [facility];  Service: Surgical Oncology    MASTECTOMY Left 1994    malignant    MT CELESTE-IMPLANT CAPSULECTOMY BREAST COMPLETE Left 11/17/2020    Procedure: BREAST CAPSULECTOMY;  Surgeon: Ronnell Chiu MD;  [facility];  Service: Plastics    MT REMOVAL INTACT BREAST IMPLANT Left 11/17/2020    Procedure: BREAST IMPLANT REMOVAL;  Surgeon: Ronnell Chiu MD;  [facility];  Service: Plastics    US BREAST NEEDLE LOC LEFT Left 11/02/2020    US GUIDED BREAST BIOPSY LEFT COMPLETE Left 08/31/2020    US GUIDED BREAST LYMPH NODE BIOPSY LEFT Left 08/31/2020    WRIST SURGERY        Family History   Problem Relation Age of Onset    Colon cancer Mother 55    Breast cancer Mother         Early 50's    Stroke  Father     Emphysema Father     Leukemia Sister     Breast cancer Sister 65    ALS Brother       Social History     Tobacco Use    Smoking status: Never    Smokeless tobacco: Never   Vaping Use    Vaping status: Never Used   Substance Use Topics    Alcohol use: Yes     Comment: social    Drug use: No      E-Cigarette/Vaping    E-Cigarette Use Never User       E-Cigarette/Vaping Substances    Nicotine No     THC No     CBD No     Flavoring No     Other No     Unknown No       I have reviewed and agree with the history as documented.     89 y.o. female with CC SOB and cough x3 days         Review of Systems   Constitutional:  Negative for activity change, appetite change and fever.   Respiratory:  Positive for cough and shortness of breath. Negative for apnea.    Cardiovascular:  Negative for chest pain and palpitations.   Gastrointestinal:  Positive for nausea and vomiting. Negative for abdominal distention and diarrhea.   Skin:  Negative for color change and rash.   Neurological:  Negative for dizziness and light-headedness.           Objective       ED Triage Vitals [02/24/25 0606]   Temp Pulse BP Resp SpO2 Patient Position - Orthostatic VS   -- 96 -- -- 95 % --      Temp src Heart Rate Source BP Location FiO2 (%) Pain Score    -- Monitor -- -- --      Vitals      Date and Time Temp Pulse SpO2 Resp BP Pain Score FACES Pain Rating User   02/24/25 0606 -- 96 95 % -- -- -- -- CC            Physical Exam  Constitutional:       Appearance: She is not ill-appearing or diaphoretic.   HENT:      Head: Normocephalic and atraumatic.      Nose: No congestion or rhinorrhea.      Mouth/Throat:      Mouth: Mucous membranes are dry.   Cardiovascular:      Rate and Rhythm: Regular rhythm. Tachycardia present.   Pulmonary:      Breath sounds: No wheezing (diffuse inspiratory and expiratory wheezes) or rales (diffuse).   Abdominal:      General: Abdomen is flat. There is no distension.      Palpations: Abdomen is soft.       Tenderness: There is no abdominal tenderness. There is no guarding.   Musculoskeletal:         General: No tenderness.      Comments: Mild pitting edema to mid shin BLE   Skin:     General: Skin is warm and dry.      Capillary Refill: Capillary refill takes less than 2 seconds.   Neurological:      General: No focal deficit present.      Mental Status: She is alert and oriented to person, place, and time.         Results Reviewed       None            No orders to display       Procedures    ED Medication and Procedure Management   Prior to Admission Medications   Prescriptions Last Dose Informant Patient Reported? Taking?   Beclomethasone Dipropionate (QVAR IN)  Self, Spouse/Significant Other Yes No   Sig: Inhale 2 puffs if needed   Droplet Pen Needles 32G X 4 MM MISC  Self, Spouse/Significant Other Yes No   EPINEPHrine (EPIPEN) 0.3 mg/0.3 mL SOAJ  Self, Spouse/Significant Other Yes No   Sig: Inject 0.3 mL as directed   FLOVENT  MCG/ACT inhaler  Self, Spouse/Significant Other Yes No   Sig: INHALE 2 PUFF BY INHALATION ROUTE 2 TIMES EVERY DAY   Flowflex COVID-19 Ag Home Test KIT  Self, Spouse/Significant Other Yes No   Sig: Use as directed   Patient not taking: Reported on 3/20/2024   Insulin Glargine (TOUJEO) 300 units/mL CONCETRATED U-300 injection pen  Self, Spouse/Significant Other Yes No   Sig: Toujeo SoloStar U-300 Insulin 300 unit/mL (1.5 mL) subcutaneous pen   INJECT 67 UNITS BY SUBCUTANEOUS ROUTE AS PER INSULIN PROTOCOL   Lidocaine 5 % CREA  Self, Spouse/Significant Other No No   Sig: Apply 1 application topically as needed (pain)   Patient not taking: Reported on 2024   NON FORMULARY  Self, Spouse/Significant Other Yes No   Sig: Domperidone 20 mg TID   Patient not taking: Reported on 6/10/2024   NOVOFINE 32G X 6 MM MISC  Self, Spouse/Significant Other Yes No   Si (two) times a day As directed   Naloxegol Oxalate (Movantik) 12.5 MG TABS  Self, Spouse/Significant Other Yes No   Sig: Take by  mouth   Patient not taking: Reported on 1/27/2025   ONE TOUCH ULTRA TEST test strip  Self, Spouse/Significant Other Yes No   Sig: TEST TWICE DAILY OR AS DIRECTED DX: E11.9   ONETOUCH DELICA LANCETS 33G MISC  Self, Spouse/Significant Other Yes No   Sig: TEST TWICE DAILY OR AS DIRECTED   Sodium Fluoride 5000 PPM 1.1 % GEL  Self, Spouse/Significant Other Yes No   Patient not taking: Reported on 6/10/2024   Toujeo SoloStar 300 units/mL CONCENTRATED U-300 injection pen (1-unit dial)  Self, Spouse/Significant Other Yes No   Sig: INJECT 72 UNITS BY SUBCUTANEOUS ROUTE AS PER INSULIN PROTOCOL   VENTOLIN  (90 Base) MCG/ACT inhaler  Self, Spouse/Significant Other Yes No   Sig:     VITAMIN K PO  Self, Spouse/Significant Other Yes No   Sig: Take by mouth   anastrozole (ARIMIDEX) 1 mg tablet  Self, Spouse/Significant Other No No   Sig: Take 1 tablet (1 mg total) by mouth daily   azithromycin (ZITHROMAX) 250 mg tablet  Self, Spouse/Significant Other Yes No   Patient not taking: Reported on 1/27/2025   glucose blood test strip  Self, Spouse/Significant Other Yes No   Sig: TEST TWICE DAILY OR AS DIRECTED DX: E11.9   Patient not taking: Reported on 8/13/2024   hydrocodone-chlorpheniramine polistirex (TUSSIONEX) 10-8 mg/5 mL ER suspension  Self, Spouse/Significant Other Yes No   Sig: Take 5 mL by mouth every 12 (twelve) hours   Patient not taking: Reported on 4/25/2023   hydrocortisone 2.5 % cream  Self, Spouse/Significant Other Yes No   Sig: APPLY TO THE AFFECTED AREA TWICE DAILY. (MIX WITH NYSTATIN)   insulin aspart (NovoLOG) 100 Units/mL injection pen  Self, Spouse/Significant Other Yes No   Sig: Novolog Flexpen U-100 Insulin aspart 100 unit/mL (3 mL) subcutaneous   lansoprazole (PREVACID) 30 mg capsule  Self, Spouse/Significant Other Yes No   Sig: lansoprazole 30 mg capsule,delayed release   lidocaine-prilocaine (EMLA) cream  Self, Spouse/Significant Other Yes No   Sig: APPLY FOR 12 HOURS, AND THEN OFF 12 HOURS AS NEEDED    Patient not taking: Reported on 4/25/2023   metoprolol tartrate (LOPRESSOR) 50 mg tablet  Self, Spouse/Significant Other Yes No   Sig: Take 50 mg by mouth every 12 (twelve) hours    morphine (MS CONTIN) 15 mg 12 hr tablet  Self, Spouse/Significant Other Yes No   Sig: Take 15 mg by mouth every 12 (twelve) hours as needed for severe pain   morphine (MSIR) 15 mg tablet  Self, Spouse/Significant Other Yes No   Sig: Take 15 mg by mouth every 8 (eight) hours as needed for severe pain.   naloxone (NARCAN) 2 MG/2ML injection  Self, Spouse/Significant Other Yes No   Sig: Inject 2 mL into a catheter in a vein   nitrofurantoin (MACROBID) 100 mg capsule  Self, Spouse/Significant Other Yes No   Patient not taking: Reported on 8/7/2024   nystatin (MYCOSTATIN) cream  Self, Spouse/Significant Other Yes No   Sig: APPLY TO AFFECTED AREAS TWICE A DAY. (MIX WITH HYDROCORTISONE)   nystatin (MYCOSTATIN) powder  Self, Spouse/Significant Other Yes No   Sig: APPLY TO THE RASH IN THE GROIN ONCE DAILY   oxybutynin (DITROPAN XL) 15 MG 24 hr tablet  Self, Spouse/Significant Other Yes No   Sig: Take 15 mg by mouth daily.   simvastatin (ZOCOR) 20 mg tablet  Self, Spouse/Significant Other Yes No   Sig: Take 20 mg by mouth daily at bedtime.   sodium chloride, PF, 0.9 %  Self, Spouse/Significant Other No No   Sig: 10 mL by Intracatheter route daily Intracatheter flushing daily   Patient not taking: Reported on 7/25/2023   solifenacin (VESICARE) 10 MG tablet  Self, Spouse/Significant Other No No   Sig: Take 1 tablet (10 mg total) by mouth daily   tobramycin-dexamethasone (TOBRADEX) ophthalmic suspension  Self, Spouse/Significant Other Yes No   Sig: INSTILL 1 DROP IN THE RIGHT EYE 4 TIMES DAILY   Patient not taking: Reported on 6/10/2024   triamcinolone (KENALOG) 0.1 % cream  Self, Spouse/Significant Other Yes No   Sig:     Patient not taking: Reported on 4/25/2023   zolpidem (AMBIEN) 10 mg tablet  Self, Spouse/Significant Other Yes No   Sig: Take  by mouth Daily   Patient not taking: Reported on 1/27/2025      Facility-Administered Medications: None     Patient's Medications   Discharge Prescriptions    No medications on file     No discharge procedures on file.  ED SEPSIS DOCUMENTATION            Yuliana Suarez MD  03/03/25 8713

## 2025-02-24 NOTE — ED ATTENDING ATTESTATION
Final Diagnoses:     1. Pneumonia    2. SOB (shortness of breath)    3. SIRS (systemic inflammatory response syndrome) ([facility])      ED Course as of 02/26/25 0659   Mon Feb 24, 2025   0703 WBC(!): 11.59   0703 Hemoglobin: 14.5   0703 Platelet Count: 250       SRINIVASA, Jan Isaac MD, saw and evaluated the patient. All available labs and X-rays were ordered by me or the resident / non-physician and have been reviewed by myself. I discussed the patient with the resident / non-physician and agree with the resident's / non-physician practitioner's findings and plan as documented in the resident's / non-physician practicitioner's note, except where noted.   At this point, I agree with the current assessment done in the ED.   I was present during key portions of all procedures performed unless otherwise stated.     HPI:  NURSING TRIAGE:    This is a 89 y.o. female presenting for evaluation of cough, shortness of breath.  For the last several days to weeks she has been having increasing cough that is rhonchorous.  She has orthopnea.  She feels the cough is productive.  She has had 2 x-rays which were negative.  History of bronchitis in the past that had a very long course.  No measured fevers at home.  No nausea or vomiting.  Denies any chest pain except with deep breaths because of the amount of coughing that she is doing.  No dizziness or lightheadedness.  No falls or injury.  No lower extremity edema that is new.  No sick contacts.  Lives at home with her .  History of left-sided mastectomy secondary breast cancer x 3, left limb alert Chief Complaint   Patient presents with    Shortness of Breath     Pt with cough/SOB for a while now. Pt with productive cough. Pt 90-92% on room air for EMS. Pt with left sided pain from cough. No chest pain.      PHYSICAL: ASSESSMENT + PLAN:   Pertinent: Rhonchorous sounds throughout, mild tachycardia heart rate 90-1 05.  Saturating 95% on 4 L, 90 to 92% at home on room  air    General: VS reviewed  Appears in NAD  awake, alert.   Well-nourished, well-developed. Appears stated age.   Speaking normally in full sentences.   Head: Normocephalic, atraumatic  Frequent coughing during the interview  Eyes: EOM-I. No diplopia.   No hyphema.   No subconjunctival hemorrhages.  Symmetrical lids.   ENT: Atraumatic external nose and ears.    MMM  No malocclusion. No stridor. Normal phonation. No drooling. Normal swallowing.   Neck: No JVD.  CV: No pallor noted  Lungs:   Mild tachypnea with bronchospastic coughing episodes  No respiratory distress  Abd: soft nt nd no rebound/guarding  MSK:   FROM spontaneously trace lower extremity edema  Skin: Dry, intact.   Neuro: Awake, alert, GCS15, CN II-XII grossly intact.   Motor grossly intact.  Psychiatric/Behavioral: interacting normally; appropriate mood/affect.    Exam: deferred    Vitals:    02/25/25 1554 02/25/25 2127 02/25/25 2223 02/26/25 0539   BP: 128/59 126/75 126/60    BP Location:       Pulse: 71 67 67    Resp: 15      Temp: 98.3 °F (36.8 °C)  97.7 °F (36.5 °C)    TempSrc: Oral      SpO2: 96% 91% 92%    Weight:    71.4 kg (157 lb 6.4 oz)   Height:        - Given patient's concerns, will do a cardiac workup.   - Will do an EKG for arrythmia, strain; troponin for same as per protocol for evaluation of ACS.   - CBC for anemia; CMP for kidney function and electrolytes.   - Will check CXR for pneumonia, PTX, fluid overload  HEART score:  History 1=Moderate suspicious   ECG 0=Normal   Age 2= > 65 years   Risk Factors 2= > 3 risk factors, or history of atherosclerotic disease   Troponin 0= Less than or equal to 12 ng/L   Total 5   - Disposition per workup.     Past Medical History:   Diagnosis Date    Anxiety     Asthma     Atrial fibrillation (HCC)     Breast cancer (HCC) 1992    left    Chest pain, unspecified     CHF (congestive heart failure) (HCC)     Diabetes mellitus (HCC)     GERD (gastroesophageal reflux disease)     History of radiation  exposure 1992    Hyperlipidemia     Hypertension     Irregular heart beat     Afib    Migraine     Obesity     Pericardial effusion     Pericarditis           There are no obvious limitations to social determinants of care.   Nursing note reviewed.   Vitals reviewed.   Orders placed by myself and/or advanced practitioner / resident.    Previous chart was reviewed  History obtained from: EMS and Patient  Language barrier: None  Limitations to the history obtained: None Critical Care Time:      Past Medical: Past Surgical:    has a past medical history of Anxiety, Asthma, Atrial fibrillation ([facility]), Breast cancer ([facility]) (1992), Chest pain, unspecified, CHF (congestive heart failure) (HCC), Diabetes mellitus (HCC), GERD (gastroesophageal reflux disease), History of radiation exposure (1992), Hyperlipidemia, Hypertension, Irregular heart beat, Migraine, Obesity, Pericardial effusion, and Pericarditis.  has a past surgical history that includes Breast surgery; Abdominal adhesion surgery; Eye surgery; Wrist surgery; Back surgery; Appendectomy; Hysterectomy; Cholecystectomy; Mastectomy (Left, 1994); Breast lumpectomy (Left, 1992); Augmentation mammaplasty (Left, 1994); US guided breast biopsy left complete (Left, 08/31/2020); US guided breast lymph node biopsy left (Left, 08/31/2020); US breast needle loc left (Left, 11/02/2020); Cardiac surgery; pr jill-implant capsulectomy breast complete (Left, 11/17/2020); pr removal intact breast implant (Left, 11/17/2020); Breast lumpectomy (Left, 11/17/2020); Lymph node dissection (Left, 11/17/2020); FLAP LOCAL TRUNK (Left, 11/17/2020); IR drainage tube placement (04/01/2021); IR drainage tube check with sclerosis (04/23/2021); IR drainage tube placement with sclerosis (05/14/2021); IR drainage tube check with sclerosis (06/03/2021); IR drainage tube check with sclerosis (06/17/2021); IR drainage tube check with sclerosis (06/28/2021); IR drainage tube check with sclerosis  (07/07/2021); and IR drainage tube check with sclerosis (07/27/2021).   Social: Cardiac (Echo/Cath)   Social History     Substance and Sexual Activity   Alcohol Use Yes    Comment: social     Social History     Tobacco Use   Smoking Status Never   Smokeless Tobacco Never     Social History     Substance and Sexual Activity   Drug Use No    No results found for this or any previous visit.    No results found for this or any previous visit.    No results found for this or any previous visit.     Labs: Imaging:   Labs Reviewed   CBC AND DIFFERENTIAL - Abnormal       Result Value Ref Range Status    WBC 11.59 (*) 4.31 - 10.16 Thousand/uL Final    RBC 4.95  3.81 - 5.12 Million/uL Final    Hemoglobin 14.5  11.5 - 15.4 g/dL Final    Hematocrit 44.2  34.8 - 46.1 % Final    MCV 89  82 - 98 fL Final    MCH 29.3  26.8 - 34.3 pg Final    MCHC 32.8  31.4 - 37.4 g/dL Final    RDW 12.8  11.6 - 15.1 % Final    MPV 11.6  8.9 - 12.7 fL Final    Platelets 250  149 - 390 Thousands/uL Final    nRBC 0  /100 WBCs Final    Segmented % 63  43 - 75 % Final    Immature Grans % 0  0 - 2 % Final    Lymphocytes % 26  14 - 44 % Final    Monocytes % 8  4 - 12 % Final    Eosinophils Relative 3  0 - 6 % Final    Basophils Relative 0  0 - 1 % Final    Absolute Neutrophils 7.35  1.85 - 7.62 Thousands/µL Final    Absolute Immature Grans 0.05  0.00 - 0.20 Thousand/uL Final    Absolute Lymphocytes 2.95  0.60 - 4.47 Thousands/µL Final    Absolute Monocytes 0.92  0.17 - 1.22 Thousand/µL Final    Eosinophils Absolute 0.30  0.00 - 0.61 Thousand/µL Final    Basophils Absolute 0.02  0.00 - 0.10 Thousands/µL Final   COMPREHENSIVE METABOLIC PANEL - Abnormal    Sodium 137  135 - 147 mmol/L Final    Potassium 3.9  3.5 - 5.3 mmol/L Final    Chloride 102  96 - 108 mmol/L Final    CO2 25  21 - 32 mmol/L Final    ANION GAP 10  4 - 13 mmol/L Final    BUN 18  5 - 25 mg/dL Final    Creatinine 1.00  0.60 - 1.30 mg/dL Final    Comment: Standardized to IDMS reference method     Glucose 145 (*) 65 - 140 mg/dL Final    Comment: If the patient is fasting, the ADA then defines impaired fasting glucose as > 100 mg/dL and diabetes as > or equal to 123 mg/dL.    Calcium 10.2  8.4 - 10.2 mg/dL Final    AST 16  13 - 39 U/L Final    ALT 12  7 - 52 U/L Final    Comment: Specimen collection should occur prior to Sulfasalazine administration due to the potential for falsely depressed results.     Alkaline Phosphatase 137 (*) 34 - 104 U/L Final    Total Protein 7.5  6.4 - 8.4 g/dL Final    Albumin 3.7  3.5 - 5.0 g/dL Final    Total Bilirubin 1.42 (*) 0.20 - 1.00 mg/dL Final    Comment: Use of this assay is not recommended for patients undergoing treatment with eltrombopag due to the potential for falsely elevated results.  N-acetyl-p-benzoquinone imine (metabolite of Acetaminophen) will generate erroneously low results in samples for patients that have taken an overdose of Acetaminophen.    eGFR 50  ml/min/1.73sq m Final    Narrative:     National Kidney Disease Foundation guidelines for Chronic Kidney Disease (CKD):     Stage 1 with normal or high GFR (GFR > 90 mL/min/1.73 square meters)    Stage 2 Mild CKD (GFR = 60-89 mL/min/1.73 square meters)    Stage 3A Moderate CKD (GFR = 45-59 mL/min/1.73 square meters)    Stage 3B Moderate CKD (GFR = 30-44 mL/min/1.73 square meters)    Stage 4 Severe CKD (GFR = 15-29 mL/min/1.73 square meters)    Stage 5 End Stage CKD (GFR <15 mL/min/1.73 square meters)  Note: GFR calculation is accurate only with a steady state creatinine   B-TYPE NATRIURETIC PEPTIDE (BNP) - Abnormal     (*) 0 - 100 pg/mL Final   PROTIME-INR - Abnormal    Protime 16.4 (*) 12.3 - 15.0 seconds Final    INR 1.29 (*) 0.85 - 1.19 Final    Narrative:     INR Therapeutic Range    Indication                                             INR Range      Atrial Fibrillation                                               2.0-3.0  Hypercoagulable State                                     "2.0.2.3  Left Ventricular Asist Device                            2.0-3.0  Mechanical Heart Valve                                  -    Aortic(with afib, MI, embolism, HF, LA enlargement,    and/or coagulopathy)                                     2.0-3.0 (2.5-3.5)     Mitral                                                             2.5-3.5  Prosthetic/Bioprosthetic Heart Valve               2.0-3.0  Venous thromboembolism (VTE: VT, PE        2.0-3.0   HS TROPONIN I 0HR - Normal    hs TnI 0hr 6  \"Refer to ACS Flowchart\"- see link ng/L Final    Comment:                                              Initial (time 0) result  If >=50 ng/L, Myocardial injury suggested ;  Type of myocardial injury and treatment strategy  to be determined.  If 5-49 ng/L, a delta result at 2 hours and or 4 hours will be needed to further evaluate.  If <4 ng/L, and chest pain has been >3 hours since onset, patient may qualify for discharge based on the HEART score in the ED.  If <5 ng/L and <3hours since onset of chest pain, a delta result at 2 hours will be needed to further evaluate.    HS Troponin 99th Percentile URL of a Health Population=12 ng/L with a 95% Confidence Interval of 8-18 ng/L.    Second Troponin (time 2 hours)  If calculated delta >= 20 ng/L,  Myocardial injury suggested ; Type of myocardial injury and treatment strategy to be determined.  If 5-49 ng/L and the calculated delta is 5-19 ng/L, consult medical service for evaluation.  Continue evaluation for ischemia on ecg and other possible etiology and repeat hs troponin at 4 hours.  If delta is <5 ng/L at 2 hours, consider discharge based on risk stratification via the HEART score (if in ED), or GREG risk score in IP/Observation.    HS Troponin 99th Percentile URL of a Health Population=12 ng/L with a 95% Confidence Interval of 8-18 ng/L.   LACTIC ACID, PLASMA (W/REFLEX IF RESULT > 2.0) - Normal    LACTIC ACID 1.2  0.5 - 2.0 mmol/L Final    Narrative:     Result may be " "elevated if tourniquet was used during collection.   HS TROPONIN I 2HR - Normal    hs TnI 2hr 5  \"Refer to ACS Flowchart\"- see link ng/L Final    Comment:                                              Initial (time 0) result  If >=50 ng/L, Myocardial injury suggested ;  Type of myocardial injury and treatment strategy  to be determined.  If 5-49 ng/L, a delta result at 2 hours and or 4 hours will be needed to further evaluate.  If <4 ng/L, and chest pain has been >3 hours since onset, patient may qualify for discharge based on the HEART score in the ED.  If <5 ng/L and <3hours since onset of chest pain, a delta result at 2 hours will be needed to further evaluate.    HS Troponin 99th Percentile URL of a Health Population=12 ng/L with a 95% Confidence Interval of 8-18 ng/L.    Second Troponin (time 2 hours)  If calculated delta >= 20 ng/L,  Myocardial injury suggested ; Type of myocardial injury and treatment strategy to be determined.  If 5-49 ng/L and the calculated delta is 5-19 ng/L, consult medical service for evaluation.  Continue evaluation for ischemia on ecg and other possible etiology and repeat hs troponin at 4 hours.  If delta is <5 ng/L at 2 hours, consider discharge based on risk stratification via the HEART score (if in ED), or GREG risk score in IP/Observation.    HS Troponin 99th Percentile URL of a Health Population=12 ng/L with a 95% Confidence Interval of 8-18 ng/L.    Delta 2hr hsTnI -1  <20 ng/L Final   PROCALCITONIN TEST - Normal    Procalcitonin <0.05  <=0.25 ng/ml Final    Comment: Suspected Lower Respiratory Tract Infection (LRTI):  - LESS than or EQUAL to 0.25 ng/mL:   low likelihood for bacterial LRTI; antibiotics DISCOURAGED.  - GREATER than 0.25 ng/mL:   increased likelihood for bacterial LRTI; antibiotics ENCOURAGED.    Suspected Sepsis:  - Strongly consider initiating antibiotics in ALL UNSTABLE patients.  - LESS than or EQUAL to 0.5 ng/mL:   low likelihood for bacterial sepsis; " antibiotics DISCOURAGED.  - GREATER than 0.5 ng/mL:   increased likelihood for bacterial sepsis; antibiotics ENCOURAGED.  - GREATER than 2 ng/mL:   high risk for severe sepsis / septic shock; antibiotics strongly ENCOURAGED.    Decisions on antibiotic use should not be based solely on Procalcitonin (PCT) levels. If PCT is low but uncertainty exists with stopping antibiotics, repeat PCT in 6-24 hours to confirm the low level. If antibiotics are administered (regardless if initial PCT was high or low), repeat PCT every 1-2 days to consider early antibiotic cessation (when GREATER than 80% decrease from the peak OR when PCT drops below designated cutoffs, whichever comes first), so long as the infection is NOT one that typically requires prolonged treatment durations (e.g., bone/joint infections, endocarditis, Staph. aureus bacteremia).    Situations of FALSE-POSITIVE Procalcitonin values:  1) Newborns < 72 hours old  2) Massive stress from severe trauma / burns, major surgery, acute pancreatitis, cardiogenic / hemorrhagic shock, sickle cell crisis, or other organ perfusion abnormalities  3) Malaria and some Candidal infections  4) Treatment with agents that stimulate cytokines (e.g., OKT3, anti-lymphocyte globulins, alemtuzumab, IL-2, granulocyte transfusion [NOT GCSFs])  5) Chronic renal disease causes elevated baseline levels (consider GREATER than 0.75 ng/mL as an abnormal cut-off); initiating HD/CRRT may cause transient decreases  6) Paraneoplastic syndromes from medullary thyroid or SCLC, some forms of vasculitis, and acute atxjw-we-baaa disease    Situations of FALSE-NEGATIVE Procalcitonin values:  1) Too early in clinical course for PCT to have reached its peak (may repeat in 6-24 hours to confirm low level)  2) Localized infection WITHOUT systemic (SIRS / sepsis) response (e.g., an abscess, osteomyelitis, cystitis)  3) Mycobacteria (e.g., Tuberculosis, MAC)  4) Cystic fibrosis exacerbations     APTT - Normal     PTT 31  23 - 34 seconds Final    Comment: Therapeutic Heparin Range =  60-90 seconds   SPUTUM CULTURE AND GRAM STAIN    CT chest wo contrast   Final Result      Right basilar consolidation likely a combination of pneumonia and atelectasis. Debris/plugging throughout the right lower lobe bronchus and bronchus intermedius; aspiration is not excluded      New 5 mm right upper lobe nodule.      Small right pleural effusion.      Lytic osseous lesions suspicious for metastases.      The study was marked in EPIC for immediate notification.         Workstation performed: [id number]         XR chest 2 views   Final Result      Moderate opacity in the right lower lobe compatible with pneumonia      Pleural thickening along the left lateral chest wall, visible in retrospect in January 2024, new since July 2024, question rib fractures with pleural thickening or metastatic disease. Given history of breast cancer, recommend further evaluation with a    chest CT with no contrast.      I notified Dr. SANDRA GEORGE on 2/24/2025 8:19 AM by epic secure chat. The study was marked in EPIC for immediate notification.                        Workstation performed: [id number]            Medications: Code Status:   Medications   enoxaparin (LOVENOX) subcutaneous injection 40 mg (40 mg Subcutaneous Given 2/25/25 0928)   morphine (MSIR) IR tablet 15 mg (15 mg Oral Given 2/25/25 2018)   naloxone (NARCAN) 0.04 mg/mL syringe 0.04 mg (has no administration in time range)   oxybutynin (DITROPAN-XL) 24 hr tablet 10 mg (10 mg Oral Given 2/25/25 0929)   albuterol (PROVENTIL HFA,VENTOLIN HFA) inhaler 2 puff (has no administration in time range)   anastrozole (ARIMIDEX) tablet 1 mg (1 mg Oral Given 2/25/25 0932)   pantoprazole (PROTONIX) EC tablet 40 mg (40 mg Oral Given 2/26/25 0601)   metoprolol tartrate (LOPRESSOR) tablet 50 mg (50 mg Oral Given 2/25/25 2128)   pravastatin (PRAVACHOL) tablet 40 mg (40 mg Oral Given 2/25/25 1628)    ceftriaxone (ROCEPHIN) 1 g/50 mL in dextrose IVPB (1,000 mg Intravenous New Bag 2/25/25 1008)     And   azithromycin (ZITHROMAX) tablet 500 mg (500 mg Oral Given 2/25/25 1321)   insulin glargine (LANTUS) subcutaneous injection 40 Units 0.4 mL (40 Units Subcutaneous Given 2/25/25 2127)   fluticasone (ARNUITY ELLIPTA) 200 MCG/ACT inhaler 1 puff (1 puff Inhalation Not Given 2/25/25 0935)   insulin lispro (HumALOG/ADMELOG) 100 units/mL subcutaneous injection 1-5 Units ( Subcutaneous Not Given 2/25/25 1627)   insulin lispro (HumALOG/ADMELOG) 100 units/mL subcutaneous injection 1-5 Units ( Subcutaneous Not Given 2/25/25 2124)   acetaminophen (TYLENOL) tablet 975 mg (975 mg Oral Given 2/26/25 0601)   lidocaine (LIDODERM) 5 % patch 1 patch (1 patch Topical Patch Removed 2/25/25 2128)   dextromethorphan-guaiFENesin (ROBITUSSIN DM) oral syrup 10 mL (has no administration in time range)   albuterol inhalation solution 10 mg (10 mg Nebulization Given 2/24/25 0624)   ipratropium (ATROVENT) 0.02 % inhalation solution 1 mg (1 mg Nebulization Given 2/24/25 0624)   sodium chloride 0.9 % inhalation solution 12 mL (12 mL Nebulization Given 2/24/25 0624)   methylPREDNISolone sodium succinate (Solu-MEDROL) injection 125 mg (125 mg Intravenous Given 2/24/25 0656)   magnesium sulfate 2 g/50 mL IVPB (premix) 2 g (0 g Intravenous Stopped 2/24/25 0930)   acetaminophen (TYLENOL) tablet 650 mg (650 mg Oral Given 2/24/25 0936)   ceftriaxone (ROCEPHIN) 1 g/50 mL in dextrose IVPB (0 mg Intravenous Stopped 2/24/25 1133)    Code Status: Level 1 - Full Code  Advance Directive and Living Will:      Power of :    POLST:       Orders Placed This Encounter   Procedures    Blood culture #1    Blood culture #2    Sputum culture and Gram stain    Strep Pneumoniae, Urine    Legionella antigen, Urine    COVID/FLU/RSV    XR chest 2 views    CT chest wo contrast    CBC and differential    Comprehensive metabolic panel    HS Troponin 0hr (reflex  protocol)    Lactic acid, plasma (w/reflex if result > 2.0)    B-Type Natriuretic Peptide(BNP)    HS Troponin I 2hr    Procalcitonin    Protime-INR    APTT    Basic metabolic panel    CBC and Platelet    Magnesium    Procalcitonin    CBC and differential    Basic metabolic panel    Diet Cardiovascular; Cardiac; Consistent Carbohydrate Diet Level 3 (6 carb servings/90 grams CHO/meal), Dysphagia 3-Dental Soft; Thin Liquid    Insert peripheral IV    Nursing communication Continue IV as ordered.    Notify admitting physician    Notify admitting physician on arrival    Vital Signs per unit routine    Elevate Head of Bed 30 degrees or greater    Incentive spirometry    Activity as Tolerated    Out of Bed to Chair    Commode at Bedside    I/O    Oral care    Daily weights    Nursing Communication Provide patient with education on Pneumonia Zone Tool and document in the Education activity    Apply SCD or Foot pumps    Insulin Subcutaneous Notify Physician    Insulin Subcutaneous Instruction    Hypoglycemia Protocol    Fingerstick Glucose (POCT) Before meals and at bedtime    Fingerstick Glucose (POCT)    Level 1-Full Code: all life saving measures are indicated    SPEECH bedside swallowing evaluation and treatment    SPEECH Language eval and treatment    ECG 12 lead    ECG 12 lead    INPATIENT ADMISSION    Aspiration precautions     Time reflects when diagnosis was documented in both MDM as applicable and the Disposition within this note       Time User Action Codes Description Comment    2/24/2025  9:53 AM Andrew Dan Add [J18.9] Pneumonia     2/24/2025  9:53 AM Andrew Dan Add [R06.02] SOB (shortness of breath)     2/24/2025  9:53 AM Andrew Dan Add [R65.10] SIRS (systemic inflammatory response syndrome) (HCC)           ED Disposition       ED Disposition   Admit    Condition   Stable    Date/Time   Mon Feb 24, 2025  9:53 AM    Comment                  Follow-up Information    None       Current Discharge  Medication List        CONTINUE these medications which have NOT CHANGED    Details   anastrozole (ARIMIDEX) 1 mg tablet Take 1 tablet (1 mg total) by mouth daily  Qty: 90 tablet, Refills: 3    Associated Diagnoses: 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 Droplet Pen Needles 32G X 4 MM MISC       EPINEPHrine (EPIPEN) 0.3 mg/0.3 mL SOAJ Inject 0.3 mL as directed      FLOVENT  MCG/ACT inhaler INHALE 2 PUFF BY INHALATION ROUTE 2 TIMES EVERY DAY  Refills: 6      Flowflex COVID-19 Ag Home Test KIT Use as directed      !! glucose blood test strip TEST TWICE DAILY OR AS DIRECTED DX: E11.9      hydrocodone-chlorpheniramine polistirex (TUSSIONEX) 10-8 mg/5 mL ER suspension Take 5 mL by mouth every 12 (twelve) hours      hydrocortisone 2.5 % cream APPLY TO THE AFFECTED AREA TWICE DAILY. (MIX WITH NYSTATIN)      insulin aspart (NovoLOG) 100 Units/mL injection pen Novolog Flexpen U-100 Insulin aspart 100 unit/mL (3 mL) subcutaneous      Insulin Glargine (TOUJEO) 300 units/mL CONCETRATED U-300 injection pen Toujeo SoloStar U-300 Insulin 300 unit/mL (1.5 mL) subcutaneous pen   INJECT 67 UNITS BY SUBCUTANEOUS ROUTE AS PER INSULIN PROTOCOL      lansoprazole (PREVACID) 30 mg capsule lansoprazole 30 mg capsule,delayed release      Lidocaine 5 % CREA Apply 1 application topically as needed (pain)  Qty: 1 Tube, Refills: 0    Associated Diagnoses: Chest pain      lidocaine-prilocaine (EMLA) cream APPLY FOR 12 HOURS, AND THEN OFF 12 HOURS AS NEEDED  Refills: 2      metoprolol tartrate (LOPRESSOR) 50 mg tablet Take 50 mg by mouth every 12 (twelve) hours       morphine (MS CONTIN) 15 mg 12 hr tablet Take 15 mg by mouth every 12 (twelve) hours as needed for severe pain      morphine (MSIR) 15 mg tablet Take 15 mg by mouth every 8 (eight) hours as needed for severe pain.      Naloxegol  Oxalate (Movantik) 12.5 MG TABS Take by mouth      naloxone (NARCAN) 2 MG/2ML injection Inject 2 mL into a catheter in a vein      nitrofurantoin (MACROBID) 100 mg capsule       NON FORMULARY Domperidone 20 mg TID      !! NOVOFINE 32G X 6 MM MISC 2 (two) times a day As directed  Refills: 5      nystatin (MYCOSTATIN) cream APPLY TO AFFECTED AREAS TWICE A DAY. (MIX WITH HYDROCORTISONE)      nystatin (MYCOSTATIN) powder APPLY TO THE RASH IN THE GROIN ONCE DAILY      !! ONE TOUCH ULTRA TEST test strip TEST TWICE DAILY OR AS DIRECTED DX: E11.9  Refills: 5      ONETOUCH DELICA LANCETS 33G MISC TEST TWICE DAILY OR AS DIRECTED  Refills: 5      oxybutynin (DITROPAN XL) 15 MG 24 hr tablet Take 15 mg by mouth daily.      simvastatin (ZOCOR) 20 mg tablet Take 20 mg by mouth daily at bedtime.      sodium chloride, PF, 0.9 % 10 mL by Intracatheter route daily Intracatheter flushing daily  Qty: 300 mL, Refills: 0    Associated Diagnoses: Seroma of breast      Sodium Fluoride 5000 PPM 1.1 % GEL       solifenacin (VESICARE) 10 MG tablet Take 1 tablet (10 mg total) by mouth daily  Qty: 90 tablet, Refills: 0    Comments: CORRECTED SCRIPT!  Please note change to QUANTITY and REFILLS as requested; process accordingly.  Thank you! (1/5/23)  Associated Diagnoses: OAB (overactive bladder)      tobramycin-dexamethasone (TOBRADEX) ophthalmic suspension INSTILL 1 DROP IN THE RIGHT EYE 4 TIMES DAILY      Toujj SoloStar 300 units/mL CONCENTRATED U-300 injection pen (1-unit dial) INJECT 72 UNITS BY SUBCUTANEOUS ROUTE AS PER INSULIN PROTOCOL      triamcinolone (KENALOG) 0.1 % cream        VENTOLIN  (90 Base) MCG/ACT inhaler        VITAMIN K PO Take by mouth      zolpidem (AMBIEN) 10 mg tablet Take by mouth Daily       !! - Potential duplicate medications found. Please discuss with provider.        No discharge procedures on file.  Prior to Admission Medications   Prescriptions Last Dose Informant Patient Reported? Taking?   Beclomethasone  Dipropionate (QVAR IN)  Self, Spouse/Significant Other Yes No   Sig: Inhale 2 puffs if needed   Droplet Pen Needles 32G X 4 MM MISC  Self, Spouse/Significant Other Yes No   EPINEPHrine (EPIPEN) 0.3 mg/0.3 mL SOAJ  Self, Spouse/Significant Other Yes No   Sig: Inject 0.3 mL as directed   FLOVENT  MCG/ACT inhaler  Self, Spouse/Significant Other Yes No   Sig: INHALE 2 PUFF BY INHALATION ROUTE 2 TIMES EVERY DAY   Flowflex COVID-19 Ag Home Test KIT  Self, Spouse/Significant Other Yes No   Sig: Use as directed   Patient not taking: Reported on 3/20/2024   Insulin Glargine (TOUJEO) 300 units/mL CONCETRATED U-300 injection pen  Self, Spouse/Significant Other Yes No   Sig: Toujeo SoloStar U-300 Insulin 300 unit/mL (1.5 mL) subcutaneous pen   INJECT 67 UNITS BY SUBCUTANEOUS ROUTE AS PER INSULIN PROTOCOL   Lidocaine 5 % CREA  Self, Spouse/Significant Other No No   Sig: Apply 1 application topically as needed (pain)   Patient not taking: Reported on 2024   NON FORMULARY  Self, Spouse/Significant Other Yes No   Sig: Domperidone 20 mg TID   Patient not taking: Reported on 6/10/2024   NOVOFINE 32G X 6 MM MISC  Self, Spouse/Significant Other Yes No   Si (two) times a day As directed   Naloxegol Oxalate (Movantik) 12.5 MG TABS  Self, Spouse/Significant Other Yes No   Sig: Take by mouth   Patient not taking: Reported on 2025   ONE TOUCH ULTRA TEST test strip  Self, Spouse/Significant Other Yes No   Sig: TEST TWICE DAILY OR AS DIRECTED DX: E11.9   ONETOUCH DELICA LANCETS 33G MISC  Self, Spouse/Significant Other Yes No   Sig: TEST TWICE DAILY OR AS DIRECTED   Sodium Fluoride 5000 PPM 1.1 % GEL  Self, Spouse/Significant Other Yes No   Patient not taking: Reported on 6/10/2024   Toujeo SoloStar 300 units/mL CONCENTRATED U-300 injection pen (1-unit dial)  Self, Spouse/Significant Other Yes No   Sig: INJECT 72 UNITS BY SUBCUTANEOUS ROUTE AS PER INSULIN PROTOCOL   VENTOLIN  (90 Base) MCG/ACT inhaler  Self,  Spouse/Significant Other Yes No   Sig:     VITAMIN K PO  Self, Spouse/Significant Other Yes No   Sig: Take by mouth   anastrozole (ARIMIDEX) 1 mg tablet  Self, Spouse/Significant Other No No   Sig: Take 1 tablet (1 mg total) by mouth daily   azithromycin (ZITHROMAX) 250 mg tablet  Self, Spouse/Significant Other Yes No   Patient not taking: Reported on 1/27/2025   glucose blood test strip  Self, Spouse/Significant Other Yes No   Sig: TEST TWICE DAILY OR AS DIRECTED DX: E11.9   Patient not taking: Reported on 8/13/2024   hydrocodone-chlorpheniramine polistirex (TUSSIONEX) 10-8 mg/5 mL ER suspension  Self, Spouse/Significant Other Yes No   Sig: Take 5 mL by mouth every 12 (twelve) hours   Patient not taking: Reported on 4/25/2023   hydrocortisone 2.5 % cream  Self, Spouse/Significant Other Yes No   Sig: APPLY TO THE AFFECTED AREA TWICE DAILY. (MIX WITH NYSTATIN)   insulin aspart (NovoLOG) 100 Units/mL injection pen  Self, Spouse/Significant Other Yes No   Sig: Novolog Flexpen U-100 Insulin aspart 100 unit/mL (3 mL) subcutaneous   lansoprazole (PREVACID) 30 mg capsule  Self, Spouse/Significant Other Yes No   Sig: lansoprazole 30 mg capsule,delayed release   lidocaine-prilocaine (EMLA) cream  Self, Spouse/Significant Other Yes No   Sig: APPLY FOR 12 HOURS, AND THEN OFF 12 HOURS AS NEEDED   Patient not taking: Reported on 4/25/2023   metoprolol tartrate (LOPRESSOR) 50 mg tablet  Self, Spouse/Significant Other Yes No   Sig: Take 50 mg by mouth every 12 (twelve) hours    morphine (MS CONTIN) 15 mg 12 hr tablet  Self, Spouse/Significant Other Yes No   Sig: Take 15 mg by mouth every 12 (twelve) hours as needed for severe pain   morphine (MSIR) 15 mg tablet  Self, Spouse/Significant Other Yes No   Sig: Take 15 mg by mouth every 8 (eight) hours as needed for severe pain.   naloxone (NARCAN) 2 MG/2ML injection  Self, Spouse/Significant Other Yes No   Sig: Inject 2 mL into a catheter in a vein   nitrofurantoin (MACROBID) 100 mg  "capsule  Self, Spouse/Significant Other Yes No   Patient not taking: Reported on 8/7/2024   nystatin (MYCOSTATIN) cream  Self, Spouse/Significant Other Yes No   Sig: APPLY TO AFFECTED AREAS TWICE A DAY. (MIX WITH HYDROCORTISONE)   nystatin (MYCOSTATIN) powder  Self, Spouse/Significant Other Yes No   Sig: APPLY TO THE RASH IN THE GROIN ONCE DAILY   oxybutynin (DITROPAN XL) 15 MG 24 hr tablet  Self, Spouse/Significant Other Yes No   Sig: Take 15 mg by mouth daily.   simvastatin (ZOCOR) 20 mg tablet  Self, Spouse/Significant Other Yes No   Sig: Take 20 mg by mouth daily at bedtime.   sodium chloride, PF, 0.9 %  Self, Spouse/Significant Other No No   Sig: 10 mL by Intracatheter route daily Intracatheter flushing daily   Patient not taking: Reported on 7/25/2023   solifenacin (VESICARE) 10 MG tablet  Self, Spouse/Significant Other No No   Sig: Take 1 tablet (10 mg total) by mouth daily   tobramycin-dexamethasone (TOBRADEX) ophthalmic suspension  Self, Spouse/Significant Other Yes No   Sig: INSTILL 1 DROP IN THE RIGHT EYE 4 TIMES DAILY   Patient not taking: Reported on 6/10/2024   triamcinolone (KENALOG) 0.1 % cream  Self, Spouse/Significant Other Yes No   Sig:     Patient not taking: Reported on 4/25/2023   zolpidem (AMBIEN) 10 mg tablet  Self, Spouse/Significant Other Yes No   Sig: Take by mouth Daily   Patient not taking: Reported on 1/27/2025      Facility-Administered Medications: None                        Portions of the record may have been created with voice recognition software. Occasional wrong word or \"sound a like\" substitutions may have occurred due to the inherent limitations of voice recognition software. Read the chart carefully and recognize, using context, where substitutions have occurred.    Electronically signed by:  Jan Isaac  "

## 2025-02-24 NOTE — SEPSIS NOTE
Sepsis Note   Barbra De Oliveira 89 y.o. female MRN: [id number]  Unit/Bed#: ED 22 Encounter: [id number]       Initial Sepsis Screening       Row Name 02/24/25 0937                Is the patient's history suggestive of a new or worsening infection? Yes (Proceed)  -BM        Suspected source of infection pneumonia  -BM        Indicate SIRS criteria Tachycardia > 90 bpm;Tachypnea > 20 resp per min  -BM        Are two or more of the above signs & symptoms of infection both present and new to the patient? Yes (Proceed)  -BM        Assess for evidence of organ dysfunction: Are any of the below criteria present within 6 hours of suspected infection and SIRS criteria that are NOT considered to be chronic conditions? --                  User Key  (r) = Recorded By, (t) = Taken By, (c) = Cosigned By      Initials Name Provider Type    BM Andrew Dan MD Resident                    Patient has history of CHF for fluid resuscitation.    There is no height or weight on file to calculate BMI.  Wt Readings from Last 1 Encounters:   02/14/25 73.9 kg (163 lb)        Ideal body weight: 50.1 kg (110 lb 7.2 oz)  Adjusted ideal body weight: 59.6 kg (131 lb 7.5 oz)

## 2025-02-25 LAB
ANION GAP SERPL CALCULATED.3IONS-SCNC: 11 MMOL/L (ref 4–13)
BUN SERPL-MCNC: 23 MG/DL (ref 5–25)
CALCIUM SERPL-MCNC: 9.9 MG/DL (ref 8.4–10.2)
CHLORIDE SERPL-SCNC: 99 MMOL/L (ref 96–108)
CO2 SERPL-SCNC: 25 MMOL/L (ref 21–32)
CREAT SERPL-MCNC: 1.09 MG/DL (ref 0.6–1.3)
ERYTHROCYTE [DISTWIDTH] IN BLOOD BY AUTOMATED COUNT: 12.7 % (ref 11.6–15.1)
FLUAV RNA RESP QL NAA+PROBE: NEGATIVE
FLUBV RNA RESP QL NAA+PROBE: NEGATIVE
GFR SERPL CREATININE-BSD FRML MDRD: 45 ML/MIN/1.73SQ M
GLUCOSE SERPL-MCNC: 114 MG/DL (ref 65–140)
GLUCOSE SERPL-MCNC: 135 MG/DL (ref 65–140)
GLUCOSE SERPL-MCNC: 146 MG/DL (ref 65–140)
HCT VFR BLD AUTO: 39.7 % (ref 34.8–46.1)
HGB BLD-MCNC: 13.1 G/DL (ref 11.5–15.4)
L PNEUMO1 AG UR QL IA.RAPID: NEGATIVE
MAGNESIUM SERPL-MCNC: 2.2 MG/DL (ref 1.9–2.7)
MCH RBC QN AUTO: 29.5 PG (ref 26.8–34.3)
MCHC RBC AUTO-ENTMCNC: 33 G/DL (ref 31.4–37.4)
MCV RBC AUTO: 89 FL (ref 82–98)
PLATELET # BLD AUTO: 237 THOUSANDS/UL (ref 149–390)
PMV BLD AUTO: 12.1 FL (ref 8.9–12.7)
POTASSIUM SERPL-SCNC: 4.2 MMOL/L (ref 3.5–5.3)
PROCALCITONIN SERPL-MCNC: 0.06 NG/ML
RBC # BLD AUTO: 4.44 MILLION/UL (ref 3.81–5.12)
RSV RNA RESP QL NAA+PROBE: NEGATIVE
S PNEUM AG UR QL: NEGATIVE
SARS-COV-2 RNA RESP QL NAA+PROBE: NEGATIVE
SODIUM SERPL-SCNC: 135 MMOL/L (ref 135–147)
WBC # BLD AUTO: 10.72 THOUSAND/UL (ref 4.31–10.16)

## 2025-02-25 PROCEDURE — 82948 REAGENT STRIP/BLOOD GLUCOSE: CPT

## 2025-02-25 PROCEDURE — 87449 NOS EACH ORGANISM AG IA: CPT | Performed by: STUDENT IN AN ORGANIZED HEALTH CARE EDUCATION/TRAINING PROGRAM

## 2025-02-25 PROCEDURE — 85027 COMPLETE CBC AUTOMATED: CPT | Performed by: STUDENT IN AN ORGANIZED HEALTH CARE EDUCATION/TRAINING PROGRAM

## 2025-02-25 PROCEDURE — 80048 BASIC METABOLIC PNL TOTAL CA: CPT | Performed by: STUDENT IN AN ORGANIZED HEALTH CARE EDUCATION/TRAINING PROGRAM

## 2025-02-25 PROCEDURE — 92610 EVALUATE SWALLOWING FUNCTION: CPT

## 2025-02-25 PROCEDURE — 0241U HB NFCT DS VIR RESP RNA 4 TRGT: CPT | Performed by: NURSE PRACTITIONER

## 2025-02-25 PROCEDURE — 99232 SBSQ HOSP IP/OBS MODERATE 35: CPT | Performed by: NURSE PRACTITIONER

## 2025-02-25 PROCEDURE — 83735 ASSAY OF MAGNESIUM: CPT | Performed by: STUDENT IN AN ORGANIZED HEALTH CARE EDUCATION/TRAINING PROGRAM

## 2025-02-25 PROCEDURE — 84145 PROCALCITONIN (PCT): CPT | Performed by: STUDENT IN AN ORGANIZED HEALTH CARE EDUCATION/TRAINING PROGRAM

## 2025-02-25 RX ORDER — INSULIN LISPRO 100 [IU]/ML
1-5 INJECTION, SOLUTION INTRAVENOUS; SUBCUTANEOUS
Status: DISCONTINUED | OUTPATIENT
Start: 2025-02-25 | End: 2025-03-02 | Stop reason: HOSPADM

## 2025-02-25 RX ORDER — GUAIFENESIN/DEXTROMETHORPHAN 100-10MG/5
10 SYRUP ORAL EVERY 4 HOURS PRN
Status: DISCONTINUED | OUTPATIENT
Start: 2025-02-25 | End: 2025-03-02 | Stop reason: HOSPADM

## 2025-02-25 RX ORDER — ACETAMINOPHEN 325 MG/1
975 TABLET ORAL EVERY 8 HOURS SCHEDULED
Status: DISCONTINUED | OUTPATIENT
Start: 2025-02-25 | End: 2025-03-02 | Stop reason: HOSPADM

## 2025-02-25 RX ORDER — IPRATROPIUM BROMIDE AND ALBUTEROL SULFATE 2.5; .5 MG/3ML; MG/3ML
3 SOLUTION RESPIRATORY (INHALATION)
Status: DISCONTINUED | OUTPATIENT
Start: 2025-02-25 | End: 2025-02-25

## 2025-02-25 RX ORDER — LIDOCAINE 50 MG/G
1 PATCH TOPICAL DAILY
Status: DISCONTINUED | OUTPATIENT
Start: 2025-02-25 | End: 2025-03-02 | Stop reason: HOSPADM

## 2025-02-25 RX ADMIN — CEFTRIAXONE 1000 MG: 1 INJECTION, POWDER, FOR SOLUTION INTRAMUSCULAR; INTRAVENOUS at 10:08

## 2025-02-25 RX ADMIN — ACETAMINOPHEN 975 MG: 325 TABLET, FILM COATED ORAL at 09:41

## 2025-02-25 RX ADMIN — MORPHINE SULFATE 15 MG: 15 TABLET ORAL at 20:18

## 2025-02-25 RX ADMIN — ENOXAPARIN SODIUM 40 MG: 40 INJECTION SUBCUTANEOUS at 09:28

## 2025-02-25 RX ADMIN — ACETAMINOPHEN 975 MG: 325 TABLET, FILM COATED ORAL at 21:28

## 2025-02-25 RX ADMIN — INSULIN GLARGINE 40 UNITS: 100 INJECTION, SOLUTION SUBCUTANEOUS at 21:27

## 2025-02-25 RX ADMIN — INSULIN GLARGINE 40 UNITS: 100 INJECTION, SOLUTION SUBCUTANEOUS at 09:31

## 2025-02-25 RX ADMIN — ANASTROZOLE 1 MG: 1 TABLET, COATED ORAL at 09:32

## 2025-02-25 RX ADMIN — LIDOCAINE 1 PATCH: 700 PATCH TOPICAL at 09:47

## 2025-02-25 RX ADMIN — AZITHROMYCIN 500 MG: 500 TABLET, FILM COATED ORAL at 13:21

## 2025-02-25 RX ADMIN — PANTOPRAZOLE SODIUM 40 MG: 40 TABLET, DELAYED RELEASE ORAL at 06:46

## 2025-02-25 RX ADMIN — OXYBUTYNIN 10 MG: 10 TABLET, FILM COATED, EXTENDED RELEASE ORAL at 09:29

## 2025-02-25 RX ADMIN — METOPROLOL TARTRATE 50 MG: 50 TABLET, FILM COATED ORAL at 09:30

## 2025-02-25 RX ADMIN — METOPROLOL TARTRATE 50 MG: 50 TABLET, FILM COATED ORAL at 21:28

## 2025-02-25 RX ADMIN — PRAVASTATIN SODIUM 40 MG: 40 TABLET ORAL at 16:28

## 2025-02-25 RX ADMIN — MORPHINE SULFATE 15 MG: 15 TABLET ORAL at 06:46

## 2025-02-25 NOTE — ASSESSMENT & PLAN NOTE
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  States that she is currently undergoing observation  Reports that she has follow-up next Monday with surg onc, 3/3/2025  CT chest concerning for bilateral rib lytic lesions as well as right upper lobe 5 mm nodule.    Would also recommend f/u with medical oncology, known to Dr. Bandar Ji Arimidex

## 2025-02-25 NOTE — ASSESSMENT & PLAN NOTE
Present on admission for tachycardia and tachypnea  Source likely right lower lobe pneumonia  Noted procalcitonin <0.05, lactic acid level 1.2  Given patient's immunocompromise in the setting of malignancy, check urine strep and Legionella.  Obtain sputum culture if able  Blood cultures pending, tailor antibiotics appropriately  Started on Rocephin and azithromycin for empiric coverage of CAP

## 2025-02-25 NOTE — ASSESSMENT & PLAN NOTE
Hypoxia noted in the ED requiring 4 L O2 via nasal cannula to maintain saturations  Secondary to right base pneumonia with question of aspiration  Respiratory protocol, wean O2 as tolerated.  Treat underlying medical issues

## 2025-02-25 NOTE — H&P
"H&P - Hospitalist   Name: Barbra De Oliveira 89 y.o. female I MRN: [id number]  Unit/Bed#: CW2 211-02 I Date of Admission: 2/24/2025   Date of Service: 2/24/2025 I Hospital Day: 0     Assessment & Plan  CAP (community acquired pneumonia)  Patient developed shortness of breath and minimally productive cough around Friday 2/21.  She states it started after she vomited after an optometrist appointment.  After that point she thought she was experiencing an asthma attack, so she took her albuterol treatments with little improvement  Decided to come to the ED where she was found to have right base pneumonia.  She does meet clinical criteria for septicemia and also has hypoxia requiring supplemental O2  Clinically there seems to be a strong correlation between patient's vomiting episode and onset of symptoms.  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    Blood Sugar Average: Last 72 hrs:    Home insulin regimen is Toujeo (U-300) 50 to 60 units nightly per patient and .  They are reporting poor p.o. intake over the past few days  Will switch to Lantus 40 twice daily and adjust accordingly. ISS ordered  Uncomplicated opioid dependence (HCC)  PDMP and dispense report reviewed, resume morphine IR 15mg q8h  Chronic heart failure (HCC)  Wt Readings from Last 3 Encounters:   02/14/25 73.9 kg (163 lb)   01/27/25 74.2 kg (163 lb 9.6 oz)   08/13/24 78.9 kg (174 lb)   Clinically appears stable at this time  I/O and daily weight ordered  Last echocardiogram in our system appears to demonstrate LVEF 60% with grade 1 diastolic dysfunction in May 2023  Mixed hyperlipidemia  Substitute Zocor for pravastatin 40 " mg daily  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)  Follows with [facility] oncology outpatient, patient diagnosed in 1992 with local recurrence.  States that she is currently undergoing observation  Reports that she has follow-up next Monday, 1/3/2025  CT chest concerning for bilateral rib lytic lesions as well as right upper lobe 5 mm nodule.  These could be followed during her oncology follow-up appointment next week  Sepsis (HCC)  Present on admission for tachycardia and tachypnea  Source likely right lower lobe pneumonia  Noted procalcitonin <0.05, lactic acid level 1.2  Given patient's immunocompromise in the setting of malignancy, check urine strep and Legionella.  Obtain sputum culture if able  Blood cultures pending, tailor antibiotics appropriately  Started on Rocephin and azithromycin for empiric coverage of CAP  Acute hypoxic respiratory failure (HCC)  Hypoxia noted in the ED requiring 4 L O2 via nasal cannula to maintain saturations  Secondary to right base pneumonia with question of aspiration  Respiratory protocol, wean O2 as tolerated.  Treat underlying medical issues    VTE Pharmacologic Prophylaxis:   Moderate Risk (Score 3-4) - Pharmacological DVT Prophylaxis Ordered: enoxaparin (Lovenox).  Code Status: Level 1 - Full Code  Discussion with family: Updated  () at bedside.    Anticipated Length of Stay: Patient will be admitted on an inpatient basis with an anticipated length of stay of greater than 2 midnights secondary to CAP.    History of Present Illness   Chief Complaint: Cough and shortness of breath    Barbra De Oliveira is a 89 y.o. female with a PMH of breast cancer, type 2 diabetes mellitus, hypertension, heart failure among others who presents with dyspnea and cough.  She states she was in a normal state of health until around Friday, February 21.  She had just visited her optometrist for an appointment and became nauseous on the drive home.   She had an episode of vomiting but does not really think she aspirated any of the contents.  After this, she began to have a cough and some trouble breathing at home.  She tried to tolerated over the weekend and used her inhaler with little improvement.  Given the fact that her symptoms failed to improve, she decided to come to the ED where she was found to have right base pneumonia.  She was subsequently referred for admission.  She has no other complaints at present.    Review of Systems   Constitutional:  Negative for chills and fever.   HENT:  Negative for sore throat.    Eyes:  Negative for visual disturbance.   Respiratory:  Positive for cough and shortness of breath.    Cardiovascular:  Negative for chest pain and palpitations.   Gastrointestinal:  Negative for abdominal pain and vomiting.   Genitourinary:  Negative for dysuria and hematuria.   Musculoskeletal:  Negative for arthralgias and back pain.   Skin:  Negative for color change and rash.   Neurological:  Negative for syncope.   All other systems reviewed and are negative.      Historical Information   Past Medical History:   Diagnosis Date    Anxiety     Asthma     Atrial fibrillation (HCC)     Breast cancer (HCC) 1992    left    Chest pain, unspecified     CHF (congestive heart failure) (HCC)     Diabetes mellitus (HCC)     GERD (gastroesophageal reflux disease)     History of radiation exposure 1992    Hyperlipidemia     Hypertension     Irregular heart beat     Afib    Migraine     Obesity     Pericardial effusion     Pericarditis      Past Surgical History:   Procedure Laterality Date    ABDOMINAL ADHESION SURGERY      APPENDECTOMY      AUGMENTATION MAMMAPLASTY Left 1994    BACK SURGERY      BREAST LUMPECTOMY Left 1992    malignant    BREAST LUMPECTOMY Left 11/17/2020    Procedure: BREAST ULTRASOUND DIRECTED LUMPECTOMY (ULTRASOUND AT 1200);  Surgeon: Earl Em MD;  [facility];  Service: Surgical Oncology    BREAST SURGERY      CARDIAC  SURGERY      Pericardial window    CHOLECYSTECTOMY      EYE SURGERY      FLAP LOCAL TRUNK Left 11/17/2020    Procedure: FLAP LOCAL TRUNK;  Surgeon: Ronnell Chiu MD;  [facility];  Service: Plastics    HYSTERECTOMY      IR DRAINAGE TUBE CHECK WITH SCLEROSIS  04/23/2021    IR DRAINAGE TUBE CHECK WITH SCLEROSIS  06/03/2021    IR DRAINAGE TUBE CHECK WITH SCLEROSIS  06/17/2021    IR DRAINAGE TUBE CHECK WITH SCLEROSIS  06/28/2021    IR DRAINAGE TUBE CHECK WITH SCLEROSIS  07/07/2021    IR DRAINAGE TUBE CHECK WITH SCLEROSIS  07/27/2021    IR DRAINAGE TUBE PLACEMENT  04/01/2021    IR DRAINAGE TUBE PLACEMENT WITH SCLEROSIS  05/14/2021    LYMPH NODE DISSECTION Left 11/17/2020    Procedure: HUGO  DIRECTED AXILLARY DISSECTION;  Surgeon: Earl Em MD;  [facility];  Service: Surgical Oncology    MASTECTOMY Left 1994    malignant    TN CELESTE-IMPLANT CAPSULECTOMY BREAST COMPLETE Left 11/17/2020    Procedure: BREAST CAPSULECTOMY;  Surgeon: Ronnell Chiu MD;  [facility];  Service: Plastics    TN REMOVAL INTACT BREAST IMPLANT Left 11/17/2020    Procedure: BREAST IMPLANT REMOVAL;  Surgeon: Ronnell Chiu MD;  [facility];  Service: Plastics    US BREAST NEEDLE LOC LEFT Left 11/02/2020    US GUIDED BREAST BIOPSY LEFT COMPLETE Left 08/31/2020    US GUIDED BREAST LYMPH NODE BIOPSY LEFT Left 08/31/2020    WRIST SURGERY       Social History     Tobacco Use    Smoking status: Never    Smokeless tobacco: Never   Vaping Use    Vaping status: Never Used   Substance and Sexual Activity    Alcohol use: Yes     Comment: social    Drug use: No    Sexual activity: Not Currently     E-Cigarette/Vaping    E-Cigarette Use Never User      E-Cigarette/Vaping Substances    Nicotine No     THC No     CBD No     Flavoring No     Other No     Unknown No      Family History   Problem Relation Age of Onset    Colon cancer Mother 55    Breast cancer Mother         Early 50's    Stroke Father     Emphysema Father     Leukemia  Sister     Breast cancer Sister 65    ALS Brother      Social History:  Marital Status: /Civil Union   Occupation: Retired  Patient Pre-hospital Living Situation: Home  Patient Pre-hospital Level of Mobility: walks  Patient Pre-hospital Diet Restrictions: Diabetic    Meds/Allergies   I have reviewed home medications with patient personally.  Prior to Admission medications    Medication Sig Start Date End Date Taking? Authorizing Provider   anastrozole (ARIMIDEX) 1 mg tablet Take 1 tablet (1 mg total) by mouth daily 8/13/24   Titus Portillo MD   azithromycin (ZITHROMAX) 250 mg tablet  12/7/22   Historical Provider, MD   Beclomethasone Dipropionate (QVAR IN) Inhale 2 puffs if needed    Historical Provider, MD   Droplet Pen Needles 32G X 4 MM MISC  9/10/23   Historical Provider, MD   EPINEPHrine (EPIPEN) 0.3 mg/0.3 mL SOAJ Inject 0.3 mL as directed    Historical Provider, MD   FLOVENT  MCG/ACT inhaler INHALE 2 PUFF BY INHALATION ROUTE 2 TIMES EVERY DAY 5/14/18   Historical Provider, MD   Flowflex COVID-19 Ag Home Test KIT Use as directed  Patient not taking: Reported on 3/20/2024 3/5/23   Historical Provider, MD   glucose blood test strip TEST TWICE DAILY OR AS DIRECTED DX: E11.9  Patient not taking: Reported on 8/13/2024 8/31/22   Historical Provider, MD   hydrocodone-chlorpheniramine polistirex (TUSSIONEX) 10-8 mg/5 mL ER suspension Take 5 mL by mouth every 12 (twelve) hours  Patient not taking: Reported on 4/25/2023 5/12/21   Historical Provider, MD   hydrocortisone 2.5 % cream APPLY TO THE AFFECTED AREA TWICE DAILY. (MIX WITH NYSTATIN) 5/29/21   Historical Provider, MD   insulin aspart (NovoLOG) 100 Units/mL injection pen Novolog Flexpen U-100 Insulin aspart 100 unit/mL (3 mL) subcutaneous    Historical Provider, MD   Insulin Glargine (TOUJEO) 300 units/mL CONCETRATED U-300 injection pen Toujeo SoloStar U-300 Insulin 300 unit/mL (1.5 mL) subcutaneous pen   INJECT 67 UNITS BY SUBCUTANEOUS ROUTE AS  PER INSULIN PROTOCOL    Historical Provider, MD   lansoprazole (PREVACID) 30 mg capsule lansoprazole 30 mg capsule,delayed release    Historical Provider, MD   Lidocaine 5 % CREA Apply 1 application topically as needed (pain)  Patient not taking: Reported on 1/31/2024 9/25/18   Erwin Holguin,    lidocaine-prilocaine (EMLA) cream APPLY FOR 12 HOURS, AND THEN OFF 12 HOURS AS NEEDED  Patient not taking: Reported on 4/25/2023 10/14/18   Historical Provider, MD   metoprolol tartrate (LOPRESSOR) 50 mg tablet Take 50 mg by mouth every 12 (twelve) hours     Historical Provider, MD   morphine (MS CONTIN) 15 mg 12 hr tablet Take 15 mg by mouth every 12 (twelve) hours as needed for severe pain    Historical Provider, MD   morphine (MSIR) 15 mg tablet Take 15 mg by mouth every 8 (eight) hours as needed for severe pain.    Historical Provider, MD   Naloxegol Oxalate (Movantik) 12.5 MG TABS Take by mouth  Patient not taking: Reported on 1/27/2025 3/9/23   Historical Provider, MD   naloxone (NARCAN) 2 MG/2ML injection Inject 2 mL into a catheter in a vein 9/8/23   Historical Provider, MD   nitrofurantoin (MACROBID) 100 mg capsule  1/27/23   Historical Provider, MD   NON FORMULARY Domperidone 20 mg TID  Patient not taking: Reported on 6/10/2024    Historical Provider, MD   NOVOFINE 32G X 6 MM MISC 2 (two) times a day As directed 8/13/18   Historical Provider, MD   nystatin (MYCOSTATIN) cream APPLY TO AFFECTED AREAS TWICE A DAY. (MIX WITH HYDROCORTISONE) 5/29/21   Historical Provider, MD   nystatin (MYCOSTATIN) powder APPLY TO THE RASH IN THE GROIN ONCE DAILY 11/14/23   Historical Provider, MD   ONE TOUCH ULTRA TEST test strip TEST TWICE DAILY OR AS DIRECTED DX: E11.9 9/12/18   Historical Provider, MD COLLINS DELICA LANCETS 33G MISC TEST TWICE DAILY OR AS DIRECTED 10/14/18   Historical Provider, MD   oxybutynin (DITROPAN XL) 15 MG 24 hr tablet Take 15 mg by mouth daily.    Historical Provider, MD   simvastatin (ZOCOR) 20 mg  tablet Take 20 mg by mouth daily at bedtime.    Historical Provider, MD   sodium chloride, PF, 0.9 % 10 mL by Intracatheter route daily Intracatheter flushing daily  Patient not taking: Reported on 7/25/2023 5/14/21   Ariel Encinas MD   Sodium Fluoride 5000 PPM 1.1 % GEL  10/25/23   Historical Provider, MD   solifenacin (VESICARE) 10 MG tablet Take 1 tablet (10 mg total) by mouth daily 1/6/23   JOSE Watson   tobramycin-dexamethasone (TOBRADEX) ophthalmic suspension INSTILL 1 DROP IN THE RIGHT EYE 4 TIMES DAILY  Patient not taking: Reported on 6/10/2024 11/22/23   Historical Provider, MD Johnson SoloStar 300 units/mL CONCENTRATED U-300 injection pen (1-unit dial) INJECT 72 UNITS BY SUBCUTANEOUS ROUTE AS PER INSULIN PROTOCOL 9/16/22   Historical Provider, MD   triamcinolone (KENALOG) 0.1 % cream    Patient not taking: Reported on 4/25/2023 5/7/19   Historical Provider, MD   VENTOLIN  (90 Base) MCG/ACT inhaler   2/26/18   Historical Provider, MD   VITAMIN K PO Take by mouth    Historical Provider, MD   zolpidem (AMBIEN) 10 mg tablet Take by mouth Daily  Patient not taking: Reported on 1/27/2025 10/31/22   Historical Provider, MD     Allergies   Allergen Reactions    Iodinated Contrast Media Anaphylaxis    Iodine - Food Allergy Anaphylaxis and Other (See Comments)    Povidone Iodine Anaphylaxis    Aspirin GI Bleeding and Other (See Comments)    Caffeine - Food Allergy Palpitations       Objective :  Temp:  [98 °F (36.7 °C)-98.1 °F (36.7 °C)] 98.1 °F (36.7 °C)  HR:  [] 103  BP: (128-152)/(58-88) 137/76  Resp:  [18-24] 18  SpO2:  [90 %-95 %] 95 %  O2 Device: Nasal cannula  Nasal Cannula O2 Flow Rate (L/min):  [4 L/min] 4 L/min    Physical Exam  Vitals and nursing note reviewed.   Constitutional:       General: She is not in acute distress.     Appearance: She is well-developed. She is not ill-appearing.   HENT:      Head: Normocephalic and atraumatic.      Mouth/Throat:      Mouth:  Mucous membranes are dry.      Comments: Poor dentition  Eyes:      Conjunctiva/sclera: Conjunctivae normal.   Cardiovascular:      Rate and Rhythm: Normal rate and regular rhythm.      Heart sounds: No murmur heard.  Pulmonary:      Effort: Pulmonary effort is normal. No respiratory distress.      Comments: Decreased breath sounds at lung bases  Abdominal:      General: Abdomen is flat. There is no distension.      Palpations: Abdomen is soft.      Tenderness: There is no abdominal tenderness. There is no guarding or rebound.   Musculoskeletal:         General: No swelling.      Cervical back: Neck supple.   Skin:     General: Skin is warm and dry.      Capillary Refill: Capillary refill takes less than 2 seconds.   Neurological:      General: No focal deficit present.      Mental Status: She is alert and oriented to person, place, and time.   Psychiatric:         Mood and Affect: Mood normal.            Lab Results: I have reviewed the following results:  Results from last 7 days   Lab Units 02/24/25  0621   WBC Thousand/uL 11.59*   HEMOGLOBIN g/dL 14.5   HEMATOCRIT % 44.2   PLATELETS Thousands/uL 250   SEGS PCT % 63   LYMPHO PCT % 26   MONO PCT % 8   EOS PCT % 3     Results from last 7 days   Lab Units 02/24/25  0621   SODIUM mmol/L 137   POTASSIUM mmol/L 3.9   CHLORIDE mmol/L 102   CO2 mmol/L 25   BUN mg/dL 18   CREATININE mg/dL 1.00   ANION GAP mmol/L 10   CALCIUM mg/dL 10.2   ALBUMIN g/dL 3.7   TOTAL BILIRUBIN mg/dL 1.42*   ALK PHOS U/L 137*   ALT U/L 12   AST U/L 16   GLUCOSE RANDOM mg/dL 145*     Results from last 7 days   Lab Units 02/24/25  1006   INR  1.29*         Lab Results   Component Value Date    HGBA1C 6.8 (H) 02/27/2024    HGBA1C 6.4 (H) 02/10/2023    HGBA1C 7.8 09/15/2020     Results from last 7 days   Lab Units 02/24/25  1006 02/24/25  0621   LACTIC ACID mmol/L  --  1.2   PROCALCITONIN ng/ml <0.05  --        Imaging Results Review: I personally reviewed the following image studies in PACS and  associated radiology reports: chest xray. My interpretation of the radiology images/reports is: R lung base pneumonia.  Other Study Results Review: EKG was reviewed.     Administrative Statements       ** Please Note: This note has been constructed using a voice recognition system. **

## 2025-02-25 NOTE — ASSESSMENT & PLAN NOTE
Present on admission for tachycardia and tachypnea  Source likely right lower lobe pneumonia  Plan as above  Sepsis criteria improving

## 2025-02-25 NOTE — ASSESSMENT & PLAN NOTE
Patient developed shortness of breath and minimally productive cough around Friday 2/21.  She states it started after she vomited after an optometrist appointment.  Thought that she was having an asthma attack, took albuterol treatments without improvement.  Met sepsis criteria in the emergency department was noted to be hypoxic.  Chest x-ray with moderate opacity in the right lower lobe compatible with pneumonia.  Also with pleural thickening, recommending further evaluation with CT.  CT also consistent with pneumonia, aspiration not excluded.  Small right pleural effusion.  New 5 mm right upper lobe nodule.  Lytic osseous lesions suspicious for metastasis (known hx of breast CA).   Procalcitonin negative however given clinical suspicion, patient was started on IV ceftriaxone and oral azithromycin, continue.  BC x 2 pending  Urine strep and Legionella pending, sputum culture if able  Check flu/covid/rsv  Speech eval to rule out aspiration   Supportive care  Plan as below

## 2025-02-25 NOTE — ASSESSMENT & PLAN NOTE
Lab Results   Component Value Date    HGBA1C 6.8 (H) 02/27/2024     Home insulin regimen is Toujeo (U-300) 50 to 60 units nightly per patient and .    In setting of poor oral intake, this was reduced to 40 units Lantus every 12 hours + sliding scale  Diabetic diet  Monitor accuchecks and adjust regimen PRN

## 2025-02-25 NOTE — PLAN OF CARE
Problem: PAIN - ADULT  Goal: Verbalizes/displays adequate comfort level or baseline comfort level  Description: Interventions:  - Encourage patient to monitor pain and request assistance  - Assess pain using appropriate pain scale  - Administer analgesics based on type and severity of pain and evaluate response  - Implement non-pharmacological measures as appropriate and evaluate response  - Consider cultural and social influences on pain and pain management  - Notify physician/advanced practitioner if interventions unsuccessful or patient reports new pain  Outcome: Progressing     Problem: INFECTION - ADULT  Goal: Absence or prevention of progression during hospitalization  Description: INTERVENTIONS:  - Assess and monitor for signs and symptoms of infection  - Monitor lab/diagnostic results  - Monitor all insertion sites, i.e. indwelling lines, tubes, and drains  - Monitor endotracheal if appropriate and nasal secretions for changes in amount and color  - [place] appropriate cooling/warming therapies per order  - Administer medications as ordered  - Instruct and encourage patient and family to use good hand hygiene technique  - Identify and instruct in appropriate isolation precautions for identified infection/condition  Outcome: Progressing  Goal: Absence of fever/infection during neutropenic period  Description: INTERVENTIONS:  - Monitor WBC    Outcome: Progressing     Problem: SAFETY ADULT  Goal: Patient will remain free of falls  Description: INTERVENTIONS:  - Educate patient/family on patient safety including physical limitations  - Instruct patient to call for assistance with activity   - Consult OT/PT to assist with strengthening/mobility   - Keep Call bell within reach  - Keep bed low and locked with side rails adjusted as appropriate  - Keep care items and personal belongings within reach  - Initiate and maintain comfort rounds  Outcome: Progressing  Goal: Maintain or return to baseline ADL  function  Description: INTERVENTIONS:  -  Assess patient's ability to carry out ADLs; assess patient's baseline for ADL function and identify physical deficits which impact ability to perform ADLs (bathing, care of mouth/teeth, toileting, grooming, dressing, etc.)  - Assess/evaluate cause of self-care deficits   - Assess range of motion  - Assess patient's mobility; develop plan if impaired  - Assess patient's need for assistive devices and provide as appropriate  - Encourage maximum independence but intervene and supervise when necessary  - Involve family in performance of ADLs  - Assess for home care needs following discharge   - Consider OT consult to assist with ADL evaluation and planning for discharge  - Provide patient education as appropriate  Outcome: Progressing  Goal: Maintains/Returns to pre admission functional level  Description: INTERVENTIONS:  - Perform AM-PAC 6 Click Basic Mobility/ Daily Activity assessment daily.  - Set and communicate daily mobility goal to care team and patient/family/caregiver.   - Collaborate with rehabilitation services on mobility goals if consulted  -- Out of bed for toileting  - Record patient progress and toleration of activity level   Outcome: Progressing     Problem: DISCHARGE PLANNING  Goal: Discharge to home or other facility with appropriate resources  Description: INTERVENTIONS:  - Identify barriers to discharge w/patient and caregiver  - Arrange for needed discharge resources and transportation as appropriate  - Identify discharge learning needs (meds, wound care, etc.)  - Arrange for interpretive services to assist at discharge as needed  - Refer to Case Management Department for coordinating discharge planning if the patient needs post-hospital services based on physician/advanced practitioner order or complex needs related to functional status, cognitive ability, or social support system  Outcome: Progressing     Problem: Knowledge Deficit  Goal:  Patient/family/caregiver demonstrates understanding of disease process, treatment plan, medications, and discharge instructions  Description: Complete learning assessment and assess knowledge base.  Interventions:  - Provide teaching at level of understanding  - Provide teaching via preferred learning methods  Outcome: Progressing

## 2025-02-25 NOTE — ASSESSMENT & PLAN NOTE
Wt Readings from Last 3 Encounters:   02/25/25 71.8 kg (158 lb 3.2 oz)   02/14/25 73.9 kg (163 lb)   01/27/25 74.2 kg (163 lb 9.6 oz)   Clinically appears stable at this time  Last echocardiogram in our system appears to demonstrate LVEF 60% with grade 1 diastolic dysfunction in May 2023  Continue BB.  Not on diuretics at baseline.  Monitor volume status

## 2025-02-25 NOTE — ASSESSMENT & PLAN NOTE
Patient developed shortness of breath and minimally productive cough around Friday 2/21.  She states it started after she vomited after an optometrist appointment.  After that point she thought she was experiencing an asthma attack, so she took her albuterol treatments with little improvement  Decided to come to the ED where she was found to have right base pneumonia.  She does meet clinical criteria for septicemia and also has hypoxia requiring supplemental O2  Clinically there seems to be a strong correlation between patient's vomiting episode and onset of symptoms.  Patient denies history of aspiration, we will order a speech therapy evaluation  Rocephin and azithromycin ordered, procalcitonin <0.05

## 2025-02-25 NOTE — PROGRESS NOTES
Progress Note - Hospitalist   Name: Barbra De Oliveira 89 y.o. female I MRN: [id number]  Unit/Bed#: CW2 211-02 I Date of Admission: 2/24/2025   Date of Service: 2/25/2025 I Hospital Day: 1     Assessment & Plan  CAP (community acquired pneumonia)  Patient developed shortness of breath and minimally productive cough around Friday 2/21.  She states it started after she vomited after an optometrist appointment.  Thought that she was having an asthma attack, took albuterol treatments without improvement.  Met sepsis criteria in the emergency department was noted to be hypoxic.  Chest x-ray with moderate opacity in the right lower lobe compatible with pneumonia.  Also with pleural thickening, recommending further evaluation with CT.  CT also consistent with pneumonia, aspiration not excluded.  Small right pleural effusion.  New 5 mm right upper lobe nodule.  Lytic osseous lesions suspicious for metastasis (known hx of breast CA).   Procalcitonin negative however given clinical suspicion, patient was started on IV ceftriaxone and oral azithromycin, continue.  BC x 2 pending  Urine strep and Legionella pending, sputum culture if able  Check flu/covid/rsv  Speech eval to rule out aspiration   Supportive care  Plan as below  Sepsis (HCC)  Present on admission for tachycardia and tachypnea  Source likely right lower lobe pneumonia  Plan as above  Sepsis criteria improving  Acute hypoxic respiratory failure (HCC)  Does not wear oxygen at baseline.  Required 4 L nasal cannula on admission, improving to 2 L.   Treatment of pneumonia as above  Respiratory protocol  Wean Oxygen as tolerated  Essential hypertension  BP acceptable  Continue PTA metoprolol  Monitor   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)  Lab Results   Component Value Date    HGBA1C 6.8 (H) 02/27/2024     Home insulin regimen is Toujeo (U-300) 50 to 60 units nightly per patient and .    In setting of poor oral intake, this was reduced to 40 units Lantus  every 12 hours + sliding scale  Diabetic diet  Monitor accuchecks and adjust regimen PRN  Uncomplicated opioid dependence (HCC)  PDMP and dispense report reviewed by admitting provider, continue morphine IR 15mg q8h  Chronic heart failure (HCC)  Wt Readings from Last 3 Encounters:   02/25/25 71.8 kg (158 lb 3.2 oz)   02/14/25 73.9 kg (163 lb)   01/27/25 74.2 kg (163 lb 9.6 oz)   Clinically appears stable at this time  Last echocardiogram in our system appears to demonstrate LVEF 60% with grade 1 diastolic dysfunction in May 2023  Continue BB.  Not on diuretics at baseline.  Monitor volume status  Mixed hyperlipidemia  Substitute Zocor for pravastatin 40 mg daily  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)  Follows with [facility] oncology outpatient, patient diagnosed in 1992 with local recurrence.  States that she is currently undergoing observation  Reports that she has follow-up next Monday with surg onc, 3/3/2025  CT chest concerning for bilateral rib lytic lesions as well as right upper lobe 5 mm nodule.    Would also recommend f/u with medical oncology, known to Dr. Portillo  Continue Arimidex     VTE Pharmacologic Prophylaxis:   Moderate Risk (Score 3-4) - Pharmacological DVT Prophylaxis Ordered: enoxaparin (Lovenox).    Mobility:   Basic Mobility Inpatient Raw Score: 22  JH-HLM Goal: 7: Walk 25 feet or more  JH-HLM Achieved: 7: Walk 25 feet or more  JH-HLM Goal achieved. Continue to encourage appropriate mobility.    Patient Centered Rounds: I performed bedside rounds with nursing staff today.   Discussions with Specialists or Other Care Team Provider: nursing, case management     Education and Discussions with Family / Patient: Updated  () via phone.    Current Length of Stay: 1 day(s)  Current Patient Status: Inpatient   Certification Statement: The patient will continue to require additional inpatient hospital stay due to IV abx, wean O2 as able  Discharge Plan:  Anticipate discharge in 24-48 hrs to home.    Code Status: Level 1 - Full Code    Subjective   Continues to complain of persistent cough, nonproductive.  No fevers, chills.  Complains of some rib pain with cough.    Objective :  Temp:  [98.1 °F (36.7 °C)-98.7 °F (37.1 °C)] 98.7 °F (37.1 °C)  HR:  [] 79  BP: (128-152)/(58-76) 129/69  Resp:  [18-22] 18  SpO2:  [90 %-97 %] 97 %  O2 Device: Nasal cannula  Nasal Cannula O2 Flow Rate (L/min):  [4 L/min] 4 L/min    Body mass index is 28.94 kg/m².     Input and Output Summary (last 24 hours):     Intake/Output Summary (Last 24 hours) at 2/25/2025 0926  Last data filed at 2/25/2025 0001  Gross per 24 hour   Intake 590 ml   Output --   Net 590 ml       Physical Exam  Vitals and nursing note reviewed.   Constitutional:       Appearance: She is obese.      Comments: 2L NC   Cardiovascular:      Rate and Rhythm: Normal rate.   Pulmonary:      Effort: No respiratory distress.      Breath sounds: Rhonchi present.   Abdominal:      Tenderness: There is no abdominal tenderness.   Musculoskeletal:         General: Swelling (trace lower extremity edema) present.   Skin:     General: Skin is warm.   Neurological:      Mental Status: She is alert and oriented to person, place, and time. Mental status is at baseline.   Psychiatric:         Mood and Affect: Mood normal.           Lines/Drains:              Lab Results: I have reviewed the following results:   Results from last 7 days   Lab Units 02/25/25  0448 02/24/25  0621   WBC Thousand/uL 10.72* 11.59*   HEMOGLOBIN g/dL 13.1 14.5   HEMATOCRIT % 39.7 44.2   PLATELETS Thousands/uL 237 250   SEGS PCT %  --  63   LYMPHO PCT %  --  26   MONO PCT %  --  8   EOS PCT %  --  3     Results from last 7 days   Lab Units 02/25/25  0448 02/24/25  0621   SODIUM mmol/L 135 137   POTASSIUM mmol/L 4.2 3.9   CHLORIDE mmol/L 99 102   CO2 mmol/L 25 25   BUN mg/dL 23 18   CREATININE mg/dL 1.09 1.00   ANION GAP mmol/L 11 10   CALCIUM mg/dL 9.9 10.2    ALBUMIN g/dL  --  3.7   TOTAL BILIRUBIN mg/dL  --  1.42*   ALK PHOS U/L  --  137*   ALT U/L  --  12   AST U/L  --  16   GLUCOSE RANDOM mg/dL 146* 145*     Results from last 7 days   Lab Units 02/24/25  1006   INR  1.29*             Results from last 7 days   Lab Units 02/25/25  0448 02/24/25  1006 02/24/25  0621   LACTIC ACID mmol/L  --   --  1.2   PROCALCITONIN ng/ml 0.06 <0.05  --        Recent Cultures (last 7 days):   Results from last 7 days   Lab Units 02/24/25  1006   BLOOD CULTURE  Received in Microbiology Lab. Culture in Progress.  Received in Microbiology Lab. Culture in Progress.       Imaging Results Review: No pertinent imaging studies reviewed.  Other Study Results Review: No additional pertinent studies reviewed.    Last 24 Hours Medication List:     Current Facility-Administered Medications:     acetaminophen (TYLENOL) tablet 650 mg, Q6H PRN    albuterol (PROVENTIL HFA,VENTOLIN HFA) inhaler 2 puff, Q6H PRN    anastrozole (ARIMIDEX) tablet 1 mg, Daily    ceftriaxone (ROCEPHIN) 1 g/50 mL in dextrose IVPB, Q24H **AND** azithromycin (ZITHROMAX) tablet 500 mg, Q24H    enoxaparin (LOVENOX) subcutaneous injection 40 mg, Daily    fluticasone (ARNUITY ELLIPTA) 200 MCG/ACT inhaler 1 puff, Daily    insulin glargine (LANTUS) subcutaneous injection 40 Units 0.4 mL, Q12H RAPHAEL    metoprolol tartrate (LOPRESSOR) tablet 50 mg, Q12H RAPHAEL    morphine (MSIR) IR tablet 15 mg, Q8H PRN    naloxone (NARCAN) 0.04 mg/mL syringe 0.04 mg, Q1MIN PRN    oxybutynin (DITROPAN-XL) 24 hr tablet 10 mg, Daily    pantoprazole (PROTONIX) EC tablet 40 mg, Early Morning    pravastatin (PRAVACHOL) tablet 40 mg, Daily With Dinner    Administrative Statements   Today, Patient Was Seen By: JOSE Torre    **Please Note: This note may have been constructed using a voice recognition system.**

## 2025-02-25 NOTE — ASSESSMENT & PLAN NOTE
Follows with [facility] oncology outpatient, patient diagnosed in 1992 with local recurrence.  States that she is currently undergoing observation  Reports that she has follow-up next Monday, 1/3/2025  CT chest concerning for bilateral rib lytic lesions as well as right upper lobe 5 mm nodule.  These could be followed during her oncology follow-up appointment next week

## 2025-02-25 NOTE — PLAN OF CARE
Problem: PAIN - ADULT  Goal: Verbalizes/displays adequate comfort level or baseline comfort level  Description: Interventions:  - Encourage patient to monitor pain and request assistance  - Assess pain using appropriate pain scale  - Administer analgesics based on type and severity of pain and evaluate response  - Implement non-pharmacological measures as appropriate and evaluate response  - Consider cultural and social influences on pain and pain management  - Notify physician/advanced practitioner if interventions unsuccessful or patient reports new pain  Outcome: Progressing     Problem: INFECTION - ADULT  Goal: Absence or prevention of progression during hospitalization  Description: INTERVENTIONS:  - Assess and monitor for signs and symptoms of infection  - Monitor lab/diagnostic results  - Monitor all insertion sites, i.e. indwelling lines, tubes, and drains  - Monitor endotracheal if appropriate and nasal secretions for changes in amount and color  - [place] appropriate cooling/warming therapies per order  - Administer medications as ordered  - Instruct and encourage patient and family to use good hand hygiene technique  - Identify and instruct in appropriate isolation precautions for identified infection/condition  Outcome: Progressing  Goal: Absence of fever/infection during neutropenic period  Description: INTERVENTIONS:  - Monitor WBC    Outcome: Progressing     Problem: SAFETY ADULT  Goal: Patient will remain free of falls  Description: INTERVENTIONS:  - Educate patient/family on patient safety including physical limitations  - Instruct patient to call for assistance with activity   - Consult OT/PT to assist with strengthening/mobility   - Keep Call bell within reach  - Keep bed low and locked with side rails adjusted as appropriate  - Keep care items and personal belongings within reach  - Initiate and maintain comfort rounds  Outcome: Progressing  Goal: Maintain or return to baseline ADL  function  Description: INTERVENTIONS:  -  Assess patient's ability to carry out ADLs; assess patient's baseline for ADL function and identify physical deficits which impact ability to perform ADLs (bathing, care of mouth/teeth, toileting, grooming, dressing, etc.)  - Assess/evaluate cause of self-care deficits   - Assess range of motion  - Assess patient's mobility; develop plan if impaired  - Assess patient's need for assistive devices and provide as appropriate  - Encourage maximum independence but intervene and supervise when necessary  - Involve family in performance of ADLs  - Assess for home care needs following discharge   - Consider OT consult to assist with ADL evaluation and planning for discharge  - Provide patient education as appropriate  Outcome: Progressing  Goal: Maintains/Returns to pre admission functional level  Description: INTERVENTIONS:  - Perform AM-PAC 6 Click Basic Mobility/ Daily Activity assessment daily.  - Set and communicate daily mobility goal to care team and patient/family/caregiver.   - Collaborate with rehabilitation services on mobility goals if consulted  -- Out of bed for toileting  - Record patient progress and toleration of activity level   Outcome: Progressing     Problem: DISCHARGE PLANNING  Goal: Discharge to home or other facility with appropriate resources  Description: INTERVENTIONS:  - Identify barriers to discharge w/patient and caregiver  - Arrange for needed discharge resources and transportation as appropriate  - Identify discharge learning needs (meds, wound care, etc.)  - Arrange for interpretive services to assist at discharge as needed  - Refer to Case Management Department for coordinating discharge planning if the patient needs post-hospital services based on physician/advanced practitioner order or complex needs related to functional status, cognitive ability, or social support system  Outcome: Progressing     Problem: Knowledge Deficit  Goal:  Patient/family/caregiver demonstrates understanding of disease process, treatment plan, medications, and discharge instructions  Description: Complete learning assessment and assess knowledge base.  Interventions:  - Provide teaching at level of understanding  - Provide teaching via preferred learning methods  Outcome: Progressing

## 2025-02-25 NOTE — SPEECH THERAPY NOTE
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)    Sepsis (HCC)    Acute hypoxic respiratory failure (HCC)    Past Medical History  Past Medical History:   Diagnosis Date    Anxiety     Asthma     Atrial fibrillation (HCC)     Breast cancer (HCC) 1992    left    Chest pain, unspecified     CHF (congestive heart failure) (HCC)     Diabetes mellitus (HCC)     GERD (gastroesophageal reflux disease)     History of radiation exposure 1992    Hyperlipidemia     Hypertension     Irregular heart beat     Afib    Migraine     Obesity     Pericardial effusion     Pericarditis      Past Surgical History  Past Surgical History:   Procedure Laterality Date    ABDOMINAL ADHESION SURGERY      APPENDECTOMY      AUGMENTATION MAMMAPLASTY Left 1994    BACK SURGERY      BREAST LUMPECTOMY Left 1992    malignant    BREAST LUMPECTOMY Left 11/17/2020    Procedure: BREAST ULTRASOUND DIRECTED LUMPECTOMY (ULTRASOUND AT 1200);  Surgeon: Earl Em MD;  [facility];  Service: Surgical Oncology    BREAST SURGERY      CARDIAC SURGERY      Pericardial window    CHOLECYSTECTOMY      EYE SURGERY      FLAP LOCAL TRUNK Left 11/17/2020    Procedure: FLAP LOCAL TRUNK;  Surgeon: Ronnell Chiu MD;  [facility];  Service: Plastics    HYSTERECTOMY      IR DRAINAGE TUBE CHECK WITH SCLEROSIS  04/23/2021    IR DRAINAGE TUBE CHECK WITH SCLEROSIS  06/03/2021    IR DRAINAGE TUBE CHECK WITH SCLEROSIS  06/17/2021    IR DRAINAGE TUBE CHECK WITH SCLEROSIS  06/28/2021    IR DRAINAGE TUBE CHECK WITH SCLEROSIS  07/07/2021    IR DRAINAGE TUBE CHECK WITH SCLEROSIS   07/27/2021    IR DRAINAGE TUBE PLACEMENT  04/01/2021    IR DRAINAGE TUBE PLACEMENT WITH SCLEROSIS  05/14/2021    LYMPH NODE DISSECTION Left 11/17/2020    Procedure: HUGO  DIRECTED AXILLARY DISSECTION;  Surgeon: Earl Em MD;  [facility];  Service: Surgical Oncology    MASTECTOMY Left 1994    malignant    UT CELESTE-IMPLANT CAPSULECTOMY BREAST COMPLETE Left 11/17/2020    Procedure: BREAST CAPSULECTOMY;  Surgeon: Ronnell Chiu MD;  [facility];  Service: Plastics    UT REMOVAL INTACT BREAST IMPLANT Left 11/17/2020    Procedure: BREAST IMPLANT REMOVAL;  Surgeon: Ronnell Chiu MD;  [facility]: [facility];  Service: Plastics    US BREAST NEEDLE LOC LEFT Left 11/02/2020    US GUIDED BREAST BIOPSY LEFT COMPLETE Left 08/31/2020    US GUIDED BREAST LYMPH NODE BIOPSY LEFT Left 08/31/2020    WRIST SURGERY         Summary  Pt presented with functional appearing oral and pharyngeal stage swallowing skills with materials administered today. She does have a harsh cough at baseline however no cough or s/s aspiration occurred with PO trials. Pt has limited dentition and is in the process of having dentures made. She is currently on a dental soft diet and requests to stay on this d/t difficulty with mastication. No additional ST f/u needed at this time. Please re consult with any new changes or concerns.    Risk/s for Aspiration: mild    Recommended Diet: soft/level 3 diet and thin liquids   Recommended Form of Meds: whole with liquid   Aspiration precautions and swallowing strategies: upright posture  Other Recommendations: Continue frequent oral care      Current Medical Status  Barbra De Oliveira is a 89 y.o. female with a PMH of breast cancer, type 2 diabetes mellitus, hypertension, heart failure among others who presents with dyspnea and cough.  She states she was in a normal state of health until around Friday, February 21.  She had just visited her optometrist for an appointment and became nauseous on the drive  home.  She had an episode of vomiting but does not really think she aspirated any of the contents.  After this, she began to have a cough and some trouble breathing at home.  She tried to tolerated over the weekend and used her inhaler with little improvement.  Given the fact that her symptoms failed to improve, she decided to come to the ED where she was found to have right base pneumonia.  She was subsequently referred for admission.  She has no other complaints at present.       Special Studies:  CT chest 2/24/25 IMPRESSION:  Right basilar consolidation likely a combination of pneumonia and atelectasis. Debris/plugging throughout the right lower lobe bronchus and bronchus intermedius; aspiration is not excluded  New 5 mm right upper lobe nodule.  Small right pleural effusion.  Lytic osseous lesions suspicious for metastases.    Social/Education/Vocational Hx:  Pt lives with family    Swallow Information   Current Risks for Dysphagia & Aspiration: Chest imaging concerning for aspiration  Current Diet: soft/level 3 diet and thin liquids   Baseline Diet:  regular/soft, thin liquids      Baseline Assessment   Behavior/Cognition: alert  Speech/Language Status: able to participate in conversation and able to follow commands  Patient Positioning: upright in bed  Pain Status/Interventions/Response to Interventions: No report of or nonverbal indications of pain.       Swallow Mechanism Exam  Facial: symmetrical  Labial: WFL  Lingual: WFL  Velum: symmetrical  Mandible: adequate ROM  Dentition: limited dentition  Vocal quality: clear/adequate   Volitional Cough: strong/productive   Respiratory Status: on 4L O2     Consistencies Assessed and Performance   Consistencies Administered: thin liquids, puree, and hard solids    Oral Stage: WFL  Mastication was prolonged but adequate with the materials administered today. Bolus formation and transfer were functional with no significant oral residue noted. No overt s/s reduced oral  control.    Pharyngeal Stage: WFL  Swallow Mechanics: Swallowing initiation appeared prompt. Laryngeal rise was palpated and judged to be within functional limits. No coughing, throat clearing, change in vocal quality or respiratory status noted today.     Esophageal Concerns:  hx GERD      Summary and Recommendations (see above)    Results Reviewed with: patient and RN     Treatment Recommended: No additional ST f/u needed at this time. Please re consult with any new changes or concerns.

## 2025-02-25 NOTE — RESPIRATORY THERAPY NOTE
02/25/25 1351   Respiratory Protocol   Protocol Selection Respiratory   Respiratory Plan No distress/Pulmonary history;Home Bronchodilator Patient pathway   Respiratory Assessment   Assessment Type Assess only   General Appearance Alert;Awake   Respiratory Pattern Normal   Chest Assessment Chest expansion symmetrical   Bilateral Breath Sounds Clear;Diminished   Cough Strong;Non-productive   Resp Comments Pt in NAD. CXR indicative of RLL pna.  BBS clear/diminished, no wheezing noted. States she was only expectorating yesterday, nothing today. Flutter valve given and performed well independently. Nebulizer therapy not indicated at this time. Will continue with her home regimen of albuterol mdi prn.

## 2025-02-25 NOTE — ASSESSMENT & PLAN NOTE
Wt Readings from Last 3 Encounters:   02/14/25 73.9 kg (163 lb)   01/27/25 74.2 kg (163 lb 9.6 oz)   08/13/24 78.9 kg (174 lb)   Clinically appears stable at this time  I/O and daily weight ordered  Last echocardiogram in our system appears to demonstrate LVEF 60% with grade 1 diastolic dysfunction in May 2023

## 2025-02-25 NOTE — ASSESSMENT & PLAN NOTE
"Lab Results   Component Value Date    HGBA1C 6.8 (H) 02/27/2024     No results for input(s): \"POCGLU\" in the last 72 hours.    Blood Sugar Average: Last 72 hrs:    Home insulin regimen is Toujeo (U-300) 50 to 60 units nightly per patient and .  They are reporting poor p.o. intake over the past few days  Will switch to Lantus 40 twice daily and adjust accordingly. ISS ordered  "

## 2025-02-25 NOTE — RESPIRATORY THERAPY NOTE
RT Protocol Note  Barbra De Oliveira 89 y.o. 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)    Sepsis (HCC)    Acute hypoxic respiratory failure (HCC)      Home Pulmonary Medications:  Albuterol MDI       Past Medical History:   Diagnosis Date    Anxiety     Asthma     Atrial fibrillation (HCC)     Breast cancer (HCC) 1992    left    Chest pain, unspecified     CHF (congestive heart failure) (HCC)     Diabetes mellitus (HCC)     GERD (gastroesophageal reflux disease)     History of radiation exposure 1992    Hyperlipidemia     Hypertension     Irregular heart beat     Afib    Migraine     Obesity     Pericardial effusion     Pericarditis      Social History     Socioeconomic History    Marital status: /Civil Union     Spouse name: Not on file    Number of children: Not on file    Years of education: Not on file    Highest education level: Not on file   Occupational History    Not on file   Tobacco Use    Smoking status: Never    Smokeless tobacco: Never   Vaping Use    Vaping status: Never Used   Substance and Sexual Activity    Alcohol use: Yes     Comment: social    Drug use: No    Sexual activity: Not Currently   Other Topics Concern    Not on file   Social History Narrative    Not on file     Social Drivers of Health     Financial Resource Strain: Not on file   Food Insecurity: Not on file   Transportation Needs: Not on file   Physical Activity: Not on file   Stress: Not on file   Social Connections: Not on file   Intimate Partner Violence: Not on file   Housing Stability: Not on file       Subjective         Objective    Physical Exam:   Assessment Type: Assess  only  General Appearance: Awake  Respiratory Pattern: Normal  Chest Assessment: Chest expansion symmetrical  Bilateral Breath Sounds: Diminished    Vitals:  Blood pressure 137/76, pulse 103, temperature 98.1 °F (36.7 °C), resp. rate 18, SpO2 95%, not currently breastfeeding.          Imaging and other studies: Results Review Statement: I reviewed radiology reports from this admission including: chest xray.          Plan    Respiratory Plan: Mild Distress pathway        Resp Comments: (P) pt assessed per respiratory protocol at this time, pt admitted for possible aspiration pneumonia, she has history of asthma and takes albuterol MDI at home, bs are decreased and spo2 wnl on 4L nasal cannula, no wob or cough noted, cxr confirms pneumonia. will leave prn albuterol inhaler as ordered and dc respiratory protocol at this time.

## 2025-02-25 NOTE — CASE MANAGEMENT
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) 2020     Atrial fibrillation, currently in sinus rhythm 2020     Chronic pain 2020     Malignant neoplasm of upper-outer quadrant of left breast in female, estrogen receptor positive (HCC) 2020     Migraine without aura and without status migrainosus, not intractable 2020     Mixed hyperlipidemia 2019     Chest pain 2018     Chronic heart failure (HCC) 2018     Uncomplicated opioid dependence (HCC) 2016     Essential hypertension 2016     GERD without esophagitis 2016     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       LOS (days): 1  Geometric Mean LOS (GMLOS) (days): 2.1  Days to GMLOS:0.9     OBJECTIVE:    Risk of Unplanned Readmission Score: 26.95         Current admission status: Inpatient       Preferred Pharmacy:   [id number] - [place], [place] - [address] [place] [address]  [place] [address]  Phone: [phone] Fax: [phone]    [facility] - [facility] [address] [facility]  [address] [facility] [address]  Phone: [phone] Fax: [phone]    Primary Care Provider: Osiel Eid MD    Primary Insurance: MEDICARE  Secondary Insurance: HUMANA    ASSESSMENT:  Active Health Care Proxies    There are  no active Health Care Proxies on file.       Advance Directives  Does patient have a Health Care POA?: Yes  Does patient have Advance Directives?: Yes  Advance Directives: Power of  for health care  Primary Contact: Dinh De Oliveira (Spouse)         Readmission Root Cause  30 Day Readmission: No    Patient Information  Admitted from:: Home  Mental Status: Alert  During Assessment patient was accompanied by: Not accompanied during assessment  Assessment information provided by:: Patient  Primary Caregiver: Self  Support Systems: Self, Spouse/significant other  County of Residence: [place]  What city do you live in?: [place]  Home entry access options. Select all that apply.: Stairs  Number of steps to enter home.: 3  Do the steps have railings?: No (uses her cane)  Type of Current Residence: [facility]  Living Arrangements: Lives w/ Spouse/significant other    Activities of Daily Living Prior to Admission  Completes ADLs independently?: Yes  Ambulates independently?: Yes  Does patient use assisted devices?: Yes  Assisted Devices (DME) used: Walker, Straight Cane, Rollator  Does patient currently own DME?: Yes  What DME does the patient currently own?: Walker, Straight Cane, Rollator  Does patient have a history of Outpatient Therapy (PT/OT)?: Yes  Does the patient have a history of Short-Term Rehab?: Yes  Does patient have a history of HHC?: Yes  Does patient currently have HHC?: No         Patient Information Continued  Income Source: Pension/nursing home  Does patient have prescription coverage?: Yes  Does patient receive dialysis treatments?: No  Does patient have a history of substance abuse?: No  Does patient have a history of Mental Health Diagnosis?: No         Means of Transportation  Means of Transport to [facility]:: Drives Self      Social Determinants of Health (SDOH)      Flowsheet Row Most Recent Value   Housing Stability    In the last 12 months, was there a time when you were not able to pay the  mortgage or rent on time? N   In the past 12 months, how many times have you moved where you were living? 0   At any time in the past 12 months, were you homeless or living in a shelter (including now)? N   Transportation Needs    In the past 12 months, has lack of transportation kept you from medical appointments or from getting medications? no   In the past 12 months, has lack of transportation kept you from meetings, work, or from getting things needed for daily living? No   Food Insecurity    Within the past 12 months, you worried that your food would run out before you got the money to buy more. Never true   Within the past 12 months, the food you bought just didn't last and you didn't have money to get more. Never true   UtilNerd Attack    In the past 12 months has the electric, gas, oil, or water company threatened to shut off services in your home? No            DISCHARGE DETAILS:    Discharge planning discussed with:: patient  Freedom of Choice: Yes  Comments - Freedom of Choice: pending recs-open to suggestions  CM contacted family/caregiver?: No- see comments  Were Treatment Team discharge recommendations reviewed with patient/caregiver?: No  Did patient/caregiver verbalize understanding of patient care needs?: No  Were patient/caregiver advised of the risks associated with not following Treatment Team discharge recommendations?: No- see comments    Contacts  Patient Contacts: Dinh De Oliveira-Spouse  Relationship to Patient:: Family  Contact Method: Phone  Phone Number: [phone] (H)  Reason/Outcome: Continuity of Care, Emergency Contact, Discharge Planning    Requested Home Health Care         Is the patient interested in HHC at discharge?:  (yes if recommended)     Patient lives with spouse in a ranch home with three steps to enter.  Independent with use of either her cane/walker or rolllator and adl's, retired, drives.  Hx of STR/OP and HHC.  No hx of SA or MI.  [facility] if  recommened.  CM to follow with DCP needs.

## 2025-02-25 NOTE — ASSESSMENT & PLAN NOTE
Does not wear oxygen at baseline.  Required 4 L nasal cannula on admission, improving to 2 L.   Treatment of pneumonia as above  Respiratory protocol  Wean Oxygen as tolerated

## 2025-02-26 LAB
ANION GAP SERPL CALCULATED.3IONS-SCNC: 8 MMOL/L (ref 4–13)
BASOPHILS # BLD AUTO: 0.04 THOUSANDS/ÂΜL (ref 0–0.1)
BASOPHILS NFR BLD AUTO: 0 % (ref 0–1)
BUN SERPL-MCNC: 27 MG/DL (ref 5–25)
CALCIUM SERPL-MCNC: 10.1 MG/DL (ref 8.4–10.2)
CHLORIDE SERPL-SCNC: 102 MMOL/L (ref 96–108)
CO2 SERPL-SCNC: 28 MMOL/L (ref 21–32)
CREAT SERPL-MCNC: 1.27 MG/DL (ref 0.6–1.3)
EOSINOPHIL # BLD AUTO: 0.18 THOUSAND/ÂΜL (ref 0–0.61)
EOSINOPHIL NFR BLD AUTO: 2 % (ref 0–6)
ERYTHROCYTE [DISTWIDTH] IN BLOOD BY AUTOMATED COUNT: 13.1 % (ref 11.6–15.1)
GFR SERPL CREATININE-BSD FRML MDRD: 38 ML/MIN/1.73SQ M
GLUCOSE SERPL-MCNC: 100 MG/DL (ref 65–140)
GLUCOSE SERPL-MCNC: 100 MG/DL (ref 65–140)
GLUCOSE SERPL-MCNC: 101 MG/DL (ref 65–140)
GLUCOSE SERPL-MCNC: 62 MG/DL (ref 65–140)
GLUCOSE SERPL-MCNC: 66 MG/DL (ref 65–140)
GLUCOSE SERPL-MCNC: 97 MG/DL (ref 65–140)
HCT VFR BLD AUTO: 43 % (ref 34.8–46.1)
HGB BLD-MCNC: 13.5 G/DL (ref 11.5–15.4)
IMM GRANULOCYTES # BLD AUTO: 0.04 THOUSAND/UL (ref 0–0.2)
IMM GRANULOCYTES NFR BLD AUTO: 0 % (ref 0–2)
LYMPHOCYTES # BLD AUTO: 3.9 THOUSANDS/ÂΜL (ref 0.6–4.47)
LYMPHOCYTES NFR BLD AUTO: 34 % (ref 14–44)
MCH RBC QN AUTO: 28.9 PG (ref 26.8–34.3)
MCHC RBC AUTO-ENTMCNC: 31.4 G/DL (ref 31.4–37.4)
MCV RBC AUTO: 92 FL (ref 82–98)
MONOCYTES # BLD AUTO: 0.92 THOUSAND/ÂΜL (ref 0.17–1.22)
MONOCYTES NFR BLD AUTO: 8 % (ref 4–12)
NEUTROPHILS # BLD AUTO: 6.41 THOUSANDS/ÂΜL (ref 1.85–7.62)
NEUTS SEG NFR BLD AUTO: 56 % (ref 43–75)
NRBC BLD AUTO-RTO: 0 /100 WBCS
PLATELET # BLD AUTO: 271 THOUSANDS/UL (ref 149–390)
PMV BLD AUTO: 11.8 FL (ref 8.9–12.7)
POTASSIUM SERPL-SCNC: 3.7 MMOL/L (ref 3.5–5.3)
RBC # BLD AUTO: 4.67 MILLION/UL (ref 3.81–5.12)
SODIUM SERPL-SCNC: 138 MMOL/L (ref 135–147)
WBC # BLD AUTO: 11.49 THOUSAND/UL (ref 4.31–10.16)

## 2025-02-26 PROCEDURE — 99232 SBSQ HOSP IP/OBS MODERATE 35: CPT | Performed by: NURSE PRACTITIONER

## 2025-02-26 PROCEDURE — 85025 COMPLETE CBC W/AUTO DIFF WBC: CPT | Performed by: NURSE PRACTITIONER

## 2025-02-26 PROCEDURE — 82948 REAGENT STRIP/BLOOD GLUCOSE: CPT

## 2025-02-26 PROCEDURE — 80048 BASIC METABOLIC PNL TOTAL CA: CPT | Performed by: NURSE PRACTITIONER

## 2025-02-26 RX ORDER — INSULIN GLARGINE 100 [IU]/ML
30 INJECTION, SOLUTION SUBCUTANEOUS
Status: DISCONTINUED | OUTPATIENT
Start: 2025-02-26 | End: 2025-02-27

## 2025-02-26 RX ORDER — INSULIN GLARGINE 100 [IU]/ML
20 INJECTION, SOLUTION SUBCUTANEOUS EVERY 12 HOURS SCHEDULED
Status: DISCONTINUED | OUTPATIENT
Start: 2025-02-26 | End: 2025-02-26

## 2025-02-26 RX ORDER — GUAIFENESIN 600 MG/1
600 TABLET, EXTENDED RELEASE ORAL EVERY 12 HOURS SCHEDULED
Status: DISCONTINUED | OUTPATIENT
Start: 2025-02-26 | End: 2025-03-02 | Stop reason: HOSPADM

## 2025-02-26 RX ORDER — INSULIN GLARGINE 100 [IU]/ML
20 INJECTION, SOLUTION SUBCUTANEOUS
Status: DISCONTINUED | OUTPATIENT
Start: 2025-02-26 | End: 2025-02-26

## 2025-02-26 RX ORDER — HEPARIN SODIUM 5000 [USP'U]/ML
5000 INJECTION, SOLUTION INTRAVENOUS; SUBCUTANEOUS EVERY 8 HOURS SCHEDULED
Status: DISCONTINUED | OUTPATIENT
Start: 2025-02-26 | End: 2025-03-02 | Stop reason: HOSPADM

## 2025-02-26 RX ADMIN — GUAIFENESIN 600 MG: 600 TABLET, EXTENDED RELEASE ORAL at 10:55

## 2025-02-26 RX ADMIN — METOPROLOL TARTRATE 50 MG: 50 TABLET, FILM COATED ORAL at 09:27

## 2025-02-26 RX ADMIN — HEPARIN SODIUM 5000 UNITS: 5000 INJECTION, SOLUTION INTRAVENOUS; SUBCUTANEOUS at 13:16

## 2025-02-26 RX ADMIN — PANTOPRAZOLE SODIUM 40 MG: 40 TABLET, DELAYED RELEASE ORAL at 06:01

## 2025-02-26 RX ADMIN — MORPHINE SULFATE 15 MG: 15 TABLET ORAL at 09:33

## 2025-02-26 RX ADMIN — FLUTICASONE FUROATE 1 PUFF: 200 POWDER RESPIRATORY (INHALATION) at 09:33

## 2025-02-26 RX ADMIN — ACETAMINOPHEN 975 MG: 325 TABLET, FILM COATED ORAL at 13:16

## 2025-02-26 RX ADMIN — INSULIN GLARGINE 30 UNITS: 100 INJECTION, SOLUTION SUBCUTANEOUS at 22:07

## 2025-02-26 RX ADMIN — GUAIFENESIN 600 MG: 600 TABLET, EXTENDED RELEASE ORAL at 22:07

## 2025-02-26 RX ADMIN — ANASTROZOLE 1 MG: 1 TABLET, COATED ORAL at 09:27

## 2025-02-26 RX ADMIN — ACETAMINOPHEN 975 MG: 325 TABLET, FILM COATED ORAL at 22:06

## 2025-02-26 RX ADMIN — PRAVASTATIN SODIUM 40 MG: 40 TABLET ORAL at 16:47

## 2025-02-26 RX ADMIN — HEPARIN SODIUM 5000 UNITS: 5000 INJECTION, SOLUTION INTRAVENOUS; SUBCUTANEOUS at 22:06

## 2025-02-26 RX ADMIN — OXYBUTYNIN 10 MG: 10 TABLET, FILM COATED, EXTENDED RELEASE ORAL at 09:27

## 2025-02-26 RX ADMIN — LIDOCAINE 1 PATCH: 700 PATCH TOPICAL at 09:27

## 2025-02-26 RX ADMIN — ACETAMINOPHEN 975 MG: 325 TABLET, FILM COATED ORAL at 06:01

## 2025-02-26 RX ADMIN — METOPROLOL TARTRATE 50 MG: 50 TABLET, FILM COATED ORAL at 22:06

## 2025-02-26 RX ADMIN — CEFTRIAXONE 1000 MG: 1 INJECTION, POWDER, FOR SOLUTION INTRAMUSCULAR; INTRAVENOUS at 09:27

## 2025-02-26 NOTE — ASSESSMENT & PLAN NOTE
Lab Results   Component Value Date    HGBA1C 6.8 (H) 02/27/2024     Home insulin regimen is Toujeo (U-300) 50 to 60 units nightly per patient and .    In setting of poor oral intake, this was reduced to 40 units Lantus every 12 hours but patient with a.m. hypoglycemia.  Will change Lantus to 30 units nightly, and continue SSI.  Diabetic diet  Monitor accuchecks and adjust regimen PRN

## 2025-02-26 NOTE — PLAN OF CARE
Problem: PAIN - ADULT  Goal: Verbalizes/displays adequate comfort level or baseline comfort level  Description: Interventions:  - Encourage patient to monitor pain and request assistance  - Assess pain using appropriate pain scale  - Administer analgesics based on type and severity of pain and evaluate response  - Implement non-pharmacological measures as appropriate and evaluate response  - Consider cultural and social influences on pain and pain management  - Notify physician/advanced practitioner if interventions unsuccessful or patient reports new pain  Outcome: Progressing     Problem: INFECTION - ADULT  Goal: Absence or prevention of progression during hospitalization  Description: INTERVENTIONS:  - Assess and monitor for signs and symptoms of infection  - Monitor lab/diagnostic results  - Monitor all insertion sites, i.e. indwelling lines, tubes, and drains  - Monitor endotracheal if appropriate and nasal secretions for changes in amount and color  - [place] appropriate cooling/warming therapies per order  - Administer medications as ordered  - Instruct and encourage patient and family to use good hand hygiene technique  - Identify and instruct in appropriate isolation precautions for identified infection/condition  Outcome: Progressing  Goal: Absence of fever/infection during neutropenic period  Description: INTERVENTIONS:  - Monitor WBC    Outcome: Progressing     Problem: SAFETY ADULT  Goal: Patient will remain free of falls  Description: INTERVENTIONS:  - Educate patient/family on patient safety including physical limitations  - Instruct patient to call for assistance with activity   - Consult OT/PT to assist with strengthening/mobility   - Keep Call bell within reach  - Keep bed low and locked with side rails adjusted as appropriate  - Keep care items and personal belongings within reach  - Initiate and maintain comfort rounds  Outcome: Progressing  Goal: Maintain or return to baseline ADL  function  Description: INTERVENTIONS:  -  Assess patient's ability to carry out ADLs; assess patient's baseline for ADL function and identify physical deficits which impact ability to perform ADLs (bathing, care of mouth/teeth, toileting, grooming, dressing, etc.)  - Assess/evaluate cause of self-care deficits   - Assess range of motion  - Assess patient's mobility; develop plan if impaired  - Assess patient's need for assistive devices and provide as appropriate  - Encourage maximum independence but intervene and supervise when necessary  - Involve family in performance of ADLs  - Assess for home care needs following discharge   - Consider OT consult to assist with ADL evaluation and planning for discharge  - Provide patient education as appropriate  Outcome: Progressing  Goal: Maintains/Returns to pre admission functional level  Description: INTERVENTIONS:  - Perform AM-PAC 6 Click Basic Mobility/ Daily Activity assessment daily.  - Set and communicate daily mobility goal to care team and patient/family/caregiver.   - Collaborate with rehabilitation services on mobility goals if consulted  -- Out of bed for toileting  - Record patient progress and toleration of activity level   Outcome: Progressing     Problem: DISCHARGE PLANNING  Goal: Discharge to home or other facility with appropriate resources  Description: INTERVENTIONS:  - Identify barriers to discharge w/patient and caregiver  - Arrange for needed discharge resources and transportation as appropriate  - Identify discharge learning needs (meds, wound care, etc.)  - Arrange for interpretive services to assist at discharge as needed  - Refer to Case Management Department for coordinating discharge planning if the patient needs post-hospital services based on physician/advanced practitioner order or complex needs related to functional status, cognitive ability, or social support system  Outcome: Progressing     Problem: Knowledge Deficit  Goal:  Patient/family/caregiver demonstrates understanding of disease process, treatment plan, medications, and discharge instructions  Description: Complete learning assessment and assess knowledge base.  Interventions:  - Provide teaching at level of understanding  - Provide teaching via preferred learning methods  Outcome: Progressing     Problem: Nutrition/Hydration-ADULT  Goal: Nutrient/Hydration intake appropriate for improving, restoring or maintaining nutritional needs  Description: Monitor and assess patient's nutrition/hydration status for malnutrition. Collaborate with interdisciplinary team and initiate plan and interventions as ordered.  Monitor patient's weight and dietary intake as ordered or per policy. Utilize nutrition screening tool and intervene as necessary. Determine patient's food preferences and provide high-protein, high-caloric foods as appropriate.     INTERVENTIONS:  - Monitor oral intake, urinary output, labs, and treatment plans  - Assess nutrition and hydration status and recommend course of action  - Evaluate amount of meals eaten  - Assist patient with eating if necessary   - Allow adequate time for meals  - Recommend/ encourage appropriate diets, oral nutritional supplements, and vitamin/mineral supplements  - Order, calculate, and assess calorie counts as needed  - Recommend, monitor, and adjust tube feedings and TPN/PPN based on assessed needs  - Assess need for intravenous fluids  - Provide specific nutrition/hydration education as appropriate  - Include patient/family/caregiver in decisions related to nutrition  Outcome: Progressing

## 2025-02-26 NOTE — PROGRESS NOTES
Progress Note - Hospitalist   Name: Barbra De Oliveira 89 y.o. female I MRN: [id number]  Unit/Bed#: CW2 211-02 I Date of Admission: 2/24/2025   Date of Service: 2/26/2025 I Hospital Day: 2     Assessment & Plan  CAP (community acquired pneumonia)  Patient developed shortness of breath and minimally productive cough around Friday 2/21.  She states it started after she vomited after an optometrist appointment.  Thought that she was having an asthma attack, took albuterol treatments without improvement.  Met sepsis criteria in the emergency department was noted to be hypoxic.  Flu COVID RSV negative x 2.  Chest x-ray with moderate opacity in the right lower lobe compatible with pneumonia.  Also with pleural thickening, recommending further evaluation with CT.  CT also consistent with pneumonia, aspiration not excluded.  Small right pleural effusion.  New 5 mm right upper lobe nodule.  Lytic osseous lesions suspicious for metastasis (known hx of breast CA).   Procalcitonin negative however given clinical suspicion, patient was started on antibiotics with IV ceftriaxone and oral azithromycin.  Given negative Legionella, azithromycin discontinued.  Continue IV ceftriaxone, Rx day 3  BC x 2 no growth to date  Speech eval completed, no concerns for aspiration.  Supportive care  Plan as below  Sepsis (HCC)  Present on admission for tachycardia, tachypnea and leukocytosis.  Source likely right lower lobe pneumonia  Tachycardia and tachypnea improving, leukocytosis stable.  Treatment as above   will monitor  Acute hypoxic respiratory failure (HCC)  Does not wear oxygen at baseline.  Required 4 L nasal cannula on admission, improving to 2 L.   Treatment of pneumonia as above  Respiratory protocol  Pulmonary toilet, incentive and flutter valve ordered  Wean Oxygen as tolerated  Essential hypertension  BP acceptable  Continue PTA metoprolol  Monitor   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)  Lab Results   Component Value Date     HGBA1C 6.8 (H) 02/27/2024     Home insulin regimen is Toujeo (U-300) 50 to 60 units nightly per patient and .    In setting of poor oral intake, this was reduced to 40 units Lantus every 12 hours but patient with a.m. hypoglycemia.  Will change Lantus to 30 units nightly, and continue SSI.  Diabetic diet  Monitor accuchecks and adjust regimen PRN  Uncomplicated opioid dependence (HCC)  PDMP and dispense report reviewed by admitting provider, continue morphine IR 15mg q8h  Chronic heart failure (HCC)  Wt Readings from Last 3 Encounters:   02/26/25 71.4 kg (157 lb 6.4 oz)   02/14/25 73.9 kg (163 lb)   01/27/25 74.2 kg (163 lb 9.6 oz)   Clinically appears stable at this time  Last echocardiogram in our system appears to demonstrate LVEF 60% with grade 1 diastolic dysfunction in May 2023  Continue BB.  Not on diuretics at baseline.  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  States that she is currently undergoing observation  Reports that she has follow-up next Monday with surg onc, 3/3/2025  CT chest concerning for bilateral rib lytic lesions as well as right upper lobe 5 mm nodule.    Would also recommend f/u with medical oncology, known to Dr. Portillo  Continue Arimidex     VTE Pharmacologic Prophylaxis:   Moderate Risk (Score 3-4) - Pharmacological DVT Prophylaxis Ordered: heparin.    Mobility:   Basic Mobility Inpatient Raw Score: 22  JH-HLM Goal: 7: Walk 25 feet or more  JH-HLM Achieved: 7: Walk 25 feet or more  JH-HLM Goal achieved. Continue to encourage appropriate mobility.    Patient Centered Rounds: I performed bedside rounds with nursing staff today.     Discussions with Specialists or Other Care Team Provider: nursing, case management     Education and Discussions with Family / Patient:  Updated  () via phone.    Current Length of Stay: 2 day(s)  Current Patient Status: Inpatient   Certification Statement: The patient will continue to require additional inpatient hospital stay due to IV abx, ongoing o2 requirements  Discharge Plan: Anticipate discharge in 24-48 hrs to home.    Code Status: Level 1 - Full Code    Subjective   Patient complains of rib pain.  Cough improving.  No complaints of shortness of breath.  No fevers, chills.  Encouraged her to ambulate today and to use flutter valve. Asked nursing to provide incentive spirometer.     Objective :  Temp:  [97.1 °F (36.2 °C)-98.3 °F (36.8 °C)] 97.1 °F (36.2 °C)  HR:  [57-83] 83  BP: (119-144)/(58-76) 144/76  Resp:  [15-16] 16  SpO2:  [91 %-96 %] 93 %  O2 Device: Nasal cannula  Nasal Cannula O2 Flow Rate (L/min):  [4 L/min] 4 L/min    Body mass index is 28.79 kg/m².     Input and Output Summary (last 24 hours):     Intake/Output Summary (Last 24 hours) at 2/26/2025 0952  Last data filed at 2/25/2025 2300  Gross per 24 hour   Intake 160 ml   Output 400 ml   Net -240 ml       Physical Exam  Vitals and nursing note reviewed.   Constitutional:       Appearance: She is obese.      Interventions: Nasal cannula in place.      Comments: 2L NC   Cardiovascular:      Rate and Rhythm: Normal rate.   Pulmonary:      Breath sounds: Decreased breath sounds present.   Musculoskeletal:         General: Swelling (trace LE edema) present.   Skin:     General: Skin is warm.   Neurological:      Mental Status: She is alert and oriented to person, place, and time. Mental status is at baseline.   Psychiatric:         Mood and Affect: Mood normal.           Lines/Drains:              Lab Results: I have reviewed the following results:   Results from last 7 days   Lab Units 02/26/25  0443   WBC Thousand/uL 11.49*   HEMOGLOBIN g/dL 13.5   HEMATOCRIT % 43.0   PLATELETS Thousands/uL 271   SEGS PCT % 56   LYMPHO PCT % 34   MONO PCT % 8   EOS PCT % 2      Results from last 7 days   Lab Units 02/26/25  0443 02/25/25  0448 02/24/25  0621   SODIUM mmol/L 138   < > 137   POTASSIUM mmol/L 3.7   < > 3.9   CHLORIDE mmol/L 102   < > 102   CO2 mmol/L 28   < > 25   BUN mg/dL 27*   < > 18   CREATININE mg/dL 1.27   < > 1.00   ANION GAP mmol/L 8   < > 10   CALCIUM mg/dL 10.1   < > 10.2   ALBUMIN g/dL  --   --  3.7   TOTAL BILIRUBIN mg/dL  --   --  1.42*   ALK PHOS U/L  --   --  137*   ALT U/L  --   --  12   AST U/L  --   --  16   GLUCOSE RANDOM mg/dL 66   < > 145*    < > = values in this interval not displayed.     Results from last 7 days   Lab Units 02/24/25  1006   INR  1.29*     Results from last 7 days   Lab Units 02/26/25  0933 02/26/25  0627 02/25/25  2038 02/25/25  1112   POC GLUCOSE mg/dl 100 62* 114 135         Results from last 7 days   Lab Units 02/25/25  0448 02/24/25  1006 02/24/25  0621   LACTIC ACID mmol/L  --   --  1.2   PROCALCITONIN ng/ml 0.06 <0.05  --        Recent Cultures (last 7 days):   Results from last 7 days   Lab Units 02/25/25  1316 02/24/25  1006   BLOOD CULTURE   --  No Growth at 24 hrs.  No Growth at 24 hrs.   LEGIONELLA URINARY ANTIGEN  Negative  --        Imaging Results Review: No pertinent imaging studies reviewed.  Other Study Results Review: No additional pertinent studies reviewed.    Last 24 Hours Medication List:     Current Facility-Administered Medications:     acetaminophen (TYLENOL) tablet 975 mg, Q8H RAPHAEL    albuterol (PROVENTIL HFA,VENTOLIN HFA) inhaler 2 puff, Q6H PRN    anastrozole (ARIMIDEX) tablet 1 mg, Daily    ceftriaxone (ROCEPHIN) 1 g/50 mL in dextrose IVPB, Q24H, Last Rate: 1,000 mg (02/26/25 0927) **AND** [DISCONTINUED] azithromycin (ZITHROMAX) tablet 500 mg, Q24H    dextromethorphan-guaiFENesin (ROBITUSSIN DM) oral syrup 10 mL, Q4H PRN    fluticasone (ARNUITY ELLIPTA) 200 MCG/ACT inhaler 1 puff, Daily    guaiFENesin (MUCINEX) 12 hr tablet 600 mg, Q12H RAPHAEL    heparin (porcine) subcutaneous injection 5,000 Units, Q8H  RAPHAEL    insulin glargine (LANTUS) subcutaneous injection 20 Units 0.2 mL, Q12H RAPHAEL    insulin lispro (HumALOG/ADMELOG) 100 units/mL subcutaneous injection 1-5 Units, TID AC **AND** Fingerstick Glucose (POCT), TID AC    insulin lispro (HumALOG/ADMELOG) 100 units/mL subcutaneous injection 1-5 Units, HS    lidocaine (LIDODERM) 5 % patch 1 patch, Daily    metoprolol tartrate (LOPRESSOR) tablet 50 mg, Q12H RAPHAEL    morphine (MSIR) IR tablet 15 mg, Q8H PRN    naloxone (NARCAN) 0.04 mg/mL syringe 0.04 mg, Q1MIN PRN    oxybutynin (DITROPAN-XL) 24 hr tablet 10 mg, Daily    pantoprazole (PROTONIX) EC tablet 40 mg, Early Morning    pravastatin (PRAVACHOL) tablet 40 mg, Daily With Dinner    Administrative Statements   Today, Patient Was Seen By: JOSE Torre      **Please Note: This note may have been constructed using a voice recognition system.**

## 2025-02-26 NOTE — ASSESSMENT & PLAN NOTE
Present on admission for tachycardia, tachypnea and leukocytosis.  Source likely right lower lobe pneumonia  Tachycardia and tachypnea improving, leukocytosis stable.  Treatment as above   will monitor

## 2025-02-26 NOTE — ASSESSMENT & PLAN NOTE
Patient developed shortness of breath and minimally productive cough around Friday 2/21.  She states it started after she vomited after an optometrist appointment.  Thought that she was having an asthma attack, took albuterol treatments without improvement.  Met sepsis criteria in the emergency department was noted to be hypoxic.  Flu COVID RSV negative x 2.  Chest x-ray with moderate opacity in the right lower lobe compatible with pneumonia.  Also with pleural thickening, recommending further evaluation with CT.  CT also consistent with pneumonia, aspiration not excluded.  Small right pleural effusion.  New 5 mm right upper lobe nodule.  Lytic osseous lesions suspicious for metastasis (known hx of breast CA).   Procalcitonin negative however given clinical suspicion, patient was started on antibiotics with IV ceftriaxone and oral azithromycin.  Given negative Legionella, azithromycin discontinued.  Continue IV ceftriaxone, Rx day 3  BC x 2 no growth to date  Speech eval completed, no concerns for aspiration.  Supportive care  Plan as below

## 2025-02-26 NOTE — ASSESSMENT & PLAN NOTE
Follows with [facility] oncology outpatient, patient diagnosed in 1992 with local recurrence.  States that she is currently undergoing observation  Reports that she has follow-up next Monday with surg onc, 3/3/2025  CT chest concerning for bilateral rib lytic lesions as well as right upper lobe 5 mm nodule.    Would also recommend f/u with medical oncology, known to Dr. Bandar Ji Arimidex

## 2025-02-26 NOTE — ASSESSMENT & PLAN NOTE
Wt Readings from Last 3 Encounters:   02/26/25 71.4 kg (157 lb 6.4 oz)   02/14/25 73.9 kg (163 lb)   01/27/25 74.2 kg (163 lb 9.6 oz)   Clinically appears stable at this time  Last echocardiogram in our system appears to demonstrate LVEF 60% with grade 1 diastolic dysfunction in May 2023  Continue BB.  Not on diuretics at baseline.  Monitor volume status

## 2025-02-26 NOTE — ASSESSMENT & PLAN NOTE
Does not wear oxygen at baseline.  Required 4 L nasal cannula on admission, improving to 2 L.   Treatment of pneumonia as above  Respiratory protocol  Pulmonary toilet, incentive and flutter valve ordered  Wean Oxygen as tolerated

## 2025-02-27 PROBLEM — M25.512 LEFT SHOULDER PAIN: Status: ACTIVE | Noted: 2025-02-27

## 2025-02-27 LAB
ANION GAP SERPL CALCULATED.3IONS-SCNC: 6 MMOL/L (ref 4–13)
BASOPHILS # BLD AUTO: 0.02 THOUSANDS/ÂΜL (ref 0–0.1)
BASOPHILS NFR BLD AUTO: 0 % (ref 0–1)
BUN SERPL-MCNC: 27 MG/DL (ref 5–25)
CALCIUM SERPL-MCNC: 9.9 MG/DL (ref 8.4–10.2)
CHLORIDE SERPL-SCNC: 105 MMOL/L (ref 96–108)
CO2 SERPL-SCNC: 28 MMOL/L (ref 21–32)
CREAT SERPL-MCNC: 1.26 MG/DL (ref 0.6–1.3)
EOSINOPHIL # BLD AUTO: 0.13 THOUSAND/ÂΜL (ref 0–0.61)
EOSINOPHIL NFR BLD AUTO: 2 % (ref 0–6)
ERYTHROCYTE [DISTWIDTH] IN BLOOD BY AUTOMATED COUNT: 13 % (ref 11.6–15.1)
GFR SERPL CREATININE-BSD FRML MDRD: 38 ML/MIN/1.73SQ M
GLUCOSE SERPL-MCNC: 111 MG/DL (ref 65–140)
GLUCOSE SERPL-MCNC: 123 MG/DL (ref 65–140)
GLUCOSE SERPL-MCNC: 171 MG/DL (ref 65–140)
GLUCOSE SERPL-MCNC: 57 MG/DL (ref 65–140)
GLUCOSE SERPL-MCNC: 75 MG/DL (ref 65–140)
HCT VFR BLD AUTO: 45.2 % (ref 34.8–46.1)
HGB BLD-MCNC: 14.4 G/DL (ref 11.5–15.4)
IMM GRANULOCYTES # BLD AUTO: 0.03 THOUSAND/UL (ref 0–0.2)
IMM GRANULOCYTES NFR BLD AUTO: 0 % (ref 0–2)
LYMPHOCYTES # BLD AUTO: 1.54 THOUSANDS/ÂΜL (ref 0.6–4.47)
LYMPHOCYTES NFR BLD AUTO: 19 % (ref 14–44)
MCH RBC QN AUTO: 29.2 PG (ref 26.8–34.3)
MCHC RBC AUTO-ENTMCNC: 31.9 G/DL (ref 31.4–37.4)
MCV RBC AUTO: 92 FL (ref 82–98)
MONOCYTES # BLD AUTO: 0.62 THOUSAND/ÂΜL (ref 0.17–1.22)
MONOCYTES NFR BLD AUTO: 8 % (ref 4–12)
NEUTROPHILS # BLD AUTO: 5.96 THOUSANDS/ÂΜL (ref 1.85–7.62)
NEUTS SEG NFR BLD AUTO: 71 % (ref 43–75)
NRBC BLD AUTO-RTO: 0 /100 WBCS
PLATELET # BLD AUTO: 229 THOUSANDS/UL (ref 149–390)
PMV BLD AUTO: 11.6 FL (ref 8.9–12.7)
POTASSIUM SERPL-SCNC: 3.9 MMOL/L (ref 3.5–5.3)
RBC # BLD AUTO: 4.93 MILLION/UL (ref 3.81–5.12)
SODIUM SERPL-SCNC: 139 MMOL/L (ref 135–147)
WBC # BLD AUTO: 8.3 THOUSAND/UL (ref 4.31–10.16)

## 2025-02-27 PROCEDURE — 82948 REAGENT STRIP/BLOOD GLUCOSE: CPT

## 2025-02-27 PROCEDURE — 99232 SBSQ HOSP IP/OBS MODERATE 35: CPT | Performed by: PHYSICIAN ASSISTANT

## 2025-02-27 PROCEDURE — 80048 BASIC METABOLIC PNL TOTAL CA: CPT | Performed by: NURSE PRACTITIONER

## 2025-02-27 PROCEDURE — 97163 PT EVAL HIGH COMPLEX 45 MIN: CPT

## 2025-02-27 PROCEDURE — 85025 COMPLETE CBC W/AUTO DIFF WBC: CPT | Performed by: NURSE PRACTITIONER

## 2025-02-27 PROCEDURE — 97166 OT EVAL MOD COMPLEX 45 MIN: CPT

## 2025-02-27 RX ORDER — POLYETHYLENE GLYCOL 3350 17 G/17G
17 POWDER, FOR SOLUTION ORAL DAILY
Status: DISCONTINUED | OUTPATIENT
Start: 2025-02-27 | End: 2025-03-02 | Stop reason: HOSPADM

## 2025-02-27 RX ORDER — DOCUSATE SODIUM 100 MG/1
100 CAPSULE, LIQUID FILLED ORAL 2 TIMES DAILY
Status: DISCONTINUED | OUTPATIENT
Start: 2025-02-27 | End: 2025-03-02 | Stop reason: HOSPADM

## 2025-02-27 RX ORDER — INSULIN GLARGINE 100 [IU]/ML
20 INJECTION, SOLUTION SUBCUTANEOUS
Status: DISCONTINUED | OUTPATIENT
Start: 2025-02-27 | End: 2025-02-28

## 2025-02-27 RX ADMIN — DOCUSATE SODIUM 100 MG: 100 CAPSULE, LIQUID FILLED ORAL at 12:00

## 2025-02-27 RX ADMIN — HEPARIN SODIUM 5000 UNITS: 5000 INJECTION, SOLUTION INTRAVENOUS; SUBCUTANEOUS at 06:13

## 2025-02-27 RX ADMIN — DOCUSATE SODIUM 100 MG: 100 CAPSULE, LIQUID FILLED ORAL at 21:39

## 2025-02-27 RX ADMIN — OXYBUTYNIN 10 MG: 10 TABLET, FILM COATED, EXTENDED RELEASE ORAL at 08:22

## 2025-02-27 RX ADMIN — PANTOPRAZOLE SODIUM 40 MG: 40 TABLET, DELAYED RELEASE ORAL at 06:14

## 2025-02-27 RX ADMIN — ANASTROZOLE 1 MG: 1 TABLET, COATED ORAL at 11:59

## 2025-02-27 RX ADMIN — HEPARIN SODIUM 5000 UNITS: 5000 INJECTION, SOLUTION INTRAVENOUS; SUBCUTANEOUS at 21:37

## 2025-02-27 RX ADMIN — METOPROLOL TARTRATE 50 MG: 50 TABLET, FILM COATED ORAL at 08:22

## 2025-02-27 RX ADMIN — METOPROLOL TARTRATE 50 MG: 50 TABLET, FILM COATED ORAL at 21:37

## 2025-02-27 RX ADMIN — GUAIFENESIN 600 MG: 600 TABLET, EXTENDED RELEASE ORAL at 08:22

## 2025-02-27 RX ADMIN — ACETAMINOPHEN 975 MG: 325 TABLET, FILM COATED ORAL at 21:36

## 2025-02-27 RX ADMIN — HEPARIN SODIUM 5000 UNITS: 5000 INJECTION, SOLUTION INTRAVENOUS; SUBCUTANEOUS at 13:39

## 2025-02-27 RX ADMIN — INSULIN GLARGINE 20 UNITS: 100 INJECTION, SOLUTION SUBCUTANEOUS at 21:37

## 2025-02-27 RX ADMIN — GUAIFENESIN 600 MG: 600 TABLET, EXTENDED RELEASE ORAL at 21:39

## 2025-02-27 RX ADMIN — POLYETHYLENE GLYCOL 3350 17 G: 17 POWDER, FOR SOLUTION ORAL at 12:00

## 2025-02-27 RX ADMIN — ACETAMINOPHEN 975 MG: 325 TABLET, FILM COATED ORAL at 13:39

## 2025-02-27 RX ADMIN — CEFTRIAXONE 1000 MG: 1 INJECTION, POWDER, FOR SOLUTION INTRAMUSCULAR; INTRAVENOUS at 08:24

## 2025-02-27 RX ADMIN — ACETAMINOPHEN 975 MG: 325 TABLET, FILM COATED ORAL at 06:12

## 2025-02-27 RX ADMIN — LIDOCAINE 1 PATCH: 700 PATCH TOPICAL at 08:23

## 2025-02-27 RX ADMIN — PRAVASTATIN SODIUM 40 MG: 40 TABLET ORAL at 17:29

## 2025-02-27 NOTE — ASSESSMENT & PLAN NOTE
Patient developed shortness of breath and minimally productive cough around Friday 2/21.  She states it started after she vomited after an optometrist appointment.  Thought that she was having an asthma attack, took albuterol treatments without improvement.  Met sepsis criteria in the emergency department was noted to be hypoxic.  Flu COVID RSV negative x 2.  Chest x-ray with moderate opacity in the right lower lobe compatible with pneumonia.  Also with pleural thickening, recommending further evaluation with CT.  CT also consistent with pneumonia, aspiration not excluded.  Small right pleural effusion.  New 5 mm right upper lobe nodule.  Lytic osseous lesions suspicious for metastasis (known hx of breast CA).   Procalcitonin negative however given clinical suspicion, patient was started on antibiotics with IV ceftriaxone and oral azithromycin.  Given negative Legionella, azithromycin discontinued.  Continue IV ceftriaxone, Rx day 4  BC x 2 no growth to date  Speech eval completed, no concerns for aspiration.  Supportive care  Plan as below

## 2025-02-27 NOTE — ASSESSMENT & PLAN NOTE
Present on admission for tachycardia, tachypnea and leukocytosis.  Source likely right lower lobe pneumonia  Tachycardia and tachypnea improving, leukocytosis resolved.  Treatment as above   will monitor

## 2025-02-27 NOTE — CASE MANAGEMENT
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) 2020     Atrial fibrillation, currently in sinus rhythm 2020     Chronic pain 2020     Malignant neoplasm of upper-outer quadrant of left breast in female, estrogen receptor positive (HCC) 2020     Migraine without aura and without status migrainosus, not intractable 2020     Mixed hyperlipidemia 2019     Chest pain 2018     Chronic heart failure (HCC) 2018     Uncomplicated opioid dependence (HCC) 2016     Essential hypertension 2016     GERD without esophagitis 2016     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       LOS (days): 3  Geometric Mean LOS (GMLOS) (days): 4.9  Days to GMLOS:1.9     OBJECTIVE:  Risk of Unplanned Readmission Score: 30.28         Current admission status: Inpatient   Preferred Pharmacy:   [id number] - [place], [place] - [address]  [place] [address]  Phone: [phone] Fax: [phone]    [facility] - [place], [place] - [address] [facility]  [address] [facility] [address]  Phone: [phone] Fax: [phone]    Primary Care Provider: Osiel Eid MD    Primary Insurance: MEDICARE  Secondary Insurance: [employer]    DISCHARGE DETAILS:             Additional  Comments: Awaiting PT/OT recommendations for further DC needs.  Patient may need assess for home oxygen.  VALENTINA Garcia is aware.  CM to be available

## 2025-02-27 NOTE — ASSESSMENT & PLAN NOTE
Follows with [facility] oncology outpatient, patient diagnosed in 1992 with local recurrence.  States that she is currently undergoing observation  Reports that she has follow-up next Monday with surg onc, 3/3/2025  CT chest concerning for bilateral rib lytic lesions as well as right upper lobe 5 mm nodule.    Would also recommend f/u with medical oncology, known to Dr. Bandar Ji Arimidex

## 2025-02-27 NOTE — PLAN OF CARE
Problem: PHYSICAL THERAPY ADULT  Goal: Performs mobility at highest level of function for planned discharge setting.  See evaluation for individualized goals.  Description: Treatment/Interventions: OT, Spoke to case management, Spoke to nursing, Gait training, Bed mobility, Patient/family training, Endurance training, Functional transfer training, LE strengthening/ROM          See flowsheet documentation for full assessment, interventions and recommendations.  Note: Prognosis: Fair  Problem List: Decreased strength, Decreased range of motion, Decreased endurance, Impaired balance, Decreased mobility, Decreased coordination, Decreased cognition, Impaired judgement, Decreased safety awareness, Pain  Assessment: Pt is 89 y.o. female seen for PT evaluation s/p admit to [facility] on 2/24/2025 w/ CAP (community acquired pneumonia). PT consulted to assess pt's functional mobility and d/c needs. Order placed for PT eval and tx, w/ up w/ A order. Comorbidities affecting pt's physical performance at time of assessment include:  has a past medical history of Anxiety, Asthma, Atrial fibrillation (HCC), Breast cancer (HCC), Chest pain, unspecified, CHF (congestive heart failure) (HCC), Diabetes mellitus (HCC), GERD (gastroesophageal reflux disease), History of radiation exposure, Hyperlipidemia, Hypertension, Irregular heart beat, Migraine, Obesity, Pericardial effusion, and Pericarditis. PTA, pt was requiring A for mobility and has 3 MITCH. Personal factors affecting pt at time of IE include: stairs to enter home, decreased initiation and engagement, unable to perform physical activity, limited insight into impairments, inability to perform IADLs, and inability to perform ADLs. Please find objective findings from PT assessment regarding body systems outlined above with impairments and limitations including impaired coordination, gait deviations, pain, decreased activity tolerance, decreased functional mobility  tolerance, decreased safety awareness, and fall risk. Pt required increased time to complete bed mobility. Required increased A for transfers and gait with balance deficits. Additional mobility limited by dizziness. The following objective measures performed on IE also reveal limitations: The patient's AM-PAC Basic Mobility Inpatient Short Form Raw Score is 20, Standardized Score is 43.99. A standardized score less than 42.9 suggests the patient may benefit from discharge to post-acute rehabilitation services, anticipate pt will progress to return home with increased support if dizziness improves. Please also refer to the recommendation of the Physical Therapist for safe discharge planning. Pt's clinical presentation is currently unstable/unpredictable seen in pt's presentation of dizziness. Pt to benefit from continued PT tx to address deficits as defined above and maximize level of functional independent mobility and consistency. From PT/mobility standpoint, recommendation at time of d/c would be level II pending progress in order to facilitate return to PLOF.  Barriers to Discharge: Inaccessible home environment, Decreased caregiver support     Rehab Resource Intensity Level, PT: II (Moderate Resource Intensity)    See flowsheet documentation for full assessment.

## 2025-02-27 NOTE — ASSESSMENT & PLAN NOTE
Lab Results   Component Value Date    HGBA1C 6.8 (H) 02/27/2024     Home insulin regimen is Toujeo (U-300) 50 to 60 units nightly per patient and .    In setting of poor oral intake, this was reduced to 40 units Lantus every 12 hours but patient with a.m. hypoglycemia again today, will further decrease to Lantus 20 units QHS for now and monitor glucose closely  Diabetic diet  Monitor accuchecks and adjust regimen PRN

## 2025-02-27 NOTE — PROGRESS NOTES
Progress Note - Hospitalist   Name: Barbra De Oliveira 89 y.o. female I MRN: [id number]  Unit/Bed#: CW2 216-01 I Date of Admission: 2/24/2025   Date of Service: 2/27/2025 I Hospital Day: 3    Assessment & Plan  CAP (community acquired pneumonia)  Patient developed shortness of breath and minimally productive cough around Friday 2/21.  She states it started after she vomited after an optometrist appointment.  Thought that she was having an asthma attack, took albuterol treatments without improvement.  Met sepsis criteria in the emergency department was noted to be hypoxic.  Flu COVID RSV negative x 2.  Chest x-ray with moderate opacity in the right lower lobe compatible with pneumonia.  Also with pleural thickening, recommending further evaluation with CT.  CT also consistent with pneumonia, aspiration not excluded.  Small right pleural effusion.  New 5 mm right upper lobe nodule.  Lytic osseous lesions suspicious for metastasis (known hx of breast CA).   Procalcitonin negative however given clinical suspicion, patient was started on antibiotics with IV ceftriaxone and oral azithromycin.  Given negative Legionella, azithromycin discontinued.  Continue IV ceftriaxone, Rx day 4  BC x 2 no growth to date  Speech eval completed, no concerns for aspiration.  Supportive care  Plan as below  Sepsis (HCC)  Present on admission for tachycardia, tachypnea and leukocytosis.  Source likely right lower lobe pneumonia  Tachycardia and tachypnea improving, leukocytosis resolved.  Treatment as above   will monitor  Acute hypoxic respiratory failure (HCC)  Does not wear oxygen at baseline.  Required 4 L nasal cannula on admission, attempt to wean to 3 L today   Treatment of pneumonia as above  Respiratory protocol  Pulmonary toilet, incentive and flutter valve ordered  Wean Oxygen as tolerated, may need home O2 evaluation prior to discharge  Essential hypertension  BP acceptable, some mild elevations noted here   Continue PTA  metoprolol 50 mg BID  Monitor   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)  Lab Results   Component Value Date    HGBA1C 6.8 (H) 02/27/2024     Home insulin regimen is Toujeo (U-300) 50 to 60 units nightly per patient and .    In setting of poor oral intake, this was reduced to 40 units Lantus every 12 hours but patient with a.m. hypoglycemia again today, will further decrease to Lantus 20 units QHS for now and monitor glucose closely  Diabetic diet  Monitor accuchecks and adjust regimen PRN  Uncomplicated opioid dependence (HCC)  PDMP and dispense report reviewed by admitting provider, continue morphine IR 15mg q8h  Chronic heart failure (HCC)  Wt Readings from Last 3 Encounters:   02/26/25 71.4 kg (157 lb 6.4 oz)   02/14/25 73.9 kg (163 lb)   01/27/25 74.2 kg (163 lb 9.6 oz)   Clinically appears stable at this time  Last echocardiogram in our system appears to demonstrate LVEF 60% with grade 1 diastolic dysfunction in May 2023  Continue BB.  Not on diuretics at baseline.  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  States that she is currently undergoing observation  Reports that she has follow-up next Monday with surg onc, 3/3/2025  CT chest concerning for bilateral rib lytic lesions as well as right upper lobe 5 mm nodule.    Would also recommend f/u with medical oncology, known to Dr. Portillo  Continue Arimidex   Left shoulder pain  Pt with complaints of left shoulder pain that have been ongoing for the past few days  Denies any trauma   Received SQ heparin injections in the left arm, small hematoma noted of the posterior left arm, possible radiation of pain vs. From coughing spells Vs. Positional  Continue with PRN lidoderm patches and ATC tylenol     VTE Pharmacologic  Prophylaxis:   High Risk (Score >/= 5) - Pharmacological DVT Prophylaxis Ordered: heparin. Sequential Compression Devices Ordered.    Mobility:   Basic Mobility Inpatient Raw Score: 22  JH-HLM Goal: 7: Walk 25 feet or more  JH-HLM Achieved: 7: Walk 25 feet or more  JH-HLM Goal achieved. Continue to encourage appropriate mobility.    Patient Centered Rounds: I evaluated the patient without nursing staff present due to speaking to nurse outside patient's room     Discussions with Specialists or Other Care Team Provider: Discussed with RN, CM and reviewed previous notes     Education and Discussions with Family / Patient: Updated  () via phone.Dinh     Current Length of Stay: 3 day(s)  Current Patient Status: Inpatient   Certification Statement: The patient will continue to require additional inpatient hospital stay due to improvement of oxygen supplementation, IV abx  Discharge Plan: Anticipate discharge in 24-48 hrs to discharge location to be determined pending rehab evaluations.    Code Status: Level 1 - Full Code    Subjective   Pt reports that she is doing okay today. Reports some coughing episodes, dry, no sputum production. Reports left shoulder pain that has been ongoing for a few days, some improvement with the lidoderm patch. Denies any trauma, feels as though it may be secondary to the heparin injections she received. Reported some lightheadedness upon standing/ambulation yesterday but this is now resolved. Denies any bowel movement yet this admission, denies any abdominal pain, nausea or vomiting.     Objective :  Temp:  [97.7 °F (36.5 °C)] 97.7 °F (36.5 °C)  HR:  [65-83] 65  BP: (145-173)/(64-82) 145/64  Resp:  [19] 19  SpO2:  [90 %-96 %] 96 %  O2 Device: Nasal cannula  Nasal Cannula O2 Flow Rate (L/min):  [2 L/min] 2 L/min    Body mass index is 28.79 kg/m².     Input and Output Summary (last 24 hours):     Intake/Output Summary (Last 24 hours) at 2/27/2025 0969  Last data filed at  2/27/2025 0910  Gross per 24 hour   Intake 180 ml   Output --   Net 180 ml       Physical Exam  Vitals reviewed.   Constitutional:       General: She is not in acute distress.     Comments: Pt is in no acute distress lying in her hospital bed resting comfortably   On 4 L NC saturating 97%, titrated down to 3 L NC and remained stable    HENT:      Head: Normocephalic and atraumatic.   Cardiovascular:      Rate and Rhythm: Normal rate and regular rhythm.   Pulmonary:      Effort: No respiratory distress.      Breath sounds: Normal breath sounds. No wheezing.   Abdominal:      General: Bowel sounds are normal. There is no distension.      Palpations: Abdomen is soft.      Tenderness: There is no abdominal tenderness.   Musculoskeletal:      Right lower leg: No edema.      Left lower leg: No edema.   Skin:     General: Skin is warm and dry.   Neurological:      Mental Status: She is alert.           Lines/Drains:              Lab Results: I have reviewed the following results:   Results from last 7 days   Lab Units 02/27/25  0551   WBC Thousand/uL 8.30   HEMOGLOBIN g/dL 14.4   HEMATOCRIT % 45.2   PLATELETS Thousands/uL 229   SEGS PCT % 71   LYMPHO PCT % 19   MONO PCT % 8   EOS PCT % 2     Results from last 7 days   Lab Units 02/27/25  0551 02/25/25  0448 02/24/25  0621   SODIUM mmol/L 139   < > 137   POTASSIUM mmol/L 3.9   < > 3.9   CHLORIDE mmol/L 105   < > 102   CO2 mmol/L 28   < > 25   BUN mg/dL 27*   < > 18   CREATININE mg/dL 1.26   < > 1.00   ANION GAP mmol/L 6   < > 10   CALCIUM mg/dL 9.9   < > 10.2   ALBUMIN g/dL  --   --  3.7   TOTAL BILIRUBIN mg/dL  --   --  1.42*   ALK PHOS U/L  --   --  137*   ALT U/L  --   --  12   AST U/L  --   --  16   GLUCOSE RANDOM mg/dL 57*   < > 145*    < > = values in this interval not displayed.     Results from last 7 days   Lab Units 02/24/25  1006   INR  1.29*     Results from last 7 days   Lab Units 02/27/25  0753 02/26/25  2137 02/26/25  1646 02/26/25  1108 02/26/25  0933  02/26/25  0627 02/25/25  2038 02/25/25  1112   POC GLUCOSE mg/dl 75 101 100 97 100 62* 114 135         Results from last 7 days   Lab Units 02/25/25  0448 02/24/25  1006 02/24/25  0621   LACTIC ACID mmol/L  --   --  1.2   PROCALCITONIN ng/ml 0.06 <0.05  --        Recent Cultures (last 7 days):   Results from last 7 days   Lab Units 02/25/25  1316 02/24/25  1006   BLOOD CULTURE   --  No Growth at 48 hrs.  No Growth at 48 hrs.   LEGIONELLA URINARY ANTIGEN  Negative  --        Imaging Results Review: I reviewed radiology reports from this admission including: chest xray and CT chest.  Other Study Results Review: No additional pertinent studies reviewed.    Last 24 Hours Medication List:     Current Facility-Administered Medications:     acetaminophen (TYLENOL) tablet 975 mg, Q8H RAPHAEL    albuterol (PROVENTIL HFA,VENTOLIN HFA) inhaler 2 puff, Q6H PRN    anastrozole (ARIMIDEX) tablet 1 mg, Daily    ceftriaxone (ROCEPHIN) 1 g/50 mL in dextrose IVPB, Q24H, Last Rate: 1,000 mg (02/27/25 0824) **AND** [DISCONTINUED] azithromycin (ZITHROMAX) tablet 500 mg, Q24H    dextromethorphan-guaiFENesin (ROBITUSSIN DM) oral syrup 10 mL, Q4H PRN    fluticasone (ARNUITY ELLIPTA) 200 MCG/ACT inhaler 1 puff, Daily    guaiFENesin (MUCINEX) 12 hr tablet 600 mg, Q12H RAPHAEL    heparin (porcine) subcutaneous injection 5,000 Units, Q8H RAPHAEL    insulin glargine (LANTUS) subcutaneous injection 30 Units 0.3 mL, HS    insulin lispro (HumALOG/ADMELOG) 100 units/mL subcutaneous injection 1-5 Units, TID AC **AND** Fingerstick Glucose (POCT), TID AC    insulin lispro (HumALOG/ADMELOG) 100 units/mL subcutaneous injection 1-5 Units, HS    lidocaine (LIDODERM) 5 % patch 1 patch, Daily    metoprolol tartrate (LOPRESSOR) tablet 50 mg, Q12H RAPHAEL    morphine (MSIR) IR tablet 15 mg, Q8H PRN    naloxone (NARCAN) 0.04 mg/mL syringe 0.04 mg, Q1MIN PRN    oxybutynin (DITROPAN-XL) 24 hr tablet 10 mg, Daily    pantoprazole (PROTONIX) EC tablet 40 mg, Early Morning     pravastatin (PRAVACHOL) tablet 40 mg, Daily With Dinner    Administrative Statements   Today, Patient Was Seen By: Kathya Garcia PA-C    **Please Note: This note may have been constructed using a voice recognition system.**

## 2025-02-27 NOTE — OCCUPATIONAL THERAPY NOTE
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)    Sepsis (HCC)    Acute hypoxic respiratory failure (HCC)    Left shoulder pain    Past Medical History  Past Medical History:   Diagnosis Date    Anxiety     Asthma     Atrial fibrillation (HCC)     Breast cancer (HCC) 1992    left    Chest pain, unspecified     CHF (congestive heart failure) (HCC)     Diabetes mellitus (HCC)     GERD (gastroesophageal reflux disease)     History of radiation exposure 1992    Hyperlipidemia     Hypertension     Irregular heart beat     Afib    Migraine     Obesity     Pericardial effusion     Pericarditis      Past Surgical History  Past Surgical History:   Procedure Laterality Date    ABDOMINAL ADHESION SURGERY      APPENDECTOMY      AUGMENTATION MAMMAPLASTY Left 1994    BACK SURGERY      BREAST LUMPECTOMY Left 1992    malignant    BREAST LUMPECTOMY Left 11/17/2020    Procedure: BREAST ULTRASOUND DIRECTED LUMPECTOMY (ULTRASOUND AT 1200);  Surgeon: Earl Em MD;  [facility];  Service: Surgical Oncology    BREAST SURGERY      CARDIAC SURGERY      Pericardial window    CHOLECYSTECTOMY      EYE SURGERY      FLAP LOCAL TRUNK Left 11/17/2020    Procedure: FLAP LOCAL TRUNK;  Surgeon: Ronnell Chiu MD;  [facility];  Service: Plastics    HYSTERECTOMY      IR DRAINAGE TUBE CHECK WITH SCLEROSIS  04/23/2021    IR DRAINAGE TUBE CHECK WITH SCLEROSIS  06/03/2021    IR DRAINAGE TUBE CHECK WITH SCLEROSIS  06/17/2021    IR DRAINAGE TUBE CHECK WITH SCLEROSIS  06/28/2021    IR DRAINAGE TUBE CHECK WITH SCLEROSIS  07/07/2021    IR DRAINAGE TUBE CHECK WITH SCLEROSIS   07/27/2021    IR DRAINAGE TUBE PLACEMENT  04/01/2021    IR DRAINAGE TUBE PLACEMENT WITH SCLEROSIS  05/14/2021    LYMPH NODE DISSECTION Left 11/17/2020    Procedure: HUGO  DIRECTED AXILLARY DISSECTION;  Surgeon: Earl Em MD;  [facility];  Service: Surgical Oncology    MASTECTOMY Left 1994    malignant    WY CELESTE-IMPLANT CAPSULECTOMY BREAST COMPLETE Left 11/17/2020    Procedure: BREAST CAPSULECTOMY;  Surgeon: Ronnell Chiu MD;  [facility];  Service: Plastics    WY REMOVAL INTACT BREAST IMPLANT Left 11/17/2020    Procedure: BREAST IMPLANT REMOVAL;  Surgeon: Ronnell Chiu MD;  [facility];  Service: Plastics    US BREAST NEEDLE LOC LEFT Left 11/02/2020    US GUIDED BREAST BIOPSY LEFT COMPLETE Left 08/31/2020    US GUIDED BREAST LYMPH NODE BIOPSY LEFT Left 08/31/2020    WRIST SURGERY           02/27/25 1148   OT Last Visit   OT Visit Date 02/27/25   Note Type   Note type Evaluation   Pain Assessment   Pain Assessment Tool 0-10   Pain Score 3   Pain Location/Orientation Orientation: Left;Location: Arm   Pain Onset/Description Onset: Ongoing;Descriptor: Aching;Descriptor: Discomfort   Patient's Stated Pain Goal No pain   Hospital Pain Intervention(s) Repositioned;Ambulation/increased activity;Emotional support   Restrictions/Precautions   Weight Bearing Precautions Per Order No   Other Precautions Chair Alarm;Bed Alarm;Multiple lines;Telemetry;Pain;Fall Risk;O2   Home Living   Type of Home House   Home Layout One level  (3 MITCH)   Bathroom Shower/Tub Tub/shower unit  (+ walk in)   Bathroom Toilet Standard   Bathroom Equipment Shower chair;Grab bars in shower   Home Equipment Cane;Walker;Other (Comment)  (rollator)   Prior Function   Level of [place] Needs assistance with ADLs;Independent with functional mobility;Needs assistance with IADLS   Lives With Spouse   Receives Help From Family   IADLs Independent with driving;Independent with medication management;Independent with meal prep    Falls in the last 6 months 0  (not in the last 6 mo but has had falls within the last year)   Vocational Retired   Comments pt reports spouse assists w/ bathing, pt is I w/ dressing, Mod I w/ transfers and functional mobility PTA   Lifestyle   Autonomy Assist ADLS/IADLS, Mod I w/ transfers and functional mobility PTA   Reciprocal Relationships Pt lives w/ her spouse   Service to Others retired   Intrinsic Gratification getting her nails done   ADL   Eating Assistance 5  Supervision/Setup   Grooming Assistance 5  Supervision/Setup   UB Bathing Assistance 5  Supervision/Setup   LB Bathing Assistance 4  Minimal Assistance   UB Dressing Assistance 5  Supervision/Setup   LB Dressing Assistance 4  Minimal Assistance   Toileting Assistance  4  Minimal Assistance   Functional Assistance 4  Minimal Assistance   Functional Deficit Steadying;Verbal cueing;Supervision/safety;Increased time to complete   Bed Mobility   Supine to Sit 5  Supervision   Additional items HOB elevated;Increased time required;Verbal cues   Sit to Supine Unable to assess   Additional Comments pt remained EOB at end of session to eat lunch; G sitting balance/trunk control   Transfers   Sit to Stand 5  Supervision   Additional items Increased time required;Verbal cues   Stand to Sit 5  Supervision   Additional items Increased time required;Verbal cues   Additional Comments W/ RW. Reports dizziness, /60   Functional Mobility   Functional Mobility 4  Minimal assistance   Additional Comments Pt took few small steps around room w/ Min A x1; use of RW; limited by dizziness.   Additional items Rolling walker   Balance   Static Sitting Good   Dynamic Sitting Fair +   Static Standing Fair   Dynamic Standing Fair -   Ambulatory Poor +   Activity Tolerance   Activity Tolerance Patient limited by fatigue;Patient limited by pain   Medical Staff Made Aware PT   Nurse Made Aware yes   RUE Assessment   RUE Assessment WFL   LUE Assessment   LUE Assessment  WFL  (reports pain in LUE)   Hand Function   Gross Motor Coordination Functional   Fine Motor Coordination Functional   Vision-Basic Assessment   Current Vision Wears glasses all the time   Vision - Complex Assessment   Ocular Range of Motion Intact   Cognition   Overall Cognitive Status WFL   Arousal/Participation Responsive;Cooperative   Attention Within functional limits   Orientation Level Oriented X4   Memory Within functional limits   Following Commands Follows all commands and directions without difficulty   Comments pt is pleasant and cooperative   Assessment   Limitation Decreased ADL status;Decreased endurance;Decreased self-care trans;Decreased high-level ADLs;Decreased UE strength   Prognosis Fair   Assessment Pt is a 89 y/o female seen for OT eval s/p adm to [facility] w/ dyspnea and cough.Pt is dx'd w/ CAP.  Pt  has a past medical history of Anxiety, Asthma, Atrial fibrillation ([facility]), Breast cancer ([facility]) (1992), Chest pain, unspecified, CHF (congestive heart failure) ([facility]), Diabetes mellitus ([facility]), GERD (gastroesophageal reflux disease), History of radiation exposure (1992), Hyperlipidemia, Hypertension, Irregular heart beat, Migraine, Obesity, Pericardial effusion, and Pericarditis. Pt with active OT orders and activity as tolerated orders. Pt lives with her spouse in ran home w/ 3 MITCH . Pt has assist w/ ADLS and IADLS, drove (minimally), & required use of DME PTA including SPC, RW and rollator. Pt is currently demonstrating the following occupational deficits: S UB ADLS, CGA LB ADLS, S bed mobility and transfers and Min A functional mobility w/ RW. These deficits that are impacting pt's baseline areas of occupation are a result of the following impairments: pain, endurance, activity tolerance, functional mobility, forward functional reach, balance, functional standing tolerance, and decreased I w/ ADLS/IADLS.The following Occupational Performance Areas to address include: bathing/shower, toilet hygiene,  dressing, health maintenance, functional mobility, community mobility, clothing management, and household maintenance. Recommend minimum resource intensity (level lll) upon D/C. Pt to continue to benefit from acute immediate OT services to address the following goals 2-3x/week to  w/in 10-14 days:   Goals   Patient Goals have less dizziness   LTG Time Frame 10-14   Long Term Goal #1 see below listed goals   Plan   Treatment Interventions ADL retraining;Functional transfer training;UE strengthening/ROM;Endurance training;Patient/family training;Equipment evaluation/education;Compensatory technique education;Continued evaluation;Energy conservation;Activityengagement   Goal Expiration Date 25   OT Frequency 2-3x/wk   Discharge Recommendation   Rehab Resource Intensity Level, OT III (Minimum Resource Intensity)   Additional Comments  The patient's raw score on the AM-PAC Daily Activity Inpatient Short Form is 21. A raw score of greater than or equal to 19 suggests the patient may benefit from discharge to home. Please refer to the recommendation of the Occupational Therapist for safe discharge planning.   Additional Comments 2 Pt seen as a co-session due to the patient's co-morbidities, clinically unstable presentation, and present impairments which are a regression from the patient's baseline.   AM-PAC Daily Activity Inpatient   Lower Body Dressing 3   Bathing 3   Toileting 3   Upper Body Dressing 4   Grooming 4   Eating 4   Daily Activity Raw Score 21   Daily Activity Standardized Score (Calc for Raw Score >=11) 44.27   AM-PAC Applied Cognition Inpatient   Following a Speech/Presentation 4   Understanding Ordinary Conversation 4   Taking Medications 4   Remembering Where Things Are Placed or Put Away 4   Remembering List of 4-5 Errands 4   Taking Care of Complicated Tasks 3   Applied Cognition Raw Score 23   Applied Cognition Standardized Score 53.08   End of Consult   Education Provided Yes   Patient  Position at End of Consult Seated edge of bed;All needs within reach;Bed/Chair alarm activated   Nurse Communication Nurse aware of consult     GOALS    1) Pt will improve activity tolerance to G for 30 min txment sessions for increased engagement in functional tasks    2) Pt will complete UB/LB dressing/self care w/ mod I using adaptive device and DME as needed    3) Pt will complete bathing w/ Mod I w/ use of AE and DME as needed    4) Pt will complete toileting w/ mod I w/ G hygiene/thoroughness using DME as needed    5) Pt will improve functional transfers to Mod I on/off all surfaces using DME as needed w/ G balance/safety     6) Pt will improve functional mobility during ADL/IADL/leisure tasks to Mod I using DME as needed w/ G balance/safety     7) Pt will improve bed mobility to Mod I and sit EOB w/ G balance/trunk control as a prerequisite for further engagement in meaningful tasks    8) Pt will be attentive 100% of the time during ongoing cognitive assessment w/ G participation to assist w/ safe d/c planning/recommendations    9) Pt will demonstrate G carryover of pt/caregiver education and training as appropriate w/o cues w/ good tolerance to increase safety during functional tasks    10) Pt will demonstrate 100% carryover of energy conservation techniques t/o functional I/ADL/leisure tasks w/o cues s/p skilled education to increase endurance during functional tasks      Mariela Curry MS, OTR/L

## 2025-02-27 NOTE — PLAN OF CARE
Problem: OCCUPATIONAL THERAPY ADULT  Goal: Performs self-care activities at highest level of function for planned discharge setting.  See evaluation for individualized goals.  Description: Treatment Interventions: ADL retraining, Functional transfer training, UE strengthening/ROM, Endurance training, Patient/family training, Equipment evaluation/education, Compensatory technique education, Continued evaluation, Energy conservation, Activityengagement          See flowsheet documentation for full assessment, interventions and recommendations.   Note: Limitation: Decreased ADL status, Decreased endurance, Decreased self-care trans, Decreased high-level ADLs, Decreased UE strength  Prognosis: Fair  Assessment: Pt is a 89 y/o female seen for OT eval s/p adm to [facility] w/ dyspnea and cough.Pt is dx'd w/ CAP.  Pt  has a past medical history of Anxiety, Asthma, Atrial fibrillation ([facility]), Breast cancer ([facility]) (1992), Chest pain, unspecified, CHF (congestive heart failure) ([facility]), Diabetes mellitus ([facility]), GERD (gastroesophageal reflux disease), History of radiation exposure (1992), Hyperlipidemia, Hypertension, Irregular heart beat, Migraine, Obesity, Pericardial effusion, and Pericarditis. Pt with active OT orders and activity as tolerated orders. Pt lives with her spouse in [facility] w/ [address] . Pt has assist w/ ADLS and IADLS, drove (minimally), & required use of DME PTA including SPC, RW and rollator. Pt is currently demonstrating the following occupational deficits: S UB ADLS, CGA LB ADLS, S bed mobility and transfers and Min A functional mobility w/ RW. These deficits that are impacting pt's baseline areas of occupation are a result of the following impairments: pain, endurance, activity tolerance, functional mobility, forward functional reach, balance, functional standing tolerance, and decreased I w/ ADLS/IADLS.The following Occupational Performance Areas to address include: bathing/shower, toilet hygiene, dressing,  health maintenance, functional mobility, community mobility, clothing management, and household maintenance. Recommend minimum resource intensity (level lll) upon D/C. Pt to continue to benefit from acute immediate OT services to address the following goals 2-3x/week to  w/in 10-14 days:     Rehab Resource Intensity Level, OT: III (Minimum Resource Intensity)        Mariela Curry MS, OTR/L

## 2025-02-27 NOTE — ASSESSMENT & PLAN NOTE
Pt with complaints of left shoulder pain that have been ongoing for the past few days  Denies any trauma   Received SQ heparin injections in the left arm, small hematoma noted of the posterior left arm, possible radiation of pain vs. From coughing spells Vs. Positional  Continue with PRN lidoderm patches and ATC tylenol

## 2025-02-27 NOTE — PHYSICAL THERAPY NOTE
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)    Uncomplicated opioid dependence (HCC)    Chest pain    Chronic heart failure (HCC)    Mixed hyperlipidemia    Migraine without aura and without status migrainosus, not intractable    Malignant neoplasm of upper-outer quadrant of left breast in female, estrogen receptor positive (HCC)    Atrial fibrillation, currently in sinus rhythm    Chronic pain    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)    Use of anastrozole (Arimidex)    Premature ventricular contractions    History of left mastectomy    Sepsis (HCC)    CAP (community acquired pneumonia)    Acute hypoxic respiratory failure (HCC)    Left shoulder pain       Past Medical History  Past Medical History:   Diagnosis Date    Anxiety     Asthma     Atrial fibrillation (HCC)     Breast cancer (HCC) 1992    left    Chest pain, unspecified     CHF (congestive heart failure) (HCC)     Diabetes mellitus (HCC)     GERD (gastroesophageal reflux disease)     History of radiation exposure 1992    Hyperlipidemia     Hypertension     Irregular heart beat     Afib    Migraine     Obesity     Pericardial effusion     Pericarditis        Past Surgical History  Past Surgical History:   Procedure Laterality Date    ABDOMINAL ADHESION SURGERY      APPENDECTOMY      AUGMENTATION MAMMAPLASTY Left 1994    BACK SURGERY      BREAST LUMPECTOMY Left 1992    malignant    BREAST LUMPECTOMY Left 11/17/2020    Procedure: BREAST ULTRASOUND DIRECTED LUMPECTOMY (ULTRASOUND AT 1200);  Surgeon: Earl Em MD;  [facility];  Service: Surgical Oncology    BREAST SURGERY       CARDIAC SURGERY      Pericardial window    CHOLECYSTECTOMY      EYE SURGERY      FLAP LOCAL TRUNK Left 11/17/2020    Procedure: FLAP LOCAL TRUNK;  Surgeon: Ronnell Chiu MD;  [facility];  Service: Plastics    HYSTERECTOMY      IR DRAINAGE TUBE CHECK WITH SCLEROSIS  04/23/2021    IR DRAINAGE TUBE CHECK WITH SCLEROSIS  06/03/2021    IR DRAINAGE TUBE CHECK WITH SCLEROSIS  06/17/2021    IR DRAINAGE TUBE CHECK WITH SCLEROSIS  06/28/2021    IR DRAINAGE TUBE CHECK WITH SCLEROSIS  07/07/2021    IR DRAINAGE TUBE CHECK WITH SCLEROSIS  07/27/2021    IR DRAINAGE TUBE PLACEMENT  04/01/2021    IR DRAINAGE TUBE PLACEMENT WITH SCLEROSIS  05/14/2021    LYMPH NODE DISSECTION Left 11/17/2020    Procedure: HUGO  DIRECTED AXILLARY DISSECTION;  Surgeon: Earl Em MD;  [facility];  Service: Surgical Oncology    MASTECTOMY Left 1994    malignant    VT CELESTE-IMPLANT CAPSULECTOMY BREAST COMPLETE Left 11/17/2020    Procedure: BREAST CAPSULECTOMY;  Surgeon: Ronnell Chiu MD;  [facility];  Service: Plastics    VT REMOVAL INTACT BREAST IMPLANT Left 11/17/2020    Procedure: BREAST IMPLANT REMOVAL;  Surgeon: Ronnell Chiu MD;  [facility];  Service: Plastics    US BREAST NEEDLE LOC LEFT Left 11/02/2020    US GUIDED BREAST BIOPSY LEFT COMPLETE Left 08/31/2020    US GUIDED BREAST LYMPH NODE BIOPSY LEFT Left 08/31/2020    WRIST SURGERY            02/27/25 1149   PT Last Visit   PT Visit Date 02/27/25   Note Type   Note type Evaluation   Pain Assessment   Pain Assessment Tool 0-10   Pain Score 3   Pain Location/Orientation Orientation: Left   Patient's Stated Pain Goal No pain   Hospital Pain Intervention(s) Repositioned;Ambulation/increased activity   Restrictions/Precautions   Weight Bearing Precautions Per Order No   Other Precautions Pain;Fall Risk;Multiple lines;Bed Alarm;Chair Alarm;Telemetry;O2   Home Living   Type of Home House   Home Layout One level;Stairs to enter with rails  (3 MITCH)   Home Equipment  Cane;Walker;Other (Comment)  (rollator)   Prior Function   Level of [place] Independent with functional mobility   Lives With Spouse   Receives Help From Family   Vocational Retired   General   Family/Caregiver Present No   Cognition   Orientation Level Oriented X4   Subjective   Subjective Pt willing and agreeable to PT session   RLE Assessment   RLE Assessment WFL   LLE Assessment   LLE Assessment WFL   Coordination   Movements are Fluid and Coordinated 0   Bed Mobility   Supine to Sit 5  Supervision   Additional items Assist x 1   Sit to Supine Unable to assess   Additional Comments Pt willing and agreeable to PT session   Transfers   Sit to Stand 4  Minimal assistance   Additional items Assist x 1   Stand to Sit 4  Minimal assistance   Additional items Assist x 1   Ambulation/Elevation   Gait pattern Excessively slow;Short stride;Shuffling;Decreased foot clearance   Gait Assistance 4  Minimal assist   Additional items Assist x 1   Assistive Device Rolling walker   Distance 5+5   Balance   Static Sitting Fair   Dynamic Sitting Fair -   Static Standing Poor +   Ambulatory Poor +   Endurance Deficit   Endurance Deficit Yes   Endurance Deficit Description limited by fatigue, weakness, pain   Activity Tolerance   Activity Tolerance Patient limited by pain   Medical Staff Made Aware OT for D/C planning   Nurse Made Aware yes, nsg gave clearance to work with pt   Assessment   Prognosis Fair   Problem List Decreased strength;Decreased range of motion;Decreased endurance;Impaired balance;Decreased mobility;Decreased coordination;Decreased cognition;Impaired judgement;Decreased safety awareness;Pain   Assessment Pt is 89 y.o. female seen for PT evaluation s/p admit to [facility]25 w/ CAP (community acquired pneumonia). PT consulted to assess pt's functional mobility and d/c needs. Order placed for PT eval and tx, w/ up w/ A order. Comorbidities affecting pt's physical performance at time of  assessment include:  has a past medical history of Anxiety, Asthma, Atrial fibrillation (HCC), Breast cancer (HCC), Chest pain, unspecified, CHF (congestive heart failure) (HCC), Diabetes mellitus (HCC), GERD (gastroesophageal reflux disease), History of radiation exposure, Hyperlipidemia, Hypertension, Irregular heart beat, Migraine, Obesity, Pericardial effusion, and Pericarditis. PTA, pt was requiring A for mobility and has 3 MITCH. Personal factors affecting pt at time of IE include: stairs to enter home, decreased initiation and engagement, unable to perform physical activity, limited insight into impairments, inability to perform IADLs, and inability to perform ADLs. Please find objective findings from PT assessment regarding body systems outlined above with impairments and limitations including impaired coordination, gait deviations, pain, decreased activity tolerance, decreased functional mobility tolerance, decreased safety awareness, and fall risk. Pt required increased time to complete bed mobility. Required increased A for transfers and gait with balance deficits. Additional mobility limited by dizziness. The following objective measures performed on IE also reveal limitations: The patient's AM-PAC Basic Mobility Inpatient Short Form Raw Score is 20, Standardized Score is 43.99. A standardized score less than 42.9 suggests the patient may benefit from discharge to post-acute rehabilitation services, anticipate pt will progress to return home with increased support if dizziness improves. Please also refer to the recommendation of the Physical Therapist for safe discharge planning. Pt's clinical presentation is currently unstable/unpredictable seen in pt's presentation of dizziness. Pt to benefit from continued PT tx to address deficits as defined above and maximize level of functional independent mobility and consistency. From PT/mobility standpoint, recommendation at time of d/c would be level II pending  progress in order to facilitate return to PLOF.   Barriers to Discharge Inaccessible home environment;Decreased caregiver support   Goals   Patient Goals To decrease dizziness.   STG Expiration Date 03/11/25   Short Term Goal #1 1. Complete bed mobility and transfers I to decrease need for caregiver in home. 2. Ambulate 300' I to complete household and community mobility without A. 3. Improve dynamic balance to good to decrease need for UE support during ambulation. 4. Be educated & demonstate 3 steps to be able to enter home without A.   Plan   Treatment/Interventions OT;Spoke to case management;Spoke to nursing;Gait training;Bed mobility;Patient/family training;Endurance training;Functional transfer training;LE strengthening/ROM   PT Frequency 3-5x/wk   Discharge Recommendation   Rehab Resource Intensity Level, PT II (Moderate Resource Intensity)   AM-PAC Basic Mobility Inpatient   Turning in Flat Bed Without Bedrails 4   Lying on Back to Sitting on Edge of Flat Bed Without Bedrails 4   Moving Bed to Chair 3   Standing Up From Chair Using Arms 3   Walk in Room 3   Climb 3-5 Stairs With Railing 3   Basic Mobility Inpatient Raw Score 20   Basic Mobility Standardized Score 43.99   [facility] Highest Level Of Mobility   -HLM Goal 6: Walk 10 steps or more   -HLM Achieved 6: Walk 10 steps or more         Liliana Serna, PT

## 2025-02-27 NOTE — ASSESSMENT & PLAN NOTE
Does not wear oxygen at baseline.  Required 4 L nasal cannula on admission, attempt to wean to 3 L today   Treatment of pneumonia as above  Respiratory protocol  Pulmonary toilet, incentive and flutter valve ordered  Wean Oxygen as tolerated, may need home O2 evaluation prior to discharge

## 2025-02-27 NOTE — PLAN OF CARE
Problem: PAIN - ADULT  Goal: Verbalizes/displays adequate comfort level or baseline comfort level  Description: Interventions:  - Encourage patient to monitor pain and request assistance  - Assess pain using appropriate pain scale  - Administer analgesics based on type and severity of pain and evaluate response  - Implement non-pharmacological measures as appropriate and evaluate response  - Consider cultural and social influences on pain and pain management  - Notify physician/advanced practitioner if interventions unsuccessful or patient reports new pain  Outcome: Progressing     Problem: INFECTION - ADULT  Goal: Absence or prevention of progression during hospitalization  Description: INTERVENTIONS:  - Assess and monitor for signs and symptoms of infection  - Monitor lab/diagnostic results  - Monitor all insertion sites, i.e. indwelling lines, tubes, and drains  - Monitor endotracheal if appropriate and nasal secretions for changes in amount and color  - [place] appropriate cooling/warming therapies per order  - Administer medications as ordered  - Instruct and encourage patient and family to use good hand hygiene technique  - Identify and instruct in appropriate isolation precautions for identified infection/condition  Outcome: Progressing  Goal: Absence of fever/infection during neutropenic period  Description: INTERVENTIONS:  - Monitor WBC    Outcome: Progressing     Problem: SAFETY ADULT  Goal: Patient will remain free of falls  Description: INTERVENTIONS:  - Educate patient/family on patient safety including physical limitations  - Instruct patient to call for assistance with activity   - Consult OT/PT to assist with strengthening/mobility   - Keep Call bell within reach  - Keep bed low and locked with side rails adjusted as appropriate  - Keep care items and personal belongings within reach  - Initiate and maintain comfort rounds  Outcome: Progressing  Goal: Maintain or return to baseline ADL  function  Description: INTERVENTIONS:  -  Assess patient's ability to carry out ADLs; assess patient's baseline for ADL function and identify physical deficits which impact ability to perform ADLs (bathing, care of mouth/teeth, toileting, grooming, dressing, etc.)  - Assess/evaluate cause of self-care deficits   - Assess range of motion  - Assess patient's mobility; develop plan if impaired  - Assess patient's need for assistive devices and provide as appropriate  - Encourage maximum independence but intervene and supervise when necessary  - Involve family in performance of ADLs  - Assess for home care needs following discharge   - Consider OT consult to assist with ADL evaluation and planning for discharge  - Provide patient education as appropriate  Outcome: Progressing  Goal: Maintains/Returns to pre admission functional level  Description: INTERVENTIONS:  - Perform AM-PAC 6 Click Basic Mobility/ Daily Activity assessment daily.  - Set and communicate daily mobility goal to care team and patient/family/caregiver.   - Collaborate with rehabilitation services on mobility goals if consulted  -- Out of bed for toileting  - Record patient progress and toleration of activity level   Outcome: Progressing     Problem: DISCHARGE PLANNING  Goal: Discharge to home or other facility with appropriate resources  Description: INTERVENTIONS:  - Identify barriers to discharge w/patient and caregiver  - Arrange for needed discharge resources and transportation as appropriate  - Identify discharge learning needs (meds, wound care, etc.)  - Arrange for interpretive services to assist at discharge as needed  - Refer to Case Management Department for coordinating discharge planning if the patient needs post-hospital services based on physician/advanced practitioner order or complex needs related to functional status, cognitive ability, or social support system  Outcome: Progressing     Problem: Knowledge Deficit  Goal:  Patient/family/caregiver demonstrates understanding of disease process, treatment plan, medications, and discharge instructions  Description: Complete learning assessment and assess knowledge base.  Interventions:  - Provide teaching at level of understanding  - Provide teaching via preferred learning methods  Outcome: Progressing     Problem: Nutrition/Hydration-ADULT  Goal: Nutrient/Hydration intake appropriate for improving, restoring or maintaining nutritional needs  Description: Monitor and assess patient's nutrition/hydration status for malnutrition. Collaborate with interdisciplinary team and initiate plan and interventions as ordered.  Monitor patient's weight and dietary intake as ordered or per policy. Utilize nutrition screening tool and intervene as necessary. Determine patient's food preferences and provide high-protein, high-caloric foods as appropriate.     INTERVENTIONS:  - Monitor oral intake, urinary output, labs, and treatment plans  - Assess nutrition and hydration status and recommend course of action  - Evaluate amount of meals eaten  - Assist patient with eating if necessary   - Allow adequate time for meals  - Recommend/ encourage appropriate diets, oral nutritional supplements, and vitamin/mineral supplements  - Order, calculate, and assess calorie counts as needed  - Recommend, monitor, and adjust tube feedings and TPN/PPN based on assessed needs  - Assess need for intravenous fluids  - Provide specific nutrition/hydration education as appropriate  - Include patient/family/caregiver in decisions related to nutrition  Outcome: Progressing

## 2025-02-28 ENCOUNTER — HOME HEALTH ADMISSION (OUTPATIENT)
Dept: HOME HEALTH SERVICES | Facility: HOME HEALTHCARE | Age: 89
End: 2025-02-28
Payer: MEDICARE

## 2025-02-28 LAB
ANION GAP SERPL CALCULATED.3IONS-SCNC: 9 MMOL/L (ref 4–13)
BUN SERPL-MCNC: 21 MG/DL (ref 5–25)
CALCIUM SERPL-MCNC: 10.4 MG/DL (ref 8.4–10.2)
CHLORIDE SERPL-SCNC: 104 MMOL/L (ref 96–108)
CO2 SERPL-SCNC: 26 MMOL/L (ref 21–32)
CREAT SERPL-MCNC: 1.26 MG/DL (ref 0.6–1.3)
GFR SERPL CREATININE-BSD FRML MDRD: 38 ML/MIN/1.73SQ M
GLUCOSE SERPL-MCNC: 122 MG/DL (ref 65–140)
GLUCOSE SERPL-MCNC: 126 MG/DL (ref 65–140)
GLUCOSE SERPL-MCNC: 178 MG/DL (ref 65–140)
GLUCOSE SERPL-MCNC: 46 MG/DL (ref 65–140)
GLUCOSE SERPL-MCNC: 78 MG/DL (ref 65–140)
POTASSIUM SERPL-SCNC: 3.8 MMOL/L (ref 3.5–5.3)
SODIUM SERPL-SCNC: 139 MMOL/L (ref 135–147)

## 2025-02-28 PROCEDURE — 82948 REAGENT STRIP/BLOOD GLUCOSE: CPT

## 2025-02-28 PROCEDURE — 80048 BASIC METABOLIC PNL TOTAL CA: CPT | Performed by: PHYSICIAN ASSISTANT

## 2025-02-28 PROCEDURE — 99232 SBSQ HOSP IP/OBS MODERATE 35: CPT | Performed by: PHYSICIAN ASSISTANT

## 2025-02-28 RX ORDER — DEXTROSE MONOHYDRATE 25 G/50ML
25 INJECTION, SOLUTION INTRAVENOUS ONCE
Status: COMPLETED | OUTPATIENT
Start: 2025-02-28 | End: 2025-02-28

## 2025-02-28 RX ADMIN — LIDOCAINE 1 PATCH: 700 PATCH TOPICAL at 08:07

## 2025-02-28 RX ADMIN — DOCUSATE SODIUM 100 MG: 100 CAPSULE, LIQUID FILLED ORAL at 08:21

## 2025-02-28 RX ADMIN — OXYBUTYNIN 10 MG: 10 TABLET, FILM COATED, EXTENDED RELEASE ORAL at 08:07

## 2025-02-28 RX ADMIN — HEPARIN SODIUM 5000 UNITS: 5000 INJECTION, SOLUTION INTRAVENOUS; SUBCUTANEOUS at 13:52

## 2025-02-28 RX ADMIN — METOPROLOL TARTRATE 50 MG: 50 TABLET, FILM COATED ORAL at 08:07

## 2025-02-28 RX ADMIN — PRAVASTATIN SODIUM 40 MG: 40 TABLET ORAL at 16:50

## 2025-02-28 RX ADMIN — ANASTROZOLE 1 MG: 1 TABLET, COATED ORAL at 08:20

## 2025-02-28 RX ADMIN — DEXTROSE MONOHYDRATE 25 ML: 25 INJECTION, SOLUTION INTRAVENOUS at 06:17

## 2025-02-28 RX ADMIN — ACETAMINOPHEN 975 MG: 325 TABLET, FILM COATED ORAL at 05:59

## 2025-02-28 RX ADMIN — METOPROLOL TARTRATE 50 MG: 50 TABLET, FILM COATED ORAL at 21:32

## 2025-02-28 RX ADMIN — POLYETHYLENE GLYCOL 3350 17 G: 17 POWDER, FOR SOLUTION ORAL at 08:07

## 2025-02-28 RX ADMIN — GUAIFENESIN 600 MG: 600 TABLET, EXTENDED RELEASE ORAL at 21:31

## 2025-02-28 RX ADMIN — PANTOPRAZOLE SODIUM 40 MG: 40 TABLET, DELAYED RELEASE ORAL at 05:59

## 2025-02-28 RX ADMIN — INSULIN LISPRO 1 UNITS: 100 INJECTION, SOLUTION INTRAVENOUS; SUBCUTANEOUS at 08:10

## 2025-02-28 RX ADMIN — HEPARIN SODIUM 5000 UNITS: 5000 INJECTION, SOLUTION INTRAVENOUS; SUBCUTANEOUS at 21:31

## 2025-02-28 RX ADMIN — ACETAMINOPHEN 975 MG: 325 TABLET, FILM COATED ORAL at 21:32

## 2025-02-28 RX ADMIN — MORPHINE SULFATE 15 MG: 15 TABLET ORAL at 16:50

## 2025-02-28 RX ADMIN — HEPARIN SODIUM 5000 UNITS: 5000 INJECTION, SOLUTION INTRAVENOUS; SUBCUTANEOUS at 05:59

## 2025-02-28 RX ADMIN — DOCUSATE SODIUM 100 MG: 100 CAPSULE, LIQUID FILLED ORAL at 16:51

## 2025-02-28 RX ADMIN — CEFTRIAXONE 1000 MG: 1 INJECTION, POWDER, FOR SOLUTION INTRAMUSCULAR; INTRAVENOUS at 07:58

## 2025-02-28 RX ADMIN — GUAIFENESIN 600 MG: 600 TABLET, EXTENDED RELEASE ORAL at 08:07

## 2025-02-28 RX ADMIN — FLUTICASONE FUROATE 1 PUFF: 200 POWDER RESPIRATORY (INHALATION) at 10:21

## 2025-02-28 RX ADMIN — ACETAMINOPHEN 975 MG: 325 TABLET, FILM COATED ORAL at 13:52

## 2025-02-28 NOTE — ASSESSMENT & PLAN NOTE
Present on admission for tachycardia, tachypnea and leukocytosis.  Source likely right lower lobe pneumonia  Tachycardia and tachypnea improving, leukocytosis resolved.  Treatment as above   Monitor

## 2025-02-28 NOTE — ASSESSMENT & PLAN NOTE
Wt Readings from Last 3 Encounters:   02/28/25 72.5 kg (159 lb 13.3 oz)   02/14/25 73.9 kg (163 lb)   01/27/25 74.2 kg (163 lb 9.6 oz)   Clinically appears stable at this time  Last echocardiogram in our system appears to demonstrate LVEF 60% with grade 1 diastolic dysfunction in May 2023  Continue BB.  Not on diuretics at baseline.  Monitor volume status

## 2025-02-28 NOTE — ASSESSMENT & PLAN NOTE
Pt with complaints of left shoulder pain that have been ongoing for the past few days  Denies any trauma   Received SQ heparin injections in the left arm, small hematoma noted of the posterior left arm, possible radiation of pain vs. From coughing spells Vs. Positional  Continue with PRN lidoderm patches and ATC tylenol   Pt reports pain is improved today

## 2025-02-28 NOTE — ASSESSMENT & PLAN NOTE
Patient developed shortness of breath and minimally productive cough on Friday 2/21.  She states it started after she vomited after an optometrist appointment.  Thought that she was having an asthma attack, took albuterol treatments without improvement.  Met sepsis criteria in the emergency department was noted to be hypoxic.  Flu COVID RSV negative x 2.  Chest x-ray with moderate opacity in the right lower lobe compatible with pneumonia.  Also with pleural thickening, recommending further evaluation with CT.  CT also consistent with pneumonia, aspiration not excluded.  Small right pleural effusion.  New 5 mm right upper lobe nodule.  Lytic osseous lesions suspicious for metastasis (known hx of breast CA).   Procalcitonin negative however given clinical suspicion, patient was started on antibiotics with IV ceftriaxone and oral azithromycin.  Given negative Legionella, azithromycin discontinued.  Continue IV ceftriaxone, Rx day 5, would likely treat for 7 day course total as patient with slow improvement   BC x 2 no growth to date  Speech eval completed, no concerns for aspiration.  Supportive care  Plan as below

## 2025-02-28 NOTE — PROGRESS NOTES
02/28/25 0800   Clinical Encounter Type   Visited With Patient  (Father Rajesh)   Sacramental Encounters   Sacrament of Sick-Anointing Patient declined anointing  (blessing given)

## 2025-02-28 NOTE — ASSESSMENT & PLAN NOTE
Lab Results   Component Value Date    HGBA1C 6.8 (H) 02/27/2024     Home insulin regimen is Toujeo (U-300) 50 to 60 units nightly per patient and .    In setting of poor oral intake, this was reduced to 20 units Lantus every 12 hours but patient with a.m. hypoglycemia again today, therefore will d/c further lantus for now and continue with SSI coverage alone, trend glucose closely   Diabetic diet  Monitor accuchecks and adjust regimen PRN

## 2025-02-28 NOTE — CASE MANAGEMENT
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) 2020     Atrial fibrillation, currently in sinus rhythm 2020     Chronic pain 2020     Malignant neoplasm of upper-outer quadrant of left breast in female, estrogen receptor positive (HCC) 2020     Migraine without aura and without status migrainosus, not intractable 2020     Mixed hyperlipidemia 2019     Chest pain 2018     Chronic heart failure (HCC) 2018     Uncomplicated opioid dependence (HCC) 2016     Essential hypertension 2016     GERD without esophagitis 2016     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       LOS (days): 4  Geometric Mean LOS (GMLOS) (days): 4.9  Days to GMLOS:0.7     OBJECTIVE:  Risk of Unplanned Readmission Score: 25.65         Current admission status: Inpatient   Preferred Pharmacy:   [id number] - [place], [place] - [address]  [place] [address]  Phone: [phone] Fax: [phone]    [facility] - [place], [place] - [address] [facility]  [address] [facility] [address]  Phone: [phone] Fax: [phone]    Primary Care Provider: Osiel Eid MD    Primary Insurance: MEDICARE  Secondary Insurance: HUMANA    DISCHARGE DETAILS:    Requested Home  Health Care         Is the patient interested in HHC at discharge?: Yes  Home Health Discipline requested:: Occupational Therapy, Physical Therapy  Home Health Agency Name:: [facility]  Home Health Follow-Up Provider:: PCP  Home Health Services Needed:: Evaluate Functional Status and Safety, Gait/ADL Training, Strengthening/Theraputic Exercises to Improve Function  Homebound Criteria Met:: Requires the Assistance of Another Person for Safe Ambulation or to Leave the Home, Uses an Assist Device (i.e. cane, walker, etc)  Supporting Clincal Findings:: Fatigues Easliy in Short Distances, Limited Endurance    Other Referral/Resources/Interventions Provided:  Referral Comments: [facility] able to accept, reserved in [facility]

## 2025-02-28 NOTE — ASSESSMENT & PLAN NOTE
Does not wear oxygen at baseline.  Required 4 L nasal cannula on admission, weaned to 2 L today, attempt to further wean to 1 L this afternoon and hopeful for RA tomorrow   Treatment of pneumonia as above  Respiratory protocol  Pulmonary toilet, incentive and flutter valve ordered  Wean Oxygen as tolerated, may need home O2 evaluation prior to discharge if unable to discontinue

## 2025-02-28 NOTE — PROGRESS NOTES
Patient:    MRN:  [id number]    Brice Request ID:  [id number]    Level of care reserved:  Home Health Agency    Partner Reserved:  [facility], [place] [address] [phone]    Clinical needs requested:    Geography searched:  [id number]    Start of Service:    Request sent:  12:26pm EST on 2/28/2025 by Dora Aragon    Partner reserved:  1:34pm EST on 2/28/2025 by Dora Aragon    Choice list shared:  1:34pm EST on 2/28/2025 by Dora Aragon

## 2025-02-28 NOTE — CASE MANAGEMENT
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) 2020     Atrial fibrillation, currently in sinus rhythm 2020     Chronic pain 2020     Malignant neoplasm of upper-outer quadrant of left breast in female, estrogen receptor positive (HCC) 2020     Migraine without aura and without status migrainosus, not intractable 2020     Mixed hyperlipidemia 2019     Chest pain 2018     Chronic heart failure (HCC) 2018     Uncomplicated opioid dependence (HCC) 2016     Essential hypertension 2016     GERD without esophagitis 2016     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       LOS (days): 4  Geometric Mean LOS (GMLOS) (days): 4.9  Days to GMLOS:0.8     OBJECTIVE:  Risk of Unplanned Readmission Score: 25.65         Current admission status: Inpatient   Preferred Pharmacy:   [id number] - [place] [place] - [address]  [place] [address]  Phone: [phone] Fax: [phone]    [facility] - [place], [place] - [address]  [address] [facility] [address]  Phone: [phone] Fax: [phone]    Primary Care Provider: Osiel Eid MD    Primary Insurance: MEDICARE  Secondary Insurance: HUMANA    DISCHARGE DETAILS:    Discharge planning  discussed with:: Jayjay  Freedom of Choice: Yes  Comments - Freedom of Choice: Discussed FOC  CM contacted family/caregiver?: No- see comments (declined)  Were Treatment Team discharge recommendations reviewed with patient/caregiver?: Yes  Did patient/caregiver verbalize understanding of patient care needs?: Yes  Were patient/caregiver advised of the risks associated with not following Treatment Team discharge recommendations?: Yes    Other Referral/Resources/Interventions Provided:  Referral Comments: This CM spoke with patient regarding therapy recommendations.  Patient would like , requesting referral to [facility], entered in [facility]. Aware of risks of not going to [facility]

## 2025-02-28 NOTE — PROGRESS NOTES
Progress Note - Hospitalist   Name: Barbra DeO liveira 89 y.o. female I MRN: [id number]  Unit/Bed#: CW2 216-01 I Date of Admission: 2/24/2025   Date of Service: 2/28/2025 I Hospital Day: 4    Assessment & Plan  CAP (community acquired pneumonia)  Patient developed shortness of breath and minimally productive cough on Friday 2/21.  She states it started after she vomited after an optometrist appointment.  Thought that she was having an asthma attack, took albuterol treatments without improvement.  Met sepsis criteria in the emergency department was noted to be hypoxic.  Flu COVID RSV negative x 2.  Chest x-ray with moderate opacity in the right lower lobe compatible with pneumonia.  Also with pleural thickening, recommending further evaluation with CT.  CT also consistent with pneumonia, aspiration not excluded.  Small right pleural effusion.  New 5 mm right upper lobe nodule.  Lytic osseous lesions suspicious for metastasis (known hx of breast CA).   Procalcitonin negative however given clinical suspicion, patient was started on antibiotics with IV ceftriaxone and oral azithromycin.  Given negative Legionella, azithromycin discontinued.  Continue IV ceftriaxone, Rx day 5, would likely treat for 7 day course total as patient with slow improvement   BC x 2 no growth to date  Speech eval completed, no concerns for aspiration.  Supportive care  Plan as below  Acute hypoxic respiratory failure (HCC)  Does not wear oxygen at baseline.  Required 4 L nasal cannula on admission, weaned to 2 L today, attempt to further wean to 1 L this afternoon and hopeful for RA tomorrow   Treatment of pneumonia as above  Respiratory protocol  Pulmonary toilet, incentive and flutter valve ordered  Wean Oxygen as tolerated, may need home O2 evaluation prior to discharge if unable to discontinue  Sepsis (HCC)  Present on admission for tachycardia, tachypnea and leukocytosis.  Source likely right lower lobe pneumonia  Tachycardia and tachypnea  improving, leukocytosis resolved.  Treatment as above   Monitor  Essential hypertension  BP acceptable, some mild elevations noted here   Continue PTA metoprolol 50 mg BID  Monitor   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)  Lab Results   Component Value Date    HGBA1C 6.8 (H) 02/27/2024     Home insulin regimen is Toujeo (U-300) 50 to 60 units nightly per patient and .    In setting of poor oral intake, this was reduced to 20 units Lantus every 12 hours but patient with a.m. hypoglycemia again today, therefore will d/c further lantus for now and continue with SSI coverage alone, trend glucose closely   Diabetic diet  Monitor accuchecks and adjust regimen PRN  Uncomplicated opioid dependence (HCC)  PDMP and dispense report reviewed by admitting provider, continue morphine IR 15mg q8h PRN  Chronic heart failure (HCC)  Wt Readings from Last 3 Encounters:   02/28/25 72.5 kg (159 lb 13.3 oz)   02/14/25 73.9 kg (163 lb)   01/27/25 74.2 kg (163 lb 9.6 oz)   Clinically appears stable at this time  Last echocardiogram in our system appears to demonstrate LVEF 60% with grade 1 diastolic dysfunction in May 2023  Continue BB.  Not on diuretics at baseline.  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  States that she is currently undergoing observation  Reports that she has follow-up next Monday with surg onc, 3/3/2025  CT chest concerning for bilateral rib lytic lesions as well as right upper lobe 5 mm nodule.    Would also recommend f/u with medical oncology, known to Dr. Portillo  Continue Arimidex   Left shoulder pain  Pt with complaints of left shoulder pain that have been ongoing for the past few days  Denies any trauma   Received SQ heparin injections in the left arm, small hematoma noted  of the posterior left arm, possible radiation of pain vs. From coughing spells Vs. Positional  Continue with PRN lidoderm patches and ATC tylenol   Pt reports pain is improved today     VTE Pharmacologic Prophylaxis:   Moderate Risk (Score 3-4) - Pharmacological DVT Prophylaxis Ordered: heparin.    Mobility:   Basic Mobility Inpatient Raw Score: 20  JH-HLM Goal: 6: Walk 10 steps or more  JH-HLM Achieved: 6: Walk 10 steps or more  JH-HLM Goal achieved. Continue to encourage appropriate mobility.    Patient Centered Rounds: I evaluated the patient without nursing staff present due to speaking to nurse outside patient's room     Discussions with Specialists or Other Care Team Provider: Discussed with RN, CM and reviewed previous notes     Education and Discussions with Family / Patient: Updated  () via phone. Dinh     Current Length of Stay: 4 day(s)  Current Patient Status: Inpatient   Certification Statement: The patient will continue to require additional inpatient hospital stay due to continued weaning of oxygen, symptomatic improvement, IV abx  Discharge Plan: Anticipate discharge in 24-48 hrs to home with home services.    Code Status: Level 1 - Full Code    Subjective   Pt reports that she feels very tired today. Reports that she is having trouble trying to get to sleep. Her left shoulder pain is improving. Does endorse some lightheadedness when she tries to get up and move around. Coughing fits are mostly at night, still dry with no production.     Objective :  Temp:  [97.8 °F (36.6 °C)-98.5 °F (36.9 °C)] 98.5 °F (36.9 °C)  HR:  [59-72] 59  BP: (130-142)/(55-74) 130/55  Resp:  [16-18] 18  SpO2:  [93 %-96 %] 93 %    Body mass index is 29.23 kg/m².     Input and Output Summary (last 24 hours):     Intake/Output Summary (Last 24 hours) at 2/28/2025 1307  Last data filed at 2/28/2025 0555  Gross per 24 hour   Intake 120 ml   Output --   Net 120 ml       Physical Exam  Vitals reviewed.    Constitutional:       General: She is not in acute distress.     Comments: Pt is in no acute distress lying in her hospital bed resting comfortably  O2 decreased to 2 L NC with stable oxygen saturations   Appears fatigued     Cardiovascular:      Rate and Rhythm: Normal rate and regular rhythm.   Pulmonary:      Effort: No respiratory distress.      Breath sounds: Normal breath sounds. No wheezing.   Abdominal:      General: Bowel sounds are normal.      Palpations: Abdomen is soft.      Tenderness: There is no abdominal tenderness.   Musculoskeletal:      Right lower leg: No edema.      Left lower leg: No edema.   Skin:     General: Skin is warm and dry.   Neurological:      Mental Status: She is alert.   Psychiatric:         Mood and Affect: Mood normal.           Lines/Drains:              Lab Results: I have reviewed the following results:   Results from last 7 days   Lab Units 02/27/25  0551   WBC Thousand/uL 8.30   HEMOGLOBIN g/dL 14.4   HEMATOCRIT % 45.2   PLATELETS Thousands/uL 229   SEGS PCT % 71   LYMPHO PCT % 19   MONO PCT % 8   EOS PCT % 2     Results from last 7 days   Lab Units 02/28/25  0458 02/25/25  0448 02/24/25  0621   SODIUM mmol/L 139   < > 137   POTASSIUM mmol/L 3.8   < > 3.9   CHLORIDE mmol/L 104   < > 102   CO2 mmol/L 26   < > 25   BUN mg/dL 21   < > 18   CREATININE mg/dL 1.26   < > 1.00   ANION GAP mmol/L 9   < > 10   CALCIUM mg/dL 10.4*   < > 10.2   ALBUMIN g/dL  --   --  3.7   TOTAL BILIRUBIN mg/dL  --   --  1.42*   ALK PHOS U/L  --   --  137*   ALT U/L  --   --  12   AST U/L  --   --  16   GLUCOSE RANDOM mg/dL 46*   < > 145*    < > = values in this interval not displayed.     Results from last 7 days   Lab Units 02/24/25  1006   INR  1.29*     Results from last 7 days   Lab Units 02/28/25  1211 02/28/25  0633 02/27/25  2105 02/27/25  1559 02/27/25  1121 02/27/25  0753 02/26/25  2137 02/26/25  1646 02/26/25  1108 02/26/25  0933 02/26/25  0627 02/25/25  2038   POC GLUCOSE mg/dl 78 178*  171* 111 123 75 101 100 97 100 62* 114         Results from last 7 days   Lab Units 02/25/25  0448 02/24/25  1006 02/24/25  0621   LACTIC ACID mmol/L  --   --  1.2   PROCALCITONIN ng/ml 0.06 <0.05  --        Recent Cultures (last 7 days):   Results from last 7 days   Lab Units 02/25/25  1316 02/24/25  1006   BLOOD CULTURE   --  No Growth After 4 Days.  No Growth After 4 Days.   LEGIONELLA URINARY ANTIGEN  Negative  --        Imaging Results Review: No pertinent imaging studies reviewed.  Other Study Results Review: No additional pertinent studies reviewed.    Last 24 Hours Medication List:     Current Facility-Administered Medications:     acetaminophen (TYLENOL) tablet 975 mg, Q8H RAPHAEL    albuterol (PROVENTIL HFA,VENTOLIN HFA) inhaler 2 puff, Q6H PRN    anastrozole (ARIMIDEX) tablet 1 mg, Daily    ceftriaxone (ROCEPHIN) 1 g/50 mL in dextrose IVPB, Q24H, Last Rate: 1,000 mg (02/28/25 0758) **AND** [DISCONTINUED] azithromycin (ZITHROMAX) tablet 500 mg, Q24H    dextromethorphan-guaiFENesin (ROBITUSSIN DM) oral syrup 10 mL, Q4H PRN    docusate sodium (COLACE) capsule 100 mg, BID    fluticasone (ARNUITY ELLIPTA) 200 MCG/ACT inhaler 1 puff, Daily    guaiFENesin (MUCINEX) 12 hr tablet 600 mg, Q12H RAPHAEL    heparin (porcine) subcutaneous injection 5,000 Units, Q8H RAPHAEL    insulin lispro (HumALOG/ADMELOG) 100 units/mL subcutaneous injection 1-5 Units, TID AC **AND** Fingerstick Glucose (POCT), TID AC    insulin lispro (HumALOG/ADMELOG) 100 units/mL subcutaneous injection 1-5 Units, HS    lidocaine (LIDODERM) 5 % patch 1 patch, Daily    metoprolol tartrate (LOPRESSOR) tablet 50 mg, Q12H RAPHAEL    morphine (MSIR) IR tablet 15 mg, Q8H PRN    naloxone (NARCAN) 0.04 mg/mL syringe 0.04 mg, Q1MIN PRN    oxybutynin (DITROPAN-XL) 24 hr tablet 10 mg, Daily    pantoprazole (PROTONIX) EC tablet 40 mg, Early Morning    polyethylene glycol (MIRALAX) packet 17 g, Daily    pravastatin (PRAVACHOL) tablet 40 mg, Daily With Dinner    Administrative  Statements   Today, Patient Was Seen By: Kathya Garcia PA-C    **Please Note: This note may have been constructed using a voice recognition system.**

## 2025-02-28 NOTE — ASSESSMENT & PLAN NOTE
Follows with [facility] oncology outpatient, patient diagnosed in 1992 with local recurrence.  States that she is currently undergoing observation  Reports that she has follow-up next Monday with surg onc, 3/3/2025  CT chest concerning for bilateral rib lytic lesions as well as right upper lobe 5 mm nodule.    Would also recommend f/u with medical oncology, known to Dr. Bandar Ji Arimidex

## 2025-02-28 NOTE — PLAN OF CARE
Problem: PAIN - ADULT  Goal: Verbalizes/displays adequate comfort level or baseline comfort level  Description: Interventions:  - Encourage patient to monitor pain and request assistance  - Assess pain using appropriate pain scale  - Administer analgesics based on type and severity of pain and evaluate response  - Implement non-pharmacological measures as appropriate and evaluate response  - Consider cultural and social influences on pain and pain management  - Notify physician/advanced practitioner if interventions unsuccessful or patient reports new pain  Outcome: Progressing     Problem: INFECTION - ADULT  Goal: Absence or prevention of progression during hospitalization  Description: INTERVENTIONS:  - Assess and monitor for signs and symptoms of infection  - Monitor lab/diagnostic results  - Monitor all insertion sites, i.e. indwelling lines, tubes, and drains  - Monitor endotracheal if appropriate and nasal secretions for changes in amount and color  - [place] appropriate cooling/warming therapies per order  - Administer medications as ordered  - Instruct and encourage patient and family to use good hand hygiene technique  - Identify and instruct in appropriate isolation precautions for identified infection/condition  Outcome: Progressing  Goal: Absence of fever/infection during neutropenic period  Description: INTERVENTIONS:  - Monitor WBC    Outcome: Progressing     Problem: SAFETY ADULT  Goal: Patient will remain free of falls  Description: INTERVENTIONS:  - Educate patient/family on patient safety including physical limitations  - Instruct patient to call for assistance with activity   - Consult OT/PT to assist with strengthening/mobility   - Keep Call bell within reach  - Keep bed low and locked with side rails adjusted as appropriate  - Keep care items and personal belongings within reach  - Initiate and maintain comfort rounds  Outcome: Progressing  Goal: Maintain or return to baseline ADL  function  Description: INTERVENTIONS:  -  Assess patient's ability to carry out ADLs; assess patient's baseline for ADL function and identify physical deficits which impact ability to perform ADLs (bathing, care of mouth/teeth, toileting, grooming, dressing, etc.)  - Assess/evaluate cause of self-care deficits   - Assess range of motion  - Assess patient's mobility; develop plan if impaired  - Assess patient's need for assistive devices and provide as appropriate  - Encourage maximum independence but intervene and supervise when necessary  - Involve family in performance of ADLs  - Assess for home care needs following discharge   - Consider OT consult to assist with ADL evaluation and planning for discharge  - Provide patient education as appropriate  Outcome: Progressing  Goal: Maintains/Returns to pre admission functional level  Description: INTERVENTIONS:  - Perform AM-PAC 6 Click Basic Mobility/ Daily Activity assessment daily.  - Set and communicate daily mobility goal to care team and patient/family/caregiver.   - Collaborate with rehabilitation services on mobility goals if consulted  -- Out of bed for toileting  - Record patient progress and toleration of activity level   Outcome: Progressing     Problem: DISCHARGE PLANNING  Goal: Discharge to home or other facility with appropriate resources  Description: INTERVENTIONS:  - Identify barriers to discharge w/patient and caregiver  - Arrange for needed discharge resources and transportation as appropriate  - Identify discharge learning needs (meds, wound care, etc.)  - Arrange for interpretive services to assist at discharge as needed  - Refer to Case Management Department for coordinating discharge planning if the patient needs post-hospital services based on physician/advanced practitioner order or complex needs related to functional status, cognitive ability, or social support system  Outcome: Progressing     Problem: Knowledge Deficit  Goal:  Patient/family/caregiver demonstrates understanding of disease process, treatment plan, medications, and discharge instructions  Description: Complete learning assessment and assess knowledge base.  Interventions:  - Provide teaching at level of understanding  - Provide teaching via preferred learning methods  Outcome: Progressing     Problem: Nutrition/Hydration-ADULT  Goal: Nutrient/Hydration intake appropriate for improving, restoring or maintaining nutritional needs  Description: Monitor and assess patient's nutrition/hydration status for malnutrition. Collaborate with interdisciplinary team and initiate plan and interventions as ordered.  Monitor patient's weight and dietary intake as ordered or per policy. Utilize nutrition screening tool and intervene as necessary. Determine patient's food preferences and provide high-protein, high-caloric foods as appropriate.     INTERVENTIONS:  - Monitor oral intake, urinary output, labs, and treatment plans  - Assess nutrition and hydration status and recommend course of action  - Evaluate amount of meals eaten  - Assist patient with eating if necessary   - Allow adequate time for meals  - Recommend/ encourage appropriate diets, oral nutritional supplements, and vitamin/mineral supplements  - Order, calculate, and assess calorie counts as needed  - Recommend, monitor, and adjust tube feedings and TPN/PPN based on assessed needs  - Assess need for intravenous fluids  - Provide specific nutrition/hydration education as appropriate  - Include patient/family/caregiver in decisions related to nutrition  Outcome: Progressing

## 2025-02-28 NOTE — PLAN OF CARE
Problem: PAIN - ADULT  Goal: Verbalizes/displays adequate comfort level or baseline comfort level  Description: Interventions:  - Encourage patient to monitor pain and request assistance  - Assess pain using appropriate pain scale  - Administer analgesics based on type and severity of pain and evaluate response  - Implement non-pharmacological measures as appropriate and evaluate response  - Consider cultural and social influences on pain and pain management  - Notify physician/advanced practitioner if interventions unsuccessful or patient reports new pain  Outcome: Progressing     Problem: INFECTION - ADULT  Goal: Absence or prevention of progression during hospitalization  Description: INTERVENTIONS:  - Assess and monitor for signs and symptoms of infection  - Monitor lab/diagnostic results  - Monitor all insertion sites, i.e. indwelling lines, tubes, and drains  - Monitor endotracheal if appropriate and nasal secretions for changes in amount and color  - [place] appropriate cooling/warming therapies per order  - Administer medications as ordered  - Instruct and encourage patient and family to use good hand hygiene technique  - Identify and instruct in appropriate isolation precautions for identified infection/condition  Outcome: Progressing     Problem: SAFETY ADULT  Goal: Patient will remain free of falls  Description: INTERVENTIONS:  - Educate patient/family on patient safety including physical limitations  - Instruct patient to call for assistance with activity   - Consult OT/PT to assist with strengthening/mobility   - Keep Call bell within reach  - Keep bed low and locked with side rails adjusted as appropriate  - Keep care items and personal belongings within reach  - Initiate and maintain comfort rounds  Outcome: Progressing

## 2025-03-01 PROBLEM — M25.512 LEFT SHOULDER PAIN: Status: RESOLVED | Noted: 2025-02-27 | Resolved: 2025-03-01

## 2025-03-01 LAB
BACTERIA BLD CULT: NORMAL
BACTERIA BLD CULT: NORMAL
GLUCOSE SERPL-MCNC: 108 MG/DL (ref 65–140)
GLUCOSE SERPL-MCNC: 113 MG/DL (ref 65–140)
GLUCOSE SERPL-MCNC: 116 MG/DL (ref 65–140)
GLUCOSE SERPL-MCNC: 90 MG/DL (ref 65–140)

## 2025-03-01 PROCEDURE — 82948 REAGENT STRIP/BLOOD GLUCOSE: CPT

## 2025-03-01 PROCEDURE — 99232 SBSQ HOSP IP/OBS MODERATE 35: CPT | Performed by: PHYSICIAN ASSISTANT

## 2025-03-01 RX ADMIN — MORPHINE SULFATE 15 MG: 15 TABLET ORAL at 22:17

## 2025-03-01 RX ADMIN — ACETAMINOPHEN 975 MG: 325 TABLET, FILM COATED ORAL at 15:22

## 2025-03-01 RX ADMIN — CEFTRIAXONE 1000 MG: 1 INJECTION, POWDER, FOR SOLUTION INTRAMUSCULAR; INTRAVENOUS at 08:38

## 2025-03-01 RX ADMIN — GUAIFENESIN 600 MG: 600 TABLET, EXTENDED RELEASE ORAL at 08:37

## 2025-03-01 RX ADMIN — OXYBUTYNIN 10 MG: 10 TABLET, FILM COATED, EXTENDED RELEASE ORAL at 08:37

## 2025-03-01 RX ADMIN — FLUTICASONE FUROATE 1 PUFF: 200 POWDER RESPIRATORY (INHALATION) at 08:38

## 2025-03-01 RX ADMIN — ACETAMINOPHEN 975 MG: 325 TABLET, FILM COATED ORAL at 22:17

## 2025-03-01 RX ADMIN — MORPHINE SULFATE 15 MG: 15 TABLET ORAL at 01:56

## 2025-03-01 RX ADMIN — METOPROLOL TARTRATE 50 MG: 50 TABLET, FILM COATED ORAL at 08:37

## 2025-03-01 RX ADMIN — ACETAMINOPHEN 975 MG: 325 TABLET, FILM COATED ORAL at 05:57

## 2025-03-01 RX ADMIN — GUAIFENESIN 600 MG: 600 TABLET, EXTENDED RELEASE ORAL at 22:17

## 2025-03-01 RX ADMIN — DOCUSATE SODIUM 100 MG: 100 CAPSULE, LIQUID FILLED ORAL at 17:49

## 2025-03-01 RX ADMIN — POLYETHYLENE GLYCOL 3350 17 G: 17 POWDER, FOR SOLUTION ORAL at 08:38

## 2025-03-01 RX ADMIN — DOCUSATE SODIUM 100 MG: 100 CAPSULE, LIQUID FILLED ORAL at 08:38

## 2025-03-01 RX ADMIN — HEPARIN SODIUM 5000 UNITS: 5000 INJECTION, SOLUTION INTRAVENOUS; SUBCUTANEOUS at 15:22

## 2025-03-01 RX ADMIN — PRAVASTATIN SODIUM 40 MG: 40 TABLET ORAL at 17:49

## 2025-03-01 RX ADMIN — METOPROLOL TARTRATE 50 MG: 50 TABLET, FILM COATED ORAL at 22:17

## 2025-03-01 RX ADMIN — LIDOCAINE 1 PATCH: 700 PATCH TOPICAL at 08:38

## 2025-03-01 RX ADMIN — HEPARIN SODIUM 5000 UNITS: 5000 INJECTION, SOLUTION INTRAVENOUS; SUBCUTANEOUS at 05:56

## 2025-03-01 RX ADMIN — HEPARIN SODIUM 5000 UNITS: 5000 INJECTION, SOLUTION INTRAVENOUS; SUBCUTANEOUS at 22:17

## 2025-03-01 RX ADMIN — PANTOPRAZOLE SODIUM 40 MG: 40 TABLET, DELAYED RELEASE ORAL at 05:57

## 2025-03-01 RX ADMIN — ANASTROZOLE 1 MG: 1 TABLET, COATED ORAL at 08:37

## 2025-03-01 NOTE — ASSESSMENT & PLAN NOTE
Does not wear oxygen at baseline.  Required 4 L nasal cannula on admission.  Likely in the setting of pneumonia with plan of care outlined above.  Currently on room air with SpO2 low 90s at rest  Will check ambulatory pulse ox today   Respiratory protocol

## 2025-03-01 NOTE — ASSESSMENT & PLAN NOTE
Follows with [facility] oncology outpatient, patient diagnosed in 1992 with local recurrence.  States that she is currently undergoing observation  Reports that she has follow-up next Monday with surg onc, 3/3/2025  CT chest concerning for bilateral rib lytic lesions as well as right upper lobe 5 mm nodule.    Recommend f/u with medical oncology, known to Dr. Portillo  Continue Arimidex

## 2025-03-01 NOTE — ASSESSMENT & PLAN NOTE
Patient developed shortness of breath and minimally productive cough on Friday 2/21.  She states it started after she vomited after an optometrist appointment.  Thought that she was having an asthma attack, took albuterol treatments without improvement.  Met sepsis criteria in the emergency department was noted to be hypoxic.  Flu COVID RSV negative x 2.  Chest x-ray with moderate opacity in the right lower lobe compatible with pneumonia.  Also with pleural thickening, recommending further evaluation with CT.  CT also consistent with pneumonia, aspiration not excluded.  Small right pleural effusion.  New 5 mm right upper lobe nodule.  Lytic osseous lesions suspicious for metastasis (known hx of breast CA).   Procalcitonin negative however given clinical suspicion, patient was started on antibiotics with IV ceftriaxone and oral azithromycin.  Given negative Legionella, azithromycin discontinued.  Continue IV ceftriaxone, day 6 today - transition to p.o. antibiotic on discharge to complete total 7-day course given slow clinical improvement  Speech eval completed, no concerns for aspiration  Pulmonary toilet, incentive and flutter valve ordered  Symptomatic and supportive cares  See plan under hypoxia

## 2025-03-01 NOTE — PLAN OF CARE
Problem: SAFETY ADULT  Goal: Patient will remain free of falls  Description: INTERVENTIONS:  - Educate patient/family on patient safety including physical limitations  - Instruct patient to call for assistance with activity   - Consult OT/PT to assist with strengthening/mobility   - Keep Call bell within reach  - Keep bed low and locked with side rails adjusted as appropriate  - Keep care items and personal belongings within reach  - Initiate and maintain comfort rounds  Outcome: Progressing     Problem: PAIN - ADULT  Goal: Verbalizes/displays adequate comfort level or baseline comfort level  Description: Interventions:  - Encourage patient to monitor pain and request assistance  - Assess pain using appropriate pain scale  - Administer analgesics based on type and severity of pain and evaluate response  - Implement non-pharmacological measures as appropriate and evaluate response  - Consider cultural and social influences on pain and pain management  - Notify physician/advanced practitioner if interventions unsuccessful or patient reports new pain  Outcome: Progressing

## 2025-03-01 NOTE — ASSESSMENT & PLAN NOTE
Patient developed shortness of breath and minimally productive cough on Friday 2/21.  She states it started after she vomited after an optometrist appointment.  Thought that she was having an asthma attack, took albuterol treatments without improvement.  Met sepsis criteria in the emergency department was noted to be hypoxic.  Flu COVID RSV negative x 2.  Chest x-ray with moderate opacity in the right lower lobe compatible with pneumonia.  Also with pleural thickening, recommending further evaluation with CT.  CT also consistent with pneumonia, aspiration not excluded.  Small right pleural effusion.  New 5 mm right upper lobe nodule.  Lytic osseous lesions suspicious for metastasis (known hx of breast CA).   Procalcitonin negative however given clinical suspicion, patient was started on antibiotics with IV ceftriaxone and oral azithromycin.  Given negative Legionella, azithromycin discontinued.  Continue IV ceftriaxone, day 6 today, transition to p.o. antibiotic on discharge to complete total 7-day course given slow clinical improvement  Speech eval completed, no concerns for aspiration  Pulmonary toilet, incentive and flutter valve ordered  Symptomatic and supportive cares  See plan under hypoxia

## 2025-03-01 NOTE — PLAN OF CARE
Problem: PAIN - ADULT  Goal: Verbalizes/displays adequate comfort level or baseline comfort level  Description: Interventions:  - Encourage patient to monitor pain and request assistance  - Assess pain using appropriate pain scale  - Administer analgesics based on type and severity of pain and evaluate response  - Implement non-pharmacological measures as appropriate and evaluate response  - Consider cultural and social influences on pain and pain management  - Notify physician/advanced practitioner if interventions unsuccessful or patient reports new pain  Outcome: Progressing     Problem: INFECTION - ADULT  Goal: Absence or prevention of progression during hospitalization  Description: INTERVENTIONS:  - Assess and monitor for signs and symptoms of infection  - Monitor lab/diagnostic results  - Monitor all insertion sites, i.e. indwelling lines, tubes, and drains  - Monitor endotracheal if appropriate and nasal secretions for changes in amount and color  - [place] appropriate cooling/warming therapies per order  - Administer medications as ordered  - Instruct and encourage patient and family to use good hand hygiene technique  - Identify and instruct in appropriate isolation precautions for identified infection/condition  Outcome: Progressing     Problem: SAFETY ADULT  Goal: Patient will remain free of falls  Description: INTERVENTIONS:  - Educate patient/family on patient safety including physical limitations  - Instruct patient to call for assistance with activity   - Consult OT/PT to assist with strengthening/mobility   - Keep Call bell within reach  - Keep bed low and locked with side rails adjusted as appropriate  - Keep care items and personal belongings within reach  - Initiate and maintain comfort rounds  Outcome: Progressing  Goal: Maintain or return to baseline ADL function  Description: INTERVENTIONS:  -  Assess patient's ability to carry out ADLs; assess patient's baseline for ADL function and  identify physical deficits which impact ability to perform ADLs (bathing, care of mouth/teeth, toileting, grooming, dressing, etc.)  - Assess/evaluate cause of self-care deficits   - Assess range of motion  - Assess patient's mobility; develop plan if impaired  - Assess patient's need for assistive devices and provide as appropriate  - Encourage maximum independence but intervene and supervise when necessary  - Involve family in performance of ADLs  - Assess for home care needs following discharge   - Consider OT consult to assist with ADL evaluation and planning for discharge  - Provide patient education as appropriate  Outcome: Progressing  Goal: Maintains/Returns to pre admission functional level  Description: INTERVENTIONS:  - Perform AM-PAC 6 Click Basic Mobility/ Daily Activity assessment daily.  - Set and communicate daily mobility goal to care team and patient/family/caregiver.   - Collaborate with rehabilitation services on mobility goals if consulted  -- Out of bed for toileting  - Record patient progress and toleration of activity level   Outcome: Progressing     Problem: DISCHARGE PLANNING  Goal: Discharge to home or other facility with appropriate resources  Description: INTERVENTIONS:  - Identify barriers to discharge w/patient and caregiver  - Arrange for needed discharge resources and transportation as appropriate  - Identify discharge learning needs (meds, wound care, etc.)  - Arrange for interpretive services to assist at discharge as needed  - Refer to Case Management Department for coordinating discharge planning if the patient needs post-hospital services based on physician/advanced practitioner order or complex needs related to functional status, cognitive ability, or social support system  Outcome: Progressing     Problem: Knowledge Deficit  Goal: Patient/family/caregiver demonstrates understanding of disease process, treatment plan, medications, and discharge instructions  Description:  Complete learning assessment and assess knowledge base.  Interventions:  - Provide teaching at level of understanding  - Provide teaching via preferred learning methods  Outcome: Progressing     Problem: Nutrition/Hydration-ADULT  Goal: Nutrient/Hydration intake appropriate for improving, restoring or maintaining nutritional needs  Description: Monitor and assess patient's nutrition/hydration status for malnutrition. Collaborate with interdisciplinary team and initiate plan and interventions as ordered.  Monitor patient's weight and dietary intake as ordered or per policy. Utilize nutrition screening tool and intervene as necessary. Determine patient's food preferences and provide high-protein, high-caloric foods as appropriate.     INTERVENTIONS:  - Monitor oral intake, urinary output, labs, and treatment plans  - Assess nutrition and hydration status and recommend course of action  - Evaluate amount of meals eaten  - Assist patient with eating if necessary   - Allow adequate time for meals  - Recommend/ encourage appropriate diets, oral nutritional supplements, and vitamin/mineral supplements  - Order, calculate, and assess calorie counts as needed  - Recommend, monitor, and adjust tube feedings and TPN/PPN based on assessed needs  - Assess need for intravenous fluids  - Provide specific nutrition/hydration education as appropriate  - Include patient/family/caregiver in decisions related to nutrition  Outcome: Progressing     Problem: Prexisting or High Potential for Compromised Skin Integrity  Goal: Skin integrity is maintained or improved  Description: INTERVENTIONS:  - Identify patients at risk for skin breakdown  - Assess and monitor skin integrity  - Assess and monitor nutrition and hydration status  - Monitor labs   - Assess for incontinence   - Turn and reposition patient  - Assist with mobility/ambulation  - Relieve pressure over bony prominences  - Avoid friction and shearing  - Provide appropriate  hygiene as needed including keeping skin clean and dry  - Evaluate need for skin moisturizer/barrier cream  - Collaborate with interdisciplinary team   - Patient/family teaching  - Consider wound care consult   Outcome: Progressing

## 2025-03-01 NOTE — ASSESSMENT & PLAN NOTE
Present on admission for tachycardia, tachypnea and leukocytosis.  Source likely right lower lobe pneumonia, see plan above  Cultures x 2 with no growth  Currently resolved

## 2025-03-01 NOTE — NURSING NOTE
Pt called she is having decrease sensation on the right fingers,RN performed neuro assessment ,secure chat provider ,who responded she will come and see the pt at bedside

## 2025-03-01 NOTE — ASSESSMENT & PLAN NOTE
Wt Readings from Last 3 Encounters:   02/28/25 72.5 kg (159 lb 13.3 oz)   02/14/25 73.9 kg (163 lb)   01/27/25 74.2 kg (163 lb 9.6 oz)   Clinically appears stable at this time. Last echocardiogram in our system appears to demonstrate LVEF 60% with grade 1 diastolic dysfunction in May 2023  Continue BB.  Not on diuretics at baseline.  Monitor volume status

## 2025-03-01 NOTE — PROGRESS NOTES
Progress Note - Hospitalist   Name: Barbra De Oliveira 89 y.o. female I MRN: [id number]  Unit/Bed#: CW2 216-01 I Date of Admission: 2/24/2025   Date of Service: 3/1/2025 I Hospital Day: 5    Assessment & Plan  CAP (community acquired pneumonia)  Patient developed shortness of breath and minimally productive cough on Friday 2/21.  She states it started after she vomited after an optometrist appointment.  Thought that she was having an asthma attack, took albuterol treatments without improvement.  Met sepsis criteria in the emergency department was noted to be hypoxic.  Flu COVID RSV negative x 2.  Chest x-ray with moderate opacity in the right lower lobe compatible with pneumonia.  Also with pleural thickening, recommending further evaluation with CT.  CT also consistent with pneumonia, aspiration not excluded.  Small right pleural effusion.  New 5 mm right upper lobe nodule.  Lytic osseous lesions suspicious for metastasis (known hx of breast CA).   Procalcitonin negative however given clinical suspicion, patient was started on antibiotics with IV ceftriaxone and oral azithromycin.  Given negative Legionella, azithromycin discontinued.  Continue IV ceftriaxone, day 6 today - transition to p.o. antibiotic on discharge to complete total 7-day course given slow clinical improvement  Speech eval completed, no concerns for aspiration  Pulmonary toilet, incentive and flutter valve ordered  Symptomatic and supportive cares  See plan under hypoxia  Acute hypoxic respiratory failure (HCC)  Does not wear oxygen at baseline.  Required 4 L nasal cannula on admission.  Likely in the setting of pneumonia with plan of care outlined above.  Currently on room air with SpO2 low 90s at rest  Will check ambulatory pulse ox today   Respiratory protocol  Sepsis (HCC)  Present on admission for tachycardia, tachypnea and leukocytosis.  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    In setting of poor oral intake, this was reduced to 20 units Lantus every 12 hours but patient with a.m. hypoglycemia, therefore Lantus has been discontinued  Blood sugars stable with SSI coverage alone  Monitor accuchecks and adjust regimen PRN  Diabetic diet  Chronic heart failure (HCC)  Wt Readings from Last 3 Encounters:   02/28/25 72.5 kg (159 lb 13.3 oz)   02/14/25 73.9 kg (163 lb)   01/27/25 74.2 kg (163 lb 9.6 oz)   Clinically appears stable at this time  Last echocardiogram in our system appears to demonstrate LVEF 60% with grade 1 diastolic dysfunction in May 2023  Continue BB.  Not on diuretics at baseline.  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  States that she is currently undergoing observation  Reports that she has follow-up next Monday with surg onc, 3/3/2025  CT chest concerning for bilateral rib lytic lesions as well as right upper lobe 5 mm nodule.    Would also recommend f/u with medical oncology, known to Dr. Portillo  Continue Arimidex   Essential hypertension  BP acceptable, some mild elevations noted here   Continue PTA metoprolol 50 mg BID  Monitor routinely   Uncomplicated opioid dependence (HCC)  PDMP and dispense report reviewed by admitting provider, continue morphine IR 15mg q8h PRN  Mixed hyperlipidemia  Substitute Zocor for pravastatin 40 mg daily while inpatient     VTE Pharmacologic Prophylaxis:   Moderate Risk (Score 3-4) - Pharmacological DVT Prophylaxis Ordered: heparin.    Mobility:   Basic Mobility Inpatient Raw Score: 20  JH-HLM Goal: 6: Walk 10 steps or more  JH-HLM Achieved: 2: Bed activities/Dependent transfer  JH-HLM  Goal NOT achieved. Continue with multidisciplinary rounding and encourage appropriate mobility to improve upon -[facility] goals.    Patient Centered Rounds: I performed bedside rounds with nursing staff today.   Discussions with Specialists or Other Care Team Provider: Primary RN    Education and Discussions with Family / Patient: Updated  () via phone.   on speaker phone during my encounter.  Discussed hopeful discharge later today pending ambulatory pulse ox.    Current Length of Stay: 5 day(s)  Current Patient Status: Inpatient   Certification Statement: The patient will continue to require additional inpatient hospital stay due to pending ambulatory pulse ox  Discharge Plan: Anticipate discharge later today or tomorrow to home.    Code Status: Level 1 - Full Code    Subjective   Patient reports feeling better this morning, still with cough but shortness of breath has improved.  She has been weaned to room air this morning. Discussed possible discharge home later today if ambulatory pulse ox is acceptable, patient and her  verbalized understanding.    Objective :  Temp:  [97.6 °F (36.4 °C)-98.9 °F (37.2 °C)] 97.6 °F (36.4 °C)  HR:  [63-85] 69  BP: (138-176)/(58-70) 143/65  Resp:  [18] 18  SpO2:  [91 %-96 %] 91 %    Body mass index is 29.23 kg/m².     Input and Output Summary (last 24 hours):     Intake/Output Summary (Last 24 hours) at 3/1/2025 1338  Last data filed at 2/28/2025 1717  Gross per 24 hour   Intake 600 ml   Output --   Net 600 ml       Physical Exam  Vitals and nursing note reviewed.   Constitutional:       General: She is not in acute distress.  Cardiovascular:      Rate and Rhythm: Normal rate and regular rhythm.   Pulmonary:      Effort: Pulmonary effort is normal. No respiratory distress.      Breath sounds: No wheezing, rhonchi or rales.      Comments: On room air with SpO2 low-90s at rest  Musculoskeletal:      Right lower leg: No edema.      Left lower leg: No  edema.   Skin:     General: Skin is warm and dry.      Coloration: Skin is not pale.      Findings: No erythema.   Neurological:      General: No focal deficit present.      Mental Status: She is alert and oriented to person, place, and time. Mental status is at baseline.           Lines/Drains:              Lab Results: I have reviewed the following results:   Results from last 7 days   Lab Units 02/27/25  0551   WBC Thousand/uL 8.30   HEMOGLOBIN g/dL 14.4   HEMATOCRIT % 45.2   PLATELETS Thousands/uL 229   SEGS PCT % 71   LYMPHO PCT % 19   MONO PCT % 8   EOS PCT % 2     Results from last 7 days   Lab Units 02/28/25  0458 02/25/25  0448 02/24/25  0621   SODIUM mmol/L 139   < > 137   POTASSIUM mmol/L 3.8   < > 3.9   CHLORIDE mmol/L 104   < > 102   CO2 mmol/L 26   < > 25   BUN mg/dL 21   < > 18   CREATININE mg/dL 1.26   < > 1.00   ANION GAP mmol/L 9   < > 10   CALCIUM mg/dL 10.4*   < > 10.2   ALBUMIN g/dL  --   --  3.7   TOTAL BILIRUBIN mg/dL  --   --  1.42*   ALK PHOS U/L  --   --  137*   ALT U/L  --   --  12   AST U/L  --   --  16   GLUCOSE RANDOM mg/dL 46*   < > 145*    < > = values in this interval not displayed.     Results from last 7 days   Lab Units 02/24/25  1006   INR  1.29*     Results from last 7 days   Lab Units 03/01/25  1103 03/01/25  0543 02/28/25  2021 02/28/25  1625 02/28/25  1211 02/28/25  0633 02/27/25  2105 02/27/25  1559 02/27/25  1121 02/27/25  0753 02/26/25  2137 02/26/25  1646   POC GLUCOSE mg/dl 116 90 126 122 78 178* 171* 111 123 75 101 100         Results from last 7 days   Lab Units 02/25/25  0448 02/24/25  1006 02/24/25  0621   LACTIC ACID mmol/L  --   --  1.2   PROCALCITONIN ng/ml 0.06 <0.05  --        Recent Cultures (last 7 days):   Results from last 7 days   Lab Units 02/25/25  1316 02/24/25  1006   BLOOD CULTURE   --  No Growth After 5 Days.  No Growth After 5 Days.   LEGIONELLA URINARY ANTIGEN  Negative  --        Imaging Results Review: No pertinent imaging studies  reviewed.  Other Study Results Review: No additional pertinent studies reviewed.    Last 24 Hours Medication List:     Current Facility-Administered Medications:     acetaminophen (TYLENOL) tablet 975 mg, Q8H RAPHAEL    albuterol (PROVENTIL HFA,VENTOLIN HFA) inhaler 2 puff, Q6H PRN    anastrozole (ARIMIDEX) tablet 1 mg, Daily    ceftriaxone (ROCEPHIN) 1 g/50 mL in dextrose IVPB, Q24H, Last Rate: Stopped (03/01/25 0908) **AND** [DISCONTINUED] azithromycin (ZITHROMAX) tablet 500 mg, Q24H    dextromethorphan-guaiFENesin (ROBITUSSIN DM) oral syrup 10 mL, Q4H PRN    docusate sodium (COLACE) capsule 100 mg, BID    fluticasone (ARNUITY ELLIPTA) 200 MCG/ACT inhaler 1 puff, Daily    guaiFENesin (MUCINEX) 12 hr tablet 600 mg, Q12H RAPHAEL    heparin (porcine) subcutaneous injection 5,000 Units, Q8H RAPHAEL    insulin lispro (HumALOG/ADMELOG) 100 units/mL subcutaneous injection 1-5 Units, TID AC **AND** Fingerstick Glucose (POCT), TID AC    insulin lispro (HumALOG/ADMELOG) 100 units/mL subcutaneous injection 1-5 Units, HS    lidocaine (LIDODERM) 5 % patch 1 patch, Daily    metoprolol tartrate (LOPRESSOR) tablet 50 mg, Q12H RAPHAEL    morphine (MSIR) IR tablet 15 mg, Q8H PRN    naloxone (NARCAN) 0.04 mg/mL syringe 0.04 mg, Q1MIN PRN    oxybutynin (DITROPAN-XL) 24 hr tablet 10 mg, Daily    pantoprazole (PROTONIX) EC tablet 40 mg, Early Morning    polyethylene glycol (MIRALAX) packet 17 g, Daily    pravastatin (PRAVACHOL) tablet 40 mg, Daily With Dinner    Administrative Statements   Today, Patient Was Seen By: Marcella Kim PA-C    **Please Note: This note may have been constructed using a voice recognition system.**

## 2025-03-01 NOTE — ASSESSMENT & PLAN NOTE
Follows with [facility] oncology outpatient, patient diagnosed in 1992 with local recurrence.  States that she is currently undergoing observation  Reports that she has follow-up next Monday with surg onc, 3/3/2025  CT chest concerning for bilateral rib lytic lesions as well as right upper lobe 5 mm nodule.    Would also recommend f/u with medical oncology, known to Dr. Bandar Ji Arimidex

## 2025-03-01 NOTE — ASSESSMENT & PLAN NOTE
BP acceptable, some mild elevations noted intermittently  Continue PTA metoprolol 50 mg BID  Monitor routinely

## 2025-03-01 NOTE — ASSESSMENT & PLAN NOTE
Lab Results   Component Value Date    HGBA1C 6.8 (H) 02/27/2024     Home insulin regimen is Toujeo (U-300) 50 to 60 units nightly per patient and .    In setting of poor oral intake, this was reduced to 20 units Lantus every 12 hours but patient with a.m. hypoglycemia, therefore Lantus has been discontinued  Blood sugars stable with SSI coverage alone  Monitor accuchecks and adjust regimen PRN  Diabetic diet

## 2025-03-01 NOTE — ASSESSMENT & PLAN NOTE
Does not wear oxygen at baseline.  Required 4 L nasal cannula on admission.  Likely in the setting of pneumonia with plan of care outlined above.  Currently on room air with SpO2 low 90s at rest  Ambulatory pulse ox  Respiratory protocol

## 2025-03-01 NOTE — ASSESSMENT & PLAN NOTE
Lab Results   Component Value Date    HGBA1C 6.8 (H) 02/27/2024     Home insulin regimen is Toujeo (U-300) 50 to 60 units nightly per patient and .    Noted with episodes of hypoglycemia here, currently on sliding scale insulin  Resume home regimen on discharge  Diabetic diet

## 2025-03-01 NOTE — ASSESSMENT & PLAN NOTE
BP acceptable, some mild elevations noted here   Continue PTA metoprolol 50 mg BID  Monitor routinely

## 2025-03-02 VITALS
SYSTOLIC BLOOD PRESSURE: 137 MMHG | BODY MASS INDEX: 28.89 KG/M2 | WEIGHT: 157 LBS | TEMPERATURE: 97.7 F | RESPIRATION RATE: 19 BRPM | DIASTOLIC BLOOD PRESSURE: 70 MMHG | OXYGEN SATURATION: 95 % | HEART RATE: 73 BPM | HEIGHT: 62 IN

## 2025-03-02 PROBLEM — J96.01 ACUTE HYPOXIC RESPIRATORY FAILURE (HCC): Status: RESOLVED | Noted: 2025-02-24 | Resolved: 2025-03-02

## 2025-03-02 PROBLEM — A41.9 SEPSIS (HCC): Status: RESOLVED | Noted: 2025-02-24 | Resolved: 2025-03-02

## 2025-03-02 PROBLEM — J18.9 CAP (COMMUNITY ACQUIRED PNEUMONIA): Status: RESOLVED | Noted: 2025-02-24 | Resolved: 2025-03-02

## 2025-03-02 LAB
GLUCOSE SERPL-MCNC: 101 MG/DL (ref 65–140)
GLUCOSE SERPL-MCNC: 126 MG/DL (ref 65–140)

## 2025-03-02 PROCEDURE — 99238 HOSP IP/OBS DSCHRG MGMT 30/<: CPT | Performed by: PHYSICIAN ASSISTANT

## 2025-03-02 PROCEDURE — 82948 REAGENT STRIP/BLOOD GLUCOSE: CPT

## 2025-03-02 RX ORDER — BENZONATATE 200 MG/1
200 CAPSULE ORAL 3 TIMES DAILY PRN
Qty: 20 CAPSULE | Refills: 0 | Status: SHIPPED | OUTPATIENT
Start: 2025-03-02

## 2025-03-02 RX ORDER — GUAIFENESIN/DEXTROMETHORPHAN 100-10MG/5
10 SYRUP ORAL EVERY 4 HOURS PRN
Qty: 118 ML | Refills: 0 | Status: SHIPPED | OUTPATIENT
Start: 2025-03-02

## 2025-03-02 RX ORDER — MAGNESIUM SULFATE 1 G/100ML
1 INJECTION INTRAVENOUS ONCE
Status: COMPLETED | OUTPATIENT
Start: 2025-03-02 | End: 2025-03-02

## 2025-03-02 RX ADMIN — METOPROLOL TARTRATE 50 MG: 50 TABLET, FILM COATED ORAL at 09:19

## 2025-03-02 RX ADMIN — OXYBUTYNIN 10 MG: 10 TABLET, FILM COATED, EXTENDED RELEASE ORAL at 09:19

## 2025-03-02 RX ADMIN — FLUTICASONE FUROATE 1 PUFF: 200 POWDER RESPIRATORY (INHALATION) at 09:20

## 2025-03-02 RX ADMIN — PANTOPRAZOLE SODIUM 40 MG: 40 TABLET, DELAYED RELEASE ORAL at 06:39

## 2025-03-02 RX ADMIN — GUAIFENESIN 600 MG: 600 TABLET, EXTENDED RELEASE ORAL at 09:19

## 2025-03-02 RX ADMIN — ACETAMINOPHEN 975 MG: 325 TABLET, FILM COATED ORAL at 06:39

## 2025-03-02 RX ADMIN — ANASTROZOLE 1 MG: 1 TABLET, COATED ORAL at 09:19

## 2025-03-02 RX ADMIN — DOCUSATE SODIUM 100 MG: 100 CAPSULE, LIQUID FILLED ORAL at 09:19

## 2025-03-02 RX ADMIN — CEFTRIAXONE 1000 MG: 1 INJECTION, POWDER, FOR SOLUTION INTRAMUSCULAR; INTRAVENOUS at 09:09

## 2025-03-02 RX ADMIN — MAGNESIUM SULFATE HEPTAHYDRATE 1 G: 1 INJECTION, SOLUTION INTRAVENOUS at 01:52

## 2025-03-02 RX ADMIN — HEPARIN SODIUM 5000 UNITS: 5000 INJECTION, SOLUTION INTRAVENOUS; SUBCUTANEOUS at 06:39

## 2025-03-02 RX ADMIN — POLYETHYLENE GLYCOL 3350 17 G: 17 POWDER, FOR SOLUTION ORAL at 09:20

## 2025-03-02 RX ADMIN — LIDOCAINE 1 PATCH: 700 PATCH TOPICAL at 09:20

## 2025-03-02 NOTE — ASSESSMENT & PLAN NOTE
Does not wear oxygen at baseline.  Required 4 L nasal cannula on admission.  Likely in the setting of pneumonia with plan of care outlined above.  Weaned to room air as of 3/1/2025  Ambulatory pulse ox acceptable   Respiratory protocol

## 2025-03-02 NOTE — ASSESSMENT & PLAN NOTE
Wt Readings from Last 3 Encounters:   03/02/25 71.2 kg (157 lb)   02/14/25 73.9 kg (163 lb)   01/27/25 74.2 kg (163 lb 9.6 oz)   Clinically appears stable at this time. Last echocardiogram in our system appears to demonstrate LVEF 60% with grade 1 diastolic dysfunction in May 2023.  Continue home dose BB  Not on diuretics at baseline  Monitor volume status

## 2025-03-02 NOTE — ASSESSMENT & PLAN NOTE
Present on admission for tachycardia, tachypnea and leukocytosis.  Source likely right lower lobe pneumonia, see plan above  Blood cultures x 2 with no growth  Currently resolved

## 2025-03-02 NOTE — ASSESSMENT & PLAN NOTE
Patient developed shortness of breath and minimally productive cough on Friday 2/21.  She states it started after she vomited after an optometrist appointment.  Thought that she was having an asthma attack, took albuterol treatments without improvement.  Met sepsis criteria in the emergency department was noted to be hypoxic.  Flu COVID RSV negative x 2.  Chest x-ray: Moderate opacity in the right lower lobe compatible with pneumonia. Pleural thickening along the left lateral chest wall, visible in retrospect in January 2024, new since July 2024, question rib fractures with pleural thickening or metastatic disease.  CT chest: Right basilar consolidation likely a combination of pneumonia and atelectasis. Debris/plugging throughout the right lower lobe bronchus and bronchus intermedius; aspiration is not excluded. New 5 mm right upper lobe nodule. Small right pleural effusion. Lytic osseous lesions suspicious for metastases.   Strep pneumonia and Legionella urinary antigens negative, azithromycin subsequently discontinued  Day 7/7 IV ceftriaxone today  Symptomatic cares

## 2025-03-02 NOTE — PLAN OF CARE
Problem: PAIN - ADULT  Goal: Verbalizes/displays adequate comfort level or baseline comfort level  Description: Interventions:  - Encourage patient to monitor pain and request assistance  - Assess pain using appropriate pain scale  - Administer analgesics based on type and severity of pain and evaluate response  - Implement non-pharmacological measures as appropriate and evaluate response  - Consider cultural and social influences on pain and pain management  - Notify physician/advanced practitioner if interventions unsuccessful or patient reports new pain  Outcome: Progressing     Problem: INFECTION - ADULT  Goal: Absence or prevention of progression during hospitalization  Description: INTERVENTIONS:  - Assess and monitor for signs and symptoms of infection  - Monitor lab/diagnostic results  - Monitor all insertion sites, i.e. indwelling lines, tubes, and drains  - Monitor endotracheal if appropriate and nasal secretions for changes in amount and color  - [place] appropriate cooling/warming therapies per order  - Administer medications as ordered  - Instruct and encourage patient and family to use good hand hygiene technique  - Identify and instruct in appropriate isolation precautions for identified infection/condition  Outcome: Progressing     Problem: SAFETY ADULT  Goal: Patient will remain free of falls  Description: INTERVENTIONS:  - Educate patient/family on patient safety including physical limitations  - Instruct patient to call for assistance with activity   - Consult OT/PT to assist with strengthening/mobility   - Keep Call bell within reach  - Keep bed low and locked with side rails adjusted as appropriate  - Keep care items and personal belongings within reach  - Initiate and maintain comfort rounds  Outcome: Progressing  Goal: Maintain or return to baseline ADL function  Description: INTERVENTIONS:  -  Assess patient's ability to carry out ADLs; assess patient's baseline for ADL function and  identify physical deficits which impact ability to perform ADLs (bathing, care of mouth/teeth, toileting, grooming, dressing, etc.)  - Assess/evaluate cause of self-care deficits   - Assess range of motion  - Assess patient's mobility; develop plan if impaired  - Assess patient's need for assistive devices and provide as appropriate  - Encourage maximum independence but intervene and supervise when necessary  - Involve family in performance of ADLs  - Assess for home care needs following discharge   - Consider OT consult to assist with ADL evaluation and planning for discharge  - Provide patient education as appropriate  Outcome: Progressing  Goal: Maintains/Returns to pre admission functional level  Description: INTERVENTIONS:  - Perform AM-PAC 6 Click Basic Mobility/ Daily Activity assessment daily.  - Set and communicate daily mobility goal to care team and patient/family/caregiver.   - Collaborate with rehabilitation services on mobility goals if consulted  -- Out of bed for toileting  - Record patient progress and toleration of activity level   Outcome: Progressing     Problem: DISCHARGE PLANNING  Goal: Discharge to home or other facility with appropriate resources  Description: INTERVENTIONS:  - Identify barriers to discharge w/patient and caregiver  - Arrange for needed discharge resources and transportation as appropriate  - Identify discharge learning needs (meds, wound care, etc.)  - Arrange for interpretive services to assist at discharge as needed  - Refer to Case Management Department for coordinating discharge planning if the patient needs post-hospital services based on physician/advanced practitioner order or complex needs related to functional status, cognitive ability, or social support system  Outcome: Progressing     Problem: Knowledge Deficit  Goal: Patient/family/caregiver demonstrates understanding of disease process, treatment plan, medications, and discharge instructions  Description:  Complete learning assessment and assess knowledge base.  Interventions:  - Provide teaching at level of understanding  - Provide teaching via preferred learning methods  Outcome: Progressing     Problem: Nutrition/Hydration-ADULT  Goal: Nutrient/Hydration intake appropriate for improving, restoring or maintaining nutritional needs  Description: Monitor and assess patient's nutrition/hydration status for malnutrition. Collaborate with interdisciplinary team and initiate plan and interventions as ordered.  Monitor patient's weight and dietary intake as ordered or per policy. Utilize nutrition screening tool and intervene as necessary. Determine patient's food preferences and provide high-protein, high-caloric foods as appropriate.     INTERVENTIONS:  - Monitor oral intake, urinary output, labs, and treatment plans  - Assess nutrition and hydration status and recommend course of action  - Evaluate amount of meals eaten  - Assist patient with eating if necessary   - Allow adequate time for meals  - Recommend/ encourage appropriate diets, oral nutritional supplements, and vitamin/mineral supplements  - Order, calculate, and assess calorie counts as needed  - Recommend, monitor, and adjust tube feedings and TPN/PPN based on assessed needs  - Assess need for intravenous fluids  - Provide specific nutrition/hydration education as appropriate  - Include patient/family/caregiver in decisions related to nutrition  Outcome: Progressing     Problem: Prexisting or High Potential for Compromised Skin Integrity  Goal: Skin integrity is maintained or improved  Description: INTERVENTIONS:  - Identify patients at risk for skin breakdown  - Assess and monitor skin integrity  - Assess and monitor nutrition and hydration status  - Monitor labs   - Assess for incontinence   - Turn and reposition patient  - Assist with mobility/ambulation  - Relieve pressure over bony prominences  - Avoid friction and shearing  - Provide appropriate  hygiene as needed including keeping skin clean and dry  - Evaluate need for skin moisturizer/barrier cream  - Collaborate with interdisciplinary team   - Patient/family teaching  - Consider wound care consult   Outcome: Progressing

## 2025-03-02 NOTE — ASSESSMENT & PLAN NOTE
Follows with [facility] oncology outpatient, patient diagnosed in 1992 with local recurrence.  States that she is currently undergoing observation  CT chest concerning for bilateral rib lytic lesions as well as right upper lobe 5 mm nodule as outlined above  Note, patient informed me she is not interested in pursuing additional treatment/radiation and wants to focus on quality of life; offered palliative care referral on discharge which patient politely declined as she is not having severe symptoms at this time  Patient has follow-up next with surg onc tomorrow, 3/3/2025  Recommend f/u with medical oncology, known to Dr. Portillo  Continue Arimidex

## 2025-03-02 NOTE — CASE MANAGEMENT
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) 2020     Atrial fibrillation, currently in sinus rhythm 2020     Chronic pain 2020     Malignant neoplasm of upper-outer quadrant of left breast in female, estrogen receptor positive (HCC) 2020     Migraine without aura and without status migrainosus, not intractable 2020     Mixed hyperlipidemia 2019     Chest pain 2018     Chronic heart failure (HCC) 2018     Uncomplicated opioid dependence (HCC) 2016     Essential hypertension 2016     GERD without esophagitis 2016     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       LOS (days): 6  Geometric Mean LOS (GMLOS) (days): 4.9  Days to GMLOS:-1.2     OBJECTIVE:  Risk of Unplanned Readmission Score: 25.38         Current admission status: Inpatient   Preferred Pharmacy:   [id number] - [place], [place] - [address] [place] [id number]  Phone: [phone] Fax: [phone]    [facility] - [facility] [address]  [address] [facility] [address]  Phone: [phone] Fax: [phone]    Primary Care Provider: Osiel Eid MD    Primary Insurance: MEDICARE  Secondary Insurance: [employer]    DISCHARGE DETAILS:    Discharge planning discussed with:: Patient  Freedom of Choice: Yes  Comments - Freedom of Choice: Reviewed plan for home with [facility]  CM contacted  family/caregiver?: No- see comments  Were Treatment Team discharge recommendations reviewed with patient/caregiver?: Yes  Did patient/caregiver verbalize understanding of patient care needs?: Yes  Were patient/caregiver advised of the risks associated with not following Treatment Team discharge recommendations?: Yes    Contacts  Patient Contacts: Patient  Relationship to Patient:: Other (Comment)  Contact Method: In Person    Requested Home Health Care         Is the patient interested in HHC at discharge?: Yes  Home Health Discipline requested:: Occupational Therapy, Physical Therapy  Home Health Agency Name:: [facility]  Home Health Follow-Up Provider:: PCP  Home Health Services Needed:: Evaluate Functional Status and Safety, Gait/ADL Training, Strengthening/Theraputic Exercises to Improve Function  Homebound Criteria Met:: Requires the Assistance of Another Person for Safe Ambulation or to Leave the Home, Uses an Assist Device (i.e. cane, walker, etc)  Supporting Clincal Findings:: Limited Endurance, Fatigues Easliy in Short Distances    DME Referral Provided  Referral made for DME?: No    Other Referral/Resources/Interventions Provided:  Interventions: HHC         Treatment Team Recommendation: Short Term Rehab  Discharge Destination Plan:: Home with Home Health Care  Transport at Discharge : Family                             IMM Given (Date):: 03/02/25  IMM Given to:: Patient        IMM reviewed with patient.  Patient agrees with discharge determination.     CM notified that patient was medically stable for DC home.  CM met with patient who confirmed she was not interested in STR at DC and aware that [facility] was set up for [facility].    Patient's family will provide transportation home.    CM reviewed the availability of treatment team to discuss questions or concerns patient and/or family may have regarding  understanding medications and recognizing signs and symptoms once discharged.  CM also encouraged patient  to follow up with all recommended appointments after discharge. Patient advised of importance for patient and family to participate in managing patient's medical well being.

## 2025-03-02 NOTE — DISCHARGE SUMMARY
Discharge Summary - Hospitalist   Name: Barbra De Oliveira 89 y.o. female I MRN: [id number]  Unit/Bed#: CW2 216-01 I Date of Admission: 2/24/2025   Date of Service: 3/2/2025 I Hospital Day: 6       Assessment & Plan  CAP (community acquired pneumonia) (Resolved: 3/2/2025)  Patient developed shortness of breath and minimally productive cough on Friday 2/21.  She states it started after she vomited after an optometrist appointment.  Thought that she was having an asthma attack, took albuterol treatments without improvement.  Met sepsis criteria in the emergency department was noted to be hypoxic.  Flu COVID RSV negative x 2.  Chest x-ray: Moderate opacity in the right lower lobe compatible with pneumonia. Pleural thickening along the left lateral chest wall, visible in retrospect in January 2024, new since July 2024, question rib fractures with pleural thickening or metastatic disease.  CT chest: Right basilar consolidation likely a combination of pneumonia and atelectasis. Debris/plugging throughout the right lower lobe bronchus and bronchus intermedius; aspiration is not excluded. New 5 mm right upper lobe nodule. Small right pleural effusion. Lytic osseous lesions suspicious for metastases.   Strep pneumonia and Legionella urinary antigens negative, azithromycin subsequently discontinued  Day 7/7 IV ceftriaxone today  Symptomatic cares  Acute hypoxic respiratory failure (HCC) (Resolved: 3/2/2025)  Does not wear oxygen at baseline.  Required 4 L nasal cannula on admission.  Likely in the setting of pneumonia with plan of care outlined above.  Weaned to room air as of 3/1/2025  Ambulatory pulse ox acceptable   Respiratory protocol  Sepsis (HCC) (Resolved: 3/2/2025)  Present on admission for tachycardia, tachypnea and leukocytosis.  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  Blood sugars stable without need for SSI coverage throughout admission  Diabetic diet  Chronic heart failure (HCC)  Wt Readings from Last 3 Encounters:   03/02/25 71.2 kg (157 lb)   02/14/25 73.9 kg (163 lb)   01/27/25 74.2 kg (163 lb 9.6 oz)   Clinically appears stable at this time. Last echocardiogram in our system appears to demonstrate LVEF 60% with grade 1 diastolic dysfunction in May 2023.  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  States that she is currently undergoing observation  CT chest concerning for bilateral rib lytic lesions as well as right upper lobe 5 mm nodule as outlined above  Note, patient informed me she is not interested in pursuing additional treatment/radiation and wants to focus on quality of life; offered palliative care referral on discharge which patient politely declined as she is not having severe symptoms at this time  Patient has follow-up next with surg onc tomorrow, 3/3/2025  Recommend f/u with medical oncology, known to Dr. Portillo  Continue Arimidex   Essential hypertension  BP reviewed and acceptable  Continue PTA metoprolol 50 mg BID  Monitor routinely   Uncomplicated opioid dependence (HCC)  PDMP and dispense report reviewed by admitting provider, continue morphine IR 15mg q8h PRN  Mixed hyperlipidemia  Substitute Zocor for pravastatin 40 mg daily while inpatient      Medical Problems       Resolved Problems  Date Reviewed: 1/27/2025          Resolved    Left shoulder pain 3/1/2025     Resolved by  Marcella Kim PA-C        Discharging Physician / Practitioner: Marcella Kim PA-C  PCP: Osiel Eid MD  Admission Date:   Admission Orders (From  admission, onward)       Ordered        02/24/25 0954  INPATIENT ADMISSION  Once                          Discharge Date: 03/02/25    Consultations During Hospital Stay:  None     Procedures Performed:   None     Significant Findings / Test Results:   Outlined above     Incidental Findings:   None      Test Results Pending at Discharge (will require follow up):   None      Outpatient Tests Requested:  None     Complications:  none     Reason for Admission: pneumonia     Hospital Course:   Barbra De Oliveira is a 89 y.o. female patient who originally presented to the hospital on 2/24/2025 due to progressive cough and shortness of breath.  She met sepsis criteria on arrival, chest x-ray and CT chest concerning for pneumonia.  Also noted to be hypoxic on arrival.  She completed total 7-day course IV antibiotic and has been successfully weaned to room air.  She was incidentally noted to have pulmonary nodule and lytic lesions on her ribs, findings discussed with patient.  She will follow-up with surgical and medical oncology but did mention she would not be interested in pursuing additional treatment/radiation.    Please see above list of diagnoses and related plan for additional information.     Condition at Discharge: stable    Discharge Day Visit / Exam:   Subjective: Patient reports feeling better today, denies dizziness or headache that she was having yesterday.  Discussed no need for further antibiotic on discharge.  Discussed incidental findings on CT chest with our recommendation for follow-up with surgical and medical oncology, patient verbalized understanding.  She did mention she would not be interested in pursuing additional treatment/radiation.  Offered palliative care referral on discharge patient politely declined.  Vitals: Blood Pressure: 137/70 (03/02/25 0725)  Pulse: 73 (03/02/25 1016)  Temperature: 97.7 °F (36.5 °C) (03/02/25 0320)  Temp Source: Oral (03/02/25 0320)  Respirations: 19 (03/02/25  "0320)  Height: 5' 2\" (157.5 cm) (02/24/25 2342)  Weight - Scale: 71.2 kg (157 lb) (03/02/25 0600)  SpO2: 95 % (03/02/25 1016)  Physical Exam  Vitals and nursing note reviewed.   Constitutional:       General: She is not in acute distress.  Cardiovascular:      Rate and Rhythm: Normal rate and regular rhythm.   Pulmonary:      Effort: Pulmonary effort is normal. No respiratory distress.      Breath sounds: No wheezing, rhonchi or rales.      Comments: O2 sat stable on room air at rest and with exertion  Neurological:      General: No focal deficit present.      Mental Status: She is alert and oriented to person, place, and time. Mental status is at baseline.          Discussion with Family: Patient declined call to .     Discharge instructions/Information to patient and family:   See after visit summary for information provided to patient and family.      Provisions for Follow-Up Care:  See after visit summary for information related to follow-up care and any pertinent home health orders.      Mobility at time of Discharge:   Basic Mobility Inpatient Raw Score: 19  JH-HLM Goal: 6: Walk 10 steps or more  JH-HLM Achieved: 7: Walk 25 feet or more  HLM Goal achieved. Continue to encourage appropriate mobility.     Disposition:   Home with VNA Services (Reminder: Complete face to face encounter)    Planned Readmission: no    Discharge Medications:  See after visit summary for reconciled discharge medications provided to patient and/or family.      Administrative Statements   Discharge Statement:  I have spent a total time of 20 minutes in caring for this patient on the day of the visit/encounter.    **Please Note: This note may have been constructed using a voice recognition system**  "

## 2025-03-02 NOTE — ASSESSMENT & PLAN NOTE
Lab Results   Component Value Date    HGBA1C 6.8 (H) 02/27/2024     Home insulin regimen: Toujeo (U-300) 50 to 60 units nightly per patient and .  Blood sugars stable without need for SSI coverage throughout admission  Diabetic diet

## 2025-03-03 ENCOUNTER — HOME CARE VISIT (OUTPATIENT)
Dept: HOME HEALTH SERVICES | Facility: HOME HEALTHCARE | Age: 89
End: 2025-03-03

## 2025-03-04 ENCOUNTER — HOME CARE VISIT (OUTPATIENT)
Dept: HOME HEALTH SERVICES | Facility: HOME HEALTHCARE | Age: 89
End: 2025-03-04

## 2025-03-05 ENCOUNTER — HOME CARE VISIT (OUTPATIENT)
Dept: HOME HEALTH SERVICES | Facility: HOME HEALTHCARE | Age: 89
End: 2025-03-05
Payer: MEDICARE

## 2025-03-05 VITALS
BODY MASS INDEX: 27.97 KG/M2 | WEIGHT: 152 LBS | DIASTOLIC BLOOD PRESSURE: 64 MMHG | OXYGEN SATURATION: 95 % | HEIGHT: 62 IN | SYSTOLIC BLOOD PRESSURE: 140 MMHG | HEART RATE: 76 BPM | TEMPERATURE: 97.3 F | RESPIRATION RATE: 18 BRPM

## 2025-03-05 PROCEDURE — 10330081 VN NO-PAY CLAIM PROCEDURE

## 2025-03-05 PROCEDURE — G0151 HHCP-SERV OF PT,EA 15 MIN: HCPCS

## 2025-03-05 PROCEDURE — 400013 VN SOC

## 2025-03-05 NOTE — Clinical Note
Medication discrepancies or Major drug interactions: None            Abnormal clinical findings: None            This report is informational only, no response is needed            [facility] has Admitted your patient to Home Health service with the following disciplines: PT and OT            Patient stated goals of care: To improve energy level            Potential barriers to goal achievement: None            Primary focus of home health care: Neoplasm            Anticipated visit pattern and next visit date: PT 2wk3, next visit 3/7/25            Thank you for allowing us to participate in the care of your patient.

## 2025-03-05 NOTE — CASE COMMUNICATION
Medication discrepancies or Major drug interactions: None  Abnormal clinical findings: None  This report is informational only, no response is needed  [facility] has Admitted your patient to Home Health service with the following disciplines: PT and OT  Patient stated goals of care: To improve energy level  Potential barriers to goal achievement: None  Primary focus of home health care:Musculoskeletal  Anticipated visit pattern and  next visit date: PT 2wk3, next visit 3/7/25  Thank you for allowing us to participate in the care of your patient.

## 2025-03-07 ENCOUNTER — HOME CARE VISIT (OUTPATIENT)
Dept: HOME HEALTH SERVICES | Facility: HOME HEALTHCARE | Age: 89
End: 2025-03-07
Payer: MEDICARE

## 2025-03-07 ENCOUNTER — TELEPHONE (OUTPATIENT)
Age: 89
End: 2025-03-07

## 2025-03-07 VITALS
SYSTOLIC BLOOD PRESSURE: 128 MMHG | RESPIRATION RATE: 18 BRPM | TEMPERATURE: 98.2 F | OXYGEN SATURATION: 96 % | HEART RATE: 82 BPM | DIASTOLIC BLOOD PRESSURE: 76 MMHG

## 2025-03-07 PROCEDURE — G0151 HHCP-SERV OF PT,EA 15 MIN: HCPCS

## 2025-03-07 PROCEDURE — G0321 AUDIO-ONLY HHS: HCPCS

## 2025-03-07 NOTE — TELEPHONE ENCOUNTER
Call out to patient and reviewed recommendation to have appointment with Dr. Portillo. Appointment made.

## 2025-03-07 NOTE — TELEPHONE ENCOUNTER
Patient called stating Dr. Portillo had planned to review her DXA Bone density results and call her so she didn't have to come to the office. States she was just discharged on 3/2 from the hospital after having pneumonia so she wanted to see if he had reviewed her results. States she also had a CT scan done while there which showed some new concerning areas. She would like for him to review this and give her a call to discuss if possible.

## 2025-03-10 ENCOUNTER — HOME CARE VISIT (OUTPATIENT)
Dept: HOME HEALTH SERVICES | Facility: HOME HEALTHCARE | Age: 89
End: 2025-03-10
Payer: MEDICARE

## 2025-03-10 NOTE — CASE COMMUNICATION
Patient's  called to cancel PT session for today stating that the patient had worsening back pain over night and wants to rest. PT offered to move visit to a different day however  requested only one visit this week on Thursday.

## 2025-03-11 ENCOUNTER — HOME CARE VISIT (OUTPATIENT)
Dept: HOME HEALTH SERVICES | Facility: HOME HEALTHCARE | Age: 89
End: 2025-03-11
Payer: MEDICARE

## 2025-03-13 ENCOUNTER — HOME CARE VISIT (OUTPATIENT)
Dept: HOME HEALTH SERVICES | Facility: HOME HEALTHCARE | Age: 89
End: 2025-03-13
Payer: MEDICARE

## 2025-03-13 VITALS
OXYGEN SATURATION: 96 % | RESPIRATION RATE: 18 BRPM | TEMPERATURE: 97.6 F | HEART RATE: 74 BPM | DIASTOLIC BLOOD PRESSURE: 72 MMHG | SYSTOLIC BLOOD PRESSURE: 122 MMHG

## 2025-03-13 PROCEDURE — G0151 HHCP-SERV OF PT,EA 15 MIN: HCPCS

## 2025-03-15 PROCEDURE — G0180 MD CERTIFICATION HHA PATIENT: HCPCS | Performed by: INTERNAL MEDICINE

## 2025-03-17 ENCOUNTER — HOME CARE VISIT (OUTPATIENT)
Dept: HOME HEALTH SERVICES | Facility: HOME HEALTHCARE | Age: 89
End: 2025-03-17
Payer: MEDICARE

## 2025-03-17 VITALS
RESPIRATION RATE: 17 BRPM | TEMPERATURE: 98 F | SYSTOLIC BLOOD PRESSURE: 124 MMHG | OXYGEN SATURATION: 96 % | DIASTOLIC BLOOD PRESSURE: 76 MMHG | HEART RATE: 79 BPM

## 2025-03-17 PROBLEM — C50.912 CARCINOMA OF LEFT BREAST METASTATIC TO AXILLARY LYMPH NODE (HCC): Status: RESOLVED | Noted: 2020-12-01 | Resolved: 2025-03-17

## 2025-03-17 PROBLEM — C77.3 CARCINOMA OF LEFT BREAST METASTATIC TO AXILLARY LYMPH NODE (HCC): Status: RESOLVED | Noted: 2020-12-01 | Resolved: 2025-03-17

## 2025-03-17 PROCEDURE — G0151 HHCP-SERV OF PT,EA 15 MIN: HCPCS

## 2025-03-20 ENCOUNTER — HOME CARE VISIT (OUTPATIENT)
Dept: HOME HEALTH SERVICES | Facility: HOME HEALTHCARE | Age: 89
End: 2025-03-20
Payer: MEDICARE

## 2025-03-20 VITALS
HEART RATE: 72 BPM | SYSTOLIC BLOOD PRESSURE: 132 MMHG | RESPIRATION RATE: 17 BRPM | TEMPERATURE: 98.2 F | DIASTOLIC BLOOD PRESSURE: 76 MMHG | OXYGEN SATURATION: 97 %

## 2025-03-20 PROCEDURE — G0151 HHCP-SERV OF PT,EA 15 MIN: HCPCS

## 2025-03-21 ENCOUNTER — APPOINTMENT (EMERGENCY)
Dept: RADIOLOGY | Facility: HOSPITAL | Age: 89
End: 2025-03-21
Payer: MEDICARE

## 2025-03-21 ENCOUNTER — APPOINTMENT (OUTPATIENT)
Dept: NON INVASIVE DIAGNOSTICS | Facility: HOSPITAL | Age: 89
End: 2025-03-21
Payer: MEDICARE

## 2025-03-21 ENCOUNTER — HOME CARE VISIT (OUTPATIENT)
Dept: HOME HEALTH SERVICES | Facility: HOME HEALTHCARE | Age: 89
End: 2025-03-21

## 2025-03-21 ENCOUNTER — HOSPITAL ENCOUNTER (INPATIENT)
Facility: HOSPITAL | Age: 89
LOS: 2 days | Discharge: HOME HEALTH | End: 2025-03-24
Attending: EMERGENCY MEDICINE | Admitting: STUDENT IN AN ORGANIZED HEALTH CARE EDUCATION/TRAINING PROGRAM
Payer: MEDICARE

## 2025-03-21 DIAGNOSIS — Z17.0 MALIGNANT NEOPLASM OF UPPER-OUTER QUADRANT OF LEFT BREAST IN FEMALE, ESTROGEN RECEPTOR POSITIVE (HCC): ICD-10-CM

## 2025-03-21 DIAGNOSIS — C50.412 MALIGNANT NEOPLASM OF UPPER-OUTER QUADRANT OF LEFT BREAST IN FEMALE, ESTROGEN RECEPTOR POSITIVE (HCC): ICD-10-CM

## 2025-03-21 DIAGNOSIS — F11.20 UNCOMPLICATED OPIOID DEPENDENCE (HCC): ICD-10-CM

## 2025-03-21 DIAGNOSIS — R06.00 DYSPNEA: Primary | ICD-10-CM

## 2025-03-21 PROBLEM — Z85.3 HISTORY OF BREAST CANCER: Status: ACTIVE | Noted: 2025-03-21

## 2025-03-21 PROBLEM — I50.32 CHRONIC DIASTOLIC (CONGESTIVE) HEART FAILURE (HCC): Status: ACTIVE | Noted: 2018-09-24

## 2025-03-21 PROBLEM — K59.00 CONSTIPATION: Status: ACTIVE | Noted: 2025-03-21

## 2025-03-21 LAB
ALBUMIN SERPL BCG-MCNC: 3.8 G/DL (ref 3.5–5)
ALP SERPL-CCNC: 136 U/L (ref 34–104)
ALT SERPL W P-5'-P-CCNC: 11 U/L (ref 7–52)
ANION GAP SERPL CALCULATED.3IONS-SCNC: 9 MMOL/L (ref 4–13)
APTT PPP: 32 SECONDS (ref 23–34)
AST SERPL W P-5'-P-CCNC: 16 U/L (ref 13–39)
ATRIAL RATE: 69 BPM
BACTERIA UR QL AUTO: ABNORMAL /HPF
BASOPHILS # BLD AUTO: 0.02 THOUSANDS/ÂΜL (ref 0–0.1)
BASOPHILS NFR BLD AUTO: 0 % (ref 0–1)
BILIRUB SERPL-MCNC: 0.85 MG/DL (ref 0.2–1)
BILIRUB UR QL STRIP: NEGATIVE
BNP SERPL-MCNC: 61 PG/ML (ref 0–100)
BSA FOR ECHO PROCEDURE: 1.7 M2
BUN SERPL-MCNC: 18 MG/DL (ref 5–25)
CALCIUM SERPL-MCNC: 10 MG/DL (ref 8.4–10.2)
CARDIAC TROPONIN I PNL SERPL HS: 4 NG/L (ref ?–50)
CHLORIDE SERPL-SCNC: 106 MMOL/L (ref 96–108)
CLARITY UR: CLEAR
CO2 SERPL-SCNC: 23 MMOL/L (ref 21–32)
COLOR UR: ABNORMAL
CREAT SERPL-MCNC: 1.1 MG/DL (ref 0.6–1.3)
D DIMER PPP FEU-MCNC: 0.45 UG/ML FEU
EOSINOPHIL # BLD AUTO: 0.19 THOUSAND/ÂΜL (ref 0–0.61)
EOSINOPHIL NFR BLD AUTO: 3 % (ref 0–6)
ERYTHROCYTE [DISTWIDTH] IN BLOOD BY AUTOMATED COUNT: 13 % (ref 11.6–15.1)
EST. AVERAGE GLUCOSE BLD GHB EST-MCNC: 117 MG/DL
FLUAV AG UPPER RESP QL IA.RAPID: NEGATIVE
FLUBV AG UPPER RESP QL IA.RAPID: NEGATIVE
GFR SERPL CREATININE-BSD FRML MDRD: 45 ML/MIN/1.73SQ M
GLUCOSE SERPL-MCNC: 100 MG/DL (ref 65–140)
GLUCOSE SERPL-MCNC: 104 MG/DL (ref 65–140)
GLUCOSE SERPL-MCNC: 118 MG/DL (ref 65–140)
GLUCOSE SERPL-MCNC: 149 MG/DL (ref 65–140)
GLUCOSE SERPL-MCNC: 91 MG/DL (ref 65–140)
GLUCOSE UR STRIP-MCNC: NEGATIVE MG/DL
HBA1C MFR BLD: 5.7 %
HCT VFR BLD AUTO: 43.1 % (ref 34.8–46.1)
HGB BLD-MCNC: 14.1 G/DL (ref 11.5–15.4)
HGB UR QL STRIP.AUTO: NEGATIVE
IMM GRANULOCYTES # BLD AUTO: 0.01 THOUSAND/UL (ref 0–0.2)
IMM GRANULOCYTES NFR BLD AUTO: 0 % (ref 0–2)
INR PPP: 1.2 (ref 0.85–1.19)
KETONES UR STRIP-MCNC: NEGATIVE MG/DL
LEUKOCYTE ESTERASE UR QL STRIP: ABNORMAL
LYMPHOCYTES # BLD AUTO: 2.57 THOUSANDS/ÂΜL (ref 0.6–4.47)
LYMPHOCYTES NFR BLD AUTO: 33 % (ref 14–44)
MCH RBC QN AUTO: 29.5 PG (ref 26.8–34.3)
MCHC RBC AUTO-ENTMCNC: 32.7 G/DL (ref 31.4–37.4)
MCV RBC AUTO: 90 FL (ref 82–98)
MONOCYTES # BLD AUTO: 0.62 THOUSAND/ÂΜL (ref 0.17–1.22)
MONOCYTES NFR BLD AUTO: 8 % (ref 4–12)
NEUTROPHILS # BLD AUTO: 4.32 THOUSANDS/ÂΜL (ref 1.85–7.62)
NEUTS SEG NFR BLD AUTO: 56 % (ref 43–75)
NITRITE UR QL STRIP: NEGATIVE
NON-SQ EPI CELLS URNS QL MICRO: ABNORMAL /HPF
NRBC BLD AUTO-RTO: 0 /100 WBCS
P AXIS: 62 DEGREES
PH UR STRIP.AUTO: 6 [PH]
PLATELET # BLD AUTO: 249 THOUSANDS/UL (ref 149–390)
PMV BLD AUTO: 11.4 FL (ref 8.9–12.7)
POTASSIUM SERPL-SCNC: 4.1 MMOL/L (ref 3.5–5.3)
PR INTERVAL: 168 MS
PROCALCITONIN SERPL-MCNC: 0.06 NG/ML
PROT SERPL-MCNC: 7 G/DL (ref 6.4–8.4)
PROT UR STRIP-MCNC: NEGATIVE MG/DL
PROTHROMBIN TIME: 15.5 SECONDS (ref 12.3–15)
QRS AXIS: 44 DEGREES
QRSD INTERVAL: 80 MS
QT INTERVAL: 404 MS
QTC INTERVAL: 432 MS
RBC # BLD AUTO: 4.78 MILLION/UL (ref 3.81–5.12)
RBC #/AREA URNS AUTO: ABNORMAL /HPF
SARS-COV+SARS-COV-2 AG RESP QL IA.RAPID: NEGATIVE
SL CV LV EF: 60
SODIUM SERPL-SCNC: 138 MMOL/L (ref 135–147)
SP GR UR STRIP.AUTO: 1.01 (ref 1–1.03)
T WAVE AXIS: 47 DEGREES
UROBILINOGEN UR STRIP-ACNC: <2 MG/DL
VENTRICULAR RATE: 69 BPM
WBC # BLD AUTO: 7.73 THOUSAND/UL (ref 4.31–10.16)
WBC #/AREA URNS AUTO: ABNORMAL /HPF

## 2025-03-21 PROCEDURE — 84484 ASSAY OF TROPONIN QUANT: CPT | Performed by: EMERGENCY MEDICINE

## 2025-03-21 PROCEDURE — 85025 COMPLETE CBC W/AUTO DIFF WBC: CPT | Performed by: EMERGENCY MEDICINE

## 2025-03-21 PROCEDURE — 81001 URINALYSIS AUTO W/SCOPE: CPT

## 2025-03-21 PROCEDURE — 93005 ELECTROCARDIOGRAM TRACING: CPT

## 2025-03-21 PROCEDURE — 82948 REAGENT STRIP/BLOOD GLUCOSE: CPT

## 2025-03-21 PROCEDURE — 93306 TTE W/DOPPLER COMPLETE: CPT

## 2025-03-21 PROCEDURE — 99024 POSTOP FOLLOW-UP VISIT: CPT | Performed by: NURSE PRACTITIONER

## 2025-03-21 PROCEDURE — 36415 COLL VENOUS BLD VENIPUNCTURE: CPT | Performed by: EMERGENCY MEDICINE

## 2025-03-21 PROCEDURE — 93306 TTE W/DOPPLER COMPLETE: CPT | Performed by: INTERNAL MEDICINE

## 2025-03-21 PROCEDURE — 85730 THROMBOPLASTIN TIME PARTIAL: CPT | Performed by: EMERGENCY MEDICINE

## 2025-03-21 PROCEDURE — 71046 X-RAY EXAM CHEST 2 VIEWS: CPT

## 2025-03-21 PROCEDURE — 85379 FIBRIN DEGRADATION QUANT: CPT

## 2025-03-21 PROCEDURE — 83036 HEMOGLOBIN GLYCOSYLATED A1C: CPT | Performed by: INTERNAL MEDICINE

## 2025-03-21 PROCEDURE — 99222 1ST HOSP IP/OBS MODERATE 55: CPT | Performed by: INTERNAL MEDICINE

## 2025-03-21 PROCEDURE — 87804 INFLUENZA ASSAY W/OPTIC: CPT | Performed by: EMERGENCY MEDICINE

## 2025-03-21 PROCEDURE — 80053 COMPREHEN METABOLIC PANEL: CPT | Performed by: EMERGENCY MEDICINE

## 2025-03-21 PROCEDURE — 99285 EMERGENCY DEPT VISIT HI MDM: CPT

## 2025-03-21 PROCEDURE — 87811 SARS-COV-2 COVID19 W/OPTIC: CPT | Performed by: EMERGENCY MEDICINE

## 2025-03-21 PROCEDURE — 94664 DEMO&/EVAL PT USE INHALER: CPT

## 2025-03-21 PROCEDURE — 93010 ELECTROCARDIOGRAM REPORT: CPT | Performed by: INTERNAL MEDICINE

## 2025-03-21 PROCEDURE — 99285 EMERGENCY DEPT VISIT HI MDM: CPT | Performed by: EMERGENCY MEDICINE

## 2025-03-21 PROCEDURE — 84145 PROCALCITONIN (PCT): CPT

## 2025-03-21 PROCEDURE — 83880 ASSAY OF NATRIURETIC PEPTIDE: CPT

## 2025-03-21 PROCEDURE — 85610 PROTHROMBIN TIME: CPT | Performed by: EMERGENCY MEDICINE

## 2025-03-21 RX ORDER — ENOXAPARIN SODIUM 100 MG/ML
40 INJECTION SUBCUTANEOUS DAILY
Status: DISCONTINUED | OUTPATIENT
Start: 2025-03-21 | End: 2025-03-24 | Stop reason: HOSPADM

## 2025-03-21 RX ORDER — LACTULOSE 10 G/15ML
20 SOLUTION ORAL 2 TIMES DAILY
Status: COMPLETED | OUTPATIENT
Start: 2025-03-21 | End: 2025-03-21

## 2025-03-21 RX ORDER — DIPHENHYDRAMINE HCL 25 MG
50 TABLET ORAL
Status: DISCONTINUED | OUTPATIENT
Start: 2025-03-21 | End: 2025-03-21

## 2025-03-21 RX ORDER — ALBUTEROL SULFATE 0.83 MG/ML
2.5 SOLUTION RESPIRATORY (INHALATION) EVERY 4 HOURS PRN
Status: DISCONTINUED | OUTPATIENT
Start: 2025-03-21 | End: 2025-03-24

## 2025-03-21 RX ORDER — MORPHINE SULFATE 15 MG/1
15 TABLET ORAL EVERY 8 HOURS PRN
Status: DISCONTINUED | OUTPATIENT
Start: 2025-03-21 | End: 2025-03-24 | Stop reason: HOSPADM

## 2025-03-21 RX ORDER — DOCUSATE SODIUM 100 MG/1
100 CAPSULE, LIQUID FILLED ORAL 2 TIMES DAILY
Status: DISCONTINUED | OUTPATIENT
Start: 2025-03-21 | End: 2025-03-24 | Stop reason: HOSPADM

## 2025-03-21 RX ORDER — IPRATROPIUM BROMIDE AND ALBUTEROL SULFATE 2.5; .5 MG/3ML; MG/3ML
3 SOLUTION RESPIRATORY (INHALATION) EVERY 6 HOURS PRN
Status: DISCONTINUED | OUTPATIENT
Start: 2025-03-21 | End: 2025-03-21

## 2025-03-21 RX ORDER — BENZONATATE 100 MG/1
200 CAPSULE ORAL 3 TIMES DAILY PRN
Status: DISCONTINUED | OUTPATIENT
Start: 2025-03-21 | End: 2025-03-24 | Stop reason: HOSPADM

## 2025-03-21 RX ORDER — PRAVASTATIN SODIUM 20 MG
20 TABLET ORAL
Status: DISCONTINUED | OUTPATIENT
Start: 2025-03-21 | End: 2025-03-24 | Stop reason: HOSPADM

## 2025-03-21 RX ORDER — METOPROLOL TARTRATE 50 MG
50 TABLET ORAL EVERY 12 HOURS SCHEDULED
Status: DISCONTINUED | OUTPATIENT
Start: 2025-03-21 | End: 2025-03-24 | Stop reason: HOSPADM

## 2025-03-21 RX ORDER — HYDRALAZINE HYDROCHLORIDE 20 MG/ML
10 INJECTION INTRAMUSCULAR; INTRAVENOUS EVERY 6 HOURS PRN
Status: DISCONTINUED | OUTPATIENT
Start: 2025-03-21 | End: 2025-03-24 | Stop reason: HOSPADM

## 2025-03-21 RX ORDER — ANASTROZOLE 1 MG/1
1 TABLET ORAL DAILY
Status: DISCONTINUED | OUTPATIENT
Start: 2025-03-21 | End: 2025-03-24 | Stop reason: HOSPADM

## 2025-03-21 RX ORDER — OXYBUTYNIN CHLORIDE 5 MG/1
5 TABLET, EXTENDED RELEASE ORAL DAILY
Status: DISCONTINUED | OUTPATIENT
Start: 2025-03-21 | End: 2025-03-24 | Stop reason: HOSPADM

## 2025-03-21 RX ORDER — INSULIN LISPRO 100 [IU]/ML
2-12 INJECTION, SOLUTION INTRAVENOUS; SUBCUTANEOUS
Status: DISCONTINUED | OUTPATIENT
Start: 2025-03-21 | End: 2025-03-24 | Stop reason: HOSPADM

## 2025-03-21 RX ORDER — METHYLPREDNISOLONE 16 MG/1
32 TABLET ORAL
Status: DISCONTINUED | OUTPATIENT
Start: 2025-03-21 | End: 2025-03-21

## 2025-03-21 RX ORDER — MORPHINE SULFATE 15 MG/1
15 TABLET, FILM COATED, EXTENDED RELEASE ORAL EVERY 12 HOURS PRN
Status: DISCONTINUED | OUTPATIENT
Start: 2025-03-21 | End: 2025-03-21

## 2025-03-21 RX ORDER — POLYETHYLENE GLYCOL 3350 17 G/17G
17 POWDER, FOR SOLUTION ORAL DAILY
Status: DISCONTINUED | OUTPATIENT
Start: 2025-03-21 | End: 2025-03-22

## 2025-03-21 RX ORDER — INSULIN GLARGINE 100 [IU]/ML
15 INJECTION, SOLUTION SUBCUTANEOUS EVERY 12 HOURS SCHEDULED
Status: DISCONTINUED | OUTPATIENT
Start: 2025-03-21 | End: 2025-03-22

## 2025-03-21 RX ORDER — ACETAMINOPHEN 325 MG/1
650 TABLET ORAL EVERY 6 HOURS PRN
Status: DISCONTINUED | OUTPATIENT
Start: 2025-03-21 | End: 2025-03-24 | Stop reason: HOSPADM

## 2025-03-21 RX ORDER — INSULIN LISPRO 100 [IU]/ML
1-6 INJECTION, SOLUTION INTRAVENOUS; SUBCUTANEOUS
Status: DISCONTINUED | OUTPATIENT
Start: 2025-03-21 | End: 2025-03-24 | Stop reason: HOSPADM

## 2025-03-21 RX ORDER — SENNOSIDES 8.6 MG
1 TABLET ORAL DAILY
Status: DISCONTINUED | OUTPATIENT
Start: 2025-03-21 | End: 2025-03-24 | Stop reason: HOSPADM

## 2025-03-21 RX ORDER — PANTOPRAZOLE SODIUM 40 MG/1
40 TABLET, DELAYED RELEASE ORAL
Status: DISCONTINUED | OUTPATIENT
Start: 2025-03-21 | End: 2025-03-24 | Stop reason: HOSPADM

## 2025-03-21 RX ADMIN — METOPROLOL TARTRATE 50 MG: 50 TABLET, FILM COATED ORAL at 08:19

## 2025-03-21 RX ADMIN — ENOXAPARIN SODIUM 40 MG: 40 INJECTION SUBCUTANEOUS at 08:19

## 2025-03-21 RX ADMIN — OXYBUTYNIN CHLORIDE 5 MG: 5 TABLET, EXTENDED RELEASE ORAL at 10:33

## 2025-03-21 RX ADMIN — DOCUSATE SODIUM 100 MG: 100 CAPSULE, LIQUID FILLED ORAL at 16:41

## 2025-03-21 RX ADMIN — POLYETHYLENE GLYCOL 3350 17 G: 17 POWDER, FOR SOLUTION ORAL at 08:19

## 2025-03-21 RX ADMIN — Medication 1000 UNITS: at 08:19

## 2025-03-21 RX ADMIN — METOPROLOL TARTRATE 50 MG: 50 TABLET, FILM COATED ORAL at 21:20

## 2025-03-21 RX ADMIN — MORPHINE SULFATE 15 MG: 15 TABLET ORAL at 21:00

## 2025-03-21 RX ADMIN — ANASTROZOLE 1 MG: 1 TABLET, COATED ORAL at 10:33

## 2025-03-21 RX ADMIN — MORPHINE SULFATE 15 MG: 15 TABLET ORAL at 08:29

## 2025-03-21 RX ADMIN — METHYLNALTREXONE BROMIDE 6 MG: 12 INJECTION, SOLUTION SUBCUTANEOUS at 06:39

## 2025-03-21 RX ADMIN — INSULIN GLARGINE 15 UNITS: 100 INJECTION, SOLUTION SUBCUTANEOUS at 08:20

## 2025-03-21 RX ADMIN — PANTOPRAZOLE SODIUM 40 MG: 40 TABLET, DELAYED RELEASE ORAL at 06:29

## 2025-03-21 RX ADMIN — DOCUSATE SODIUM 100 MG: 100 CAPSULE, LIQUID FILLED ORAL at 08:19

## 2025-03-21 RX ADMIN — ACETAMINOPHEN 650 MG: 325 TABLET, FILM COATED ORAL at 15:24

## 2025-03-21 RX ADMIN — LACTULOSE 20 G: 20 SOLUTION ORAL at 10:47

## 2025-03-21 RX ADMIN — SENNOSIDES 8.6 MG: 8.6 TABLET, FILM COATED ORAL at 08:19

## 2025-03-21 RX ADMIN — LACTULOSE 20 G: 20 SOLUTION ORAL at 16:41

## 2025-03-21 RX ADMIN — FLUTICASONE FUROATE 1 PUFF: 100 POWDER RESPIRATORY (INHALATION) at 11:23

## 2025-03-21 RX ADMIN — PRAVASTATIN SODIUM 20 MG: 20 TABLET ORAL at 21:20

## 2025-03-21 NOTE — ASSESSMENT & PLAN NOTE
Unclear etiology but possibly due to recurring malignancy with possible metastasis given newly found pulm nodule and lytic bone lesion, discovered on recent admission. Pt continues to maintain that she does not wish to pursue any further evaluation or intervention  D-dimer is normal  Troponin x 1 is negative  No sign of acute infectious process.  Regardless patient was just recently treated for pneumonia.  Negative viral panel.  Follow-up official chest x-ray results  Patient with underlying history of asthma but no sign of exacerbation  Will check echocardiogram.  But clinically appears to be euvolemic.  Normal BNP  Home O2 evaluation prior to discharge

## 2025-03-21 NOTE — ED ATTENDING ATTESTATION
3/21/2025  I, Kirit Rubin MD, saw and evaluated the patient. I have discussed the patient with the resident/non-physician practitioner and agree with the resident's/non-physician practitioner's findings, Plan of Care, and MDM as documented in the resident's/non-physician practitioner's note, except where noted. All available labs and Radiology studies were reviewed.  I was present for key portions of any procedure(s) performed by the resident/non-physician practitioner and I was immediately available to provide assistance.       At this point I agree with the current assessment done in the Emergency Department.  I have conducted an independent evaluation of this patient a history and physical is as follows:      Final Diagnosis:  1. Dyspnea      Chief Complaint   Patient presents with    Shortness of Breath     Pt has been feeling generally weak after spending a week in the hospital for pneumonia. Pt reports increased SOB tonight. A nodule on the lung tissue was found during last hospitalization.           A:  -89-year-old female presents with shortness of breath.      P:  - Given patient's concerns, will do a cardiac workup.   - Will do an EKG for arrythmia, strain; troponin for same as per protocol for evaluation of ACS.   - CBC for anemia; CMP for kidney function and electrolytes.   - Will check CXR for pneumonia, PTX, fluid overload  -Flu/COVID swab to evaluate for viral cause.  -Patient appears euvolemic.  Doubt CHF.      - Disposition per workup.     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) 12/01/2020    Chest pain, unspecified     CHF (congestive heart failure) (HCC)     Diabetes mellitus (HCC)     GERD (gastroesophageal reflux disease)     History of radiation exposure 1992    Hyperlipidemia     Hypertension     Irregular heart beat     Afib     Migraine     Obesity     Pericardial effusion     Pericarditis           H:    89-year-old female who presents with shortness of breath and weakness.  Symptoms started yesterday.  Patient recently admitted to the hospital for pneumonia.  Patient found to have bilateral rib lytic lesions and a right upper lobe nodule concerning for metastasis.  No known sick contacts.  She did develop a mild cough yesterday as well.  Patient experiencing issues with constipation as well.  This is a chronic issue for her.  Drink plenty of fluids.  She does have decreased appetite.      PMH:  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) 12/01/2020    Chest pain, unspecified     CHF (congestive heart failure) (HCC)     Diabetes mellitus (HCC)     GERD (gastroesophageal reflux disease)     History of radiation exposure 1992    Hyperlipidemia     Hypertension     Irregular heart beat     Afib    Migraine     Obesity     Pericardial effusion     Pericarditis        PSH:  Past Surgical History:   Procedure Laterality Date    ABDOMINAL ADHESION SURGERY      APPENDECTOMY      AUGMENTATION MAMMAPLASTY Left 1994    BACK SURGERY      BREAST LUMPECTOMY Left 1992    malignant    BREAST LUMPECTOMY Left 11/17/2020    Procedure: BREAST ULTRASOUND DIRECTED LUMPECTOMY (ULTRASOUND AT 1200);  Surgeon: Earl Em MD;  [facility];  Service: Surgical Oncology    BREAST SURGERY      CARDIAC SURGERY      Pericardial window    CHOLECYSTECTOMY      EYE SURGERY      FLAP LOCAL TRUNK Left 11/17/2020    Procedure: FLAP LOCAL TRUNK;  Surgeon: Ronnell Chiu MD;  [facility];  Service: Plastics    HYSTERECTOMY      IR DRAINAGE TUBE CHECK WITH SCLEROSIS  04/23/2021    IR DRAINAGE TUBE CHECK WITH SCLEROSIS  06/03/2021    IR DRAINAGE TUBE CHECK WITH SCLEROSIS  06/17/2021    IR DRAINAGE TUBE CHECK WITH  SCLEROSIS  06/28/2021    IR DRAINAGE TUBE CHECK WITH SCLEROSIS  07/07/2021    IR DRAINAGE TUBE CHECK WITH SCLEROSIS  07/27/2021    IR DRAINAGE TUBE PLACEMENT  04/01/2021    IR DRAINAGE TUBE PLACEMENT WITH SCLEROSIS  05/14/2021    LYMPH NODE DISSECTION Left 11/17/2020    Procedure: HUGO  DIRECTED AXILLARY DISSECTION;  Surgeon: Earl Em MD;  [facility];  Service: Surgical Oncology    MASTECTOMY Left 1994    malignant    VA CELESTE-IMPLANT CAPSULECTOMY BREAST COMPLETE Left 11/17/2020    Procedure: BREAST CAPSULECTOMY;  Surgeon: Ronnell Chiu MD;  [facility];  Service: Plastics    VA REMOVAL INTACT BREAST IMPLANT Left 11/17/2020    Procedure: BREAST IMPLANT REMOVAL;  Surgeon: Ronnell Chiu MD;  [facility];  Service: Plastics    US BREAST NEEDLE LOC LEFT Left 11/02/2020    US GUIDED BREAST BIOPSY LEFT COMPLETE Left 08/31/2020    US GUIDED BREAST LYMPH NODE BIOPSY LEFT Left 08/31/2020    WRIST SURGERY           PE:   Vitals:    03/21/25 0249 03/21/25 0300 03/21/25 0455 03/21/25 0513   BP: 156/73 (!) 201/86  163/70   BP Location: Right arm Right arm  Right arm   Pulse: 80 74 (!) 52 63   Resp: 18 22 20 20   Temp: 97.7 °F (36.5 °C)      TempSrc: Oral      SpO2: 94% 94% 95% 95%         Constitutional: Vital signs are normal. She appears well-developed. She is cooperative. No distress.   Cardiovascular: Normal rate, regular rhythm, normal heart sounds.   No murmur heard.  Pulmonary/Chest: Effort normal and breath sounds normal.   Abdominal: Soft. Normal appearance and bowel sounds are normal. There is no tenderness. There is no rebound, no guarding.   Neurological: She is alert.   Skin: Skin is warm, dry and intact.   MSK: No pitting edema bilateral lower extremities.  Psychiatric: She has a normal mood and affect. Her speech is normal and behavior is normal. Thought content normal.          - 13 point ROS was performed and all are normal unless stated in the history above.   - Nursing note  reviewed. Vitals reviewed.   - Orders placed by myself and/or advanced practitioner / resident.    - Previous chart was reviewed  - No language barrier.   - History obtained from patient.   - There are no limitations to the history obtained. Reasons ROS could not be obtained:  N/A         Medications - No data to display  XR chest 2 views   ED Interpretation   Small right-sided pleural effusion.  Otherwise, unremarkable chest x-ray compared to previous.        Orders Placed This Encounter   Procedures    FLU/COVID Rapid Antigen (30 min. TAT) - Preferred screening test in ED    XR chest 2 views    CBC and differential    Comprehensive metabolic panel    Protime-INR    APTT    HS Troponin 0hr (reflex protocol)    B-Type Natriuretic Peptide(BNP)    Procalcitonin    UA w Reflex to Microscopic w Reflex to Culture    D-dimer, quantitative    Insert peripheral IV    24 Hour Telemetry Monitoring    ECG 12 lead    ECG 12 lead    Place in Observation     Labs Reviewed   COMPREHENSIVE METABOLIC PANEL - Abnormal       Result Value Ref Range Status    Sodium 138  135 - 147 mmol/L Final    Potassium 4.1  3.5 - 5.3 mmol/L Final    Chloride 106  96 - 108 mmol/L Final    CO2 23  21 - 32 mmol/L Final    ANION GAP 9  4 - 13 mmol/L Final    BUN 18  5 - 25 mg/dL Final    Creatinine 1.10  0.60 - 1.30 mg/dL Final    Comment: Standardized to IDMS reference method    Glucose 149 (*) 65 - 140 mg/dL Final    Comment: If the patient is fasting, the ADA then defines impaired fasting glucose as > 100 mg/dL and diabetes as > or equal to 123 mg/dL.    Calcium 10.0  8.4 - 10.2 mg/dL Final    AST 16  13 - 39 U/L Final    ALT 11  7 - 52 U/L Final    Comment: Specimen collection should occur prior to Sulfasalazine administration due to the potential for falsely depressed results.     Alkaline Phosphatase 136 (*) 34 - 104 U/L Final    Total Protein 7.0  6.4 - 8.4 g/dL Final    Albumin 3.8  3.5 - 5.0 g/dL Final    Total Bilirubin 0.85  0.20 - 1.00 mg/dL  Final    Comment: Use of this assay is not recommended for patients undergoing treatment with eltrombopag due to the potential for falsely elevated results.  N-acetyl-p-benzoquinone imine (metabolite of Acetaminophen) will generate erroneously low results in samples for patients that have taken an overdose of Acetaminophen.    eGFR 45  ml/min/1.73sq m Final    Narrative:     National Kidney Disease Foundation guidelines for Chronic Kidney Disease (CKD):     Stage 1 with normal or high GFR (GFR > 90 mL/min/1.73 square meters)    Stage 2 Mild CKD (GFR = 60-89 mL/min/1.73 square meters)    Stage 3A Moderate CKD (GFR = 45-59 mL/min/1.73 square meters)    Stage 3B Moderate CKD (GFR = 30-44 mL/min/1.73 square meters)    Stage 4 Severe CKD (GFR = 15-29 mL/min/1.73 square meters)    Stage 5 End Stage CKD (GFR <15 mL/min/1.73 square meters)  Note: GFR calculation is accurate only with a steady state creatinine   PROTIME-INR - Abnormal    Protime 15.5 (*) 12.3 - 15.0 seconds Final    INR 1.20 (*) 0.85 - 1.19 Final    Narrative:     INR Therapeutic Range    Indication                                             INR Range      Atrial Fibrillation                                               2.0-3.0  Hypercoagulable State                                    2.0.2.3  Left Ventricular Asist Device                            2.0-3.0  Mechanical Heart Valve                                  -    Aortic(with afib, MI, embolism, HF, LA enlargement,    and/or coagulopathy)                                     2.0-3.0 (2.5-3.5)     Mitral                                                             2.5-3.5  Prosthetic/Bioprosthetic Heart Valve               2.0-3.0  Venous thromboembolism (VTE: VT, PE        2.0-3.0   COVID-19/INFLUENZA A/B RAPID ANTIGEN (30 MIN.TAT) - Normal    SARS COV Rapid Antigen Negative  Negative Final    Influenza A Rapid Antigen Negative  Negative Final    Influenza B Rapid Antigen Negative  Negative Final     "Narrative:     This test has been performed using the Quidel Lesli 2 FLU+SARS Antigen test under the Emergency Use Authorization (EUA). This test has been validated by the  and verified by the performing laboratory. The Lesli uses lateral flow immunofluorescent sandwich assay to detect SARS-COV, Influenza A and Influenza B Antigen.     The Quidel Lesli 2 SARS Antigen test does not differentiate between SARS-CoV and SARS-CoV-2.     Negative results are presumptive and may be confirmed with a molecular assay, if necessary, for patient management. Negative results do not rule out SARS-CoV-2 or influenza infection and should not be used as the sole basis for treatment or patient management decisions. A negative test result may occur if the level of antigen in a sample is below the limit of detection of this test.     Positive results are indicative of the presence of viral antigens, but do not rule out bacterial infection or co-infection with other viruses.     All test results should be used as an adjunct to clinical observations and other information available to the provider.    FOR PEDIATRIC PATIENTS - copy/paste COVID Guidelines URL to browser: [email or web address]   APTT - Normal    PTT 32  23 - 34 seconds Final    Comment: Therapeutic Heparin Range =  60-90 seconds   HS TROPONIN I 0HR - Normal    hs TnI 0hr 4  \"Refer to ACS Flowchart\"- see link ng/L Final    Comment:                                              Initial (time 0) result  If >=50 ng/L, Myocardial injury suggested ;  Type of myocardial injury and treatment strategy  to be determined.  If 5-49 ng/L, a delta result at 2 hours and or 4 hours will be needed to further evaluate.  If <4 ng/L, and chest pain has been >3 hours since onset, patient may qualify for discharge based on the HEART score in the ED.  If <5 ng/L and <3hours since onset of chest pain, a delta result at 2 hours will be " needed to further evaluate.    HS Troponin 99th Percentile URL of a Health Population=12 ng/L with a 95% Confidence Interval of 8-18 ng/L.    Second Troponin (time 2 hours)  If calculated delta >= 20 ng/L,  Myocardial injury suggested ; Type of myocardial injury and treatment strategy to be determined.  If 5-49 ng/L and the calculated delta is 5-19 ng/L, consult medical service for evaluation.  Continue evaluation for ischemia on ecg and other possible etiology and repeat hs troponin at 4 hours.  If delta is <5 ng/L at 2 hours, consider discharge based on risk stratification via the HEART score (if in ED), or GREG risk score in IP/Observation.    HS Troponin 99th Percentile URL of a Health Population=12 ng/L with a 95% Confidence Interval of 8-18 ng/L.   B-TYPE NATRIURETIC PEPTIDE (BNP) - Normal    BNP 61  0 - 100 pg/mL Final   PROCALCITONIN TEST - Normal    Procalcitonin 0.06  <=0.25 ng/ml Final    Comment: Suspected Lower Respiratory Tract Infection (LRTI):  - LESS than or EQUAL to 0.25 ng/mL:   low likelihood for bacterial LRTI; antibiotics DISCOURAGED.  - GREATER than 0.25 ng/mL:   increased likelihood for bacterial LRTI; antibiotics ENCOURAGED.    Suspected Sepsis:  - Strongly consider initiating antibiotics in ALL UNSTABLE patients.  - LESS than or EQUAL to 0.5 ng/mL:   low likelihood for bacterial sepsis; antibiotics DISCOURAGED.  - GREATER than 0.5 ng/mL:   increased likelihood for bacterial sepsis; antibiotics ENCOURAGED.  - GREATER than 2 ng/mL:   high risk for severe sepsis / septic shock; antibiotics strongly ENCOURAGED.    Decisions on antibiotic use should not be based solely on Procalcitonin (PCT) levels. If PCT is low but uncertainty exists with stopping antibiotics, repeat PCT in 6-24 hours to confirm the low level. If antibiotics are administered (regardless if initial PCT was high or low), repeat PCT every 1-2 days to consider early antibiotic cessation (when GREATER than 80% decrease from the  peak OR when PCT drops below designated cutoffs, whichever comes first), so long as the infection is NOT one that typically requires prolonged treatment durations (e.g., bone/joint infections, endocarditis, Staph. aureus bacteremia).    Situations of FALSE-POSITIVE Procalcitonin values:  1) Newborns < 72 hours old  2) Massive stress from severe trauma / burns, major surgery, acute pancreatitis, cardiogenic / hemorrhagic shock, sickle cell crisis, or other organ perfusion abnormalities  3) Malaria and some Candidal infections  4) Treatment with agents that stimulate cytokines (e.g., OKT3, anti-lymphocyte globulins, alemtuzumab, IL-2, granulocyte transfusion [NOT GCSFs])  5) Chronic renal disease causes elevated baseline levels (consider GREATER than 0.75 ng/mL as an abnormal cut-off); initiating HD/CRRT may cause transient decreases  6) Paraneoplastic syndromes from medullary thyroid or SCLC, some forms of vasculitis, and acute cnwyu-pr-elmw disease    Situations of FALSE-NEGATIVE Procalcitonin values:  1) Too early in clinical course for PCT to have reached its peak (may repeat in 6-24 hours to confirm low level)  2) Localized infection WITHOUT systemic (SIRS / sepsis) response (e.g., an abscess, osteomyelitis, cystitis)  3) Mycobacteria (e.g., Tuberculosis, MAC)  4) Cystic fibrosis exacerbations     D-DIMER, QUANTITATIVE - Normal    D-Dimer, Quant 0.45  <0.50 ug/ml FEU Final    Comment: Reference and upper limits to exclude DVT and PE are the same.  Do not use to exclude if clinical symptoms are present.  Pregnant women:  1st trimester:  <0.22 - 1.06 ug/ml FEU  2nd trimester:  <0.22 - 1.88 ug/ml FEU  3rd trimester:   0.24 - 3.28 ug/ml FEU    Note: Normal ranges may not apply to patients who are transgender, non-binary, or whose legal sex, sex at birth, and gender identity differ.      Narrative:     In the evaluation for possible pulmonary embolism, in the appropriate (Well's Score of 4 or less) patient, the age  adjusted d-dimer cutoff for this patient can be calculated as:    Age x 0.01 (in ug/mL) for Age-adjusted D-dimer exclusion threshold for a patient over 50 years.   CBC AND DIFFERENTIAL    WBC 7.73  4.31 - 10.16 Thousand/uL Final    RBC 4.78  3.81 - 5.12 Million/uL Final    Hemoglobin 14.1  11.5 - 15.4 g/dL Final    Hematocrit 43.1  34.8 - 46.1 % Final    MCV 90  82 - 98 fL Final    MCH 29.5  26.8 - 34.3 pg Final    MCHC 32.7  31.4 - 37.4 g/dL Final    RDW 13.0  11.6 - 15.1 % Final    MPV 11.4  8.9 - 12.7 fL Final    Platelets 249  149 - 390 Thousands/uL Final    nRBC 0  /100 WBCs Final    Segmented % 56  43 - 75 % Final    Immature Grans % 0  0 - 2 % Final    Lymphocytes % 33  14 - 44 % Final    Monocytes % 8  4 - 12 % Final    Eosinophils Relative 3  0 - 6 % Final    Basophils Relative 0  0 - 1 % Final    Absolute Neutrophils 4.32  1.85 - 7.62 Thousands/µL Final    Absolute Immature Grans 0.01  0.00 - 0.20 Thousand/uL Final    Absolute Lymphocytes 2.57  0.60 - 4.47 Thousands/µL Final    Absolute Monocytes 0.62  0.17 - 1.22 Thousand/µL Final    Eosinophils Absolute 0.19  0.00 - 0.61 Thousand/µL Final    Basophils Absolute 0.02  0.00 - 0.10 Thousands/µL Final   UA W REFLEX TO MICROSCOPIC WITH REFLEX TO CULTURE     Time reflects when diagnosis was documented in both MDM as applicable and the Disposition within this note       Time User Action Codes Description Comment    3/21/2025  5:24 AM Chris Stanley Add [R06.00] Dyspnea           ED Disposition       ED Disposition   Admit    Condition   Stable    Date/Time   Fri Mar 21, 2025  5:22 AM    Comment   Case was discussed with MATIAS and the patient's admission status was agreed to be Admission Status: observation status to the service of  .               Follow-up Information    None       Patient's Medications   Discharge Prescriptions    No medications on file     No discharge procedures on file.  Prior to Admission Medications   Prescriptions Last Dose Informant  Patient Reported? Taking?   Beclomethasone Dipropionate (QVAR IN)  Self, Spouse/Significant Other Yes No   Sig: Inhale 2 puffs if needed (shortness of breath) Inhale 2 puffs as needed every 8 hours for shortness of breath   Cholecalciferol (D3 Adult) 25 MCG (1000 UT) CHEW   Yes No   Sig: Chew 1,000 Units daily. 1 tablet by mouth daily   Droplet Pen Needles 32G X 4 MM MISC  Self, Spouse/Significant Other Yes No   EPINEPHrine (EPIPEN) 0.3 mg/0.3 mL SOAJ  Self, Spouse/Significant Other Yes No   Sig: Inject 0.3 mL as directed Patient does not have in home   FLOVENT  MCG/ACT inhaler  Self, Spouse/Significant Other Yes No   Sig: INHALE 2 PUFF BY INHALATION ROUTE 2 TIMES EVERY DAY   MORPHINE SULFATE ER PO   Yes No   Sig: Take 15 mg by mouth every 12 (twelve) hours as needed (for pain). Take 15 mg by mouth every 12 hours as needed for severe pain. Patient not taking.   Menaquinone-7 (K2 PO)   Yes No   Sig: Take 100 mcg by mouth daily. Take 1 tablet by mouth daily   NOVOFINE 32G X 6 MM MISC  Self, Spouse/Significant Other Yes No   Si (two) times a day As directed   ONE TOUCH ULTRA TEST test strip  Self, Spouse/Significant Other Yes No   Sig: TEST TWICE DAILY OR AS DIRECTED DX: E11.9   ONETOUCH DELICA LANCETS 33G MISC  Self, Spouse/Significant Other Yes No   Sig: TEST TWICE DAILY OR AS DIRECTED   Toujeo SoloStar 300 units/mL CONCENTRATED U-300 injection pen (1-unit dial)  Self, Spouse/Significant Other Yes No   Sig: INJECT 72 UNITS BY SUBCUTANEOUS ROUTE AS PER INSULIN PROTOCOL. Patient reports she injects 3x daily.   VENTOLIN  (90 Base) MCG/ACT inhaler  Self, Spouse/Significant Other Yes No   Sig: Inhale 2 puffs every 4 (four) hours as needed for shortness of breath Take 2 puffs inhalation every 4 hours as needed   anastrozole (ARIMIDEX) 1 mg tablet  Self, Spouse/Significant Other No No   Sig: Take 1 tablet (1 mg total) by mouth daily   benzonatate (TESSALON) 200 MG capsule   No No   Sig: Take 1 capsule (200  mg total) by mouth 3 (three) times a day as needed for cough   dextromethorphan-guaiFENesin (ROBITUSSIN DM)  mg/5 mL syrup   No No   Sig: Take 10 mL by mouth every 4 (four) hours as needed for cough   hydrocortisone 2.5 % cream  Self, Spouse/Significant Other Yes No   Sig: Apply to rash in groin area twice daily   insulin aspart (NovoLOG) 100 Units/mL injection pen  Self, Spouse/Significant Other Yes No   Sig: Novolog Flexpen U-100 Insulin aspart 100 unit/mL (3 mL) subcutaneous   insulin glargine (TOUJEO MAX) 300 units/mL CONCENTRATED U-300 injection pen (2-unit dial)   Yes No   Sig: Inject 67 Units under the skin daily. Inject 67 unit by subcutaneous route as per insulin protocol. Patient is not taking this dosage.patient is not taking this dosage   lansoprazole (PREVACID) 15 mg capsule   Yes No   Sig: Take 15 mg by mouth daily. Take 1 tablet by mouth daily  latest dci state 30 mg daily    metoprolol tartrate (LOPRESSOR) 50 mg tablet  Self, Spouse/Significant Other Yes No   Sig: Take 50 mg by mouth every 12 (twelve) hours Take 1 tablet by mouth twice daily with meals   morphine (MSIR) 15 mg tablet  Self, Spouse/Significant Other Yes No   Sig: Take 15 mg by mouth every 8 (eight) hours as needed for severe pain.   naloxone (NARCAN) 2 MG/2ML injection  Self, Spouse/Significant Other Yes No   Sig: Inject 2 mL into a catheter in a vein once Patient not taking   nystatin (MYCOSTATIN) cream  Self, Spouse/Significant Other Yes No   Sig: Apply to rash in groin area twice daily   nystatin (MYCOSTATIN) powder  Self, Spouse/Significant Other Yes No   Sig: APPLY TO THE RASH IN THE GROIN ONCE DAILY   oxybutynin (DITROPAN XL) 15 MG 24 hr tablet  Self, Spouse/Significant Other Yes No   Sig: Take 15 mg by mouth daily.   simvastatin (ZOCOR) 20 mg tablet  Self, Spouse/Significant Other Yes No   Sig: Take 20 mg by mouth daily at bedtime.   solifenacin (VESICARE) 10 MG tablet  Self, Spouse/Significant Other No No   Sig: Take 1  "tablet (10 mg total) by mouth daily      Facility-Administered Medications: None       Portions of the record may have been created with voice recognition software. Occasional wrong word or \"sound a like\" substitutions may have occurred due to the inherent limitations of voice recognition software. Read the chart carefully and recognize, using context, where substitutions have occurred.       ED Course         Critical Care Time  Procedures      "

## 2025-03-21 NOTE — PROGRESS NOTES
Patient:    MRN:  [id number]    Brice Request ID:  [id number]    Level of care reserved:  Hospice    Partner Reserved:  [facility], [place] [address] [phone]    Clinical needs requested:    Geography searched:  10 miles around [id number]    Start of Service:    Request sent:  10:29am EDT on 3/21/2025 by Irlanda Judge    Partner reserved:  11:48am EDT on 3/21/2025 by Irlanda Judge    Choice list shared:  11:48am EDT on 3/21/2025 by Irlanda Judge

## 2025-03-21 NOTE — HOSPICE NOTE
secure message recieved from [facility] Irlanda to notify of the referal.  referral recieved.  met with pt at bedside.  discussed hospice services and benefits by  guidelines.  Questions answered and support provided.  pt reports that she does not want treatment but would like to wait to start hospice services.  hospice folder provided and reivewed how to contact hospice when she is ready.  secure message sent to [facility] with update

## 2025-03-21 NOTE — ASSESSMENT & PLAN NOTE
On Arimidex, can continue.   Has upcoming follow up with [facility] oncology  On most recent admission concerning for recurring malignancy and possible metastasis given new pulm nodule and lytic osseous lesion.   Pt continues to decline any further oncology workup, eval or intervention and would like to proceed with hospice evaluation

## 2025-03-21 NOTE — ASSESSMENT & PLAN NOTE
POA, unclear etiology. With associated generalized fatigue and weakness. Recently admitted and treated for PNA. D/c home with home therapies. During recent admission noted to have New 5 mm right upper lobe nodule, Lytic osseous lesions suspicious for metastasis (known hx of breast CA).   Labs unremarkable, procal negative. D dimer negative.   Flu/COVID/RSV negative   Does not appear grossly volume overloaded. Hx of asthma but without signs of exacerbation.   CXR without any obvious infiltrates, pending radiology read   Checking echo for completeness, patient agreeable.   Patient wishes to be conservative with her cares and wants to focus on comfort. Would like to avoid hospitalizations. She meets with her oncologist next week and plans to inform her team that she does not wish for any further workup/treatment. Discussed hospice eval, she is agreeable.

## 2025-03-21 NOTE — ASSESSMENT & PLAN NOTE
Lab Results   Component Value Date    HGBA1C 6.8 (H) 02/27/2024       Recent Labs     03/21/25  0627   POCGLU 118       Blood Sugar Average: Last 72 hrs:  (P) 118on [place] U-300 50-60 units daily per patient  Will place on lantus bid at lowered dose, titrate upwards as needed  Place on ISS

## 2025-03-21 NOTE — ASSESSMENT & PLAN NOTE
Likely opioid induced  Will give dose of relistor, renally dosed  Bowel regimen with colace, senna, miralax

## 2025-03-21 NOTE — ASSESSMENT & PLAN NOTE
Urgency noted on admission, now improved.   Continue Lopressor as per PTA  PRN Hydralazine  Monitor

## 2025-03-21 NOTE — PROGRESS NOTES
Progress Note - Hospitalist   Name: Barbra De Oliveira 89 y.o. female I MRN: [id number]  Unit/Bed#: ED 25 I Date of Admission: 3/21/2025   Date of Service: 3/21/2025 I Hospital Day: 0     Assessment & Plan  Dyspnea  POA, unclear etiology. With associated generalized fatigue and weakness. Recently admitted and treated for PNA. D/c home with home therapies. During recent admission noted to have New 5 mm right upper lobe nodule, Lytic osseous lesions suspicious for metastasis (known hx of breast CA).   Labs unremarkable, procal negative. D dimer negative.   Flu/COVID/RSV negative   Does not appear grossly volume overloaded. Hx of asthma but without signs of exacerbation.   CXR without any obvious infiltrates, pending radiology read   Checking echo for completeness, patient agreeable.   Patient wishes to be conservative with her cares and wants to focus on comfort. Would like to avoid hospitalizations. She meets with her oncologist next week and plans to inform her team that she does not wish for any further workup/treatment. Discussed hospice eval, she is agreeable.   Essential hypertension  Urgency noted on admission, now improved.   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  Given Relistor 3/21  Continue miralax/senna  Add lactulose BID  Monitor stools   Uncomplicated opioid dependence (HCC)  In setting of chronic cancer pain  Continue MSIR 15 mg Q8 PRN as per PTA  Malignant neoplasm of upper-outer quadrant of left  breast in female, estrogen receptor positive (HCC)  On Arimidex, can continue.   Has upcoming follow up with [facility] oncology  On most recent admission concerning for recurring malignancy and possible metastasis given new pulm nodule and lytic osseous lesion.   Pt continues to decline any further oncology workup, eval or intervention and would like to proceed with hospice evaluation  Chronic diastolic (congestive) heart failure (HCC)  Wt Readings from Last 3 Encounters:   03/05/25 68.9 kg (152 lb)   03/02/25 71.2 kg (157 lb)   02/14/25 73.9 kg (163 lb)   Appears euvolemic on exam  Last echocardiogram in our system appears to demonstrate LVEF 60% with grade 1 diastolic dysfunction in May 2023, repeat pending  Continue BB, not on diuretic at baseline  Monitor       VTE Pharmacologic Prophylaxis:   Moderate Risk (Score 3-4) - Pharmacological DVT Prophylaxis Ordered: enoxaparin (Lovenox).    Mobility:      pending      Discussions with Specialists or Other Care Team Provider: nursing, case management     Education and Discussions with Family / Patient: Patient declined call to .     Current Length of Stay: 0 day(s)  Current Patient Status: Observation   Certification Statement:  keep obs pending hospice eval, echo and improvement in constipation  Discharge Plan: Anticipate discharge tomorrow to home with home services.    Code Status: Level 3 - DNAR and DNI    Subjective   Still no BM. States last BM was appx 2 weeks ago. Walked to bathroom with RN, no significant SOB. States that she does not wish for invasive work up and overall would like to focus on comfort and remaining at home with her . She is scheduled to meet with oncology next week but plans to tell them she does not wish for any further investigation or treatment. Discussed hospice, she is agreeable. Says she has considered this in the past.     Objective :  Temp:  [97.7 °F (36.5 °C)-98 °F (36.7 °C)] 98 °F (36.7 °C)  HR:  [52-80] 68  BP:  (138-201)/(68-86) 138/68  Resp:  [18-23] 23  SpO2:  [93 %-95 %] 95 %  O2 Device: None (Room air)    There is no height or weight on file to calculate BMI.     Input and Output Summary (last 24 hours):   No intake or output data in the 24 hours ending 03/21/25 0856    Physical Exam  Vitals and nursing note reviewed.   Constitutional:       General: She is not in acute distress.     Appearance: She is obese.   Cardiovascular:      Rate and Rhythm: Normal rate.   Pulmonary:      Breath sounds: Decreased breath sounds present. No wheezing, rhonchi or rales.   Abdominal:      General: Bowel sounds are decreased. There is distension.      Tenderness: There is no abdominal tenderness.   Musculoskeletal:         General: No swelling.   Skin:     General: Skin is warm.   Neurological:      Mental Status: She is alert and oriented to person, place, and time. Mental status is at baseline.   Psychiatric:         Mood and Affect: Mood normal.           Lines/Drains:        Telemetry:  Telemetry Orders (From admission, onward)               24 Hour Telemetry Monitoring  Continuous x 24 Hours (Telem)        Expiring   Question:  Reason for 24 Hour Telemetry  Answer:  Pulmonary Embolism                     Telemetry Reviewed: Normal Sinus Rhythm  Indication for Continued Telemetry Use: Arrthymias requiring medical therapy               Lab Results: I have reviewed the following results:   Results from last 7 days   Lab Units 03/21/25  0351   WBC Thousand/uL 7.73   HEMOGLOBIN g/dL 14.1   HEMATOCRIT % 43.1   PLATELETS Thousands/uL 249   SEGS PCT % 56   LYMPHO PCT % 33   MONO PCT % 8   EOS PCT % 3     Results from last 7 days   Lab Units 03/21/25  0351   SODIUM mmol/L 138   POTASSIUM mmol/L 4.1   CHLORIDE mmol/L 106   CO2 mmol/L 23   BUN mg/dL 18   CREATININE mg/dL 1.10   ANION GAP mmol/L 9   CALCIUM mg/dL 10.0   ALBUMIN g/dL 3.8   TOTAL BILIRUBIN mg/dL 0.85   ALK PHOS U/L 136*   ALT U/L 11   AST U/L 16   GLUCOSE RANDOM mg/dL 149*      Results from last 7 days   Lab Units 03/21/25  0351   INR  1.20*     Results from last 7 days   Lab Units 03/21/25  0627   POC GLUCOSE mg/dl 118     Results from last 7 days   Lab Units 03/21/25  0630   HEMOGLOBIN A1C % 5.7*     Results from last 7 days   Lab Units 03/21/25  0351   PROCALCITONIN ng/ml 0.06       Recent Cultures (last 7 days):           Other Study Results Review: No additional pertinent studies reviewed.    Last 24 Hours Medication List:     Current Facility-Administered Medications:     acetaminophen (TYLENOL) tablet 650 mg, Q6H PRN    anastrozole (ARIMIDEX) tablet 1 mg, Daily    benzonatate (TESSALON PERLES) capsule 200 mg, TID PRN    Cholecalciferol (VITAMIN D3) tablet 1,000 Units, Daily    docusate sodium (COLACE) capsule 100 mg, BID    enoxaparin (LOVENOX) subcutaneous injection 40 mg, Daily    fluticasone (ARNUITY ELLIPTA) 100 MCG/ACT inhaler 1 puff, Daily    hydrALAZINE (APRESOLINE) injection 10 mg, Q6H PRN    insulin glargine (LANTUS) subcutaneous injection 15 Units 0.15 mL, Q12H RAPHAEL    insulin lispro (HumALOG/ADMELOG) 100 units/mL subcutaneous injection 1-6 Units, HS    insulin lispro (HumALOG/ADMELOG) 100 units/mL subcutaneous injection 2-12 Units, TID AC **AND** Fingerstick Glucose (POCT), TID AC    lactulose (CHRONULAC) oral solution 20 g, BID    metoprolol tartrate (LOPRESSOR) tablet 50 mg, Q12H RAPHAEL    morphine (MSIR) IR tablet 15 mg, Q8H PRN    oxybutynin (DITROPAN-XL) 24 hr tablet 5 mg, Daily    pantoprazole (PROTONIX) EC tablet 40 mg, Early Morning    polyethylene glycol (MIRALAX) packet 17 g, Daily    pravastatin (PRAVACHOL) tablet 20 mg, Daily With Dinner    senna (SENOKOT) tablet 8.6 mg, Daily    Administrative Statements   Today, Patient Was Seen By: JOSE Torre      **Please Note: This note may have been constructed using a voice recognition system.**

## 2025-03-21 NOTE — ASSESSMENT & PLAN NOTE
Patient reports no significant BM in appx 2 weeks  Patient with + BS, significant abdominal distention noted, possibly contributing to SOB?  Given Relistor 3/21  Continue miralax/senna  Add lactulose BID  Monitor stools

## 2025-03-21 NOTE — ASSESSMENT & PLAN NOTE
On Arimidex  Has upcoming follow up with [facility] oncology  On most recent admission concerning for recurring malignancy and possible metastasis given new pulm nodule and lytic osseous lesion. Pt continues to decline any further oncology workup, eval or intervention

## 2025-03-21 NOTE — CASE MANAGEMENT
Case Management Assessment & Discharge Planning Note    Patient name Barbra De Oliveira  Location ED 25/ED 25 MRN [id number]  : 1935 Date 3/21/2025       Current Admission Date: 3/21/2025  Current Admission Diagnosis:Dyspnea   Patient Active Problem List    Diagnosis Date Noted Date Diagnosed    History of breast cancer 2025     Constipation 2025     Dyspnea 2025     History of left mastectomy 2023     Premature ventricular contractions 2023     Use of anastrozole (Arimidex) 2021     Atrial fibrillation, currently in sinus rhythm 2020     Chronic pain 2020     Malignant neoplasm of upper-outer quadrant of left breast in female, estrogen receptor positive (HCC) 2020     Migraine without aura and without status migrainosus, not intractable 2020     Mixed hyperlipidemia 2019     Chest pain 2018     Chronic diastolic (congestive) heart failure (HCC) 2018     Uncomplicated opioid dependence (HCC) 2016     Essential hypertension 2016     GERD without esophagitis 2016     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       Readmission Root Cause  30 Day Readmission: No, Yes  During your hospital stay, did someone (provider, nurse, ) explain your care to you in a way you could understand?: Yes  Did you feel medically stable to leave the hospital?: Yes  Were you able to pay  for your medication at the pharmacy?: Yes  Did you have reliable transportation to take you to your appointments?: Yes  During previous admission, was a post-acute recommendation made?: Yes  What post-acute resources were offered?: [facility]  Patient was readmitted due to: dyspnea    Patient Information  Admitted from:: Home  Mental Status: Alert  During Assessment patient was accompanied by: Not accompanied during assessment  Assessment information provided by:: Patient  Primary Caregiver: Self  Support Systems: Self, Spouse/significant other  County of Residence: [place]  What city do you live in?: [place]  Home entry access options. Select all that apply.: Stairs  Number of steps to enter home.: 3  Type of Current Residence: [facility]  Living Arrangements: Lives w/ Spouse/significant other    Activities of Daily Living Prior to Admission  Functional Status: Assistance  Completes ADLs independently?: No  Level of ADL dependence: Assistance  Ambulates independently?: Yes  Does patient use assisted devices?: Yes  Assisted Devices (DME) used: Walker, Rollator, Straight Cane  Does patient currently own DME?: Yes  What DME does the patient currently own?: Rollator, Straight Cane, Walker  Does patient have a history of Outpatient Therapy (PT/OT)?: Yes  Does the patient have a history of Short-Term Rehab?: Yes ([facility])  Does patient have a history of HHC?: Yes ([facility])  Does patient currently have HHC?: Yes ([facility])    Current Home Health Care  Type of Current Home Care Services: Home PT, Home OT  Home Health Agency Name:: [facility]  Current Home Health Follow-Up Provider:: PCP    Patient Information Continued  Income Source: Pension/alf  Does patient have prescription coverage?: Yes  Can the patient afford their medications and any related supplies (such as glucometers or test strips)?: Yes  Does patient receive dialysis treatments?: No  Does patient have a history of substance abuse?: No  Does patient have  a history of Mental Health Diagnosis?: No         Means of Transportation  Means of Transport to Appts:: Family transport          DISCHARGE DETAILS:    Discharge planning discussed with:: Patient  Freedom of Choice: Yes  Comments - Freedom of Choice: Agreeable to referral to  Hospice to discuss options  CM contacted family/caregiver?: No- see comments (declined, states pt just got home and went to sleep)  Were Treatment Team discharge recommendations reviewed with patient/caregiver?: Yes  Did patient/caregiver verbalize understanding of patient care needs?: Yes  Were patient/caregiver advised of the risks associated with not following Treatment Team discharge recommendations?: Yes         Requested Home Health Care         Home Health Agency Name:: [facility]         Other Referral/Resources/Interventions Provided:  Interventions: Hospice  Referral Comments: Pt requested referral to  Hospice.  Referral sent.         Treatment Team Recommendation: Home, Hospice        Additional Comments: CM met with pt at bedside in the ED to complete Open and discuss Hospice. Pt resides with her  in a ranch style home, 3 [address]. Pt told CM that she does not wish to pursue treatment for CA and would like to speak with hospice.  CM discussed different hospice agencies and pt requested a referral be sent to  Hospice.  Referral sent.  CM offered to discuss with pt's  as well, pt declined stating he was in the ED all night and is now home sleeping.  Pt states she will inculed him in conversations with hospice but she is ok with CM sending the referral without discussing with .      Update:  Per hospice liaison Felisa, pt is not going to be starting hospice at this time.  Pt is interested in hospice in the future.

## 2025-03-21 NOTE — ED PROVIDER NOTES
Time reflects when diagnosis was documented in both MDM as applicable and the Disposition within this note       Time User Action Codes Description Comment    3/21/2025  5:24 AM Chris Stanley Add [R06.00] Dyspnea     3/21/2025  8:48 AM Liz Mariscal Add [C50.412,  Z17.0] Malignant neoplasm of upper-outer quadrant of left breast in female, estrogen receptor positive (HCC)     3/21/2025  8:48 AM Liz Mariscal Add [F11.20] Uncomplicated opioid dependence (HCC)           ED Disposition       ED Disposition   Admit    Condition   Stable    Date/Time   Fri Mar 21, 2025  5:22 AM    Comment   Case was discussed with MATIAS and the patient's admission status was agreed to be Admission Status: observation status to the service of  .               Assessment & Plan       Medical Decision Making  Patient is a 89 y.o. female who presents to the ED with fatigue, shortness of breath.    We obtained labs including CBC, CMP, troponin, BNP, coags, all of which are unremarkable. Her CXR reveals a trace right pleural effusion and a trace linear opacity along the inferior margin of the right middle lobe. Maintains SpO2 > 92% while ambulating with her walker.  Dimer negative.    This most likely represents fatigue secondary to ongoing progression of underlying metastasis.  No evidence to suggest pneumonia or large pleural effusion on chest x-ray.     Plan: Patient was admitted to internal medicine for observation.    View ED course above for further discussion on patient workup.     All labs reviewed and utilized in the medical decision making process  All radiology studies independently viewed by me and interpreted by the radiologist.  I reviewed all testing with the patient.         Amount and/or Complexity of Data Reviewed  Labs: ordered.  Radiology: ordered and independent interpretation performed.    Risk  Decision regarding hospitalization.             Medications   acetaminophen (TYLENOL) tablet 650 mg (650 mg Oral Given 3/21/25  1524)   docusate sodium (COLACE) capsule 100 mg (100 mg Oral Given 3/21/25 0819)   senna (SENOKOT) tablet 8.6 mg (8.6 mg Oral Given 3/21/25 0819)   enoxaparin (LOVENOX) subcutaneous injection 40 mg (40 mg Subcutaneous Given 3/21/25 0819)   insulin lispro (HumALOG/ADMELOG) 100 units/mL subcutaneous injection 2-12 Units ( Subcutaneous Not Given 3/21/25 1300)   insulin lispro (HumALOG/ADMELOG) 100 units/mL subcutaneous injection 1-6 Units (has no administration in time range)   anastrozole (ARIMIDEX) tablet 1 mg (1 mg Oral Given 3/21/25 1033)   fluticasone (ARNUITY ELLIPTA) 100 MCG/ACT inhaler 1 puff (1 puff Inhalation Given 3/21/25 1123)   benzonatate (TESSALON PERLES) capsule 200 mg (has no administration in time range)   Cholecalciferol (VITAMIN D3) tablet 1,000 Units (1,000 Units Oral Given 3/21/25 0819)   pantoprazole (PROTONIX) EC tablet 40 mg (40 mg Oral Given 3/21/25 0629)   metoprolol tartrate (LOPRESSOR) tablet 50 mg (50 mg Oral Given 3/21/25 0819)   morphine (MSIR) IR tablet 15 mg (15 mg Oral Given 3/21/25 0829)   pravastatin (PRAVACHOL) tablet 20 mg (has no administration in time range)   oxybutynin (DITROPAN-XL) 24 hr tablet 5 mg (5 mg Oral Given 3/21/25 1033)   hydrALAZINE (APRESOLINE) injection 10 mg (has no administration in time range)   methylnaltrexone (Relistor) subcutaneous injection 6 mg (6 mg Subcutaneous Given 3/21/25 0639)   lactulose (CHRONULAC) oral solution 20 g (20 g Oral Given 3/21/25 1047)       ED Risk Strat Scores                            SBIRT 22yo+      Flowsheet Row Most Recent Value   Initial Alcohol Screen: US AUDIT-C     1. How often do you have a drink containing alcohol? 0 Filed at: 03/21/2025 0432   2. How many drinks containing alcohol do you have on a typical day you are drinking?  0 Filed at: 03/21/2025 0432   3a. Male UNDER 65: How often do you have five or more drinks on one occasion? 0 Filed at: 03/21/2025 0432   3b. FEMALE Any Age, or MALE 65+: How often do you have 4  or more drinks on one occassion? 0 Filed at: 03/21/2025 0432   Audit-C Score 0 Filed at: 03/21/2025 0432   LI: How many times in the past year have you...    Used an illegal drug or used a prescription medication for non-medical reasons? Never Filed at: 03/21/2025 0432            Wells' Criteria for PE      Flowsheet Row Most Recent Value   Wells' Criteria for PE    Clinical signs and symptoms of DVT 0 Filed at: 03/21/2025 0354   PE is primary diagnosis or equally likely 0 Filed at: 03/21/2025 0354   HR >100 0 Filed at: 03/21/2025 0354   Immobilization at least 3 days or Surgery in the previous 4 weeks 1.5 Filed at: 03/21/2025 0354   Previous, objectively diagnosed PE or DVT 0 Filed at: 03/21/2025 0354   Hemoptysis 0 Filed at: 03/21/2025 0354   Malignancy with treatment within 6 months or palliative 1 Filed at: 03/21/2025 0354   Wells' Criteria Total 2.5 Filed at: 03/21/2025 0354                        History of Present Illness       Chief Complaint   Patient presents with    Shortness of Breath     Pt has been feeling generally weak after spending a week in the hospital for pneumonia. Pt reports increased SOB tonight. A nodule on the lung tissue was found during last hospitalization.       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) 12/01/2020    Chest pain, unspecified     CHF (congestive heart failure) (HCC)     Diabetes mellitus (HCC)     GERD (gastroesophageal reflux disease)     History of radiation exposure 1992    Hyperlipidemia     Hypertension     Irregular heart beat     Afib    Migraine     Obesity     Pericardial effusion     Pericarditis       Past Surgical History:   Procedure Laterality Date    ABDOMINAL ADHESION SURGERY      APPENDECTOMY      AUGMENTATION MAMMAPLASTY Left 1994    BACK SURGERY      BREAST LUMPECTOMY Left 1992    malignant    BREAST  LUMPECTOMY Left 11/17/2020    Procedure: BREAST ULTRASOUND DIRECTED LUMPECTOMY (ULTRASOUND AT 1200);  Surgeon: Earl Em MD;  [facility];  Service: Surgical Oncology    BREAST SURGERY      CARDIAC SURGERY      Pericardial window    CHOLECYSTECTOMY      EYE SURGERY      FLAP LOCAL TRUNK Left 11/17/2020    Procedure: FLAP LOCAL TRUNK;  Surgeon: Ronnell Chiu MD;  [facility];  Service: Plastics    HYSTERECTOMY      IR DRAINAGE TUBE CHECK WITH SCLEROSIS  04/23/2021    IR DRAINAGE TUBE CHECK WITH SCLEROSIS  06/03/2021    IR DRAINAGE TUBE CHECK WITH SCLEROSIS  06/17/2021    IR DRAINAGE TUBE CHECK WITH SCLEROSIS  06/28/2021    IR DRAINAGE TUBE CHECK WITH SCLEROSIS  07/07/2021    IR DRAINAGE TUBE CHECK WITH SCLEROSIS  07/27/2021    IR DRAINAGE TUBE PLACEMENT  04/01/2021    IR DRAINAGE TUBE PLACEMENT WITH SCLEROSIS  05/14/2021    LYMPH NODE DISSECTION Left 11/17/2020    Procedure: HUGO  DIRECTED AXILLARY DISSECTION;  Surgeon: Earl Em MD;  [facility];  Service: Surgical Oncology    MASTECTOMY Left 1994    malignant    MA CELESTE-IMPLANT CAPSULECTOMY BREAST COMPLETE Left 11/17/2020    Procedure: BREAST CAPSULECTOMY;  Surgeon: Ronnell Chiu MD;  [facility];  Service: Plastics    MA REMOVAL INTACT BREAST IMPLANT Left 11/17/2020    Procedure: BREAST IMPLANT REMOVAL;  Surgeon: Ronnell Chiu MD;  [facility];  Service: Plastics    US BREAST NEEDLE LOC LEFT Left 11/02/2020    US GUIDED BREAST BIOPSY LEFT COMPLETE Left 08/31/2020    US GUIDED BREAST LYMPH NODE BIOPSY LEFT Left 08/31/2020    WRIST SURGERY        Family History   Problem Relation Age of Onset    Colon cancer Mother 55    Breast cancer Mother         Early 50's    Stroke Father     Emphysema Father     Leukemia Sister     Breast cancer Sister 65    ALS Brother       Social History     Tobacco Use    Smoking status: Never    Smokeless tobacco: Never   Vaping Use    Vaping status: Never Used   Substance Use Topics    Alcohol  use: Yes     Comment: social    Drug use: No      E-Cigarette/Vaping    E-Cigarette Use Never User       E-Cigarette/Vaping Substances    Nicotine No     THC No     CBD No     Flavoring No     Other No     Unknown No       I have reviewed and agree with the history as documented.     This is an 89-year-old female with past medical history significant for left-sided breast cancer with previous meniscectomy and recent findings suspicious for recurrence, diabetes type 2, hypertension, hyperlipidemia, congestive heart failure, presenting to the emergency room with 2-day history of dyspnea.  Patient was recently here and treated for right lower lobe CAP and discharged on 03/02.  At that time, noncontrast chest CT was notable for a suspicious 5 mm nodule in the right upper lobe of the lung and associated osteolytic lesions along the ribs, along with trace right pleural effusion. Patient is aware and declined chemotherapy and not currently on any chemotherapeutic regimen.  Over the last 2 days, patient notes that she had initially done well after discharge but has been feeling increasingly dyspneic and fatigued and has had trouble getting a full breath.  She had some diaphoresis earlier today but denies any fevers or chills.  Also notes that she has not had a bowel movement for several days although does not have any abdominal pain, nausea, or vomiting.  Denies any urinary changes.  Denies productive cough or chest pain.       Shortness of Breath  Associated symptoms: no abdominal pain, no chest pain, no cough, no ear pain, no fever, no rash, no sore throat and no vomiting        Review of Systems   Constitutional:  Positive for fatigue. Negative for chills and fever.   HENT:  Negative for ear pain and sore throat.    Eyes:  Negative for pain and visual disturbance.   Respiratory:  Positive for shortness of breath. Negative for cough.    Cardiovascular:  Negative for chest pain and palpitations.   Gastrointestinal:   Negative for abdominal pain and vomiting.   Genitourinary:  Negative for dysuria and hematuria.   Musculoskeletal:  Negative for arthralgias and back pain.   Skin:  Negative for color change and rash.   Neurological:  Negative for seizures and syncope.   All other systems reviewed and are negative.          Objective       ED Triage Vitals   Temperature Pulse Blood Pressure Respirations SpO2 Patient Position - Orthostatic VS   03/21/25 0249 03/21/25 0249 03/21/25 0249 03/21/25 0249 03/21/25 0249 03/21/25 0249   97.7 °F (36.5 °C) 80 156/73 18 94 % Lying      Temp Source Heart Rate Source BP Location FiO2 (%) Pain Score    03/21/25 0249 03/21/25 0300 03/21/25 0249 -- 03/21/25 0631    Oral Monitor Right arm  No Pain      Vitals      Date and Time Temp Pulse SpO2 Resp BP Pain Score FACES Pain Rating User   03/22/25 2144 98.3 °F (36.8 °C) 80 91 % 18 155/86 -- -- DII   03/22/25 1914 -- -- -- -- -- 10 - Worst Possible Pain -- AL   03/22/25 1643 -- 71 95 % -- 111/61 -- -- DII   03/22/25 1430 97.5 °F (36.4 °C) 64 95 % 15 119/56 -- -- DII   03/22/25 1053 -- -- -- -- -- No Pain -- SA   03/22/25 1033 97.5 °F (36.4 °C) 66 94 % 14 126/54 -- -- DII   03/22/25 0829 -- -- -- -- -- No Pain -- EU   03/22/25 0812 -- -- -- -- -- No Pain -- SI   03/22/25 0715 97.6 °F (36.4 °C) 65 93 % 17 133/69 -- -- DII   03/22/25 0445 -- -- -- -- -- No Pain -- BB   03/22/25 0311 98.3 °F (36.8 °C) 64 92 % -- 132/69 -- -- DII   03/22/25 0246 -- -- -- -- -- 10 - Worst Possible Pain -- BB   03/22/25 0039 -- -- -- -- -- 10 - Worst Possible Pain -- BB   03/21/25 2213 -- 68 93 % 18 142/70 -- -- DII   03/21/25 2100 -- -- -- -- -- 10 - Worst Possible Pain -- AMG   03/21/25 1913 98.4 °F (36.9 °C) 70 94 % 18 140/69 -- -- DII   03/21/25 1910 -- -- -- -- -- No Pain -- DL   03/21/25 1551 98.6 °F (37 °C) 69 94 % 18 130/68 -- -- DII   03/21/25 1524 -- -- -- -- -- 10 - Worst Possible Pain -- SM   03/21/25 1440 -- 66 -- -- 156/73 -- -- JG   03/21/25 1309 -- 66 93 % 23  156/73 -- -- KO   03/21/25 0829 -- -- -- -- -- 9 -- KO   03/21/25 0822 98 °F (36.7 °C) 68 95 % 23 138/68 -- -- KO   03/21/25 0715 -- 64 93 % 22 147/70 -- -- HK   03/21/25 0631 -- -- -- -- -- No Pain -- ED   03/21/25 0513 -- 63 95 % 20 163/70 -- -- ED   03/21/25 0455 -- 52 95 % 20 -- -- -- ED   03/21/25 0300 -- 74 94 % 22 201/86 -- -- ED   03/21/25 0249 97.7 °F (36.5 °C) 80 94 % 18 156/73 -- -- MS            Physical Exam  Vitals and nursing note reviewed.   Constitutional:       General: She is not in acute distress.     Appearance: Normal appearance. She is well-developed and normal weight. She is not ill-appearing or toxic-appearing.   HENT:      Head: Normocephalic and atraumatic.      Right Ear: External ear normal.      Left Ear: External ear normal.      Mouth/Throat:      Mouth: Mucous membranes are moist.      Pharynx: Oropharynx is clear.   Eyes:      General: No scleral icterus.        Right eye: No discharge.         Left eye: No discharge.      Conjunctiva/sclera: Conjunctivae normal.   Neck:      Vascular: No carotid bruit.   Cardiovascular:      Rate and Rhythm: Normal rate and regular rhythm.      Heart sounds: No murmur heard.     No friction rub. No gallop.   Pulmonary:      Effort: Pulmonary effort is normal. No respiratory distress.      Breath sounds: No stridor. Examination of the right-lower field reveals decreased breath sounds. Decreased breath sounds present. No wheezing, rhonchi or rales.   Chest:      Chest wall: No tenderness.   Abdominal:      General: There is no distension.      Palpations: Abdomen is soft. There is no mass.      Tenderness: There is no abdominal tenderness. There is no right CVA tenderness, left CVA tenderness, guarding or rebound.      Hernia: No hernia is present.   Musculoskeletal:         General: No swelling.      Cervical back: Neck supple. No rigidity or tenderness.      Right lower leg: No edema.      Left lower leg: No edema.   Lymphadenopathy:      Cervical:  No cervical adenopathy.   Skin:     General: Skin is warm and dry.      Capillary Refill: Capillary refill takes less than 2 seconds.      Coloration: Skin is not jaundiced or pale.      Findings: No bruising, erythema, lesion or rash.   Neurological:      General: No focal deficit present.      Mental Status: She is alert and oriented to person, place, and time. Mental status is at baseline.   Psychiatric:         Mood and Affect: Mood normal.         Results Reviewed       Procedure Component Value Units Date/Time    CBC (With Platelets) [[id number]]  (Abnormal) Collected: 03/22/25 0603    Lab Status: Final result Specimen: Blood from Arm, Right Updated: 03/22/25 0656     WBC 7.66 Thousand/uL      RBC 4.89 Million/uL      Hemoglobin 14.0 g/dL      Hematocrit 45.0 %      MCV 92 fL      MCH 28.6 pg      MCHC 31.1 g/dL      RDW 13.2 %      Platelets 232 Thousands/uL      MPV 11.9 fL     Basic metabolic panel [[id number]] Collected: 03/22/25 0603    Lab Status: Final result Specimen: Blood from Arm, Right Updated: 03/22/25 0644     Sodium 138 mmol/L      Potassium 4.1 mmol/L      Chloride 106 mmol/L      CO2 25 mmol/L      ANION GAP 7 mmol/L      BUN 16 mg/dL      Creatinine 1.08 mg/dL      Glucose 100 mg/dL      Calcium 10.0 mg/dL      eGFR 46 ml/min/1.73sq m     Narrative:      National Kidney Disease Foundation guidelines for Chronic Kidney Disease (CKD):     Stage 1 with normal or high GFR (GFR > 90 mL/min/1.73 square meters)    Stage 2 Mild CKD (GFR = 60-89 mL/min/1.73 square meters)    Stage 3A Moderate CKD (GFR = 45-59 mL/min/1.73 square meters)    Stage 3B Moderate CKD (GFR = 30-44 mL/min/1.73 square meters)    Stage 4 Severe CKD (GFR = 15-29 mL/min/1.73 square meters)    Stage 5 End Stage CKD (GFR <15 mL/min/1.73 square meters)  Note: GFR calculation is accurate only with a steady state creatinine    Fingerstick Glucose (POCT) [[id number]]  (Normal) Collected: 03/21/25 1220    Lab Status: Final result Specimen:  Blood Updated: 03/21/25 1221     POC Glucose 100 mg/dl     Urine Microscopic [[id number]]  (Abnormal) Collected: 03/21/25 0808    Lab Status: Final result Specimen: Urine, Clean Catch Updated: 03/21/25 0847     RBC, UA None Seen /hpf      WBC, UA 2-4 /hpf      Epithelial Cells Occasional /hpf      Bacteria, UA None Seen /hpf     UA w Reflex to Microscopic w Reflex to Culture [[id number]]  (Abnormal) Collected: 03/21/25 0808    Lab Status: Final result Specimen: Urine, Clean Catch Updated: 03/21/25 0847     Color, UA Light Yellow     Clarity, UA Clear     Specific Gravity, UA 1.009     pH, UA 6.0     Leukocytes, UA Trace     Nitrite, UA Negative     Protein, UA Negative mg/dl      Glucose, UA Negative mg/dl      Ketones, UA Negative mg/dl      Urobilinogen, UA <2.0 mg/dl      Bilirubin, UA Negative     Occult Blood, UA Negative    Hemoglobin A1c w/EAG Estimation (Prechecked if no A1C within 90 days) [[id number]]  (Abnormal) Collected: 03/21/25 0630    Lab Status: Final result Specimen: Blood from Arm, Left Updated: 03/21/25 0717     Hemoglobin A1C 5.7 %       mg/dl     Fingerstick Glucose (POCT) [[id number]]  (Normal) Collected: 03/21/25 0627    Lab Status: Final result Specimen: Blood Updated: 03/21/25 0628     POC Glucose 118 mg/dl     D-dimer, quantitative [[id number]]  (Normal) Collected: 03/21/25 0351    Lab Status: Final result Specimen: Blood from Arm, Right Updated: 03/21/25 0527     D-Dimer, Quant 0.45 ug/ml FEU     Narrative:      In the evaluation for possible pulmonary embolism, in the appropriate (Well's Score of 4 or less) patient, the age adjusted d-dimer cutoff for this patient can be calculated as:    Age x 0.01 (in ug/mL) for Age-adjusted D-dimer exclusion threshold for a patient over 50 years.    B-Type Natriuretic Peptide(BNP) [[id number]]  (Normal) Collected: 03/21/25 0351    Lab Status: Final result Specimen: Blood from Arm, Right Updated: 03/21/25 0433     BNP 61 pg/mL     Procalcitonin  [[id number]]  (Normal) Collected: 03/21/25 0351    Lab Status: Final result Specimen: Blood from Arm, Right Updated: 03/21/25 0430     Procalcitonin 0.06 ng/ml     Comprehensive metabolic panel [[id number]]  (Abnormal) Collected: 03/21/25 0351    Lab Status: Final result Specimen: Blood from Arm, Right Updated: 03/21/25 0422     Sodium 138 mmol/L      Potassium 4.1 mmol/L      Chloride 106 mmol/L      CO2 23 mmol/L      ANION GAP 9 mmol/L      BUN 18 mg/dL      Creatinine 1.10 mg/dL      Glucose 149 mg/dL      Calcium 10.0 mg/dL      AST 16 U/L      ALT 11 U/L      Alkaline Phosphatase 136 U/L      Total Protein 7.0 g/dL      Albumin 3.8 g/dL      Total Bilirubin 0.85 mg/dL      eGFR 45 ml/min/1.73sq m     Narrative:      National Kidney Disease Foundation guidelines for Chronic Kidney Disease (CKD):     Stage 1 with normal or high GFR (GFR > 90 mL/min/1.73 square meters)    Stage 2 Mild CKD (GFR = 60-89 mL/min/1.73 square meters)    Stage 3A Moderate CKD (GFR = 45-59 mL/min/1.73 square meters)    Stage 3B Moderate CKD (GFR = 30-44 mL/min/1.73 square meters)    Stage 4 Severe CKD (GFR = 15-29 mL/min/1.73 square meters)    Stage 5 End Stage CKD (GFR <15 mL/min/1.73 square meters)  Note: GFR calculation is accurate only with a steady state creatinine    HS Troponin 0hr (reflex protocol) [[id number]]  (Normal) Collected: 03/21/25 0351    Lab Status: Final result Specimen: Blood from Arm, Right Updated: 03/21/25 0422     hs TnI 0hr 4 ng/L     Protime-INR [[id number]]  (Abnormal) Collected: 03/21/25 0351    Lab Status: Final result Specimen: Blood from Arm, Right Updated: 03/21/25 0418     Protime 15.5 seconds      INR 1.20    Narrative:      INR Therapeutic Range    Indication                                             INR Range      Atrial Fibrillation                                               2.0-3.0  Hypercoagulable State                                    2.0.2.3  Left Ventricular Asist Device                             2.0-3.0  Mechanical Heart Valve                                  -    Aortic(with afib, MI, embolism, HF, LA enlargement,    and/or coagulopathy)                                     2.0-3.0 (2.5-3.5)     Mitral                                                             2.5-3.5  Prosthetic/Bioprosthetic Heart Valve               2.0-3.0  Venous thromboembolism (VTE: VT, PE        2.0-3.0    APTT [[id number]]  (Normal) Collected: 03/21/25 0351    Lab Status: Final result Specimen: Blood from Arm, Right Updated: 03/21/25 0418     PTT 32 seconds     FLU/COVID Rapid Antigen (30 min. TAT) - Preferred screening test in ED [[id number]]  (Normal) Collected: 03/21/25 0344    Lab Status: Final result Specimen: Nares from Nose Updated: 03/21/25 0417     SARS COV Rapid Antigen Negative     Influenza A Rapid Antigen Negative     Influenza B Rapid Antigen Negative    Narrative:      This test has been performed using the [employer] Lesli 2 FLU+SARS Antigen test under the Emergency Use Authorization (EUA). This test has been validated by the  and verified by the performing laboratory. The Lesli uses lateral flow immunofluorescent sandwich assay to detect SARS-COV, Influenza A and Influenza B Antigen.     The [employer] Lesli 2 SARS Antigen test does not differentiate between SARS-CoV and SARS-CoV-2.     Negative results are presumptive and may be confirmed with a molecular assay, if necessary, for patient management. Negative results do not rule out SARS-CoV-2 or influenza infection and should not be used as the sole basis for treatment or patient management decisions. A negative test result may occur if the level of antigen in a sample is below the limit of detection of this test.     Positive results are indicative of the presence of viral antigens, but do not rule out bacterial infection or co-infection with other viruses.     All test results should be used as an adjunct to clinical observations and other information  available to the provider.    FOR PEDIATRIC PATIENTS - copy/paste COVID Guidelines URL to browser: [email or web address]    CBC and differential [[id number]] Collected: 03/21/25 0351    Lab Status: Final result Specimen: Blood from Arm, Right Updated: 03/21/25 0406     WBC 7.73 Thousand/uL      RBC 4.78 Million/uL      Hemoglobin 14.1 g/dL      Hematocrit 43.1 %      MCV 90 fL      MCH 29.5 pg      MCHC 32.7 g/dL      RDW 13.0 %      MPV 11.4 fL      Platelets 249 Thousands/uL      nRBC 0 /100 WBCs      Segmented % 56 %      Immature Grans % 0 %      Lymphocytes % 33 %      Monocytes % 8 %      Eosinophils Relative 3 %      Basophils Relative 0 %      Absolute Neutrophils 4.32 Thousands/µL      Absolute Immature Grans 0.01 Thousand/uL      Absolute Lymphocytes 2.57 Thousands/µL      Absolute Monocytes 0.62 Thousand/µL      Eosinophils Absolute 0.19 Thousand/µL      Basophils Absolute 0.02 Thousands/µL             XR chest 2 views   ED Interpretation by Kirit Rubin MD (03/21 0431)   Small right-sided pleural effusion.  Otherwise, unremarkable chest x-ray compared to previous.      Final Interpretation by Dimitry Mayes MD (03/21 1044)      Improved aeration of the right lower lobe with minimal residual airspace opacity.      Small right pleural effusion.            Workstation performed: [id number]             ECG 12 Lead Documentation Only    Date/Time: 3/21/2025 3:15 AM    Performed by: Chris Stanley DO  Authorized by: Chris Stanley DO    Indications / Diagnosis:  SOB  ECG reviewed by me, the ED Provider: yes    Patient location:  ED  Interpretation:     Interpretation: normal    Rate:     ECG rate:  69    ECG rate assessment: normal    Rhythm:     Rhythm: sinus rhythm    Ectopy:     Ectopy: none    QRS:     QRS axis:  Normal    QRS intervals:  Normal  Conduction:     Conduction: normal    ST segments:     ST segments:  Normal  T waves:     T waves:  normal        ED Medication and Procedure Management   Prior to Admission Medications   Prescriptions Last Dose Informant Patient Reported? Taking?   Beclomethasone Dipropionate (QVAR IN)  Self, Spouse/Significant Other Yes No   Sig: Inhale 2 puffs if needed (shortness of breath) Inhale 2 puffs as needed every 8 hours for shortness of breath   Cholecalciferol (D3 Adult) 25 MCG (1000 UT) CHEW   Yes No   Sig: Chew 1,000 Units daily. 1 tablet by mouth daily   Droplet Pen Needles 32G X 4 MM MISC  Self, Spouse/Significant Other Yes No   EPINEPHrine (EPIPEN) 0.3 mg/0.3 mL SOAJ  Self, Spouse/Significant Other Yes No   Sig: Inject 0.3 mL as directed Patient does not have in home   FLOVENT  MCG/ACT inhaler  Self, Spouse/Significant Other Yes No   Sig: INHALE 2 PUFF BY INHALATION ROUTE 2 TIMES EVERY DAY   Menaquinone-7 (K2 PO)   Yes No   Sig: Take 100 mcg by mouth daily. Take 1 tablet by mouth daily   NOVOFINE 32G X 6 MM MISC  Self, Spouse/Significant Other Yes No   Si (two) times a day As directed   ONE TOUCH ULTRA TEST test strip  Self, Spouse/Significant Other Yes No   Sig: TEST TWICE DAILY OR AS DIRECTED DX: E11.9   ONETOUCH DELICA LANCETS 33G MISC  Self, Spouse/Significant Other Yes No   Sig: TEST TWICE DAILY OR AS DIRECTED   VENTOLIN  (90 Base) MCG/ACT inhaler  Self, Spouse/Significant Other Yes No   Sig: Inhale 2 puffs every 4 (four) hours as needed for shortness of breath Take 2 puffs inhalation every 4 hours as needed   anastrozole (ARIMIDEX) 1 mg tablet  Self, Spouse/Significant Other No No   Sig: Take 1 tablet (1 mg total) by mouth daily   dextromethorphan-guaiFENesin (ROBITUSSIN DM)  mg/5 mL syrup   No No   Sig: Take 10 mL by mouth every 4 (four) hours as needed for cough   hydrocortisone 2.5 % cream  Self, Spouse/Significant Other Yes No   Sig: Apply to rash in groin area twice daily   insulin aspart (NovoLOG) 100 Units/mL injection pen  Self, Spouse/Significant Other Yes No   Sig: Novolog  Flexpen U-100 Insulin aspart 100 unit/mL (3 mL) subcutaneous   insulin glargine (TOUJEO MAX) 300 units/mL CONCENTRATED U-300 injection pen (2-unit dial)   Yes No   Sig: Inject 40 Units under the skin daily at bedtime Patient takes 50-60 units daily   lansoprazole (PREVACID) 15 mg capsule   Yes No   Sig: Take 15 mg by mouth daily. Take 1 tablet by mouth daily  latest dci state 30 mg daily    metoprolol tartrate (LOPRESSOR) 50 mg tablet  Self, Spouse/Significant Other Yes No   Sig: Take 50 mg by mouth every 12 (twelve) hours Take 1 tablet by mouth twice daily with meals   morphine (MSIR) 15 mg tablet  Self, Spouse/Significant Other Yes No   Sig: Take 15 mg by mouth every 8 (eight) hours as needed for severe pain.   naloxone (NARCAN) 2 MG/2ML injection  Self, Spouse/Significant Other Yes No   Sig: Inject 2 mL into a catheter in a vein once Patient not taking   nystatin (MYCOSTATIN) powder  Self, Spouse/Significant Other Yes No   Sig: APPLY TO THE RASH IN THE GROIN ONCE DAILY   oxybutynin (DITROPAN XL) 15 MG 24 hr tablet  Self, Spouse/Significant Other Yes No   Sig: Take 15 mg by mouth daily.   simvastatin (ZOCOR) 20 mg tablet  Self, Spouse/Significant Other Yes No   Sig: Take 20 mg by mouth daily at bedtime.   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 1 tablet by mouth  daily      dextromethorphan-guaiFENesin (ROBITUSSIN DM)  mg/5 mL syrup Take 10 mL by mouth every 4 (four) hours as needed for cough  Qty: 118 mL, Refills: 0    Associated Diagnoses: Pneumonia      !! Droplet Pen Needles 32G X 4 MM MISC       EPINEPHrine (EPIPEN) 0.3 mg/0.3 mL SOAJ Inject 0.3 mL as directed Patient does not have in home      FLOVENT  MCG/ACT inhaler INHALE 2 PUFF BY INHALATION ROUTE 2 TIMES EVERY DAY  Refills: 6      hydrocortisone 2.5 % cream Apply to rash in groin area twice daily      insulin aspart (NovoLOG) 100 Units/mL injection pen Novolog Flexpen U-100 Insulin aspart 100 unit/mL (3 mL) subcutaneous      insulin glargine (TOUJEO MAX) 300 units/mL CONCENTRATED U-300 injection pen (2-unit dial) Inject 40 Units under the skin daily at bedtime Patient takes 50-60 units daily      lansoprazole (PREVACID) 15 mg capsule Take 15 mg by mouth daily. Take 1 tablet by mouth daily  latest dci state 30 mg daily       Menaquinone-7 (K2 PO) Take 100 mcg by mouth daily. Take 1 tablet by mouth daily      metoprolol tartrate (LOPRESSOR) 50 mg tablet Take 50 mg by mouth every 12 (twelve) hours Take 1 tablet by mouth twice daily with meals      morphine (MSIR) 15 mg tablet Take 15 mg by mouth every 8 (eight) hours as needed for severe pain.      naloxone (NARCAN) 2 MG/2ML injection Inject 2 mL into a catheter in a vein once Patient not taking      !! NOVOFINE 32G X 6 MM MISC 2 (two) times a day As directed  Refills: 5      nystatin (MYCOSTATIN) powder APPLY TO THE RASH IN THE GROIN ONCE DAILY      ONE TOUCH ULTRA TEST test strip TEST TWICE DAILY OR AS DIRECTED DX: E11.9  Refills: 5      ONETOUCH DELICA LANCETS 33G MISC TEST TWICE DAILY OR AS DIRECTED  Refills: 5      oxybutynin (DITROPAN XL) 15 MG 24 hr tablet Take 15 mg by mouth daily.      simvastatin (ZOCOR) 20 mg tablet Take 20 mg by mouth daily at bedtime.      solifenacin (VESICARE) 10 MG tablet Take 1 tablet (10 mg total) by mouth daily  Qty:  90 tablet, Refills: 0    Comments: CORRECTED SCRIPT!  Please note change to QUANTITY and REFILLS as requested; process accordingly.  Thank you! (1/5/23)  Associated Diagnoses: OAB (overactive bladder)      VENTOLIN  (90 Base) MCG/ACT inhaler Inhale 2 puffs every 4 (four) hours as needed for shortness of breath Take 2 puffs inhalation every 4 hours as needed       !! - Potential duplicate medications found. Please discuss with provider.        STOP taking these medications       benzonatate (TESSALON) 200 MG capsule Comments:   Reason for Stopping:             No discharge procedures on file.  ED SEPSIS DOCUMENTATION   Time reflects when diagnosis was documented in both MDM as applicable and the Disposition within this note       Time User Action Codes Description Comment    3/21/2025  5:24 AM Chris Stanley [R06.00] Dyspnea     3/21/2025  8:48 AM Liz Mariscal [C50.412,  Z17.0] Malignant neoplasm of upper-outer quadrant of left breast in female, estrogen receptor positive (HCC)     3/21/2025  8:48 AM Liz Mariscal [F11.20] Uncomplicated opioid dependence (HCC)                  Chris Stanley DO  03/22/25 1652

## 2025-03-21 NOTE — PLAN OF CARE
Problem: Potential for Falls  Goal: Patient will remain free of falls  Description: INTERVENTIONS:- Educate patient/family on patient safety including physical limitations- Instruct patient to call for assistance with activity - Consult OT/PT to assist with strengthening/mobility - Keep Call bell within reach- Keep bed low and locked with side rails adjusted as appropriate- Keep care items and personal belongings within reach- Initiate and maintain comfort rounds- Make Fall Risk Sign visible to staff- Offer Toileting every 2 Hours, in advance of need- Initiate/Maintain alarm- Obtain necessary fall risk management equipment Apply yellow socks and bracelet for high fall risk patients- Consider moving patient to room near nurses station  Outcome: Progressing     Problem: PAIN - ADULT  Goal: Verbalizes/displays adequate comfort level or baseline comfort level  Description: Interventions:- Encourage patient to monitor pain and request assistance- Assess pain using appropriate pain scale- Administer analgesics based on type and severity of pain and evaluate response- Implement non-pharmacological measures as appropriate and evaluate response- Consider cultural and social influences on pain and pain management- Notify physician/advanced practitioner if interventions unsuccessful or patient reports new pain  Outcome: Progressing     Problem: DISCHARGE PLANNING  Goal: Discharge to home or other facility with appropriate resources  Description: INTERVENTIONS:- Identify barriers to discharge w/patient and caregiver- Arrange for needed discharge resources and transportation as appropriate- Identify discharge learning needs (meds, wound care, etc.)- Arrange for interpretive services to assist at discharge as needed- Refer to Case Management Department for coordinating discharge planning if the patient needs post-hospital services based on physician/advanced practitioner order or complex needs related to functional status,  cognitive ability, or social support system  Outcome: Progressing     Problem: CARDIOVASCULAR - ADULT  Goal: Maintains optimal cardiac output and hemodynamic stability  Description: INTERVENTIONS:- Monitor I/O, vital signs and rhythm- Monitor for S/S and trends of decreased cardiac output- Administer and titrate ordered vasoactive medications to optimize hemodynamic stability- Assess quality of pulses, skin color and temperature- Assess for signs of decreased coronary artery perfusion- Instruct patient to report change in severity of symptoms  Outcome: Progressing  Goal: Absence of cardiac dysrhythmias or at baseline rhythm  Description: INTERVENTIONS:- Continuous cardiac monitoring, vital signs, obtain 12 lead EKG if ordered- Administer antiarrhythmic and heart rate control medications as ordered- Monitor electrolytes and administer replacement therapy as ordered  Outcome: Progressing     Problem: RESPIRATORY - ADULT  Goal: Achieves optimal ventilation and oxygenation  Description: INTERVENTIONS:- Assess for changes in respiratory status- Assess for changes in mentation and behavior- Position to facilitate oxygenation and minimize respiratory effort- Oxygen administered by appropriate delivery if ordered- Initiate smoking cessation education as indicated- Encourage broncho-pulmonary hygiene including cough, deep breathe, Incentive Spirometry- Assess the need for suctioning and aspirate as needed- Assess and instruct to report SOB or any respiratory difficulty- Respiratory Therapy support as indicated  Outcome: Progressing     Problem: GASTROINTESTINAL - ADULT  Goal: Minimal or absence of nausea and/or vomiting  Description: INTERVENTIONS:- Administer IV fluids if ordered to ensure adequate hydration- Maintain NPO status until nausea and vomiting are resolved- Nasogastric tube if ordered- Administer ordered antiemetic medications as needed- Provide nonpharmacologic comfort measures as appropriate- Advance diet as  tolerated, if ordered- Consider nutrition services referral to assist patient with adequate nutrition and appropriate food choices  Outcome: Progressing  Goal: Maintains or returns to baseline bowel function  Description: INTERVENTIONS:- Assess bowel function- Encourage oral fluids to ensure adequate hydration- Administer IV fluids if ordered to ensure adequate hydration- Administer ordered medications as needed- Encourage mobilization and activity- Consider nutritional services referral to assist patient with adequate nutrition and appropriate food choices  Outcome: Progressing  Goal: Maintains adequate nutritional intake  Description: INTERVENTIONS:- Monitor percentage of each meal consumed- Identify factors contributing to decreased intake, treat as appropriate- Assist with meals as needed- Monitor I&O, weight, and lab values if indicated- Obtain nutrition services referral as needed  Outcome: Progressing     Problem: METABOLIC, FLUID AND ELECTROLYTES - ADULT  Goal: Electrolytes maintained within normal limits  Description: INTERVENTIONS:- Monitor labs and assess patient for signs and symptoms of electrolyte imbalances- Administer electrolyte replacement as ordered- Monitor response to electrolyte replacements, including repeat lab results as appropriate- Instruct patient on fluid and nutrition as appropriate  Outcome: Progressing  Goal: Fluid balance maintained  Description: INTERVENTIONS:- Monitor labs - Monitor I/O and WT- Instruct patient on fluid and nutrition as appropriate- Assess for signs & symptoms of volume excess or deficit  Outcome: Progressing

## 2025-03-21 NOTE — H&P
H&P - Hospitalist   Name: Barbra De Oliveira 89 y.o. female I MRN: [id number]  Unit/Bed#: ED 25 I Date of Admission: 3/21/2025   Date of Service: 3/21/2025 I Hospital Day: 0     Assessment & Plan  Dyspnea  Unclear etiology but possibly due to recurring malignancy with possible metastasis given newly found pulm nodule and lytic bone lesion, discovered on recent admission. Pt continues to maintain that she does not wish to pursue any further evaluation or intervention  D-dimer is normal  Troponin x 1 is negative  No sign of acute infectious process.  Regardless patient was just recently treated for pneumonia.  Negative viral panel.  Follow-up official chest x-ray results  Patient with underlying history of asthma but no sign of exacerbation  Will check echocardiogram.  But clinically appears to be euvolemic.  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 oncology  On most recent admission concerning for recurring malignancy and possible metastasis given new pulm nodule and lytic osseous lesion. Pt continues to decline any further oncology workup, eval or intervention  Constipation  Likely opioid induced  Will give dose of relistor, renally dosed  Bowel regimen with colace, senna, miralax      VTE Pharmacologic Prophylaxis:   Moderate Risk (Score 3-4) - Pharmacological DVT Prophylaxis  Ordered: enoxaparin (Lovenox).  Code Status: Level 3 - DNAR and DNI as per patient  Discussion with family: Updated  () at bedside.    Anticipated Length of Stay: Patient will be admitted on an observation basis with an anticipated length of stay of less than 2 midnights secondary to dyspnea.    History of Present Illness   Chief Complaint: Dyspnea    Barbra De Oliveira is a 89 y.o. female with a PMH of breast cancer, insulin-dependent diabetes, asthma, hypertension who presents with complaint of dyspnea x 2 days.  She denies chest pain but reports of episode of diaphoresis.  She also reports of worsening dyspnea with exertion.  She was recently discharged on 3/2 where she was treated for right-sided pneumonia and has since completed course of abx.  During that admission, new findings revealed concerning for recurrent malignancy but patient has opted not to pursue any further treatment.  She denies fever, chills, cough.  She reports of constipation x 2 weeks.  She denies abdominal pain, nausea, vomiting.  She reports of flatus.    Upon presentation to the ER, presenting pulse ox more than 92% however with ambulation to the bathroom patient reported worsening dyspnea and tachypnea.  Thus feeling reluctant to be discharged.  Decision was made to observe patient overnight.    Review of Systems   Respiratory:  Positive for shortness of breath.    All other systems reviewed and are negative.      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) 12/01/2020    Chest pain, unspecified     CHF (congestive heart failure) (HCC)     Diabetes mellitus (HCC)     GERD (gastroesophageal reflux disease)     History of radiation exposure 1992    Hyperlipidemia     Hypertension     Irregular heart beat     Afib    Migraine     Obesity      Pericardial effusion     Pericarditis      Past Surgical History:   Procedure Laterality Date    ABDOMINAL ADHESION SURGERY      APPENDECTOMY      AUGMENTATION MAMMAPLASTY Left 1994    BACK SURGERY      BREAST LUMPECTOMY Left 1992    malignant    BREAST LUMPECTOMY Left 11/17/2020    Procedure: BREAST ULTRASOUND DIRECTED LUMPECTOMY (ULTRASOUND AT 1200);  Surgeon: Earl Em MD;  [facility];  Service: Surgical Oncology    BREAST SURGERY      CARDIAC SURGERY      Pericardial window    CHOLECYSTECTOMY      EYE SURGERY      FLAP LOCAL TRUNK Left 11/17/2020    Procedure: FLAP LOCAL TRUNK;  Surgeon: Ronnell Chiu MD;  [facility];  Service: Plastics    HYSTERECTOMY      IR DRAINAGE TUBE CHECK WITH SCLEROSIS  04/23/2021    IR DRAINAGE TUBE CHECK WITH SCLEROSIS  06/03/2021    IR DRAINAGE TUBE CHECK WITH SCLEROSIS  06/17/2021    IR DRAINAGE TUBE CHECK WITH SCLEROSIS  06/28/2021    IR DRAINAGE TUBE CHECK WITH SCLEROSIS  07/07/2021    IR DRAINAGE TUBE CHECK WITH SCLEROSIS  07/27/2021    IR DRAINAGE TUBE PLACEMENT  04/01/2021    IR DRAINAGE TUBE PLACEMENT WITH SCLEROSIS  05/14/2021    LYMPH NODE DISSECTION Left 11/17/2020    Procedure: HUGO  DIRECTED AXILLARY DISSECTION;  Surgeon: Earl Em MD;  [facility];  Service: Surgical Oncology    MASTECTOMY Left 1994    malignant    SD CELESTE-IMPLANT CAPSULECTOMY BREAST COMPLETE Left 11/17/2020    Procedure: BREAST CAPSULECTOMY;  Surgeon: Ronnell Chiu MD;  [facility];  Service: Plastics    SD REMOVAL INTACT BREAST IMPLANT Left 11/17/2020    Procedure: BREAST IMPLANT REMOVAL;  Surgeon: Ronnell Chiu MD;  [facility];  Service: Plastics    US BREAST NEEDLE LOC LEFT Left 11/02/2020    US GUIDED BREAST BIOPSY LEFT COMPLETE Left 08/31/2020    US GUIDED BREAST LYMPH NODE BIOPSY LEFT Left 08/31/2020    WRIST SURGERY       Social History     Tobacco Use    Smoking status: Never    Smokeless tobacco: Never   Vaping Use    Vaping status: Never Used    Substance and Sexual Activity    Alcohol use: Yes     Comment: social    Drug use: No    Sexual activity: Not Currently     E-Cigarette/Vaping    E-Cigarette Use Never User      E-Cigarette/Vaping Substances    Nicotine No     THC No     CBD No     Flavoring No     Other No     Unknown No      Family history non-contributory  Social History:  Marital Status: /Civil Union   Occupation:   Patient Pre-hospital Living Situation: Home  Patient Pre-hospital Level of Mobility: walks  Patient Pre-hospital Diet Restrictions:     Meds/Allergies   I have reviewed home medications using recent Epic encounter.  Prior to Admission medications    Medication Sig Start Date End Date Taking? Authorizing Provider   anastrozole (ARIMIDEX) 1 mg tablet Take 1 tablet (1 mg total) by mouth daily 8/13/24   Titus Portillo MD   Beclomethasone Dipropionate (QVAR IN) Inhale 2 puffs if needed (shortness of breath) Inhale 2 puffs as needed every 8 hours for shortness of breath    Historical Provider, MD   benzonatate (TESSALON) 200 MG capsule Take 1 capsule (200 mg total) by mouth 3 (three) times a day as needed for cough 3/2/25   Marcella Kim PA-C   Cholecalciferol (D3 Adult) 25 MCG (1000 UT) CHEW Chew 1,000 Units daily. 1 tablet by mouth daily    Historical Provider, MD   dextromethorphan-guaiFENesin (ROBITUSSIN DM)  mg/5 mL syrup Take 10 mL by mouth every 4 (four) hours as needed for cough 3/2/25   Marcella Kim PA-C   Droplet Pen Needles 32G X 4 MM MISC  9/10/23   Historical Provider, MD   EPINEPHrine (EPIPEN) 0.3 mg/0.3 mL SOAJ Inject 0.3 mL as directed Patient does not have in home  3/5/25  Historical Provider, MD   FLOVENT  MCG/ACT inhaler INHALE 2 PUFF BY INHALATION ROUTE 2 TIMES EVERY DAY 5/14/18   Historical Provider, MD   hydrocortisone 2.5 % cream Apply to rash in groin area twice daily 5/29/21   Historical Provider, MD   insulin aspart (NovoLOG) 100 Units/mL injection pen Novolog Flexpen U-100 Insulin  aspart 100 unit/mL (3 mL) subcutaneous    Historical Provider, MD   insulin glargine (TOUJEO MAX) 300 units/mL CONCENTRATED U-300 injection pen (2-unit dial) Inject 50 Units under the skin daily Patient takes 50-60 units daily    Historical Provider, MD   lansoprazole (PREVACID) 15 mg capsule Take 15 mg by mouth daily. Take 1 tablet by mouth daily  latest dci state 30 mg daily     Historical Provider, MD   Menaquinone-7 (K2 PO) Take 100 mcg by mouth daily. Take 1 tablet by mouth daily    Historical Provider, MD   metoprolol tartrate (LOPRESSOR) 50 mg tablet Take 50 mg by mouth every 12 (twelve) hours Take 1 tablet by mouth twice daily with meals    Historical Provider, MD   morphine (MSIR) 15 mg tablet Take 15 mg by mouth every 8 (eight) hours as needed for severe pain.    Historical Provider, MD   naloxone (NARCAN) 2 MG/2ML injection Inject 2 mL into a catheter in a vein once Patient not taking 9/8/23   Historical Provider, MD   NOVOFINE 32G X 6 MM MISC 2 (two) times a day As directed 8/13/18   Historical Provider, MD   nystatin (MYCOSTATIN) powder APPLY TO THE RASH IN THE GROIN ONCE DAILY 11/14/23   Historical Provider, MD   ONE TOUCH ULTRA TEST test strip TEST TWICE DAILY OR AS DIRECTED DX: E11.9 9/12/18   Historical Provider, MD COLLINS DELICA LANCETS 33G MISC TEST TWICE DAILY OR AS DIRECTED 10/14/18   Historical Provider, MD   oxybutynin (DITROPAN XL) 15 MG 24 hr tablet Take 15 mg by mouth daily.    Historical Provider, MD   simvastatin (ZOCOR) 20 mg tablet Take 20 mg by mouth daily at bedtime.    Historical Provider, MD   solifenacin (VESICARE) 10 MG tablet Take 1 tablet (10 mg total) by mouth daily 1/6/23   JOSE Watson   VENTOLIN  (90 Base) MCG/ACT inhaler Inhale 2 puffs every 4 (four) hours as needed for shortness of breath Take 2 puffs inhalation every 4 hours as needed 2/26/18   Historical Provider, MD   MORPHINE SULFATE ER PO Take 15 mg by mouth every 12 (twelve) hours as needed  (for pain). Take 15 mg by mouth every 12 hours as needed for severe pain. Patient not taking.  3/21/25  Historical Provider, MD   nystatin (MYCOSTATIN) cream Apply to rash in groin area twice daily 5/29/21 3/21/25  Historical Provider, MD Alex Wilson 300 units/mL CONCENTRATED U-300 injection pen (1-unit dial) INJECT 72 UNITS BY SUBCUTANEOUS ROUTE AS PER INSULIN PROTOCOL. Patient reports she injects 3x daily. 9/16/22 3/21/25  Historical Provider, MD     Allergies   Allergen Reactions    Iodinated Contrast Media Anaphylaxis    Iodine - Food Allergy Anaphylaxis and Other (See Comments)    Povidone Iodine Anaphylaxis    Aspirin GI Bleeding and Other (See Comments)    Caffeine - Food Allergy Palpitations       Objective :  Temp:  [97.7 °F (36.5 °C)] 97.7 °F (36.5 °C)  HR:  [52-80] 63  BP: (156-201)/(70-86) 163/70  Resp:  [18-22] 20  SpO2:  [94 %-95 %] 95 %  O2 Device: None (Room air)    Physical Exam  Cardiovascular:      Rate and Rhythm: Normal rate and regular rhythm.      Pulses: Normal pulses.      Heart sounds: Normal heart sounds.   Pulmonary:      Effort: Pulmonary effort is normal. No respiratory distress.      Breath sounds: Normal breath sounds. No wheezing or rales.   Abdominal:      General: Abdomen is flat. Bowel sounds are normal. There is no distension.      Palpations: Abdomen is soft.      Tenderness: There is no abdominal tenderness. There is no guarding.   Musculoskeletal:         General: Normal range of motion.      Cervical back: Normal range of motion and neck supple.      Right lower leg: No edema.      Left lower leg: No edema.   Skin:     General: Skin is warm and dry.   Neurological:      General: No focal deficit present.      Mental Status: She is alert and oriented to person, place, and time. Mental status is at baseline.      Cranial Nerves: No cranial nerve deficit.      Motor: No weakness.         Lines/Drains:            Lab Results: I have reviewed the following results:  Results  from last 7 days   Lab Units 03/21/25  0351   WBC Thousand/uL 7.73   HEMOGLOBIN g/dL 14.1   HEMATOCRIT % 43.1   PLATELETS Thousands/uL 249   SEGS PCT % 56   LYMPHO PCT % 33   MONO PCT % 8   EOS PCT % 3     Results from last 7 days   Lab Units 03/21/25  0351   SODIUM mmol/L 138   POTASSIUM mmol/L 4.1   CHLORIDE mmol/L 106   CO2 mmol/L 23   BUN mg/dL 18   CREATININE mg/dL 1.10   ANION GAP mmol/L 9   CALCIUM mg/dL 10.0   ALBUMIN g/dL 3.8   TOTAL BILIRUBIN mg/dL 0.85   ALK PHOS U/L 136*   ALT U/L 11   AST U/L 16   GLUCOSE RANDOM mg/dL 149*     Results from last 7 days   Lab Units 03/21/25  0351   INR  1.20*     Results from last 7 days   Lab Units 03/21/25  0627   POC GLUCOSE mg/dl 118     Lab Results   Component Value Date    HGBA1C 6.8 (H) 02/27/2024    HGBA1C 6.4 (H) 02/10/2023    HGBA1C 7.8 09/15/2020     Results from last 7 days   Lab Units 03/21/25  0351   PROCALCITONIN ng/ml 0.06       Imaging Results Review: I personally reviewed the following image studies in PACS and associated radiology reports: chest xray. My interpretation of the radiology images/reports is: Official chest x-ray report pending.  Other Study Results Review: No additional pertinent studies reviewed.    Administrative Statements   I have spent a total time of 60 minutes in caring for this patient on the day of the visit/encounter including Diagnostic results, Instructions for management, Patient and family education, Impressions, Counseling / Coordination of care, Documenting in the medical record, Reviewing/placing orders in the medical record (including tests, medications, and/or procedures), and Obtaining or reviewing history  .    ** Please Note: This note has been constructed using a voice recognition system. **

## 2025-03-21 NOTE — ASSESSMENT & PLAN NOTE
Lab Results   Component Value Date    HGBA1C 5.7 (H) 03/21/2025       Recent Labs     03/21/25  0627   POCGLU 118   on [place] U-300 50-60 units nightly per patient  Continue Lantus 15 units QHS + SSI for now  Diabetic diet  Monitor accuchecks and adjust regimen PRN  Hypoglycemia protocol

## 2025-03-21 NOTE — ED NOTES
Ambulatory pulse oximetry performed with pt, and pts oxygen levels remained above 92% on room air throughout ambulation.  Pt did endorse worsening shortness of breath despite adequate oxygenation levels upon ambulation.      Kathya Pearson RN  03/21/25 [id number]

## 2025-03-21 NOTE — ASSESSMENT & PLAN NOTE
Wt Readings from Last 3 Encounters:   03/05/25 68.9 kg (152 lb)   03/02/25 71.2 kg (157 lb)   02/14/25 73.9 kg (163 lb)   Appears euvolemic on exam  Last echocardiogram in our system appears to demonstrate LVEF 60% with grade 1 diastolic dysfunction in May 2023, repeat pending  Continue BB, not on diuretic at baseline  Monitor

## 2025-03-22 LAB
ANION GAP SERPL CALCULATED.3IONS-SCNC: 7 MMOL/L (ref 4–13)
BUN SERPL-MCNC: 16 MG/DL (ref 5–25)
CALCIUM SERPL-MCNC: 10 MG/DL (ref 8.4–10.2)
CHLORIDE SERPL-SCNC: 106 MMOL/L (ref 96–108)
CO2 SERPL-SCNC: 25 MMOL/L (ref 21–32)
CREAT SERPL-MCNC: 1.08 MG/DL (ref 0.6–1.3)
ERYTHROCYTE [DISTWIDTH] IN BLOOD BY AUTOMATED COUNT: 13.2 % (ref 11.6–15.1)
GFR SERPL CREATININE-BSD FRML MDRD: 46 ML/MIN/1.73SQ M
GLUCOSE SERPL-MCNC: 100 MG/DL (ref 65–140)
GLUCOSE SERPL-MCNC: 103 MG/DL (ref 65–140)
GLUCOSE SERPL-MCNC: 112 MG/DL (ref 65–140)
GLUCOSE SERPL-MCNC: 148 MG/DL (ref 65–140)
GLUCOSE SERPL-MCNC: 94 MG/DL (ref 65–140)
HCT VFR BLD AUTO: 45 % (ref 34.8–46.1)
HGB BLD-MCNC: 14 G/DL (ref 11.5–15.4)
MCH RBC QN AUTO: 28.6 PG (ref 26.8–34.3)
MCHC RBC AUTO-ENTMCNC: 31.1 G/DL (ref 31.4–37.4)
MCV RBC AUTO: 92 FL (ref 82–98)
PLATELET # BLD AUTO: 232 THOUSANDS/UL (ref 149–390)
PMV BLD AUTO: 11.9 FL (ref 8.9–12.7)
POTASSIUM SERPL-SCNC: 4.1 MMOL/L (ref 3.5–5.3)
RBC # BLD AUTO: 4.89 MILLION/UL (ref 3.81–5.12)
SODIUM SERPL-SCNC: 138 MMOL/L (ref 135–147)
WBC # BLD AUTO: 7.66 THOUSAND/UL (ref 4.31–10.16)

## 2025-03-22 PROCEDURE — 97163 PT EVAL HIGH COMPLEX 45 MIN: CPT

## 2025-03-22 PROCEDURE — 80048 BASIC METABOLIC PNL TOTAL CA: CPT | Performed by: INTERNAL MEDICINE

## 2025-03-22 PROCEDURE — 99232 SBSQ HOSP IP/OBS MODERATE 35: CPT | Performed by: NURSE PRACTITIONER

## 2025-03-22 PROCEDURE — 97166 OT EVAL MOD COMPLEX 45 MIN: CPT

## 2025-03-22 PROCEDURE — 85027 COMPLETE CBC AUTOMATED: CPT | Performed by: INTERNAL MEDICINE

## 2025-03-22 PROCEDURE — 82948 REAGENT STRIP/BLOOD GLUCOSE: CPT

## 2025-03-22 RX ORDER — BISACODYL 10 MG
10 SUPPOSITORY, RECTAL RECTAL ONCE
Status: COMPLETED | OUTPATIENT
Start: 2025-03-22 | End: 2025-03-22

## 2025-03-22 RX ORDER — HYDROMORPHONE HCL IN WATER/PF 6 MG/30 ML
0.2 PATIENT CONTROLLED ANALGESIA SYRINGE INTRAVENOUS ONCE
Refills: 0 | Status: COMPLETED | OUTPATIENT
Start: 2025-03-22 | End: 2025-03-22

## 2025-03-22 RX ORDER — SIMETHICONE 80 MG
80 TABLET,CHEWABLE ORAL ONCE
Status: COMPLETED | OUTPATIENT
Start: 2025-03-22 | End: 2025-03-22

## 2025-03-22 RX ORDER — POLYETHYLENE GLYCOL 3350 17 G/17G
238 POWDER, FOR SOLUTION ORAL ONCE
Status: COMPLETED | OUTPATIENT
Start: 2025-03-22 | End: 2025-03-22

## 2025-03-22 RX ORDER — LACTULOSE 10 G/15ML
20 SOLUTION ORAL 2 TIMES DAILY
Status: DISCONTINUED | OUTPATIENT
Start: 2025-03-22 | End: 2025-03-22

## 2025-03-22 RX ORDER — POLYETHYLENE GLYCOL 3350 17 G/17G
17 POWDER, FOR SOLUTION ORAL 2 TIMES DAILY
Status: DISCONTINUED | OUTPATIENT
Start: 2025-03-22 | End: 2025-03-22

## 2025-03-22 RX ADMIN — DOCUSATE SODIUM 100 MG: 100 CAPSULE, LIQUID FILLED ORAL at 09:51

## 2025-03-22 RX ADMIN — SIMETHICONE 80 MG: 80 TABLET, CHEWABLE ORAL at 02:46

## 2025-03-22 RX ADMIN — BISACODYL 10 MG: 10 SUPPOSITORY RECTAL at 11:54

## 2025-03-22 RX ADMIN — SENNOSIDES 8.6 MG: 8.6 TABLET, FILM COATED ORAL at 09:51

## 2025-03-22 RX ADMIN — POLYETHYLENE GLYCOL 3350 17 G: 17 POWDER, FOR SOLUTION ORAL at 09:51

## 2025-03-22 RX ADMIN — FLUTICASONE FUROATE 1 PUFF: 100 POWDER RESPIRATORY (INHALATION) at 09:53

## 2025-03-22 RX ADMIN — Medication 1000 UNITS: at 09:51

## 2025-03-22 RX ADMIN — HYDROMORPHONE HYDROCHLORIDE 0.2 MG: 0.2 INJECTION, SOLUTION INTRAMUSCULAR; INTRAVENOUS; SUBCUTANEOUS at 02:46

## 2025-03-22 RX ADMIN — POLYETHYLENE GLYCOL 3350 238 G: 17 POWDER, FOR SOLUTION ORAL at 11:45

## 2025-03-22 RX ADMIN — METOPROLOL TARTRATE 50 MG: 50 TABLET, FILM COATED ORAL at 22:02

## 2025-03-22 RX ADMIN — OXYBUTYNIN CHLORIDE 5 MG: 5 TABLET, EXTENDED RELEASE ORAL at 09:50

## 2025-03-22 RX ADMIN — ANASTROZOLE 1 MG: 1 TABLET, COATED ORAL at 09:52

## 2025-03-22 RX ADMIN — DOCUSATE SODIUM 100 MG: 100 CAPSULE, LIQUID FILLED ORAL at 17:39

## 2025-03-22 RX ADMIN — PANTOPRAZOLE SODIUM 40 MG: 40 TABLET, DELAYED RELEASE ORAL at 06:16

## 2025-03-22 RX ADMIN — MORPHINE SULFATE 15 MG: 15 TABLET ORAL at 19:14

## 2025-03-22 RX ADMIN — ACETAMINOPHEN 650 MG: 325 TABLET, FILM COATED ORAL at 00:39

## 2025-03-22 RX ADMIN — PRAVASTATIN SODIUM 20 MG: 20 TABLET ORAL at 16:50

## 2025-03-22 RX ADMIN — INSULIN GLARGINE 15 UNITS: 100 INJECTION, SOLUTION SUBCUTANEOUS at 09:52

## 2025-03-22 RX ADMIN — ENOXAPARIN SODIUM 40 MG: 40 INJECTION SUBCUTANEOUS at 09:50

## 2025-03-22 RX ADMIN — METOPROLOL TARTRATE 50 MG: 50 TABLET, FILM COATED ORAL at 09:52

## 2025-03-22 NOTE — ASSESSMENT & PLAN NOTE
On Arimidex, can continue.   Has upcoming follow up with [facility] oncology  On most recent admission concerning for recurring malignancy and possible metastasis given new pulm nodule and lytic osseous lesion.   Pt continues to decline any further oncology workup, eval or intervention

## 2025-03-22 NOTE — PROGRESS NOTES
Progress Note - Hospitalist   Name: Barbra De Oliveira 89 y.o. female I MRN: [id number]  Unit/Bed#: CW2 215-02 I Date of Admission: 3/21/2025   Date of Service: 3/22/2025 I Hospital Day: 0     Assessment & Plan  Dyspnea  POA, unclear etiology. With associated generalized fatigue and weakness. Recently admitted and treated for PNA. D/c home with home therapies. During recent admission noted to have New 5 mm right upper lobe nodule, Lytic osseous lesions suspicious for metastasis (known hx of breast CA).   Labs unremarkable, procal negative. D dimer negative.   Flu/COVID/RSV negative   Does not appear grossly volume overloaded. Hx of asthma but without signs of exacerbation.   CXR negative for acute findings  Echo with normal EF, G1DD. Mild mitral regurg.   Patient had inquired about hospice cares, discussed with case management, would like to hold off on hospice for now but would consider in the future.  She meets with her oncologist next week and plans to inform her team that she does not wish for any further workup/treatment.  Could consider hospice in the future.  PT/OT cleared for home with home services, pending bowel movement patient will be medically stable.  Constipation  Patient reports no significant BM in appx 2 weeks. Patient with + BS, significant abdominal distention noted, possibly contributing to SOB?  Given Relistor and lactulose 3/21  Will order miralax bowel prep and rectal suppository  CLD for now  Monitor stools   Essential hypertension  Urgency noted on admission, now improved.   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)  Lab Results   Component Value Date    HGBA1C 5.7 (H) 03/21/2025       Recent Labs     03/21/25  1653 03/21/25  2102 03/22/25  0626 03/22/25  1032   POCGLU 91 104 103 148*   on [place] U-300 50-60 units nightly per patient  Given borderline BG, will hold Lantus and continue with SSI  Diabetic diet  Monitor accuchecks and  adjust regimen PRN  Hypoglycemia protocol    Uncomplicated opioid dependence (HCC)  In setting of chronic cancer pain  Continue MSIR 15 mg Q8 PRN as per PTA  Malignant neoplasm of upper-outer quadrant of left breast in female, estrogen receptor positive (HCC)  On Arimidex, can continue.   Has upcoming follow up with [facility] oncology  On most recent admission concerning for recurring malignancy and possible metastasis given new pulm nodule and lytic osseous lesion.   Pt continues to decline any further oncology workup, eval or intervention  Chronic diastolic (congestive) heart failure (HCC)  Wt Readings from Last 3 Encounters:   03/21/25 68.9 kg (152 lb)   03/05/25 68.9 kg (152 lb)   03/02/25 71.2 kg (157 lb)   Appears euvolemic on exam  Updated echo with preserved ejection fraction, grade 1 diastolic dysfunction.  Mild mitral regurg.  Continue BB, not on diuretic at baseline  Monitor       VTE Pharmacologic Prophylaxis:   Moderate Risk (Score 3-4) - Pharmacological DVT Prophylaxis Ordered: enoxaparin (Lovenox).    Mobility:   Basic Mobility Inpatient Raw Score: 22  JH-HLM Goal: 7: Walk 25 feet or more  JH-HLM Achieved: 3: Sit at edge of bed  JH-HLM Goal NOT achieved. Continue with multidisciplinary rounding and encourage appropriate mobility to improve upon JH-HLM goals.    Patient Centered Rounds: I performed bedside rounds with nursing staff today.   Discussions with Specialists or Other Care Team Provider: nursing, PT, case management     Education and Discussions with Family / Patient: Updated  () via phone.    Current Length of Stay: 0 day(s)    Current Patient Status: Inpatient     Certification Statement: The patient, admitted on an observation basis, will now require > 2 midnight hospital stay due to severe constipation requiring ongoing treatment    Discharge Plan: Anticipate discharge in 24-48 hrs to home with home services.    Code Status: Level 3 - DNAR and DNI    Subjective    Continues to complain of severe constipation, no bowel movement in 2 weeks. Abdomen is distended. She is passing slight amount of gas. No n/v.     Objective :  Temp:  [97.5 °F (36.4 °C)-98.6 °F (37 °C)] 97.5 °F (36.4 °C)  HR:  [64-70] 66  BP: (126-156)/(54-73) 126/54  Resp:  [14-23] 14  SpO2:  [92 %-94 %] 94 %  O2 Device: None (Room air)    Body mass index is 27.8 kg/m².     Input and Output Summary (last 24 hours):   No intake or output data in the 24 hours ending 03/22/25 1056    Physical Exam  Vitals and nursing note reviewed.   Constitutional:       Appearance: She is obese.   Cardiovascular:      Rate and Rhythm: Normal rate.   Pulmonary:      Effort: No respiratory distress.      Breath sounds: Decreased breath sounds present.   Abdominal:      General: Bowel sounds are decreased. There is distension.      Palpations: Abdomen is soft.   Musculoskeletal:         General: No swelling.   Skin:     General: Skin is warm.   Neurological:      Mental Status: She is alert and oriented to person, place, and time. Mental status is at baseline.   Psychiatric:         Mood and Affect: Mood normal.           Lines/Drains:              Lab Results: I have reviewed the following results:   Results from last 7 days   Lab Units 03/22/25  0603 03/21/25  0351   WBC Thousand/uL 7.66 7.73   HEMOGLOBIN g/dL 14.0 14.1   HEMATOCRIT % 45.0 43.1   PLATELETS Thousands/uL 232 249   SEGS PCT %  --  56   LYMPHO PCT %  --  33   MONO PCT %  --  8   EOS PCT %  --  3     Results from last 7 days   Lab Units 03/22/25  0603 03/21/25  0351   SODIUM mmol/L 138 138   POTASSIUM mmol/L 4.1 4.1   CHLORIDE mmol/L 106 106   CO2 mmol/L 25 23   BUN mg/dL 16 18   CREATININE mg/dL 1.08 1.10   ANION GAP mmol/L 7 9   CALCIUM mg/dL 10.0 10.0   ALBUMIN g/dL  --  3.8   TOTAL BILIRUBIN mg/dL  --  0.85   ALK PHOS U/L  --  136*   ALT U/L  --  11   AST U/L  --  16   GLUCOSE RANDOM mg/dL 100 149*     Results from last 7 days   Lab Units 03/21/25  0351   INR  1.20*      Results from last 7 days   Lab Units 03/22/25  1032 03/22/25  0626 03/21/25  2102 03/21/25  1653 03/21/25  1220 03/21/25  0627   POC GLUCOSE mg/dl 148* 103 104 91 100 118     Results from last 7 days   Lab Units 03/21/25  0630   HEMOGLOBIN A1C % 5.7*     Results from last 7 days   Lab Units 03/21/25  0351   PROCALCITONIN ng/ml 0.06       Recent Cultures (last 7 days):         Imaging Results Review: No pertinent imaging studies reviewed.  Other Study Results Review: No additional pertinent studies reviewed.    Last 24 Hours Medication List:     Current Facility-Administered Medications:     acetaminophen (TYLENOL) tablet 650 mg, Q6H PRN    albuterol inhalation solution 2.5 mg, Q4H PRN    anastrozole (ARIMIDEX) tablet 1 mg, Daily    benzonatate (TESSALON PERLES) capsule 200 mg, TID PRN    bisacodyl (DULCOLAX) rectal suppository 10 mg, Once    Cholecalciferol (VITAMIN D3) tablet 1,000 Units, Daily    docusate sodium (COLACE) capsule 100 mg, BID    enoxaparin (LOVENOX) subcutaneous injection 40 mg, Daily    fluticasone (ARNUITY ELLIPTA) 100 MCG/ACT inhaler 1 puff, Daily    hydrALAZINE (APRESOLINE) injection 10 mg, Q6H PRN    insulin lispro (HumALOG/ADMELOG) 100 units/mL subcutaneous injection 1-6 Units, HS    insulin lispro (HumALOG/ADMELOG) 100 units/mL subcutaneous injection 2-12 Units, TID AC **AND** Fingerstick Glucose (POCT), TID AC    metoprolol tartrate (LOPRESSOR) tablet 50 mg, Q12H RAPHAEL    morphine (MSIR) IR tablet 15 mg, Q8H PRN    oxybutynin (DITROPAN-XL) 24 hr tablet 5 mg, Daily    pantoprazole (PROTONIX) EC tablet 40 mg, Early Morning    polyethylene glycol (GLYCOLAX) bowel prep 238 g, Once    pravastatin (PRAVACHOL) tablet 20 mg, Daily With Dinner    senna (SENOKOT) tablet 8.6 mg, Daily    Administrative Statements   Today, Patient Was Seen By: JOSE Torre      **Please Note: This note may have been constructed using a voice recognition system.**

## 2025-03-22 NOTE — PHYSICAL THERAPY NOTE
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)    Uncomplicated opioid dependence (HCC)    Chest pain    Chronic diastolic (congestive) heart failure (HCC)    Mixed hyperlipidemia    Migraine without aura and without status migrainosus, not intractable    Malignant neoplasm of upper-outer quadrant of left breast in female, estrogen receptor positive (HCC)    Atrial fibrillation, currently in sinus rhythm    Chronic pain    Use of anastrozole (Arimidex)    Premature ventricular contractions    History of left mastectomy    History of breast cancer    Constipation    Dyspnea       Past Medical History  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) 12/01/2020    Chest pain, unspecified     CHF (congestive heart failure) (HCC)     Diabetes mellitus (HCC)     GERD (gastroesophageal reflux disease)     History of radiation exposure 1992    Hyperlipidemia     Hypertension     Irregular heart beat     Afib    Migraine     Obesity     Pericardial effusion     Pericarditis        Past Surgical History  Past Surgical History:   Procedure Laterality Date    ABDOMINAL ADHESION SURGERY      APPENDECTOMY      AUGMENTATION MAMMAPLASTY Left 1994    BACK SURGERY      BREAST LUMPECTOMY Left 1992    malignant    BREAST LUMPECTOMY Left 11/17/2020    Procedure: BREAST ULTRASOUND DIRECTED LUMPECTOMY (ULTRASOUND AT 1200);  Surgeon: Earl Em MD;  [facility];  Service:  Surgical Oncology    BREAST SURGERY      CARDIAC SURGERY      Pericardial window    CHOLECYSTECTOMY      EYE SURGERY      FLAP LOCAL TRUNK Left 11/17/2020    Procedure: FLAP LOCAL TRUNK;  Surgeon: Ronnell Chiu MD;  [facility];  Service: Plastics    HYSTERECTOMY      IR DRAINAGE TUBE CHECK WITH SCLEROSIS  04/23/2021    IR DRAINAGE TUBE CHECK WITH SCLEROSIS  06/03/2021    IR DRAINAGE TUBE CHECK WITH SCLEROSIS  06/17/2021    IR DRAINAGE TUBE CHECK WITH SCLEROSIS  06/28/2021    IR DRAINAGE TUBE CHECK WITH SCLEROSIS  07/07/2021    IR DRAINAGE TUBE CHECK WITH SCLEROSIS  07/27/2021    IR DRAINAGE TUBE PLACEMENT  04/01/2021    IR DRAINAGE TUBE PLACEMENT WITH SCLEROSIS  05/14/2021    LYMPH NODE DISSECTION Left 11/17/2020    Procedure: HUGO  DIRECTED AXILLARY DISSECTION;  Surgeon: Earl Em MD;  [facility];  Service: Surgical Oncology    MASTECTOMY Left 1994    malignant    CA CELESTE-IMPLANT CAPSULECTOMY BREAST COMPLETE Left 11/17/2020    Procedure: BREAST CAPSULECTOMY;  Surgeon: Ronnell Chiu MD;  [facility];  Service: Plastics    CA REMOVAL INTACT BREAST IMPLANT Left 11/17/2020    Procedure: BREAST IMPLANT REMOVAL;  Surgeon: Ronnell Chiu MD;  [facility];  Service: Plastics    US BREAST NEEDLE LOC LEFT Left 11/02/2020    US GUIDED BREAST BIOPSY LEFT COMPLETE Left 08/31/2020    US GUIDED BREAST LYMPH NODE BIOPSY LEFT Left 08/31/2020    WRIST SURGERY            03/22/25 0829   PT Last Visit   PT Visit Date 03/22/25   Note Type   Note type Evaluation   Pain Assessment   Pain Assessment Tool 0-10   Pain Score No Pain   Restrictions/Precautions   Other Precautions Bed Alarm;Multiple lines   Home Living   Type of Home House   Home Layout One level;Performs ADLs on one level  (3 MITCH)   Bathroom Shower/Tub Tub/shower unit  (+ Walk in)   Bathroom Toilet Standard   Bathroom Equipment Shower chair;Grab bars in shower   Home Equipment Walker;Cane;Other (Comment)  (Rollator)   Additional Comments PTA  pt utilized rollator for mobility   Prior Function   Level of [place] Independent with functional mobility;Needs assistance with ADLs;Needs assistance with functional mobility   Lives With Spouse   Receives Help From Family   Vocational Retired   General   Family/Caregiver Present No   Cognition   Arousal/Participation Cooperative   Orientation Level Oriented X4   Following Commands Follows one step commands without difficulty   RLE Assessment   RLE Assessment WFL   LLE Assessment   LLE Assessment WFL   Bed Mobility   Additional Comments Pt found seated EOB with try, bed alarm on. At end of session patient returned. All needs within reach, alarm on.   Transfers   Sit to Stand 5  Supervision   Additional items Increased time required   Stand to Sit 5  Supervision   Additional items Increased time required   Additional Comments w/ RW   Ambulation/Elevation   Gait pattern Forward Flexion;Wide CECILIA;Step through pattern;Excessively slow   Gait Assistance 5  Supervision   Assistive Device Rolling walker   Distance 150'   Balance   Static Sitting Fair   Dynamic Sitting Fair   Static Standing Fair -   Dynamic Standing Poor +   Ambulatory Poor +   Activity Tolerance   Activity Tolerance Patient tolerated treatment well;Patient limited by fatigue   Medical Staff Made Aware OT   Nurse Made Aware RN cleared   Assessment   Prognosis Fair   Problem List Decreased strength;Decreased endurance;Impaired balance   Assessment Pt seen for PT evaluation. Pt with active PT eval/treat orders. Pt is a 89 y.o. female who was admitted to [facility] on 3/21/25. Pt's current dx/ problem list include: dyspnea. Comorbidities affecting pt's physical performance at time of assessment are as follows: has a past medical history of Anxiety, Asthma, Atrial fibrillation (HCC), Breast cancer (HCC), 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), Chest pain, unspecified, CHF (congestive heart failure) (HCC), Diabetes  mellitus (HCC), GERD (gastroesophageal reflux disease), History of radiation exposure, Hyperlipidemia, Hypertension, Irregular heart beat, Migraine, Obesity, Pericardial effusion, and Pericarditis. Personal factors affecting pt at time of initial examination include: steps to enter environment, limited home support, past experience, inability to perform IADLs, inability to perform ADLs. Due to acute medical issues, ongoing medical workup for primary dx; fall risk, increased reliance on more restrictive AD compared to baseline;  decreased activity tolerance compared to baseline, increased assistance needed from caregiver at current time, multiple lines, decline in overall functional mobility status; health management issues. At baseline, pt resides in a 1  with 3 MITCH and was independent with mobility. Currently, upon initial examination, pt is requiring supervision for sit to stand and ambulation. PT to continue to follow and assess functional transfers and ambulation as appropriate and able. Currently, pt presents functioning below baseline and with overall mobility deficits 2* to: decreased LE strength/AROM; limited flexibility;  generalized weakness/ deconditioning; decreased endurance; decreased activity tolerance; impaired balance; gait deviations. Pt would benefit from continued skilled PT services while in hospital to address remaining limitations and maximize functional potential. Recommend level III resource intensity. Please also refer to the recommendation of the Physical Therapist for safe discharge planning.   Barriers to Discharge None   Goals   Patient Goals To finish my coffee   STG Expiration Date 04/05/25   Short Term Goal #1 STG 1. Pt will be able to perform bed mobility with mod I in order to improve overall functional mobility and assist in safe d/c. STG 2. Pt will be able to perform functional transfer with mod I in order to improve overall functional mobility and assist in safe d/c. STG 3. Pt  will be able to ambulate at least 250 feet with least restrictive device with mod I A in order to improve overall functional mobility and assist in safe d/c. STG 4. Pt will improve sitting/standing static/dynamic balance 1 grade in order to improve functional mobility and assist in safe d/c. STG 5. Pt will improve LE strength by one grade in order to improve functional mobility and assist in safe d/c. STG 6. Pt will negotiate at least 1 step at mod I level A in order to improve overall funcitonal mobility and assist in safe d/c   Plan   Treatment/Interventions Functional transfer training;Therapeutic exercise;LE strengthening/ROM;Gait training;Spoke to nursing   PT Frequency 2-3x/wk   Discharge Recommendation   Rehab Resource Intensity Level, PT III (Minimum Resource Intensity)   Equipment Recommended Walker  (pt owns)   Walker Package Recommended Wheeled walker   AM-PAC Basic Mobility Inpatient   Turning in Flat Bed Without Bedrails 4   Lying on Back to Sitting on Edge of Flat Bed Without Bedrails 4   Moving Bed to Chair 3   Standing Up From Chair Using Arms 3   Walk in Room 3   Climb 3-5 Stairs With Railing 2   Basic Mobility Inpatient Raw Score 19   Basic Mobility Standardized Score 42.48   [facility] Highest Level Of Mobility   JH-HLM Goal 6: Walk 10 steps or more   JH-HLM Achieved 7: Walk 25 feet or more         Ester Harris, PT

## 2025-03-22 NOTE — ASSESSMENT & PLAN NOTE
Lab Results   Component Value Date    HGBA1C 5.7 (H) 03/21/2025       Recent Labs     03/21/25  1653 03/21/25  2102 03/22/25  0626 03/22/25  1032   POCGLU 91 104 103 148*   on [place] U-300 50-60 units nightly per patient  Given borderline BG, will hold Lantus and continue with SSI  Diabetic diet  Monitor accuchecks and adjust regimen PRN  Hypoglycemia protocol

## 2025-03-22 NOTE — PLAN OF CARE
Problem: Potential for Falls  Goal: Patient will remain free of falls  Description: INTERVENTIONS:- Educate patient/family on patient safety including physical limitations- Instruct patient to call for assistance with activity - Consult OT/PT to assist with strengthening/mobility - Keep Call bell within reach- Keep bed low and locked with side rails adjusted as appropriate- Keep care items and personal belongings within reach- Initiate and maintain comfort rounds-  Apply yellow socks and bracelet for high fall risk patients- Consider moving patient to room near nurses station  Outcome: Progressing     Problem: PAIN - ADULT  Goal: Verbalizes/displays adequate comfort level or baseline comfort level  Description: Interventions:- Encourage patient to monitor pain and request assistance- Assess pain using appropriate pain scale- Administer analgesics based on type and severity of pain and evaluate response- Implement non-pharmacological measures as appropriate and evaluate response- Consider cultural and social influences on pain and pain management- Notify physician/advanced practitioner if interventions unsuccessful or patient reports new pain  Outcome: Progressing

## 2025-03-22 NOTE — OCCUPATIONAL THERAPY NOTE
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)    Uncomplicated opioid dependence (HCC)    Chronic diastolic (congestive) heart failure (HCC)    Malignant neoplasm of upper-outer quadrant of left breast in female, estrogen receptor positive (HCC)    Constipation    Past Medical History  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) 12/01/2020    Chest pain, unspecified     CHF (congestive heart failure) (HCC)     Diabetes mellitus (HCC)     GERD (gastroesophageal reflux disease)     History of radiation exposure 1992    Hyperlipidemia     Hypertension     Irregular heart beat     Afib    Migraine     Obesity     Pericardial effusion     Pericarditis      Past Surgical History  Past Surgical History:   Procedure Laterality Date    ABDOMINAL ADHESION SURGERY      APPENDECTOMY      AUGMENTATION MAMMAPLASTY Left 1994    BACK SURGERY      BREAST LUMPECTOMY Left 1992    malignant    BREAST LUMPECTOMY Left 11/17/2020    Procedure: BREAST ULTRASOUND DIRECTED LUMPECTOMY (ULTRASOUND AT 1200);  Surgeon: Earl Em MD;  [facility];  Service: Surgical Oncology    BREAST SURGERY      CARDIAC SURGERY      Pericardial window    CHOLECYSTECTOMY      EYE SURGERY      FLAP LOCAL TRUNK Left 11/17/2020    Procedure: FLAP LOCAL TRUNK;  Surgeon: Ronnell Chiu MD;  [facility];  Service: Plastics    HYSTERECTOMY      IR DRAINAGE TUBE CHECK WITH SCLEROSIS  04/23/2021    IR DRAINAGE TUBE CHECK WITH SCLEROSIS  06/03/2021    IR DRAINAGE TUBE CHECK WITH SCLEROSIS  06/17/2021    IR DRAINAGE TUBE CHECK WITH SCLEROSIS  06/28/2021    IR DRAINAGE TUBE CHECK WITH SCLEROSIS  07/07/2021    IR DRAINAGE TUBE  CHECK WITH SCLEROSIS  07/27/2021    IR DRAINAGE TUBE PLACEMENT  04/01/2021    IR DRAINAGE TUBE PLACEMENT WITH SCLEROSIS  05/14/2021    LYMPH NODE DISSECTION Left 11/17/2020    Procedure: HUGO  DIRECTED AXILLARY DISSECTION;  Surgeon: Earl Em MD;  [facility];  Service: Surgical Oncology    MASTECTOMY Left 1994    malignant    ND CELESTE-IMPLANT CAPSULECTOMY BREAST COMPLETE Left 11/17/2020    Procedure: BREAST CAPSULECTOMY;  Surgeon: Ronnell Chiu MD;  [facility];  Service: Plastics    ND REMOVAL INTACT BREAST IMPLANT Left 11/17/2020    Procedure: BREAST IMPLANT REMOVAL;  Surgeon: Ronnell Chiu MD;  [facility];  Service: Plastics    US BREAST NEEDLE LOC LEFT Left 11/02/2020    US GUIDED BREAST BIOPSY LEFT COMPLETE Left 08/31/2020    US GUIDED BREAST LYMPH NODE BIOPSY LEFT Left 08/31/2020    WRIST SURGERY               03/22/25 0812   OT Last Visit   OT Visit Date 03/22/25   Note Type   Note type Evaluation   Pain Assessment   Pain Assessment Tool 0-10   Pain Score No Pain   Restrictions/Precautions   Weight Bearing Precautions Per Order No   Other Precautions Bed Alarm;Fall Risk;Multiple lines   Home Living   Type of Home House   Home Layout One level  (3 MITCH)   Bathroom Shower/Tub Tub/shower unit  (also has walk-in shower, uses tub shower more often)   Bathroom Toilet Standard   Bathroom Equipment Grab bars in shower;Shower chair  (reports use PTA)   Home Equipment Walker;Cane;Other (Comment)  (rollator)   Prior Function   Level of [place] Independent with ADLs;Independent with functional mobility  (assist for bathing)   Lives With Spouse   Receives Help From Family   IADLs Family/Friend/Other provides meals;Independent with medication management  (reports she drives if needed)   Falls in the last 6 months 0   Vocational Retired   Lifestyle   Autonomy At baseline pt was completing all ADLs IND except showering, IADLs with assist, ambulating MOD IND with rollator.   Reciprocal  Relationships supportive spouse   Service to Others retired   Intrinsic Gratification family   ADL   Eating Assistance 7  Independent   Grooming Assistance 5  Supervision/Setup   UB Bathing Assistance 5  Supervision/Setup   LB Bathing Assistance 4  Minimal Assistance   UB Dressing Assistance 5  Supervision/Setup   LB Dressing Assistance 4  Minimal Assistance   Toileting Assistance  4  Minimal Assistance   Transfers   Sit to Stand 5  Supervision   Additional items Increased time required   Stand to Sit 5  Supervision   Additional items Increased time required   Additional Comments RW   Functional Mobility   Functional Mobility 5  Supervision   Additional Comments short household distances, SpO2 92-94% on room air   Additional items Rolling walker   Balance   Static Sitting Fair +   Dynamic Sitting Fair +   Static Standing Fair   Dynamic Standing Fair   Activity Tolerance   Activity Tolerance Patient tolerated treatment well   Medical Staff Made Aware PT   Nurse Made Aware Yes   RUE Assessment   RUE Assessment WFL   LUE Assessment   LUE Assessment WFL   Hand Function   Gross Motor Coordination Functional   Fine Motor Coordination Functional   Cognition   Overall Cognitive Status WFL   Arousal/Participation Alert;Cooperative   Attention Within functional limits   Orientation Level Oriented X4   Memory Within functional limits   Following Commands Follows one step commands without difficulty   Assessment   Limitation Decreased ADL status;Decreased endurance;Decreased self-care trans;Decreased high-level ADLs   Prognosis Good   Assessment Pt is a 89 y.o. female who was admitted to [facility] on 3/21/2025 with Dyspnea; pt with malignant breast cancer with newly found found pulmonary nodule and lytic bone lesion. Per EMR, pt has opted not to pursue further treatment. Patient  has a past medical history of Anxiety, Asthma, Atrial fibrillation (HCC), Breast cancer (HCC) (1992), Carcinoma of left breast metastatic  to axillary lymph node (HCC) (12/01/2020), Chest pain, unspecified, CHF (congestive heart failure) (HCC), Diabetes mellitus (HCC), GERD (gastroesophageal reflux disease), History of radiation exposure (1992), Hyperlipidemia, Hypertension, Irregular heart beat, Migraine, Obesity, Pericardial effusion, and Pericarditis. At baseline pt was completing all ADLs IND except showering, IDLs with assist, ambulating MOD IND with rollator. Pt lives with her spouse in a 1 . Currently pt requires SUP-MIN A for overall ADLS and for functional mobility/transfers. Pt currently presents with impairments in the following categories -difficulty performing ADLS and difficulty performing IADLS  activity tolerance, endurance, and standing balance/tolerance. These impairments, as well as pt's fatigue, SOB, and risk for falls  limit pt's ability to safely engage in all baseline areas of occupation, includinggrooming, bathing, dressing, toileting, functional mobility/transfers, community mobility, and leisure activities . From OT standpoint, recommend NO REHAB NEEDS upon D/C - continued support from family for bathing/functional tasks. OT will continue to follow to address the below stated goals.   Goals   Patient Goals to go home   LTG Time Frame 10-14   Long Term Goal #1 see goals below   Plan   Treatment Interventions ADL retraining;Functional transfer training;Endurance training;Patient/family training;Equipment evaluation/education;Compensatory technique education;Energy conservation;Activityengagement   Goal Expiration Date 04/05/25   OT Frequency 2-3x/wk   Discharge Recommendation   Rehab Resource Intensity Level, OT No post-acute rehabilitation needs   AM-PAC Daily Activity Inpatient   Lower Body Dressing 3   Bathing 3   Toileting 3   Upper Body Dressing 3   Grooming 3   Eating 4   Daily Activity Raw Score 19   Daily Activity Standardized Score (Calc for Raw Score >=11) 40.22   AM-PAC Applied Cognition Inpatient   Following a  Speech/Presentation 4   Understanding Ordinary Conversation 4   Taking Medications 4   Remembering Where Things Are Placed or Put Away 4   Remembering List of 4-5 Errands 3   Taking Care of Complicated Tasks 3   Applied Cognition Raw Score 22   Applied Cognition Standardized Score 47.83   End of Consult   Patient Position at End of Consult Supine;Bed/Chair alarm activated;All needs within reach   Nurse Communication Nurse aware of consult          The patient's raw score on the AM-PAC Daily Activity Inpatient Short Form is 19. A raw score of greater than or equal to 19 suggests the patient may benefit from discharge to home. Please refer to the recommendation of the Occupational Therapist for safe discharge planning.        GOALS     Pt will improve activity tolerance to G for min 30 min txment sessions     Pt will complete UB ADLs with MOD IND and use of adaptive device and DME as needed     Pt will complete LB ADLs with MOD IND w/ use of AE and DME as needed     Pt will complete toileting w/ MOD IND w/ G hygiene/thoroughness using DME as needed     Pt will improve functional transfers to Mod I on/off all surfaces using DME as needed w/ G balance/safety      Pt will improve functional mobility during ADL/IADL/leisure tasks to Mod I using DME as needed w/ G balance/safety      Pt will demonstrate G carryover of pt/caregiver education and training as appropriate w/ mod I w/o cues w/ good tolerance     Pt to demo % carryover of energy conservation strategies during functional tasks.        Jazmyne Jennings, OTR/L

## 2025-03-22 NOTE — ASSESSMENT & PLAN NOTE
POA, unclear etiology. With associated generalized fatigue and weakness. Recently admitted and treated for PNA. D/c home with home therapies. During recent admission noted to have New 5 mm right upper lobe nodule, Lytic osseous lesions suspicious for metastasis (known hx of breast CA).   Labs unremarkable, procal negative. D dimer negative.   Flu/COVID/RSV negative   Does not appear grossly volume overloaded. Hx of asthma but without signs of exacerbation.   CXR negative for acute findings  Echo with normal EF, G1DD. Mild mitral regurg.   Patient had inquired about hospice cares, discussed with case management, would like to hold off on hospice for now but would consider in the future.  She meets with her oncologist next week and plans to inform her team that she does not wish for any further workup/treatment.  Could consider hospice in the future.  PT/OT cleared for home with home services, pending bowel movement patient will be medically stable.

## 2025-03-22 NOTE — PLAN OF CARE
Problem: OCCUPATIONAL THERAPY ADULT  Goal: Performs self-care activities at highest level of function for planned discharge setting.  See evaluation for individualized goals.  Description: Treatment Interventions: ADL retraining, Functional transfer training, Endurance training, Patient/family training, Equipment evaluation/education, Compensatory technique education, Energy conservation, Activityengagement          See flowsheet documentation for full assessment, interventions and recommendations.   Note: Limitation: Decreased ADL status, Decreased endurance, Decreased self-care trans, Decreased high-level ADLs  Prognosis: Good  Assessment: Pt is a 89 y.o. female who was [facility] to [facility] on 3/21/20[facility] with Dyspnea; pt with malignant breast cancer with newly found found pulmonary nodule and lytic bone lesion. Patient  has a past medical history of Anxiety, Asthma, Atrial fibrillation ([facility]), Breast cancer ([facility]) (1992), 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 ([facility]) (12/01/2020), Chest pain, unspecified, CHF (congestive heart failure) (HCC), Diabetes mellitus (HCC), GERD (gastroesophageal reflux disease), History of radiation exposure (1992), Hyperlipidemia, Hypertension, Irregular heart beat, Migraine, Obesity, Pericardial effusion, and Pericarditis. At baseline pt was completing all ADLs IND except showering, IDLs with assist, ambulating MOD IND with rollator. Pt lives with her spouse in a 1 . Currently pt requires SUP-MIN A for overall ADLS and for functional mobility/transfers. Pt currently presents with impairments in the following categories -difficulty performing ADLS and difficulty performing IADLS  activity tolerance, endurance, and standing balance/tolerance. These impairments, as well as pt's fatigue, SOB, and risk for falls  limit pt's ability to safely engage in all baseline areas of occupation, includinggrooming, bathing, dressing, toileting, functional  mobility/transfers, community mobility, and leisure activities . From OT standpoint, recommend NO REHAB NEEDS upon D/C. OT will continue to follow to address the below stated goals.     Rehab Resource Intensity Level, OT: No post-acute rehabilitation needs

## 2025-03-22 NOTE — PLAN OF CARE
Problem: PHYSICAL THERAPY ADULT  Goal: Performs mobility at highest level of function for planned discharge setting.  See evaluation for individualized goals.  Description: Treatment/Interventions: Functional transfer training, Therapeutic exercise, LE strengthening/ROM, Gait training, Spoke to nursing  Equipment Recommended: Walker (pt owns)       See flowsheet documentation for full assessment, interventions and recommendations.  Note: Prognosis: Fair  Problem List: Decreased strength, Decreased endurance, Impaired balance  Assessment: Pt seen for PT evaluation. Pt with active PT eval/treat orders. Pt is a 89 y.o. female who was admitted to [facility] on 3/21/25. Pt's current dx/ problem list include: dyspnea. 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 Personal factors affecting pt at time of initial examination include: steps to enter environment, limited home support, past experience, inability to perform IADLs, inability to perform ADLs. Due to acute medical issues, ongoing medical workup for primary dx; fall risk, increased reliance on more restrictive AD compared to baseline;  decreased activity tolerance compared to baseline, increased assistance needed from caregiver at current time, multiple lines, decline in overall functional mobility status; health management issues. At baseline, pt resides in a [facility] with [facility] and was independent with mobility. Currently, upon initial examination, pt is requiring supervision for sit to  stand and ambulation. PT to continue to follow and assess functional transfers and ambulation as appropriate and able. Currently, pt presents functioning below baseline and with overall mobility deficits 2* to: decreased LE strength/AROM; limited flexibility;  generalized weakness/ deconditioning; decreased endurance; decreased activity tolerance; impaired balance; gait deviations. Pt would benefit from continued skilled PT services while in hospital to address remaining limitations and maximize functional potential. Recommend level III resource intensity. Please also refer to the recommendation of the Physical Therapist for safe discharge planning.  Barriers to Discharge: None     Rehab Resource Intensity Level, PT: III (Minimum Resource Intensity)    See flowsheet documentation for full assessment.

## 2025-03-22 NOTE — ASSESSMENT & PLAN NOTE
Patient reports no significant BM in appx 2 weeks. Patient with + BS, significant abdominal distention noted, possibly contributing to SOB?  Given Relistor and lactulose 3/21  Will order miralax bowel prep and rectal suppository  CLD for now  Monitor stools

## 2025-03-22 NOTE — ASSESSMENT & PLAN NOTE
Wt Readings from Last 3 Encounters:   03/21/25 68.9 kg (152 lb)   03/05/25 68.9 kg (152 lb)   03/02/25 71.2 kg (157 lb)   Appears euvolemic on exam  Updated echo with preserved ejection fraction, grade 1 diastolic dysfunction.  Mild mitral regurg.  Continue BB, not on diuretic at baseline  Monitor

## 2025-03-23 ENCOUNTER — HOME CARE VISIT (OUTPATIENT)
Dept: HOME HEALTH SERVICES | Facility: HOME HEALTHCARE | Age: 89
End: 2025-03-23
Payer: MEDICARE

## 2025-03-23 LAB
GLUCOSE SERPL-MCNC: 109 MG/DL (ref 65–140)
GLUCOSE SERPL-MCNC: 115 MG/DL (ref 65–140)
GLUCOSE SERPL-MCNC: 94 MG/DL (ref 65–140)
GLUCOSE SERPL-MCNC: 99 MG/DL (ref 65–140)

## 2025-03-23 PROCEDURE — 82948 REAGENT STRIP/BLOOD GLUCOSE: CPT

## 2025-03-23 PROCEDURE — 94760 N-INVAS EAR/PLS OXIMETRY 1: CPT

## 2025-03-23 PROCEDURE — 99232 SBSQ HOSP IP/OBS MODERATE 35: CPT | Performed by: NURSE PRACTITIONER

## 2025-03-23 RX ORDER — HYDROXYZINE HYDROCHLORIDE 10 MG/1
10 TABLET, FILM COATED ORAL EVERY 6 HOURS PRN
Status: DISCONTINUED | OUTPATIENT
Start: 2025-03-23 | End: 2025-03-24 | Stop reason: HOSPADM

## 2025-03-23 RX ADMIN — ANASTROZOLE 1 MG: 1 TABLET, COATED ORAL at 08:10

## 2025-03-23 RX ADMIN — MINERAL OIL 1 ENEMA: 100 ENEMA RECTAL at 12:57

## 2025-03-23 RX ADMIN — METOPROLOL TARTRATE 50 MG: 50 TABLET, FILM COATED ORAL at 21:15

## 2025-03-23 RX ADMIN — METOPROLOL TARTRATE 50 MG: 50 TABLET, FILM COATED ORAL at 08:09

## 2025-03-23 RX ADMIN — OXYBUTYNIN CHLORIDE 5 MG: 5 TABLET, EXTENDED RELEASE ORAL at 08:08

## 2025-03-23 RX ADMIN — MORPHINE SULFATE 15 MG: 15 TABLET ORAL at 08:09

## 2025-03-23 RX ADMIN — ENOXAPARIN SODIUM 40 MG: 40 INJECTION SUBCUTANEOUS at 08:09

## 2025-03-23 RX ADMIN — POLYETHYLENE GLYCOL 3350, SODIUM SULFATE ANHYDROUS, SODIUM BICARBONATE, SODIUM CHLORIDE, POTASSIUM CHLORIDE 4000 ML: 236; 22.74; 6.74; 5.86; 2.97 POWDER, FOR SOLUTION ORAL at 15:28

## 2025-03-23 RX ADMIN — METHYLNALTREXONE BROMIDE 6 MG: 12 INJECTION, SOLUTION SUBCUTANEOUS at 09:43

## 2025-03-23 RX ADMIN — PANTOPRAZOLE SODIUM 40 MG: 40 TABLET, DELAYED RELEASE ORAL at 05:22

## 2025-03-23 RX ADMIN — HYDROXYZINE HYDROCHLORIDE 10 MG: 10 TABLET, FILM COATED ORAL at 08:17

## 2025-03-23 RX ADMIN — DOCUSATE SODIUM 100 MG: 100 CAPSULE, LIQUID FILLED ORAL at 16:48

## 2025-03-23 RX ADMIN — HYDROXYZINE HYDROCHLORIDE 10 MG: 10 TABLET, FILM COATED ORAL at 16:52

## 2025-03-23 RX ADMIN — FLUTICASONE FUROATE 1 PUFF: 100 POWDER RESPIRATORY (INHALATION) at 08:10

## 2025-03-23 RX ADMIN — PRAVASTATIN SODIUM 20 MG: 20 TABLET ORAL at 16:48

## 2025-03-23 RX ADMIN — Medication 1000 UNITS: at 08:08

## 2025-03-23 RX ADMIN — SENNOSIDES 8.6 MG: 8.6 TABLET, FILM COATED ORAL at 08:09

## 2025-03-23 RX ADMIN — DOCUSATE SODIUM 100 MG: 100 CAPSULE, LIQUID FILLED ORAL at 08:10

## 2025-03-23 NOTE — ASSESSMENT & PLAN NOTE
Patient reports no significant BM in appx 2 weeks. Patient with + BS/flatus, significant abdominal distention noted, possibly contributing to SOB?  Given Relistor and lactulose 3/21  Ordered MiraLAX bowel prep 3/22, patient only had about a quarter of this.  Discussed with nursing, patient to complete rest of MiraLAX bowel prep along with mineral oil enema and additional dose of Relistor.   If no improvement, could consider GoLytely  CLD for now  Monitor

## 2025-03-23 NOTE — ASSESSMENT & PLAN NOTE
On Arimidex, can continue.   Has upcoming follow up with [facility] oncology  On most recent admission concerning for recurring malignancy and possible metastasis given new pulm nodule and lytic osseous lesion.   Pt continues to decline any further oncology workup, eval or intervention

## 2025-03-23 NOTE — PLAN OF CARE
Problem: Potential for Falls  Goal: Patient will remain free of falls  Description: INTERVENTIONS:- Educate patient/family on patient safety including physical limitations- Instruct patient to call for assistance with activity - Consult OT/PT to assist with strengthening/mobility - Keep Call bell within reach- Keep bed low and locked with side rails adjusted as appropriate- Keep care items and personal belongings within reach- Initiate and maintain comfortpatients- Consider moving patient to room near nurses station  Outcome: Progressing     Problem: PAIN - ADULT  Goal: Verbalizes/displays adequate comfort level or baseline comfort level  Description: Interventions:- Encourage patient to monitor pain and request assistance- Assess pain using appropriate pain scale- Administer analgesics based on type and severity of pain and evaluate response- Implement non-pharmacological measures as appropriate and evaluate response- Consider cultural and social influences on pain and pain management- Notify physician/advanced practitioner if interventions unsuccessful or patient reports new pain  Outcome: Progressing     Problem: DISCHARGE PLANNING  Goal: Discharge to home or other facility with appropriate resources  Description: INTERVENTIONS:- Identify barriers to discharge w/patient and caregiver- Arrange for needed discharge resources and transportation as appropriate- Identify discharge learning needs (meds, wound care, etc.)- Arrange for interpretive services to assist at discharge as needed- Refer to Case Management Department for coordinating discharge planning if the patient needs post-hospital services based on physician/advanced practitioner order or complex needs related to functional status, cognitive ability, or social support system  Outcome: Progressing     Problem: CARDIOVASCULAR - ADULT  Goal: Maintains optimal cardiac output and hemodynamic stability  Description: INTERVENTIONS:- Monitor I/O, vital signs and  rhythm- Monitor for S/S and trends of decreased cardiac output- Administer and titrate ordered vasoactive medications to optimize hemodynamic stability- Assess quality of pulses, skin color and temperature- Assess for signs of decreased coronary artery perfusion- Instruct patient to report change in severity of symptoms  Outcome: Progressing  Goal: Absence of cardiac dysrhythmias or at baseline rhythm  Description: INTERVENTIONS:- Continuous cardiac monitoring, vital signs, obtain 12 lead EKG if ordered- Administer antiarrhythmic and heart rate control medications as ordered- Monitor electrolytes and administer replacement therapy as ordered  Outcome: Progressing     Problem: RESPIRATORY - ADULT  Goal: Achieves optimal ventilation and oxygenation  Description: INTERVENTIONS:- Assess for changes in respiratory status- Assess for changes in mentation and behavior- Position to facilitate oxygenation and minimize respiratory effort- Oxygen administered by appropriate delivery if ordered- Initiate smoking cessation education as indicated- Encourage broncho-pulmonary hygiene including cough, deep breathe, Incentive Spirometry- Assess the need for suctioning and aspirate as needed- Assess and instruct to report SOB or any respiratory difficulty- Respiratory Therapy support as indicated  Outcome: Progressing     Problem: GASTROINTESTINAL - ADULT  Goal: Minimal or absence of nausea and/or vomiting  Description: INTERVENTIONS:- Administer IV fluids if ordered to ensure adequate hydration- Maintain NPO status until nausea and vomiting are resolved- Nasogastric tube if ordered- Administer ordered antiemetic medications as needed- Provide nonpharmacologic comfort measures as appropriate- Advance diet as tolerated, if ordered- Consider nutrition services referral to assist patient with adequate nutrition and appropriate food choices  Outcome: Progressing  Goal: Maintains or returns to baseline bowel function  Description:  INTERVENTIONS:- Assess bowel function- Encourage oral fluids to ensure adequate hydration- Administer IV fluids if ordered to ensure adequate hydration- Administer ordered medications as needed- Encourage mobilization and activity- Consider nutritional services referral to assist patient with adequate nutrition and appropriate food choices  Outcome: Progressing  Goal: Maintains adequate nutritional intake  Description: INTERVENTIONS:- Monitor percentage of each meal consumed- Identify factors contributing to decreased intake, treat as appropriate- Assist with meals as needed- Monitor I&O, weight, and lab values if indicated- Obtain nutrition services referral as needed  Outcome: Progressing     Problem: METABOLIC, FLUID AND ELECTROLYTES - ADULT  Goal: Electrolytes maintained within normal limits  Description: INTERVENTIONS:- Monitor labs and assess patient for signs and symptoms of electrolyte imbalances- Administer electrolyte replacement as ordered- Monitor response to electrolyte replacements, including repeat lab results as appropriate- Instruct patient on fluid and nutrition as appropriate  Outcome: Progressing  Goal: Fluid balance maintained  Description: INTERVENTIONS:- Monitor labs - Monitor I/O and WT- Instruct patient on fluid and nutrition as appropriate- Assess for signs & symptoms of volume excess or deficit  Outcome: Progressing

## 2025-03-23 NOTE — QUICK NOTE
"Notified by primary RN Aimee that patient is refusing remainder of MiraLAX bowel prep and enema stating that it \"does not work.\"     Went to room to speak with patient, encouraged her to complete Miralax bowel prep.  Explained to her that it is likely not working because she did not have the total dose.  Still has about half of the miralax powder in the bottle to be mixed.     Encouraged her to allow nursing to administer mineral oil enema, now agreeable to enema and remainder of miralax bowel prep. If does not produce bowel movement following completion of this, will order GoLytely and patient can drink until starts having BM.   "

## 2025-03-23 NOTE — ASSESSMENT & PLAN NOTE
POA, unclear etiology. With associated generalized fatigue and weakness. Recently admitted and treated for PNA. D/c home with home therapies. During recent admission noted to have New 5 mm right upper lobe nodule, Lytic osseous lesions suspicious for metastasis (known hx of breast CA).   Labs unremarkable, procal negative. D dimer negative.   Flu/COVID/RSV negative   Does not appear grossly volume overloaded. Hx of asthma but without signs of exacerbation.   CXR negative for acute findings  Echo with normal EF, G1DD. Mild mitral regurg.   Patient had inquired about hospice cares but after further discussion would like to hold off on hospice for now but would consider in the future.  She meets with her oncologist next week and plans to inform her team that she does not wish for any further workup/treatment.  Could consider hospice in the future.  PT/OT cleared for home with home services, pending bowel movement patient will be medically stable.

## 2025-03-23 NOTE — PROGRESS NOTES
Progress Note - Hospitalist   Name: Barbra De Oliveira 89 y.o. female I MRN: [id number]  Unit/Bed#: CW2 215-02 I Date of Admission: 3/21/2025   Date of Service: 3/23/2025 I Hospital Day: 1     Assessment & Plan  Dyspnea  POA, unclear etiology. With associated generalized fatigue and weakness. Recently admitted and treated for PNA. D/c home with home therapies. During recent admission noted to have New 5 mm right upper lobe nodule, Lytic osseous lesions suspicious for metastasis (known hx of breast CA).   Labs unremarkable, procal negative. D dimer negative.   Flu/COVID/RSV negative   Does not appear grossly volume overloaded. Hx of asthma but without signs of exacerbation.   CXR negative for acute findings  Echo with normal EF, G1DD. Mild mitral regurg.   Patient had inquired about hospice cares but after further discussion would like to hold off on hospice for now but would consider in the future.  She meets with her oncologist next week and plans to inform her team that she does not wish for any further workup/treatment.  Could consider hospice in the future.  PT/OT cleared for home with home services, pending bowel movement patient will be medically stable.  Constipation  Patient reports no significant BM in appx 2 weeks. Patient with + BS/flatus, significant abdominal distention noted, possibly contributing to SOB?  Given Relistor and lactulose 3/21  Ordered MiraLAX bowel prep 3/22, patient only had about a quarter of this.  Discussed with nursing, patient to complete rest of MiraLAX bowel prep along with mineral oil enema and additional dose of Relistor.   If no improvement, could consider GoLytely  CLD for now  Monitor   Essential hypertension  Urgency noted on admission, now improved.   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  Continue SSI  Diabetic diet  Monitor accuchecks and adjust regimen PRN  Hypoglycemia protocol    Uncomplicated opioid dependence (HCC)  In setting of chronic cancer pain  Continue MSIR 15 mg Q8 PRN as per PTA  Malignant neoplasm of upper-outer quadrant of left breast in female, estrogen receptor positive (HCC)  On Arimidex, can continue.   Has upcoming follow up with [facility] oncology  On most recent admission concerning for recurring malignancy and possible metastasis given new pulm nodule and lytic osseous lesion.   Pt continues to decline any further oncology workup, eval or intervention  Chronic diastolic (congestive) heart failure (HCC)  Wt Readings from Last 3 Encounters:   03/21/25 68.9 kg (152 lb)   03/05/25 68.9 kg (152 lb)   03/02/25 71.2 kg (157 lb)   Appears euvolemic on exam  Updated echo with preserved ejection fraction, grade 1 diastolic dysfunction.  Mild mitral regurg.  Continue BB, not on diuretic at baseline  Monitor       VTE Pharmacologic Prophylaxis:   Moderate Risk (Score 3-4) - Pharmacological DVT Prophylaxis Ordered: enoxaparin (Lovenox).    Mobility:   Basic Mobility Inpatient Raw Score: 22  JH-HLM Goal: 7: Walk 25 feet or more  JH-HLM Achieved: 7: Walk 25 feet or more  JH-HLM Goal achieved. Continue to encourage appropriate mobility.    Patient Centered Rounds: I performed bedside rounds with nursing staff today.   Discussions with Specialists or Other Care Team Provider: nursing    Education and Discussions with Family / Patient: Updated  () via phone.    Current Length of Stay: 1 day(s)  Current Patient Status: Inpatient   Certification Statement: The patient will continue to require additional inpatient hospital stay due to ongoing constipation requiring bowel prep  Discharge Plan: Anticipate discharge in 24-48 hrs to  "home with home services.    Code Status: Level 3 - DNAR and DNI    Subjective   Still no significant bowel movement, states she had one small \"ball\" about the size of a quarter. Passing flatus. No abdominal pain, nausea or vomiting.     Objective :  Temp:  [97.5 °F (36.4 °C)-98.3 °F (36.8 °C)] 97.9 °F (36.6 °C)  HR:  [64-80] 75  BP: (111-155)/(54-86) 136/56  Resp:  [14-18] 14  SpO2:  [90 %-95 %] 90 %  O2 Device: None (Room air)    Body mass index is 27.8 kg/m².     Input and Output Summary (last 24 hours):     Intake/Output Summary (Last 24 hours) at 3/23/2025 0886  Last data filed at 3/23/2025 0533  Gross per 24 hour   Intake 180 ml   Output --   Net 180 ml       Physical Exam  Vitals and nursing note reviewed.   Constitutional:       General: She is not in acute distress.     Appearance: She is obese.      Comments: On RA   Cardiovascular:      Rate and Rhythm: Normal rate.   Pulmonary:      Breath sounds: Normal breath sounds.   Abdominal:      General: Bowel sounds are normal. There is distension.      Palpations: Abdomen is soft.      Tenderness: There is no abdominal tenderness.   Skin:     General: Skin is warm.   Neurological:      Mental Status: She is alert and oriented to person, place, and time. Mental status is at baseline.   Psychiatric:         Mood and Affect: Mood normal.           Lines/Drains:              Lab Results: I have reviewed the following results:   Results from last 7 days   Lab Units 03/22/25  0603 03/21/25  0351   WBC Thousand/uL 7.66 7.73   HEMOGLOBIN g/dL 14.0 14.1   HEMATOCRIT % 45.0 43.1   PLATELETS Thousands/uL 232 249   SEGS PCT %  --  56   LYMPHO PCT %  --  33   MONO PCT %  --  8   EOS PCT %  --  3     Results from last 7 days   Lab Units 03/22/25  0603 03/21/25  0351   SODIUM mmol/L 138 138   POTASSIUM mmol/L 4.1 4.1   CHLORIDE mmol/L 106 106   CO2 mmol/L 25 23   BUN mg/dL 16 18   CREATININE mg/dL 1.08 1.10   ANION GAP mmol/L 7 9   CALCIUM mg/dL 10.0 10.0   ALBUMIN g/dL  --  " 3.8   TOTAL BILIRUBIN mg/dL  --  0.85   ALK PHOS U/L  --  136*   ALT U/L  --  11   AST U/L  --  16   GLUCOSE RANDOM mg/dL 100 149*     Results from last 7 days   Lab Units 03/21/25  0351   INR  1.20*     Results from last 7 days   Lab Units 03/23/25  0627 03/22/25  2020 03/22/25  1615 03/22/25  1032 03/22/25  0626 03/21/25  2102 03/21/25  1653 03/21/25  1220 03/21/25  0627   POC GLUCOSE mg/dl 94 112 94 148* 103 104 91 100 118     Results from last 7 days   Lab Units 03/21/25  0630   HEMOGLOBIN A1C % 5.7*     Results from last 7 days   Lab Units 03/21/25  0351   PROCALCITONIN ng/ml 0.06       Recent Cultures (last 7 days):         Imaging Results Review: No pertinent imaging studies reviewed.  Other Study Results Review: No additional pertinent studies reviewed.    Last 24 Hours Medication List:     Current Facility-Administered Medications:     acetaminophen (TYLENOL) tablet 650 mg, Q6H PRN    albuterol inhalation solution 2.5 mg, Q4H PRN    anastrozole (ARIMIDEX) tablet 1 mg, Daily    benzonatate (TESSALON PERLES) capsule 200 mg, TID PRN    Cholecalciferol (VITAMIN D3) tablet 1,000 Units, Daily    docusate sodium (COLACE) capsule 100 mg, BID    enoxaparin (LOVENOX) subcutaneous injection 40 mg, Daily    fluticasone (ARNUITY ELLIPTA) 100 MCG/ACT inhaler 1 puff, Daily    hydrALAZINE (APRESOLINE) injection 10 mg, Q6H PRN    hydrOXYzine HCL (ATARAX) tablet 10 mg, Q6H PRN    insulin lispro (HumALOG/ADMELOG) 100 units/mL subcutaneous injection 1-6 Units, HS    insulin lispro (HumALOG/ADMELOG) 100 units/mL subcutaneous injection 2-12 Units, TID AC **AND** Fingerstick Glucose (POCT), TID AC    metoprolol tartrate (LOPRESSOR) tablet 50 mg, Q12H RAPHAEL    mineral oil enema 1 enema, Once    morphine (MSIR) IR tablet 15 mg, Q8H PRN    oxybutynin (DITROPAN-XL) 24 hr tablet 5 mg, Daily    pantoprazole (PROTONIX) EC tablet 40 mg, Early Morning    polyethylene glycol (GOLYTELY) bowel prep 4,000 mL, Once    pravastatin (PRAVACHOL)  tablet 20 mg, Daily With Dinner    senna (SENOKOT) tablet 8.6 mg, Daily    Administrative Statements   Today, Patient Was Seen By: JOSE Torre      **Please Note: This note may have been constructed using a voice recognition system.**

## 2025-03-23 NOTE — ASSESSMENT & PLAN NOTE
Lab Results   Component Value Date    HGBA1C 5.7 (H) 03/21/2025       Recent Labs     03/22/25  1032 03/22/25  1615 03/22/25 2020 03/23/25  0627   POCGLU 148* 94 112 94   on [place] U-300 50-60 units nightly per patient  Given borderline BG, basal insulin held.  Continue SSI  Diabetic diet  Monitor accuchecks and adjust regimen PRN  Hypoglycemia protocol

## 2025-03-23 NOTE — PLAN OF CARE
Problem: Potential for Falls  Goal: Patient will remain free of falls  Description: INTERVENTIONS:- Educate patient/family on patient safety including physical limitations- Instruct patient to call for assistance with activity - Consult OT/PT to assist with strengthening/mobility - Keep Call bell within reach- Keep bed low and locked with side rails adjusted as appropriate- Keep care items and personal belongings within reach- Initiate and maintain comfort rounds- Make Fall Risk Sign visible to staff- Offer Toileting every 2 Hours, in advance of need  - Initiate/Maintain bed alarm- Obtain necessary fall risk management equipment:   - Apply yellow socks and bracelet for high fall risk patients- Consider moving patient to room near nurses station  Outcome: Progressing     Problem: DISCHARGE PLANNING  Goal: Discharge to home or other facility with appropriate resources  Description: INTERVENTIONS:- Identify barriers to discharge w/patient and caregiver- Arrange for needed discharge resources and transportation as appropriate- Identify discharge learning needs (meds, wound care, etc.)- Arrange for interpretive services to assist at discharge as needed- Refer to Case Management Department for coordinating discharge planning if the patient needs post-hospital services based on physician/advanced practitioner order or complex needs related to functional status, cognitive ability, or social support system  Outcome: Progressing     Problem: METABOLIC, FLUID AND ELECTROLYTES - ADULT  Goal: Electrolytes maintained within normal limits  Description: INTERVENTIONS:- Monitor labs and assess patient for signs and symptoms of electrolyte imbalances- Administer electrolyte replacement as ordered- Monitor response to electrolyte replacements, including repeat lab results as appropriate- Instruct patient on fluid and nutrition as appropriate  Outcome: Progressing

## 2025-03-24 ENCOUNTER — HOME CARE VISIT (OUTPATIENT)
Dept: HOME HEALTH SERVICES | Facility: HOME HEALTHCARE | Age: 89
End: 2025-03-24
Payer: MEDICARE

## 2025-03-24 VITALS
SYSTOLIC BLOOD PRESSURE: 124 MMHG | DIASTOLIC BLOOD PRESSURE: 61 MMHG | WEIGHT: 152 LBS | BODY MASS INDEX: 27.97 KG/M2 | HEART RATE: 75 BPM | HEIGHT: 62 IN | TEMPERATURE: 97.9 F | OXYGEN SATURATION: 92 % | RESPIRATION RATE: 17 BRPM

## 2025-03-24 LAB
GLUCOSE SERPL-MCNC: 102 MG/DL (ref 65–140)
GLUCOSE SERPL-MCNC: 98 MG/DL (ref 65–140)

## 2025-03-24 PROCEDURE — 82948 REAGENT STRIP/BLOOD GLUCOSE: CPT

## 2025-03-24 PROCEDURE — 99239 HOSP IP/OBS DSCHRG MGMT >30: CPT | Performed by: INTERNAL MEDICINE

## 2025-03-24 RX ADMIN — FLUTICASONE FUROATE 1 PUFF: 100 POWDER RESPIRATORY (INHALATION) at 08:54

## 2025-03-24 RX ADMIN — Medication 1000 UNITS: at 08:54

## 2025-03-24 RX ADMIN — ENOXAPARIN SODIUM 40 MG: 40 INJECTION SUBCUTANEOUS at 08:54

## 2025-03-24 RX ADMIN — SENNOSIDES 8.6 MG: 8.6 TABLET, FILM COATED ORAL at 08:54

## 2025-03-24 RX ADMIN — OXYBUTYNIN CHLORIDE 5 MG: 5 TABLET, EXTENDED RELEASE ORAL at 08:54

## 2025-03-24 RX ADMIN — DOCUSATE SODIUM 100 MG: 100 CAPSULE, LIQUID FILLED ORAL at 08:54

## 2025-03-24 RX ADMIN — PANTOPRAZOLE SODIUM 40 MG: 40 TABLET, DELAYED RELEASE ORAL at 05:00

## 2025-03-24 RX ADMIN — METOPROLOL TARTRATE 50 MG: 50 TABLET, FILM COATED ORAL at 08:54

## 2025-03-24 RX ADMIN — ANASTROZOLE 1 MG: 1 TABLET, COATED ORAL at 10:43

## 2025-03-24 NOTE — PROGRESS NOTES
Patient:    MRN:  [id number]    Brice Request ID:  [id number]    Level of care reserved:  Home Health Agency    Partner Reserved:  [facility] [place], [place] [address] [phone]    Clinical needs requested:    Geography searched:  [phone]    Start of Service:    Request sent:  8:58am EDT on 3/24/2025 by Lupe Lawler    Partner reserved:  9:08am EDT on 3/24/2025 by Lupe Lawler    Choice list shared:  9:07am EDT on 3/24/2025 by Lupe Lawler

## 2025-03-24 NOTE — DISCHARGE SUMMARY
Pre op cleaning solution provided at appointment:     Hibiclens  PreOperative Showering Instructions      Showering with Hibiclens antibacterial soap prior to surgery decreases the chance of infection. Take a shower the night before and the morning of your surgery. Wash your entire body from the neck down. Use one packet of Hibiclens for each shower.    SHOWER THE NIGHT BEFORE SURGERY:  Wet your body from the neck down with warm water.  Wash your body from the neck down with 1/2 of the packet of Hibiclens.  Rinse thoroughly.  Repeat steps 1-3 using the remainder of the packet.  Dry your body with a clean, freshly laundered towel and put on freshly laundered clothing.      SHOWER THE MORNING OF SURGERY:  Repeat steps 1-4 above for the second shower.  Dry your body with a clean, freshly laundered towel and put on freshly laundered clothing.    WARNING:  Hibiclens soap is for external use only. Keep out of eyes, ears, and mouth. If Hibiclens should get into your eyes or ears, rinse the area out promptly and thoroughly with water.  Do not use Hibiclens to clean the genital area or deep skin wounds.      Discharge Summary - Hospitalist   Name: Barbra De Oliveira 89 y.o. female I MRN: [id number]  [facility] Date of Admission: 3/21/2025   Date of Service: 3/24/2025 I Hospital Day: 2     Assessment & Plan  Dyspnea  POA, unclear etiology. With associated generalized fatigue and weakness. Recently admitted and treated for PNA. D/c home with home therapies. During recent admission noted to have New 5 mm right upper lobe nodule, Lytic osseous lesions suspicious for metastasis (known hx of breast CA).   Labs unremarkable, procal negative. D dimer negative.   Flu/COVID/RSV negative   Does not appear grossly volume overloaded. Hx of asthma but without signs of exacerbation.   CXR negative for acute findings  Echo with normal EF, G1DD. Mild mitral regurg.   Patient had inquired about hospice cares but after further discussion would like to hold off on hospice for now but would consider in the future.  She meets with her oncologist next week and plans to inform her team that she does not wish for any further workup/treatment.  Could consider hospice in the future.  PT/OT cleared for home with home services, pending bowel movement patient will be medically stable.  Constipation  Patient reports no significant BM in appx 2 weeks. Patient with + BS/flatus, significant abdominal distention noted, possibly contributing to SOB?  Given Relistor and lactulose 3/21  Ordered MiraLAX bowel prep 3/22, patient only had about a quarter of this.  Discussed with nursing, patient to complete rest of MiraLAX bowel prep along with mineral oil enema and additional dose of Relistor.   If no improvement, could consider GoLytely  CLD for now  Monitor   Essential hypertension  Urgency noted on admission, now improved.   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  Continue SSI  Diabetic diet  Monitor accuchecks and adjust regimen PRN  Hypoglycemia protocol    Uncomplicated opioid dependence (HCC)  In setting of chronic cancer pain  Continue MSIR 15 mg Q8 PRN as per PTA  Malignant neoplasm of upper-outer quadrant of left breast in female, estrogen receptor positive (HCC)  On Arimidex, can continue.   Has upcoming follow up with [facility] oncology  On most recent admission concerning for recurring malignancy and possible metastasis given new pulm nodule and lytic osseous lesion.   Pt continues to decline any further oncology workup, eval or intervention  Chronic diastolic (congestive) heart failure (HCC)  Wt Readings from Last 3 Encounters:   03/21/25 68.9 kg (152 lb)   03/05/25 68.9 kg (152 lb)   03/02/25 71.2 kg (157 lb)   Appears euvolemic on exam  Updated echo with preserved ejection fraction, grade 1 diastolic dysfunction.  Mild mitral regurg.  Continue BB, not on diuretic at baseline  Monitor        Medical Problems       Resolved Problems  Date Reviewed: 3/24/2025   None       Discharging Physician / Practitioner: Juan Alberto Quevedo DO  PCP: Osiel Eid MD  Admission Date:   Admission Orders (From admission, onward)       Ordered        03/22/25 1053  INPATIENT ADMISSION  Once            03/21/25 0528  Place in Observation  Once                          Discharge Date: 03/24/25        Reason for Admission: weakness, dyspnea    Hospital Course:   Barbra De Oliveira is a 89 y.o. female patient who originally presented to the hospital on 3/21/2025 due to dyspnea and generalized fatigue and weakness in the setting of recently treated pneumonia and known lytic lesions concerning for metastasis in the setting of breast cancer, however patient deferring further workup from an oncology standpoint, she was treated for constipation  "and with improvement was discharged home          Please see above list of diagnoses and related plan for additional information.     Condition at Discharge: stable    Discharge Day Visit / Exam:   Subjective:  patient denies any acute complaints  Vitals: Blood Pressure: 124/61 (03/24/25 0845)  Pulse: 75 (03/24/25 0845)  Temperature: 97.9 °F (36.6 °C) (03/24/25 0845)  Temp Source: Oral (03/23/25 0732)  Respirations: 17 (03/24/25 0845)  Height: 5' 2\" (157.5 cm) (03/21/25 1440)  Weight - Scale: 68.9 kg (152 lb) (03/21/25 1440)  SpO2: 92 % (03/24/25 0845)  Physical Exam  Vitals and nursing note reviewed.   Constitutional:       General: She is not in acute distress.     Appearance: She is well-developed. She is not toxic-appearing or diaphoretic.   HENT:      Head: Normocephalic and atraumatic.   Eyes:      General: No scleral icterus.     Conjunctiva/sclera: Conjunctivae normal.   Cardiovascular:      Rate and Rhythm: Normal rate and regular rhythm.      Heart sounds: No murmur heard.     No friction rub. No gallop.   Pulmonary:      Effort: Pulmonary effort is normal. No respiratory distress.      Breath sounds: Normal breath sounds. No stridor. No wheezing, rhonchi or rales.   Chest:      Chest wall: No tenderness.   Abdominal:      General: There is no distension.      Palpations: Abdomen is soft. There is no mass.      Tenderness: There is no abdominal tenderness. There is no guarding or rebound.      Hernia: No hernia is present.   Musculoskeletal:         General: No swelling or tenderness.      Cervical back: Neck supple.   Skin:     General: Skin is warm and dry.      Capillary Refill: Capillary refill takes less than 2 seconds.   Neurological:      Mental Status: She is alert and oriented to person, place, and time.   Psychiatric:         Mood and Affect: Mood normal.          Discussion with Family: Updated  (significant other) via phone.    Discharge instructions/Information to patient and " family:   See after visit summary for information provided to patient and family.      Provisions for Follow-Up Care:  See after visit summary for information related to follow-up care and any pertinent home health orders.      Mobility at time of Discharge:   Basic Mobility Inpatient Raw Score: 22  JH-HLM Goal: 7: Walk 25 feet or more  JH-HLM Achieved: 7: Walk 25 feet or more  HLM Goal achieved. Continue to encourage appropriate mobility.     Disposition:   Home    Planned Readmission: no    Discharge Medications:  See after visit summary for reconciled discharge medications provided to patient and/or family.      Administrative Statements   Discharge Statement:  I have spent a total time of  minutes in caring for this patient on the day of the visit/encounter. .    **Please Note: This note may have been constructed using a voice recognition system**

## 2025-03-24 NOTE — ASSESSMENT & PLAN NOTE
On Arimidex, can continue.   Has upcoming follow up with [facility] oncology  On most recent admission concerning for recurring malignancy and possible metastasis given new pulm nodule and lytic osseous lesion.   Pt continues to decline any further oncology workup, eval or intervention

## 2025-03-24 NOTE — CASE MANAGEMENT
Case Management Discharge Planning Note    Patient name Barbra De Oliveira  Location [facility] MRN [id number]  : 1935 Date 3/24/2025       Current Admission Date: 3/21/2025  Current Admission Diagnosis:Dyspnea   Patient Active Problem List    Diagnosis Date Noted Date Diagnosed    History of breast cancer 2025     Constipation 2025     Dyspnea 2025     History of left mastectomy 2023     Premature ventricular contractions 2023     Use of anastrozole (Arimidex) 2021     Atrial fibrillation, currently in sinus rhythm 2020     Chronic pain 2020     Malignant neoplasm of upper-outer quadrant of left breast in female, estrogen receptor positive (HCC) 2020     Migraine without aura and without status migrainosus, not intractable 2020     Mixed hyperlipidemia 2019     Chest pain 2018     Chronic diastolic (congestive) heart failure (HCC) 2018     Uncomplicated opioid dependence (HCC) 2016     Essential hypertension 2016     GERD without esophagitis 2016     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       LOS (days): 2  Geometric Mean LOS (GMLOS) (days):   Days to GMLOS:     OBJECTIVE:  Risk of Unplanned Readmission Score: 24.01         Current admission status: Inpatient   Preferred Pharmacy:   [id number] - [place], [place] - [address] [address]  Phone: [phone] Fax: [phone]    Primary Care Provider: Osiel Eid MD    Primary Insurance: MEDICARE  Secondary Insurance: [employer]    DISCHARGE DETAILS:    Discharge planning discussed with:: Pt  Freedom of Choice: Yes  Comments - Freedom of Choice: Pt was active with [facility]  CM contacted family/caregiver?: No- see comments (alert and oriented)  Were Treatment Team discharge recommendations reviewed with patient/caregiver?: Yes  Did patient/caregiver verbalize understanding of patient care  needs?: Yes  Were patient/caregiver advised of the risks associated with not following Treatment Team discharge recommendations?: Yes         Requested Home Health Care         Is the patient interested in HHC at discharge?: Yes  Home Health Discipline requested:: Nursing  Home Health Agency Name:: [facility]  Home Health Follow-Up Provider:: YOLANDA  Home Health Services Needed:: Post-Op Care and Assessment, Heart Failure Management  Homebound Criteria Met:: Uses an Assist Device (i.e. cane, walker, etc)  Supporting Clincal Findings:: Limited Endurance         Other Referral/Resources/Interventions Provided:  Interventions: HHC  Referral Comments: Pt was active with  [facility] prior to admission, pt does not wish to start hospice services at this time         Treatment Team Recommendation: Home with Home Health Care  Discharge Destination Plan:: Home with Home Health Care

## 2025-03-24 NOTE — ASSESSMENT & PLAN NOTE
Lab Results   Component Value Date    HGBA1C 5.7 (H) 03/21/2025       Recent Labs     03/23/25  1620 03/23/25  2057 03/24/25  0734 03/24/25  1202   POCGLU 115 99 102 98   on [place] U-300 50-60 units nightly per patient  Given borderline BG, basal insulin held.  Continue SSI  Diabetic diet  Monitor accuchecks and adjust regimen PRN  Hypoglycemia protocol

## 2025-03-25 ENCOUNTER — HOME CARE VISIT (OUTPATIENT)
Dept: HOME HEALTH SERVICES | Facility: HOME HEALTHCARE | Age: 89
End: 2025-03-25
Payer: MEDICARE

## 2025-04-07 NOTE — PROGRESS NOTES
Name: Barbra De Oliveira      : 1935      MRN: [id number]  Encounter Provider: Titus Portillo MD  Encounter Date: 2025   Encounter department: [facility]  :  Assessment & Plan  Malignant neoplasm of upper-outer quadrant of left breast in female, estrogen receptor positive (HCC)    This is a 89 y.o. c PMHx notable for L Breast HR+ Breast Cancer,  chronic lower back pain being seen in consultation for adjuvant endocrine therapy with likely recurrence of her breast cancer to the bone. Palliative care referral for pain and other palliation focus. She does not want to be treatment oriented at this time. 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 c PMHx notable for L Breast HR+ Breast Cancer,  chronic lower back pain being seen in consultation for adjuvant endocrine therapy with likely recurrence of her breast cancer to the bone. Palliative care referral for pain and other palliation focus. She does not want to be treatment oriented at this time. We will d/c the Anastrozole. Forego any further w/u as a result  Orders:    Ambulatory referral to Palliative Care; Future        No follow-ups on file.    History of Present Illness   No chief complaint on file.    2025 DEXA: L hip and femoral neck osteoporosis    2025 CT Chest w/o c: Lytic osseous lesions suspicious for metastases    Having more discomfort in her L rib, lower back that has been worsening over the past few weeks. Using a walker to ambulate.      Objective   There were no vitals taken for this visit.    Cancer Stage at diagnosis: IIIA    Referral Physician: No ref. provider found    Primary Care Physician:  Osiel Eid MD     Nickname: Barbra    Spouse: Dinh Norman ECOG: 3    Today's ECOG: 3 (walker)    Goals and Barriers:  Current Goal: Minimize effects of disease burden, extend life.    Barriers to accomplishing this: chronic lower back pain for the past 10 years+    Patient's Capacity to Self Care:  Patient is able to moderately self care      This is a 89 y.o. c PMHx notable for L Breast HR+ Breast Cancer,  chronic lower back pain being seen in consultation for adjuvant endocrine therapy        + Anterior surgical margins after the L mastectomy.     Discussion of decision making  Oncology history updated, accordingly, during this visit  Goals of care/patient communication  I discussed with the patient the clinical course leading up to their cancer diagnosis. I reviewed relevant office notes, imaging reports and pathology result as well.  I told the patient that this is a case of curable disease and what this means. We discussed that the goal of anti-cancer therapy is to provide best quality of life, extend overall survival, and progression free survival as shown in clinical trials. We also discussed that there might be a point when the cancer will no longer respond to this anti-neoplastic therapy.  I explained the risks/benefits of the proposed cancer therapy: Anastrozole and after discussion including understanding risks of possible life-threatening complications and therapy-related malignancy development, informed consent for blood products and treatment has been verbally obtained.  TNM/Staging At Diagnosis  Cancer Staging   Malignant neoplasm of upper-outer quadrant of left breast in female, estrogen receptor positive (HCC)  Staging form: Breast, AJCC 8th Edition  - Pathologic stage from 11/17/2020: Stage IIIA (rpT4b, pN1a, cM0, G2, ER+, ND+, HER2-) - Signed by JOSE Stark on 1/31/2024  Stage prefix: Recurrence  Multigene prognostic tests performed: None  Histologic grading system: 3 grade system  - Pathologic: Stage IIIA (pT4b, pN1a, cM0, G2, ER+, ND+, HER2-) - Unsigned  Method of lymph node assessment: Axillary lymph node dissection  Histologic grading system: 3 grade  system    1. Left breast cancer, diagnosed in 1992.      2. Local recurrence of breast cancer in her left breast, diagnosed in 1994.     3. Left chest wall and left axillary lymph node recurrent disease, grade 2, % positive, NM 90% positive, HER2 negative disease.  Diagnosed in November 2020 to her implant/reconstructed breast.     Disease Features/Tumor Markers/Genetics  Tumor Marker: n/a  Notable Path Features: noted  Treatment: Anastrozole  Other Supportive care:   Treatment Team Members  Surgeon: Dr. Em  Rad Onc  Palliative  Labs  Diagnostics  2/14/2025 DEXA: Osteoporosis. T score result at the total hip and femoral neck areas       Discussion of decision making    I personally reviewed the following lab results, the image studies, pathology, other specialty/physicians consult notes and recommendations, and outside medical records. I had a lengthy discussion with the patient and shared the work-up findings. We discussed the diagnosis and management plan as below. I spent 41 minutes reviewing the records (labs, clinician notes, outside records, medical history, ordering medicine/tests/procedures, monitoring of anti-neoplastic toxicities, interpreting the imaging/labs previously done) and coordination of care as well as direct time with the patient today, of which greater than 50% of the time was spent in counseling and coordination of care with the patient/family.      Plan/Labs  Palliative care referral for goals of care  Recommend Calcium 1200mg, Vit D 1000 IU daily, and weight bearing exercises  D/c Anastrozole        Follow Up: prn    All questions were answered to the patient's satisfaction during this encounter. The patient knows the contact information for our office and knows to reach out for any relevant concerns related to this encounter. They are to call for any temperature 100.4 or higher, new symptoms including but not restricted to shaking chills, decreased appetite, nausea, vomiting,  diarrhea, increased fatigue, shortness of breath or chest pain, confusion, and not feeling the strength to come to the clinic. For all other listed problems and medical diagnosis in their chart - they are managed by PCP and/or other specialists, which the patient acknowledges. Thank you very much for your consultation and making us a part of this patient's care. We are continuing to follow closely with you. Please do not hesitate to reach out to me with any additional questions or concerns.    Titus Portillo MD  Hematology & Medical Oncology Staff Physician             Disclaimer: This document was prepared using [employer] technology. If a word or phrase is confusing, or does not make sense, this is likely due to recognition error which was not discovered during this clinician's review. If you believe an error has occurred, please contact me through GLO service line for best?cation.      ONCOLOGY HISTORY OF PRESENT ILLNESS        Oncology History   Malignant neoplasm of upper-outer quadrant of left breast in female, estrogen receptor positive (HCC)   1992 Initial Diagnosis    Left breast cancer  Lumpectomy  RT      1994 Surgery    Left breast cancer recurrence  Mastectomy with immediate reconstruction     8/31/2020 Biopsy    Left breast ultrasound-guided biopsy  A. 1 - 2 o'clock  Invasive mammary carcinoma of no special type   Grade 2    AL 90  HER2 1+  Lymphovascular invasion not identified    B. Left axillary lymph node  Invasive mammary carcinoma of no special type  Grade 2    Concordant. No residual lymph node is identified in specimen B, favoring axillary extension of invasive carcinoma over lymph node metastasis. Breast mass measured 2.8cm on US and involved skin. Axillary mass measured 1.4cm. Right clear.     11/17/2020 Surgery    Left breast ultrasound-guided lumpectomy with HUGO  directed axillary dissection  Invasive carcinoma of no special type (ductal)  Grade 2  3.2  cm  Invasive carcinoma directly invades into the dermis or epidermis with skin ulceration  Carcinoma invades skeletal muscle  Lymphovascular invasion present  Anterior margin positive for invasive carcinoma  3/5 Lymph nodes with macrometastases (1.6 cm)  Anatomic Stage IIIB  Prognostic Stage IIIA    Immediate reconstruction with Dr. Chiu (implant removal and local flap)     11/17/2020 -  Cancer Staged    Staging form: Breast, AJCC 8th Edition  - Pathologic stage from 11/17/2020: Stage IIIA (rpT4b, pN1a, cM0, G2, ER+, TX+, HER2-) - Signed by JOSE Stark on 1/31/2024  Stage prefix: Recurrence  Multigene prognostic tests performed: None  Histologic grading system: 3 grade system       12/1/2020 -  Cancer Staged    Staging form: Breast, AJCC 8th Edition  - Pathologic: Stage IIIA (pT4b, pN1a, cM0, G2, ER+, TX+, HER2-) - Signed by Titus Portillo MD on 3/17/2025  Method of lymph node assessment: Axillary lymph node dissection  Histologic grading system: 3 grade system       12/21/2020 -  Hormone Therapy    Anastrozole 1 mg daily  Dr. 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 Never was pregnant as a result.      Having more discomfort in her L rib, lower back that has been worsening over the past few weeks. Using a walker to ambulate.        I have reviewed the relevant past medical, surgical, social and family history. I have also reviewed allergies and medications for this patient.    Review of Systems  Baseline weight: 170-175  lbs     A 10-point review of system was performed, pertinent positive and negative were detailed as above. Otherwise, the 10-point review of system was negative.      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) 12/01/2020    Chest pain, unspecified     CHF (congestive heart failure) (HCC)     Diabetes mellitus (HCC)     GERD (gastroesophageal reflux disease)     History of radiation exposure 1992    Hyperlipidemia     Hypertension     Irregular heart beat     Afib    Migraine     Obesity     Pericardial effusion     Pericarditis        Past Surgical History:   Procedure Laterality Date    ABDOMINAL ADHESION SURGERY      APPENDECTOMY      AUGMENTATION MAMMAPLASTY Left 1994    BACK SURGERY      BREAST LUMPECTOMY Left 1992    malignant    BREAST LUMPECTOMY Left 11/17/2020    Procedure: BREAST ULTRASOUND DIRECTED LUMPECTOMY (ULTRASOUND AT 1200);  Surgeon: Earl Em MD;  [facility];  Service: Surgical Oncology    BREAST SURGERY      CARDIAC SURGERY      Pericardial window    CHOLECYSTECTOMY      EYE SURGERY      FLAP LOCAL TRUNK Left 11/17/2020    Procedure: FLAP LOCAL TRUNK;  Surgeon: Ronnell Chiu MD;  [facility];  Service: Plastics    HYSTERECTOMY      IR DRAINAGE TUBE CHECK WITH SCLEROSIS  04/23/2021    IR DRAINAGE TUBE CHECK WITH SCLEROSIS  06/03/2021    IR DRAINAGE TUBE CHECK WITH SCLEROSIS  06/17/2021    IR DRAINAGE TUBE CHECK WITH SCLEROSIS  06/28/2021    IR DRAINAGE TUBE CHECK WITH SCLEROSIS  07/07/2021    IR DRAINAGE TUBE CHECK WITH SCLEROSIS  07/27/2021    IR DRAINAGE TUBE PLACEMENT  04/01/2021    IR DRAINAGE TUBE PLACEMENT WITH SCLEROSIS  05/14/2021    LYMPH NODE DISSECTION Left 11/17/2020    Procedure: HUGO  DIRECTED AXILLARY DISSECTION;  Surgeon: Earl Em MD;  [facility];  Service: Surgical Oncology    MASTECTOMY Left 1994     malignant    CT CELESTE-IMPLANT CAPSULECTOMY BREAST COMPLETE Left 11/17/2020    Procedure: BREAST CAPSULECTOMY;  Surgeon: Ronnell Chiu MD;  [facility];  Service: Plastics    CT REMOVAL INTACT BREAST IMPLANT Left 11/17/2020    Procedure: BREAST IMPLANT REMOVAL;  Surgeon: Ronnell Chiu MD;  [facility];  Service: Plastics    US BREAST NEEDLE LOC LEFT Left 11/02/2020    US GUIDED BREAST BIOPSY LEFT COMPLETE Left 08/31/2020    US GUIDED BREAST LYMPH NODE BIOPSY LEFT Left 08/31/2020    WRIST SURGERY         Family History   Problem Relation Age of Onset    Colon cancer Mother 55    Breast cancer Mother         Early 50's    Stroke Father     Emphysema Father     Leukemia Sister     Breast cancer Sister 65    ALS Brother        Social History     Socioeconomic History    Marital status: /Civil Union     Spouse name: Not on file    Number of children: Not on file    Years of education: Not on file    Highest education level: Not on file   Occupational History    Not on file   Tobacco Use    Smoking status: Never    Smokeless tobacco: Never   Vaping Use    Vaping status: Never Used   Substance and Sexual Activity    Alcohol use: Yes     Comment: social    Drug use: No    Sexual activity: Not Currently   Other Topics Concern    Not on file   Social History Narrative    Not on file     Social Drivers of Health     Financial Resource Strain: Not on file   Food Insecurity: No Food Insecurity (3/21/2025)    Nursing - Inadequate Food Risk Classification     Worried About Running Out of Food in the Last Year: Never true     Ran Out of Food in the Last Year: Never true     Ran Out of Food in the Last Year: Never true   Transportation Needs: No Transportation Needs (3/21/2025)    Nursing - Transportation Risk Classification     Lack of Transportation: Not on file     Lack of Transportation: No   Physical Activity: Not on file   Stress: Not on file   Social Connections: Not on file   Intimate Partner Violence:  Unknown (3/21/2025)    Nursing IPS     Feels Physically and Emotionally Safe: Not on file     Physically Hurt by Someone: Not on file     Humiliated or Emotionally Abused by Someone: Not on file     Physically Hurt by Someone: No     Hurt or Threatened by Someone: No   Housing Stability: Unknown (3/21/2025)    Nursing: Inadequate Housing Risk Classification     Has Housing: Not on file     Worried About Losing Housing: Not on file     Unable to Get Utilities: Not on file     Unable to Pay for Housing in the Last Year: No     Has Housin       Allergies   Allergen Reactions    Iodinated Contrast Media Anaphylaxis    Iodine - Food Allergy Anaphylaxis and Other (See Comments)    Povidone Iodine Anaphylaxis    Aspirin GI Bleeding and Other (See Comments)    Caffeine - Food Allergy Palpitations       Current Outpatient Medications   Medication Sig Dispense Refill    anastrozole (ARIMIDEX) 1 mg tablet Take 1 tablet (1 mg total) by mouth daily 90 tablet 3    Beclomethasone Dipropionate (QVAR IN) Inhale 2 puffs if needed (shortness of breath) Inhale 2 puffs as needed every 8 hours for shortness of breath      Cholecalciferol (D3 Adult) 25 MCG (1000 UT) CHEW Chew 1,000 Units daily. 1 tablet by mouth daily      dextromethorphan-guaiFENesin (ROBITUSSIN DM)  mg/5 mL syrup Take 10 mL by mouth every 4 (four) hours as needed for cough 118 mL 0    Droplet Pen Needles 32G X 4 MM MISC       EPINEPHrine (EPIPEN) 0.3 mg/0.3 mL SOAJ Inject 0.3 mL as directed Patient does not have in home      FLOVENT  MCG/ACT inhaler INHALE 2 PUFF BY INHALATION ROUTE 2 TIMES EVERY DAY  6    hydrocortisone 2.5 % cream Apply to rash in groin area twice daily      insulin aspart (NovoLOG) 100 Units/mL injection pen Novolog Flexpen U-100 Insulin aspart 100 unit/mL (3 mL) subcutaneous      insulin glargine (TOUJEO MAX) 300 units/mL CONCENTRATED U-300 injection pen (2-unit dial) Inject 40 Units under the skin daily at bedtime Patient takes  50-60 units daily      lansoprazole (PREVACID) 15 mg capsule Take 15 mg by mouth daily. Take 1 tablet by mouth daily  latest dci state 30 mg daily       Menaquinone-7 (K2 PO) Take 100 mcg by mouth daily. Take 1 tablet by mouth daily      metoprolol tartrate (LOPRESSOR) 50 mg tablet Take 50 mg by mouth every 12 (twelve) hours Take 1 tablet by mouth twice daily with meals      morphine (MSIR) 15 mg tablet Take 15 mg by mouth every 8 (eight) hours as needed for severe pain.      naloxone (NARCAN) 2 MG/2ML injection Inject 2 mL into a catheter in a vein once Patient not taking      NOVOFINE 32G X 6 MM MISC 2 (two) times a day As directed  5    nystatin (MYCOSTATIN) powder APPLY TO THE RASH IN THE GROIN ONCE DAILY      ONE TOUCH ULTRA TEST test strip TEST TWICE DAILY OR AS DIRECTED DX: E11.9  5    ONETOUCH DELICA LANCETS 33G MISC TEST TWICE DAILY OR AS DIRECTED  5    oxybutynin (DITROPAN XL) 15 MG 24 hr tablet Take 15 mg by mouth daily.      simvastatin (ZOCOR) 20 mg tablet Take 20 mg by mouth daily at bedtime.      solifenacin (VESICARE) 10 MG tablet Take 1 tablet (10 mg total) by mouth daily 90 tablet 0    VENTOLIN  (90 Base) MCG/ACT inhaler Inhale 2 puffs every 4 (four) hours as needed for shortness of breath Take 2 puffs inhalation every 4 hours as needed       No current facility-administered medications for this visit.       (Not in a hospital admission)      Objective:     24 Hour Vitals Assessment:     There were no vitals filed for this visit.      PHYSICIAN EXAM:    General: Appearance: alert, cooperative, no distress.  HEENT: Normocephalic, atraumatic. No scleral icterus. conjunctivae clear. EOMI.  Chest: No tenderness to palpation. No open wound noted.  Lungs: Clear to auscultation bilaterally, Respirations unlabored.  Cardiac: Regular rate and rhythm, +S1and S2  Abdomen: Soft, non-tender, non-distended. Bowel sounds are normal.  Extremities:  No edema, cyanosis, clubbing.  Skin: Skin color, turgor are  normal. No rashes.  Breast: deferred by pt  Lymphatics: no palpable supra-cervical, axillary, or inguinal adenopathy  Neurologic: Awake, Alert, and oriented, no gross focal deficits noted b/l.       DATA REVIEW:    Pathology Result:    Final Diagnosis   Date Value Ref Range Status   04/26/2023   Final    A. Polyp, Stomach/Small Intestine, antrum:  - Gastric adenoma, intestinal type.  See note   - No high-grade dysplasia or carcinoma seen  - Intestinal metaplasia present  - No adenoma present at the margin of biopsy    B. Polyp, Stomach/Small Intestine, fundus:  - Fragments of gastric hyperplastic polyp and fundic gland polyp. See note    NOTE A and B: Intradepartmental consultation was obtained.              04/20/2022   Final    A. Stomach, Gastric Antrum nodule - cold bx:  - Gastric adenoma, intestinal type, fragments.  - Negative for high grade dysplasia or carcinoma.  - Intestinal metaplasia confirmed with Alcian blue/PAS stain.    B. Polyp, Stomach/Small Intestine, Gastric polyp - cold bx:  - Gastric fundic gland polyp, fragments.  - Negative for dysplasia or carcinoma.     C. Esophagogastric junction, GE junction - cold bx:  - Benign gastric fundic mucosa with mild chronic inflammation and regenerative epithelial changes.  - No intestinal metaplasia/ dysplasia identified.     11/17/2020   Final    A. Soft Tissue, Other, section of left chest wall mass and axillary dissection :  - Invasive mammary carcinoma, NOS/ ductal carcinoma 3.2 x 3.1 x 1.0 cm , ulcerating into the skin ( slide A29), located in the 9-12 o'clock region  -The tumor invades skeletal muscle in few section  -Intraductal papilloma  -Skin with seborrhoic keratosis and small hemangioma (2 mm)  -Multiple foci of LVI seen  -Subcutaneous dense fibrous capsule  -Three of total five lymph nodes are positive for metastatic carcinoma with extranodal extension, largest deposit measures 1.6 cm    Margins  -The tumor involves (blue -inked soft tissue   margin)  anterior subcutaneous  soft tissue margin in the 9-12 o'clock quadrant (slide A30, 32)  -Tumor is located 0.5 mm of deep margin in 12-3 o'clock-yellow inked margin  The remaining margins are negative for tumor by wider range    Note  Despite extensive search of ymph nodes and use of fat dissolving solution only five lymph nodes are identifed.  Case discussed with Dr. JS Em on 11/30/20 at 10:30 AM       08/31/2020   Final    A. Breast, Left, left breast u/s guided needle biopsy 1-2 o'clock 14g 4 passes:  - Invasive mammary carcinoma of no special type (ductal). See Note.     -- Paula histologic grade 2 of 3 (total score: 6 of 9).         * Glandular (acinar) Tubular Differentiation < 10%, score 3.        * Nuclear Pleomorphism 2 of 3, score 2.        * Mitotic Rate < 3/mm2, (</= 7 mitoses/10HPF; 3 mitoses/10 HPF), score 1.    -- Confirmed by tumor cell immunophenotype:        * Positive: E-cadherin, P120 - each in a membranous pattern.        * Negative: p63, SMM[facility].    -- Invasive carcinoma involves 4 of 4 submitted core biopsies, max. Dimension= 11 mm.    -- Estrogen, Progesterone & HER2 receptor studies pending, to be described in an addendum.  - Small portion of skeletal muscle, cannot exclude involvement by invasive carcinoma; Pankeratin (CK) and GATA3     immunostains pending; results will be reported in an addendum.   - Ductal carcinoma in-situ (DCIS): Not identified.  - Lymphovascular invasion: Not identified.  - Microcalcifications: Present, rare associated with non-neoplastic breast tissue.  - Best representative tumor block: A1    -- Sufficient tumor present for        Agendia Mammaprint/Blueprint (1 cm2 of invasive tumor in aggregate): No.        MI Profile/Foundation One (at least 5 x 5 mm of tumor): Yes.    B. Axillary lymph node, left axillary lymph node, needle core biopsy, 16g 3 passes:  - Invasive mammary carcinoma of no special type (ductal). See Note.     -- Present in background  of fibrosclerosis and mature adipose tissue; no lymph node identified.     -- Paula histologic grade 2 of 3 (total score: 6 of 9)        * Glandular (acinar) Tubular Differentiation < 10%, score 3.        * Nuclear Pleomorphism 2 of 3, score 2.        * Mitotic Rate < 3/mm2, (</= 7 mitoses/10HPF; 2 mitoses/10 HPF), score 1.    -- Confirmed by positive Pankeratin (CK) and GATA3 immunostains.    -- Invasive carcinoma involves 2 of 3 submitted core biopsies, max. Dimension= 8 mm.  - Ductal carcinoma in-situ (DCIS): Not identified.  - Lymphovascular invasion: Not identified.  - Microcalcifications: Absent.  - Best representative tumor block: B2.    -- Sufficient tumor present for        Agendia Mammaprint/Blueprint (1 cm2 of invasive tumor in aggregate): No.        MI Profile/Foundation One (at least 5 x 5 mm of tumor): No.     12/19/2018   Final    A. Colon, cecum, polypectomy:             - Tubular adenoma.             - No high grade dysplasia or malignancy is identified.    B. Colon, transverse, polypectomy:             - Tubular adenoma.             - No high-grade dysplasia or malignancy is identified.     C. Colon sigmoid, polypectomy:             - Polypoid colonic mucosa with mildly hyperplastic features.             - No dysplasia or malignancy is identified.     Interpretation performed at [facility], [address] [place] [id number]      06/15/2018   Final    A. Stomach, biopsy:             - Oxyntic mucosa with mild chronic inactive gastritis and superficial fragments with foveolar hyperplasia, suggestive of hyperplastic polyp.             - No intestinal metaplasia, dysplasia or malignancy is identified.    B. Esophagus, distal, biopsy:             - Benign squamous mucosa with reactive features.             - Benign glandular mucosa with chronic inflammation.             - No intestinal metaplasia, dysplasia or malignancy is identified.    Interpretation performed at [facility], [address]  [facility] [address]           Image Results:   Image result are reviewed and documented in Hematology/Oncology history    US bedside procedure  [id number]  Echo complete w/ contrast if indicated    Left Ventricle: Left ventricular cavity size is normal. Wall thickness   is normal. The left ventricular ejection fraction is 60%. Systolic   function is normal. Wall motion is normal. Diastolic function is mildly   abnormal, consistent with grade I (abnormal) relaxation.    Aortic Valve: There is aortic valve sclerosis.    Mitral Valve: There is mild annular calcification.    Pulmonic Valve: There is mild regurgitation.  XR chest 2 views  Narrative: XR CHEST PA AND LATERAL    INDICATION: shortness of breath.    COMPARISON: Two-view chest 2/24/2025 and CT chest 1/28/2025    FINDINGS:    Improved aeration of the right lower lobe with minimal residual airspace opacity. Minimal linear right perihilar scar or subsegmental atelectasis. Small right pleural effusion. No pneumothorax.    Normal cardiac silhouette.  Aortic calcification is present.    Left upper lateral chest wall opacity corresponds to recently described osseous lytic lesion. Degenerative changes of bilateral shoulders and spine.    Normal upper abdomen.  Impression: Improved aeration of the right lower lobe with minimal residual airspace opacity.    Small right pleural effusion.    Workstation performed: [id number]      LABS:  Lab data are reviewed and documented in HemOnc history.       Lab Results   Component Value Date    HGB 14.0 03/22/2025    HCT 45.0 03/22/2025    MCV 92 03/22/2025     03/22/2025    WBC 7.66 03/22/2025    NRBC 0 03/21/2025     Lab Results   Component Value Date     01/30/2015    K 4.1 03/22/2025     03/22/2025    CO2 25 03/22/2025    ANIONGAP 7 01/30/2015    BUN 16 03/22/2025    CREATININE 1.08 03/22/2025    GLUCOSE 103 01/30/2015    GLUF 123 (H) 02/27/2024    CALCIUM 10.0  "03/22/2025    AST 16 03/21/2025    ALT 11 03/21/2025    ALKPHOS 136 (H) 03/21/2025    PROT 5.7 (L) 01/28/2015    PROT 6.3 (L) 01/28/2015    BILITOT 0.49 01/28/2015    EGFR 46 03/22/2025       No results found for: \"IRON\", \"TIBC\", \"FERRITIN\"    No results found for: \"LWCSWZPK56\"    No results for input(s): \"WBC\", \"CREAT\", \"PLT\" in the last 72 hours.    By:  Titus Portillo MD, 4/7/2025, 2:26 PM                                  "

## 2025-04-07 NOTE — ASSESSMENT & PLAN NOTE
This is a 89 y.o. c PMHx notable for L Breast HR+ Breast Cancer,  chronic lower back pain being seen in consultation for adjuvant endocrine therapy with likely recurrence of her breast cancer to the bone. Palliative care referral for pain and other palliation focus. She does not want to be treatment oriented at this time. We will d/c the Anastrozole

## 2025-04-17 ENCOUNTER — OFFICE VISIT (OUTPATIENT)
Dept: HEMATOLOGY ONCOLOGY | Facility: CLINIC | Age: 89
End: 2025-04-17
Payer: MEDICARE

## 2025-04-17 VITALS
WEIGHT: 152 LBS | BODY MASS INDEX: 27.97 KG/M2 | HEART RATE: 66 BPM | DIASTOLIC BLOOD PRESSURE: 70 MMHG | SYSTOLIC BLOOD PRESSURE: 126 MMHG | HEIGHT: 62 IN | TEMPERATURE: 97.1 F | RESPIRATION RATE: 16 BRPM | OXYGEN SATURATION: 97 %

## 2025-04-17 DIAGNOSIS — Z17.0 MALIGNANT NEOPLASM OF UPPER-OUTER QUADRANT OF LEFT BREAST IN FEMALE, ESTROGEN RECEPTOR POSITIVE (HCC): Primary | ICD-10-CM

## 2025-04-17 DIAGNOSIS — C50.912 CARCINOMA OF LEFT BREAST METASTATIC TO AXILLARY LYMPH NODE (HCC): ICD-10-CM

## 2025-04-17 DIAGNOSIS — C77.3 CARCINOMA OF LEFT BREAST METASTATIC TO AXILLARY LYMPH NODE (HCC): ICD-10-CM

## 2025-04-17 DIAGNOSIS — C50.412 MALIGNANT NEOPLASM OF UPPER-OUTER QUADRANT OF LEFT BREAST IN FEMALE, ESTROGEN RECEPTOR POSITIVE (HCC): Primary | ICD-10-CM

## 2025-04-17 PROCEDURE — 99215 OFFICE O/P EST HI 40 MIN: CPT | Performed by: INTERNAL MEDICINE

## 2025-04-17 PROCEDURE — G2211 COMPLEX E/M VISIT ADD ON: HCPCS | Performed by: INTERNAL MEDICINE

## 2025-04-17 RX ORDER — ANASTROZOLE 1 MG/1
1 TABLET ORAL DAILY
Qty: 90 TABLET | Refills: 3 | Status: SHIPPED | OUTPATIENT
Start: 2025-04-17 | End: 2025-04-17

## 2025-05-02 ENCOUNTER — CONSULT (OUTPATIENT)
Dept: PALLIATIVE MEDICINE | Facility: CLINIC | Age: 89
End: 2025-05-02
Attending: INTERNAL MEDICINE
Payer: MEDICARE

## 2025-05-02 VITALS
SYSTOLIC BLOOD PRESSURE: 138 MMHG | TEMPERATURE: 97.1 F | WEIGHT: 149.5 LBS | RESPIRATION RATE: 18 BRPM | HEART RATE: 68 BPM | BODY MASS INDEX: 27.51 KG/M2 | OXYGEN SATURATION: 98 % | DIASTOLIC BLOOD PRESSURE: 70 MMHG | HEIGHT: 62 IN

## 2025-05-02 DIAGNOSIS — C50.412 MALIGNANT NEOPLASM OF UPPER-OUTER QUADRANT OF LEFT BREAST IN FEMALE, ESTROGEN RECEPTOR POSITIVE (HCC): Primary | ICD-10-CM

## 2025-05-02 DIAGNOSIS — G89.3 CANCER RELATED PAIN: ICD-10-CM

## 2025-05-02 DIAGNOSIS — F11.20 UNCOMPLICATED OPIOID DEPENDENCE (HCC): ICD-10-CM

## 2025-05-02 DIAGNOSIS — C77.3 CARCINOMA OF LEFT BREAST METASTATIC TO AXILLARY LYMPH NODE (HCC): ICD-10-CM

## 2025-05-02 DIAGNOSIS — C50.912 CARCINOMA OF LEFT BREAST METASTATIC TO AXILLARY LYMPH NODE (HCC): ICD-10-CM

## 2025-05-02 DIAGNOSIS — Z51.5 PALLIATIVE CARE BY SPECIALIST: ICD-10-CM

## 2025-05-02 DIAGNOSIS — Z17.0 MALIGNANT NEOPLASM OF UPPER-OUTER QUADRANT OF LEFT BREAST IN FEMALE, ESTROGEN RECEPTOR POSITIVE (HCC): Primary | ICD-10-CM

## 2025-05-02 DIAGNOSIS — Z71.89 GOALS OF CARE, COUNSELING/DISCUSSION: ICD-10-CM

## 2025-05-02 DIAGNOSIS — G89.3 CHRONIC PAIN DUE TO NEOPLASM: Chronic | ICD-10-CM

## 2025-05-02 DIAGNOSIS — Z90.12 HISTORY OF LEFT MASTECTOMY: ICD-10-CM

## 2025-05-02 PROCEDURE — 99204 OFFICE O/P NEW MOD 45 MIN: CPT | Performed by: STUDENT IN AN ORGANIZED HEALTH CARE EDUCATION/TRAINING PROGRAM

## 2025-05-02 RX ORDER — OXYCODONE HYDROCHLORIDE 5 MG/1
5 TABLET ORAL EVERY 8 HOURS PRN
Qty: 30 TABLET | Refills: 0 | Status: SHIPPED | OUTPATIENT
Start: 2025-05-02

## 2025-05-02 NOTE — ASSESSMENT & PLAN NOTE
Orders:    naloxone (NARCAN) 4 mg/0.1 mL nasal spray; Administer 1 spray into a nostril. If no response after 2-3 minutes, give another dose in the other nostril using a new spray. For use in emergencies for opioid / pain medication reversal for accidental ingestion, respiratory depression, sedation or concerns for overdose.    oxyCODONE (Roxicodone) 5 immediate release tablet; Take 1 tablet (5 mg total) by mouth every 8 (eight) hours as needed for moderate pain Max Daily Amount: 15 mg

## 2025-05-02 NOTE — ASSESSMENT & PLAN NOTE
Psychosocial   Supportive listening provided  Normalized experience of patient/family  Provided anxiety containment     Referrals Placed / Medical Equipment Ordered  -None    Follow-Up Recommendations  -Follow-up with PCP and current medical specialists  -Follow-up with palliative care: 4 weeks for medication recheck    Orders:    naloxone (NARCAN) 4 mg/0.1 mL nasal spray; Administer 1 spray into a nostril. If no response after 2-3 minutes, give another dose in the other nostril using a new spray. For use in emergencies for opioid / pain medication reversal for accidental ingestion, respiratory depression, sedation or concerns for overdose.    oxyCODONE (Roxicodone) 5 immediate release tablet; Take 1 tablet (5 mg total) by mouth every 8 (eight) hours as needed for moderate pain Max Daily Amount: 15 mg

## 2025-05-02 NOTE — ASSESSMENT & PLAN NOTE
Summary of plan:  1) Discontinue morphine-patient states understanding to stop the morphine at this time.  2) rotate to Oxy IR 5 mg every 8 hours as needed  3) bowel regimen to avoid OIC-as above    ----------------  Following Medical Oncology - Dr. Portillo  Patient does not wish to pursue cancer directed treatments at this time.    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    Patient has been prescribed MSIR 15mg for which she does not take daily and only sparingly for when her pain is intolerable.  She feels the MSIR has been less effective for her.  Though she is not taking it consistently, each dose is not as effective as it used to be.    On review of her renal function, last renal panel with Cr of 1.08, GFR of 46, Calculated CrCl of 34 mL/min.  -reviewed option to rotate from MSIR to OxyIR given CrCl and potential for when kidney function may be worse, that there is a potential for a high retention of morphine concentration in the system.    Given that the patient has had Percocet in the past and tolerated without any side effects.  She is open to rotation to oxycodone 5 mg every 8 hours.    Bowel regimen to avoid OIC.  She states she has had a extensive history with constipation that does not necessarily respond to her typical laxatives.  She does follow GI for which she has been on Linzess among other medications like GoLytely to help with constipation.  She will continue with following her GI specialist for her chronic GI issues as well.    Medication safety issues - Do not drive under the influence of narcotics (including opioids), watch for adverse effects including confusion / altered mental status / respiratory depression (slowed breathing), keep medications stored in a safe/locked environment, do not use alcohol while opioids or other narcotics are in your system. Do not travel with more than the minimum number of tablets or  capsules required for the trip.    Reviewed palliative care opioid agreement and stipulations regarding medication compliance.  Patient denies any drug use or EtOH use at this time.  Patient does like to go to the casino as enjoyment.  Advised patient not to use any alcohol products while on opioid therapy.    Orders:    Ambulatory referral to Palliative Care    naloxone (NARCAN) 4 mg/0.1 mL nasal spray; Administer 1 spray into a nostril. If no response after 2-3 minutes, give another dose in the other nostril using a new spray. For use in emergencies for opioid / pain medication reversal for accidental ingestion, respiratory depression, sedation or concerns for overdose.    oxyCODONE (Roxicodone) 5 immediate release tablet; Take 1 tablet (5 mg total) by mouth every 8 (eight) hours as needed for moderate pain Max Daily Amount: 15 mg

## 2025-05-02 NOTE — PATIENT INSTRUCTIONS
It was a pleasure speaking with you today.    As discussed:  -Continue your medications as prescribed.  -Continue with a bowel regimen to avoid constipation.  Follow-up in: 3-4 weeks    Please do not hesitate to call me sooner should you have any questions/concerns or needs.    Medication safety issues - Do not drive under the influence of narcotics (including opioids), watch for adverse effects including confusion / altered mental status / respiratory depression (slowed breathing), keep medications stored in a safe/locked environment, do not use alcohol while opioids or other narcotics are in your system. Do not travel with more than the minimum number of tablets or capsules required for the trip.    ER Precautions  Watch for red flag symptoms including, but not limited to fevers, chills, chest pain, shortness of breath, intractable nausea/vomiting/diarrhea, or acute intractable pain (especially if pain is new or has changed).

## 2025-05-02 NOTE — PROGRESS NOTES
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  2) rotate to Oxy IR 5 mg every 8 hours as needed  3) bowel regimen to avoid OIC-as above    ----------------  Following Medical Oncology - Dr. Portillo  Patient does not wish to pursue cancer directed treatments at this time.    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    Patient has been prescribed MSIR 15mg for which she does not take daily and only sparingly for when her pain is intolerable.  She feels the MSIR has been less effective for her.  Though she is not taking it consistently, each dose is not as effective as it used to be.    On review of her renal function, last renal panel with Cr of 1.08, GFR of 46, Calculated CrCl of 34 mL/min.  -reviewed option to rotate from MSIR to OxyIR given CrCl and potential for when kidney function may be worse, that there is a potential for a high retention of morphine concentration in the system.    Given that the patient has had Percocet in the past and tolerated without any side effects.  She is open to rotation to oxycodone 5 mg every 8 hours.    Bowel regimen to avoid OIC.  She states she has had a extensive history with constipation that does not necessarily respond to her typical laxatives.  She does follow GI for which she has been on Linzess among other medications like GoLytely to help with constipation.  She will continue with following her GI specialist for her chronic GI issues as well.    Medication safety issues - Do not drive under the influence of narcotics (including opioids), watch for adverse effects  including confusion / altered mental status / respiratory depression (slowed breathing), keep medications stored in a safe/locked environment, do not use alcohol while opioids or other narcotics are in your system. Do not travel with more than the minimum number of tablets or capsules required for the trip.    Reviewed palliative care opioid agreement and stipulations regarding medication compliance.  Patient denies any drug use or EtOH use at this time.  Patient does like to go to the casino as enjoyment.  Advised patient not to use any alcohol products while on opioid therapy.    Orders:    Ambulatory referral to Palliative Care    naloxone (NARCAN) 4 mg/0.1 mL nasal spray; Administer 1 spray into a nostril. If no response after 2-3 minutes, give another dose in the other nostril using a new spray. For use in emergencies for opioid / pain medication reversal for accidental ingestion, respiratory depression, sedation or concerns for overdose.    oxyCODONE (Roxicodone) 5 immediate release tablet; Take 1 tablet (5 mg total) by mouth every 8 (eight) hours as needed for moderate pain Max Daily Amount: 15 mg    Malignant neoplasm of upper-outer quadrant of left breast in female, estrogen receptor positive (HCC)    Orders:    naloxone (NARCAN) 4 mg/0.1 mL nasal spray; Administer 1 spray into a nostril. If no response after 2-3 minutes, give another dose in the other nostril using a new spray. For use in emergencies for opioid / pain medication reversal for accidental ingestion, respiratory depression, sedation or concerns for overdose.    oxyCODONE (Roxicodone) 5 immediate release tablet; Take 1 tablet (5 mg total) by mouth every 8 (eight) hours as needed for moderate pain Max Daily Amount: 15 mg    Chronic pain due to neoplasm    Orders:    naloxone (NARCAN) 4 mg/0.1 mL nasal spray; Administer 1 spray into a nostril. If no response after 2-3 minutes, give another dose in the other nostril using a new spray. For use in  emergencies for opioid / pain medication reversal for accidental ingestion, respiratory depression, sedation or concerns for overdose.    oxyCODONE (Roxicodone) 5 immediate release tablet; Take 1 tablet (5 mg total) by mouth every 8 (eight) hours as needed for moderate pain Max Daily Amount: 15 mg    Palliative care by specialist  Psychosocial   Supportive listening provided  Normalized experience of patient/family  Provided anxiety containment     Referrals Placed / Medical Equipment Ordered  -None    Follow-Up Recommendations  -Follow-up with PCP and current medical specialists  -Follow-up with palliative care: 4 weeks for medication recheck    Orders:    naloxone (NARCAN) 4 mg/0.1 mL nasal spray; Administer 1 spray into a nostril. If no response after 2-3 minutes, give another dose in the other nostril using a new spray. For use in emergencies for opioid / pain medication reversal for accidental ingestion, respiratory depression, sedation or concerns for overdose.    oxyCODONE (Roxicodone) 5 immediate release tablet; Take 1 tablet (5 mg total) by mouth every 8 (eight) hours as needed for moderate pain Max Daily Amount: 15 mg    Cancer related pain  As above  - Oxy IR 5 mg every 8 hours.  DC morphine       Uncomplicated opioid dependence (HCC)         History of left mastectomy         Goals of care, counseling/discussion  Extensive goals of care discussion held today.  - Patient states she would like to focus on continued cares, however, does not wish to pursue any further cancer related treatments.  She she has seen her mother as well as her sister go through cancer as well as her own long journey with treatments for cancer.  She does not wish to pursue further aggressive interventions or treatments as well as the potential for making her worse or to worsen her quality of life.  At this time she would like to focus on her comfort and quality, however, not ready for hospice services.    We did discuss extensively  regarding the services entailed with hospice.  Reviewed RLOC services with hospice, role of hospice physician, nurses, and equipment.           Decisional apparatus: Patient is competent on my exam today. If competence is lost, patient's substitute decision maker would default to spouse by [place] Act 169.   Advance Directive / Living Will / POLST: On file     PDMP Review: I have reviewed the patient's controlled substance dispensing history in the Prescription Drug Monitoring Program in compliance with the [facility] regulations before prescribing any controlled substances.    History of Present Illness   Barbra De Oliveira is a 89 y.o. female who presents for consultation.    Palliative diagnosis -malignant neoplasm of the left breast, estrogen receptor positive    Patient is seen today in office. Alert, oriented, pleasantly conversant.  Patient is seen in NAD.    Appetite has been stable.    Pain - has increased particularly to her back which is likely correlated to lytic osseous lesions suspicious for metastasis, bilateral rib lytic lesions, dominant 3.3 x 2.1 cm lesion in the left fifth rib with extensive cortical erosion with a 2.7 x 1.6 cm expansile lytic lesion of the sternum.  There is also a right clavicular lytic lesion along with multiple spine and neck lytic lesions as per the CT of the chest from 2/24/2025.  Pain is rated today as an 8-9 out of 10.  She also has concurrent mouth pain due to needing dental work for ongoing issues with her teeth.    Patient is well supported by spouse and family at this time.      Medical History Reviewed by provider this encounter:  Tobacco  Allergies  Meds  Problems  Med Hx  Surg Hx  Fam Hx     .  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) 12/01/2020    Chest pain, unspecified     CHF (congestive heart  failure) (HCC)     Diabetes mellitus (HCC)     GERD (gastroesophageal reflux disease)     History of radiation exposure 1992    Hyperlipidemia     Hypertension     Irregular heart beat     Afib    Migraine     Obesity     Pericardial effusion     Pericarditis      Past Surgical History:   Procedure Laterality Date    ABDOMINAL ADHESION SURGERY      APPENDECTOMY      AUGMENTATION MAMMAPLASTY Left 1994    BACK SURGERY      BREAST LUMPECTOMY Left 1992    malignant    BREAST LUMPECTOMY Left 11/17/2020    Procedure: BREAST ULTRASOUND DIRECTED LUMPECTOMY (ULTRASOUND AT 1200);  Surgeon: Earl Em MD;  [facility];  Service: Surgical Oncology    BREAST SURGERY      CARDIAC SURGERY      Pericardial window    CHOLECYSTECTOMY      EYE SURGERY      FLAP LOCAL TRUNK Left 11/17/2020    Procedure: FLAP LOCAL TRUNK;  Surgeon: Ronnell Chiu MD;  [facility];  Service: Plastics    HYSTERECTOMY      IR DRAINAGE TUBE CHECK WITH SCLEROSIS  04/23/2021    IR DRAINAGE TUBE CHECK WITH SCLEROSIS  06/03/2021    IR DRAINAGE TUBE CHECK WITH SCLEROSIS  06/17/2021    IR DRAINAGE TUBE CHECK WITH SCLEROSIS  06/28/2021    IR DRAINAGE TUBE CHECK WITH SCLEROSIS  07/07/2021    IR DRAINAGE TUBE CHECK WITH SCLEROSIS  07/27/2021    IR DRAINAGE TUBE PLACEMENT  04/01/2021    IR DRAINAGE TUBE PLACEMENT WITH SCLEROSIS  05/14/2021    LYMPH NODE DISSECTION Left 11/17/2020    Procedure: HUGO  DIRECTED AXILLARY DISSECTION;  Surgeon: Earl Em MD;  [facility];  Service: Surgical Oncology    MASTECTOMY Left 1994    malignant    ME CELESTE-IMPLANT CAPSULECTOMY BREAST COMPLETE Left 11/17/2020    Procedure: BREAST CAPSULECTOMY;  Surgeon: Ronnell Chiu MD;  [facility];  Service: Plastics    ME REMOVAL INTACT BREAST IMPLANT Left 11/17/2020    Procedure: BREAST IMPLANT REMOVAL;  Surgeon: Ronnell Chiu MD;  [facility];  Service: Plastics    US BREAST NEEDLE LOC LEFT Left 11/02/2020    US GUIDED BREAST BIOPSY LEFT COMPLETE Left  08/31/2020    US GUIDED BREAST LYMPH NODE BIOPSY LEFT Left 08/31/2020    WRIST SURGERY       Family History   Problem Relation Age of Onset    Colon cancer Mother 55    Breast cancer Mother         Early 50's    Stroke Father     Emphysema Father     Leukemia Sister     Breast cancer Sister 65    ALS Brother       reports that she has never smoked. She has never used smokeless tobacco. She reports current alcohol use. She reports that she does not use drugs.  Current Outpatient Medications   Medication Instructions    Beclomethasone Dipropionate (QVAR IN) 2 puffs, As needed    D3 Adult 1,000 Units, Daily    dextromethorphan-guaiFENesin (ROBITUSSIN DM)  mg/5 mL syrup 10 mL, Oral, Every 4 hours PRN    Droplet Pen Needles 32G X 4 MM MISC     EPINEPHrine (EPIPEN) 0.3 mg/0.3 mL SOAJ 0.3 mL, Injection, Patient does not have in home    FLOVENT  MCG/ACT inhaler INHALE 2 PUFF BY INHALATION ROUTE 2 TIMES EVERY DAY    hydrocortisone 2.5 % cream Apply to rash in groin area twice daily    insulin aspart (NovoLOG) 100 Units/mL injection pen Novolog Flexpen U-100 Insulin aspart 100 unit/mL (3 mL) subcutaneous    insulin glargine (TOUJEO MAX) 40 Units, Daily at bedtime    lansoprazole (PREVACID) 15 mg, Daily    Menaquinone-7 (K2 PO) 100 mcg, Daily    metoprolol tartrate (LOPRESSOR) 50 mg, Every 12 hours scheduled    naloxone (NARCAN) 4 mg/0.1 mL nasal spray Administer 1 spray into a nostril. If no response after 2-3 minutes, give another dose in the other nostril using a new spray. For use in emergencies for opioid / pain medication reversal for accidental ingestion, respiratory depression, sedation or concerns for overdose.    NOVOFINE 32G X 6 MM MISC 2 times daily    nystatin (MYCOSTATIN) powder APPLY TO THE RASH IN THE GROIN ONCE DAILY    ONE TOUCH ULTRA TEST test strip TEST TWICE DAILY OR AS DIRECTED DX: E11.9    ONETOUCH DELICA LANCETS 33G MISC TEST TWICE DAILY OR AS DIRECTED    oxybutynin (DITROPAN XL) 15 mg,  Daily    oxyCODONE (ROXICODONE) 5 mg, Oral, Every 8 hours PRN    simvastatin (ZOCOR) 20 mg, Daily at bedtime    solifenacin (VESICARE) 10 mg, Oral, Daily    VENTOLIN  (90 Base) MCG/ACT inhaler 2 puffs, Every 4 hours PRN     Allergies   Allergen Reactions    Iodinated Contrast Media Anaphylaxis    Iodine - Food Allergy Anaphylaxis and Other (See Comments)    Povidone Iodine Anaphylaxis    Aspirin GI Bleeding and Other (See Comments)    Caffeine - Food Allergy Palpitations      Current Outpatient Medications on File Prior to Visit   Medication Sig Dispense Refill    Beclomethasone Dipropionate (QVAR IN) Inhale 2 puffs if needed (shortness of breath) Inhale 2 puffs as needed every 8 hours for shortness of breath      Cholecalciferol (D3 Adult) 25 MCG (1000 UT) CHEW Chew 1,000 Units daily. 1 tablet by mouth daily      dextromethorphan-guaiFENesin (ROBITUSSIN DM)  mg/5 mL syrup Take 10 mL by mouth every 4 (four) hours as needed for cough 118 mL 0    Droplet Pen Needles 32G X 4 MM MISC       FLOVENT  MCG/ACT inhaler INHALE 2 PUFF BY INHALATION ROUTE 2 TIMES EVERY DAY  6    hydrocortisone 2.5 % cream Apply to rash in groin area twice daily      insulin aspart (NovoLOG) 100 Units/mL injection pen Novolog Flexpen U-100 Insulin aspart 100 unit/mL (3 mL) subcutaneous      insulin glargine (TOUJEO MAX) 300 units/mL CONCENTRATED U-300 injection pen (2-unit dial) Inject 40 Units under the skin daily at bedtime Patient takes 50-60 units daily      lansoprazole (PREVACID) 15 mg capsule Take 15 mg by mouth daily. Take 1 tablet by mouth daily  latest dci state 30 mg daily       Menaquinone-7 (K2 PO) Take 100 mcg by mouth daily. Take 1 tablet by mouth daily      metoprolol tartrate (LOPRESSOR) 50 mg tablet Take 50 mg by mouth every 12 (twelve) hours Take 1 tablet by mouth twice daily with meals      NOVOFINE 32G X 6 MM MISC 2 (two) times a day As directed  5    nystatin (MYCOSTATIN) powder APPLY TO THE RASH IN THE  "GROIN ONCE DAILY      ONE TOUCH ULTRA TEST test strip TEST TWICE DAILY OR AS DIRECTED DX: E11.9  5    ONETOUCH DELICA LANCETS 33G MISC TEST TWICE DAILY OR AS DIRECTED  5    oxybutynin (DITROPAN XL) 15 MG 24 hr tablet Take 15 mg by mouth daily.      simvastatin (ZOCOR) 20 mg tablet Take 20 mg by mouth daily at bedtime.      solifenacin (VESICARE) 10 MG tablet Take 1 tablet (10 mg total) by mouth daily 90 tablet 0    VENTOLIN  (90 Base) MCG/ACT inhaler Inhale 2 puffs every 4 (four) hours as needed for shortness of breath Take 2 puffs inhalation every 4 hours as needed      [DISCONTINUED] morphine (MSIR) 15 mg tablet Take 15 mg by mouth every 8 (eight) hours as needed for severe pain.      [DISCONTINUED] naloxone (NARCAN) 2 MG/2ML injection Inject 2 mL into a catheter in a vein once Patient not taking      EPINEPHrine (EPIPEN) 0.3 mg/0.3 mL SOAJ Inject 0.3 mL as directed Patient does not have in home       No current facility-administered medications on file prior to visit.      Social History     Tobacco Use    Smoking status: Never    Smokeless tobacco: Never   Vaping Use    Vaping status: Never Used   Substance and Sexual Activity    Alcohol use: Yes     Comment: social    Drug use: No    Sexual activity: Not Currently        Objective   /70 (BP Location: Right arm, Patient Position: Sitting, Cuff Size: Standard)   Pulse 68   Temp (!) 97.1 °F (36.2 °C) (Temporal)   Resp 18   Ht 5' 2\" (1.575 m)   Wt 67.8 kg (149 lb 8 oz)   SpO2 98%   BMI 27.34 kg/m²     Physical Exam  Vitals reviewed.   Constitutional:       General: She is not in acute distress.     Appearance: She is not ill-appearing, toxic-appearing or diaphoretic.   HENT:      Head: Normocephalic and atraumatic.      Nose: Nose normal.   Eyes:      General:         Right eye: No discharge.         Left eye: No discharge.   Cardiovascular:      Rate and Rhythm: Normal rate.   Pulmonary:      Effort: Pulmonary effort is normal. No respiratory " distress.   Abdominal:      General: There is no distension.   Musculoskeletal:         General: No swelling or tenderness.      Right lower leg: No edema.      Left lower leg: No edema.   Skin:     General: Skin is warm and dry.      Coloration: Skin is not jaundiced or pale.   Neurological:      General: No focal deficit present.      Mental Status: She is alert. Mental status is at baseline.   Psychiatric:         Mood and Affect: Mood normal.         Behavior: Behavior normal.         Thought Content: Thought content normal.         Judgment: Judgment normal.         Recent labs:  Lab Results   Component Value Date/Time    SODIUM 138 03/22/2025 06:03 AM    K 4.1 03/22/2025 06:03 AM    K 3.7 01/30/2015 06:29 AM    BUN 16 03/22/2025 06:03 AM    BUN 20 01/30/2015 06:29 AM    CREATININE 1.08 03/22/2025 06:03 AM    CREATININE 1.03 01/30/2015 06:29 AM    GLUC 100 03/22/2025 06:03 AM    CALCIUM 10.0 03/22/2025 06:03 AM    CALCIUM 9.3 01/30/2015 06:29 AM    AST 16 03/21/2025 03:51 AM    AST 58 (H) 01/28/2015 05:58 AM    ALT 11 03/21/2025 03:51 AM    ALT 44 01/28/2015 05:58 AM    ALB 3.8 03/21/2025 03:51 AM    ALB 2.0 (L) 01/28/2015 05:58 AM    TP 7.0 03/21/2025 03:51 AM    EGFR 46 03/22/2025 06:03 AM     Lab Results   Component Value Date/Time    HGB 14.0 03/22/2025 06:03 AM    HGB 8.6 (L) 01/30/2015 06:29 AM    WBC 7.66 03/22/2025 06:03 AM    WBC 14.12 (H) 01/30/2015 06:29 AM     03/22/2025 06:03 AM     (H) 01/30/2015 06:29 AM    INR 1.20 (H) 03/21/2025 03:51 AM    INR 2.19 (H) 01/17/2015 06:13 AM    PTT 32 03/21/2025 03:51 AM    PTT 49 (H) 01/16/2015 04:59 PM     Lab Results   Component Value Date/Time    XUO5NUZKTYPE 1.435 02/27/2024 11:29 AM    QVY5ULHESUTH 1.657 01/27/2015 06:00 AM       Recent Imaging:  Procedure: XR chest 2 views  Result Date: 3/21/2025  Impression: Improved aeration of the right lower lobe with minimal residual airspace opacity. Small right pleural effusion. Workstation performed:  [id number]     Procedure: CT chest wo contrast  Result Date: 2/24/2025  Impression: Right basilar consolidation likely a combination of pneumonia and atelectasis. Debris/plugging throughout the right lower lobe bronchus and bronchus intermedius; aspiration is not excluded New 5 mm right upper lobe nodule. Small right pleural effusion. Lytic osseous lesions suspicious for metastases. The study was marked in EPIC for immediate notification. Workstation performed: [id number]     Procedure: XR chest 2 views  Result Date: 2/24/2025  Impression: Moderate opacity in the right lower lobe compatible with pneumonia Pleural thickening along the left lateral chest wall, visible in retrospect in January 2024, new since July 2024, question rib fractures with pleural thickening or metastatic disease. Given history of breast cancer, recommend further evaluation with a chest CT with no contrast. I notified Dr. SANDRA GEORGE on 2/24/2025 8:19 AM by epic secure chat. The study was marked in EPIC for immediate notification. Workstation performed: [id number]     Procedure: DXA bone density spine hip and pelvis  Result Date: 2/22/2025  Impression: 1. Osteoporosis. T score result at the total hip and femoral neck areas The 10-year risk of a hip fracture is 11%. The 10-year fracture risk is 29%, both of which exceed the treatment thresholds. 3.  The current Bone Health and Osteoporosis Foundation (BHOF) guidelines recommend treating patients with a T-score of -2.5 or less in the lumbar spine or hips, or in post-menopausal women and men over the age of 50 with low bone mass (osteopenia; T-score between -1.0 and -2.5) and a FRAX 10 year risk score of > 3% for hip fracture and/or > 20% for major osteoporosis-related fracture (clinical vertebral, hip, forearm, or proximal humerus). 4.  The BHOF recommends follow-up DXA in 1-2 years after initiating or changing medical therapy for osteoporosis and at appropriate intervals thereafter,  according to clinical circumstances. More frequent BMD testing may be warranted in higher-risk individuals (multiple fractures, older age, very low BMD). Less frequent BMD testing is warranted as follow-up in patients with initial T-scores in the normal or slightly below normal range (osteopenia) and for patients who have remained fracture free on  treatment. WHO CLASSIFICATION: Normal (a T-score of -1.0 or higher) Low bone mineral density (a T-score of less than -1.0 but higher than -2.5) Osteoporosis (a T-score of -2.5 or less) Severe osteoporosis (a T-score of -2.5 or less with a fragility fracture) Thank you for allowing us the opportunity to participate in your patient care. SELECTED REFERENCES: Janelle MS, Josefa SL, Saumya SARGENT, et al. The clinician's guide to prevention and treatment of osteoporosis. Osteoporos Int 2022; 33:6715-2013. Jesus D, Juancarlos SB, Roseanna A, et al. DXA reporting updates: 2023 Official Positions of the International Society for Clinical Densitometry. J Clin Densitom 2024; 27: 292741 (https://doi.org/10.1016/j.jocd.2023.738623). Workstation performed: [id number]     Procedure: XR ribs right w pa chest min 3 views  Result Date: 1/29/2025  Impression: No evidence of a rib fracture. No acute cardiopulmonary disease. Computerized Assisted Algorithm (CAA) may have been used to analyze all applicable images. Workstation performed: [id number]       Administrative Statements   I have spent a total time of 55 minutes in caring for this patient on the day of the visit/encounter including Risks and benefits of tx options, Instructions for management, Patient and family education, Importance of tx compliance, Risk factor reductions, Impressions, Counseling / Coordination of care, Documenting in the medical record, Reviewing/placing orders in the medical record (including tests, medications, and/or procedures), and Obtaining or reviewing history  .   Topics discussed with the patient / family  include symptom assessment and management, medication review, medication adjustment, psychosocial support, advanced directives, goals of care, hospice services, opioid titration, supportive listening, and anticipatory guidance.

## 2025-05-02 NOTE — ASSESSMENT & PLAN NOTE
Extensive goals of care discussion held today.  - Patient states she would like to focus on continued cares, however, does not wish to pursue any further cancer related treatments.  She she has seen her mother as well as her sister go through cancer as well as her own long journey with treatments for cancer.  She does not wish to pursue further aggressive interventions or treatments as well as the potential for making her worse or to worsen her quality of life.  At this time she would like to focus on her comfort and quality, however, not ready for hospice services.    We did discuss extensively regarding the services entailed with hospice.  Reviewed RLOC services with hospice, role of hospice physician, nurses, and equipment.

## 2025-05-18 ENCOUNTER — APPOINTMENT (EMERGENCY)
Dept: RADIOLOGY | Facility: HOSPITAL | Age: 89
DRG: 948 | End: 2025-05-18
Payer: MEDICARE

## 2025-05-18 ENCOUNTER — HOSPITAL ENCOUNTER (INPATIENT)
Facility: HOSPITAL | Age: 89
LOS: 3 days | Discharge: HOSPICE | DRG: 948 | End: 2025-05-22
Attending: EMERGENCY MEDICINE | Admitting: STUDENT IN AN ORGANIZED HEALTH CARE EDUCATION/TRAINING PROGRAM
Payer: MEDICARE

## 2025-05-18 DIAGNOSIS — M54.9 ACUTE ON CHRONIC BACK PAIN: ICD-10-CM

## 2025-05-18 DIAGNOSIS — Z51.5 HOSPICE CARE: ICD-10-CM

## 2025-05-18 DIAGNOSIS — G89.3 CANCER ASSOCIATED PAIN: Primary | ICD-10-CM

## 2025-05-18 DIAGNOSIS — R52 INTRACTABLE PAIN: ICD-10-CM

## 2025-05-18 DIAGNOSIS — G89.3 CANCER RELATED PAIN: ICD-10-CM

## 2025-05-18 DIAGNOSIS — G89.29 ACUTE ON CHRONIC BACK PAIN: ICD-10-CM

## 2025-05-18 DIAGNOSIS — K59.00 CONSTIPATION, UNSPECIFIED CONSTIPATION TYPE: ICD-10-CM

## 2025-05-18 PROBLEM — Z71.89 ADVANCED CARE PLANNING/COUNSELING DISCUSSION: Status: ACTIVE | Noted: 2025-05-18

## 2025-05-18 LAB
2HR DELTA HS TROPONIN: 0 NG/L
ANION GAP SERPL CALCULATED.3IONS-SCNC: 9 MMOL/L (ref 4–13)
ATRIAL RATE: 79 BPM
BASOPHILS # BLD AUTO: 0.03 THOUSANDS/ÂΜL (ref 0–0.1)
BASOPHILS NFR BLD AUTO: 0 % (ref 0–1)
BUN SERPL-MCNC: 17 MG/DL (ref 5–25)
CALCIUM SERPL-MCNC: 10.9 MG/DL (ref 8.4–10.2)
CARDIAC TROPONIN I PNL SERPL HS: 9 NG/L (ref ?–50)
CARDIAC TROPONIN I PNL SERPL HS: 9 NG/L (ref ?–50)
CHLORIDE SERPL-SCNC: 100 MMOL/L (ref 96–108)
CO2 SERPL-SCNC: 26 MMOL/L (ref 21–32)
CREAT SERPL-MCNC: 1.02 MG/DL (ref 0.6–1.3)
EOSINOPHIL # BLD AUTO: 0.16 THOUSAND/ÂΜL (ref 0–0.61)
EOSINOPHIL NFR BLD AUTO: 2 % (ref 0–6)
ERYTHROCYTE [DISTWIDTH] IN BLOOD BY AUTOMATED COUNT: 13.2 % (ref 11.6–15.1)
GFR SERPL CREATININE-BSD FRML MDRD: 49 ML/MIN/1.73SQ M
GLUCOSE SERPL-MCNC: 100 MG/DL (ref 65–140)
GLUCOSE SERPL-MCNC: 104 MG/DL (ref 65–140)
GLUCOSE SERPL-MCNC: 126 MG/DL (ref 65–140)
GLUCOSE SERPL-MCNC: 128 MG/DL (ref 65–140)
GLUCOSE SERPL-MCNC: 140 MG/DL (ref 65–140)
HCT VFR BLD AUTO: 43 % (ref 34.8–46.1)
HGB BLD-MCNC: 13.9 G/DL (ref 11.5–15.4)
IMM GRANULOCYTES # BLD AUTO: 0.04 THOUSAND/UL (ref 0–0.2)
IMM GRANULOCYTES NFR BLD AUTO: 0 % (ref 0–2)
LYMPHOCYTES # BLD AUTO: 2.21 THOUSANDS/ÂΜL (ref 0.6–4.47)
LYMPHOCYTES NFR BLD AUTO: 22 % (ref 14–44)
MCH RBC QN AUTO: 29.3 PG (ref 26.8–34.3)
MCHC RBC AUTO-ENTMCNC: 32.3 G/DL (ref 31.4–37.4)
MCV RBC AUTO: 91 FL (ref 82–98)
MONOCYTES # BLD AUTO: 0.75 THOUSAND/ÂΜL (ref 0.17–1.22)
MONOCYTES NFR BLD AUTO: 7 % (ref 4–12)
NEUTROPHILS # BLD AUTO: 6.9 THOUSANDS/ÂΜL (ref 1.85–7.62)
NEUTS SEG NFR BLD AUTO: 69 % (ref 43–75)
NRBC BLD AUTO-RTO: 0 /100 WBCS
P AXIS: 55 DEGREES
PLATELET # BLD AUTO: 320 THOUSANDS/UL (ref 149–390)
PMV BLD AUTO: 11.1 FL (ref 8.9–12.7)
POTASSIUM SERPL-SCNC: 3.8 MMOL/L (ref 3.5–5.3)
PR INTERVAL: 168 MS
QRS AXIS: 32 DEGREES
QRSD INTERVAL: 90 MS
QT INTERVAL: 376 MS
QTC INTERVAL: 431 MS
RBC # BLD AUTO: 4.74 MILLION/UL (ref 3.81–5.12)
SODIUM SERPL-SCNC: 135 MMOL/L (ref 135–147)
T WAVE AXIS: 59 DEGREES
VENTRICULAR RATE: 79 BPM
WBC # BLD AUTO: 10.09 THOUSAND/UL (ref 4.31–10.16)

## 2025-05-18 PROCEDURE — 84484 ASSAY OF TROPONIN QUANT: CPT

## 2025-05-18 PROCEDURE — 99285 EMERGENCY DEPT VISIT HI MDM: CPT

## 2025-05-18 PROCEDURE — 82948 REAGENT STRIP/BLOOD GLUCOSE: CPT

## 2025-05-18 PROCEDURE — 80048 BASIC METABOLIC PNL TOTAL CA: CPT

## 2025-05-18 PROCEDURE — 96374 THER/PROPH/DIAG INJ IV PUSH: CPT

## 2025-05-18 PROCEDURE — 99223 1ST HOSP IP/OBS HIGH 75: CPT | Performed by: INTERNAL MEDICINE

## 2025-05-18 PROCEDURE — 93005 ELECTROCARDIOGRAM TRACING: CPT

## 2025-05-18 PROCEDURE — 71046 X-RAY EXAM CHEST 2 VIEWS: CPT

## 2025-05-18 PROCEDURE — 99285 EMERGENCY DEPT VISIT HI MDM: CPT | Performed by: EMERGENCY MEDICINE

## 2025-05-18 PROCEDURE — 93010 ELECTROCARDIOGRAM REPORT: CPT | Performed by: INTERNAL MEDICINE

## 2025-05-18 PROCEDURE — 85025 COMPLETE CBC W/AUTO DIFF WBC: CPT

## 2025-05-18 PROCEDURE — 36415 COLL VENOUS BLD VENIPUNCTURE: CPT

## 2025-05-18 RX ORDER — LIDOCAINE 50 MG/G
2 PATCH TOPICAL DAILY
Status: DISCONTINUED | OUTPATIENT
Start: 2025-05-18 | End: 2025-05-22 | Stop reason: HOSPADM

## 2025-05-18 RX ORDER — INSULIN LISPRO 100 [IU]/ML
1-5 INJECTION, SOLUTION INTRAVENOUS; SUBCUTANEOUS
Status: DISCONTINUED | OUTPATIENT
Start: 2025-05-18 | End: 2025-05-22 | Stop reason: HOSPADM

## 2025-05-18 RX ORDER — GUAIFENESIN/DEXTROMETHORPHAN 100-10MG/5
10 SYRUP ORAL EVERY 4 HOURS PRN
Status: DISCONTINUED | OUTPATIENT
Start: 2025-05-18 | End: 2025-05-22 | Stop reason: HOSPADM

## 2025-05-18 RX ORDER — HYDROMORPHONE HCL/PF 1 MG/ML
1 SYRINGE (ML) INJECTION EVERY 4 HOURS PRN
Status: DISCONTINUED | OUTPATIENT
Start: 2025-05-18 | End: 2025-05-20

## 2025-05-18 RX ORDER — HYDROMORPHONE HYDROCHLORIDE 2 MG/1
2 TABLET ORAL EVERY 4 HOURS PRN
Status: DISCONTINUED | OUTPATIENT
Start: 2025-05-18 | End: 2025-05-19

## 2025-05-18 RX ORDER — DEXAMETHASONE 2 MG/1
1 TABLET ORAL
Status: DISCONTINUED | OUTPATIENT
Start: 2025-05-18 | End: 2025-05-22 | Stop reason: HOSPADM

## 2025-05-18 RX ORDER — HYDROMORPHONE HCL/PF 1 MG/ML
0.5 SYRINGE (ML) INJECTION ONCE
Refills: 0 | Status: COMPLETED | OUTPATIENT
Start: 2025-05-18 | End: 2025-05-18

## 2025-05-18 RX ORDER — ACETAMINOPHEN 325 MG/1
650 TABLET ORAL ONCE
Status: COMPLETED | OUTPATIENT
Start: 2025-05-18 | End: 2025-05-18

## 2025-05-18 RX ORDER — POLYETHYLENE GLYCOL 3350 17 G/17G
17 POWDER, FOR SOLUTION ORAL DAILY
Status: DISCONTINUED | OUTPATIENT
Start: 2025-05-18 | End: 2025-05-18

## 2025-05-18 RX ORDER — PANTOPRAZOLE SODIUM 20 MG/1
20 TABLET, DELAYED RELEASE ORAL
Status: DISCONTINUED | OUTPATIENT
Start: 2025-05-18 | End: 2025-05-22 | Stop reason: HOSPADM

## 2025-05-18 RX ORDER — DOCUSATE SODIUM 100 MG/1
100 CAPSULE, LIQUID FILLED ORAL 2 TIMES DAILY
Status: DISCONTINUED | OUTPATIENT
Start: 2025-05-18 | End: 2025-05-20

## 2025-05-18 RX ORDER — ONDANSETRON 2 MG/ML
4 INJECTION INTRAMUSCULAR; INTRAVENOUS EVERY 6 HOURS PRN
Status: DISCONTINUED | OUTPATIENT
Start: 2025-05-18 | End: 2025-05-22 | Stop reason: HOSPADM

## 2025-05-18 RX ORDER — HYDROMORPHONE HCL/PF 1 MG/ML
0.5 SYRINGE (ML) INJECTION EVERY 4 HOURS PRN
Status: DISCONTINUED | OUTPATIENT
Start: 2025-05-18 | End: 2025-05-18

## 2025-05-18 RX ORDER — ALBUTEROL SULFATE 90 UG/1
2 INHALANT RESPIRATORY (INHALATION) EVERY 4 HOURS PRN
Status: DISCONTINUED | OUTPATIENT
Start: 2025-05-18 | End: 2025-05-22 | Stop reason: HOSPADM

## 2025-05-18 RX ORDER — ACETAMINOPHEN 325 MG/1
975 TABLET ORAL EVERY 8 HOURS SCHEDULED
Status: DISCONTINUED | OUTPATIENT
Start: 2025-05-18 | End: 2025-05-22 | Stop reason: HOSPADM

## 2025-05-18 RX ORDER — SENNOSIDES 8.6 MG
1 TABLET ORAL DAILY
Status: DISCONTINUED | OUTPATIENT
Start: 2025-05-18 | End: 2025-05-18

## 2025-05-18 RX ORDER — SENNOSIDES 8.6 MG
2 TABLET ORAL DAILY
Status: DISCONTINUED | OUTPATIENT
Start: 2025-05-19 | End: 2025-05-20

## 2025-05-18 RX ORDER — ENOXAPARIN SODIUM 100 MG/ML
40 INJECTION SUBCUTANEOUS
Status: DISCONTINUED | OUTPATIENT
Start: 2025-05-18 | End: 2025-05-20

## 2025-05-18 RX ORDER — INSULIN GLARGINE 100 [IU]/ML
20 INJECTION, SOLUTION SUBCUTANEOUS
Status: DISCONTINUED | OUTPATIENT
Start: 2025-05-18 | End: 2025-05-22 | Stop reason: HOSPADM

## 2025-05-18 RX ORDER — METOPROLOL TARTRATE 50 MG
50 TABLET ORAL EVERY 12 HOURS SCHEDULED
Status: DISCONTINUED | OUTPATIENT
Start: 2025-05-18 | End: 2025-05-22 | Stop reason: HOSPADM

## 2025-05-18 RX ORDER — BISACODYL 10 MG
10 SUPPOSITORY, RECTAL RECTAL DAILY PRN
Status: DISCONTINUED | OUTPATIENT
Start: 2025-05-18 | End: 2025-05-22 | Stop reason: HOSPADM

## 2025-05-18 RX ORDER — HYDROMORPHONE HYDROCHLORIDE 4 MG/1
4 TABLET ORAL EVERY 4 HOURS PRN
Status: DISCONTINUED | OUTPATIENT
Start: 2025-05-18 | End: 2025-05-19

## 2025-05-18 RX ORDER — PRAVASTATIN SODIUM 40 MG
40 TABLET ORAL
Status: DISCONTINUED | OUTPATIENT
Start: 2025-05-18 | End: 2025-05-20

## 2025-05-18 RX ORDER — HYDROMORPHONE HCL/PF 1 MG/ML
0.5 SYRINGE (ML) INJECTION ONCE
Status: COMPLETED | OUTPATIENT
Start: 2025-05-18 | End: 2025-05-18

## 2025-05-18 RX ADMIN — INSULIN GLARGINE 20 UNITS: 100 INJECTION, SOLUTION SUBCUTANEOUS at 22:49

## 2025-05-18 RX ADMIN — METOPROLOL TARTRATE 50 MG: 50 TABLET, FILM COATED ORAL at 20:20

## 2025-05-18 RX ADMIN — HYDROMORPHONE HYDROCHLORIDE 4 MG: 4 TABLET ORAL at 22:50

## 2025-05-18 RX ADMIN — ENOXAPARIN SODIUM 40 MG: 40 INJECTION SUBCUTANEOUS at 14:36

## 2025-05-18 RX ADMIN — HYDROMORPHONE HYDROCHLORIDE 0.5 MG: 1 INJECTION, SOLUTION INTRAMUSCULAR; INTRAVENOUS; SUBCUTANEOUS at 13:24

## 2025-05-18 RX ADMIN — HYDROMORPHONE HYDROCHLORIDE 4 MG: 4 TABLET ORAL at 18:40

## 2025-05-18 RX ADMIN — HYDROMORPHONE HYDROCHLORIDE 1 MG: 1 INJECTION, SOLUTION INTRAMUSCULAR; INTRAVENOUS; SUBCUTANEOUS at 20:21

## 2025-05-18 RX ADMIN — HYDROMORPHONE HYDROCHLORIDE 0.5 MG: 1 INJECTION, SOLUTION INTRAMUSCULAR; INTRAVENOUS; SUBCUTANEOUS at 01:59

## 2025-05-18 RX ADMIN — HYDROMORPHONE HYDROCHLORIDE 4 MG: 4 TABLET ORAL at 14:36

## 2025-05-18 RX ADMIN — HYDROMORPHONE HYDROCHLORIDE 0.5 MG: 1 INJECTION, SOLUTION INTRAMUSCULAR; INTRAVENOUS; SUBCUTANEOUS at 07:39

## 2025-05-18 RX ADMIN — DOCUSATE SODIUM 100 MG: 100 CAPSULE, LIQUID FILLED ORAL at 09:54

## 2025-05-18 RX ADMIN — HYDROMORPHONE HYDROCHLORIDE 4 MG: 4 TABLET ORAL at 09:53

## 2025-05-18 RX ADMIN — ACETAMINOPHEN 650 MG: 325 TABLET ORAL at 01:57

## 2025-05-18 RX ADMIN — PRAVASTATIN SODIUM 40 MG: 40 TABLET ORAL at 16:33

## 2025-05-18 RX ADMIN — OXYBUTYNIN 15 MG: 10 TABLET, FILM COATED, EXTENDED RELEASE ORAL at 09:52

## 2025-05-18 RX ADMIN — HYDROMORPHONE HYDROCHLORIDE 0.5 MG: 1 INJECTION, SOLUTION INTRAMUSCULAR; INTRAVENOUS; SUBCUTANEOUS at 16:33

## 2025-05-18 RX ADMIN — LIDOCAINE 2 PATCH: 700 PATCH TOPICAL at 11:00

## 2025-05-18 RX ADMIN — POLYETHYLENE GLYCOL 3350, SODIUM SULFATE ANHYDROUS, SODIUM BICARBONATE, SODIUM CHLORIDE, POTASSIUM CHLORIDE 4000 ML: 236; 22.74; 6.74; 5.86; 2.97 POWDER, FOR SOLUTION ORAL at 15:03

## 2025-05-18 RX ADMIN — PANTOPRAZOLE SODIUM 20 MG: 20 TABLET, DELAYED RELEASE ORAL at 05:33

## 2025-05-18 RX ADMIN — ACETAMINOPHEN 975 MG: 325 TABLET ORAL at 22:48

## 2025-05-18 RX ADMIN — ACETAMINOPHEN 975 MG: 325 TABLET ORAL at 13:24

## 2025-05-18 RX ADMIN — HYDROMORPHONE HYDROCHLORIDE 0.5 MG: 1 INJECTION, SOLUTION INTRAMUSCULAR; INTRAVENOUS; SUBCUTANEOUS at 03:59

## 2025-05-18 RX ADMIN — METOPROLOL TARTRATE 50 MG: 50 TABLET, FILM COATED ORAL at 09:52

## 2025-05-18 RX ADMIN — SENNOSIDES 8.6 MG: 8.6 TABLET, FILM COATED ORAL at 09:54

## 2025-05-18 RX ADMIN — DOCUSATE SODIUM 100 MG: 100 CAPSULE, LIQUID FILLED ORAL at 18:40

## 2025-05-18 RX ADMIN — HYDROMORPHONE HYDROCHLORIDE 4 MG: 4 TABLET ORAL at 05:32

## 2025-05-18 RX ADMIN — HYDROMORPHONE HYDROCHLORIDE 0.5 MG: 1 INJECTION, SOLUTION INTRAMUSCULAR; INTRAVENOUS; SUBCUTANEOUS at 02:29

## 2025-05-18 RX ADMIN — DEXAMETHASONE 1 MG: 2 TABLET ORAL at 11:04

## 2025-05-18 RX ADMIN — POLYETHYLENE GLYCOL 3350 17 G: 17 POWDER, FOR SOLUTION ORAL at 09:54

## 2025-05-18 NOTE — ASSESSMENT & PLAN NOTE
Patient presenting with worsening left chest pain despite trial of increased oxycodone, pain persistent despite IV Dilaudid 0.5 mg x 3 during ED evaluation  Analgesia per palliative care, appreciate recommendations

## 2025-05-18 NOTE — PLAN OF CARE
Problem: Potential for Falls  Goal: Patient will remain free of falls  Description: INTERVENTIONS:  - Educate patient/family on patient safety including physical limitations  - Instruct patient to call for assistance with activity   - Consider consulting OT/PT to assist with strengthening/mobility based on AM PAC & JH-HLM score  - Consult OT/PT to assist with strengthening/mobility   - Keep Call bell within reach  - Keep bed low and locked with side rails adjusted as appropriate  - Keep care items and personal belongings within reach  - Initiate and maintain comfort rounds  - Make Fall Risk Sign visible to staff  - Offer Toileting every 2 Hours, in advance of need  - Initiate/Maintain alarm  - Obtain necessary fall risk management equipment:   - Apply yellow socks and bracelet for high fall risk patients  - Consider moving patient to room near nurses station  5/18/2025 1225 by Yumi Monaco RN  Outcome: Progressing     Problem: PAIN - ADULT  Goal: Verbalizes/displays adequate comfort level or baseline comfort level  Description: Interventions:  - Encourage patient to monitor pain and request assistance  - Assess pain using appropriate pain scale  - Administer analgesics as ordered based on type and severity of pain and evaluate response  - Implement non-pharmacological measures as appropriate and evaluate response  - Consider cultural and social influences on pain and pain management  - Notify physician/advanced practitioner if interventions unsuccessful or patient reports new pain  - Educate patient/family on pain management process including their role and importance of  reporting pain   - Provide non-pharmacologic/complimentary pain relief interventions  5/18/2025 1225 by Yumi Monaco RN  Outcome: Progressing    Problem: INFECTION - ADULT  Goal: Absence or prevention of progression during hospitalization  Description: INTERVENTIONS:  - Assess and monitor for signs and symptoms of infection  - Monitor  lab/diagnostic results  - Monitor all insertion sites, i.e. indwelling lines, tubes, and drains  - Monitor endotracheal if appropriate and nasal secretions for changes in amount and color  - [place] appropriate cooling/warming therapies per order  - Administer medications as ordered  - Instruct and encourage patient and family to use good hand hygiene technique  - Identify and instruct in appropriate isolation precautions for identified infection/condition  5/18/2025 1225 by Yumi Monaco RN  Outcome: Progressing  Goal: Absence of fever/infection during neutropenic period  Description: INTERVENTIONS:  - Monitor WBC  - Perform strict hand hygiene  - Limit to healthy visitors only  - No plants, dried, fresh or silk flowers with [place] in patient room  5/18/2025 1225 by Yumi Monaco, RN  Outcome: Progressing     Problem: SAFETY ADULT  Goal: Patient will remain free of falls  Description: INTERVENTIONS:  - Educate patient/family on patient safety including physical limitations  - Instruct patient to call for assistance with activity   - Consider consulting OT/PT to assist with strengthening/mobility based on AM PAC & JH-HLM score  - Consult OT/PT to assist with strengthening/mobility   - Keep Call bell within reach  - Keep bed low and locked with side rails adjusted as appropriate  - Keep care items and personal belongings within reach  - Initiate and maintain comfort rounds  - Make Fall Risk Sign visible to staff  - Offer Toileting every 2 Hours, in advance of need  - Initiate/Maintain alarm  - Obtain necessary fall risk management equipment:   - Apply yellow socks and bracelet for high fall risk patients  - Consider moving patient to room near nurses station  5/18/2025 1225 by Yumi Monaco RN  Outcome: Progressing  Goal: Maintain or return to baseline ADL function  Description: INTERVENTIONS:  -  Assess patient's ability to carry out ADLs; assess patient's baseline for ADL function and identify physical deficits  which impact ability to perform ADLs (bathing, care of mouth/teeth, toileting, grooming, dressing, etc.)  - Assess/evaluate cause of self-care deficits   - Assess range of motion  - Assess patient's mobility; develop plan if impaired  - Assess patient's need for assistive devices and provide as appropriate  - Encourage maximum independence but intervene and supervise when necessary  - Involve family in performance of ADLs  - Assess for home care needs following discharge   - Consider OT consult to assist with ADL evaluation and planning for discharge  - Provide patient education as appropriate  - Monitor functional capacity and physical performance, use of AM PAC & JH-HLM   - Monitor gait, balance and fatigue with ambulation    5/18/2025 1225 by Yumi Monaco RN  Outcome: Progressing  Goal: Maintains/Returns to pre admission functional level  Description: INTERVENTIONS:  - Perform AM-PAC 6 Click Basic Mobility/ Daily Activity assessment daily.  - Set and communicate daily mobility goal to care team and patient/family/caregiver.   - Collaborate with rehabilitation services on mobility goals if consulted  - Perform Range of Motion 3 times a day.  - Reposition patient every 2 hours.  - Dangle patient 3 times a day  - Stand patient 3 times a day  - Ambulate patient 3 times a day  - Out of bed to chair 3 times a day   - Out of bed for meals 3 times a day  - Out of bed for toileting  - Record patient progress and toleration of activity level   5/18/2025 1225 by Yumi Monaco RN  Outcome: Progressing

## 2025-05-18 NOTE — ASSESSMENT & PLAN NOTE
Recently with concern for recurrent malignancy and suspect metastasis to new pulmonary nodule and lytic osseous lesions.  At prior admissions here she declined any further oncology workup evaluation or intervention.  At outpatient appointment with her oncologist 4/17/2025 she declined any for further treatment, anastrozole at that time was discontinued  Palliative care following and having ongoing goals of care discussions with patient and family

## 2025-05-18 NOTE — PROGRESS NOTES
Progress Note - Hospitalist   Name: Barbra De Oliveira 89 y.o. female I MRN: [id number]  Unit/Bed#: -01 I Date of Admission: 5/18/2025   Date of Service: 5/18/2025 I Hospital Day: 0    Assessment & Plan  Cancer related pain  Patient presenting with worsening left chest pain despite trial of increased oxycodone, pain persistent despite IV Dilaudid 0.5 mg x 3 during ED evaluation  Analgesia per palliative care, appreciate recommendations  Uncomplicated opioid dependence (HCC)  PDMP reviewed, last prescribed oxycodone IR 5 mg 30 pills for 10-day course 5/2/2025 by Dr. 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  At prior admissions here she declined any further oncology workup evaluation or intervention.  At outpatient appointment with her oncologist 4/17/2025 she declined any for further treatment, anastrozole at that time was discontinued  Palliative care following and having ongoing goals of care discussions with patient and family  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)  Lab Results   Component Value Date    HGBA1C 5.7 (H) 03/21/2025       Recent Labs     05/18/25  0611 05/18/25  1039   POCGLU 100 128       Blood Sugar Average: Last 72 hrs:  (P) 114    Very well-controlled as outpatient, did have episodes of hypoglycemia during prior hospitalizations  Continue reduced Lantus to 20 units nightly with insulin sliding scale  Carb controlled diet   Hypoglycemia protocol    Chronic diastolic (congestive) heart failure (HCC)  Wt Readings from Last 3 Encounters:   05/18/25 65.3 kg (144 lb)   05/02/25 67.8 kg (149 lb 8 oz)   04/17/25 68.9 kg (152 lb)    TTE March 2025 with LVEF 60% and grade 1 diastolic dysfunction  CXR demonstrating slight increase in small right pleural effusion, patient herself denies any worsening shortness of breath from baseline  Continue cardiac medications for now and monitor volume status closely    Constipation  Reports no bowel movements in approximately 2 weeks  Placed on senna, suppository and Colace on admission  Refusing milk of magnesia and suppository.  Reports GoLytely is the only medication that works for her  Will trial GoLytely today    VTE Pharmacologic Prophylaxis: VTE Score: 6 High Risk (Score >/= 5) - Pharmacological DVT Prophylaxis Ordered: enoxaparin (Lovenox). Sequential Compression Devices Ordered.    Mobility:   Basic Mobility Inpatient Raw Score: 18  JH-HLM Goal: 6: Walk 10 steps or more  JH-HLM Achieved: 7: Walk 25 feet or more  JH-HLM Goal achieved. Continue to encourage appropriate mobility.    Patient Centered Rounds: I performed bedside rounds with nursing staff today.   Discussions with Specialists or Other Care Team Provider: Palliative care    Education and Discussions with Family / Patient: Attempted to update  () via phone. Unable to contact.    Current Length of Stay: 0 day(s)  Current Patient Status: Observation   Certification Statement: The patient will continue to require additional inpatient hospital stay due to pain control  Discharge Plan: Anticipate discharge in 24-48 hrs to home.    Code Status: Level 3 - DNAR and DNI    Subjective   No events overnight.  Patient continues to report pain on her back.  Has been tolerating oral intake with no nausea or vomiting.    Objective :  Temp:  [97.4 °F (36.3 °C)-97.9 °F (36.6 °C)] 97.4 °F (36.3 °C)  HR:  [75-86] 76  BP: ()/(60-90) 130/60  Resp:  [18-19] 19  SpO2:  [89 %-93 %] 89 %  O2 Device: None (Room air)    Body mass index is 25.51 kg/m².     Input and Output Summary (last 24 hours):   No intake or output data in the 24 hours  ending 05/18/25 1410    Physical Exam  Vitals and nursing note reviewed.   Constitutional:       General: She is not in acute distress.     Appearance: She is well-developed.   HENT:      Head: Normocephalic and atraumatic.     Eyes:      Conjunctiva/sclera: Conjunctivae normal.       Cardiovascular:      Rate and Rhythm: Normal rate and regular rhythm.   Pulmonary:      Effort: No respiratory distress.      Breath sounds: Normal breath sounds. No wheezing, rhonchi or rales.     Musculoskeletal:         General: No swelling.      Cervical back: Neck supple.     Skin:     General: Skin is warm and dry.      Capillary Refill: Capillary refill takes less than 2 seconds.     Neurological:      Mental Status: She is alert.     Psychiatric:         Mood and Affect: Mood normal.         Behavior: Behavior normal.           Lines/Drains:        Lab Results: I have reviewed the following results:   Results from last 7 days   Lab Units 05/18/25  0157   WBC Thousand/uL 10.09   HEMOGLOBIN g/dL 13.9   HEMATOCRIT % 43.0   PLATELETS Thousands/uL 320   SEGS PCT % 69   LYMPHO PCT % 22   MONO PCT % 7   EOS PCT % 2     Results from last 7 days   Lab Units 05/18/25  0157   SODIUM mmol/L 135   POTASSIUM mmol/L 3.8   CHLORIDE mmol/L 100   CO2 mmol/L 26   BUN mg/dL 17   CREATININE mg/dL 1.02   ANION GAP mmol/L 9   CALCIUM mg/dL 10.9*   GLUCOSE RANDOM mg/dL 104         Results from last 7 days   Lab Units 05/18/25  1039 05/18/25  0611   POC GLUCOSE mg/dl 128 100               Recent Cultures (last 7 days):         Imaging Results Review: I reviewed radiology reports from this admission including: chest xray.  Other Study Results Review: No additional pertinent studies reviewed.    Last 24 Hours Medication List:     Current Facility-Administered Medications:     acetaminophen (TYLENOL) tablet 975 mg, Q8H RAPHAEL    albuterol (PROVENTIL HFA,VENTOLIN HFA) inhaler 2 puff, Q4H PRN    dexamethasone (DECADRON) tablet 1 mg, BID (AM & Afternoon)     dextromethorphan-guaiFENesin (ROBITUSSIN DM) oral syrup 10 mL, Q4H PRN    docusate sodium (COLACE) capsule 100 mg, BID    HYDROmorphone (DILAUDID) injection 0.5 mg, Q4H PRN    HYDROmorphone (DILAUDID) tablet 2 mg, Q4H PRN **OR** HYDROmorphone (DILAUDID) tablet 4 mg, Q4H PRN    insulin glargine (LANTUS) subcutaneous injection 20 Units 0.2 mL, HS    insulin lispro (HumALOG/ADMELOG) 100 units/mL subcutaneous injection 1-5 Units, TID AC **AND** Fingerstick Glucose (POCT), TID AC    insulin lispro (HumALOG/ADMELOG) 100 units/mL subcutaneous injection 1-5 Units, HS    lidocaine (LIDODERM) 5 % patch 2 patch, Daily    magnesium hydroxide (MILK OF MAGNESIA) oral suspension 30 mL, Daily PRN    metoprolol tartrate (LOPRESSOR) tablet 50 mg, Q12H RAPHAEL    ondansetron (ZOFRAN) injection 4 mg, Q6H PRN    oxybutynin (DITROPAN-XL) 24 hr tablet 15 mg, Daily    pantoprazole (PROTONIX) EC tablet 20 mg, Early Morning    polyethylene glycol (MIRALAX) packet 17 g, Daily    pravastatin (PRAVACHOL) tablet 40 mg, Daily With Dinner    senna (SENOKOT) tablet 8.6 mg, Daily    Administrative Statements   Today, Patient Was Seen By: Treva Servin MD      **Please Note: This note may have been constructed using a voice recognition system.**

## 2025-05-18 NOTE — CASE MANAGEMENT
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) 2025    Cancer related pain 2025    Goals of care, counseling/discussion 2025    Palliative care by specialist 2025    History of breast cancer 2025    Constipation 2025    Dyspnea 2025    History of left mastectomy 2023    Premature ventricular contractions 2023    Use of anastrozole (Arimidex) 2021    Atrial fibrillation, currently in sinus rhythm 2020    Chronic pain 2020    Malignant neoplasm of upper-outer quadrant of left breast in female, estrogen receptor positive (HCC) 2020    Migraine without aura and without status migrainosus, not intractable 2020    Mixed hyperlipidemia 2019    Chest pain 2018    Chronic diastolic (congestive) heart failure (HCC) 2018    Uncomplicated opioid dependence (HCC) 2016    Essential hypertension 2016    GERD without esophagitis 2016    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      LOS (days): 0  Geometric Mean LOS (GMLOS) (days):   Days to GMLOS:     OBJECTIVE:              Current admission status: Observation       Preferred Pharmacy:   [id number] - [place], [place] - [address]  [place] [address]  Phone: [phone] Fax: [phone]    Primary Care Provider: Osiel Eid MD    Primary Insurance: MEDICARE  Secondary Insurance: HUMA[employer]    ASSESSMENT:  Active Health Care Proxies       Dinh De Oliveira Health Care Representative - Spouse   Primary Phone: [phone] (Mobile)                 Patient Information  Admitted from::  Home  Mental Status: Alert  During Assessment patient was accompanied by: Not accompanied during assessment  Assessment information provided by:: Patient  Primary Caregiver: Self  Support Systems: Spouse/significant other, Family members  County of Residence: [place]  What city do you live in?: [place]  Home entry access options. Select all that apply.: Stairs  Number of steps to enter home.: 3  Type of Current Residence: [facility]  Living Arrangements: Lives w/ Spouse/significant other  Is patient a ?: No    Activities of Daily Living Prior to Admission  Functional Status: Assistance  Completes ADLs independently?: No  Level of ADL dependence: Assistance  Ambulates independently?: Yes  Does patient use assisted devices?: Yes  Assisted Devices (DME) used: Rollator, Straight Cane  Does patient have a history of Outpatient Therapy (PT/OT)?: Yes  Does the patient have a history of Short-Term Rehab?: Yes ([facility])  Does patient have a history of HHC?: Yes  Does patient currently have HHC?: No    Patient Information Continued  Income Source: Pension/skilled nursing  Does patient have prescription coverage?: Yes  Can the patient afford their medications and any related supplies (such as glucometers or test strips)?: Yes  Does patient receive dialysis treatments?: No  Does patient have a history of substance abuse?: No  Does patient have a history of Mental Health Diagnosis?: No    Means of Transportation  Means of Transport to Appts:: Family transport      DISCHARGE DETAILS:    Discharge planning discussed with:: Patient  Freedom of Choice: Yes  Comments - Freedom of Choice: Discussed FOC  CM contacted family/caregiver?: No- see comments (declined)      Other Referral/Resources/Interventions Provided:  Referral Comments: This CM introduced self and role to patient, lives with spouse in ran style home, 3 MITCH.  States  assists sometimes with ADLS, reports spongebathing at the sink.  Has rollator (uses all  the time) and cane at home.  History noted of HH (believes through [facility]), STR at [facility], and history of OP therapy.  Spouse drives patient to appointments, although patient states that she can drive if needed

## 2025-05-18 NOTE — ASSESSMENT & PLAN NOTE
Lab Results   Component Value Date    HGBA1C 5.7 (H) 03/21/2025       Recent Labs     05/18/25  0611 05/18/25  1039   POCGLU 100 128       Blood Sugar Average: Last 72 hrs:  (P) 114    Very well-controlled as outpatient, did have episodes of hypoglycemia during prior hospitalizations  Continue reduced Lantus to 20 units nightly with insulin sliding scale  Carb controlled diet   Hypoglycemia protocol

## 2025-05-18 NOTE — ASSESSMENT & PLAN NOTE
She has good insight and judgement into her medical condition, tells me she knows her increased pain is from worsening cancer.   I again re-visited hospice at home on d/c and encourage her to consider this now given situation.   She requests to talk about this some more with her  present tomorrow at 1pm. I will let the team know

## 2025-05-18 NOTE — ASSESSMENT & PLAN NOTE
Wt Readings from Last 3 Encounters:   05/18/25 67.6 kg (149 lb)   05/02/25 67.8 kg (149 lb 8 oz)   04/17/25 68.9 kg (152 lb)   CXR demonstrating slight increase in small right pleural effusion, patient herself denies any worsening shortness of breath from baseline  Continue cardiac medications for now and monitor volume status closely

## 2025-05-18 NOTE — ASSESSMENT & PLAN NOTE
Patient reports has been several days since she has a good bowel movement.  Will initiate bowel regimen, adjust as needed to produce good bowel movement.

## 2025-05-18 NOTE — ASSESSMENT & PLAN NOTE
Wt Readings from Last 3 Encounters:   05/18/25 65.3 kg (144 lb)   05/02/25 67.8 kg (149 lb 8 oz)   04/17/25 68.9 kg (152 lb)   TTE March 2025 with LVEF 60% and grade 1 diastolic dysfunction  CXR demonstrating slight increase in small right pleural effusion, patient herself denies any worsening shortness of breath from baseline  Continue cardiac medications for now and monitor volume status closely

## 2025-05-18 NOTE — CONSULTS
Consultation - Palliative Care   Name: Barbra De Oliveira 89 y.o. female I MRN: [id number]  Unit/Bed#: -01 I Date of Admission: 5/18/2025   Date of Service: 5/18/2025 I Hospital Day: 0   Inpatient consult to Palliative Care  Consult performed by: Zina Guerrero MD  Consult ordered by: Ricky Farrell DO        Physician Requesting Evaluation: Treva Servin MD   Reason for Evaluation / Principal Problem: GOC/cancer pain    Assessment & Plan  Cancer related pain  Pain in the L side of the chest. Likely from the dominant 3.3 x 2.1 cm expansile lesion of the left fifth rib with extensive cortical erosion from CT scan in 2/2025. Also has 2.7 x 1.6 cm expansile lytic lesion of the sternum, 13 mm left sternal lytic lesion, subcentimeter right clavicular lytic lesion, multiple spine neck lytic lesions - For example 14 mm left T12 lesion  MAR: IV-D 0.5mg x 3 and PO-D 4mg x 2 since admission.  Continue tylenol 975mg PO q8H ATC. Max of 3000mg in 24H only  Continue PO dilaudid 2-4mg PO q4H prn   Continue IV dilaudid 0.5mg q4H prn BT pain  Senokot and miralax daily  Add decadron for acute worsening of bone pain, monitor BG closely - 1mg (am and noon).   Add lidocaine patch to the lower back and L shoulder, 12H on 12H off   D/w above with SLIM and RN  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)  Lab Results   Component Value Date    HGBA1C 5.7 (H) 03/21/2025     Getting BG monitored, has insulin ordered  SLIM made aware of starting low dose steroid    Recent Labs     05/18/25  0611   POCGLU 100       Blood Sugar Average: Last 72 hrs:  (P) 100    Uncomplicated opioid dependence (HCC)    Chronic diastolic (congestive) heart failure (HCC)  Wt Readings from Last 3 Encounters:   05/18/25 65.3 kg (144 lb)   05/02/25 67.8 kg (149 lb 8 oz)   04/17/25 68.9 kg (152 lb)   Appears euvolemic on exam today  Constipation  Miralax and senokot daily  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)  As per last  palliative office visit in 5/2/2025, no longer interested in cancer cares  She verified this again today.  Palliative care encounter  Lives with her  who provides support  Supportive listening provided  Advanced care planning/counseling discussion  She has good insight and judgement into her medical condition, tells me she knows her increased pain is from worsening cancer.   I again re-visited hospice at home on d/c and encourage her to consider this now given situation.   She requests to talk about this some more with her  present tomorrow at 1pm. I will let the team know   I have discussed the above management plan in detail with the primary service.   Palliative Care service will follow.    Decisional apparatus: Patient is competent on my exam today. If competence is lost, patient's substitute decision maker would default to  by [place].   Advance Directive / Living Will / POLST: none on file     PDMP Review: I have reviewed the patient's controlled substance dispensing history in the Prescription Drug Monitoring Program in compliance with the [facility] regulations before prescribing any controlled substances.    05/02/2025 05/02/2025 1 Oxycodone Hcl (Ir) 5 Mg Tablet 30.00 10 Da Mariel [id number] Pen (3102) 0 22.50 MME [place]   03/19/2025 03/19/2025 1 Morphine Sulfate Ir 15 Mg Tab 24.00 8 Ne Ole [id number] Pen (3102) 0 45.00 [place]   02/21/2025 02/21/2025 1 Morphine Sulfate Ir 15 Mg Tab 24.00 8 Ne Ole [id number] Pen (3102) 0 45.00 [place]   11/27/2024 11/27/2024 1 Morphine Sulfate Ir 15 Mg Tab 24.00 8 Ne Ole [id number] Pen (3102) 0 45.00 [place]   09/20/2024 09/20/2024 1 Morphine Sulfate Ir 15 Mg Tab 24.00 8 Ne Ole [id number] Pen (3102) 0 45.00 [place]ME [place]     History of Present Illness   HPI: Barbra De Oliveira is a 89 y.o. year old female with recurrence of breast cancer with imaging in 2/2025 showing bone metastases (bilateral rib lytic lesions, dominant 3.3 x 2.1 cm expansile  lesion of the left fifth rib with extensive cortical erosion, 2.7 x 1.6 cm expansile lytic lesion of the sternum, 13 mm left sternal lytic lesion, subcentimeter right clavicular lytic lesion, multiple spine neck lytic lesions - For example 14 mm left T12 lesion). She is known to palliative medicine - recently seen in the office on initial consult in 5/2/2025 where she complained of cancer pain in the back (d/c MSIR and placed on OxyIR 7.5mg q6H prn) and discussed not wanting to pursue any further cancer related treatments, wanting to focus on her comfort and quality, however, not ready for hospice services. She previously met with  Hospice in 3/2025 but declined admission to hospice at the time.     She is admitted from home due to increasing L sided chest pain, work up not remarkable except for small R pleural effusion. EKG is unremarkable. Palliative medicine to assist with cancer pain.     Met with patient, no family member at bedside. She looks slightly uncomfortable from pain mostly in the L shoulder and L side of her chest wall. Discussed this and she tells me independently that she knows this is her because of her cancer, she has cancer in the bones. She reports current meds are working. I recommended and discussed addition of steroid as above. She is agreeable to this, aware of the possible side effects of increased BG, confusion/delirium, palpitation, insomnia. Will monitor closely. Discussed GOC as above. Recommended home hospice on d/c. Rest of conversation as above.    Review of Systems   Constitutional:  Positive for activity change and unexpected weight change.   Respiratory:  Negative for shortness of breath.    Cardiovascular:  Negative for chest pain.   Gastrointestinal:  Positive for constipation. Negative for abdominal distention, abdominal pain, nausea and vomiting.   Musculoskeletal:         Pain mostly in the L shoulder, L side of her torso, L chest wall, R lower back, coccyx pain    Neurological:  Negative for weakness.   Psychiatric/Behavioral:  The patient is not nervous/anxious.    All other systems reviewed and are negative.    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) 12/01/2020    Chest pain, unspecified     CHF (congestive heart failure) (HCC)     Diabetes mellitus (HCC)     GERD (gastroesophageal reflux disease)     History of radiation exposure 1992    Hyperlipidemia     Hypertension     Irregular heart beat     Afib    Migraine     Obesity     Pericardial effusion     Pericarditis      Past Surgical History:   Procedure Laterality Date    ABDOMINAL ADHESION SURGERY      APPENDECTOMY      AUGMENTATION MAMMAPLASTY Left 1994    BACK SURGERY      BREAST LUMPECTOMY Left 1992    malignant    BREAST LUMPECTOMY Left 11/17/2020    Procedure: BREAST ULTRASOUND DIRECTED LUMPECTOMY (ULTRASOUND AT 1200);  Surgeon: Earl Em MD;  [facility];  Service: Surgical Oncology    BREAST SURGERY      CARDIAC SURGERY      Pericardial window    CHOLECYSTECTOMY      EYE SURGERY      FLAP LOCAL TRUNK Left 11/17/2020    Procedure: FLAP LOCAL TRUNK;  Surgeon: Ronnell Chiu MD;  [facility];  Service: Plastics    HYSTERECTOMY      IR DRAINAGE TUBE CHECK WITH SCLEROSIS  04/23/2021    IR DRAINAGE TUBE CHECK WITH SCLEROSIS  06/03/2021    IR DRAINAGE TUBE CHECK WITH SCLEROSIS  06/17/2021    IR DRAINAGE TUBE CHECK WITH SCLEROSIS  06/28/2021    IR DRAINAGE TUBE CHECK WITH SCLEROSIS  07/07/2021    IR DRAINAGE TUBE CHECK WITH SCLEROSIS  07/27/2021    IR DRAINAGE TUBE PLACEMENT  04/01/2021    IR DRAINAGE TUBE PLACEMENT WITH SCLEROSIS  05/14/2021    LYMPH NODE DISSECTION Left 11/17/2020    Procedure: HUGO  DIRECTED AXILLARY DISSECTION;  Surgeon: Earl Em MD;  [facility];  Service: Surgical Oncology    MASTECTOMY  Left 1994    malignant    HI CELESTE-IMPLANT CAPSULECTOMY BREAST COMPLETE Left 11/17/2020    Procedure: BREAST CAPSULECTOMY;  Surgeon: Ronnell Chiu MD;  [facility];  Service: Plastics    HI REMOVAL INTACT BREAST IMPLANT Left 11/17/2020    Procedure: BREAST IMPLANT REMOVAL;  Surgeon: Ronnell Chiu MD;  [facility];  Service: Plastics    US BREAST NEEDLE LOC LEFT Left 11/02/2020    US GUIDED BREAST BIOPSY LEFT COMPLETE Left 08/31/2020    US GUIDED BREAST LYMPH NODE BIOPSY LEFT Left 08/31/2020    WRIST SURGERY       Social History     Tobacco Use    Smoking status: Never    Smokeless tobacco: Never   Vaping Use    Vaping status: Never Used   Substance and Sexual Activity    Alcohol use: Yes     Comment: social    Drug use: No    Sexual activity: Not Currently     E-Cigarette/Vaping    E-Cigarette Use Never User      E-Cigarette/Vaping Substances    Nicotine No     THC No     CBD No     Flavoring No     Other No     Unknown No          Objective :  Temp:  [97.4 °F (36.3 °C)-97.9 °F (36.6 °C)] 97.4 °F (36.3 °C)  HR:  [75-86] 75  BP: ()/(64-90) 98/64  Resp:  [18-19] 19  SpO2:  [93 %] 93 %  O2 Device: None (Room air)    Physical Exam  Constitutional:       Comments: Only somewhat sickly looking, stable, pleasant, slightly uncomfortable from pain   HENT:      Head: Normocephalic and atraumatic.     Eyes:      General: No scleral icterus.        Right eye: No discharge.         Left eye: No discharge.     Pulmonary:      Effort: Pulmonary effort is normal. No respiratory distress.   Abdominal:      General: Abdomen is flat. There is no distension.     Musculoskeletal:         General: No swelling.     Skin:     General: Skin is dry.      Coloration: Skin is not jaundiced or pale.     Neurological:      General: No focal deficit present.      Mental Status: She is alert and oriented to person, place, and time.     Psychiatric:         Mood and Affect: Mood normal.         Behavior: Behavior normal.          Thought Content: Thought content normal.         Judgment: Judgment normal.            Lab Results: I have reviewed the following results:  Lab Results   Component Value Date/Time    SODIUM 135 05/18/2025 01:57 AM    K 3.8 05/18/2025 01:57 AM    K 3.7 01/30/2015 06:29 AM    BUN 17 05/18/2025 01:57 AM    BUN 20 01/30/2015 06:29 AM    CREATININE 1.02 05/18/2025 01:57 AM    CREATININE 1.03 01/30/2015 06:29 AM    GLUC 104 05/18/2025 01:57 AM    CALCIUM 10.9 (H) 05/18/2025 01:57 AM    CALCIUM 9.3 01/30/2015 06:29 AM    AST 16 03/21/2025 03:51 AM    AST 58 (H) 01/28/2015 05:58 AM    ALT 11 03/21/2025 03:51 AM    ALT 44 01/28/2015 05:58 AM    ALB 3.8 03/21/2025 03:51 AM    ALB 2.0 (L) 01/28/2015 05:58 AM    TP 7.0 03/21/2025 03:51 AM    EGFR 49 05/18/2025 01:57 AM     Lab Results   Component Value Date/Time    HGB 13.9 05/18/2025 01:57 AM    HGB 8.6 (L) 01/30/2015 06:29 AM    WBC 10.09 05/18/2025 01:57 AM    WBC 14.12 (H) 01/30/2015 06:29 AM     05/18/2025 01:57 AM     (H) 01/30/2015 06:29 AM    INR 1.20 (H) 03/21/2025 03:51 AM    INR 2.19 (H) 01/17/2015 06:13 AM    PTT 32 03/21/2025 03:51 AM    PTT 49 (H) 01/16/2015 04:59 PM     Lab Results   Component Value Date/Time    LWA9KXECWBWL 1.435 02/27/2024 11:29 AM    ORY0LRUBPSLL 1.657 01/27/2015 06:00 AM             Code Status: Level 3 - DNAR and DNI  Advance Directive and Living Will:      Power of :    POLST:      Administrative Statements   I have spent a total time of 45 minutes in caring for this patient on the day of the visit/encounter including Diagnostic results, Prognosis, Risks and benefits of tx options, Instructions for management, Patient and family education, Importance of tx compliance, Risk factor reductions, Impressions, Counseling / Coordination of care, Documenting in the medical record, Reviewing/placing orders in the medical record (including tests, medications, and/or procedures), Obtaining or reviewing history  , and  Communicating with other healthcare professionals .    Zina Guerrero MD  Palliative Medicine & Supportive Care  Internal Medicine  Available via [place] [employer]  Office: [phone]  Fax: [phone]

## 2025-05-18 NOTE — ASSESSMENT & PLAN NOTE
Patient presenting with worsening left chest pain despite trial of increased oxycodone, pain persistent despite IV Dilaudid 0.5 mg x 3 during ED evaluation  Labs otherwise without marked abnormality except for mild hypercalcemia, CXR significant only for slight increase in size of small right pleural effusion.  EKG normal sinus rhythm with stable nonspecific T wave abnormalities compared to prior  Will trial scheduled acetaminophen and Lidoderm patch.  Pending palliative care service consultation will rotate to oral Dilaudid 2 mg / 4 mg every 4 hours as needed for moderate/severe pain, IV Dilaudid 0.5 mg every 4 hours as needed for breakthrough pain.

## 2025-05-18 NOTE — QUICK NOTE
Received page from RN on severe pain 3 hours after last dose of IV dilaudid and 2 hours after PO dilaudid. Verbal order for 1 time dose of 0.5mg IV dilaudid given. I then changed IV dilaudid to 10mg q4H prn for BT pain starting at 2000.    Zina Guerrero MD  Palliative Medicine & Supportive Care  Internal Medicine  Available via [employer]  Office: [phone]  Fax: [phone]

## 2025-05-18 NOTE — ASSESSMENT & PLAN NOTE
Reports no bowel movements in approximately 2 weeks  Placed on senna, suppository and Colace on admission  Refusing milk of magnesia and suppository.  Reports GoLytely is the only medication that works for her  Will trial GoLytely today

## 2025-05-18 NOTE — ASSESSMENT & PLAN NOTE
Wt Readings from Last 3 Encounters:   05/18/25 65.3 kg (144 lb)   05/02/25 67.8 kg (149 lb 8 oz)   04/17/25 68.9 kg (152 lb)   Appears euvolemic on exam today

## 2025-05-18 NOTE — ASSESSMENT & PLAN NOTE
"Lab Results   Component Value Date    HGBA1C 5.7 (H) 03/21/2025       No results for input(s): \"POCGLU\" in the last 72 hours.    Blood Sugar Average: Last 72 hrs:  Very well-controlled as outpatient, did have episodes of hypoglycemia during prior hospitalizations  Reduce Lantus to 20 units nightly, add correctional insulin coverage with Accu-Cheks  Carb controlled diet   initiate hypoglycemia protocol    "

## 2025-05-18 NOTE — ASSESSMENT & PLAN NOTE
PDMP reviewed, last prescribed oxycodone IR 5 mg 30 pills for 10-day course 5/2/2025 by Dr. Bernabe of [facility] palliative care, patient with intractable pain despite this regimen and trialing doubling dose prior to arrival here  Management as per primary problem additionally appreciate palliative care consultation

## 2025-05-18 NOTE — ED PROVIDER NOTES
Time reflects when diagnosis was documented in both MDM as applicable and the Disposition within this note       Time User Action Codes Description Comment    5/18/2025  3:49 AM Jeffrey Cifuentes [G89.3] Cancer associated pain     5/18/2025  3:49 AM Jeffrey Cifuentes [M54.9,  G89.29] Acute on chronic back pain     5/18/2025  4:14 AM Jeffrey Cifuentes [R52] Intractable pain           ED Disposition       ED Disposition   Admit    Condition   Stable    Date/Time   Sun May 18, 2025  3:49 AM    Comment   Case was discussed with MATIAS and the patient's admission status was agreed to be Admission Status: observation status to the service of Dr. Farrell .               Assessment & Plan       Medical Decision Making  Differentials includes but is not limited to cardiopulmonary ordinarily such as ACS, dysrhythmia, pneumonia and pneumothorax.  Consideration for pleural effusion, lytic lesions, osseous abnormalities and occult fractures.  Will treat pain, rule out cardiopulmonary etiologies.  Plan for discharge home however low threshold for admission.    Amount and/or Complexity of Data Reviewed  Labs: ordered. Decision-making details documented in ED Course.  Radiology: ordered and independent interpretation performed. Decision-making details documented in ED Course.    Risk  OTC drugs.  Prescription drug management.  Decision regarding hospitalization.        ED Course as of 05/18/25 0414   Sun May 18, 2025   0144 Blood Pressure: 124/90   0144 Temperature: 97.9 °F (36.6 °C)   0144 Temp Source: Oral   0144 Pulse: 86   0144 Respirations: 18   0144 SpO2: 93 %   0234 hs TnI 0hr: 9   0234 WBC: 10.09   0234 RBC: 4.74   0234 Hemoglobin: 13.9   0234 Hematocrit: 43.0   0234 Platelet Count: 320   0237 Sodium: 135   0237 Potassium: 3.8   0237 Chloride: 100   0237 Carbon Dioxide: 26   0237 Creatinine: 1.02   0237 GLUCOSE: 104   0237 Calcium(!): 10.9   0241 XR chest 2 views  No acute cardiopulmonary abnormality.      0243  Reevaluated.  Still complaining of pain.  Out of 10.  Will redose Dilaudid.   0413 Patient still complaining of pain.  Will be admitted to medicine service for intractable pain       Medications   HYDROmorphone (DILAUDID) injection 0.5 mg (0.5 mg Intravenous Given 5/18/25 0159)   acetaminophen (TYLENOL) tablet 650 mg (650 mg Oral Given 5/18/25 0157)   HYDROmorphone (DILAUDID) injection 0.5 mg (0.5 mg Intravenous Given 5/18/25 0229)   HYDROmorphone (DILAUDID) injection 0.5 mg (0.5 mg Intravenous Given 5/18/25 0359)       ED Risk Strat Scores   HEART Risk Score      Flowsheet Row Most Recent Value   Heart Score Risk Calculator    History 0 Filed at: 05/18/2025 0413   ECG 0 Filed at: 05/18/2025 0413   Age 2 Filed at: 05/18/2025 0413   Risk Factors 2 Filed at: 05/18/2025 0413   Troponin 0 Filed at: 05/18/2025 0413   HEART Score 4 Filed at: 05/18/2025 0413          HEART Risk Score      Flowsheet Row Most Recent Value   Heart Score Risk Calculator    History 0 Filed at: 05/18/2025 0413   ECG 0 Filed at: 05/18/2025 0413   Age 2 Filed at: 05/18/2025 0413   Risk Factors 2 Filed at: 05/18/2025 0413   Troponin 0 Filed at: 05/18/2025 0413   HEART Score 4 Filed at: 05/18/2025 0413                      No data recorded        SBIRT 20yo+      Flowsheet Row Most Recent Value   Initial Alcohol Screen: US AUDIT-C     1. How often do you have a drink containing alcohol? 0 Filed at: 05/18/2025 0140   2. How many drinks containing alcohol do you have on a typical day you are drinking?  0 Filed at: 05/18/2025 0140   3a. Male UNDER 65: How often do you have five or more drinks on one occasion? 0 Filed at: 05/18/2025 0140   3b. FEMALE Any Age, or MALE 65+: How often do you have 4 or more drinks on one occassion? 0 Filed at: 05/18/2025 0140   Audit-C Score 0 Filed at: 05/18/2025 0140   LI: How many times in the past year have you...    Used an illegal drug or used a prescription medication for non-medical reasons? Never Filed at:  05/18/2025 0140                            History of Present Illness       Chief Complaint   Patient presents with    Pain     Pt arrived to ED c/o uncontrolled pain, states she has Hx of cancer recently moved to palliative care where pain medications were changed. Per patient previously on Morphine 15mg but switched to oxycodone 5mg c/o pain under L arm/arm pit.       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) 12/01/2020    Chest pain, unspecified     CHF (congestive heart failure) (HCC)     Diabetes mellitus (HCC)     GERD (gastroesophageal reflux disease)     History of radiation exposure 1992    Hyperlipidemia     Hypertension     Irregular heart beat     Afib    Migraine     Obesity     Pericardial effusion     Pericarditis       Past Surgical History:   Procedure Laterality Date    ABDOMINAL ADHESION SURGERY      APPENDECTOMY      AUGMENTATION MAMMAPLASTY Left 1994    BACK SURGERY      BREAST LUMPECTOMY Left 1992    malignant    BREAST LUMPECTOMY Left 11/17/2020    Procedure: BREAST ULTRASOUND DIRECTED LUMPECTOMY (ULTRASOUND AT 1200);  Surgeon: Earl Em MD;  [facility];  Service: Surgical Oncology    BREAST SURGERY      CARDIAC SURGERY      Pericardial window    CHOLECYSTECTOMY      EYE SURGERY      FLAP LOCAL TRUNK Left 11/17/2020    Procedure: FLAP LOCAL TRUNK;  Surgeon: Ronnell Chiu MD;  [facility];  Service: Plastics    HYSTERECTOMY      IR DRAINAGE TUBE CHECK WITH SCLEROSIS  04/23/2021    IR DRAINAGE TUBE CHECK WITH SCLEROSIS  06/03/2021    IR DRAINAGE TUBE CHECK WITH SCLEROSIS  06/17/2021    IR DRAINAGE TUBE CHECK WITH SCLEROSIS  06/28/2021    IR DRAINAGE TUBE CHECK WITH SCLEROSIS  07/07/2021    IR DRAINAGE TUBE CHECK WITH SCLEROSIS  07/27/2021    IR DRAINAGE TUBE PLACEMENT  04/01/2021    IR DRAINAGE TUBE PLACEMENT WITH SCLEROSIS   05/14/2021    LYMPH NODE DISSECTION Left 11/17/2020    Procedure: HUGO  DIRECTED AXILLARY DISSECTION;  Surgeon: Earl Em MD;  [facility];  Service: Surgical Oncology    MASTECTOMY Left 1994    malignant    IL CELESTE-IMPLANT CAPSULECTOMY BREAST COMPLETE Left 11/17/2020    Procedure: BREAST CAPSULECTOMY;  Surgeon: Ronnell Chiu MD;  [facility];  Service: Plastics    IL REMOVAL INTACT BREAST IMPLANT Left 11/17/2020    Procedure: BREAST IMPLANT REMOVAL;  Surgeon: Ronnell Chiu MD;  [facility];  Service: Plastics    US BREAST NEEDLE LOC LEFT Left 11/02/2020    US GUIDED BREAST BIOPSY LEFT COMPLETE Left 08/31/2020    US GUIDED BREAST LYMPH NODE BIOPSY LEFT Left 08/31/2020    WRIST SURGERY        Family History   Problem Relation Age of Onset    Colon cancer Mother 55    Breast cancer Mother         Early 50's    Stroke Father     Emphysema Father     Leukemia Sister     Breast cancer Sister 65    ALS Brother       Social History[1]   E-Cigarette/Vaping    E-Cigarette Use Never User       E-Cigarette/Vaping Substances    Nicotine No     THC No     CBD No     Flavoring No     Other No     Unknown No       I have reviewed and agree with the history as documented.     Barbra is an 89-year-old female with a past medical history of CHF, diabetes, hypertension, breast cancer on the left not actively undergoing treatment.  She presents emergency department complaining of left-sided chest pain.  She complains of this pain is different than previous episodes of cancer related pain.  Hematology note on 4/17/2025 reviewed.  Patient had DEXA scan on 2/14 which showed left hip and femoral neck osteoporosis.  On 2/20/2025 CT chest shows lytic osseous lesions suspicious for metastasis.  She is having more discomfort in her left rib and low back has been worsening based on the documentation of visit on 4/17.  She complains of left-sided chest discomfort that started approximately 5 hours ago.  Recently  evaluated by palliative care/hospice services on 5/2.  Changed opioid analgesic from morphine to Oxy IR 5 mg every 8 hours.  She states she took 2 oxy pills prior to arrival throughout the course of the day.  She is complaining of worsening pain.  There were 2 phone call notes on 5/6 and 5/15 with the patient states that she is having worsening pain.        Review of Systems   Cardiovascular:  Positive for chest pain.   All other systems reviewed and are negative.          Objective       ED Triage Vitals [05/18/25 0137]   Temperature Pulse Blood Pressure Respirations SpO2 Patient Position - Orthostatic VS   97.9 °F (36.6 °C) 86 124/90 18 93 % --      Temp Source Heart Rate Source BP Location FiO2 (%) Pain Score    Oral -- -- -- 10 - Worst Possible Pain      Vitals      Date and Time Temp Pulse SpO2 Resp BP Pain Score FACES Pain Rating User   05/18/25 0359 -- -- -- -- -- 10 - Worst Possible Pain -- CC   05/18/25 0229 -- -- -- -- -- 8 -- CC   05/18/25 0157 -- -- -- -- -- 10 - Worst Possible Pain --    05/18/25 0137 97.9 °F (36.6 °C) 86 93 % 18 124/90 10 - Worst Possible Pain -- CC            Physical Exam  Vitals and nursing note reviewed.   Constitutional:       General: She is not in acute distress.     Appearance: She is well-developed.   HENT:      Head: Normocephalic and atraumatic.     Eyes:      Conjunctiva/sclera: Conjunctivae normal.       Cardiovascular:      Rate and Rhythm: Normal rate and regular rhythm.      Heart sounds: No murmur heard.  Pulmonary:      Effort: Pulmonary effort is normal. No respiratory distress.      Breath sounds: Normal breath sounds.   Abdominal:      Palpations: Abdomen is soft.      Tenderness: There is no abdominal tenderness.     Musculoskeletal:         General: No swelling.      Cervical back: Neck supple.     Skin:     General: Skin is warm and dry.      Capillary Refill: Capillary refill takes less than 2 seconds.     Neurological:      General: No focal deficit present.       Mental Status: She is alert and oriented to person, place, and time. Mental status is at baseline.      GCS: GCS eye subscore is 4. GCS verbal subscore is 5. GCS motor subscore is 6.      Cranial Nerves: No cranial nerve deficit.      Sensory: No sensory deficit.      Motor: No weakness.      Coordination: Coordination normal.      Gait: Gait normal.     Psychiatric:         Mood and Affect: Mood normal.         Results Reviewed       Procedure Component Value Units Date/Time    HS Troponin I 2hr [[id number]] Collected: 05/18/25 0403    Lab Status: No result Specimen: Blood from Arm, Right     HS Troponin I 4hr [[id number]]     Lab Status: No result Specimen: Blood     Basic metabolic panel [[id number]]  (Abnormal) Collected: 05/18/25 0157    Lab Status: Final result Specimen: Blood from Arm, Right Updated: 05/18/25 0236     Sodium 135 mmol/L      Potassium 3.8 mmol/L      Chloride 100 mmol/L      CO2 26 mmol/L      ANION GAP 9 mmol/L      BUN 17 mg/dL      Creatinine 1.02 mg/dL      Glucose 104 mg/dL      Calcium 10.9 mg/dL      eGFR 49 ml/min/1.73sq m     Narrative:      National Kidney Disease Foundation guidelines for Chronic Kidney Disease (CKD):     Stage 1 with normal or high GFR (GFR > 90 mL/min/1.73 square meters)    Stage 2 Mild CKD (GFR = 60-89 mL/min/1.73 square meters)    Stage 3A Moderate CKD (GFR = 45-59 mL/min/1.73 square meters)    Stage 3B Moderate CKD (GFR = 30-44 mL/min/1.73 square meters)    Stage 4 Severe CKD (GFR = 15-29 mL/min/1.73 square meters)    Stage 5 End Stage CKD (GFR <15 mL/min/1.73 square meters)  Note: GFR calculation is accurate only with a steady state creatinine    HS Troponin 0hr (reflex protocol) [[id number]]  (Normal) Collected: 05/18/25 0157    Lab Status: Final result Specimen: Blood from Arm, Right Updated: 05/18/25 0231     hs TnI 0hr 9 ng/L     CBC and differential [[id number]] Collected: 05/18/25 0157    Lab Status: Final result Specimen: Blood from Arm, Right Updated:  05/18/25 0210     WBC 10.09 Thousand/uL      RBC 4.74 Million/uL      Hemoglobin 13.9 g/dL      Hematocrit 43.0 %      MCV 91 fL      MCH 29.3 pg      MCHC 32.3 g/dL      RDW 13.2 %      MPV 11.1 fL      Platelets 320 Thousands/uL      nRBC 0 /100 WBCs      Segmented % 69 %      Immature Grans % 0 %      Lymphocytes % 22 %      Monocytes % 7 %      Eosinophils Relative 2 %      Basophils Relative 0 %      Absolute Neutrophils 6.90 Thousands/µL      Absolute Immature Grans 0.04 Thousand/uL      Absolute Lymphocytes 2.21 Thousands/µL      Absolute Monocytes 0.75 Thousand/µL      Eosinophils Absolute 0.16 Thousand/µL      Basophils Absolute 0.03 Thousands/µL             XR chest 2 views   ED Interpretation by Jeffrey Cifuentes DO (05/18 0241)   No acute cardiopulmonary abnormality.       Final Interpretation by Jie Goel MD (05/18 033)      Increased small right pleural effusion.            Workstation performed: [id number]             ECG 12 Lead Documentation Only    Date/Time: 5/18/2025 2:40 AM    Performed by: Jeffrey Cifuentes DO  Authorized by: Jeffrey Cifuentes DO    Indications / Diagnosis:  Left chest pain  ECG reviewed by me, the ED Provider: yes    Patient location:  ED  Previous ECG:     Previous ECG:  Compared to current    Similarity:  No change    Comparison to cardiac monitor: Yes    Interpretation:     Interpretation: normal    Rate:     ECG rate:  79    ECG rate assessment: normal    Rhythm:     Rhythm: sinus rhythm    Ectopy:     Ectopy: none    QRS:     QRS axis:  Normal    QRS intervals:  Normal  Conduction:     Conduction: normal    ST segments:     ST segments:  Normal  T waves:     T waves: normal        ED Medication and Procedure Management   Prior to Admission Medications   Prescriptions Last Dose Informant Patient Reported? Taking?   Beclomethasone Dipropionate (QVAR IN)  Self, Spouse/Significant Other Yes No   Sig: Inhale 2 puffs if needed (shortness of breath) Inhale 2 puffs as  needed every 8 hours for shortness of breath   Cholecalciferol (D3 Adult) 25 MCG (1000 UT) CHEW   Yes No   Sig: Chew 1,000 Units daily. 1 tablet by mouth daily   Droplet Pen Needles 32G X 4 MM MISC  Self, Spouse/Significant Other Yes No   EPINEPHrine (EPIPEN) 0.3 mg/0.3 mL SOAJ  Self, Spouse/Significant Other Yes No   Sig: Inject 0.3 mL as directed Patient does not have in home   FLOVENT  MCG/ACT inhaler  Self, Spouse/Significant Other Yes No   Sig: INHALE 2 PUFF BY INHALATION ROUTE 2 TIMES EVERY DAY   Menaquinone-7 (K2 PO)   Yes No   Sig: Take 100 mcg by mouth daily. Take 1 tablet by mouth daily   NOVOFINE 32G X 6 MM MISC  Self, Spouse/Significant Other Yes No   Si (two) times a day As directed   ONE TOUCH ULTRA TEST test strip  Self, Spouse/Significant Other Yes No   Sig: TEST TWICE DAILY OR AS DIRECTED DX: E11.9   ONETOUCH DELICA LANCETS 33G MISC  Self, Spouse/Significant Other Yes No   Sig: TEST TWICE DAILY OR AS DIRECTED   VENTOLIN  (90 Base) MCG/ACT inhaler  Self, Spouse/Significant Other Yes No   Sig: Inhale 2 puffs every 4 (four) hours as needed for shortness of breath Take 2 puffs inhalation every 4 hours as needed   dextromethorphan-guaiFENesin (ROBITUSSIN DM)  mg/5 mL syrup   No No   Sig: Take 10 mL by mouth every 4 (four) hours as needed for cough   hydrocortisone 2.5 % cream  Self, Spouse/Significant Other Yes No   Sig: Apply to rash in groin area twice daily   insulin aspart (NovoLOG) 100 Units/mL injection pen  Self, Spouse/Significant Other Yes No   Sig: Novolog Flexpen U-100 Insulin aspart 100 unit/mL (3 mL) subcutaneous   insulin glargine (TOUJEO MAX) 300 units/mL CONCENTRATED U-300 injection pen (2-unit dial)   Yes No   Sig: Inject 40 Units under the skin daily at bedtime Patient takes 50-60 units daily   lansoprazole (PREVACID) 15 mg capsule   Yes No   Sig: Take 15 mg by mouth daily. Take 1 tablet by mouth daily  latest dci state 30 mg daily    metoprolol tartrate  (LOPRESSOR) 50 mg tablet  Self, Spouse/Significant Other Yes No   Sig: Take 50 mg by mouth every 12 (twelve) hours Take 1 tablet by mouth twice daily with meals   naloxone (NARCAN) 4 mg/0.1 mL nasal spray   No No   Sig: Administer 1 spray into a nostril. If no response after 2-3 minutes, give another dose in the other nostril using a new spray. For use in emergencies for opioid / pain medication reversal for accidental ingestion, respiratory depression, sedation or concerns for overdose.   nystatin (MYCOSTATIN) powder  Self, Spouse/Significant Other Yes No   Sig: APPLY TO THE RASH IN THE GROIN ONCE DAILY   oxyCODONE (Roxicodone) 5 immediate release tablet   No No   Sig: Take 1 tablet (5 mg total) by mouth every 8 (eight) hours as needed for moderate pain Max Daily Amount: 15 mg   oxybutynin (DITROPAN XL) 15 MG 24 hr tablet  Self, Spouse/Significant Other Yes No   Sig: Take 15 mg by mouth daily.   simvastatin (ZOCOR) 20 mg tablet  Self, Spouse/Significant Other Yes No   Sig: Take 20 mg by mouth daily at bedtime.   solifenacin (VESICARE) 10 MG tablet  Self, Spouse/Significant Other No No   Sig: Take 1 tablet (10 mg total) by mouth daily      Facility-Administered Medications: None     Patient's Medications   Discharge Prescriptions    No medications on file     No discharge procedures on file.  ED SEPSIS DOCUMENTATION   Time reflects when diagnosis was documented in both MDM as applicable and the Disposition within this note       Time User Action Codes Description Comment    5/18/2025  3:49 AM Jeffrey Cifuentes [G89.3] Cancer associated pain     5/18/2025  3:49 AM Jeffrey Cifuentes [M54.9,  G89.29] Acute on chronic back pain     5/18/2025  4:14 AM Jeffrey Cifuentes [R52] Intractable pain                    [1]   Social History  Tobacco Use    Smoking status: Never    Smokeless tobacco: Never   Vaping Use    Vaping status: Never Used   Substance Use Topics    Alcohol use: Yes     Comment: social    Drug use: No         Jeffrey Cifuentes,   05/18/25 0414

## 2025-05-18 NOTE — ED ATTENDING ATTESTATION
5/18/2025  ITed Jr, DO, saw and evaluated the patient. I have discussed the patient with the resident/non-physician practitioner and agree with the resident's/non-physician practitioner's findings, Plan of Care, and MDM as documented in the resident's/non-physician practitioner's note, except where noted. All available labs and Radiology studies were reviewed.  I was present for key portions of any procedure(s) performed by the resident/non-physician practitioner and I was immediately available to provide assistance.       At this point I agree with the current assessment done in the Emergency Department.  I have conducted an independent evaluation of this patient a history and physical is as follows:    Final Diagnosis:  1. Cancer associated pain    2. Acute on chronic back pain            MDM       DDX including but not limited to: cancer related pain, chest wall pain, costochondritis, pleurisy, pericarditis, myocarditis, PTX, pneumonia, ACS, MI, PE, GI etiology.      - Given patient's concerns, will do a cardiac workup.   - Will do an EKG for strain; arrhythmia, heart block, ST changes to rule out STEMI, pericarditis.  - Troponin for same as per protocol for evaluation of ACS.   - CBC to evaluate for anemia, evaluate for leukocytosis as sign of infectious source of symptoms.  - BMP to evaluate for electrolyte derangement, assess renal function.  - Will check CXR for pneumonia, pneumothorax, pleural effusion, signs of fluid overload.  - Unable to obtain a CT for PE given allergy history.  Will not obtain a d-dimer as this will be elevated given her PMH of cancer.  - IV pain medications as needed  - Disposition per workup.     Workup is overall reassuring but patient still in pain so we will admit to internal medicine.  Vital signs have remained stable.      Lab Results:   Abnormal Labs Reviewed   BASIC METABOLIC PANEL - Abnormal; Notable for the following components:       Result Value    Calcium  10.9 (*)     All other components within normal limits    Narrative:     National Kidney Disease Foundation guidelines for Chronic Kidney Disease (CKD):     Stage 1 with normal or high GFR (GFR > 90 mL/min/1.73 square meters)    Stage 2 Mild CKD (GFR = 60-89 mL/min/1.73 square meters)    Stage 3A Moderate CKD (GFR = 45-59 mL/min/1.73 square meters)    Stage 3B Moderate CKD (GFR = 30-44 mL/min/1.73 square meters)    Stage 4 Severe CKD (GFR = 15-29 mL/min/1.73 square meters)    Stage 5 End Stage CKD (GFR <15 mL/min/1.73 square meters)  Note: GFR calculation is accurate only with a steady state creatinine     Lab Results: I have personally reviewed pertinent lab results.    Imaging:   XR chest 2 views   ED Interpretation   No acute cardiopulmonary abnormality.       Final Result      Increased small right pleural effusion.            Workstation performed: [id number]           I have personally reviewed pertinent reports.    EKG, Pathology, and Other Studies: I have personally reviewed pertinent films in PACS    Clinical Impression:    Final diagnoses:   Cancer associated pain   Acute on chronic back pain         Disposition    admitted to the hospital           New Prescriptions:    New Prescriptions    No medications on file            Follow-up Instructions:    No follow-up provider specified.        History of Present Illness   Barbra De Oliveira is a 89 y.o. female who presents with Pain (Pt arrived to ED c/o uncontrolled pain, states she has Hx of cancer recently moved to palliative care where pain medications were changed. Per patient previously on Morphine 15mg but switched to oxycodone 5mg c/o pain under L arm/arm pit.)    has a past medical history of Anxiety, Asthma, Atrial fibrillation (HCC), Breast cancer (HCC) (1992), 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) (12/01/2020), Chest pain, unspecified, CHF (congestive heart failure) (HCC), Diabetes mellitus (HCC), GERD (gastroesophageal  "reflux disease), History of radiation exposure (1992), Hyperlipidemia, Hypertension, Irregular heart beat, Migraine, Obesity, Pericardial effusion, and Pericarditis..         Objective     Vitals:    05/18/25 0137   BP: 124/90   Pulse: 86   Resp: 18   Temp: 97.9 °F (36.6 °C)   TempSrc: Oral   SpO2: 93%   Weight: 67.6 kg (149 lb)   Height: 5' 2\" (1.575 m)     Body mass index is 27.25 kg/m².  No intake or output data in the 24 hours ending 05/18/25 0353  Invasive Devices       Peripheral Intravenous Line  Duration             Peripheral IV 05/18/25 Distal;Dorsal (posterior);Right Forearm <1 day                    ED Course         Critical Care Time  Procedures      "

## 2025-05-18 NOTE — ASSESSMENT & PLAN NOTE
Recently with concern for recurrent malignancy and suspect metastasis to new pulmonary nodule and lytic osseous lesions.  At prior admissions here she declined any further oncology workup evaluation or intervention.  At outpatient appointment with her oncologist 4/17/2025 she declined any for further treatment, anastrozole at that time was discontinued  Indicated at palliative care appointment 5/2/2025 she did not wish to pursue any further cancer treatments but did wish to continue with other medical cares for her other medical conditions and was not ready for hospice services.  Now indicates to me she is considering transition to focus solely on comfort  Appreciate palliative care assistance

## 2025-05-18 NOTE — ASSESSMENT & PLAN NOTE
As per last palliative office visit in 5/2/2025, no longer interested in cancer cares  She verified this again today.

## 2025-05-18 NOTE — ASSESSMENT & PLAN NOTE
Pain in the L side of the chest. Likely from the dominant 3.3 x 2.1 cm expansile lesion of the left fifth rib with extensive cortical erosion from CT scan in 2/2025. Also has 2.7 x 1.6 cm expansile lytic lesion of the sternum, 13 mm left sternal lytic lesion, subcentimeter right clavicular lytic lesion, multiple spine neck lytic lesions - For example 14 mm left T12 lesion  MAR: IV-D 0.5mg x 3 and PO-D 4mg x 2 since admission.  Continue tylenol 975mg PO q8H ATC. Max of 3000mg in 24H only  Continue PO dilaudid 2-4mg PO q4H prn   Continue IV dilaudid 0.5mg q4H prn BT pain  Senokot and miralax daily  Add decadron for acute worsening of bone pain, monitor BG closely - 1mg (am and noon).   Add lidocaine patch to the lower back and L shoulder, 12H on 12H off   D/w above with SLIM and RN

## 2025-05-19 ENCOUNTER — HOME CARE VISIT (OUTPATIENT)
Dept: HOME HEALTH SERVICES | Facility: HOME HEALTHCARE | Age: 89
End: 2025-05-19
Payer: MEDICARE

## 2025-05-19 LAB
GLUCOSE SERPL-MCNC: 118 MG/DL (ref 65–140)
GLUCOSE SERPL-MCNC: 131 MG/DL (ref 65–140)
GLUCOSE SERPL-MCNC: 153 MG/DL (ref 65–140)
GLUCOSE SERPL-MCNC: 97 MG/DL (ref 65–140)

## 2025-05-19 PROCEDURE — 99233 SBSQ HOSP IP/OBS HIGH 50: CPT | Performed by: STUDENT IN AN ORGANIZED HEALTH CARE EDUCATION/TRAINING PROGRAM

## 2025-05-19 PROCEDURE — G0316 PR PROLONG INPT EVAL ADD15 M: HCPCS | Performed by: PHYSICIAN ASSISTANT

## 2025-05-19 PROCEDURE — 99233 SBSQ HOSP IP/OBS HIGH 50: CPT | Performed by: PHYSICIAN ASSISTANT

## 2025-05-19 PROCEDURE — 82948 REAGENT STRIP/BLOOD GLUCOSE: CPT

## 2025-05-19 RX ORDER — POLYETHYLENE GLYCOL 3350 17 G/17G
17 POWDER, FOR SOLUTION ORAL DAILY
Status: DISCONTINUED | OUTPATIENT
Start: 2025-05-19 | End: 2025-05-20

## 2025-05-19 RX ORDER — HYDROMORPHONE HYDROCHLORIDE 4 MG/1
4 TABLET ORAL EVERY 4 HOURS PRN
Refills: 0 | Status: DISCONTINUED | OUTPATIENT
Start: 2025-05-19 | End: 2025-05-20

## 2025-05-19 RX ADMIN — HYDROMORPHONE HYDROCHLORIDE 4 MG: 4 TABLET ORAL at 20:24

## 2025-05-19 RX ADMIN — METOPROLOL TARTRATE 50 MG: 50 TABLET, FILM COATED ORAL at 08:14

## 2025-05-19 RX ADMIN — ACETAMINOPHEN 975 MG: 325 TABLET ORAL at 06:23

## 2025-05-19 RX ADMIN — HYDROMORPHONE HYDROCHLORIDE 1 MG: 1 INJECTION, SOLUTION INTRAMUSCULAR; INTRAVENOUS; SUBCUTANEOUS at 14:03

## 2025-05-19 RX ADMIN — OXYBUTYNIN 15 MG: 10 TABLET, FILM COATED, EXTENDED RELEASE ORAL at 08:14

## 2025-05-19 RX ADMIN — METHYLNALTREXONE BROMIDE 12 MG: 12 INJECTION, SOLUTION SUBCUTANEOUS at 16:28

## 2025-05-19 RX ADMIN — METOPROLOL TARTRATE 50 MG: 50 TABLET, FILM COATED ORAL at 20:25

## 2025-05-19 RX ADMIN — LIDOCAINE 2 PATCH: 700 PATCH TOPICAL at 08:14

## 2025-05-19 RX ADMIN — ONDANSETRON 4 MG: 2 INJECTION INTRAMUSCULAR; INTRAVENOUS at 20:25

## 2025-05-19 RX ADMIN — INSULIN LISPRO 1 UNITS: 100 INJECTION, SOLUTION INTRAVENOUS; SUBCUTANEOUS at 16:29

## 2025-05-19 RX ADMIN — POLYETHYLENE GLYCOL 3350 17 G: 17 POWDER, FOR SOLUTION ORAL at 16:30

## 2025-05-19 RX ADMIN — PRAVASTATIN SODIUM 40 MG: 40 TABLET ORAL at 16:28

## 2025-05-19 RX ADMIN — PANTOPRAZOLE SODIUM 20 MG: 20 TABLET, DELAYED RELEASE ORAL at 06:24

## 2025-05-19 RX ADMIN — DEXAMETHASONE 1 MG: 2 TABLET ORAL at 06:23

## 2025-05-19 RX ADMIN — HYDROMORPHONE HYDROCHLORIDE 1 MG: 1 INJECTION, SOLUTION INTRAMUSCULAR; INTRAVENOUS; SUBCUTANEOUS at 07:31

## 2025-05-19 RX ADMIN — HYDROMORPHONE HYDROCHLORIDE 4 MG: 4 TABLET ORAL at 06:23

## 2025-05-19 RX ADMIN — ACETAMINOPHEN 975 MG: 325 TABLET ORAL at 14:03

## 2025-05-19 RX ADMIN — DEXAMETHASONE 1 MG: 2 TABLET ORAL at 12:17

## 2025-05-19 RX ADMIN — SENNOSIDES 17.2 MG: 8.6 TABLET, FILM COATED ORAL at 08:14

## 2025-05-19 RX ADMIN — INSULIN GLARGINE 20 UNITS: 100 INJECTION, SOLUTION SUBCUTANEOUS at 23:07

## 2025-05-19 RX ADMIN — DOCUSATE SODIUM 100 MG: 100 CAPSULE, LIQUID FILLED ORAL at 08:14

## 2025-05-19 RX ADMIN — DOCUSATE SODIUM 100 MG: 100 CAPSULE, LIQUID FILLED ORAL at 17:29

## 2025-05-19 RX ADMIN — HYDROMORPHONE HYDROCHLORIDE 1 MG: 1 INJECTION, SOLUTION INTRAMUSCULAR; INTRAVENOUS; SUBCUTANEOUS at 00:45

## 2025-05-19 RX ADMIN — HYDROMORPHONE HYDROCHLORIDE 4 MG: 4 TABLET ORAL at 10:25

## 2025-05-19 RX ADMIN — HYDROMORPHONE HYDROCHLORIDE 1 MG: 1 INJECTION, SOLUTION INTRAMUSCULAR; INTRAVENOUS; SUBCUTANEOUS at 23:13

## 2025-05-19 RX ADMIN — HYDROMORPHONE HYDROCHLORIDE 4 MG: 4 TABLET ORAL at 16:28

## 2025-05-19 RX ADMIN — ACETAMINOPHEN 975 MG: 325 TABLET ORAL at 23:07

## 2025-05-19 RX ADMIN — ENOXAPARIN SODIUM 40 MG: 40 INJECTION SUBCUTANEOUS at 08:14

## 2025-05-19 NOTE — PROGRESS NOTES
Patient:    MRN:  [id number]    Aidin Request ID:  [id number]    Level of care reserved:  Hospice    Partner Reserved:  [facility], [place] [address] [phone]    Clinical needs requested:    Geography searched:  [id number]    Start of Service:    Request sent:  12:21pm EDT on 5/19/2025 by Merline Chavarria    Partner reserved:  1:02pm EDT on 5/19/2025 by Merline Chavarria    Choice list shared:  1:02pm EDT on 5/19/2025 by Merline Chavarria

## 2025-05-19 NOTE — ASSESSMENT & PLAN NOTE
Wt Readings from Last 3 Encounters:   05/18/25 65.3 kg (144 lb)   05/02/25 67.8 kg (149 lb 8 oz)   04/17/25 68.9 kg (152 lb)   Denies SOB

## 2025-05-19 NOTE — PLAN OF CARE
Problem: Potential for Falls  Goal: Patient will remain free of falls  Description: INTERVENTIONS:  - Educate patient/family on patient safety including physical limitations  - Instruct patient to call for assistance with activity   - Consider consulting OT/PT to assist with strengthening/mobility based on AM PAC & JH-HLM score  - Consult OT/PT to assist with strengthening/mobility   - Keep Call bell within reach  - Keep bed low and locked with side rails adjusted as appropriate  - Keep care items and personal belongings within reach  - Initiate and maintain comfort rounds  - Make Fall Risk Sign visible to staff  - Offer Toileting every 2 Hours, in advance of need  - Initiate/Maintain bedalarm  - Obtain necessary fall risk management equipment:   - Apply yellow socks and bracelet for high fall risk patients  - Consider moving patient to room near nurses station  Outcome: Progressing     Problem: PAIN - ADULT  Goal: Verbalizes/displays adequate comfort level or baseline comfort level  Description: Interventions:  - Encourage patient to monitor pain and request assistance  - Assess pain using appropriate pain scale  - Administer analgesics as ordered based on type and severity of pain and evaluate response  - Implement non-pharmacological measures as appropriate and evaluate response  - Consider cultural and social influences on pain and pain management  - Notify physician/advanced practitioner if interventions unsuccessful or patient reports new pain  - Educate patient/family on pain management process including their role and importance of  reporting pain   - Provide non-pharmacologic/complimentary pain relief interventions  Outcome: Progressing     Problem: INFECTION - ADULT  Goal: Absence or prevention of progression during hospitalization  Description: INTERVENTIONS:  - Assess and monitor for signs and symptoms of infection  - Monitor lab/diagnostic results  - Monitor all insertion sites, i.e. indwelling lines,  tubes, and drains  - Monitor endotracheal if appropriate and nasal secretions for changes in amount and color  - [place] appropriate cooling/warming therapies per order  - Administer medications as ordered  - Instruct and encourage patient and family to use good hand hygiene technique  - Identify and instruct in appropriate isolation precautions for identified infection/condition  Outcome: Progressing  Goal: Absence of fever/infection during neutropenic period  Description: INTERVENTIONS:  - Monitor WBC  - Perform strict hand hygiene  - Limit to healthy visitors only  - No plants, dried, fresh or silk flowers with [place] in patient room  Outcome: Progressing     Problem: SAFETY ADULT  Goal: Patient will remain free of falls  Description: INTERVENTIONS:  - Educate patient/family on patient safety including physical limitations  - Instruct patient to call for assistance with activity   - Consider consulting OT/PT to assist with strengthening/mobility based on AM PAC & -HLM score  - Consult OT/PT to assist with strengthening/mobility   - Keep Call bell within reach  - Keep bed low and locked with side rails adjusted as appropriate  - Keep care items and personal belongings within reach  - Initiate and maintain comfort rounds  - Make Fall Risk Sign visible to staff  - Offer Toileting every 2 Hours, in advance of need  - Initiate/Maintain bed alarm  - Obtain necessary fall risk management equipment:   - Apply yellow socks and bracelet for high fall risk patients  - Consider moving patient to room near nurses station  Outcome: Progressing  Goal: Maintain or return to baseline ADL function  Description: INTERVENTIONS:  -  Assess patient's ability to carry out ADLs; assess patient's baseline for ADL function and identify physical deficits which impact ability to perform ADLs (bathing, care of mouth/teeth, toileting, grooming, dressing, etc.)  - Assess/evaluate cause of self-care deficits   - Assess range of motion  -  Assess patient's mobility; develop plan if impaired  - Assess patient's need for assistive devices and provide as appropriate  - Encourage maximum independence but intervene and supervise when necessary  - Involve family in performance of ADLs  - Assess for home care needs following discharge   - Consider OT consult to assist with ADL evaluation and planning for discharge  - Provide patient education as appropriate  - Monitor functional capacity and physical performance, use of AM PAC & JH-HLM   - Monitor gait, balance and fatigue with ambulation    Outcome: Progressing  Goal: Maintains/Returns to pre admission functional level  Description: INTERVENTIONS:  - Perform AM-PAC 6 Click Basic Mobility/ Daily Activity assessment daily.  - Set and communicate daily mobility goal to care team and patient/family/caregiver.   - Collaborate with rehabilitation services on mobility goals if consulted  - Perform Range of Motion 3 times a day.  - Reposition patient every 2 hours.  - Dangle patient 3 times a day  - Stand patient 3 times a day  - Ambulate patient 3 times a day  - Out of bed to chair 3 times a day   - Out of bed for meals 3 times a day  - Out of bed for toileting  - Record patient progress and toleration of activity level   Outcome: Progressing     Problem: Nutrition/Hydration-ADULT  Goal: Nutrient/Hydration intake appropriate for improving, restoring or maintaining nutritional needs  Description: Monitor and assess patient's nutrition/hydration status for malnutrition. Collaborate with interdisciplinary team and initiate plan and interventions as ordered.  Monitor patient's weight and dietary intake as ordered or per policy. Utilize nutrition screening tool and intervene as necessary. Determine patient's food preferences and provide high-protein, high-caloric foods as appropriate.     INTERVENTIONS:  - Monitor oral intake, urinary output, labs, and treatment plans  - Assess nutrition and hydration status and  recommend course of action  - Evaluate amount of meals eaten  - Assist patient with eating if necessary   - Allow adequate time for meals  - Recommend/ encourage appropriate diets, oral nutritional supplements, and vitamin/mineral supplements  - Order, calculate, and assess calorie counts as needed  - Recommend, monitor, and adjust tube feedings and TPN/PPN based on assessed needs  - Assess need for intravenous fluids  - Provide specific nutrition/hydration education as appropriate  - Include patient/family/caregiver in decisions related to nutrition  Outcome: Progressing

## 2025-05-19 NOTE — ASSESSMENT & PLAN NOTE
Reports no bowel movements in approximately 2 weeks  Continue senna, MiraLAX and Colace  Refusing milk of magnesia and suppository.  S/p GoLytely on 5/18   Relistor x 1 today

## 2025-05-19 NOTE — ASSESSMENT & PLAN NOTE
PDMP reviewed, last prescribed oxycodone IR 5 mg 30 pills for 10-day course 5/2/2025 by Dr. Bernabe of [facility] palliative care, patient with intractable pain despite this regimen and trialing doubling dose prior to arrival here  Management as per primary problem additionally appreciate palliative care consultation

## 2025-05-19 NOTE — ASSESSMENT & PLAN NOTE
Last BM 2 weeks ago  Miralax and senokot daily, GoLytely today per primary SLIM  Relistor and milk of magnesia added by primary team today.  Recommend enema

## 2025-05-19 NOTE — ASSESSMENT & PLAN NOTE
Pain in the L side of the chest. Likely from the dominant 3.3 x 2.1 cm expansile lesion of the left fifth rib with extensive cortical erosion from CT scan in 2/2025. Also has 2.7 x 1.6 cm expansile lytic lesion of the sternum, 13 mm left sternal lytic lesion, subcentimeter right clavicular lytic lesion, multiple spine neck lytic lesions - For example 14 mm left T12 lesion  Last 24 hours: Dilaudid 4 mg PO x 5 doses, Dilaudid 0.5 mg IV x 2 doses, 1 mg IV x 2 doses, OME= 160 mg.  Continue tylenol 975mg PO q8H ATC. Max of 3000mg in 24H only  Continue PO dilaudid 4mg PO q4H prn moderate-severe pain  Continue IV dilaudid 1 mg q4H prn BT pain  Senokot and miralax daily  Continue decadron for acute worsening of bone pain, monitor BG closely - 1mg (am and noon).   Continue lidocaine patch to the lower back and L shoulder, 12H on 12H off   D/w above with SLIM and RN  Defer escalation of regimen while goals of care remain unclear. Consider addition of long acting agent pending course/GOC.

## 2025-05-19 NOTE — ASSESSMENT & PLAN NOTE
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 Servin.    Follow-up  We appreciate the opportunity to participate in this patient's care.   We will continue to follow while admitted.    Please do not hesitate to contact our on-call provider through [employer] Secure Chat or contact [phone] should there be an acute change or other symptom control concerns.    Please do not make any changes to symptom medications (opioid analgesics, nonopioid analgesics, antiemetics, etc) without first consulting the on-call Palliative Care provider for your specific campus; including after hours and on weekends. We are available 24/7, and can be contacted on [employer] chat or at [phone].

## 2025-05-19 NOTE — ASSESSMENT & PLAN NOTE
As per last palliative office visit in 5/2/2025, no longer interested in cancer cares  States she does not want to seek further chemotherapy or radiation, is focused on symptom management

## 2025-05-19 NOTE — ASSESSMENT & PLAN NOTE
Lab Results   Component Value Date    HGBA1C 5.7 (H) 03/21/2025     BG monitoring/ SSI per primary SLIM    Recent Labs     05/18/25  1039 05/18/25  1613 05/18/25  2100 05/19/25  0558   POCGLU 128 126 140 97       Blood Sugar Average: Last 72 hrs:  (P) 118.2

## 2025-05-19 NOTE — PROGRESS NOTES
Progress Note - Palliative Care   Name: Barbra De Oliveira 89 y.o. 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 Servin.    Follow-up  We appreciate the opportunity to participate in this patient's care.   We will continue to follow while admitted.    Please do not hesitate to contact our on-call provider through [employer] Secure Chat or contact [phone] should there be an acute change or other symptom control concerns.    Please do not make any changes to symptom medications (opioid analgesics, nonopioid analgesics, antiemetics, etc) without first consulting the on-call Palliative Care provider for your specific campus; including after hours and on weekends. We are available 24/7, and can be contacted on [employer] chat or at [phone].   Cancer related pain  Pain in the L side of the chest. Likely from the dominant 3.3 x 2.1 cm expansile lesion of the left fifth rib with extensive cortical erosion from CT scan in 2/2025. Also has 2.7 x 1.6 cm expansile lytic lesion of the sternum, 13 mm left sternal lytic lesion, subcentimeter right clavicular lytic lesion, multiple spine neck lytic lesions - For example 14 mm left T12 lesion  Last 24 hours: Dilaudid 4 mg PO x 5 doses, Dilaudid 0.5 mg IV x 2 doses, 1 mg IV x 2 doses, OME= 160 mg.  Continue tylenol 975mg PO q8H ATC. Max of 3000mg in 24H only  Continue PO dilaudid 4mg PO q4H prn  "moderate-severe pain  Continue IV dilaudid 1 mg q4H prn BT pain  Senokot and miralax daily  Continue decadron for acute worsening of bone pain, monitor BG closely - 1mg (am and noon).   Continue lidocaine patch to the lower back and L shoulder, 12H on 12H off   D/w above with SLIM and RN  Defer escalation of regimen while goals of care remain unclear. Consider addition of long acting agent pending course/GOC.  Advanced care planning/counseling discussion    Hospice care discussed with patient and her  Dinh at 1300 as planned with  Hospice liaison Felisa and Palliative's Felisa JACKSON. Hospice vs palliative benefits/ limitations including pain management options extensively explained and discussed with patient and her . Both verbalized understanding. Pt has preexisting experiences with hospice in caring for family members and requests time to \"think about\" it. She is verbalizes understanding that her pain management regimen course is influenced on whether she chooses hospice or palliative aftercare.   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 Cole. Last script for MSER written in June 2024, patient taking MSIR infrequently.   Palliative consult with Dr. Bernabe 2/24/25- opioid " rotated to oxycodone 5 mg PO Q 8 hrs PRN moderate/ severe pain r/t for better renal clearance- not effective for pain per patient.  Chronic diastolic (congestive) heart failure (HCC)  Wt Readings from Last 3 Encounters:   05/18/25 65.3 kg (144 lb)   05/02/25 67.8 kg (149 lb 8 oz)   04/17/25 68.9 kg (152 lb)   Denies SOB  Constipation  Last BM 2 weeks ago  Miralax and senokot daily, GoLytely today per primary SLIM  Relistor and milk of magnesia added by primary team today.  Recommend enema       MEDICATIONS / ALLERGIES:     all current active meds have been reviewed and current meds:   Current Facility-Administered Medications:     acetaminophen (TYLENOL) tablet 975 mg, Q8H RAPHAEL    albuterol (PROVENTIL HFA,VENTOLIN HFA) inhaler 2 puff, Q4H PRN    bisacodyl (DULCOLAX) rectal suppository 10 mg, Daily PRN    dexamethasone (DECADRON) tablet 1 mg, BID (AM & Afternoon)    dextromethorphan-guaiFENesin (ROBITUSSIN DM) oral syrup 10 mL, Q4H PRN    docusate sodium (COLACE) capsule 100 mg, BID    enoxaparin (LOVENOX) subcutaneous injection 40 mg, Q24H RAPHAEL    HYDROmorphone (DILAUDID) injection 1 mg, Q4H PRN    HYDROmorphone (DILAUDID) tablet 2 mg, Q4H PRN **OR** HYDROmorphone (DILAUDID) tablet 4 mg, Q4H PRN    insulin glargine (LANTUS) subcutaneous injection 20 Units 0.2 mL, HS    insulin lispro (HumALOG/ADMELOG) 100 units/mL subcutaneous injection 1-5 Units, TID AC **AND** Fingerstick Glucose (POCT), TID AC    insulin lispro (HumALOG/ADMELOG) 100 units/mL subcutaneous injection 1-5 Units, HS    lidocaine (LIDODERM) 5 % patch 2 patch, Daily    magnesium hydroxide (MILK OF MAGNESIA) oral suspension 30 mL, Daily PRN    metoprolol tartrate (LOPRESSOR) tablet 50 mg, Q12H RAPHAEL    ondansetron (ZOFRAN) injection 4 mg, Q6H PRN    oxybutynin (DITROPAN-XL) 24 hr tablet 15 mg, Daily    pantoprazole (PROTONIX) EC tablet 20 mg, Early Morning    pravastatin (PRAVACHOL) tablet 40 mg, Daily With Dinner    senna (SENOKOT) tablet 17.2 mg,  "Daily    Allergies[1]    OBJECTIVE:    Physical Exam  Physical Exam  Constitutional:       General: She is not in acute distress.  HENT:      Mouth/Throat:      Comments: Dentition poor    Eyes:      Conjunctiva/sclera: Conjunctivae normal.       Cardiovascular:      Rate and Rhythm: Normal rate.   Pulmonary:      Effort: No respiratory distress.   Chest:      Chest wall: Mass and tenderness present.      Comments: Patient states she has felt a left chest wall mass for months prior to this admission and has \"15\"/10 left chest wall pain.  Abdominal:      Tenderness: There is no guarding.      Comments: Constipation     Musculoskeletal:      Comments: Old injury to coccyx causing \"15\"/10 chronic pain per patient     Neurological:      General: No focal deficit present.      Mental Status: She is alert and oriented to person, place, and time. Mental status is at baseline.     Psychiatric:         Mood and Affect: Mood normal.         Lab Results:   Results from last 7 days   Lab Units 05/18/25  0157   WBC Thousand/uL 10.09   HEMOGLOBIN g/dL 13.9   HEMATOCRIT % 43.0   PLATELETS Thousands/uL 320   SEGS PCT % 69   MONO PCT % 7   EOS PCT % 2     Results from last 7 days   Lab Units 05/18/25  0157   POTASSIUM mmol/L 3.8   CHLORIDE mmol/L 100   CO2 mmol/L 26   BUN mg/dL 17   CREATININE mg/dL 1.02   CALCIUM mg/dL 10.9*       Imaging Studies: reviewed pertinent studies   EKG, Pathology, and Other Studies: reviewed pertinent studies    Counseling / Coordination of Care    Total floor / unit time spent today 120 minutes. Greater than 50% of total time was spent with the patient and / or family counseling and / or coordination of care. A description of the counseling / coordination of care: symptom assessment and management, medication review, medication adjustment, psychosocial support, chart review, lab review, advanced directives, goals of care, hospice services, opioid titration, supportive listening, anticipatory guidance, " DME, and coordination with hospice liaison. First encounter at bedside 10:30-11:30, second encounter 1:15-2pm discussing GOC and symptom management.                                         [1]   Allergies  Allergen Reactions    Iodinated Contrast Media Anaphylaxis    Iodine - Food Allergy Anaphylaxis and Other (See Comments)    Povidone Iodine Anaphylaxis    Aspirin GI Bleeding and Other (See Comments)    Caffeine - Food Allergy Palpitations

## 2025-05-19 NOTE — ASSESSMENT & PLAN NOTE
Started morphine sulfate 15 mg ER Q 12 hours (per pt PRN) with morphine 15 mg IR PO Q 8 hrs PRN severe pain 2/24/25 by Dr. Cole. Last script for MSER written in June 2024, patient taking MSIR infrequently.   Palliative consult with Dr. Bernabe 2/24/25- opioid rotated to oxycodone 5 mg PO Q 8 hrs PRN moderate/ severe pain r/t for better renal clearance- not effective for pain per patient.

## 2025-05-19 NOTE — CASE MANAGEMENT
Case Management Discharge Planning Note    Patient name Barbra De Oliveira  Location /-01 MRN [id number]  : 1935 Date 2025       Current Admission Date: 2025  Current Admission Diagnosis:Cancer related pain   Patient Active Problem List    Diagnosis Date Noted    Advanced care planning/counseling discussion 2025    Palliative care encounter 2025    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) 2025    Cancer related pain 2025    Goals of care, counseling/discussion 2025    Palliative care by specialist 2025    History of breast cancer 2025    Constipation 2025    Dyspnea 2025    History of left mastectomy 2023    Premature ventricular contractions 2023    Use of anastrozole (Arimidex) 2021    Atrial fibrillation, currently in sinus rhythm 2020    Chronic pain 2020    Malignant neoplasm of upper-outer quadrant of left breast in female, estrogen receptor positive (HCC) 2020    Migraine without aura and without status migrainosus, not intractable 2020    Mixed hyperlipidemia 2019    Chest pain 2018    Chronic diastolic (congestive) heart failure (HCC) 2018    Uncomplicated opioid dependence (HCC) 2016    Essential hypertension 2016    GERD without esophagitis 2016    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      LOS (days): 0  Geometric Mean LOS (GMLOS) (days):   Days to GMLOS:     OBJECTIVE:            Current admission status: Observation   Preferred Pharmacy:   [id number] - [place], [place] - [address]  [place] [address]  Phone: [phone] Fax: [phone]    Primary Care Provider: Osiel Eid MD    Primary Insurance: MEDICARE  Secondary Insurance: HUMA[employer]    DISCHARGE DETAILS:    Discharge planning discussed with:: Patient     Comments - Freedom of Choice: Discussed  Attendance at Delivery    Reason for attendance section  Oxygen Needed yes  Positive Pressure Needed yes  Baby Vigorous yes  Evidence of Meconium no    Pt delivered crying with tone.  Delayed cord clamping for 30 seconds.  PT brought to warmer and warmed, dried, stimulated, and stimulated as indicated. Pt dusky, delivered blow-by at 30-40%.  Pt grunting, delivered CPAP 5 cm H20 for total of 10 mins, decompressing stomach afterwards.  Pt weaned to Blow-by, but unable to wean to RA.  Pt transported with RT, L&D RN, and FOB to NBN on blow-by 30%.  Pt placed under oxygen campos 30%.  No other respiratory interventions needed at this time.  Pt stable and left in care of NBN RN.    APGAR 8/9            FOC  CM contacted family/caregiver?: No- see comments (Declined)  Were Treatment Team discharge recommendations reviewed with patient/caregiver?: Yes  Did patient/caregiver verbalize understanding of patient care needs?: Yes  Were patient/caregiver advised of the risks associated with not following Treatment Team discharge recommendations?: Yes       Per am rounds/chart review: palliative following.  Plan for a meeting w/pt & pt's spouse at 1300 today to discuss hospice.      This CM met w/pt at bedside to introduce self & potential DCP pending medical course. Pt confirmed they are meeting today at 1:00 pm to discuss hospice. CM discussed w/pt preference for a hospice agency - pt requested  hospice if they are available.      CM received message from  Hospice liaison notifying CM of order placed for hospice consult - requesting referral to be sent.  CM reviewed order - referral placed in Aidin to  Hospice for review.  Awaiting response from  hospice.  CM to follow for dcp needs pending medical course.

## 2025-05-19 NOTE — ASSESSMENT & PLAN NOTE
Recently with concern for recurrent malignancy and suspect metastasis to new pulmonary nodule and lytic osseous lesions.  At prior admissions here she declined any further oncology workup evaluation or intervention.  At outpatient appointment with her oncologist 4/17/2025 she declined any for further treatment, anastrozole at that time was discontinued  Discussed with palliative care team, hospice consulted

## 2025-05-19 NOTE — ASSESSMENT & PLAN NOTE
Lab Results   Component Value Date    HGBA1C 5.7 (H) 03/21/2025       Recent Labs     05/18/25  1613 05/18/25  2100 05/19/25  0558 05/19/25  1139   POCGLU 126 140 97 118       Blood Sugar Average: Last 72 hrs:  (P) [id number]    Very well-controlled as outpatient, did have episodes of hypoglycemia during prior hospitalizations  Continue reduced Lantus to 20 units nightly with insulin sliding scale  Carb controlled diet   Hypoglycemia protocol

## 2025-05-19 NOTE — ASSESSMENT & PLAN NOTE
"  Hospice care discussed with patient and her  Dinh at 1300 as planned with  Hospice liaison Felisa and Palliative's Felisa JACKSON. Hospice vs palliative benefits/ limitations including pain management options extensively explained and discussed with patient and her . Both verbalized understanding. Pt has preexisting experiences with hospice in caring for family members and requests time to \"think about\" it. She is verbalizes understanding that her pain management regimen course is influenced on whether she chooses hospice or palliative aftercare.   Palliative team and hospice liaison will f/u tomorrow  "

## 2025-05-19 NOTE — HOSPICE NOTE
Secure message recieved from Felisa Burns regarding a meeting with pt at 1300 to discuss hospice.  Referral recieved.  Pt approved RLOC which can be done at home or SNF.  Met pt at bedside.   enedina present.  Discussed hospice services and benefits by  guidelines.  Questions answered.  Pt reports she needs time to decide.  Agreeable to follow up 5/20/25. Secure update sent to [facility]. Liaison to follow up 5/20/25

## 2025-05-19 NOTE — PROGRESS NOTES
Progress Note - Hospitalist   Name: Barbra De Oliveira 89 y.o. female I MRN: [id number]  Unit/Bed#: -01 I Date of Admission: 5/18/2025   Date of Service: 5/19/2025 I Hospital Day: 0    Assessment & Plan  Cancer related pain  Patient presenting with worsening left chest pain despite trial of increased oxycodone, pain persistent despite IV Dilaudid 0.5 mg x 3 during ED evaluation  Analgesia per palliative care, appreciate recommendations  Uncomplicated opioid dependence (HCC)  PDMP reviewed, last prescribed oxycodone IR 5 mg 30 pills for 10-day course 5/2/2025 by Dr. Bernabe of 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  At prior admissions here she declined any further oncology workup evaluation or intervention.  At outpatient appointment with her oncologist 4/17/2025 she declined any for further treatment, anastrozole at that time was discontinued  Discussed with palliative care team, hospice consulted  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)  Lab Results   Component Value Date    HGBA1C 5.7 (H) 03/21/2025       Recent Labs     05/18/25  1613 05/18/25  2100 05/19/25  0558 05/19/25  1139   POCGLU 126 140 97 118       Blood Sugar Average: Last 72 hrs:  (P) [id number]    Very well-controlled as outpatient, did have episodes of hypoglycemia during prior hospitalizations  Continue reduced Lantus to 20 units nightly with insulin sliding scale  Carb controlled diet   Hypoglycemia protocol    Chronic diastolic (congestive) heart failure (HCC)  Wt Readings from Last 3 Encounters:   05/18/25 65.3 kg (144 lb)   05/02/25 67.8 kg (149 lb 8 oz)   04/17/25  68.9 kg (152 lb)   TTE March 2025 with LVEF 60% and grade 1 diastolic dysfunction  CXR demonstrating slight increase in small right pleural effusion, patient herself denies any worsening shortness of breath from baseline  Continue cardiac medications for now and monitor volume status closely    Constipation  Reports no bowel movements in approximately 2 weeks  Continue senna, MiraLAX and Colace  Refusing milk of magnesia and suppository.  S/p GoLytely on 5/18   Relistor x 1 today    VTE Pharmacologic Prophylaxis: VTE Score: 6 High Risk (Score >/= 5) - Pharmacological DVT Prophylaxis Ordered: enoxaparin (Lovenox). Sequential Compression Devices Ordered.    Mobility:   Basic Mobility Inpatient Raw Score: 19  JH-HLM Goal: 6: Walk 10 steps or more  JH-HLM Achieved: 7: Walk 25 feet or more  JH-HLM Goal achieved. Continue to encourage appropriate mobility.    Patient Centered Rounds: I performed bedside rounds with nursing staff today.   Discussions with Specialists or Other Care Team Provider: , palliative care    Education and Discussions with Family / Patient: Patient will update .     Current Length of Stay: 0 day(s)  Current Patient Status: Observation   Certification Statement: The patient will continue to require additional inpatient hospital stay due to hospice evaluation, control of pain  Discharge Plan: Anticipate discharge in 24-48 hrs to home.    Code Status: Level 3 - DNAR and DNI    Subjective   No events overnight.  Patient continues to report back pain and today left shoulder pain.  Reports pain medications provide some relief.  Has been tolerating oral intake with no nausea or vomiting.    Objective :  Temp:  [97.2 °F (36.2 °C)-97.7 °F (36.5 °C)] 97.2 °F (36.2 °C)  HR:  [68-84] 72  BP: (124-142)/(62-67) 131/67  Resp:  [16] 16  SpO2:  [91 %-92 %] 91 %  O2 Device: None (Room air)    Body mass index is 25.51 kg/m².     Input and Output Summary (last 24 hours):     Intake/Output Summary  (Last 24 hours) at 5/19/2025 1450  Last data filed at 5/19/2025 0746  Gross per 24 hour   Intake 600 ml   Output --   Net 600 ml       Physical Exam  Vitals and nursing note reviewed.   Constitutional:       General: She is not in acute distress.     Appearance: She is well-developed.   HENT:      Head: Normocephalic and atraumatic.     Eyes:      Conjunctiva/sclera: Conjunctivae normal.       Cardiovascular:      Rate and Rhythm: Normal rate and regular rhythm.   Pulmonary:      Effort: No respiratory distress.      Breath sounds: Normal breath sounds.   Abdominal:      General: There is no distension.      Palpations: Abdomen is soft.      Tenderness: There is no abdominal tenderness.     Musculoskeletal:         General: No swelling.      Cervical back: Neck supple.     Skin:     General: Skin is warm and dry.      Capillary Refill: Capillary refill takes less than 2 seconds.     Neurological:      Mental Status: She is alert.     Psychiatric:         Mood and Affect: Mood normal.         Behavior: Behavior normal.         Lines/Drains:        Lab Results: I have reviewed the following results:   Results from last 7 days   Lab Units 05/18/25  0157   WBC Thousand/uL 10.09   HEMOGLOBIN g/dL 13.9   HEMATOCRIT % 43.0   PLATELETS Thousands/uL 320   SEGS PCT % 69   LYMPHO PCT % 22   MONO PCT % 7   EOS PCT % 2     Results from last 7 days   Lab Units 05/18/25  0157   SODIUM mmol/L 135   POTASSIUM mmol/L 3.8   CHLORIDE mmol/L 100   CO2 mmol/L 26   BUN mg/dL 17   CREATININE mg/dL 1.02   ANION GAP mmol/L 9   CALCIUM mg/dL 10.9*   GLUCOSE RANDOM mg/dL 104         Results from last 7 days   Lab Units 05/19/25  1139 05/19/25  0558 05/18/25  2100 05/18/25  1613 05/18/25  1039 05/18/25  0611   POC GLUCOSE mg/dl 118 97 140 126 128 100               Recent Cultures (last 7 days):               Last 24 Hours Medication List:     Current Facility-Administered Medications:     acetaminophen (TYLENOL) tablet 975 mg, Q8H RAPHAEL     albuterol (PROVENTIL HFA,VENTOLIN HFA) inhaler 2 puff, Q4H PRN    bisacodyl (DULCOLAX) rectal suppository 10 mg, Daily PRN    dexamethasone (DECADRON) tablet 1 mg, BID (AM & Afternoon)    dextromethorphan-guaiFENesin (ROBITUSSIN DM) oral syrup 10 mL, Q4H PRN    docusate sodium (COLACE) capsule 100 mg, BID    enoxaparin (LOVENOX) subcutaneous injection 40 mg, Q24H RAPHAEL    HYDROmorphone (DILAUDID) injection 1 mg, Q4H PRN    HYDROmorphone (DILAUDID) tablet 2 mg, Q4H PRN **OR** HYDROmorphone (DILAUDID) tablet 4 mg, Q4H PRN    insulin glargine (LANTUS) subcutaneous injection 20 Units 0.2 mL, HS    insulin lispro (HumALOG/ADMELOG) 100 units/mL subcutaneous injection 1-5 Units, TID AC **AND** Fingerstick Glucose (POCT), TID AC    insulin lispro (HumALOG/ADMELOG) 100 units/mL subcutaneous injection 1-5 Units, HS    lidocaine (LIDODERM) 5 % patch 2 patch, Daily    magnesium hydroxide (MILK OF MAGNESIA) oral suspension 30 mL, Daily PRN    metoprolol tartrate (LOPRESSOR) tablet 50 mg, Q12H RAPHAEL    ondansetron (ZOFRAN) injection 4 mg, Q6H PRN    oxybutynin (DITROPAN-XL) 24 hr tablet 15 mg, Daily    pantoprazole (PROTONIX) EC tablet 20 mg, Early Morning    pravastatin (PRAVACHOL) tablet 40 mg, Daily With Dinner    senna (SENOKOT) tablet 17.2 mg, Daily    Administrative Statements   Today, Patient Was Seen By: Treva Servin MD      **Please Note: This note may have been constructed using a voice recognition system.**

## 2025-05-19 NOTE — PLAN OF CARE
Problem: Potential for Falls  Goal: Patient will remain free of falls  Description: INTERVENTIONS:  - Educate patient/family on patient safety including physical limitations  - Instruct patient to call for assistance with activity   - Consider consulting OT/PT to assist with strengthening/mobility based on AM PAC & JH-HLM score  - Consult OT/PT to assist with strengthening/mobility   - Keep Call bell within reach  - Keep bed low and locked with side rails adjusted as appropriate  - Keep care items and personal belongings within reach  - Initiate and maintain comfort rounds  - Make Fall Risk Sign visible to staff  - Offer Toileting every 2 Hours, in advance of need  - Initiate/Maintain alarm  - Obtain necessary fall risk management equipment:   - Apply yellow socks and bracelet for high fall risk patients  - Consider moving patient to room near nurses station  Outcome: Progressing     Problem: PAIN - ADULT  Goal: Verbalizes/displays adequate comfort level or baseline comfort level  Description: Interventions:  - Encourage patient to monitor pain and request assistance  - Assess pain using appropriate pain scale  - Administer analgesics as ordered based on type and severity of pain and evaluate response  - Implement non-pharmacological measures as appropriate and evaluate response  - Consider cultural and social influences on pain and pain management  - Notify physician/advanced practitioner if interventions unsuccessful or patient reports new pain  - Educate patient/family on pain management process including their role and importance of  reporting pain   - Provide non-pharmacologic/complimentary pain relief interventions  Outcome: Progressing     Problem: INFECTION - ADULT  Goal: Absence or prevention of progression during hospitalization  Description: INTERVENTIONS:  - Assess and monitor for signs and symptoms of infection  - Monitor lab/diagnostic results  - Monitor all insertion sites, i.e. indwelling lines,  tubes, and drains  - Monitor endotracheal if appropriate and nasal secretions for changes in amount and color  - [place] appropriate cooling/warming therapies per order  - Administer medications as ordered  - Instruct and encourage patient and family to use good hand hygiene technique  - Identify and instruct in appropriate isolation precautions for identified infection/condition  Outcome: Progressing  Goal: Absence of fever/infection during neutropenic period  Description: INTERVENTIONS:  - Monitor WBC  - Perform strict hand hygiene  - Limit to healthy visitors only  - No plants, dried, fresh or silk flowers with [place] in patient room  Outcome: Progressing     Problem: SAFETY ADULT  Goal: Patient will remain free of falls  Description: INTERVENTIONS:  - Educate patient/family on patient safety including physical limitations  - Instruct patient to call for assistance with activity   - Consider consulting OT/PT to assist with strengthening/mobility based on AM PAC & -HLM score  - Consult OT/PT to assist with strengthening/mobility   - Keep Call bell within reach  - Keep bed low and locked with side rails adjusted as appropriate  - Keep care items and personal belongings within reach  - Initiate and maintain comfort rounds  - Make Fall Risk Sign visible to staff  - Offer Toileting every 2 Hours, in advance of need  - Initiate/Maintain alarm  - Obtain necessary fall risk management equipment:   - Apply yellow socks and bracelet for high fall risk patients  - Consider moving patient to room near nurses station  Outcome: Progressing  Goal: Maintain or return to baseline ADL function  Description: INTERVENTIONS:  -  Assess patient's ability to carry out ADLs; assess patient's baseline for ADL function and identify physical deficits which impact ability to perform ADLs (bathing, care of mouth/teeth, toileting, grooming, dressing, etc.)  - Assess/evaluate cause of self-care deficits   - Assess range of motion  - Assess  patient's mobility; develop plan if impaired  - Assess patient's need for assistive devices and provide as appropriate  - Encourage maximum independence but intervene and supervise when necessary  - Involve family in performance of ADLs  - Assess for home care needs following discharge   - Consider OT consult to assist with ADL evaluation and planning for discharge  - Provide patient education as appropriate  - Monitor functional capacity and physical performance, use of AM PAC & JH-HLM   - Monitor gait, balance and fatigue with ambulation    Outcome: Progressing  Goal: Maintains/Returns to pre admission functional level  Description: INTERVENTIONS:  - Perform AM-PAC 6 Click Basic Mobility/ Daily Activity assessment daily.  - Set and communicate daily mobility goal to care team and patient/family/caregiver.   - Collaborate with rehabilitation services on mobility goals if consulted  - Perform Range of Motion 3 times a day.  - Reposition patient every 2 hours.  - Dangle patient 3 times a day  - Stand patient 3 times a day  - Ambulate patient 3 times a day  - Out of bed to chair 3 times a day   - Out of bed for meals 3 times a day  - Out of bed for toileting  - Record patient progress and toleration of activity level   Outcome: Progressing     Problem: Nutrition/Hydration-ADULT  Goal: Nutrient/Hydration intake appropriate for improving, restoring or maintaining nutritional needs  Description: Monitor and assess patient's nutrition/hydration status for malnutrition. Collaborate with interdisciplinary team and initiate plan and interventions as ordered.  Monitor patient's weight and dietary intake as ordered or per policy. Utilize nutrition screening tool and intervene as necessary. Determine patient's food preferences and provide high-protein, high-caloric foods as appropriate.     INTERVENTIONS:  - Monitor oral intake, urinary output, labs, and treatment plans  - Assess nutrition and hydration status and recommend  course of action  - Evaluate amount of meals eaten  - Assist patient with eating if necessary   - Allow adequate time for meals  - Recommend/ encourage appropriate diets, oral nutritional supplements, and vitamin/mineral supplements  - Order, calculate, and assess calorie counts as needed  - Recommend, monitor, and adjust tube feedings and TPN/PPN based on assessed needs  - Assess need for intravenous fluids  - Provide specific nutrition/hydration education as appropriate  - Include patient/family/caregiver in decisions related to nutrition  Outcome: Progressing

## 2025-05-20 ENCOUNTER — APPOINTMENT (INPATIENT)
Dept: RADIOLOGY | Facility: HOSPITAL | Age: 89
DRG: 948 | End: 2025-05-20
Attending: PHYSICIAN ASSISTANT
Payer: MEDICARE

## 2025-05-20 LAB
ANION GAP SERPL CALCULATED.3IONS-SCNC: 9 MMOL/L (ref 4–13)
BUN SERPL-MCNC: 17 MG/DL (ref 5–25)
CALCIUM SERPL-MCNC: 10.6 MG/DL (ref 8.4–10.2)
CHLORIDE SERPL-SCNC: 100 MMOL/L (ref 96–108)
CO2 SERPL-SCNC: 26 MMOL/L (ref 21–32)
CREAT SERPL-MCNC: 0.91 MG/DL (ref 0.6–1.3)
ERYTHROCYTE [DISTWIDTH] IN BLOOD BY AUTOMATED COUNT: 13.2 % (ref 11.6–15.1)
GFR SERPL CREATININE-BSD FRML MDRD: 56 ML/MIN/1.73SQ M
GLUCOSE SERPL-MCNC: 137 MG/DL (ref 65–140)
GLUCOSE SERPL-MCNC: 145 MG/DL (ref 65–140)
GLUCOSE SERPL-MCNC: 147 MG/DL (ref 65–140)
GLUCOSE SERPL-MCNC: 96 MG/DL (ref 65–140)
GLUCOSE SERPL-MCNC: 98 MG/DL (ref 65–140)
HCT VFR BLD AUTO: 40.3 % (ref 34.8–46.1)
HGB BLD-MCNC: 13.2 G/DL (ref 11.5–15.4)
MCH RBC QN AUTO: 29.7 PG (ref 26.8–34.3)
MCHC RBC AUTO-ENTMCNC: 32.8 G/DL (ref 31.4–37.4)
MCV RBC AUTO: 91 FL (ref 82–98)
PLATELET # BLD AUTO: 287 THOUSANDS/UL (ref 149–390)
PMV BLD AUTO: 11.4 FL (ref 8.9–12.7)
POTASSIUM SERPL-SCNC: 4.1 MMOL/L (ref 3.5–5.3)
RBC # BLD AUTO: 4.45 MILLION/UL (ref 3.81–5.12)
SODIUM SERPL-SCNC: 135 MMOL/L (ref 135–147)
WBC # BLD AUTO: 11.7 THOUSAND/UL (ref 4.31–10.16)

## 2025-05-20 PROCEDURE — C1751 CATH, INF, PER/CENT/MIDLINE: HCPCS

## 2025-05-20 PROCEDURE — 85027 COMPLETE CBC AUTOMATED: CPT | Performed by: STUDENT IN AN ORGANIZED HEALTH CARE EDUCATION/TRAINING PROGRAM

## 2025-05-20 PROCEDURE — 74022 RADEX COMPL AQT ABD SERIES: CPT

## 2025-05-20 PROCEDURE — 82948 REAGENT STRIP/BLOOD GLUCOSE: CPT

## 2025-05-20 PROCEDURE — 36569 INSJ PICC 5 YR+ W/O IMAGING: CPT

## 2025-05-20 PROCEDURE — 99233 SBSQ HOSP IP/OBS HIGH 50: CPT | Performed by: PHYSICIAN ASSISTANT

## 2025-05-20 PROCEDURE — 02HV33Z INSERTION OF INFUSION DEVICE INTO SUPERIOR VENA CAVA, PERCUTANEOUS APPROACH: ICD-10-PCS | Performed by: PHYSICIAN ASSISTANT

## 2025-05-20 PROCEDURE — 80048 BASIC METABOLIC PNL TOTAL CA: CPT | Performed by: STUDENT IN AN ORGANIZED HEALTH CARE EDUCATION/TRAINING PROGRAM

## 2025-05-20 PROCEDURE — 99232 SBSQ HOSP IP/OBS MODERATE 35: CPT | Performed by: PHYSICIAN ASSISTANT

## 2025-05-20 PROCEDURE — 99497 ADVNCD CARE PLAN 30 MIN: CPT | Performed by: PHYSICIAN ASSISTANT

## 2025-05-20 RX ORDER — LORAZEPAM 2 MG/ML
0.5 INJECTION INTRAMUSCULAR EVERY 4 HOURS PRN
Status: DISCONTINUED | OUTPATIENT
Start: 2025-05-20 | End: 2025-05-22 | Stop reason: HOSPADM

## 2025-05-20 RX ORDER — SODIUM PHOSPHATE,MONO-DIBASIC 19G-7G/118
1 ENEMA (ML) RECTAL ONCE
Status: COMPLETED | OUTPATIENT
Start: 2025-05-20 | End: 2025-05-20

## 2025-05-20 RX ORDER — LORAZEPAM 0.5 MG/1
0.5 TABLET ORAL EVERY 4 HOURS PRN
Status: DISCONTINUED | OUTPATIENT
Start: 2025-05-20 | End: 2025-05-22 | Stop reason: HOSPADM

## 2025-05-20 RX ORDER — SENNOSIDES 8.6 MG
2 TABLET ORAL 2 TIMES DAILY
Status: DISCONTINUED | OUTPATIENT
Start: 2025-05-20 | End: 2025-05-22 | Stop reason: HOSPADM

## 2025-05-20 RX ORDER — HYDROMORPHONE HCL/PF 1 MG/ML
1 SYRINGE (ML) INJECTION EVERY 2 HOUR PRN
Status: DISCONTINUED | OUTPATIENT
Start: 2025-05-20 | End: 2025-05-20

## 2025-05-20 RX ORDER — LACTULOSE 10 G/15ML
20 SOLUTION ORAL 2 TIMES DAILY
Status: DISCONTINUED | OUTPATIENT
Start: 2025-05-20 | End: 2025-05-22 | Stop reason: HOSPADM

## 2025-05-20 RX ORDER — HALOPERIDOL 5 MG/ML
1 INJECTION INTRAMUSCULAR EVERY 4 HOURS PRN
Status: DISCONTINUED | OUTPATIENT
Start: 2025-05-20 | End: 2025-05-22 | Stop reason: HOSPADM

## 2025-05-20 RX ORDER — HALOPERIDOL 1 MG/1
1 TABLET ORAL EVERY 4 HOURS PRN
Status: DISCONTINUED | OUTPATIENT
Start: 2025-05-20 | End: 2025-05-22 | Stop reason: HOSPADM

## 2025-05-20 RX ORDER — HYDROMORPHONE HCL/PF 1 MG/ML
1 SYRINGE (ML) INJECTION EVERY 2 HOUR PRN
Status: DISCONTINUED | OUTPATIENT
Start: 2025-05-20 | End: 2025-05-22 | Stop reason: HOSPADM

## 2025-05-20 RX ADMIN — METOPROLOL TARTRATE 50 MG: 50 TABLET, FILM COATED ORAL at 21:58

## 2025-05-20 RX ADMIN — DEXAMETHASONE 1 MG: 2 TABLET ORAL at 06:05

## 2025-05-20 RX ADMIN — ONDANSETRON 4 MG: 2 INJECTION INTRAMUSCULAR; INTRAVENOUS at 04:35

## 2025-05-20 RX ADMIN — HYDROMORPHONE HYDROCHLORIDE 4 MG: 4 TABLET ORAL at 08:48

## 2025-05-20 RX ADMIN — LIDOCAINE 2 PATCH: 700 PATCH TOPICAL at 08:41

## 2025-05-20 RX ADMIN — SENNOSIDES 17.2 MG: 8.6 TABLET, FILM COATED ORAL at 21:58

## 2025-05-20 RX ADMIN — HYDROMORPHONE HYDROCHLORIDE 1 MG: 1 INJECTION, SOLUTION INTRAMUSCULAR; INTRAVENOUS; SUBCUTANEOUS at 07:39

## 2025-05-20 RX ADMIN — INSULIN GLARGINE 20 UNITS: 100 INJECTION, SOLUTION SUBCUTANEOUS at 21:59

## 2025-05-20 RX ADMIN — HYDROMORPHONE HYDROCHLORIDE 1 MG: 1 INJECTION, SOLUTION INTRAMUSCULAR; INTRAVENOUS; SUBCUTANEOUS at 10:12

## 2025-05-20 RX ADMIN — PANTOPRAZOLE SODIUM 20 MG: 20 TABLET, DELAYED RELEASE ORAL at 06:24

## 2025-05-20 RX ADMIN — Medication: at 16:22

## 2025-05-20 RX ADMIN — METOPROLOL TARTRATE 50 MG: 50 TABLET, FILM COATED ORAL at 08:40

## 2025-05-20 RX ADMIN — ACETAMINOPHEN 975 MG: 325 TABLET ORAL at 21:58

## 2025-05-20 RX ADMIN — SENNOSIDES 17.2 MG: 8.6 TABLET, FILM COATED ORAL at 08:41

## 2025-05-20 RX ADMIN — OXYBUTYNIN 15 MG: 10 TABLET, FILM COATED, EXTENDED RELEASE ORAL at 08:40

## 2025-05-20 RX ADMIN — HYDROMORPHONE HYDROCHLORIDE 1 MG: 1 INJECTION, SOLUTION INTRAMUSCULAR; INTRAVENOUS; SUBCUTANEOUS at 21:58

## 2025-05-20 RX ADMIN — LORAZEPAM 0.5 MG: 0.5 TABLET ORAL at 14:22

## 2025-05-20 RX ADMIN — ENOXAPARIN SODIUM 40 MG: 40 INJECTION SUBCUTANEOUS at 08:40

## 2025-05-20 RX ADMIN — DEXAMETHASONE 1 MG: 2 TABLET ORAL at 12:06

## 2025-05-20 RX ADMIN — DOCUSATE SODIUM 100 MG: 100 CAPSULE, LIQUID FILLED ORAL at 08:40

## 2025-05-20 RX ADMIN — ACETAMINOPHEN 975 MG: 325 TABLET ORAL at 06:05

## 2025-05-20 RX ADMIN — HYDROMORPHONE HYDROCHLORIDE 4 MG: 4 TABLET ORAL at 03:49

## 2025-05-20 RX ADMIN — SODIUM PHOSPHATE, DIBASIC AND SODIUM PHOSPHATE, MONOBASIC 1 ENEMA: 7; 19 ENEMA RECTAL at 16:30

## 2025-05-20 RX ADMIN — LACTULOSE 20 G: 20 SOLUTION ORAL at 21:59

## 2025-05-20 NOTE — ACP (ADVANCE CARE PLANNING)
Record of Family Meeting - Palliative and Supportive Care   Barbra De Oliveira 89 y.o. female [id number]      Recommendations and Plan:  Transition to comfort meausres only while admitted  Orders reconciled to reflect comfort care. RN, primary team, hospice updated.  Start dPCA at 0.3mg/hr and 0.3mg Q15 minute bolus  PICC ordered for CADD upon hospice discharge  Tentative hospice d/c home on   Page PSC if sx poorly controlled while admitted        A family meeting was held for Barbra De Oliveira. This meeting was necessary for determine the appropriate course of treatment.  Time of Meetinpm  Meeting Location: patient's room  Participants:   Patient  Spouse, Travon  MANUEL Flaherty CRNP-S    Patient Participation: alert and participating    Patient Support System: primarily spouse, Travon     Advanced Directive of POLST available: yes    Topics of Discussion:  Patient has decided upon discharge home with hospice (primary diagnosis stage IV breast cancer complicated by cancer related pain and constipation)  Discussed initiation of dPCA for improved pain control  Discussed expectations from home hospice    Other Content of Meeting:  Time spent providing supportive listening    Time Involved in Meetin minutes, beginning at approximately  12:45 and ending at approximately 1:15.     Felisa Benito PA-C   Palliative and Supportive Care  Clinic/Answering Service: [phone]  You can find me on [employer] Secure Chat!

## 2025-05-20 NOTE — PLAN OF CARE
Problem: Potential for Falls  Goal: Patient will remain free of falls  Description: INTERVENTIONS:  - Educate patient/family on patient safety including physical limitations  - Instruct patient to call for assistance with activity   - Consider consulting OT/PT to assist with strengthening/mobility based on AM PAC & JH-HLM score  - Consult OT/PT to assist with strengthening/mobility   - Keep Call bell within reach  - Keep bed low and locked with side rails adjusted as appropriate  - Keep care items and personal belongings within reach  - Initiate and maintain comfort rounds  - Make Fall Risk Sign visible to staff  - Offer Toileting every  Hours, in advance of need  - Initiate/Maintain alarm  - Obtain necessary fall risk management equipment:   - Apply yellow socks and bracelet for high fall risk patients  - Consider moving patient to room near nurses station  Outcome: Progressing     Problem: PAIN - ADULT  Goal: Verbalizes/displays adequate comfort level or baseline comfort level  Description: Interventions:  - Encourage patient to monitor pain and request assistance  - Assess pain using appropriate pain scale  - Administer analgesics as ordered based on type and severity of pain and evaluate response  - Implement non-pharmacological measures as appropriate and evaluate response  - Consider cultural and social influences on pain and pain management  - Notify physician/advanced practitioner if interventions unsuccessful or patient reports new pain  - Educate patient/family on pain management process including their role and importance of  reporting pain   - Provide non-pharmacologic/complimentary pain relief interventions  Outcome: Progressing     Problem: SAFETY ADULT  Goal: Patient will remain free of falls  Description: INTERVENTIONS:  - Educate patient/family on patient safety including physical limitations  - Instruct patient to call for assistance with activity   - Consider consulting OT/PT to assist with  strengthening/mobility based on AM PAC & JH-HLM score  - Consult OT/PT to assist with strengthening/mobility   - Keep Call bell within reach  - Keep bed low and locked with side rails adjusted as appropriate  - Keep care items and personal belongings within reach  - Initiate and maintain comfort rounds  - Make Fall Risk Sign visible to staff  - Offer Toileting every 2 Hours, in advance of need  - Initiate/Maintain alarm  - Obtain necessary fall risk management equipment:   - Apply yellow socks and bracelet for high fall risk patients  - Consider moving patient to room near nurses station  Outcome: Progressing

## 2025-05-20 NOTE — PLAN OF CARE
Problem: Potential for Falls  Goal: Patient will remain free of falls  Description: INTERVENTIONS:  - Educate patient/family on patient safety including physical limitations  - Instruct patient to call for assistance with activity   - Consider consulting OT/PT to assist with strengthening/mobility based on AM PAC & JH-HLM score  - Consult OT/PT to assist with strengthening/mobility   - Keep Call bell within reach  - Keep bed low and locked with side rails adjusted as appropriate  - Keep care items and personal belongings within reach  - Initiate and maintain comfort rounds  - Make Fall Risk Sign visible to staff  - Offer Toileting every 2 Hours, in advance of need  - Initiate/Maintain bed alarm  - Obtain necessary fall risk management equipment:   - Apply yellow socks and bracelet for high fall risk patients  - Consider moving patient to room near nurses station  Outcome: Progressing     Problem: PAIN - ADULT  Goal: Verbalizes/displays adequate comfort level or baseline comfort level  Description: Interventions:  - Encourage patient to monitor pain and request assistance  - Assess pain using appropriate pain scale  - Administer analgesics as ordered based on type and severity of pain and evaluate response  - Implement non-pharmacological measures as appropriate and evaluate response  - Consider cultural and social influences on pain and pain management  - Notify physician/advanced practitioner if interventions unsuccessful or patient reports new pain  - Educate patient/family on pain management process including their role and importance of  reporting pain   - Provide non-pharmacologic/complimentary pain relief interventions  Outcome: Progressing     Problem: INFECTION - ADULT  Goal: Absence or prevention of progression during hospitalization  Description: INTERVENTIONS:  - Assess and monitor for signs and symptoms of infection  - Monitor lab/diagnostic results  - Monitor all insertion sites, i.e. indwelling  lines, tubes, and drains  - Monitor endotracheal if appropriate and nasal secretions for changes in amount and color  - [place] appropriate cooling/warming therapies per order  - Administer medications as ordered  - Instruct and encourage patient and family to use good hand hygiene technique  - Identify and instruct in appropriate isolation precautions for identified infection/condition  Outcome: Progressing  Goal: Absence of fever/infection during neutropenic period  Description: INTERVENTIONS:  - Monitor WBC  - Perform strict hand hygiene  - Limit to healthy visitors only  - No plants, dried, fresh or silk flowers with [place] in patient room  Outcome: Progressing     Problem: SAFETY ADULT  Goal: Patient will remain free of falls  Description: INTERVENTIONS:  - Educate patient/family on patient safety including physical limitations  - Instruct patient to call for assistance with activity   - Consider consulting OT/PT to assist with strengthening/mobility based on AM PAC & -HLM score  - Consult OT/PT to assist with strengthening/mobility   - Keep Call bell within reach  - Keep bed low and locked with side rails adjusted as appropriate  - Keep care items and personal belongings within reach  - Initiate and maintain comfort rounds  - Make Fall Risk Sign visible to staff  - Offer Toileting every 2 Hours, in advance of need  - Initiate/Maintain bed alarm  - Obtain necessary fall risk management equipment:   - Apply yellow socks and bracelet for high fall risk patients  - Consider moving patient to room near nurses station  Outcome: Progressing  Goal: Maintain or return to baseline ADL function  Description: INTERVENTIONS:  -  Assess patient's ability to carry out ADLs; assess patient's baseline for ADL function and identify physical deficits which impact ability to perform ADLs (bathing, care of mouth/teeth, toileting, grooming, dressing, etc.)  - Assess/evaluate cause of self-care deficits   - Assess range of  motion  - Assess patient's mobility; develop plan if impaired  - Assess patient's need for assistive devices and provide as appropriate  - Encourage maximum independence but intervene and supervise when necessary  - Involve family in performance of ADLs  - Assess for home care needs following discharge   - Consider OT consult to assist with ADL evaluation and planning for discharge  - Provide patient education as appropriate  - Monitor functional capacity and physical performance, use of AM PAC & JH-HLM   - Monitor gait, balance and fatigue with ambulation    Outcome: Progressing  Goal: Maintains/Returns to pre admission functional level  Description: INTERVENTIONS:  - Perform AM-PAC 6 Click Basic Mobility/ Daily Activity assessment daily.  - Set and communicate daily mobility goal to care team and patient/family/caregiver.   - Collaborate with rehabilitation services on mobility goals if consulted  - Perform Range of Motion 3 times a day.  - Reposition patient every 2 hours.  - Dangle patient 3 times a day  - Stand patient 3 times a day  - Ambulate patient 3 times a day  - Out of bed to chair 3 times a day   - Out of bed for meals 3 times a day  - Out of bed for toileting  - Record patient progress and toleration of activity level   Outcome: Progressing     Problem: Nutrition/Hydration-ADULT  Goal: Nutrient/Hydration intake appropriate for improving, restoring or maintaining nutritional needs  Description: Monitor and assess patient's nutrition/hydration status for malnutrition. Collaborate with interdisciplinary team and initiate plan and interventions as ordered.  Monitor patient's weight and dietary intake as ordered or per policy. Utilize nutrition screening tool and intervene as necessary. Determine patient's food preferences and provide high-protein, high-caloric foods as appropriate.     INTERVENTIONS:  - Monitor oral intake, urinary output, labs, and treatment plans  - Assess nutrition and hydration status  and recommend course of action  - Evaluate amount of meals eaten  - Assist patient with eating if necessary   - Allow adequate time for meals  - Recommend/ encourage appropriate diets, oral nutritional supplements, and vitamin/mineral supplements  - Order, calculate, and assess calorie counts as needed  - Recommend, monitor, and adjust tube feedings and TPN/PPN based on assessed needs  - Assess need for intravenous fluids  - Provide specific nutrition/hydration education as appropriate  - Include patient/family/caregiver in decisions related to nutrition  Outcome: Progressing

## 2025-05-20 NOTE — ASSESSMENT & PLAN NOTE
Recently with concern for recurrent malignancy and suspect metastasis to new pulmonary nodule and lytic osseous lesions.  At prior admissions here she declined any further oncology workup evaluation or intervention.  At outpatient appointment with her oncologist 4/17/2025 she declined any for further treatment, anastrozole at that time was discontinued  Palliative Care and hospice following. Patient and her  requesting another meeting with Palliative today at 13:00 - Palliative aware

## 2025-05-20 NOTE — ASSESSMENT & PLAN NOTE
PDMP reviewed by prior provider, last prescribed oxycodone IR 5 mg 30 pills for 10-day course 5/2/2025 by Dr. Bernabe of [facility] palliative care, patient with intractable pain despite this regimen and trialing doubling dose prior to arrival here  Management as per primary problem additionally appreciate palliative care consultation

## 2025-05-20 NOTE — PROGRESS NOTES
Progress Note - Palliative Care   Name: Barbra De Oliveira 89 y.o. 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   We will continue to follow while admitted.    Please do not hesitate to contact our on-call provider through [employer] Secure Chat or contact [phone] should there be an acute change or other symptom control concerns.    Please do not make any changes to symptom medications (opioid analgesics, nonopioid analgesics, antiemetics, etc) without first consulting the on-call Palliative Care provider for your specific campus; including after hours and on weekends. We are available 24/7, and can be contacted on [employer] chat or at [phone].   Cancer related pain  Pain in the L side of the chest. Likely from the dominant 3.3 x 2.1 cm expansile lesion of the left fifth rib with extensive cortical erosion from CT scan in 2/2025. Also has 2.7 x 1.6 cm expansile lytic lesion of the sternum, 13 mm left sternal lytic lesion, subcentimeter right clavicular lytic lesion, multiple spine neck lytic lesions - For example 14 mm left T12 lesion  Last 24 hours: Dilaudid 4 mg PO x 5 doses, Dilaudid 1 mg IV x 2 doses, OME= 140 mg.  Continue tylenol 975mg PO q8H ATC. Max of 3000mg in 24H only  Senokot and miralax daily  Continue decadron for acute  worsening of bone pain, monitor BG closely - 1mg (am and noon).   Continue lidocaine patch to the lower back and L shoulder, 12H on 12H off   D/w above with SLIM and RN  Increase IV dilaudid 1 mg frequency to q2H prn BT pain until PCA in place  Hydromorphone PCA: 0.3 mg/hr with 0.3 mg demand Q 15 minutes and PICC pending  Discussing CADD pump pain management with Hospice  Advanced care planning/counseling discussion    Follow up meeting for discussion of hospice care with patient and her  Dinh at 1300 with  Hospice liaison Felisa Russo. 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 Cole. Last script for MSER written in June 2024, patient taking MSIR infrequently.   Palliative consult with Dr. Bernabe 2/24/25- opioid rotated to oxycodone 5 mg PO Q 8 hrs PRN moderate/ severe pain r/t for better renal clearance- not effective for pain per patient.  Chronic diastolic (congestive) heart failure (HCC)  Wt Readings from Last 3 Encounters:   05/18/25 65.3 kg (144 lb)   05/02/25 67.8 kg (149 lb 8 oz)   04/17/25 68.9 kg (152 lb)   Denies SOB  Constipation  Last BM 2 weeks ago  Miralax and senokot daily, GoLytely today per primary SLIM  Relistor and milk of magnesia added by primary team today.  Bowel sounds hypoactive  Obstruction series per SLIM  Recommend enema- pt requests after family meeting today  Discontinue Colace, Miralax. Start Senna BID, lactulose 20 g BID PRN    Interval  history:      Obstruction series completed per SLIM orders and reviewed with patient   Small amount of fecal debris in the rectum   Nonobstructive bowel gas pattern.  multiple air-fluid levels seen on the decubitus view, may be due to ileus.   Deformity of the left lateral chest wall with associated extrapleural soft tissue density, suspect erosive destructive changes and possible metastatic disease involving the chest wall, correlate with CT chest    Patient received 3 doses of IV dilaudid 5 doses of PO dilaudid in the past 24H. Pain still poorly controlled in sacrum and left ribs. Agreeable to above changes. Continues to be constipated and agreeable to enema after the meeting.    MEDICATIONS / ALLERGIES:     all current active meds have been reviewed and current meds:   Current Facility-Administered Medications:     acetaminophen (TYLENOL) tablet 975 mg, Q8H RAPHAEL    albuterol (PROVENTIL HFA,VENTOLIN HFA) inhaler 2 puff, Q4H PRN    bisacodyl (DULCOLAX) rectal suppository 10 mg, Daily PRN    dexamethasone (DECADRON) tablet 1 mg, BID (AM & Afternoon)    dextromethorphan-guaiFENesin (ROBITUSSIN DM) oral syrup 10 mL, Q4H PRN    haloperidol (HALDOL) tablet 1 mg, Q4H PRN    haloperidol lactate (HALDOL) injection 1 mg, Q4H PRN    HYDROmorphone (DILAUDID) 1 mg/mL 50 mL PCA, Continuous    HYDROmorphone (DILAUDID) injection 1 mg, Q2H PRN    insulin glargine (LANTUS) subcutaneous injection 20 Units 0.2 mL, HS    insulin lispro (HumALOG/ADMELOG) 100 units/mL subcutaneous injection 1-5 Units, TID AC **AND** Fingerstick Glucose (POCT), TID AC    insulin lispro (HumALOG/ADMELOG) 100 units/mL subcutaneous injection 1-5 Units, HS    lactulose (CHRONULAC) oral solution 20 g, BID    lidocaine (LIDODERM) 5 % patch 2 patch, Daily    LORazepam (ATIVAN) injection 0.5 mg, Q4H PRN    LORazepam (ATIVAN) tablet 0.5 mg, Q4H PRN    metoprolol tartrate (LOPRESSOR) tablet 50 mg, Q12H RAPHAEL    ondansetron (ZOFRAN) injection 4 mg, Q6H PRN     oxybutynin (DITROPAN-XL) 24 hr tablet 15 mg, Daily    pantoprazole (PROTONIX) EC tablet 20 mg, Early Morning    senna (SENOKOT) tablet 17.2 mg, BID    sodium phosphate-biphosphate (FLEET) enema 1 enema, Once    Allergies[1]    OBJECTIVE:    Physical Exam  Physical Exam  Constitutional:       Appearance: Normal appearance.   HENT:      Mouth/Throat:      Mouth: Mucous membranes are dry.     Cardiovascular:      Rate and Rhythm: Normal rate.   Pulmonary:      Effort: Pulmonary effort is normal.     Skin:     General: Skin is warm and dry.     Neurological:      General: No focal deficit present.      Mental Status: She is alert and oriented to person, place, and time. Mental status is at baseline.     Psychiatric:         Mood and Affect: Mood normal.         Behavior: Behavior normal.         Thought Content: Thought content normal.         Judgment: Judgment normal.         Lab Results:   Results from last 7 days   Lab Units 05/20/25  0456 05/18/25  0157   WBC Thousand/uL 11.70* 10.09   HEMOGLOBIN g/dL 13.2 13.9   HEMATOCRIT % 40.3 43.0   PLATELETS Thousands/uL 287 320   SEGS PCT %  --  69   MONO PCT %  --  7   EOS PCT %  --  2     Results from last 7 days   Lab Units 05/20/25  0456 05/18/25  0157   POTASSIUM mmol/L 4.1 3.8   CHLORIDE mmol/L 100 100   CO2 mmol/L 26 26   BUN mg/dL 17 17   CREATININE mg/dL 0.91 1.02   CALCIUM mg/dL 10.6* 10.9*       Imaging Studies: reviewed pertinent studies- obstruction series per SLIM  EKG, Pathology, and Other Studies: reviewed pertinent studies    Counseling / Coordination of Care    Total floor / unit time spent today 60 minutes. Greater than 50% of total time was spent with the patient and / or family counseling and / or coordination of care. A description of the counseling / coordination of care: symptom assessment and management, medication review, medication adjustment, psychosocial support, chart review, imaging review, advanced directives, goals of care, hospice services,  opioid titration, supportive listening, anticipatory guidance, and coordination with hospice liaison                                    [1]   Allergies  Allergen Reactions    Iodinated Contrast Media Anaphylaxis    Iodine - Food Allergy Anaphylaxis and Other (See Comments)    Povidone Iodine Anaphylaxis    Aspirin GI Bleeding and Other (See Comments)    Caffeine - Food Allergy Palpitations

## 2025-05-20 NOTE — ASSESSMENT & PLAN NOTE
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   We will continue to follow while admitted.    Please do not hesitate to contact our on-call provider through [employer] Secure Chat or contact [phone] should there be an acute change or other symptom control concerns.    Please do not make any changes to symptom medications (opioid analgesics, nonopioid analgesics, antiemetics, etc) without first consulting the on-call Palliative Care provider for your specific campus; including after hours and on weekends. We are available 24/7, and can be contacted on [employer] chat or at [phone].

## 2025-05-20 NOTE — ASSESSMENT & PLAN NOTE
Reports no bowel movements in approximately 2 weeks  Continue senna, MiraLAX and Colace  S/p GoLytely on 5/18   1/p Relistor x 1 5/19  Patient agreeable to trialing enema today after meeting with Palliative Care  She reports she is passing gas and denies vomiting  Check obstruction series

## 2025-05-20 NOTE — ASSESSMENT & PLAN NOTE
Lab Results   Component Value Date    HGBA1C 5.7 (H) 03/21/2025       Recent Labs     05/19/25  1139 05/19/25  1609 05/19/25  2201 05/20/25  0604   POCGLU 118 153* 131 96       Blood Sugar Average: Last 72 hrs:  (P) 121    Very well-controlled as outpatient, did have episodes of hypoglycemia during prior hospitalizations  Continue reduced Lantus to 20 units nightly with insulin sliding scale  Carb controlled diet   Hypoglycemia protocol

## 2025-05-20 NOTE — PROGRESS NOTES
Progress Note - Hospitalist   Name: Barbra De Oliveira 89 y.o. female I MRN: [id number]  Unit/Bed#: -01 I Date of Admission: 5/18/2025   Date of Service: 5/20/2025 I Hospital Day: 1    Assessment & Plan  Cancer related pain  Patient presented with worsening left chest pain   Analgesia per palliative care, appreciate recommendations  Uncomplicated opioid dependence (HCC)  PDMP reviewed by prior provider, last prescribed oxycodone IR 5 mg 30 pills for 10-day course 5/2/2025 by Dr. 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  At prior admissions here she declined any further oncology workup evaluation or intervention.  At outpatient appointment with her oncologist 4/17/2025 she declined any for further treatment, anastrozole at that time was discontinued  Palliative Care and hospice following. Patient and her  requesting another meeting with Palliative today at 13:00 - Palliative aware  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)  Lab Results   Component Value Date    HGBA1C 5.7 (H) 03/21/2025       Recent Labs     05/19/25  1139 05/19/25  1609 05/19/25  2201 05/20/25  0604   POCGLU 118 153* 131 96       Blood Sugar Average: Last 72 hrs:  (P) 121    Very well-controlled as outpatient, did have episodes of hypoglycemia during prior hospitalizations  Continue reduced Lantus to 20 units nightly with insulin sliding scale  Carb controlled diet   Hypoglycemia protocol    Chronic diastolic (congestive) heart failure (HCC)  Wt Readings from Last 3 Encounters:   05/18/25 65.3 kg (144 lb)   05/02/25 67.8 kg (149 lb 8 oz)   04/17/25 68.9 kg (152 lb)   TTE  March 2025 with LVEF 60% and grade 1 diastolic dysfunction  CXR demonstrating slight increase in small right pleural effusion, patient herself denies any worsening shortness of breath from baseline  Continue cardiac medications for now and monitor volume status closely    Constipation  Reports no bowel movements in approximately 2 weeks  Continue senna, MiraLAX and Colace  S/p GoLytely on 5/18   1/p Relistor x 1 5/19  Patient agreeable to trialing enema today after meeting with Palliative Care  She reports she is passing gas and denies vomiting  Check obstruction series    VTE Pharmacologic Prophylaxis: VTE Score: 6 High Risk (Score >/= 5) - Pharmacological DVT Prophylaxis Ordered: enoxaparin (Lovenox). Sequential Compression Devices Ordered.    Mobility:   Basic Mobility Inpatient Raw Score: 19  JH-HLM Goal: 6: Walk 10 steps or more  JH-HLM Achieved: 7: Walk 25 feet or more  JH-HLM Goal achieved. Continue to encourage appropriate mobility.    Patient Centered Rounds: I performed bedside rounds with nursing staff today.   Discussions with Specialists or Other Care Team Provider: Palliative Care AP    Education and Discussions with Family / Patient: patient. Patient reports that she just spoke with her  and that they request to speak with Palliative again today at 13:00 - palliative made aware.     Current Length of Stay: 1 day(s)  Current Patient Status: Inpatient   Certification Statement: The patient will continue to require additional inpatient hospital stay due to ongoing GOC  Discharge Plan: Anticipate discharge in 24-48 hrs to pending ongoing GOC    Code Status: Level 3 - DNAR and DNI    Subjective   Ms. De Oliveira reports pain. Denies abdominal pain. Reports still no BM. She states she is agreeable to enema AFTER meeting with palliative today which she reports her and her  would like at 1PM. She reports she is passing gas and has some nausea but denies vomiting     Objective :  Temp:  [97.2 °F  (36.2 °C)-98.4 °F (36.9 °C)] 98.2 °F (36.8 °C)  HR:  [72-87] 74  BP: (130-138)/(64-70) 132/64  Resp:  [10-18] 16  SpO2:  [90 %-93 %] 90 %  O2 Device: None (Room air)    Body mass index is 25.51 kg/m².     Input and Output Summary (last 24 hours):     Intake/Output Summary (Last 24 hours) at 5/20/2025 0934  Last data filed at 5/19/2025 2001  Gross per 24 hour   Intake 120 ml   Output 200 ml   Net -80 ml       Physical Exam  Vitals reviewed.   Constitutional:       Comments: Patient seen lying in bed, NAD, chronically ill appearing     Cardiovascular:      Rate and Rhythm: Normal rate and regular rhythm.   Pulmonary:      Effort: Pulmonary effort is normal. No respiratory distress.      Breath sounds: Normal breath sounds.   Abdominal:      Tenderness: There is no abdominal tenderness.      Comments: Decreased but audible bowel sounds     Musculoskeletal:      Right lower leg: No edema.      Left lower leg: No edema.     Skin:     General: Skin is warm.     Neurological:      Mental Status: She is alert.     Psychiatric:         Mood and Affect: Mood normal.           Lines/Drains:              Lab Results: I have reviewed the following results:   Results from last 7 days   Lab Units 05/20/25  0456 05/18/25  0157   WBC Thousand/uL 11.70* 10.09   HEMOGLOBIN g/dL 13.2 13.9   HEMATOCRIT % 40.3 43.0   PLATELETS Thousands/uL 287 320   SEGS PCT %  --  69   LYMPHO PCT %  --  22   MONO PCT %  --  7   EOS PCT %  --  2     Results from last 7 days   Lab Units 05/20/25  0456   SODIUM mmol/L 135   POTASSIUM mmol/L 4.1   CHLORIDE mmol/L 100   CO2 mmol/L 26   BUN mg/dL 17   CREATININE mg/dL 0.91   ANION GAP mmol/L 9   CALCIUM mg/dL 10.6*   GLUCOSE RANDOM mg/dL 98         Results from last 7 days   Lab Units 05/20/25  0604 05/19/25  2201 05/19/25  1609 05/19/25  1139 05/19/25  0558 05/18/25  2100 05/18/25  1613 05/18/25  1039 05/18/25  0611   POC GLUCOSE mg/dl 96 131 153* 118 97 140 126 128 100               Recent Cultures (last 7  days):         Imaging Results Review: I reviewed radiology reports from this admission including: chest xray.      Last 24 Hours Medication List:     Current Facility-Administered Medications:     acetaminophen (TYLENOL) tablet 975 mg, Q8H RAPHAEL    albuterol (PROVENTIL HFA,VENTOLIN HFA) inhaler 2 puff, Q4H PRN    bisacodyl (DULCOLAX) rectal suppository 10 mg, Daily PRN    dexamethasone (DECADRON) tablet 1 mg, BID (AM & Afternoon)    dextromethorphan-guaiFENesin (ROBITUSSIN DM) oral syrup 10 mL, Q4H PRN    docusate sodium (COLACE) capsule 100 mg, BID    enoxaparin (LOVENOX) subcutaneous injection 40 mg, Q24H RAPHAEL    HYDROmorphone (DILAUDID) injection 1 mg, Q4H PRN    [DISCONTINUED] HYDROmorphone (DILAUDID) tablet 2 mg, Q4H PRN **OR** HYDROmorphone (DILAUDID) tablet 4 mg, Q4H PRN    insulin glargine (LANTUS) subcutaneous injection 20 Units 0.2 mL, HS    insulin lispro (HumALOG/ADMELOG) 100 units/mL subcutaneous injection 1-5 Units, TID AC **AND** Fingerstick Glucose (POCT), TID AC    insulin lispro (HumALOG/ADMELOG) 100 units/mL subcutaneous injection 1-5 Units, HS    lidocaine (LIDODERM) 5 % patch 2 patch, Daily    magnesium hydroxide (MILK OF MAGNESIA) oral suspension 30 mL, Daily PRN    metoprolol tartrate (LOPRESSOR) tablet 50 mg, Q12H RAPHAEL    ondansetron (ZOFRAN) injection 4 mg, Q6H PRN    oxybutynin (DITROPAN-XL) 24 hr tablet 15 mg, Daily    pantoprazole (PROTONIX) EC tablet 20 mg, Early Morning    polyethylene glycol (MIRALAX) packet 17 g, Daily    pravastatin (PRAVACHOL) tablet 40 mg, Daily With Dinner    senna (SENOKOT) tablet 17.2 mg, Daily    Administrative Statements   Today, Patient Was Seen By: Israel Garcia PA-C      **Please Note: This note may have been constructed using a voice recognition system.**

## 2025-05-20 NOTE — ASSESSMENT & PLAN NOTE
Patient presented with worsening left chest pain   Analgesia per palliative care, appreciate recommendations

## 2025-05-20 NOTE — PROCEDURES
Insert Complex Venous Access Line    Date/Time: 5/20/2025 3:17 PM    Performed by: Debbi Rich RN  Authorized by: Israel Garcia PA-C    Patient location:  Bedside  Other Assisting Provider: Yes (comment) (Liliana PINEDA MA)    Consent:     Consent obtained:  Written    Consent given by:  Spouse    Risks discussed:  Arterial puncture, incorrect placement, nerve damage, bleeding, infection and pneumothorax    Alternatives discussed:  No treatment  Universal protocol:     Procedure explained and questions answered to patient or proxy's satisfaction: yes      Immediately prior to procedure, a time out was called: yes      Site/side marked: yes      Patient identity confirmed:  Verbally with patient, arm band, provided demographic data and hospital-assigned identification number  Pre-procedure details:     Hand hygiene: Hand hygiene performed prior to insertion      Sterile barrier technique: All elements of maximal sterile technique followed      Skin preparation:  ChloraPrep    Skin preparation agent: Skin preparation agent completely dried prior to procedure    Procedure details:     Complex Venous Access Line Type: PICC      Complex Venous Access Line Indications: other (comment)      Complex Venous Access Line Indications comment:  Hospice    Catheter tip vessel location: atriocaval junction      Orientation:  Right    Location:  Basilic    Procedural supplies:  Double lumen    Catheter size:  5 Fr    Total catheter length (cm):  41    Catheter out on skin (cm):  0    Max flow rate:  999    Arm circumference:  31    Patient evaluated for contraindications to access (i.e. fistula, thrombosis, etc): Yes      Site selection rationale:  Lt arm limb alert    Approach: percutaneous technique used      Patient position:  Flat    Ultrasound image availability:  Not saved    Sterile ultrasound techniques: Sterile gel and sterile probe covers were used      Number of attempts:  1    Successful placement: yes      Landmarks  identified: yes      Vessel of catheter tip end:  Sherlock 3CG confirmed  Anesthesia (see MAR for exact dosages):     Anesthesia method:  Local infiltration    Local anesthetic:  Lidocaine 1% w/o epi (2 ml)  Post-procedure details:     Post-procedure:  Dressing applied and securement device placed    Assessment:  Blood return through all ports and free fluid flow    Post-procedure complications: none      Patient tolerance of procedure:  Tolerated well, no immediate complications    Observer: Yes      Observer name:  Spouse at bedside

## 2025-05-20 NOTE — ASSESSMENT & PLAN NOTE
Follow up meeting for discussion of hospice care with patient and her  Dinh at 1300 with  Hospice liaison Felisa Russo. Patient has discussed hospice and palliative options with her  overnight and is now interested in pursuing hospice care  Family meeting at 1300, see ACP note

## 2025-05-20 NOTE — ASSESSMENT & PLAN NOTE
Pain in the L side of the chest. Likely from the dominant 3.3 x 2.1 cm expansile lesion of the left fifth rib with extensive cortical erosion from CT scan in 2/2025. Also has 2.7 x 1.6 cm expansile lytic lesion of the sternum, 13 mm left sternal lytic lesion, subcentimeter right clavicular lytic lesion, multiple spine neck lytic lesions - For example 14 mm left T12 lesion  Last 24 hours: Dilaudid 4 mg PO x 5 doses, Dilaudid 1 mg IV x 2 doses, OME= 140 mg.  Continue tylenol 975mg PO q8H ATC. Max of 3000mg in 24H only  Senokot and miralax daily  Continue decadron for acute worsening of bone pain, monitor BG closely - 1mg (am and noon).   Continue lidocaine patch to the lower back and L shoulder, 12H on 12H off   D/w above with SLIM and RN  Increase IV dilaudid 1 mg frequency to q2H prn BT pain until PCA in place  Hydromorphone PCA: 0.3 mg/hr with 0.3 mg demand Q 15 minutes and PICC pending  Discussing CADD pump pain management with Hospice

## 2025-05-20 NOTE — ASSESSMENT & PLAN NOTE
Last BM 2 weeks ago  Miralax and senokot daily, GoLytely today per primary SLIM  Relistor and milk of magnesia added by primary team today.  Bowel sounds hypoactive  Obstruction series per SLIM  Recommend enema- pt requests after family meeting today  Discontinue Colace, Miralax. Start Senna BID, lactulose 20 g BID PRN

## 2025-05-20 NOTE — ASSESSMENT & PLAN NOTE
Lab Results   Component Value Date    HGBA1C 5.7 (H) 03/21/2025     BG monitoring/ SSI per primary SLIM    Recent Labs     05/19/25  1139 05/19/25  1609 05/19/25  2201 05/20/25  0604   POCGLU 118 153* 131 96       Blood Sugar Average: Last 72 hrs:  (P) 121

## 2025-05-20 NOTE — ASSESSMENT & PLAN NOTE
Palliative Care and hospice following - was transitioned to comfort care as above, discharge home with hospice

## 2025-05-21 ENCOUNTER — HOME CARE VISIT (OUTPATIENT)
Dept: HOME HEALTH SERVICES | Facility: HOME HEALTHCARE | Age: 89
End: 2025-05-21
Payer: MEDICARE

## 2025-05-21 DIAGNOSIS — Z51.5 HOSPICE CARE PATIENT: Primary | ICD-10-CM

## 2025-05-21 LAB
GLUCOSE SERPL-MCNC: 128 MG/DL (ref 65–140)
GLUCOSE SERPL-MCNC: 136 MG/DL (ref 65–140)
GLUCOSE SERPL-MCNC: 179 MG/DL (ref 65–140)
GLUCOSE SERPL-MCNC: 86 MG/DL (ref 65–140)

## 2025-05-21 PROCEDURE — 82948 REAGENT STRIP/BLOOD GLUCOSE: CPT

## 2025-05-21 PROCEDURE — 99233 SBSQ HOSP IP/OBS HIGH 50: CPT

## 2025-05-21 PROCEDURE — 99232 SBSQ HOSP IP/OBS MODERATE 35: CPT | Performed by: PHYSICIAN ASSISTANT

## 2025-05-21 RX ADMIN — HYDROMORPHONE HYDROCHLORIDE 1 MG: 1 INJECTION, SOLUTION INTRAMUSCULAR; INTRAVENOUS; SUBCUTANEOUS at 08:24

## 2025-05-21 RX ADMIN — METOPROLOL TARTRATE 50 MG: 50 TABLET, FILM COATED ORAL at 22:09

## 2025-05-21 RX ADMIN — PANTOPRAZOLE SODIUM 20 MG: 20 TABLET, DELAYED RELEASE ORAL at 06:14

## 2025-05-21 RX ADMIN — DEXAMETHASONE 1 MG: 2 TABLET ORAL at 13:17

## 2025-05-21 RX ADMIN — INSULIN LISPRO 1 UNITS: 100 INJECTION, SOLUTION INTRAVENOUS; SUBCUTANEOUS at 17:25

## 2025-05-21 RX ADMIN — HYDROMORPHONE HYDROCHLORIDE 1 MG: 1 INJECTION, SOLUTION INTRAMUSCULAR; INTRAVENOUS; SUBCUTANEOUS at 06:15

## 2025-05-21 RX ADMIN — OXYBUTYNIN 15 MG: 10 TABLET, FILM COATED, EXTENDED RELEASE ORAL at 08:21

## 2025-05-21 RX ADMIN — ACETAMINOPHEN 975 MG: 325 TABLET ORAL at 22:07

## 2025-05-21 RX ADMIN — INSULIN GLARGINE 20 UNITS: 100 INJECTION, SOLUTION SUBCUTANEOUS at 22:42

## 2025-05-21 RX ADMIN — DEXAMETHASONE 1 MG: 2 TABLET ORAL at 06:14

## 2025-05-21 RX ADMIN — HYDROMORPHONE HYDROCHLORIDE 1 MG: 1 INJECTION, SOLUTION INTRAMUSCULAR; INTRAVENOUS; SUBCUTANEOUS at 01:52

## 2025-05-21 RX ADMIN — SENNOSIDES 17.2 MG: 8.6 TABLET, FILM COATED ORAL at 17:31

## 2025-05-21 RX ADMIN — METOPROLOL TARTRATE 50 MG: 50 TABLET, FILM COATED ORAL at 08:21

## 2025-05-21 RX ADMIN — Medication: at 10:45

## 2025-05-21 RX ADMIN — HYDROMORPHONE HYDROCHLORIDE 1 MG: 1 INJECTION, SOLUTION INTRAMUSCULAR; INTRAVENOUS; SUBCUTANEOUS at 13:17

## 2025-05-21 RX ADMIN — ACETAMINOPHEN 975 MG: 325 TABLET ORAL at 06:14

## 2025-05-21 RX ADMIN — LIDOCAINE 2 PATCH: 700 PATCH TOPICAL at 08:21

## 2025-05-21 RX ADMIN — ACETAMINOPHEN 975 MG: 325 TABLET ORAL at 13:17

## 2025-05-21 NOTE — PLAN OF CARE
Problem: Potential for Falls  Goal: Patient will remain free of falls  Description: INTERVENTIONS:  - Educate patient/family on patient safety including physical limitations  - Instruct patient to call for assistance with activity   - Consider consulting OT/PT to assist with strengthening/mobility based on AM PAC & JH-HLM score  - Consult OT/PT to assist with strengthening/mobility   - Keep Call bell within reach  - Keep bed low and locked with side rails adjusted as appropriate  - Keep care items and personal belongings within reach  - Initiate and maintain comfort rounds  - Make Fall Risk Sign visible to staff  - Offer Toileting every 2 Hours, in advance of need  - Initiate/Maintain alarm  - Obtain necessary fall risk management equipment:   - Apply yellow socks and bracelet for high fall risk patients  - Consider moving patient to room near nurses station  Outcome: Progressing     Problem: PAIN - ADULT  Goal: Verbalizes/displays adequate comfort level or baseline comfort level  Description: Interventions:  - Encourage patient to monitor pain and request assistance  - Assess pain using appropriate pain scale  - Administer analgesics as ordered based on type and severity of pain and evaluate response  - Implement non-pharmacological measures as appropriate and evaluate response  - Consider cultural and social influences on pain and pain management  - Notify physician/advanced practitioner if interventions unsuccessful or patient reports new pain  - Educate patient/family on pain management process including their role and importance of  reporting pain   - Provide non-pharmacologic/complimentary pain relief interventions  Outcome: Progressing     Problem: INFECTION - ADULT  Goal: Absence or prevention of progression during hospitalization  Description: INTERVENTIONS:  - Assess and monitor for signs and symptoms of infection  - Monitor lab/diagnostic results  - Monitor all insertion sites, i.e. indwelling lines,  tubes, and drains  - Monitor endotracheal if appropriate and nasal secretions for changes in amount and color  - [place] appropriate cooling/warming therapies per order  - Administer medications as ordered  - Instruct and encourage patient and family to use good hand hygiene technique  - Identify and instruct in appropriate isolation precautions for identified infection/condition  Outcome: Progressing  Goal: Absence of fever/infection during neutropenic period  Description: INTERVENTIONS:  - Monitor WBC  - Perform strict hand hygiene  - Limit to healthy visitors only  - No plants, dried, fresh or silk flowers with [place] in patient room  Outcome: Progressing     Problem: SAFETY ADULT  Goal: Patient will remain free of falls  Description: INTERVENTIONS:  - Educate patient/family on patient safety including physical limitations  - Instruct patient to call for assistance with activity   - Consider consulting OT/PT to assist with strengthening/mobility based on AM PAC & -HLM score  - Consult OT/PT to assist with strengthening/mobility   - Keep Call bell within reach  - Keep bed low and locked with side rails adjusted as appropriate  - Keep care items and personal belongings within reach  - Initiate and maintain comfort rounds  - Make Fall Risk Sign visible to staff  - Offer Toileting every  Hours, in advance of need  - Initiate/Maintain alarm  - Obtain necessary fall risk management equipment:   - Apply yellow socks and bracelet for high fall risk patients  - Consider moving patient to room near nurses station  Outcome: Progressing  Goal: Maintain or return to baseline ADL function  Description: INTERVENTIONS:  -  Assess patient's ability to carry out ADLs; assess patient's baseline for ADL function and identify physical deficits which impact ability to perform ADLs (bathing, care of mouth/teeth, toileting, grooming, dressing, etc.)  - Assess/evaluate cause of self-care deficits   - Assess range of motion  - Assess  patient's mobility; develop plan if impaired  - Assess patient's need for assistive devices and provide as appropriate  - Encourage maximum independence but intervene and supervise when necessary  - Involve family in performance of ADLs  - Assess for home care needs following discharge   - Consider OT consult to assist with ADL evaluation and planning for discharge  - Provide patient education as appropriate  - Monitor functional capacity and physical performance, use of AM PAC & JH-HLM   - Monitor gait, balance and fatigue with ambulation    Outcome: Progressing  Goal: Maintains/Returns to pre admission functional level  Description: INTERVENTIONS:  - Perform AM-PAC 6 Click Basic Mobility/ Daily Activity assessment daily.  - Set and communicate daily mobility goal to care team and patient/family/caregiver.   - Collaborate with rehabilitation services on mobility goals if consulted  - Perform Range of Motion 3 times a day.  - Reposition patient every 2 hours.  - Dangle patient 3 times a day  - Stand patient 3 times a day  - Ambulate patient 3 times a day  - Out of bed to chair 3 times a day   - Out of bed for meals 3 times a day  - Out of bed for toileting  - Record patient progress and toleration of activity level   Outcome: Progressing     Problem: Nutrition/Hydration-ADULT  Goal: Nutrient/Hydration intake appropriate for improving, restoring or maintaining nutritional needs  Description: Monitor and assess patient's nutrition/hydration status for malnutrition. Collaborate with interdisciplinary team and initiate plan and interventions as ordered.  Monitor patient's weight and dietary intake as ordered or per policy. Utilize nutrition screening tool and intervene as necessary. Determine patient's food preferences and provide high-protein, high-caloric foods as appropriate.     INTERVENTIONS:  - Monitor oral intake, urinary output, labs, and treatment plans  - Assess nutrition and hydration status and recommend  course of action  - Evaluate amount of meals eaten  - Assist patient with eating if necessary   - Allow adequate time for meals  - Recommend/ encourage appropriate diets, oral nutritional supplements, and vitamin/mineral supplements  - Order, calculate, and assess calorie counts as needed  - Recommend, monitor, and adjust tube feedings and TPN/PPN based on assessed needs  - Assess need for intravenous fluids  - Provide specific nutrition/hydration education as appropriate  - Include patient/family/caregiver in decisions related to nutrition  Outcome: Progressing     Problem: Prexisting or High Potential for Compromised Skin Integrity  Goal: Skin integrity is maintained or improved  Description: INTERVENTIONS:  - Identify patients at risk for skin breakdown  - Assess and monitor skin integrity including under and around medical devices   - Assess and monitor nutrition and hydration status  - Monitor labs  - Assess for incontinence   - Turn and reposition patient  - Assist with mobility/ambulation  - Relieve pressure over osmel prominences   - Avoid friction and shearing  - Provide appropriate hygiene as needed including keeping skin clean and dry  - Evaluate need for skin moisturizer/barrier cream  - Collaborate with interdisciplinary team  - Patient/family teaching  - Consider wound care consult    Assess:  - Review Bartolo scale daily  - Clean and moisturize skin     - Inspect skin when repositioning, toileting, and assisting with ADLS  - Assess under medical devices  - Assess extremities for adequate circulation and sensation     Bed Management:  - Have minimal linens on bed & keep smooth, unwrinkled  - Change linens as needed when moist or perspiring  - Avoid sitting or lying in one position for more than 2 hours while in bed?Keep HOB at 30degrees   - Toileting:  - Offer bedside commode  - Assess for incontinence  - Use incontinent care products after each incontinent episode     Activity:  - Mobilize patient 3  times a day  - Encourage activity and walks on unit  - Encourage or provide ROM exercises   - Turn and reposition patient every 2 Hours  - Use appropriate equipment to lift or move patient in bed  - Instruct/ Assist with weight shifting every 2 when out of bed in chair  - Consider limitation of chair time 2 hour intervals    Skin Care:  - Avoid use of baby powder, tape, friction and shearing, hot water or constrictive clothing  - Relieve pressure over bony prominences  - Do not massage red bony areas    Next Steps:  - Teach patient strategies to minimize risks  - Consider consults to  interdisciplinary teams  Outcome: Progressing

## 2025-05-21 NOTE — ASSESSMENT & PLAN NOTE
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   We will continue to follow while admitted.    Please do not hesitate to contact our on-call provider through [employer] Secure Chat or contact [phone] should there be an acute change or other symptom control concerns.    Please do not make any changes to symptom medications (opioid analgesics, nonopioid analgesics, antiemetics, etc) without first consulting the on-call Palliative Care provider for your specific campus; including after hours and on weekends. We are available 24/7, and can be contacted on [employer] chat or at [phone].

## 2025-05-21 NOTE — ASSESSMENT & PLAN NOTE
Pain in the L side of the chest. Likely from the dominant 3.3 x 2.1 cm expansile lesion of the left fifth rib with extensive cortical erosion from CT scan in 2/2025. Also has 2.7 x 1.6 cm expansile lytic lesion of the sternum, 13 mm left sternal lytic lesion, subcentimeter right clavicular lytic lesion, multiple spine neck lytic lesions - For example 14 mm left T12 lesion    Continue tylenol 975mg PO q8H ATC. Max of 3000mg in 24H only  Continue decadron for acute worsening of bone pain, monitor BG closely - 1mg (am and noon).   Continue lidocaine patch to the lower back and L shoulder, 12H on 12H off   D/w above with SLIM and RN  Hydromorphone PCA:  Basal dose increased 0.5 mg/hr  Bolus dose increased to 0.5 mg Q 15 minutes   19 total bolus received 31 bolus attempted / 24 hrs  11.4  total mg used over 24 hrs via PCA + 5 total RN boluses for a total of 16.4 mg IV dilaudid (328 mg OME)  Discussing CADD pump pain management with Hospice

## 2025-05-21 NOTE — ASSESSMENT & PLAN NOTE
Wt Readings from Last 3 Encounters:   05/18/25 65.3 kg (144 lb)   05/02/25 67.8 kg (149 lb 8 oz)   04/17/25 68.9 kg (152 lb)   TTE March 2025 with LVEF 60% and grade 1 diastolic dysfunction  CXR demonstrating slight increase in small right pleural effusion, patient herself denies any worsening shortness of breath from baseline  Patient was transitioned to comfort care as above

## 2025-05-21 NOTE — HOSPICE NOTE
pt and  have decided on hospice care.  Palliative care to inituated Dilaudid infusion for pain control and PICC line to be placed for access.  Dr. White and [facility] CC aware.  pt on admission schedule for 5/22/25.  PC to Dinh and updated on plan.  Notified will follow up with pt tomorrow 5/22/25

## 2025-05-21 NOTE — ASSESSMENT & PLAN NOTE
Started morphine sulfate 15 mg ER Q 12 hours (per pt PRN) with morphine 15 mg IR PO Q 8 hrs PRN severe pain 2/24/25 by Dr. Cole. Last script for MSER written in June 2024, patient taking MSIR infrequently.   Palliative consult with Dr. Bernabe 2/24/25- opioid rotated to oxycodone 5 mg PO Q 8 hrs PRN moderate/ severe pain r/t for better renal clearance- not effective for pain per patient.    See above for current pain regimen

## 2025-05-21 NOTE — ASSESSMENT & PLAN NOTE
Last BM 2 weeks ago  Miralax and senokot daily, GoLytely today per primary SLIM  Relistor and milk of magnesia added by primary team today.  Bowel sounds hypoactive  Obstruction series per SLIM - possible ileus, no obstruction demonstrated  Senna BID, lactulose 20 g BID PRN started 5/20  Last bowel movement morning of 5/21

## 2025-05-21 NOTE — ASSESSMENT & PLAN NOTE
Lab Results   Component Value Date    HGBA1C 5.7 (H) 03/21/2025       Recent Labs     05/20/25  1038 05/20/25  1601 05/20/25  2115 05/21/25  0602   POCGLU 145* 137 147* 86       Blood Sugar Average: Last 72 hrs:  (P) 123.6971493180131838    Very well-controlled as outpatient, did have episodes of hypoglycemia during prior hospitalizations  Continue reduced Lantus to 20 units nightly with insulin sliding scale  Carb controlled diet   Hypoglycemia protocol

## 2025-05-21 NOTE — HOSPICE NOTE
Met pt at the bedside with palliative care.  Discussed discharge home with hospice services.  Palliative care adjusting dilaudid pump dosage.  Pc to pt  Travon.  Discussed DME equipment.  Declined hospital bed.  Agreeable to commode and WC.  Dme ordered [facility] #[id number] for delivery 5/22/25 in the morning. Cadd pump to be ordered in morning for delivery home.  Israel Garcia updated. IPRN and IPCM updated on plan.

## 2025-05-21 NOTE — PROGRESS NOTES
Progress Note - Hospitalist   Name: Barbra De Oliveira 89 y.o. female I MRN: [id number]  Unit/Bed#: -01 I Date of Admission: 5/18/2025   Date of Service: 5/21/2025 I Hospital Day: 2    Assessment & Plan  Cancer related pain  Patient presented with worsening left chest pain   Analgesia per palliative care, appreciate recommendations  Uncomplicated opioid dependence (HCC)  PDMP reviewed by prior provider, last prescribed oxycodone IR 5 mg 30 pills for 10-day course 5/2/2025 by Dr. 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  At prior admissions here she declined any further oncology workup evaluation or intervention.  At outpatient appointment with her oncologist 4/17/2025 she declined any for further treatment, anastrozole at that time was discontinued  Palliative Care and hospice following. 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)  Lab Results   Component Value Date    HGBA1C 5.7 (H) 03/21/2025       Recent Labs     05/20/25  1038 05/20/25  1601 05/20/25  2115 05/21/25  0602   POCGLU 145* 137 147* 86       Blood Sugar Average: Last 72 hrs:  (P) 123.0414352476179714    Very well-controlled as outpatient, did have episodes of hypoglycemia during prior hospitalizations  Continue reduced Lantus to 20 units nightly with insulin sliding scale  Carb controlled diet   Hypoglycemia protocol    Chronic diastolic (congestive) heart failure (HCC)  Wt Readings from Last 3 Encounters:   05/18/25 65.3 kg (144 lb)   05/02/25 67.8 kg (149 lb 8 oz)  "  04/17/25 68.9 kg (152 lb)   TTE March 2025 with LVEF 60% and grade 1 diastolic dysfunction  CXR demonstrating slight increase in small right pleural effusion, patient herself denies any worsening shortness of breath from baseline  Patient was transitioned to comfort care as above    Constipation  Reported no bowel movements in approximately 2 weeks  S/p GoLytely on 5/18   s/p Relistor x 1 5/19  Obstruction series revealed, \"Small amount of fecal debris in the rectum. Nonobstructive bowel gas pattern. multiple air-fluid levels seen on the decubitus view, may be due to ileus\"  Patient now with multiple charted BMs 5/20-5/21    VTE Pharmacologic Prophylaxis: VTE Score: 6 comfort care as above    Mobility:   Basic Mobility Inpatient Raw Score: 19  JH-HLM Goal: 6: Walk 10 steps or more  JH-HLM Achieved: 7: Walk 25 feet or more  JH-HLM Goal achieved. Continue to encourage appropriate mobility.    Patient Centered Rounds: I performed bedside rounds with nursing staff today. Nedra  Discussions with Specialists or Other Care Team Provider:     Education and Discussions with Family / Patient: Updated  () via phone. Dinh    Current Length of Stay: 2 day(s)  Current Patient Status: Inpatient   Certification Statement: The patient will continue to require additional inpatient hospital stay due to safe d/c planning   Discharge Plan: Anticipate discharge in 24-48 hrs to home with hospice    Code Status: Level 4 - Comfort Care    Subjective   Ms. De Oliveira reports pain. Had multiple BMs per RN and chart review.    Objective :  Temp:  [97.1 °F (36.2 °C)] 97.1 °F (36.2 °C)  HR:  [80-84] 80  BP: (134-170)/(66-82) 134/66  Resp:  [16-17] 17  SpO2:  [90 %] 90 %  O2 Device: None (Room air)    Body mass index is 25.51 kg/m².     Input and Output Summary (last 24 hours):     Intake/Output Summary (Last 24 hours) at 5/21/2025 0932  Last data filed at 5/21/2025 0855  Gross per 24 hour   Intake 264.8 ml   Output 404 ml "   Net -139.2 ml       Physical Exam  Vitals reviewed.   Constitutional:       Comments: Patient seen sitting on oswaldo, RN present     Cardiovascular:      Rate and Rhythm: Normal rate and regular rhythm.   Pulmonary:      Effort: Pulmonary effort is normal. No respiratory distress.      Breath sounds: Normal breath sounds.   Abdominal:      Palpations: Abdomen is soft.      Tenderness: There is no abdominal tenderness.     Musculoskeletal:      Right lower leg: No edema.      Left lower leg: No edema.     Skin:     General: Skin is warm.     Neurological:      Mental Status: She is alert.     Psychiatric:         Mood and Affect: Mood normal.           Lines/Drains:  Lines/Drains/Airways       Active Status       Name Placement date Placement time Site Days    PICC Line 05/20/25 Right Basilic 05/20/25  1518  Basilic  less than 1                    Central Line:  Goal for removal: N/A - Discharging with PICC for IV ABX/medications               Lab Results: I have reviewed the following results:   Results from last 7 days   Lab Units 05/20/25  0456 05/18/25  0157   WBC Thousand/uL 11.70* 10.09   HEMOGLOBIN g/dL 13.2 13.9   HEMATOCRIT % 40.3 43.0   PLATELETS Thousands/uL 287 320   SEGS PCT %  --  69   LYMPHO PCT %  --  22   MONO PCT %  --  7   EOS PCT %  --  2     Results from last 7 days   Lab Units 05/20/25  0456   SODIUM mmol/L 135   POTASSIUM mmol/L 4.1   CHLORIDE mmol/L 100   CO2 mmol/L 26   BUN mg/dL 17   CREATININE mg/dL 0.91   ANION GAP mmol/L 9   CALCIUM mg/dL 10.6*   GLUCOSE RANDOM mg/dL 98         Results from last 7 days   Lab Units 05/21/25  0602 05/20/25  2115 05/20/25  1601 05/20/25  1038 05/20/25  0604 05/19/25  2201 05/19/25  1609 05/19/25  1139 05/19/25  0558 05/18/25  2100 05/18/25  1613 05/18/25  1039   POC GLUCOSE mg/dl 86 147* 137 145* 96 131 153* 118 97 140 126 128               Recent Cultures (last 7 days):         Imaging Results Review: I reviewed radiology reports from this admission  including: obstruction series.      Last 24 Hours Medication List:     Current Facility-Administered Medications:     acetaminophen (TYLENOL) tablet 975 mg, Q8H RAPHAEL    albuterol (PROVENTIL HFA,VENTOLIN HFA) inhaler 2 puff, Q4H PRN    bisacodyl (DULCOLAX) rectal suppository 10 mg, Daily PRN    dexamethasone (DECADRON) tablet 1 mg, BID (AM & Afternoon)    dextromethorphan-guaiFENesin (ROBITUSSIN DM) oral syrup 10 mL, Q4H PRN    haloperidol (HALDOL) tablet 1 mg, Q4H PRN    haloperidol lactate (HALDOL) injection 1 mg, Q4H PRN    HYDROmorphone (DILAUDID) 1 mg/mL 50 mL PCA, Continuous    HYDROmorphone (DILAUDID) injection 1 mg, Q2H PRN    insulin glargine (LANTUS) subcutaneous injection 20 Units 0.2 mL, HS    insulin lispro (HumALOG/ADMELOG) 100 units/mL subcutaneous injection 1-5 Units, TID AC **AND** Fingerstick Glucose (POCT), TID AC    insulin lispro (HumALOG/ADMELOG) 100 units/mL subcutaneous injection 1-5 Units, HS    lactulose (CHRONULAC) oral solution 20 g, BID    lidocaine (LIDODERM) 5 % patch 2 patch, Daily    LORazepam (ATIVAN) injection 0.5 mg, Q4H PRN    LORazepam (ATIVAN) tablet 0.5 mg, Q4H PRN    metoprolol tartrate (LOPRESSOR) tablet 50 mg, Q12H RAPHAEL    ondansetron (ZOFRAN) injection 4 mg, Q6H PRN    oxybutynin (DITROPAN-XL) 24 hr tablet 15 mg, Daily    pantoprazole (PROTONIX) EC tablet 20 mg, Early Morning    senna (SENOKOT) tablet 17.2 mg, BID    Administrative Statements   Today, Patient Was Seen By: Israel Garcia PA-C      **Please Note: This note may have been constructed using a voice recognition system.**

## 2025-05-21 NOTE — PROGRESS NOTES
5/21/2025 12:18 PM  [facility] home patient requests SOC medications and PCA started for improved symptom management.  Filled electronically via Epic as per [place] State Law.    Requested Prescriptions     Signed Prescriptions Disp Refills    HYDROmorphone (Dilaudid) 5 mg/mL 100 mL PCA (Home Health only) 100 mL 0     Sig: Route of Administration: Intravenous-PICC;  Basal Rate: 0.5 mg/hr;  Bolus Dose: 0.5 mg;  Bolus Interval: 15 minutes;  Max Bolus Doses/hr: 2       Tamar White MD  [facility]  Hospice Answering Service: [phone]

## 2025-05-21 NOTE — ASSESSMENT & PLAN NOTE
Transitioned to level 4 comfort care 5/21  Hospice consulted  Per hospice liaison: pt to go home with  Hospice 5/22

## 2025-05-21 NOTE — PROGRESS NOTES
Progress Note - Palliative Care   Name: Barbra De Oliveira 89 y.o. female I MRN: [id number]  Unit/Bed#: -01 I Date of Admission: 5/18/2025   Date of Service: 5/21/2025 I Hospital Day: 2     Assessment & Plan  Cancer related pain  Pain in the L side of the chest. Likely from the dominant 3.3 x 2.1 cm expansile lesion of the left fifth rib with extensive cortical erosion from CT scan in 2/2025. Also has 2.7 x 1.6 cm expansile lytic lesion of the sternum, 13 mm left sternal lytic lesion, subcentimeter right clavicular lytic lesion, multiple spine neck lytic lesions - For example 14 mm left T12 lesion    Continue tylenol 975mg PO q8H ATC. Max of 3000mg in 24H only  Continue decadron for acute worsening of bone pain, monitor BG closely - 1mg (am and noon).   Continue lidocaine patch to the lower back and L shoulder, 12H on 12H off   D/w above with SLIM and RN  Hydromorphone PCA:  Basal dose increased 0.5 mg/hr  Bolus dose increased to 0.5 mg Q 15 minutes   19 total bolus received 31 bolus attempted / 24 hrs  11.4  total mg used over 24 hrs via PCA + 5 total RN boluses for a total of 16.4 mg IV dilaudid (328 mg OME)  Discussing CADD pump pain management with Hospice  Constipation  Last BM 2 weeks ago  Miralax and senokot daily, GoLytely today per primary SLIM  Relistor and milk of magnesia added by primary team today.  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   We will continue to follow while admitted.    Please do not hesitate to contact our on-call provider through EPIC Secure Chat or contact [phone] should there be an acute change or other symptom control concerns.    Please do not make any changes to symptom medications (opioid analgesics, nonopioid analgesics, antiemetics, etc) without first consulting the on-call Palliative Care provider for your specific campus; including after hours and on weekends. We are available 24/7, and can be contacted on Epic secure chat or at [phone].   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 Cole. Last script for MSER written in June 2024, patient taking MSIR infrequently.   Palliative consult with Dr. Bernabe 2/24/25- opioid rotated to oxycodone 5 mg PO Q 8 hrs PRN moderate/ severe pain r/t for better renal clearance- not effective for pain per patient.    See above for current pain regimen  Chronic diastolic (congestive) heart failure (HCC)  Wt Readings from Last 3 Encounters:   05/18/25 65.3 kg (144 lb)   05/02/25 67.8 kg (149 lb 8 oz)   04/17/25 68.9 kg (152 lb)   Denies SOB    24 Hour History  Chart reviewed before visit.  + large BM after multiple interventions  PICC insertion RUE 5/20    Patient seen in bed, in NAD, sleeping on arrival though wakes easily to voice. Patient  "is visibly uncomfortable with minimal movements, grimacing. Endorsing ongoing severe pain, minimal relief with current PCA dosing.     Medications  Current Medications[1]    Objective  /66   Pulse 80   Temp (!) 97.1 °F (36.2 °C)   Resp 17   Ht 5' 3\" (1.6 m)   Wt 65.3 kg (144 lb)   SpO2 90%   BMI 25.51 kg/m²   Physical Exam  Vitals reviewed.   Constitutional:       General: She is sleeping. She is not in acute distress.     Appearance: Normal appearance.     Cardiovascular:      Rate and Rhythm: Normal rate.   Pulmonary:      Effort: Pulmonary effort is normal. No respiratory distress.     Skin:     General: Skin is warm and dry.      Coloration: Skin is pale.     Neurological:      General: No focal deficit present.      Mental Status: She is easily aroused. She is confused.      Motor: Weakness present.     Psychiatric:         Mood and Affect: Mood normal.         Behavior: Behavior normal. Behavior is cooperative.     States she is more confused today and does not want to answer any questions regarding future plans without  present.    Lab Results: no lab studies- Level 4  Imaging Studies: I have personally reviewed pertinent reports.  EKG, Pathology, and Other Studies: I have personally reviewed pertinent reports.    Counseling / Coordination of Care  Total floor / unit time spent today 30+ minutes. Greater than 50% of total time was spent with the patient and / or family counseling and / or coordinating of care. A description of the counseling / coordination of care: symptom assessment and management, medication adjustment, psychosocial support, chart review, advanced directives, goals of care, hospice services, opioid titration, supportive listening, anticipatory guidance, and coordination with hospice liaison    JOSE Adams  Palliative & Supportive Care    Portions of this document may have been created using dictation software and as such some \"sound alike\" terms may have been " generated by the system. Do not hesitate to contact me with any questions or clarifications.         [1]   Current Facility-Administered Medications:     acetaminophen (TYLENOL) tablet 975 mg, 975 mg, Oral, Q8H RAPHAEL, Ricky Farrell DO, 975 mg at 05/21/25 0614    albuterol (PROVENTIL HFA,VENTOLIN HFA) inhaler 2 puff, 2 puff, Inhalation, Q4H PRN, Ricky Farrell DO    bisacodyl (DULCOLAX) rectal suppository 10 mg, 10 mg, Rectal, Daily PRN, Treva Servin MD    dexamethasone (DECADRON) tablet 1 mg, 1 mg, Oral, BID (AM & Afternoon), Zina Guerrero MD, 1 mg at 05/21/25 0614    dextromethorphan-guaiFENesin (ROBITUSSIN DM) oral syrup 10 mL, 10 mL, Oral, Q4H PRN, Ricky Farrell DO    haloperidol (HALDOL) tablet 1 mg, 1 mg, Oral, Q4H PRN, Felisa Benito PA-C    haloperidol lactate (HALDOL) injection 1 mg, 1 mg, Intravenous, Q4H PRN, Felisa Benito PA-C    HYDROmorphone (DILAUDID) 1 mg/mL 50 mL PCA, , Intravenous, Continuous, Felisa Benito PA-C, Restarted at 05/20/25 1900    HYDROmorphone (DILAUDID) injection 1 mg, 1 mg, Intravenous, Q2H PRN, Felisa Benito PA-C, 1 mg at 05/21/25 0824    insulin glargine (LANTUS) subcutaneous injection 20 Units 0.2 mL, 20 Units, Subcutaneous, HS, Ricky Farrell DO, 20 Units at 05/20/25 2159    insulin lispro (HumALOG/ADMELOG) 100 units/mL subcutaneous injection 1-5 Units, 1-5 Units, Subcutaneous, TID AC, 1 Units at 05/19/25 1629 **AND** Fingerstick Glucose (POCT), , , TID AC, Ricky Farrell DO    insulin lispro (HumALOG/ADMELOG) 100 units/mL subcutaneous injection 1-5 Units, 1-5 Units, Subcutaneous, HS, Ricky Farrell,     lactulose (CHRONULAC) oral solution 20 g, 20 g, Oral, BID, Felisa Benito PA-C, 20 g at 05/20/25 2159    lidocaine (LIDODERM) 5 % patch 2 patch, 2 patch, Topical, Daily, Zina Guerrero MD, 2 patch at 05/21/25 0821    LORazepam (ATIVAN) injection 0.5 mg, 0.5 mg, Intravenous, Q4H PRN, Felisa Benito PA-C    LORazepam (ATIVAN) tablet 0.5 mg, 0.5 mg, Oral,  Q4H PRN, Felisa Benito PA-C, 0.5 mg at 05/20/25 1422    metoprolol tartrate (LOPRESSOR) tablet 50 mg, 50 mg, Oral, Q12H RAPHAEL, Ricky Palominoi, DO, 50 mg at 05/21/25 0821    ondansetron (ZOFRAN) injection 4 mg, 4 mg, Intravenous, Q6H PRN, Ricky Farrell, DO, 4 mg at 05/20/25 0435    oxybutynin (DITROPAN-XL) 24 hr tablet 15 mg, 15 mg, Oral, Daily, Ricky Farrell, DO, 15 mg at 05/21/25 0821    pantoprazole (PROTONIX) EC tablet 20 mg, 20 mg, Oral, Early Morning, Ricky Farrell, DO, 20 mg at 05/21/25 0614    senna (SENOKOT) tablet 17.2 mg, 2 tablet, Oral, BID, Felisa Benito PA-C, 17.2 mg at 05/20/25 6994

## 2025-05-21 NOTE — CASE MANAGEMENT
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) 2025    Cancer related pain 2025    Goals of care, counseling/discussion 2025    Palliative care by specialist 2025    History of breast cancer 2025    Constipation 2025    Dyspnea 2025    History of left mastectomy 2023    Premature ventricular contractions 2023    Use of anastrozole (Arimidex) 2021    Atrial fibrillation, currently in sinus rhythm 2020    Chronic pain 2020    Malignant neoplasm of upper-outer quadrant of left breast in female, estrogen receptor positive (HCC) 2020    Migraine without aura and without status migrainosus, not intractable 2020    Mixed hyperlipidemia 2019    Chest pain 2018    Chronic diastolic (congestive) heart failure (HCC) 2018    Uncomplicated opioid dependence (HCC) 2016    Essential hypertension 2016    GERD without esophagitis 2016    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      LOS (days): 2  Geometric Mean LOS (GMLOS) (days): 2.6  Days to GMLOS:0.8     OBJECTIVE:  Risk of Unplanned Readmission Score: 29.97         Current admission status: Inpatient   Preferred Pharmacy:   [id number] - [place], [place] - [address]  [address]  [place] [address]  Phone: [phone] Fax: [phone]    [facility] - [place], [place]  [address] [facility] [address]  [place] [address]  Phone: [phone] Fax: [phone]    Primary Care  Provider: Osiel Eid MD    Primary Insurance: MEDICARE  Secondary Insurance: HUMA[employer]    DISCHARGE DETAILS:          Treatment Team Recommendation: Hospice  Discharge Destination Plan:: Hospice  Transport at Discharge : [facility]     Number/Name of Dispatcher: DORA [phone]  Transported by (Company and Unit #): DORA  ETA of Transport (Date): 05/22/25  ETA of Transport (Time): 1200 (Requested time)      CM received message from  Hospice Liaison Felisa WEINSTEIN Requesting CM arrange transport for 5/22 @ 1200.  hospice liaison will order CADD pump for pt in the am prior to dishcarge.  All DME to be delivered to pt's home in the am.  CM submitted transport request via [employer] - awaiting confirmation.  CM notified  Hospice liaision and bedside nurse of above.  CM to follow for dcp needs pending medical course.

## 2025-05-21 NOTE — ASSESSMENT & PLAN NOTE
PDMP reviewed by prior provider, last prescribed oxycodone IR 5 mg 30 pills for 10-day course 5/2/2025 by Dr. Bernabe of [facility] palliative care  Palliative Care following - was transitioned to comfort care and had PICC line placed with plan for CADD on hospice discharge per Palliative

## 2025-05-21 NOTE — ASSESSMENT & PLAN NOTE
"Reported no bowel movements in approximately 2 weeks  S/p GoLytely on 5/18   s/p Relistor x 1 5/19  Obstruction series revealed, \"Small amount of fecal debris in the rectum. Nonobstructive bowel gas pattern. multiple air-fluid levels seen on the decubitus view, may be due to ileus\"  Patient now with multiple charted BMs 5/20-5/21  "

## 2025-05-21 NOTE — ASSESSMENT & PLAN NOTE
Recently with concern for recurrent malignancy and suspect metastasis to new pulmonary nodule and lytic osseous lesions.  At prior admissions here she declined any further oncology workup evaluation or intervention.  At outpatient appointment with her oncologist 4/17/2025 she declined any for further treatment, anastrozole at that time was discontinued  Palliative Care and hospice following. Patient was transitioned to Comfort Care per Palliative  Had PICC line placed and started on dilaudid PCA while admitted and plan for CADD upon hospice discharge

## 2025-05-22 ENCOUNTER — HOME CARE VISIT (OUTPATIENT)
Dept: HOME HOSPICE | Facility: HOSPICE | Age: 89
End: 2025-05-22
Payer: MEDICARE

## 2025-05-22 ENCOUNTER — HOME CARE VISIT (OUTPATIENT)
Dept: HOME HEALTH SERVICES | Facility: HOME HEALTHCARE | Age: 89
End: 2025-05-22
Attending: STUDENT IN AN ORGANIZED HEALTH CARE EDUCATION/TRAINING PROGRAM
Payer: MEDICARE

## 2025-05-22 VITALS
RESPIRATION RATE: 16 BRPM | HEIGHT: 63 IN | SYSTOLIC BLOOD PRESSURE: 156 MMHG | WEIGHT: 144 LBS | OXYGEN SATURATION: 89 % | BODY MASS INDEX: 25.52 KG/M2 | DIASTOLIC BLOOD PRESSURE: 75 MMHG | TEMPERATURE: 97.3 F | HEART RATE: 83 BPM

## 2025-05-22 VITALS
WEIGHT: 144 LBS | RESPIRATION RATE: 16 BRPM | BODY MASS INDEX: 25.52 KG/M2 | SYSTOLIC BLOOD PRESSURE: 118 MMHG | OXYGEN SATURATION: 86 % | HEART RATE: 72 BPM | DIASTOLIC BLOOD PRESSURE: 82 MMHG | TEMPERATURE: 97.7 F | HEIGHT: 63 IN

## 2025-05-22 PROBLEM — Z51.5 HOSPICE CARE: Status: ACTIVE | Noted: 2025-05-22

## 2025-05-22 LAB
GLUCOSE SERPL-MCNC: 127 MG/DL (ref 65–140)
GLUCOSE SERPL-MCNC: 80 MG/DL (ref 65–140)

## 2025-05-22 PROCEDURE — 99239 HOSP IP/OBS DSCHRG MGMT >30: CPT | Performed by: PHYSICIAN ASSISTANT

## 2025-05-22 PROCEDURE — G0299 HHS/HOSPICE OF RN EA 15 MIN: HCPCS

## 2025-05-22 PROCEDURE — 82948 REAGENT STRIP/BLOOD GLUCOSE: CPT

## 2025-05-22 PROCEDURE — 99233 SBSQ HOSP IP/OBS HIGH 50: CPT

## 2025-05-22 RX ORDER — SENNOSIDES 8.6 MG
17.2 TABLET ORAL 2 TIMES DAILY
Start: 2025-05-22

## 2025-05-22 RX ORDER — LACTULOSE 10 G/15ML
20 SOLUTION ORAL 2 TIMES DAILY
Qty: 240 ML | Refills: 0 | Status: SHIPPED | OUTPATIENT
Start: 2025-05-22

## 2025-05-22 RX ORDER — DEXAMETHASONE 1 MG
1 TABLET ORAL
Qty: 10 TABLET | Refills: 0 | Status: SHIPPED | OUTPATIENT
Start: 2025-05-22 | End: 2025-05-23

## 2025-05-22 RX ORDER — HYDROMORPHONE HCL/PF 1 MG/ML
1 SYRINGE (ML) INJECTION ONCE
Status: COMPLETED | OUTPATIENT
Start: 2025-05-22 | End: 2025-05-22

## 2025-05-22 RX ADMIN — ONDANSETRON 4 MG: 2 INJECTION INTRAMUSCULAR; INTRAVENOUS at 12:02

## 2025-05-22 RX ADMIN — SENNOSIDES 17.2 MG: 8.6 TABLET, FILM COATED ORAL at 09:39

## 2025-05-22 RX ADMIN — LACTULOSE 20 G: 20 SOLUTION ORAL at 09:39

## 2025-05-22 RX ADMIN — DEXAMETHASONE 1 MG: 2 TABLET ORAL at 12:03

## 2025-05-22 RX ADMIN — ACETAMINOPHEN 975 MG: 325 TABLET ORAL at 06:01

## 2025-05-22 RX ADMIN — METOPROLOL TARTRATE 50 MG: 50 TABLET, FILM COATED ORAL at 09:39

## 2025-05-22 RX ADMIN — DEXAMETHASONE 1 MG: 2 TABLET ORAL at 06:27

## 2025-05-22 RX ADMIN — PANTOPRAZOLE SODIUM 20 MG: 20 TABLET, DELAYED RELEASE ORAL at 06:01

## 2025-05-22 RX ADMIN — OXYBUTYNIN 15 MG: 10 TABLET, FILM COATED, EXTENDED RELEASE ORAL at 09:39

## 2025-05-22 RX ADMIN — HYDROMORPHONE HYDROCHLORIDE 1 MG: 1 INJECTION, SOLUTION INTRAMUSCULAR; INTRAVENOUS; SUBCUTANEOUS at 12:03

## 2025-05-22 RX ADMIN — HYDROMORPHONE HYDROCHLORIDE 1 MG: 1 INJECTION, SOLUTION INTRAMUSCULAR; INTRAVENOUS; SUBCUTANEOUS at 09:46

## 2025-05-22 NOTE — PLAN OF CARE
Problem: Potential for Falls  Goal: Patient will remain free of falls  Description: INTERVENTIONS:  - Educate patient/family on patient safety including physical limitations  - Instruct patient to call for assistance with activity   - Consider consulting OT/PT to assist with strengthening/mobility based on AM PAC & JH-HLM score  - Consult OT/PT to assist with strengthening/mobility   - Keep Call bell within reach  - Keep bed low and locked with side rails adjusted as appropriate  - Keep care items and personal belongings within reach  - Initiate and maintain comfort rounds  - Make Fall Risk Sign visible to staff  - Offer Toileting every 2 Hours, in advance of need  - Initiate/Maintain alarm  - Obtain necessary fall risk management equipment:   - Apply yellow socks and bracelet for high fall risk patients  - Consider moving patient to room near nurses station  Outcome: Progressing     Problem: PAIN - ADULT  Goal: Verbalizes/displays adequate comfort level or baseline comfort level  Description: Interventions:  - Encourage patient to monitor pain and request assistance  - Assess pain using appropriate pain scale  - Administer analgesics as ordered based on type and severity of pain and evaluate response  - Implement non-pharmacological measures as appropriate and evaluate response  - Consider cultural and social influences on pain and pain management  - Notify physician/advanced practitioner if interventions unsuccessful or patient reports new pain  - Educate patient/family on pain management process including their role and importance of  reporting pain   - Provide non-pharmacologic/complimentary pain relief interventions  Outcome: Progressing     Problem: INFECTION - ADULT  Goal: Absence or prevention of progression during hospitalization  Description: INTERVENTIONS:  - Assess and monitor for signs and symptoms of infection  - Monitor lab/diagnostic results  - Monitor all insertion sites, i.e. indwelling lines,  tubes, and drains  - Monitor endotracheal if appropriate and nasal secretions for changes in amount and color  - [place] appropriate cooling/warming therapies per order  - Administer medications as ordered  - Instruct and encourage patient and family to use good hand hygiene technique  - Identify and instruct in appropriate isolation precautions for identified infection/condition  Outcome: Progressing  Goal: Absence of fever/infection during neutropenic period  Description: INTERVENTIONS:  - Monitor WBC  - Perform strict hand hygiene  - Limit to healthy visitors only  - No plants, dried, fresh or silk flowers with [place] in patient room  Outcome: Progressing     Problem: SAFETY ADULT  Goal: Patient will remain free of falls  Description: INTERVENTIONS:  - Educate patient/family on patient safety including physical limitations  - Instruct patient to call for assistance with activity   - Consider consulting OT/PT to assist with strengthening/mobility based on AM PAC & JH-HLM score  - Consult OT/PT to assist with strengthening/mobility   - Keep Call bell within reach  - Keep bed low and locked with side rails adjusted as appropriate  - Keep care items and personal belongings within reach  - Initiate and maintain comfort rounds  - Make Fall Risk Sign visible to staff  - Offer Toileting every 3 Hours, in advance of need  - Initiate/Maintain alarm  - Obtain necessary fall risk management equipment: 3  - Apply yellow socks and bracelet for high fall risk patients  - Consider moving patient to room near nurses station  Outcome: Progressing  Goal: Maintain or return to baseline ADL function  Description: INTERVENTIONS:  -  Assess patient's ability to carry out ADLs; assess patient's baseline for ADL function and identify physical deficits which impact ability to perform ADLs (bathing, care of mouth/teeth, toileting, grooming, dressing, etc.)  - Assess/evaluate cause of self-care deficits   - Assess range of motion  - Assess  patient's mobility; develop plan if impaired  - Assess patient's need for assistive devices and provide as appropriate  - Encourage maximum independence but intervene and supervise when necessary  - Involve family in performance of ADLs  - Assess for home care needs following discharge   - Consider OT consult to assist with ADL evaluation and planning for discharge  - Provide patient education as appropriate  - Monitor functional capacity and physical performance, use of AM PAC & JH-HLM   - Monitor gait, balance and fatigue with ambulation    Outcome: Progressing  Goal: Maintains/Returns to pre admission functional level  Description: INTERVENTIONS:  - Perform AM-PAC 6 Click Basic Mobility/ Daily Activity assessment daily.  - Set and communicate daily mobility goal to care team and patient/family/caregiver.   - Collaborate with rehabilitation services on mobility goals if consulted  - Perform Range of Motion 3 times a day.  - Reposition patient every 2 hours.  - Dangle patient 3 times a day  - Stand patient 3 times a day  - Ambulate patient 3 times a day  - Out of bed to chair 3 times a day   - Out of bed for meals 3 times a day  - Out of bed for toileting  - Record patient progress and toleration of activity level   Outcome: Progressing     Problem: Nutrition/Hydration-ADULT  Goal: Nutrient/Hydration intake appropriate for improving, restoring or maintaining nutritional needs  Description: Monitor and assess patient's nutrition/hydration status for malnutrition. Collaborate with interdisciplinary team and initiate plan and interventions as ordered.  Monitor patient's weight and dietary intake as ordered or per policy. Utilize nutrition screening tool and intervene as necessary. Determine patient's food preferences and provide high-protein, high-caloric foods as appropriate.     INTERVENTIONS:  - Monitor oral intake, urinary output, labs, and treatment plans  - Assess nutrition and hydration status and recommend  course of action  - Evaluate amount of meals eaten  - Assist patient with eating if necessary   - Allow adequate time for meals  - Recommend/ encourage appropriate diets, oral nutritional supplements, and vitamin/mineral supplements  - Order, calculate, and assess calorie counts as needed  - Recommend, monitor, and adjust tube feedings and TPN/PPN based on assessed needs  - Assess need for intravenous fluids  - Provide specific nutrition/hydration education as appropriate  - Include patient/family/caregiver in decisions related to nutrition  Outcome: Progressing     Problem: Prexisting or High Potential for Compromised Skin Integrity  Goal: Skin integrity is maintained or improved  Description: INTERVENTIONS:  - Identify patients at risk for skin breakdown  - Assess and monitor skin integrity including under and around medical devices   - Assess and monitor nutrition and hydration status  - Monitor labs  - Assess for incontinence   - Turn and reposition patient  - Assist with mobility/ambulation  - Relieve pressure over osmel prominences   - Avoid friction and shearing  - Provide appropriate hygiene as needed including keeping skin clean and dry  - Evaluate need for skin moisturizer/barrier cream  - Collaborate with interdisciplinary team  - Patient/family teaching  - Consider wound care consult    Assess:  - Review Bartolo scale daily  - Clean and moisturize skin every 3  - Inspect skin when repositioning, toileting, and assisting with ADLS  - Assess under medical devices such as  every 3  - Assess extremities for adequate circulation and sensation     Bed Management:  - Have minimal linens on bed & keep smooth, unwrinkled  - Change linens as needed when moist or perspiring  - Avoid sitting or lying in one position for more than 3 hours while in bed?Keep HOB at 45 degrees   - Toileting:  - Offer bedside commode  - Assess for incontinence every   - Use incontinent care products after each incontinent episode such as      Activity:  - Mobilize patient 3 times a day  - Encourage activity and walks on unit  - Encourage or provide ROM exercises   - Turn and reposition patient every 3 Hours  - Use appropriate equipment to lift or move patient in bed  - Instruct/ Assist with weight shifting every 3 when out of bed in chair  - Consider limitation of chair time 3 hour intervals    Skin Care:  - Avoid use of baby powder, tape, friction and shearing, hot water or constrictive clothing  - Relieve pressure over bony prominences using 3  - Do not massage red bony areas    Next Steps:  - Teach patient strategies to minimize risks such as 3  - Consider consults to  interdisciplinary teams such as 3  Outcome: Progressing

## 2025-05-22 NOTE — ASSESSMENT & PLAN NOTE
Pain in the L side of the chest. Likely from the dominant 3.3 x 2.1 cm expansile lesion of the left fifth rib with extensive cortical erosion from CT scan in 2/2025. Also has 2.7 x 1.6 cm expansile lytic lesion of the sternum, 13 mm left sternal lytic lesion, subcentimeter right clavicular lytic lesion, multiple spine neck lytic lesions - For example 14 mm left T12 lesion    Continue tylenol 975mg PO q8H ATC. Max of 3000mg in 24H only  Continue decadron for acute worsening of bone pain, monitor BG closely - 1mg (am and noon).   Continue lidocaine patch to the lower back and L shoulder, 12H on 12H off   D/w above with SLIM and RN  Hydromorphone PCA:  Basal dose 0.5 mg/hr  Bolus dose to 0.5 mg Q 15 minutes   17 total bolus received 20 bolus attempted / 24 hrs  17  total mg used over 24 hrs via PCA IV dilaudid   2 nurse administered IV Dilaudid 1 mg bolus x 24 hours  (340 mg OME)  CADD pump ordered by hospice for home care for current regimen

## 2025-05-22 NOTE — ASSESSMENT & PLAN NOTE
Transitioned to level 4 comfort care 5/21  Hospice consulted  Per hospice liaison: pt to go home with  Hospice 5/22 at 1200 via [facility] with home hospice admission to follow  Confirmed plan for discharge home today with CM, hospice, spouse

## 2025-05-22 NOTE — ASSESSMENT & PLAN NOTE
Patient presented with worsening left chest pain   Analgesia per palliative care, appreciate recommendations  Discharge home with hospice

## 2025-05-22 NOTE — PROGRESS NOTES
Progress Note - Palliative Care   Name: Barbra De Oilveira 89 y.o. female I MRN: [id number]  Unit/Bed#: -01 I Date of Admission: 5/18/2025   Date of Service: 5/22/2025 I Hospital Day: 3     Assessment & Plan  Cancer related pain  Pain in the L side of the chest. Likely from the dominant 3.3 x 2.1 cm expansile lesion of the left fifth rib with extensive cortical erosion from CT scan in 2/2025. Also has 2.7 x 1.6 cm expansile lytic lesion of the sternum, 13 mm left sternal lytic lesion, subcentimeter right clavicular lytic lesion, multiple spine neck lytic lesions - For example 14 mm left T12 lesion    Continue tylenol 975mg PO q8H ATC. Max of 3000mg in 24H only  Continue decadron for acute worsening of bone pain, monitor BG closely - 1mg (am and noon).   Continue lidocaine patch to the lower back and L shoulder, 12H on 12H off   D/w above with SLIM and RN  Hydromorphone PCA:  Basal dose 0.5 mg/hr  Bolus dose to 0.5 mg Q 15 minutes   17 total bolus received 20 bolus attempted / 24 hrs  17  total mg used over 24 hrs via PCA IV dilaudid   2 nurse administered IV Dilaudid 1 mg bolus x 24 hours  (340 mg OME)  CADD pump ordered by hospice for home care for current regimen  Constipation  Last BM 2 weeks ago  Miralax and senokot daily, GoLytely today per primary SLIM  Relistor and milk of magnesia added by primary team today.  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   We will continue to follow while admitted.    Please do not hesitate to contact our on-call provider through EPIC Secure Chat or contact [phone] should there be an acute change or other symptom control concerns.    Please do not make any changes to symptom medications (opioid analgesics, nonopioid analgesics, antiemetics, etc) without first consulting the on-call Palliative Care provider for your specific campus; including after hours and on weekends. We are available 24/7, and can be contacted on Epic secure chat or at [phone].   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 Cole. Last script for MSER written in June 2024, patient taking MSIR infrequently.   Palliative consult with Dr. Bernabe 2/24/25- opioid rotated to oxycodone 5 mg PO Q 8 hrs PRN moderate/ severe pain r/t for better renal clearance- not effective for pain per patient.    See above for current pain regimen  Chronic diastolic (congestive) heart failure (HCC)  Wt Readings from Last 3 Encounters:   05/18/25 65.3 kg (144 lb)   05/02/25 67.8 kg (149 lb 8 oz)   04/17/25 68.9 kg (152 lb)   Denies SOB  Hospice care      24 Hour History  Chart reviewed before visit.  No  significant events overnight    Pt alert and oriented to person, place, time, and situation on writer's arrival. Reports continued severe pain in multiple sites with most pain in left shoulder and left lower rib cage. She reports minimal relief from Dilaudid PCA but has used fewer nurse IV bolus in last 24 hours with comparable OME as previous 24 hours. Pt appears more comfortable and alert to writer.  Spoke with Dinh via phone in regards to D/C home today. Lactulose, steroid sent to preferred pharmacy [facility], [address]    Medications    Current Facility-Administered Medications:     acetaminophen (TYLENOL) tablet 975 mg, 975 mg, Oral, Q8H RAPHAEL, Ricky Farrell DO, 975 mg at 05/22/25 0601    albuterol (PROVENTIL HFA,VENTOLIN HFA) inhaler 2 puff, 2 puff, Inhalation, Q4H PRN, Ricky Farrell DO    bisacodyl (DULCOLAX) rectal suppository 10 mg, 10 mg, Rectal, Daily PRN, Treva Servin MD    dexamethasone (DECADRON) tablet 1 mg, 1 mg, Oral, BID (AM & Afternoon), Zina Guerrero MD, 1 mg at 05/22/25 1203    dextromethorphan-guaiFENesin (ROBITUSSIN DM) oral syrup 10 mL, 10 mL, Oral, Q4H PRN, Ricky Farrell DO    haloperidol (HALDOL) tablet 1 mg, 1 mg, Oral, Q4H PRN, Felisa Benito PA-C    haloperidol lactate (HALDOL) injection 1 mg, 1 mg, Intravenous, Q4H PRN, Felisa Benito PA-C    HYDROmorphone (DILAUDID) 1 mg/mL 50 mL PCA, , Intravenous, Continuous, JOSE Adams, Stopped at 05/22/25 1220    HYDROmorphone (DILAUDID) injection 1 mg, 1 mg, Intravenous, Q2H PRN, Felisa Benito PA-C, 1 mg at 05/22/25 0946    insulin glargine (LANTUS) subcutaneous injection 20 Units 0.2 mL, 20 Units, Subcutaneous, HS, Ricky Farrell DO, 20 Units at 05/21/25 2242    insulin lispro (HumALOG/ADMELOG) 100 units/mL subcutaneous injection 1-5 Units, 1-5 Units, Subcutaneous, TID AC, 1 Units at 05/21/25 1725 **AND** Fingerstick Glucose (POCT), , , TID ERIK, Ricky Farrell,     insulin lispro (HumALOG/ADMELOG) 100 units/mL subcutaneous  "injection 1-5 Units, 1-5 Units, Subcutaneous, HS, Ricky Farrell DO    lactulose (CHRONULAC) oral solution 20 g, 20 g, Oral, BID, Felisa Benito PA-C, 20 g at 05/22/25 0939    lidocaine (LIDODERM) 5 % patch 2 patch, 2 patch, Topical, Daily, Zina Guerrero MD, 2 patch at 05/21/25 0821    LORazepam (ATIVAN) injection 0.5 mg, 0.5 mg, Intravenous, Q4H PRN, Felisa Benito PA-C    LORazepam (ATIVAN) tablet 0.5 mg, 0.5 mg, Oral, Q4H PRN, Felisa Benito PA-C, 0.5 mg at 05/20/25 1422    metoprolol tartrate (LOPRESSOR) tablet 50 mg, 50 mg, Oral, Q12H RAPHAEL, Ricky Farrell DO, 50 mg at 05/22/25 0939    ondansetron (ZOFRAN) injection 4 mg, 4 mg, Intravenous, Q6H PRN, Ricky Farrell DO, 4 mg at 05/22/25 1202    oxybutynin (DITROPAN-XL) 24 hr tablet 15 mg, 15 mg, Oral, Daily, Ricky Farrell DO, 15 mg at 05/22/25 0939    pantoprazole (PROTONIX) EC tablet 20 mg, 20 mg, Oral, Early Morning, Ricky Farrell DO, 20 mg at 05/22/25 0601    senna (SENOKOT) tablet 17.2 mg, 2 tablet, Oral, BID, Felisa Benito PA-C, 17.2 mg at 05/22/25 0939    Objective  /75   Pulse 83   Temp (!) 97.3 °F (36.3 °C)   Resp 16   Ht 5' 3\" (1.6 m)   Wt 65.3 kg (144 lb)   SpO2 (!) 89%   BMI 25.51 kg/m²   Physical Exam  Constitutional:       General: She is not in acute distress.     Appearance: Normal appearance. She is not ill-appearing.   HENT:      Mouth/Throat:      Mouth: Mucous membranes are dry.     Cardiovascular:      Rate and Rhythm: Normal rate.   Pulmonary:      Effort: Pulmonary effort is normal.     Skin:     General: Skin is warm and dry.      Coloration: Skin is pale.     Neurological:      General: No focal deficit present.      Mental Status: She is alert and oriented to person, place, and time. Mental status is at baseline.      Motor: Weakness present.      Gait: Gait abnormal.     Psychiatric:         Mood and Affect: Mood normal.         Behavior: Behavior normal.         Thought Content: Thought content normal.         " "Judgment: Judgment normal.         Imaging Studies: I have personally reviewed pertinent reports.  EKG, Pathology, and Other Studies: I have personally reviewed pertinent reports.    Counseling / Coordination of Care  Total floor / unit time spent today 40 minutes. Greater than 50% of total time was spent with the patient and / or family counseling and / or coordinating of care. A description of the counseling / coordination of care: symptom assessment and management, medication review, psychosocial support, chart review, goals of care, hospice services, supportive listening, anticipatory guidance, DME, and coordination with home hospice.    JOSE Adams  Palliative & Supportive Care    Portions of this document may have been created using dictation software and as such some \"sound alike\" terms may have been generated by the system. Do not hesitate to contact me with any questions or clarifications.    "

## 2025-05-22 NOTE — DISCHARGE SUMMARY
Discharge Summary - Hospitalist   Name: Barbra De Oliveira 89 y.o. female I MRN: [id number]  Unit/Bed#: -01 I Date of Admission: 5/18/2025   Date of Service: 5/22/2025 I Hospital Day: 3     Assessment & Plan  Cancer related pain  Patient presented with worsening left chest pain   Analgesia per palliative care, appreciate recommendations  Discharge home with hospice  Uncomplicated opioid dependence (HCC)  PDMP reviewed by prior provider, last prescribed oxycodone IR 5 mg 30 pills for 10-day course 5/2/2025 by Dr. Bernabe of 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  At prior admissions here she declined any further oncology workup evaluation or intervention.  At outpatient appointment with her oncologist 4/17/2025 she declined any for further treatment, anastrozole at that time was discontinued  Palliative Care and hospice following. 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)  Lab Results   Component Value Date    HGBA1C 5.7 (H) 03/21/2025       Recent Labs     05/21/25  1623 05/21/25  2150 05/22/25  0601 05/22/25  1026   POCGLU 179* 128 80 127       Blood Sugar Average: Last 72 hrs:  (P) 125.3967015364398451    Very well-controlled as outpatient, did have episodes of hypoglycemia during prior hospitalizations  Continue home regimen on discharge    Chronic diastolic (congestive) heart failure (HCC)  Wt Readings from Last 3 Encounters:   05/18/25 65.3 kg (144 lb)   05/02/25 67.8 kg (149 lb 8 oz)   04/17/25 68.9 kg (152 lb)   TTE March 2025 with  "LVEF 60% and grade 1 diastolic dysfunction  CXR demonstrating slight increase in small right pleural effusion, patient herself denies any worsening shortness of breath from baseline  Patient was transitioned to comfort care as above    Constipation  Reported no bowel movements in approximately 2 weeks  S/p GoLytely on 5/18   s/p Relistor x 1 5/19  Obstruction series revealed, \"Small amount of fecal debris in the rectum. Nonobstructive bowel gas pattern. multiple air-fluid levels seen on the decubitus view, may be due to ileus\"  Patient now with multiple charted BMs 5/20-5/21  Bowel regimen for comfort per Palliative Care     Medical Problems       Resolved Problems  Date Reviewed: 5/22/2025   None       Discharging Physician / Practitioner: Israel Garcia PA-C  PCP: Osiel Eid MD  Admission Date:   Admission Orders (From admission, onward)       Ordered        05/19/25 1658  INPATIENT ADMISSION  Once            05/18/25 0349  Place in Observation  Once                          Discharge Date: 05/22/25    Next Steps for Physician/AP Assuming Care:  Follow up with hospice/palliative care    Test Results Pending at Discharge (will require follow up):  None    Medication Changes for Discharge & Rationale:   Dilaudid PCA per Palliative Care, patient hospice/comfort care  See after visit summary for reconciled discharge medications provided to patient and/or family.     Consultations During Hospital Stay:  Palliative Care  Hospice  CM    Procedures Performed:   CXR  Obstruction series  BMP  CBC    Significant Findings / Test Results:   CXR: \"Increased small right pleural effusion.\"  Obstruction series: \"Small amount of fecal debris in the rectum. Nonobstructive bowel gas pattern. multiple air-fluid levels seen on the decubitus view, may be due to ileus. Deformity of the left lateral chest wall with associated extrapleural soft tissue density, suspect erosive destructive changes and possible metastatic disease " "involving the chest wall, correlate with CT chest. The study was marked in EPIC for significant notification.\"    Incidental Findings:   None     Hospital Course:   Barbra De Oliveira is a 89 y.o. female with breast cancer, opioid dependence, cancer related pain, DM2, CHF who originally presented to the hospital on 5/18/2025 due to intractable left chest wall pain. Palliative Care was consulted. Pain regimen was adjusted. Patient was transitioned to Level 4 comfort care, had PICC line placed for dilaudid PCA/CADD. Hospice was consulted. CM arranged for discharge home with hospice today.    Please see above list of diagnoses and related plan for additional information.     Discharge Day Visit / Exam:   Subjective:   Ms. De Oliveira reports left chest wall pain. Reports BMs yesterday    Vitals: Blood Pressure: 156/75 (05/22/25 1023)  Pulse: 83 (05/22/25 1023)  Temperature: (!) 97.3 °F (36.3 °C) (05/22/25 0738)  Temp Source: Oral (05/21/25 2330)  Respirations: 16 (05/22/25 1023)  Height: 5' 3\" (160 cm) (05/18/25 0606)  Weight - Scale: 65.3 kg (144 lb) (05/18/25 0606)  SpO2: (!) 89 % (05/22/25 1023)  Physical Exam  Vitals reviewed.   Constitutional:       Comments: Patient seen sitting in bed, uncomfortable appearing     Cardiovascular:      Rate and Rhythm: Normal rate and regular rhythm.   Pulmonary:      Effort: Pulmonary effort is normal. No respiratory distress.      Breath sounds: Normal breath sounds.   Abdominal:      Palpations: Abdomen is soft.      Tenderness: There is no abdominal tenderness.     Musculoskeletal:      Right lower leg: No edema.      Left lower leg: No edema.     Skin:     General: Skin is warm.     Neurological:      Mental Status: She is alert.     Psychiatric:         Mood and Affect: Mood normal.          Discussion with Family: Updated  () via phone. Dinh    Discharge instructions/Information to patient and family:   See after visit summary for information provided to " patient and family.      Provisions for Follow-Up Care:  See after visit summary for information related to follow-up care and any pertinent home health orders.      Mobility at time of Discharge:   Basic Mobility Inpatient Raw Score: 19  JH-HLM Goal: 6: Walk 10 steps or more  JH-HLM Achieved: 6: Walk 10 steps or more  HLM Goal achieved. Continue to encourage appropriate mobility.     Disposition:   Home with hospice    Planned Readmission: None    Administrative Statements   Discharge Statement:  I have spent a total time of 50 minutes in caring for this patient on the day of the visit/encounter. >30 minutes of time was spent on: Diagnostic results, Risks and benefits of tx options, Instructions for management, Patient and family education, Importance of tx compliance, Risk factor reductions, Impressions, Counseling / Coordination of care, Documenting in the medical record, Reviewing / ordering tests, medicine, procedures  , and Communicating with other healthcare professionals .    **Please Note: This note may have been constructed using a voice recognition system**

## 2025-05-22 NOTE — PLAN OF CARE
Problem: Potential for Falls  Goal: Patient will remain free of falls  Description: INTERVENTIONS:  - Educate patient/family on patient safety including physical limitations  - Instruct patient to call for assistance with activity   - Consider consulting OT/PT to assist with strengthening/mobility based on AM PAC & JH-HLM score  - Consult OT/PT to assist with strengthening/mobility   - Keep Call bell within reach  - Keep bed low and locked with side rails adjusted as appropriate  - Keep care items and personal belongings within reach  - Initiate and maintain comfort rounds  - Make Fall Risk Sign visible to staff  - Offer Toileting every 2 Hours, in advance of need  - Initiate/Maintain alarm  - Obtain necessary fall risk management equipment:   - Apply yellow socks and bracelet for high fall risk patients  - Consider moving patient to room near nurses station  Outcome: Adequate for Discharge     Problem: PAIN - ADULT  Goal: Verbalizes/displays adequate comfort level or baseline comfort level  Description: Interventions:  - Encourage patient to monitor pain and request assistance  - Assess pain using appropriate pain scale  - Administer analgesics as ordered based on type and severity of pain and evaluate response  - Implement non-pharmacological measures as appropriate and evaluate response  - Consider cultural and social influences on pain and pain management  - Notify physician/advanced practitioner if interventions unsuccessful or patient reports new pain  - Educate patient/family on pain management process including their role and importance of  reporting pain   - Provide non-pharmacologic/complimentary pain relief interventions  Outcome: Adequate for Discharge     Problem: INFECTION - ADULT  Goal: Absence or prevention of progression during hospitalization  Description: INTERVENTIONS:  - Assess and monitor for signs and symptoms of infection  - Monitor lab/diagnostic results  - Monitor all insertion sites,  i.e. indwelling lines, tubes, and drains  - Monitor endotracheal if appropriate and nasal secretions for changes in amount and color  - [place] appropriate cooling/warming therapies per order  - Administer medications as ordered  - Instruct and encourage patient and family to use good hand hygiene technique  - Identify and instruct in appropriate isolation precautions for identified infection/condition  Outcome: Adequate for Discharge  Goal: Absence of fever/infection during neutropenic period  Description: INTERVENTIONS:  - Monitor WBC  - Perform strict hand hygiene  - Limit to healthy visitors only  - No plants, dried, fresh or silk flowers with [place] in patient room  Outcome: Adequate for Discharge     Problem: SAFETY ADULT  Goal: Patient will remain free of falls  Description: INTERVENTIONS:  - Educate patient/family on patient safety including physical limitations  - Instruct patient to call for assistance with activity   - Consider consulting OT/PT to assist with strengthening/mobility based on AM PAC & -HL score  - Consult OT/PT to assist with strengthening/mobility   - Keep Call bell within reach  - Keep bed low and locked with side rails adjusted as appropriate  - Keep care items and personal belongings within reach  - Initiate and maintain comfort rounds  - Make Fall Risk Sign visible to staff  - Offer Toileting every 2 Hours, in advance of need  - Initiate/Maintain alarm  - Obtain necessary fall risk management equipment:   - Apply yellow socks and bracelet for high fall risk patients  - Consider moving patient to room near nurses station  Outcome: Adequate for Discharge  Goal: Maintain or return to baseline ADL function  Description: INTERVENTIONS:  -  Assess patient's ability to carry out ADLs; assess patient's baseline for ADL function and identify physical deficits which impact ability to perform ADLs (bathing, care of mouth/teeth, toileting, grooming, dressing, etc.)  - Assess/evaluate cause of  self-care deficits   - Assess range of motion  - Assess patient's mobility; develop plan if impaired  - Assess patient's need for assistive devices and provide as appropriate  - Encourage maximum independence but intervene and supervise when necessary  - Involve family in performance of ADLs  - Assess for home care needs following discharge   - Consider OT consult to assist with ADL evaluation and planning for discharge  - Provide patient education as appropriate  - Monitor functional capacity and physical performance, use of AM PAC & JH-HLM   - Monitor gait, balance and fatigue with ambulation    Outcome: Adequate for Discharge  Goal: Maintains/Returns to pre admission functional level  Description: INTERVENTIONS:  - Perform AM-PAC 6 Click Basic Mobility/ Daily Activity assessment daily.  - Set and communicate daily mobility goal to care team and patient/family/caregiver.   - Collaborate with rehabilitation services on mobility goals if consulted  - Perform Range of Motion 3 times a day.  - Reposition patient every 2 hours.  - Dangle patient 3 times a day  - Stand patient 3 times a day  - Ambulate patient 3 times a day  - Out of bed to chair 3 times a day   - Out of bed for meals 3 times a day  - Out of bed for toileting  - Record patient progress and toleration of activity level   Outcome: Adequate for Discharge     Problem: Nutrition/Hydration-ADULT  Goal: Nutrient/Hydration intake appropriate for improving, restoring or maintaining nutritional needs  Description: Monitor and assess patient's nutrition/hydration status for malnutrition. Collaborate with interdisciplinary team and initiate plan and interventions as ordered.  Monitor patient's weight and dietary intake as ordered or per policy. Utilize nutrition screening tool and intervene as necessary. Determine patient's food preferences and provide high-protein, high-caloric foods as appropriate.     INTERVENTIONS:  - Monitor oral intake, urinary output, labs,  and treatment plans  - Assess nutrition and hydration status and recommend course of action  - Evaluate amount of meals eaten  - Assist patient with eating if necessary   - Allow adequate time for meals  - Recommend/ encourage appropriate diets, oral nutritional supplements, and vitamin/mineral supplements  - Order, calculate, and assess calorie counts as needed  - Recommend, monitor, and adjust tube feedings and TPN/PPN based on assessed needs  - Assess need for intravenous fluids  - Provide specific nutrition/hydration education as appropriate  - Include patient/family/caregiver in decisions related to nutrition  Outcome: Adequate for Discharge     Problem: Prexisting or High Potential for Compromised Skin Integrity  Goal: Skin integrity is maintained or improved  Description: INTERVENTIONS:  - Identify patients at risk for skin breakdown  - Assess and monitor skin integrity including under and around medical devices   - Assess and monitor nutrition and hydration status  - Monitor labs  - Assess for incontinence   - Turn and reposition patient  - Assist with mobility/ambulation  - Relieve pressure over osmel prominences   - Avoid friction and shearing  - Provide appropriate hygiene as needed including keeping skin clean and dry  - Evaluate need for skin moisturizer/barrier cream  - Collaborate with interdisciplinary team  - Patient/family teaching  - Consider wound care consult    Assess:  - Review Bartolo scale daily  - Clean and moisturize skin   - Inspect skin when repositioning, toileting, and assisting with ADLS  - Assess under medical devices  - Assess extremities for adequate circulation and sensation     Bed Management:  - Have minimal linens on bed & keep smooth, unwrinkled  - Change linens as needed when moist or perspiring  - Avoid sitting or lying in one position for more than 2 hours while in bed?Keep HOB at 30degrees   - Toileting:  - Offer bedside commode  - Assess for incontinence   - Use  incontinent care products after each incontinent episodes    Activity:  - Mobilize patient 3 times a day  - Encourage activity and walks on unit  - Encourage or provide ROM exercises   - Turn and reposition patient every 2 Hours  - Use appropriate equipment to lift or move patient in bed  - Instruct/ Assist with weight shifting every 2 hours when out of bed in chair  - Consider limitation of chair time 2 hour intervals    Skin Care:  - Avoid use of baby powder, tape, friction and shearing, hot water or constrictive clothing  - Relieve pressure over bony prominences   - Do not massage red bony areas    Next Steps:  - Teach patient strategies to minimize risks   - Consider consults to  interdisciplinary teams   Outcome: Adequate for Discharge

## 2025-05-22 NOTE — ASSESSMENT & PLAN NOTE
PDMP reviewed by prior provider, last prescribed oxycodone IR 5 mg 30 pills for 10-day course 5/2/2025 by Dr. Bernabe of [facility] palliative care  Palliative Care following - was transitioned to comfort care and had PICC line placed with plan for CADD on hospice discharge per Palliative

## 2025-05-22 NOTE — ASSESSMENT & PLAN NOTE
"Reported no bowel movements in approximately 2 weeks  S/p GoLytely on 5/18   s/p Relistor x 1 5/19  Obstruction series revealed, \"Small amount of fecal debris in the rectum. Nonobstructive bowel gas pattern. multiple air-fluid levels seen on the decubitus view, may be due to ileus\"  Patient now with multiple charted BMs 5/20-5/21  Bowel regimen for comfort per Palliative Care  "

## 2025-05-22 NOTE — ASSESSMENT & PLAN NOTE
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   We will continue to follow while admitted.    Please do not hesitate to contact our on-call provider through EPIC Secure Chat or contact [phone] should there be an acute change or other symptom control concerns.    Please do not make any changes to symptom medications (opioid analgesics, nonopioid analgesics, antiemetics, etc) without first consulting the on-call Palliative Care provider for your specific campus; including after hours and on weekends. We are available 24/7, and can be contacted on [employer] chat or at [phone].

## 2025-05-22 NOTE — ASSESSMENT & PLAN NOTE
Lab Results   Component Value Date    HGBA1C 5.7 (H) 03/21/2025       Recent Labs     05/21/25  1623 05/21/25  2150 05/22/25  0601 05/22/25  1026   POCGLU 179* 128 80 127       Blood Sugar Average: Last 72 hrs:  (P) [id number]    Very well-controlled as outpatient, did have episodes of hypoglycemia during prior hospitalizations  Continue home regimen on discharge

## 2025-05-22 NOTE — ASSESSMENT & PLAN NOTE
Last BM 2 weeks ago  Miralax and senokot daily, GoLytely today per primary SLIM  Relistor and milk of magnesia added by primary team today.  Bowel sounds hypoactive  Obstruction series per SLIM - possible ileus, no obstruction demonstrated  Senna BID, lactulose 20 g BID PRN started 5/20  Last bowel movement morning of 5/22

## 2025-05-23 ENCOUNTER — HOME CARE VISIT (OUTPATIENT)
Dept: HOME HOSPICE | Facility: HOSPICE | Age: 89
End: 2025-05-23
Payer: MEDICARE

## 2025-05-23 ENCOUNTER — HOME CARE VISIT (OUTPATIENT)
Dept: HOME HEALTH SERVICES | Facility: HOME HEALTHCARE | Age: 89
End: 2025-05-23
Payer: MEDICARE

## 2025-05-23 VITALS — HEART RATE: 110 BPM | RESPIRATION RATE: 22 BRPM

## 2025-05-23 DIAGNOSIS — Z51.5 HOSPICE CARE PATIENT: Primary | ICD-10-CM

## 2025-05-23 DIAGNOSIS — Z17.0 MALIGNANT NEOPLASM OF UPPER-OUTER QUADRANT OF LEFT BREAST IN FEMALE, ESTROGEN RECEPTOR POSITIVE (HCC): ICD-10-CM

## 2025-05-23 DIAGNOSIS — C50.412 MALIGNANT NEOPLASM OF UPPER-OUTER QUADRANT OF LEFT BREAST IN FEMALE, ESTROGEN RECEPTOR POSITIVE (HCC): ICD-10-CM

## 2025-05-23 PROCEDURE — G0299 HHS/HOSPICE OF RN EA 15 MIN: HCPCS

## 2025-05-23 NOTE — HOSPICE NOTE
confirmed the pump setting with hospice team 0.5mg w/ 0.5mg q15min bolus.  Confirmed order with [facility] at [facility].Transport set for 12pm.  DME for morning delivery.  met with pt at bedside to update on plan. SLIM and palliative care team aware of discharge plan.  NILSON Sneed aware to dc continuous infusion and medicate with comfort meds per order when transport arrives.

## 2025-05-23 NOTE — TELEPHONE ENCOUNTER
5/23/2025 2:07 PM  [facility] home patient requests PCA settings adjusted for improved symptom management.  Filled electronically via Epic as per [place] State Law.    Requested Prescriptions     Signed Prescriptions Disp Refills    HYDROmorphone (Dilaudid) 5 mg/mL 100 mL PCA (Home Health only)       Sig: Route of Administration: Intravenous-PICC;  Basal Rate: 1 mg/hr;  Bolus Dose: 1.25 mg;  Bolus Interval: 15 minutes;  Max Bolus Doses/hr: 4     Authorizing Provider: ABDIEL OLIVER CRNP  [facility]  Hospice Answering Service: [phone]

## 2025-05-24 ENCOUNTER — HOME CARE VISIT (OUTPATIENT)
Dept: HOME HOSPICE | Facility: HOSPICE | Age: 89
End: 2025-05-24
Payer: MEDICARE

## 2025-05-24 ENCOUNTER — HOME CARE VISIT (OUTPATIENT)
Dept: HOME HEALTH SERVICES | Facility: HOME HEALTHCARE | Age: 89
End: 2025-05-24
Payer: MEDICARE

## 2025-05-24 VITALS — DIASTOLIC BLOOD PRESSURE: 68 MMHG | SYSTOLIC BLOOD PRESSURE: 136 MMHG | HEART RATE: 84 BPM

## 2025-05-24 DIAGNOSIS — Z17.0 MALIGNANT NEOPLASM OF UPPER-OUTER QUADRANT OF LEFT BREAST IN FEMALE, ESTROGEN RECEPTOR POSITIVE (HCC): ICD-10-CM

## 2025-05-24 DIAGNOSIS — Z51.5 HOSPICE CARE PATIENT: ICD-10-CM

## 2025-05-24 DIAGNOSIS — C50.412 MALIGNANT NEOPLASM OF UPPER-OUTER QUADRANT OF LEFT BREAST IN FEMALE, ESTROGEN RECEPTOR POSITIVE (HCC): ICD-10-CM

## 2025-05-24 PROCEDURE — G0299 HHS/HOSPICE OF RN EA 15 MIN: HCPCS

## 2025-05-27 NOTE — TELEPHONE ENCOUNTER
5/27/2025 1:30 PM  [facility] home patient requests refill of CII medication and medication adjusted due to change in patient condition.  Filled electronically via Epic as per [place] State Law.    Requested Prescriptions     Signed Prescriptions Disp Refills    HYDROmorphone (Dilaudid) 5 mg/mL 100 mL PCA (Home Health only) 100 mL 0     Sig: Route of Administration: Intravenous-PICC;  Basal Rate: 2 mg/hr;  Bolus Dose: 2.5 mg;  Bolus Interval: 15 minutes;  Max Bolus Doses/hr: 4     Authorizing Provider: ABDIEL OLIVER    LORazepam (ATIVAN) 2 mg/mL concentrated solution 30 mL 2     Sig: Take 0.25 mL (0.5 mg total) by mouth every 8 (eight) hours. May also take 0.25 mL (0.5 mg total) every 2 (two) hours as needed for anxiety or sleep (restlessness / SOB).     Authorizing Provider: ABDIEL OLIVER CRNP  [facility]  Hospice Answering Service: [phone]

## 2025-05-30 NOTE — TELEPHONE ENCOUNTER
5/30/2025 12:23 PM  [facility] PCA settings adjusted for improved symptom management.  Filled electronically via Epic as per [place] State Law.    Requested Prescriptions     Signed Prescriptions Disp Refills    HYDROmorphone (Dilaudid) 10 mg/mL 100 mL PCA (Home Health only) 100 mL 0     Sig: Route of Administration: Intravenous;  Basal Rate: 3 mg/hr;  Bolus Dose: 3.5 mg;  Bolus Interval: 15 minutes;  Max Bolus Doses/hr: 4     Authorizing Provider: ABDIEL OLIVER CRNP  [facility]  Hospice Answering Service: [phone]

## 2025-06-03 NOTE — TELEPHONE ENCOUNTER
6/3/2025 9:58 AM  [facility] home patient requests PCA settings adjusted for improved symptom management.  Filled electronically via Epic as per [place] State Law.    Requested Prescriptions     Signed Prescriptions Disp Refills    HYDROmorphone (Dilaudid) 10 mg/mL 100 mL PCA (Home Health only)       Sig: Route of Administration: Intravenous;  Basal Rate: 4 mg/hr;  Bolus Dose: 4 mg;  Bolus Interval: 15 minutes;  Max Bolus Doses/hr: 4     Authorizing Provider: ABDIEL OLIVER CRNP  [facility]  Hospice Answering Service: [phone]

## 2025-06-03 NOTE — HOSPICE
Spouse, brother and sister present at time of death.  Supporting each other.  PICC line removed.  Bereavement services discussed.  DME for P/U

## 2025-06-05 ENCOUNTER — HOME CARE VISIT (OUTPATIENT)
Dept: HOME HOSPICE | Facility: HOSPICE | Age: 89
End: 2025-06-05
Payer: MEDICARE

## 2025-06-06 ENCOUNTER — HOME CARE VISIT (OUTPATIENT)
Dept: HOME HOSPICE | Facility: HOSPICE | Age: 89
End: 2025-06-06
Payer: MEDICARE

## (undated) DEVICE — SUT PDS II 2-0 CT-2 27 IN Z333H

## (undated) DEVICE — UNDYED MONOFILAMENT (POLYDIOXANONE), ABSORBABLE SURGICAL SUTURE: Brand: PDS

## (undated) DEVICE — SKIN MARKER DUAL TIP WITH RULER CAP, FLEXIBLE RULER AND LABELS: Brand: DEVON

## (undated) DEVICE — CHEST/BREAST DRAPE: Brand: CONVERTORS

## (undated) DEVICE — SAFETY PINS KIT: Brand: CARDINAL HEALTH

## (undated) DEVICE — VIAL DECANTER

## (undated) DEVICE — GLOVE SRG BIOGEL 7

## (undated) DEVICE — ELECTRODE BLADE MOD E-Z CLEAN  2.75IN 7CM -0012AM

## (undated) DEVICE — PAD GROUNDING ADULT

## (undated) DEVICE — KERLIX BANDAGE ROLL: Brand: KERLIX

## (undated) DEVICE — DRESSING BIOPATCH ANTIMICROBIAL 1 IN DISC

## (undated) DEVICE — PENCIL ELECTROSURG E-Z CLEAN -0035H

## (undated) DEVICE — DRAPE SHEET THREE QUARTER

## (undated) DEVICE — CHLORAPREP HI-LITE 26ML ORANGE

## (undated) DEVICE — SUT VICRYL 3-0 SH 27 IN J416H

## (undated) DEVICE — INTENDED FOR TISSUE SEPARATION, AND OTHER PROCEDURES THAT REQUIRE A SHARP SURGICAL BLADE TO PUNCTURE OR CUT.: Brand: BARD-PARKER ® CARBON RIB-BACK BLADES

## (undated) DEVICE — SHEATH, GUIDE, SAVI SCOUT®: Brand: SAVI SCOUT®

## (undated) DEVICE — SMOKE EVACUATION TUBING WITH 8 IN INTEGRAL WAND AND SPONGE GUARD: Brand: BUFFALO FILTER

## (undated) DEVICE — JACKSON-PRATT 100CC BULB RESERVOIR: Brand: CARDINAL HEALTH

## (undated) DEVICE — 3M™ TEGADERM™ TRANSPARENT FILM DRESSING FRAME STYLE, 1626W, 4 IN X 4-3/4 IN (10 CM X 12 CM), 50/CT 4CT/CASE: Brand: 3M™ TEGADERM™

## (undated) DEVICE — DRAPE EQUIPMENT RF WAND

## (undated) DEVICE — MEDI-VAC YANKAUER SUCTION HANDLE W/STRAIGHT TIP & CONTROL VENT: Brand: CARDINAL HEALTH

## (undated) DEVICE — COVER ROLL 8 IN X 2 YD STRETCH TAPE

## (undated) DEVICE — ELECTRODE BLADE MOD  E-Z CLEAN 6.5IN -0014M

## (undated) DEVICE — NEEDLE 25G X 1 1/2

## (undated) DEVICE — DRAPE PROBE NEO-PROBE/ULTRASOUND

## (undated) DEVICE — BETHLEHEM UNIVERSAL LAPAROTOMY: Brand: CARDINAL HEALTH

## (undated) DEVICE — BULB SYRINGE,IRRIGATION WITH PROTECTIVE CAP: Brand: DOVER

## (undated) DEVICE — BETHLEHEM UNIVERSAL MINOR GEN: Brand: CARDINAL HEALTH

## (undated) DEVICE — INTENDED FOR TISSUE SEPARATION, AND OTHER PROCEDURES THAT REQUIRE A SHARP SURGICAL BLADE TO PUNCTURE OR CUT.: Brand: BARD-PARKER SAFETY BLADES SIZE 15, STERILE

## (undated) DEVICE — GLOVE INDICATOR PI UNDERGLOVE SZ 7.5 BLUE

## (undated) DEVICE — MEDI-VAC YANK SUCT HNDL W/TPRD BULBOUS TIP: Brand: CARDINAL HEALTH

## (undated) DEVICE — ADHESIVE SKIN HIGH VISCOSITY EXOFIN 1ML

## (undated) DEVICE — FUNNEL E KELLER 2 DELIVERY DEVICE

## (undated) DEVICE — BLANKET HYPOTHERMIA ADULT GAYMAR

## (undated) DEVICE — SUT MONOCRYL 4-0 PS-2 18 IN Y496G

## (undated) DEVICE — LIGHT HANDLE COVER SLEEVE DISP BLUE STELLAR

## (undated) DEVICE — TUBING SUCTION 5MM X 12 FT

## (undated) DEVICE — GLOVE SRG BIOGEL 7.5

## (undated) DEVICE — SUT VICRYL 2-0 REEL 54 IN J286G

## (undated) DEVICE — PLUMEPEN PRO 10FT

## (undated) DEVICE — GAUZE SPONGES,USP TYPE VII GAUZE, 12 PLY: Brand: CURITY

## (undated) DEVICE — BOWL: 16OZ PEELPOUCH 75/CS 16/PLT: Brand: MEDEGEN MEDICAL PRODUCTS, LLC

## (undated) DEVICE — SYRINGE 1ML TB 26G X 3/8 SAFETY

## (undated) DEVICE — ABDOMINAL PAD: Brand: DERMACEA

## (undated) DEVICE — LIGACLIP MCA MULTIPLE CLIP APPLIERS, 20 MEDIUM CLIPS: Brand: LIGACLIP

## (undated) DEVICE — 3M™ STERI-STRIP™ REINFORCED ADHESIVE SKIN CLOSURES, R1547, 1/2 IN X 4 IN (12 MM X 100 MM), 6 STRIPS/ENVELOPE: Brand: 3M™ STERI-STRIP™